# Patient Record
Sex: MALE | Race: WHITE | HISPANIC OR LATINO | Employment: UNEMPLOYED | URBAN - METROPOLITAN AREA
[De-identification: names, ages, dates, MRNs, and addresses within clinical notes are randomized per-mention and may not be internally consistent; named-entity substitution may affect disease eponyms.]

---

## 2018-11-16 ENCOUNTER — TELEPHONE (OUTPATIENT)
Dept: ORTHOPEDICS | Facility: CLINIC | Age: 43
End: 2018-11-16

## 2018-11-16 NOTE — TELEPHONE ENCOUNTER
----- Message from Angie Salazar sent at 11/16/2018 12:03 PM CST -----  Patient called to schedule an urgent appointment specifically with Dr [facility]  And wishes to speak with a nurse regarding this matter.       he can be reached at [phone]    Thanks  KB      No answer  No voice mail set up yet not taking calls at this time

## 2018-11-19 ENCOUNTER — TELEPHONE (OUTPATIENT)
Dept: ORTHOPEDICS | Facility: CLINIC | Age: 43
End: 2018-11-19

## 2018-11-19 NOTE — TELEPHONE ENCOUNTER
----- Message from Arlette Hong sent at 11/19/2018 11:43 AM CST -----  Contact: patient  Attn Philomena  Please call pt at [phone]    Patient is returning your  Patient is having left knee pain and only     Thank you     We spoke  Right off he said he has Medicaid  Told we do not take that here   I asked what was his injury  He said meniscal tear  Told we send sports related injuries to sports medicine  He said they said to call here  I gave him our [facility] Escalation phone number for help finding a provider  He thanked me.

## 2018-12-21 ENCOUNTER — CLINICAL SUPPORT (OUTPATIENT)
Dept: LAB | Facility: HOSPITAL | Age: 43
End: 2018-12-21
Attending: ORTHOPAEDIC SURGERY
Payer: MEDICAID

## 2018-12-21 ENCOUNTER — HOSPITAL ENCOUNTER (OUTPATIENT)
Dept: RADIOLOGY | Facility: HOSPITAL | Age: 43
Discharge: HOME | End: 2018-12-21
Attending: ORTHOPAEDIC SURGERY
Payer: MEDICAID

## 2018-12-21 DIAGNOSIS — Z01.818 PREOP EXAMINATION: ICD-10-CM

## 2018-12-21 DIAGNOSIS — Z01.818 PREOP EXAMINATION: Primary | ICD-10-CM

## 2018-12-21 PROCEDURE — 93005 ELECTROCARDIOGRAM TRACING: CPT

## 2018-12-21 PROCEDURE — 93010 ELECTROCARDIOGRAM REPORT: CPT | Mod: ,,, | Performed by: STUDENT IN AN ORGANIZED HEALTH CARE EDUCATION/TRAINING PROGRAM

## 2018-12-21 PROCEDURE — 93010 EKG 12-LEAD: ICD-10-PCS | Mod: ,,, | Performed by: STUDENT IN AN ORGANIZED HEALTH CARE EDUCATION/TRAINING PROGRAM

## 2018-12-21 PROCEDURE — 71046 X-RAY EXAM CHEST 2 VIEWS: CPT | Mod: TC,FY

## 2018-12-21 PROCEDURE — 71046 X-RAY EXAM CHEST 2 VIEWS: CPT | Mod: 26,,, | Performed by: RADIOLOGY

## 2018-12-31 ENCOUNTER — ANESTHESIA EVENT (OUTPATIENT)
Dept: SURGERY | Facility: HOSPITAL | Age: 43
End: 2018-12-31
Payer: MEDICAID

## 2018-12-31 ENCOUNTER — HOSPITAL ENCOUNTER (OUTPATIENT)
Dept: PREADMISSION TESTING | Facility: HOSPITAL | Age: 43
Discharge: HOME | End: 2018-12-31
Attending: ORTHOPAEDIC SURGERY
Payer: MEDICAID

## 2018-12-31 VITALS
DIASTOLIC BLOOD PRESSURE: 78 MMHG | HEIGHT: 75 IN | WEIGHT: 203.25 LBS | OXYGEN SATURATION: 97 % | BODY MASS INDEX: 25.27 KG/M2 | HEART RATE: 104 BPM | RESPIRATION RATE: 18 BRPM | TEMPERATURE: 98 F | SYSTOLIC BLOOD PRESSURE: 132 MMHG

## 2018-12-31 DIAGNOSIS — M23.332 OTHER MENISCUS DERANGEMENTS, OTHER MEDIAL MENISCUS, LEFT KNEE: Primary | ICD-10-CM

## 2018-12-31 PROCEDURE — G8980 MOBILITY D/C STATUS: HCPCS | Mod: CH

## 2018-12-31 PROCEDURE — G8979 MOBILITY GOAL STATUS: HCPCS | Mod: CH

## 2018-12-31 PROCEDURE — 97161 PT EVAL LOW COMPLEX 20 MIN: CPT

## 2018-12-31 PROCEDURE — G8978 MOBILITY CURRENT STATUS: HCPCS | Mod: CH

## 2018-12-31 RX ORDER — NAPROXEN SODIUM 220 MG
220 TABLET ORAL
Status: ON HOLD | COMMUNITY
End: 2019-04-09 | Stop reason: HOSPADM

## 2018-12-31 RX ORDER — LIDOCAINE HYDROCHLORIDE 10 MG/ML
1 INJECTION, SOLUTION EPIDURAL; INFILTRATION; INTRACAUDAL; PERINEURAL ONCE
Status: CANCELLED | OUTPATIENT
Start: 2018-12-31 | End: 2018-12-31

## 2018-12-31 RX ORDER — SODIUM CHLORIDE, SODIUM LACTATE, POTASSIUM CHLORIDE, CALCIUM CHLORIDE 600; 310; 30; 20 MG/100ML; MG/100ML; MG/100ML; MG/100ML
INJECTION, SOLUTION INTRAVENOUS CONTINUOUS
Status: CANCELLED | OUTPATIENT
Start: 2018-12-31

## 2018-12-31 RX ORDER — TRAMADOL HYDROCHLORIDE 50 MG/1
100 TABLET ORAL EVERY 6 HOURS
Status: ON HOLD | COMMUNITY
End: 2019-04-09 | Stop reason: HOSPADM

## 2018-12-31 NOTE — DISCHARGE INSTRUCTIONS
· Arrive on  1/7/2019  at  10:30.  · Report to the 2nd floor Procedural Check In Room .   · Nothing to eat or drink after midnight the night before your procedure.  ·                                                          · Please remove all body piercings and leave all your jewelery and valuables at home .  · Please remove your contact lenses.   · Wear loose comfortable clothing.  · You will not be able to drive that day, please make arrangement for transportation to and from your procedure.  · You cannot be alone for 24 hours after anesthesia. Make arrangements as needed.  · Shower the night before your procedure and the morning of your procedure with Hibiclens antibacterial solution.  · No lotions, creams or powders  · Do not shave 3 days prior to procedure  · Report any signs or symptoms of an infection to your surgeon. Examples: urinary (bladder) infection, upper respiratory infection, skin boils.   · Practice good hand washing before, during, and after procedure.   · Stop Aspirin, Ibuprofen, Advil, Motrin, Aleve, Nuprin, Naprosyn (all NSAID Medication) or any other blood thinners 5 days before your procedure unless directed by your physician.  Also hold all over the counter vitamins and herbal supplements for 5 days prior to your procedure.  · You may take Tylenol or Acetaminophen up the day of surgery for any pain.        I have read or had read and explained to me, and understand the above information.    Additional comments or instructions:  For additional questions call [phone]        Pre-Op Bathing Instructions    Before surgery, you can play an important role in your own health.    Because skin is not sterile, we need to be sure that your skin is as free of germs as possible. By following the instructions below, you can reduce the number of germs on your skin before surgery.    IMPORTANT: You will need to shower with a special soap called Hibiclens*, available at any pharmacy, over the counter usually  in the first aid isle.  If you are allergic to Chlorhexidine (the antiseptic in Hibiclens), use an antibacterial soap such as Dial Soap for your preoperative showers.  You will shower with Hibiclens the night before and the morning of surgery. Both the night before your surgery and the morning of your surgery see steps 2 and 3.   Do not use Hibiclens on the head, face or genitals to avoid injury to those areas.    STEP #1  1.  Shower with Hibiclens solution night before and the morning of surgery.      STEP #2: THE NIGHT BEFORE YOUR SURGERY     1. Do not shave the area of your body where your surgery will be performed.  2. Wash your hair as usual with your normal  Shampoo. Wash body shoulder to toes with your normal soap.  3. Squeeze Hibiclens into hand apply to surgical site.   4. Wash the site gently for five (5) minutes. Do not scrub your skin too hard.   5. Do not wash with your regular soap after Hibiclens is used.  6. Rinse your body thoroughly.  7. Pat yourself dry with a clean, soft towel.  8. Do not use lotion, cream, or powder.  9. Wear clean clothes.    STEP #3: THE MORNING OF YOUR SURGERY     1. Repeat Step #2.    * Not to be used by people allergic to Chlorhexidine.            Anesthesia: General Anesthesia     You are watched continuously during your procedure by your anesthesia provider.     Youre due to have surgery. During surgery, youll be given medicine called anesthesia or anesthetic. This will keep you comfortable and pain-free. Your anesthesia provider will use general anesthesia.  What is general anesthesia?  General anesthesia puts you into a state like deep sleep. It goes into the bloodstream (IV anesthetics), into the lungs (gas anesthetics), or both. You feel nothing during the procedure. You will not remember it. During the procedure, the anesthesia provider monitors you continuously. He or she checks your heart rate and rhythm, blood pressure, breathing, and blood oxygen.  · IV  anesthetics. IV anesthetics are given through an IV line in your arm. Theyre often given first. This is so you are asleep before a gas anesthetic is started. Some kinds of IV anesthetics relieve pain. Others relax you. Your doctor will decide which kind is best in your case.  · Gas anesthetics. Gas anesthetics are breathed into the lungs. They are often used to keep you asleep. They can be given through a facemask or a tube placed in your larynx or trachea (breathing tube).  ¨ If you have a facemask, your anesthesia provider will most likely place it over your nose and mouth while youre still awake. Youll breathe oxygen through the mask as your IV anesthetic is started. Gas anesthetic may be added through the mask.  ¨ If you have a tube in the larynx or trachea, it will be inserted into your throat after youre asleep.  Anesthesia tools and medicines  You will likely have:  · IV anesthetics. These are put into an IV line into your bloodstream.  · Gas anesthetics. You breathe these anesthetics into your lungs, where they pass into your bloodstream.  · Pulse oximeter. This is a small clip that is attached to the end of your finger. This measures your blood oxygen level.  · Electrocardiography leads (electrodes). These are small sticky pads that are placed on your chest. They record your heart rate and rhythm.  · Blood pressure cuff. This reads your blood pressure.  Risks and possible complications  General anesthesia has some risks. These include:  · Breathing problems  · Nausea and vomiting  · Sore throat or hoarseness (usually temporary)  · Allergic reaction to the anesthetic  · Irregular heartbeat (rare)  · Cardiac arrest (rare)   Anesthesia safety  · Follow all instructions you are given for how long not to eat or drink before your procedure.  · Be sure your doctor knows what medicines and drugs you take. This includes over-the-counter medicines, herbs, supplements, alcohol or other drugs. You will be asked  when those were last taken.  · Have an adult family member or friend drive you home after the procedure.  · For the first 24 hours after your surgery:  ¨ Do not drive or use heavy equipment.  ¨ Do not make important decisions or sign legal documents. If important decisions or signing legal documents is necessary during the first 24 hours after surgery, have a trusted family member or spouse act on your behalf.  ¨ Avoid alcohol.  ¨ Have a responsible adult stay with you. He or she can watch for problems and help keep you safe.  Date Last Reviewed: 12/1/2016  © 0445-9736 [employer]. [address] [place] [address], [place] [address]. All rights reserved. This information is not intended as a substitute for professional medical care. Always follow your healthcare professional's instructions.

## 2018-12-31 NOTE — PT/OT/SLP PROGRESS
Physical Therapy Crutch Evaluation/Training  Discharge  Nilesh Rolon Jr.   MRN: [id number]   Admitting Diagnosis: <principal problem not specified> The encounter diagnosis was Other meniscus derangements, other medial meniscus, left knee.  Pre op training/visit                          Billable Minutes:930 to 945  Evaluation 15     Order: gait training with axillary crutches  Order Date: 12/31/2018    Precautions Weight Bearing Status: weight bearing as tolerated: left leg    There is no problem list on file for this patient.    Past Medical History:   Diagnosis Date    Arthritis      History reviewed. No pertinent surgical history.    Subjective Information     Prior level of function: independent  Residence: lives with their family 1-story house/ trailer   Support available: family  Equipment owned: None  Mental Status: Oriented X 4  Skin: Intact  Sensation: Intact  Posture: Good  Hearing: Intact  Vision:  Intact    Pain at time of assessment: 3/10 located in left leg, aggravated by activity , relieved by rest.    Objective findings/Assessment  Bed mobility: independent  Transfers: independent  Gross assessment: WFL  Standing balance: Good  ROM: WFL  Strength: WFL  Patient requires crutch fitting and training.    Treatment  Gait: Instructed in WBAT gait with axillary crutches  Stairs: Teach back method  Transfers: na  Therapeutic Exercise: na  Education provided in form of: stair climbing    AM-PAC 6 CLICK MOBILITY  How much help from another person does this patient currently need?   1 = Unable, Total/Dependent Assistance  2 = A lot, Maximum/Moderate Assistance  3 = A little, Minimum/Contact Guard/Supervision  4 = None, Modified [place]/Independent          AM-PAC Raw Score CMS G-Code Modifier Level of Impairment Assistance   6 % Total / Unable   7 - 9 CM 80 - 100% Maximal Assist   10 - 14 CL 60 - 80% Moderate Assist   15 - 19 CK 40 - 60% Moderate Assist   20 - 22 CJ 20 - 40% Minimal Assist   23  CI 1-20% SBA / CGA   24 CH 0% Independent/ Mod I     Goals/Discharge Status  Patient safely and effectively ambulates with crutches with  modified independent,  weight bearing as tolerated: left leg on level surfaces.    Recommended Plan:  Patient to be discharged to home with family  support.    Merlin Elise, PT  12/31/2018

## 2019-01-07 ENCOUNTER — HOSPITAL ENCOUNTER (OUTPATIENT)
Facility: HOSPITAL | Age: 44
Discharge: HOME | End: 2019-01-07
Attending: ORTHOPAEDIC SURGERY | Admitting: ORTHOPAEDIC SURGERY
Payer: MEDICAID

## 2019-01-07 ENCOUNTER — ANESTHESIA (OUTPATIENT)
Dept: SURGERY | Facility: HOSPITAL | Age: 44
End: 2019-01-07
Payer: MEDICAID

## 2019-01-07 VITALS
OXYGEN SATURATION: 93 % | WEIGHT: 203 LBS | TEMPERATURE: 98 F | SYSTOLIC BLOOD PRESSURE: 123 MMHG | HEIGHT: 75 IN | HEART RATE: 102 BPM | DIASTOLIC BLOOD PRESSURE: 78 MMHG | BODY MASS INDEX: 25.24 KG/M2 | RESPIRATION RATE: 16 BRPM

## 2019-01-07 DIAGNOSIS — M23.332 OTHER MENISCUS DERANGEMENTS, OTHER MEDIAL MENISCUS, LEFT KNEE: Primary | ICD-10-CM

## 2019-01-07 PROCEDURE — 01382 ANES DX ARTHRS PX KNEE JT: CPT | Performed by: ORTHOPAEDIC SURGERY

## 2019-01-07 PROCEDURE — 25000003 PHARM REV CODE 250: Performed by: STUDENT IN AN ORGANIZED HEALTH CARE EDUCATION/TRAINING PROGRAM

## 2019-01-07 PROCEDURE — 71000015 HC POSTOP RECOV 1ST HR: Performed by: ORTHOPAEDIC SURGERY

## 2019-01-07 PROCEDURE — 25000003 PHARM REV CODE 250: Performed by: ORTHOPAEDIC SURGERY

## 2019-01-07 PROCEDURE — 63600175 PHARM REV CODE 636 W HCPCS: Performed by: STUDENT IN AN ORGANIZED HEALTH CARE EDUCATION/TRAINING PROGRAM

## 2019-01-07 PROCEDURE — 37000009 HC ANESTHESIA EA ADD 15 MINS: Performed by: ORTHOPAEDIC SURGERY

## 2019-01-07 PROCEDURE — 36000710: Performed by: ORTHOPAEDIC SURGERY

## 2019-01-07 PROCEDURE — 37000008 HC ANESTHESIA 1ST 15 MINUTES: Performed by: ORTHOPAEDIC SURGERY

## 2019-01-07 PROCEDURE — 71000039 HC RECOVERY, EACH ADD'L HOUR: Performed by: ORTHOPAEDIC SURGERY

## 2019-01-07 PROCEDURE — 71000033 HC RECOVERY, INTIAL HOUR: Performed by: ORTHOPAEDIC SURGERY

## 2019-01-07 PROCEDURE — S0020 INJECTION, BUPIVICAINE HYDRO: HCPCS | Performed by: ORTHOPAEDIC SURGERY

## 2019-01-07 PROCEDURE — 63600175 PHARM REV CODE 636 W HCPCS: Performed by: ORTHOPAEDIC SURGERY

## 2019-01-07 PROCEDURE — 36000711: Performed by: ORTHOPAEDIC SURGERY

## 2019-01-07 RX ORDER — EPINEPHRINE 1 MG/ML
INJECTION, SOLUTION INTRACARDIAC; INTRAMUSCULAR; INTRAVENOUS; SUBCUTANEOUS
Status: DISCONTINUED | OUTPATIENT
Start: 2019-01-07 | End: 2019-01-07 | Stop reason: HOSPADM

## 2019-01-07 RX ORDER — CEFAZOLIN SODIUM 2 G/50ML
2 SOLUTION INTRAVENOUS ONCE
Status: DISCONTINUED | OUTPATIENT
Start: 2019-01-07 | End: 2019-01-07 | Stop reason: HOSPADM

## 2019-01-07 RX ORDER — SODIUM CHLORIDE, SODIUM LACTATE, POTASSIUM CHLORIDE, CALCIUM CHLORIDE 600; 310; 30; 20 MG/100ML; MG/100ML; MG/100ML; MG/100ML
INJECTION, SOLUTION INTRAVENOUS CONTINUOUS PRN
Status: DISCONTINUED | OUTPATIENT
Start: 2019-01-07 | End: 2019-01-07

## 2019-01-07 RX ORDER — MIDAZOLAM HYDROCHLORIDE 1 MG/ML
INJECTION, SOLUTION INTRAMUSCULAR; INTRAVENOUS
Status: DISCONTINUED | OUTPATIENT
Start: 2019-01-07 | End: 2019-01-07

## 2019-01-07 RX ORDER — SODIUM CHLORIDE 0.9 % (FLUSH) 0.9 %
3 SYRINGE (ML) INJECTION EVERY 8 HOURS
Status: DISCONTINUED | OUTPATIENT
Start: 2019-01-07 | End: 2019-01-07 | Stop reason: HOSPADM

## 2019-01-07 RX ORDER — ONDANSETRON 2 MG/ML
INJECTION INTRAMUSCULAR; INTRAVENOUS
Status: DISCONTINUED | OUTPATIENT
Start: 2019-01-07 | End: 2019-01-07

## 2019-01-07 RX ORDER — PHENYLEPHRINE HYDROCHLORIDE 10 MG/ML
INJECTION INTRAVENOUS
Status: DISCONTINUED | OUTPATIENT
Start: 2019-01-07 | End: 2019-01-07

## 2019-01-07 RX ORDER — SODIUM CHLORIDE 0.9 % (FLUSH) 0.9 %
3 SYRINGE (ML) INJECTION
Status: DISCONTINUED | OUTPATIENT
Start: 2019-01-07 | End: 2019-01-07 | Stop reason: HOSPADM

## 2019-01-07 RX ORDER — BUPIVACAINE HYDROCHLORIDE 5 MG/ML
INJECTION, SOLUTION EPIDURAL; INTRACAUDAL
Status: DISCONTINUED | OUTPATIENT
Start: 2019-01-07 | End: 2019-01-07 | Stop reason: HOSPADM

## 2019-01-07 RX ORDER — FENTANYL CITRATE 50 UG/ML
INJECTION, SOLUTION INTRAMUSCULAR; INTRAVENOUS
Status: DISCONTINUED | OUTPATIENT
Start: 2019-01-07 | End: 2019-01-07

## 2019-01-07 RX ORDER — LIDOCAINE HYDROCHLORIDE 10 MG/ML
1 INJECTION, SOLUTION EPIDURAL; INFILTRATION; INTRACAUDAL; PERINEURAL ONCE
Status: DISCONTINUED | OUTPATIENT
Start: 2019-01-07 | End: 2019-01-07 | Stop reason: HOSPADM

## 2019-01-07 RX ORDER — LIDOCAINE HCL/PF 100 MG/5ML
SYRINGE (ML) INTRAVENOUS
Status: DISCONTINUED | OUTPATIENT
Start: 2019-01-07 | End: 2019-01-07

## 2019-01-07 RX ORDER — CEFAZOLIN SODIUM 2 G/50ML
2 SOLUTION INTRAVENOUS
Status: COMPLETED | OUTPATIENT
Start: 2019-01-07 | End: 2019-01-07

## 2019-01-07 RX ORDER — HYDROMORPHONE HYDROCHLORIDE 2 MG/ML
0.2 INJECTION, SOLUTION INTRAMUSCULAR; INTRAVENOUS; SUBCUTANEOUS EVERY 5 MIN PRN
Status: DISCONTINUED | OUTPATIENT
Start: 2019-01-07 | End: 2019-01-07 | Stop reason: HOSPADM

## 2019-01-07 RX ORDER — SODIUM CHLORIDE, SODIUM LACTATE, POTASSIUM CHLORIDE, CALCIUM CHLORIDE 600; 310; 30; 20 MG/100ML; MG/100ML; MG/100ML; MG/100ML
INJECTION, SOLUTION INTRAVENOUS CONTINUOUS
Status: DISCONTINUED | OUTPATIENT
Start: 2019-01-07 | End: 2019-01-07 | Stop reason: HOSPADM

## 2019-01-07 RX ORDER — OXYCODONE AND ACETAMINOPHEN 5; 325 MG/1; MG/1
1 TABLET ORAL
Status: DISCONTINUED | OUTPATIENT
Start: 2019-01-07 | End: 2019-01-07 | Stop reason: HOSPADM

## 2019-01-07 RX ORDER — SUCCINYLCHOLINE CHLORIDE 20 MG/ML
INJECTION INTRAMUSCULAR; INTRAVENOUS
Status: DISCONTINUED | OUTPATIENT
Start: 2019-01-07 | End: 2019-01-07

## 2019-01-07 RX ORDER — OXYCODONE AND ACETAMINOPHEN 5; 325 MG/1; MG/1
1 TABLET ORAL EVERY 4 HOURS PRN
Qty: 10 TABLET | Refills: 0 | Status: SHIPPED | OUTPATIENT
Start: 2019-01-07 | End: 2019-01-14

## 2019-01-07 RX ORDER — PROPOFOL 10 MG/ML
INJECTION, EMULSION INTRAVENOUS
Status: DISCONTINUED | OUTPATIENT
Start: 2019-01-07 | End: 2019-01-07

## 2019-01-07 RX ORDER — ONDANSETRON 2 MG/ML
4 INJECTION INTRAMUSCULAR; INTRAVENOUS ONCE
Status: DISCONTINUED | OUTPATIENT
Start: 2019-01-07 | End: 2019-01-07 | Stop reason: HOSPADM

## 2019-01-07 RX ADMIN — OXYCODONE HYDROCHLORIDE AND ACETAMINOPHEN 1 TABLET: 5; 325 TABLET ORAL at 08:01

## 2019-01-07 RX ADMIN — SUCCINYLCHOLINE CHLORIDE 100 MG: 20 INJECTION, SOLUTION INTRAMUSCULAR; INTRAVENOUS at 07:01

## 2019-01-07 RX ADMIN — CEFAZOLIN SODIUM 2 G: 2 SOLUTION INTRAVENOUS at 07:01

## 2019-01-07 RX ADMIN — PHENYLEPHRINE HYDROCHLORIDE 200 MCG: 10 INJECTION INTRAVENOUS at 07:01

## 2019-01-07 RX ADMIN — PHENYLEPHRINE HYDROCHLORIDE 100 MCG: 10 INJECTION INTRAVENOUS at 07:01

## 2019-01-07 RX ADMIN — ONDANSETRON 4 MG: 2 INJECTION, SOLUTION INTRAMUSCULAR; INTRAVENOUS at 07:01

## 2019-01-07 RX ADMIN — SODIUM CHLORIDE, SODIUM LACTATE, POTASSIUM CHLORIDE, AND CALCIUM CHLORIDE: .6; .31; .03; .02 INJECTION, SOLUTION INTRAVENOUS at 07:01

## 2019-01-07 RX ADMIN — PROPOFOL 150 MG: 10 INJECTION, EMULSION INTRAVENOUS at 07:01

## 2019-01-07 RX ADMIN — MIDAZOLAM 2 MG: 1 INJECTION INTRAMUSCULAR; INTRAVENOUS at 07:01

## 2019-01-07 RX ADMIN — FENTANYL CITRATE 100 MCG: 50 INJECTION, SOLUTION INTRAMUSCULAR; INTRAVENOUS at 07:01

## 2019-01-07 RX ADMIN — LIDOCAINE HYDROCHLORIDE 80 MG: 20 INJECTION, SOLUTION INTRAVENOUS at 07:01

## 2019-01-07 RX ADMIN — PROPOFOL 50 MG: 10 INJECTION, EMULSION INTRAVENOUS at 07:01

## 2019-01-07 NOTE — BRIEF OP NOTE
[facility]  Brief Operative Note     SUMMARY     Surgery Date: 1/7/2019     Surgeon(s) and Role:     * Derick Kirby MD - Primary    Assisting Surgeon: Jose Ulloa    Pre-op Diagnosis:  Other meniscus derangements, other medial meniscus, left knee [M23.332]    Post-op Diagnosis:  Post-Op Diagnosis Codes:  Medial femoral condyle chondral defect    Procedures:  ATS Knee scope    Anesthesia: General    Description of the findings of the procedure: Meniscus intact, Medial femoral condyle chondral defect    Findings/Key Components: Medial femoral condyle chondral defect    Estimated Blood Loss: 1cc         Specimens:   Specimen (12h ago, onward)    None          Discharge Note    SUMMARY     Admit Date: 1/7/2019    Discharge Date and Time:  01/07/2019 7:48 AM    Hospital Course (synopsis of major diagnoses, care, treatment, and services provided during the course of the hospital stay): Left knee pain pre-operatively admitted to same day surgery for Left Knee ATS.  Pt tolerated procedure well. Medial femoral condyle chondral defect noted. Meniscus intact. Pt discharged home same day    Final Diagnosis: Post-Op Diagnosis Codes:   Medial femoral condyle chondral defect    Disposition: Home or Self Care    Follow Up/Patient Instructions:     Medications:  Reconciled Home Medications:      Medication List      START taking these medications    oxyCODONE-acetaminophen 5-325 mg per tablet  Commonly known as:  PERCOCET  Take 1 tablet by mouth every 4 (four) hours as needed for Pain (q4-6hr PRN pn).        CONTINUE taking these medications    multivitamin capsule  Take 1 capsule by mouth once daily.     naproxen sodium 220 MG tablet  Commonly known as:  ANAPROX  Take 220 mg by mouth every 12 (twelve) hours.     traMADol 50 mg tablet  Commonly known as:  ULTRAM  Take 100 mg by mouth every 6 (six) hours.          Discharge Procedure Orders   Keep surgical extremity elevated     Ice to affected area     Activity  as tolerated     Follow-up Information     Derick Kirby MD In 2 weeks.    Specialty:  Orthopedic Surgery  Contact information:  [address] [address] [phone]

## 2019-01-07 NOTE — DISCHARGE INSTRUCTIONS
After Knee Arthroscopy  After surgery, your joint may be swollen, painful, and stiff. Your recovery time will depend on what was done. Your surgeon will tell you when to resume activity and weight bearing. If you had meniscal cartilage or loose bodies removed, you may be told to bear weight early on.    Patient will get a phone call from [facility] in regards to when and where to go to PHYSICAL THERAPY.        Follow your surgeons guidelines for healing:  · Elevate your knee as much as possible and ice your knee for 20 minutes. then allow at least 20 minutes before the next icing session.  · Limit weight-bearing, if instructed to do so.  · Keep your knee bandaged according to your surgeon's instructions.  · When you shower, wrap your knee with plastic to keep it dry.  · Take pain medicine as directed.  Your checklist  The checklist below helps remind you what to do after arthroscopy.  ? Schedule your first follow-up visit for 5 days after surgery or as directed by your surgeon.  ? Take care of your incision and bathe as directed.  ? Complete your physical therapy program.  ? Talk with your surgeon about activities you can do immediately and those that need to wait.  Contact your surgeon right away if you have any of the following:  · Fever  · Chills  · Persistent warmth or redness around the knee  · Persistent or increased pain  · Significant swelling in your knee  · Increasing pain in your calf muscle  Date Last Reviewed: 9/22/2015  © 3422-2573 [employer]. [address] [place] [address]. All rights reserved. This information is not intended as a substitute for professional medical care. Always follow your healthcare professional's instructions.          ANESTHESIA  -For the first 24 hours after surgery:  Do not drive, use heavy equipment, make important decisions, or drink alcohol  -It is normal to feel sleepy for several hours.  Rest until you are more awake.  -Have someone stay with you,  if needed.  They can watch for problems and help keep you safe.  -Some possible post anesthesia side effects include: nausea and vomiting, sore throat and hoarseness, sleepiness, and dizziness.    PAIN  -If you have pain after surgery, pain medicine will help you feel better.  Take it as directed, before pain becomes severe.  Most pain relievers taken by mouth need at least 20-30 minutes to start working.  -Do not drive or drink alcohol while taking pain medicine.  -Pain medication can upset your stomach.  Taking them with a little food may help.  -Other ways to help control pain: elevation, ice, and relaxation  -Call your surgeon if still having unmanageable pain an hour after taking pain medicine.  -Pain medicine can cause constipation.  Taking an over-the counter stool softener while on prescription pain medicine and drinking plenty of fluids can prevent this side effect.  -Call your surgeon if you have severe side effects like: breathing problems, trouble waking up, dizziness, confusion, or severe constipation.    NAUSEA  -Some people have nausea after surgery.  This is often because of anesthesia, pain, pain medicine, or the stress of surgery.  -Do not push yourself to eat.  Start off with clear liquids and soup.  Slowly move to solid foods.  Don't eat fatty, rich, spicy foods at first.  Eat smaller amounts.  -If you develop persistent nausea and vomiting please notify your surgeon immediately.    BLEEDING  -Different types of surgery require different types of care and dressing changes.  It is important to follow all instructions and advice from your surgeon.  Change dressing as directed.  Call your surgeon for any concerns regarding postop bleeding.    SIGNS OF INFECTION  -Signs of infection include: fever, swelling, drainage, and redness  -Notify your surgeon if you have a fever of 100.4 F (38.0 C) or higher.  -Notify your surgeon if you notice redness, swelling, increased pain, pus, or a foul smell at the  incision site.      Acetaminophen; Oxycodone tablets  What is this medicine?  ACETAMINOPHEN; OXYCODONE (a set a KURTIS samm fen; ox i KOE done) is a pain reliever. It is used to treat moderate to severe pain.  How should I use this medicine?  Take this medicine by mouth with a full glass of water. Follow the directions on the prescription label. You can take it with or without food. If it upsets your stomach, take it with food. Take your medicine at regular intervals. Do not take it more often than directed.  A special MedGuide will be given to you by the pharmacist with each prescription and refill. Be sure to read this information carefully each time.  Talk to your pediatrician regarding the use of this medicine in children. Special care may be needed.  What side effects may I notice from receiving this medicine?  Side effects that you should report to your doctor or health care professional as soon as possible:  · allergic reactions like skin rash, itching or hives, swelling of the face, lips, or tongue  · breathing problems  · confusion  · redness, blistering, peeling or loosening of the skin, including inside the mouth  · signs and symptoms of liver injury like dark yellow or brown urine; general ill feeling or flu-like symptoms; light-colored stools; loss of appetite; nausea; right upper belly pain; unusually weak or tired; yellowing of the eyes or skin  · signs and symptoms of low blood pressure like dizziness; feeling faint or lightheaded, falls; unusually weak or tired  · trouble passing urine or change in the amount of urine  Side effects that usually do not require medical attention (report to your doctor or health care professional if they continue or are bothersome):  · constipation  · dry mouth  · nausea, vomiting  · tiredness  What may interact with this medicine?  This medicine may interact with the following medications:  · alcohol  · antihistamines for allergy, cough and cold  · antiviral medicines for  HIV or AIDS  · atropine  · certain antibiotics like clarithromycin, erythromycin, linezolid, rifampin  · certain medicines for anxiety or sleep  · certain medicines for bladder problems like oxybutynin, tolterodine  · certain medicines for depression like amitriptyline, fluoxetine, sertraline  · certain medicines for fungal infections like ketoconazole, itraconazole, voriconazole  · certain medicines for migraine headache like almotriptan, eletriptan, frovatriptan, naratriptan, rizatriptan, sumatriptan, zolmitriptan  · certain medicines for nausea or vomiting like dolasetron, ondansetron, palonosetron  · certain medicines for Parkinson's disease like benztropine, trihexyphenidyl  · certain medicines for seizures like phenobarbital, phenytoin, primidone  · certain medicines for stomach problems like dicyclomine, hyoscyamine  · certain medicines for travel sickness like scopolamine  · diuretics  · general anesthetics like halothane, isoflurane, methoxyflurane, propofol  · ipratropium  · local anesthetics like lidocaine, pramoxine, tetracaine  · MAOIs like Carbex, Eldepryl, Marplan, Nardil, and Parnate  · medicines that relax muscles for surgery  · methylene blue  · nilotinib  · other medicines with acetaminophen  · other narcotic medicines for pain or cough  · phenothiazines like chlorpromazine, mesoridazine, prochlorperazine, thioridazine  What if I miss a dose?  If you miss a dose, take it as soon as you can. If it is almost time for your next dose, take only that dose. Do not take double or extra doses.  Where should I keep my medicine?  Keep out of the reach of children. This medicine can be abused. Keep your medicine in a safe place to protect it from theft. Do not share this medicine with anyone. Selling or giving away this medicine is dangerous and against the law.  This medicine may cause accidental overdose and death if it taken by other adults, children, or pets. Mix any unused medicine with a substance like  cat litter or coffee grounds. Then throw the medicine away in a sealed container like a sealed bag or a coffee can with a lid. Do not use the medicine after the expiration date.  Store at room temperature between 20 and 25 degrees C (68 and 77 degrees F).  What should I tell my health care provider before I take this medicine?  They need to know if you have any of these conditions:  · brain tumor  · Crohn's disease, inflammatory bowel disease, or ulcerative colitis  · drug abuse or addiction  · head injury  · heart or circulation problems  · if you often drink alcohol  · kidney disease or problems going to the bathroom  · liver disease  · lung disease, asthma, or breathing problems  · an unusual or allergic reaction to acetaminophen, oxycodone, other opioid analgesics, other medicines, foods, dyes, or preservatives  · pregnant or trying to get pregnant  · breast-feeding  What should I watch for while using this medicine?  Tell your doctor or health care professional if your pain does not go away, if it gets worse, or if you have new or a different type of pain. You may develop tolerance to the medicine. Tolerance means that you will need a higher dose of the medication for pain relief. Tolerance is normal and is expected if you take this medicine for a long time.  Do not suddenly stop taking your medicine because you may develop a severe reaction. Your body becomes used to the medicine. This does NOT mean you are addicted. Addiction is a behavior related to getting and using a drug for a non-medical reason. If you have pain, you have a medical reason to take pain medicine. Your doctor will tell you how much medicine to take. If your doctor wants you to stop the medicine, the dose will be slowly lowered over time to avoid any side effects.  There are different types of narcotic medicines (opiates). If you take more than one type at the same time or if you are taking another medicine that also causes drowsiness, you  may have more side effects. Give your health care provider a list of all medicines you use. Your doctor will tell you how much medicine to take. Do not take more medicine than directed. Call emergency for help if you have problems breathing or unusual sleepiness.  Do not take other medicines that contain acetaminophen with this medicine. Always read labels carefully. If you have questions, ask your doctor or pharmacist.  If you take too much acetaminophen get medical help right away. Too much acetaminophen can be very dangerous and cause liver damage. Even if you do not have symptoms, it is important to get help right away.  You may get drowsy or dizzy. Do not drive, use machinery, or do anything that needs mental alertness until you know how this medicine affects you. Do not stand or sit up quickly, especially if you are an older patient. This reduces the risk of dizzy or fainting spells. Alcohol may interfere with the effect of this medicine. Avoid alcoholic drinks.  The medicine will cause constipation. Try to have a bowel movement at least every 2 to 3 days. If you do not have a bowel movement for 3 days, call your doctor or health care professional.  Your mouth may get dry. Chewing sugarless gum or sucking hard candy, and drinking plenty or water may help. Contact your doctor if the problem does not go away or is severe.  NOTE:This sheet is a summary. It may not cover all possible information. If you have questions about this medicine, talk to your doctor, pharmacist, or health care provider. Copyright© 2017 [employer]

## 2019-01-07 NOTE — TRANSFER OF CARE
"Anesthesia Transfer of Care Note    Patient: Nilesh Rolon Jr.    Procedure(s) Performed: Procedure(s) (LRB):  ARTHROSCOPY, KNEE, WITH MENISCECTOMY (Left)  ARTHROSCOPY, KNEE (Left)    Patient location: PACU    Anesthesia Type: general    Transport from OR: Transported from OR on 6-10 L/min O2 by face mask with adequate spontaneous ventilation    Post pain: adequate analgesia    Post assessment: no apparent anesthetic complications    Post vital signs: stable    Level of consciousness: awake, alert and oriented    Nausea/Vomiting: no nausea/vomiting    Complications: none    Transfer of care protocol was followed      Last vitals:   Visit Vitals  /69   Pulse 108   Temp 37 °C (98.6 °F) (Temporal)   Resp 16   Ht 6' 3" (1.905 m)   Wt 92.1 kg (203 lb)   SpO2 100%   BMI 25.37 kg/m²     "

## 2019-01-07 NOTE — PLAN OF CARE
9483 patient OOB ambulated to bathroom with crutches. Patient voided with out difficulty. Pharmacy at  delivering pain medication.

## 2019-01-07 NOTE — OP NOTE
DATE OF PROCEDURE:  01/07/2019.    PREOPERATIVE DIAGNOSIS:  Possible left knee meniscal tear.    POSTOPERATIVE DIAGNOSIS:  OCD lesion, medial femoral condyle.    PROCEDURE:  Diagnostic arthroscopy.    ATTENDING SURGEON:  Derick Kirby M.D.    ASSISTANT:  Dr. Ulloa.    COMPLICATIONS:  None.    IMPLANTS:  None.    INDICATIONS:  This is a 43-year-old gentleman with mechanical knee pain.  MRI   revealed possible tear of the meniscus.  Operative and nonoperative management   discussed with the patient.  We did discuss the fact that arthroscopy hopefully   will help his symptoms related to any potential meniscal pathology; however, it   may not necessarily address any symptoms related to degenerative arthritis.  He   understood this and agreed to move forward with arthroscopy.    FINDINGS:  The patient did have a small 1 to 2 cm area of articular cartilage   fraying on the medial aspect of the medial femoral condyle in a nonweightbearing   area along the medial edge of the trochlea.    PROCEDURE IN DETAIL:  The patient was brought to the Operating Room and placed   supine on the operative table.  General anesthesia was induced without any   difficulties.  Left knee was placed in leg cordova.  Foot table flexed.    Posterior aspect of right knee was well padded.  Prior to incision, proper site   and procedure as well as antibiotic administration were verified.  Anterolateral   and anteromedial portal sites were injected Marcaine with epinephrine.  A #11   blade was used to establish anterolateral portals and dilated using a trocar and   cannula and camera placed in suprapatellar pouch.  Undersurface of the patella   was visualized, which showed no significant articular cartilage wear, grade I   changes on the patella as well as trochlea.  Knee was then flexed, medial   compartment visualized.  Spinal needle was used to establish anteromedial   portals, then created using #11 blade and dilated using a trocar.  Knee was then    placed in valgus position and medial compartment probed.  Medial meniscus was   intact.  No tears or disruptions.  Articular surface of the tibial plateau and   femoral condyle showed grade I softening.  Knee was then flexed, ACL visualized.    This was intact.  No tears or disruptions.  Knee was then placed in   figure-of-four position and lateral compartment visualized.  Lateral meniscus   was intact.  No tears or disruptions either, grade I softening on the femur and   tibia.  We then performed more of an exploratory arthroscopy, went into the   medial and lateral gutters and found no loose bodies or any pathology; however   examining the entire aspect of the medial femoral condyle on the peripheral edge   of the distal aspect of the medial femoral condyle, there is an area of   articular cartilage damage, grade II/III changes, not significantly   weightbearing area.  Otherwise, no other pathology.  No plica formation either.    This articular cartilage area may be the source of his pain.  However, there   was no surgical treatment to address this.  I then placed our cannula back in   suprapatellar pouch.  All excess fluid evacuated via the cannula.  Portal sites   were then closed with Dermabond and Steri-Strips, injected Marcaine with   epinephrine.  Incisions were then dressed with sterile ABD and ACE wrap.  The   patient was then extubated by Anesthesia without any difficulties.      VD/IN  dd: 01/07/2019 14:26:58 (CST)  td: 01/07/2019 15:56:15 (CST)  Doc ID   #[id number]  Job ID #[id number]    CC:

## 2019-01-07 NOTE — ANESTHESIA POSTPROCEDURE EVALUATION
"Anesthesia Post Evaluation    Patient: Nilesh Rolon     Procedure(s) Performed: Procedure(s) (LRB):  ARTHROSCOPY, KNEE, WITH MENISCECTOMY (Left)  ARTHROSCOPY, KNEE (Left)    Final Anesthesia Type: general  Patient location during evaluation: PACU  Patient participation: Yes- Able to Participate  Level of consciousness: awake and alert  Post-procedure vital signs: reviewed and stable  Pain management: adequate  Airway patency: patent  PONV status at discharge: No PONV  Anesthetic complications: no      Cardiovascular status: blood pressure returned to baseline and hemodynamically stable  Respiratory status: unassisted  Hydration status: euvolemic  Follow-up not needed.        Visit Vitals  /77   Pulse 100   Temp 36.9 °C (98.4 °F) (Temporal)   Resp 15   Ht 6' 3" (1.905 m)   Wt 92.1 kg (203 lb)   SpO2 (!) 93%   BMI 25.37 kg/m²       Pain/Maribel Score: Pain Rating Prior to Med Admin: 3 (1/7/2019  8:05 AM)  Pain Rating Post Med Admin: 0 (1/7/2019  8:47 AM)        "

## 2019-01-07 NOTE — H&P
History and Physical     History of Present Illness    mri shows men tear and oa  c/o cont pain     Allergies   · No Known Environmental Allergies   · No Known Food Allergies   · No Known Allergies    Current Meds    Medication Name Instruction   Naproxen 500 MG Oral Tablet TAKE 1 TABLET EVERY 12 HOURS WITH FOOD AS NEEDED.     Active Problems   · Acute pain of both knees (M25.561,M25.562)   · Internal derangement of left knee (M23.92)   · Localized osteoarthritis of left knee (M17.12)   · Localized osteoarthritis of right knee (M17.11)    Past Medical History   · No pertinent past medical history   · History of No pertinent past surgical history    Family History   · No pertinent family history    Social History   · Never a smoker   · No alcohol use    Review of Systems    see HPI  no dizziness     Vitals   Recorded: [id number] 09:30AM   Height 6 ft 3 in   Weight 202 lb    BMI Calculated 25.25   BSA Calculated 2.2   Systolic 129   Diastolic 88   Heart Rate 111   Respiration 18   Pain Scale 10   Location: Knee     Physical Exam    NAD  well appearing   knee left   swelling yes   gait antalgic: yes   ROM: limited  stability: stable  strength: normal     Assessment   1. Internal derangement of left knee (M23.92)    Plan    The patient presents today for Surgery. MRI revealed a tear of the meniscus as well as some degenerative arthritis of the left knee. We discussed these findings with  the patient. We did discuss arthroscopy and the fact that its role would hopefully  help the symptoms related to meniscal tear; however, would not necessarily address  any symptoms related to degenerative arthritis. The patient was also offered a course of PT first but would rather move forward with arthroscopy. The patient understands this and would like to move forward. I think that is reasonable. We spent approx 15 min face to face reviewing the risks and benefoits of surgery. We also reviewed the role of physical therapy vs  arthroscopy. Research indicates that approx 9 mos of PT will achieve similar results to the near immediate improvement with arthroscopy. The patient would like the more quick improvement compared to the delayed improvement with PT. We will go ahead and schedule this at a time that is convenient for the patient.     There are no changes to the H&P documented above.

## 2019-01-07 NOTE — PLAN OF CARE
1005 discharge instructions given Patient and family verbalized understanding. Discharged viaa wc per staff.

## 2019-04-06 ENCOUNTER — HOSPITAL ENCOUNTER (INPATIENT)
Facility: HOSPITAL | Age: 44
LOS: 3 days | Discharge: HOME | DRG: 442 | End: 2019-04-09
Attending: EMERGENCY MEDICINE | Admitting: INTERNAL MEDICINE
Payer: MEDICAID

## 2019-04-06 DIAGNOSIS — K92.2 GASTROINTESTINAL HEMORRHAGE, UNSPECIFIED GASTROINTESTINAL HEMORRHAGE TYPE: Primary | ICD-10-CM

## 2019-04-06 DIAGNOSIS — F10.939 ALCOHOL WITHDRAWAL SYNDROME WITH COMPLICATION: ICD-10-CM

## 2019-04-06 DIAGNOSIS — F10.939 ALCOHOL WITHDRAWAL: ICD-10-CM

## 2019-04-06 DIAGNOSIS — F10.930 ALCOHOL WITHDRAWAL SYNDROME WITHOUT COMPLICATION: ICD-10-CM

## 2019-04-06 DIAGNOSIS — K62.5 BRIGHT RED BLOOD PER RECTUM: ICD-10-CM

## 2019-04-06 DIAGNOSIS — D69.6 THROMBOCYTOPENIA: ICD-10-CM

## 2019-04-06 DIAGNOSIS — E87.1 HYPONATREMIA: ICD-10-CM

## 2019-04-06 DIAGNOSIS — F10.10 ALCOHOL ABUSE: ICD-10-CM

## 2019-04-06 DIAGNOSIS — D62 ACUTE BLOOD LOSS ANEMIA: ICD-10-CM

## 2019-04-06 DIAGNOSIS — K92.1 BLOODY STOOL: ICD-10-CM

## 2019-04-06 DIAGNOSIS — K70.11 ASCITES DUE TO ALCOHOLIC HEPATITIS: ICD-10-CM

## 2019-04-06 DIAGNOSIS — K70.11 ALCOHOLIC HEPATITIS WITH ASCITES: ICD-10-CM

## 2019-04-06 DIAGNOSIS — K92.1 MELENA: ICD-10-CM

## 2019-04-06 LAB
ABO + RH BLD: NORMAL
ALBUMIN SERPL BCP-MCNC: 3.3 G/DL (ref 3.5–5.2)
ALP SERPL-CCNC: 241 U/L (ref 55–135)
ALT SERPL W/O P-5'-P-CCNC: 26 U/L (ref 10–44)
AMMONIA PLAS-SCNC: 46 UMOL/L (ref 10–50)
ANION GAP SERPL CALC-SCNC: 14 MMOL/L (ref 8–16)
AST SERPL-CCNC: 144 U/L (ref 10–40)
BASOPHILS # BLD AUTO: 0.04 K/UL (ref 0–0.2)
BASOPHILS NFR BLD: 0.5 % (ref 0–1.9)
BILIRUB SERPL-MCNC: 1.7 MG/DL (ref 0.1–1)
BILIRUB UR QL STRIP: NEGATIVE
BLD GP AB SCN CELLS X3 SERPL QL: NORMAL
BUN SERPL-MCNC: 2 MG/DL (ref 6–20)
CALCIUM SERPL-MCNC: 8.7 MG/DL (ref 8.7–10.5)
CHLORIDE SERPL-SCNC: 95 MMOL/L (ref 95–110)
CLARITY UR: CLEAR
CO2 SERPL-SCNC: 23 MMOL/L (ref 23–29)
COLOR UR: YELLOW
CREAT SERPL-MCNC: 0.7 MG/DL (ref 0.5–1.4)
DIFFERENTIAL METHOD: ABNORMAL
EOSINOPHIL # BLD AUTO: 0 K/UL (ref 0–0.5)
EOSINOPHIL NFR BLD: 0.5 % (ref 0–8)
ERYTHROCYTE [DISTWIDTH] IN BLOOD BY AUTOMATED COUNT: 14.3 % (ref 11.5–14.5)
EST. GFR  (AFRICAN AMERICAN): >60 ML/MIN/1.73 M^2
EST. GFR  (NON AFRICAN AMERICAN): >60 ML/MIN/1.73 M^2
ETHANOL SERPL-MCNC: 125 MG/DL
GLUCOSE SERPL-MCNC: 109 MG/DL (ref 70–110)
GLUCOSE UR QL STRIP: NEGATIVE
HCT VFR BLD AUTO: 36.1 % (ref 40–54)
HGB BLD-MCNC: 12.7 G/DL (ref 14–18)
HGB UR QL STRIP: NEGATIVE
INR PPP: 1.2 (ref 0.8–1.2)
KETONES UR QL STRIP: NEGATIVE
LEUKOCYTE ESTERASE UR QL STRIP: NEGATIVE
LYMPHOCYTES # BLD AUTO: 1.1 K/UL (ref 1–4.8)
LYMPHOCYTES NFR BLD: 14.7 % (ref 18–48)
MCH RBC QN AUTO: 35.8 PG (ref 27–31)
MCHC RBC AUTO-ENTMCNC: 35.2 G/DL (ref 32–36)
MCV RBC AUTO: 102 FL (ref 82–98)
MONOCYTES # BLD AUTO: 1 K/UL (ref 0.3–1)
MONOCYTES NFR BLD: 12.9 % (ref 4–15)
NEUTROPHILS # BLD AUTO: 5.6 K/UL (ref 1.8–7.7)
NEUTROPHILS NFR BLD: 71.4 % (ref 38–73)
NITRITE UR QL STRIP: NEGATIVE
OB PNL STL: POSITIVE
PH UR STRIP: 8 [PH] (ref 5–8)
PLATELET # BLD AUTO: 145 K/UL (ref 150–350)
PMV BLD AUTO: 10.9 FL (ref 9.2–12.9)
POTASSIUM SERPL-SCNC: 3.5 MMOL/L (ref 3.5–5.1)
PROT SERPL-MCNC: 7.7 G/DL (ref 6–8.4)
PROT UR QL STRIP: NEGATIVE
PROTHROMBIN TIME: 12.9 SEC (ref 9–12.5)
RBC # BLD AUTO: 3.55 M/UL (ref 4.6–6.2)
SODIUM SERPL-SCNC: 132 MMOL/L (ref 136–145)
SP GR UR STRIP: <=1.005 (ref 1–1.03)
URN SPEC COLLECT METH UR: ABNORMAL
UROBILINOGEN UR STRIP-ACNC: NEGATIVE EU/DL
WBC # BLD AUTO: 7.77 K/UL (ref 3.9–12.7)

## 2019-04-06 PROCEDURE — 82272 OCCULT BLD FECES 1-3 TESTS: CPT

## 2019-04-06 PROCEDURE — 80053 COMPREHEN METABOLIC PANEL: CPT

## 2019-04-06 PROCEDURE — 85610 PROTHROMBIN TIME: CPT

## 2019-04-06 PROCEDURE — 25000003 PHARM REV CODE 250: Performed by: PHYSICIAN ASSISTANT

## 2019-04-06 PROCEDURE — 96375 TX/PRO/DX INJ NEW DRUG ADDON: CPT

## 2019-04-06 PROCEDURE — 80320 DRUG SCREEN QUANTALCOHOLS: CPT

## 2019-04-06 PROCEDURE — 81003 URINALYSIS AUTO W/O SCOPE: CPT

## 2019-04-06 PROCEDURE — 96361 HYDRATE IV INFUSION ADD-ON: CPT

## 2019-04-06 PROCEDURE — 99285 EMERGENCY DEPT VISIT HI MDM: CPT | Mod: 25

## 2019-04-06 PROCEDURE — 86850 RBC ANTIBODY SCREEN: CPT

## 2019-04-06 PROCEDURE — 85025 COMPLETE CBC W/AUTO DIFF WBC: CPT

## 2019-04-06 PROCEDURE — 82140 ASSAY OF AMMONIA: CPT

## 2019-04-06 PROCEDURE — 63600175 PHARM REV CODE 636 W HCPCS: Performed by: PHYSICIAN ASSISTANT

## 2019-04-06 PROCEDURE — 93005 ELECTROCARDIOGRAM TRACING: CPT

## 2019-04-06 PROCEDURE — 25500020 PHARM REV CODE 255: Performed by: EMERGENCY MEDICINE

## 2019-04-06 PROCEDURE — 12000002 HC ACUTE/MED SURGE SEMI-PRIVATE ROOM

## 2019-04-06 RX ORDER — THIAMINE HYDROCHLORIDE 100 MG/ML
100 INJECTION, SOLUTION INTRAMUSCULAR; INTRAVENOUS DAILY
Status: DISCONTINUED | OUTPATIENT
Start: 2019-04-07 | End: 2019-04-06

## 2019-04-06 RX ADMIN — SODIUM CHLORIDE 1000 ML: 0.9 INJECTION, SOLUTION INTRAVENOUS at 10:04

## 2019-04-06 RX ADMIN — LORAZEPAM 1 MG: 2 INJECTION INTRAMUSCULAR; INTRAVENOUS at 10:04

## 2019-04-06 RX ADMIN — IOHEXOL 100 ML: 350 INJECTION, SOLUTION INTRAVENOUS at 10:04

## 2019-04-06 RX ADMIN — SODIUM CHLORIDE 1000 ML: 0.9 INJECTION, SOLUTION INTRAVENOUS at 07:04

## 2019-04-07 PROBLEM — K92.2 GI BLEED: Status: ACTIVE | Noted: 2019-04-07

## 2019-04-07 PROBLEM — D62 ACUTE BLOOD LOSS ANEMIA: Status: ACTIVE | Noted: 2019-04-07

## 2019-04-07 PROBLEM — E87.1 HYPONATREMIA: Status: ACTIVE | Noted: 2019-04-07

## 2019-04-07 PROBLEM — R18.8 ASCITES: Status: ACTIVE | Noted: 2019-04-07

## 2019-04-07 PROBLEM — F10.10 ALCOHOL ABUSE: Status: ACTIVE | Noted: 2019-04-07

## 2019-04-07 PROBLEM — D69.6 THROMBOCYTOPENIA: Status: ACTIVE | Noted: 2019-04-07

## 2019-04-07 PROBLEM — K70.11 ALCOHOLIC HEPATITIS WITH ASCITES: Status: ACTIVE | Noted: 2019-04-07

## 2019-04-07 PROBLEM — F10.939 ALCOHOL WITHDRAWAL: Status: ACTIVE | Noted: 2019-04-07

## 2019-04-07 LAB
ALBUMIN SERPL BCP-MCNC: 2.9 G/DL (ref 3.5–5.2)
ALP SERPL-CCNC: 205 U/L (ref 55–135)
ALT SERPL W/O P-5'-P-CCNC: 22 U/L (ref 10–44)
ANION GAP SERPL CALC-SCNC: 11 MMOL/L (ref 8–16)
APAP SERPL-MCNC: <3 UG/ML (ref 10–20)
APTT BLDCRRT: 29.9 SEC (ref 21–32)
AST SERPL-CCNC: 114 U/L (ref 10–40)
BASOPHILS # BLD AUTO: 0.02 K/UL (ref 0–0.2)
BASOPHILS # BLD AUTO: 0.03 K/UL (ref 0–0.2)
BASOPHILS # BLD AUTO: 0.04 K/UL (ref 0–0.2)
BASOPHILS NFR BLD: 0.3 % (ref 0–1.9)
BASOPHILS NFR BLD: 0.5 % (ref 0–1.9)
BASOPHILS NFR BLD: 0.7 % (ref 0–1.9)
BILIRUB SERPL-MCNC: 1.9 MG/DL (ref 0.1–1)
BUN SERPL-MCNC: 2 MG/DL (ref 6–20)
CALCIUM SERPL-MCNC: 8.4 MG/DL (ref 8.7–10.5)
CHLORIDE SERPL-SCNC: 101 MMOL/L (ref 95–110)
CO2 SERPL-SCNC: 24 MMOL/L (ref 23–29)
CREAT SERPL-MCNC: 0.6 MG/DL (ref 0.5–1.4)
DIFFERENTIAL METHOD: ABNORMAL
EOSINOPHIL # BLD AUTO: 0.1 K/UL (ref 0–0.5)
EOSINOPHIL NFR BLD: 1.4 % (ref 0–8)
EOSINOPHIL NFR BLD: 1.5 % (ref 0–8)
EOSINOPHIL NFR BLD: 1.7 % (ref 0–8)
ERYTHROCYTE [DISTWIDTH] IN BLOOD BY AUTOMATED COUNT: 14.3 % (ref 11.5–14.5)
ERYTHROCYTE [DISTWIDTH] IN BLOOD BY AUTOMATED COUNT: 14.4 % (ref 11.5–14.5)
ERYTHROCYTE [DISTWIDTH] IN BLOOD BY AUTOMATED COUNT: 14.6 % (ref 11.5–14.5)
EST. GFR  (AFRICAN AMERICAN): >60 ML/MIN/1.73 M^2
EST. GFR  (NON AFRICAN AMERICAN): >60 ML/MIN/1.73 M^2
FERRITIN SERPL-MCNC: 1017 NG/ML (ref 20–300)
FOLATE SERPL-MCNC: 2.3 NG/ML (ref 4–24)
GLUCOSE SERPL-MCNC: 69 MG/DL (ref 70–110)
HCT VFR BLD AUTO: 33.9 % (ref 40–54)
HCT VFR BLD AUTO: 35.4 % (ref 40–54)
HCT VFR BLD AUTO: 35.6 % (ref 40–54)
HGB BLD-MCNC: 11.7 G/DL (ref 14–18)
HGB BLD-MCNC: 11.9 G/DL (ref 14–18)
HGB BLD-MCNC: 11.9 G/DL (ref 14–18)
INR PPP: 1.2 (ref 0.8–1.2)
IRON SERPL-MCNC: 86 UG/DL (ref 45–160)
LYMPHOCYTES # BLD AUTO: 0.7 K/UL (ref 1–4.8)
LYMPHOCYTES # BLD AUTO: 0.8 K/UL (ref 1–4.8)
LYMPHOCYTES # BLD AUTO: 0.8 K/UL (ref 1–4.8)
LYMPHOCYTES NFR BLD: 12.1 % (ref 18–48)
LYMPHOCYTES NFR BLD: 13.1 % (ref 18–48)
LYMPHOCYTES NFR BLD: 13.2 % (ref 18–48)
MCH RBC QN AUTO: 35 PG (ref 27–31)
MCH RBC QN AUTO: 35.1 PG (ref 27–31)
MCH RBC QN AUTO: 36 PG (ref 27–31)
MCHC RBC AUTO-ENTMCNC: 33.4 G/DL (ref 32–36)
MCHC RBC AUTO-ENTMCNC: 33.6 G/DL (ref 32–36)
MCHC RBC AUTO-ENTMCNC: 34.5 G/DL (ref 32–36)
MCV RBC AUTO: 104 FL (ref 82–98)
MCV RBC AUTO: 104 FL (ref 82–98)
MCV RBC AUTO: 105 FL (ref 82–98)
MONOCYTES # BLD AUTO: 0.6 K/UL (ref 0.3–1)
MONOCYTES # BLD AUTO: 0.7 K/UL (ref 0.3–1)
MONOCYTES # BLD AUTO: 0.8 K/UL (ref 0.3–1)
MONOCYTES NFR BLD: 10.3 % (ref 4–15)
MONOCYTES NFR BLD: 11.4 % (ref 4–15)
MONOCYTES NFR BLD: 12.9 % (ref 4–15)
NEUTROPHILS # BLD AUTO: 4.2 K/UL (ref 1.8–7.7)
NEUTROPHILS # BLD AUTO: 4.4 K/UL (ref 1.8–7.7)
NEUTROPHILS # BLD AUTO: 4.5 K/UL (ref 1.8–7.7)
NEUTROPHILS NFR BLD: 73 % (ref 38–73)
NEUTROPHILS NFR BLD: 73.5 % (ref 38–73)
NEUTROPHILS NFR BLD: 74.2 % (ref 38–73)
PLATELET # BLD AUTO: 129 K/UL (ref 150–350)
PLATELET # BLD AUTO: 131 K/UL (ref 150–350)
PLATELET # BLD AUTO: 136 K/UL (ref 150–350)
PMV BLD AUTO: 10.2 FL (ref 9.2–12.9)
PMV BLD AUTO: 10.2 FL (ref 9.2–12.9)
PMV BLD AUTO: 10.5 FL (ref 9.2–12.9)
POCT GLUCOSE: 178 MG/DL (ref 70–110)
POCT GLUCOSE: 78 MG/DL (ref 70–110)
POTASSIUM SERPL-SCNC: 3.3 MMOL/L (ref 3.5–5.1)
PROT SERPL-MCNC: 7.1 G/DL (ref 6–8.4)
PROTHROMBIN TIME: 12.8 SEC (ref 9–12.5)
RBC # BLD AUTO: 3.25 M/UL (ref 4.6–6.2)
RBC # BLD AUTO: 3.39 M/UL (ref 4.6–6.2)
RBC # BLD AUTO: 3.4 M/UL (ref 4.6–6.2)
SATURATED IRON: 52 % (ref 20–50)
SODIUM SERPL-SCNC: 136 MMOL/L (ref 136–145)
TOTAL IRON BINDING CAPACITY: 164 UG/DL (ref 250–450)
TRANSFERRIN SERPL-MCNC: 111 MG/DL (ref 200–375)
VIT B12 SERPL-MCNC: 1345 PG/ML (ref 210–950)
WBC # BLD AUTO: 5.69 K/UL (ref 3.9–12.7)
WBC # BLD AUTO: 5.97 K/UL (ref 3.9–12.7)
WBC # BLD AUTO: 6.01 K/UL (ref 3.9–12.7)

## 2019-04-07 PROCEDURE — 36415 COLL VENOUS BLD VENIPUNCTURE: CPT

## 2019-04-07 PROCEDURE — 63600175 PHARM REV CODE 636 W HCPCS: Performed by: STUDENT IN AN ORGANIZED HEALTH CARE EDUCATION/TRAINING PROGRAM

## 2019-04-07 PROCEDURE — C9113 INJ PANTOPRAZOLE SODIUM, VIA: HCPCS | Performed by: STUDENT IN AN ORGANIZED HEALTH CARE EDUCATION/TRAINING PROGRAM

## 2019-04-07 PROCEDURE — 85730 THROMBOPLASTIN TIME PARTIAL: CPT

## 2019-04-07 PROCEDURE — 85025 COMPLETE CBC W/AUTO DIFF WBC: CPT

## 2019-04-07 PROCEDURE — 94761 N-INVAS EAR/PLS OXIMETRY MLT: CPT

## 2019-04-07 PROCEDURE — 96374 THER/PROPH/DIAG INJ IV PUSH: CPT

## 2019-04-07 PROCEDURE — S5010 5% DEXTROSE AND 0.45% SALINE: HCPCS | Performed by: STUDENT IN AN ORGANIZED HEALTH CARE EDUCATION/TRAINING PROGRAM

## 2019-04-07 PROCEDURE — 25000003 PHARM REV CODE 250: Performed by: STUDENT IN AN ORGANIZED HEALTH CARE EDUCATION/TRAINING PROGRAM

## 2019-04-07 PROCEDURE — 80053 COMPREHEN METABOLIC PANEL: CPT

## 2019-04-07 PROCEDURE — 82746 ASSAY OF FOLIC ACID SERUM: CPT

## 2019-04-07 PROCEDURE — 27000221 HC OXYGEN, UP TO 24 HOURS

## 2019-04-07 PROCEDURE — 83540 ASSAY OF IRON: CPT

## 2019-04-07 PROCEDURE — 82607 VITAMIN B-12: CPT

## 2019-04-07 PROCEDURE — 81256 HFE GENE: CPT

## 2019-04-07 PROCEDURE — 86790 VIRUS ANTIBODY NOS: CPT

## 2019-04-07 PROCEDURE — 80074 ACUTE HEPATITIS PANEL: CPT

## 2019-04-07 PROCEDURE — 85610 PROTHROMBIN TIME: CPT

## 2019-04-07 PROCEDURE — 86706 HEP B SURFACE ANTIBODY: CPT

## 2019-04-07 PROCEDURE — 80329 ANALGESICS NON-OPIOID 1 OR 2: CPT

## 2019-04-07 PROCEDURE — 82728 ASSAY OF FERRITIN: CPT

## 2019-04-07 PROCEDURE — 20000000 HC ICU ROOM

## 2019-04-07 PROCEDURE — 86704 HEP B CORE ANTIBODY TOTAL: CPT

## 2019-04-07 RX ORDER — DEXTROSE 50 % IN WATER (D50W) INTRAVENOUS SYRINGE
25
Status: DISCONTINUED | OUTPATIENT
Start: 2019-04-07 | End: 2019-04-09 | Stop reason: HOSPADM

## 2019-04-07 RX ORDER — IBUPROFEN 200 MG
16 TABLET ORAL
Status: DISCONTINUED | OUTPATIENT
Start: 2019-04-07 | End: 2019-04-09 | Stop reason: HOSPADM

## 2019-04-07 RX ORDER — SODIUM CHLORIDE, SODIUM LACTATE, POTASSIUM CHLORIDE, CALCIUM CHLORIDE 600; 310; 30; 20 MG/100ML; MG/100ML; MG/100ML; MG/100ML
INJECTION, SOLUTION INTRAVENOUS CONTINUOUS
Status: DISCONTINUED | OUTPATIENT
Start: 2019-04-07 | End: 2019-04-07

## 2019-04-07 RX ORDER — LORAZEPAM 1 MG/1
2 TABLET ORAL EVERY 6 HOURS
Status: DISCONTINUED | OUTPATIENT
Start: 2019-04-07 | End: 2019-04-08

## 2019-04-07 RX ORDER — POTASSIUM CHLORIDE 20 MEQ/15ML
25 SOLUTION ORAL
Status: COMPLETED | OUTPATIENT
Start: 2019-04-07 | End: 2019-04-07

## 2019-04-07 RX ORDER — GLUCAGON 1 MG
1 KIT INJECTION
Status: DISCONTINUED | OUTPATIENT
Start: 2019-04-07 | End: 2019-04-09 | Stop reason: HOSPADM

## 2019-04-07 RX ORDER — POTASSIUM CHLORIDE 7.45 MG/ML
10 INJECTION INTRAVENOUS
Status: COMPLETED | OUTPATIENT
Start: 2019-04-07 | End: 2019-04-07

## 2019-04-07 RX ORDER — DEXTROSE 50 % IN WATER (D50W) INTRAVENOUS SYRINGE
12.5
Status: DISCONTINUED | OUTPATIENT
Start: 2019-04-07 | End: 2019-04-09 | Stop reason: HOSPADM

## 2019-04-07 RX ORDER — PANTOPRAZOLE SODIUM 40 MG/10ML
40 INJECTION, POWDER, LYOPHILIZED, FOR SOLUTION INTRAVENOUS 2 TIMES DAILY
Status: DISCONTINUED | OUTPATIENT
Start: 2019-04-07 | End: 2019-04-08

## 2019-04-07 RX ORDER — DIAZEPAM 10 MG/2ML
5 INJECTION INTRAMUSCULAR
Status: DISCONTINUED | OUTPATIENT
Start: 2019-04-07 | End: 2019-04-07

## 2019-04-07 RX ORDER — IBUPROFEN 200 MG
24 TABLET ORAL
Status: DISCONTINUED | OUTPATIENT
Start: 2019-04-07 | End: 2019-04-09 | Stop reason: HOSPADM

## 2019-04-07 RX ORDER — DEXTROSE MONOHYDRATE AND SODIUM CHLORIDE 5; .45 G/100ML; G/100ML
INJECTION, SOLUTION INTRAVENOUS CONTINUOUS
Status: DISCONTINUED | OUTPATIENT
Start: 2019-04-07 | End: 2019-04-09

## 2019-04-07 RX ORDER — LORAZEPAM 0.5 MG/1
0.5 TABLET ORAL EVERY 4 HOURS
Status: DISCONTINUED | OUTPATIENT
Start: 2019-04-07 | End: 2019-04-07

## 2019-04-07 RX ORDER — DIAZEPAM 5 MG/1
5 TABLET ORAL
Status: DISCONTINUED | OUTPATIENT
Start: 2019-04-07 | End: 2019-04-07

## 2019-04-07 RX ORDER — FOLIC ACID 1 MG/1
1 TABLET ORAL DAILY
Status: DISCONTINUED | OUTPATIENT
Start: 2019-04-07 | End: 2019-04-09 | Stop reason: HOSPADM

## 2019-04-07 RX ADMIN — DEXTROSE AND SODIUM CHLORIDE: 5; .45 INJECTION, SOLUTION INTRAVENOUS at 04:04

## 2019-04-07 RX ADMIN — LORAZEPAM 2 MG: 2 INJECTION INTRAMUSCULAR; INTRAVENOUS at 02:04

## 2019-04-07 RX ADMIN — LORAZEPAM 2 MG: 1 TABLET ORAL at 12:04

## 2019-04-07 RX ADMIN — LORAZEPAM 2 MG: 1 TABLET ORAL at 06:04

## 2019-04-07 RX ADMIN — LORAZEPAM 2 MG: 1 TABLET ORAL at 05:04

## 2019-04-07 RX ADMIN — POTASSIUM CHLORIDE 10 MEQ: 7.46 INJECTION, SOLUTION INTRAVENOUS at 08:04

## 2019-04-07 RX ADMIN — THIAMINE HYDROCHLORIDE 100 MG: 100 INJECTION, SOLUTION INTRAMUSCULAR; INTRAVENOUS at 11:04

## 2019-04-07 RX ADMIN — POTASSIUM CHLORIDE 10 MEQ: 7.46 INJECTION, SOLUTION INTRAVENOUS at 06:04

## 2019-04-07 RX ADMIN — OCTREOTIDE ACETATE 50 MCG/HR: 500 INJECTION, SOLUTION INTRAVENOUS; SUBCUTANEOUS at 02:04

## 2019-04-07 RX ADMIN — PANTOPRAZOLE SODIUM 40 MG: 40 INJECTION, POWDER, LYOPHILIZED, FOR SOLUTION INTRAVENOUS at 09:04

## 2019-04-07 RX ADMIN — FOLIC ACID 1 MG: 1 TABLET ORAL at 12:04

## 2019-04-07 RX ADMIN — THERA TABS 1 TABLET: TAB at 09:04

## 2019-04-07 RX ADMIN — OCTREOTIDE ACETATE 50 MCG/HR: 500 INJECTION, SOLUTION INTRAVENOUS; SUBCUTANEOUS at 11:04

## 2019-04-07 RX ADMIN — SODIUM CHLORIDE, SODIUM LACTATE, POTASSIUM CHLORIDE, AND CALCIUM CHLORIDE: .6; .31; .03; .02 INJECTION, SOLUTION INTRAVENOUS at 02:04

## 2019-04-07 RX ADMIN — THIAMINE HYDROCHLORIDE 100 MG: 100 INJECTION, SOLUTION INTRAMUSCULAR; INTRAVENOUS at 12:04

## 2019-04-07 RX ADMIN — DEXTROSE AND SODIUM CHLORIDE: 5; .45 INJECTION, SOLUTION INTRAVENOUS at 07:04

## 2019-04-07 RX ADMIN — POTASSIUM CHLORIDE 25.07 MEQ: 20 SOLUTION ORAL at 09:04

## 2019-04-07 RX ADMIN — POTASSIUM CHLORIDE 25.07 MEQ: 20 SOLUTION ORAL at 08:04

## 2019-04-07 RX ADMIN — OCTREOTIDE ACETATE 50 MCG/HR: 500 INJECTION, SOLUTION INTRAVENOUS; SUBCUTANEOUS at 07:04

## 2019-04-07 RX ADMIN — CEFTRIAXONE 1 G: 1 INJECTION, SOLUTION INTRAVENOUS at 02:04

## 2019-04-07 RX ADMIN — POTASSIUM CHLORIDE 10 MEQ: 7.46 INJECTION, SOLUTION INTRAVENOUS at 09:04

## 2019-04-07 RX ADMIN — LORAZEPAM 2 MG: 1 TABLET ORAL at 11:04

## 2019-04-07 NOTE — ED NOTES
Pt complaining of bright red and dark blood in the stool and in between bowel movements. Pt also complaining of abdominal pain from the pressure of the abdomen. Pt was diagnosed with fatty liver disease this past Friday and is worried to go to detox on Monday if he has other problems. Pt has been drinking every day for the past year and four months including today.

## 2019-04-07 NOTE — H&P
"[facility] Medicine H&P Note     Admitting Team: [facility] Hospitalist Team A  Attending Physician: Laverne Edge MD  Resident: Maksim Guajardo  Intern: Loan Bowers    Date of Admit: 4/6/2019    Chief Complaint     GI bleed x 3-4 days    Subjective:      History of Present Illness:    Nilesh Gorman Jr is a 42yo male with PMH of alcohol abuse and fatty liver. He presented to [facility] on 4/6/2019 with a primary complaint of blood in stool.    The patient was in his usual state of health until 1 week PTA when his abdomen became distended. Approx 3-4 days PTA, pt began passing 3-4 bloody stools daily, which he describes as soft with intermingled bright red liquid and black sticky components. States he to see his PCP Dr. Brannon on the day prior to presentation, when he was diagnosed with fatty liver, advised to quit drinking, and instructed to follow up with GI. Denies prior history of abdominal distension or bloody stools. He denies any associated abdominal pain, diarrhea or constipation, or vomiting. Endorses dizziness with standing since onset of bloody stools, though he denies LOC or falls. Takes two pills of Tylenol nightly "to help sleep" but denies NSAID use or tobacco use.     Patient drinks 1 L vodka daily for the last several years, with last drink occurring on the morning of presentation. Has attempted to quit before but could not because of onset of tremors. Insists he would like to quit now. Denies history seizures. At time of bedside eval by medicine housestaff, pt reports feeling anxious as if he is entering withdrawal.    Past Medical History:    EtOH abuse  Fatty liver  Possible diagnosis of hypercholesterolmia per mother    Past Surgical History:  Past Surgical History:   Procedure Laterality Date    ARTHROSCOPY, KNEE Left 1/7/2019    Performed by Derick Kirby MD at [facility] OR       Allergies:  Review of patient's allergies indicates:  No Known Allergies    Home Medications:  Prior to " "Admission medications    Medication Sig Start Date End Date Taking? Authorizing Provider   multivitamin capsule Take 1 capsule by mouth once daily.    Historical Provider, MD   naproxen sodium (ANAPROX) 220 MG tablet Take 220 mg by mouth every 12 (twelve) hours.    Historical Provider, MD   traMADol (ULTRAM) 50 mg tablet Take 100 mg by mouth every 6 (six) hours.    Historical Provider, MD       Family History:    Father: gastric ulcer  Maternal grandfather: DM        Social History:  Social History     Tobacco Use    Smoking status: Never Smoker    Smokeless tobacco: Never Used   Substance Use Topics    Alcohol use: Yes     Alcohol/week: 7.2 oz     Types: 12 Cans of beer per week    Drug use: No         Smoking status: never smoked  EtOH use: drinks 1 L vodka daily for last year  Drug use: denies        Review of Systems:  Pertinent items are noted in HPI. All other systems are reviewed and are negative.    Health Maintaince :   Primary Care Physician: Salud    Immunizations:   TDap status unknown by pt  Flu not UTD per pt  Pna not UTD per pt    Cancer Screening:  Colonoscopy: not UTD. Pt denies ever having colonoscopy    Other Screening  Screening EGD: pt denies history of EGD      Objective:   Last 24 Hour Vital Signs:  BP  Min: 105/60  Max: 133/91  Temp  Av.7 °F (37.1 °C)  Min: 98.2 °F (36.8 °C)  Max: 99.2 °F (37.3 °C)  Pulse  Av  Min: 98  Max: 113  Resp  Av  Min: 20  Max: 26  SpO2  Av.6 %  Min: 94 %  Max: 98 %  Height  Av' 3" (190.5 cm)  Min: 6' 3" (190.5 cm)  Max: 6' 3" (190.5 cm)  Weight  Av.7 kg (222 lb)  Min: 100.7 kg (222 lb)  Max: 100.7 kg (222 lb)  Body mass index is 27.75 kg/m².  No intake/output data recorded.    Physical Examination:    Gen: middle-aged male lying in bed, appears anxious and tremulous  Head: NC/AT  Eyes: PERRL. EOMI. No scleral icterus  ENT: MMM. OP non-erythematous.   Neck: supple, no adenopapthy  CV: tachycardic and regular. No m/r/g  Abdomen: " Moderately distended with shifting dullness. Nontender  Skin: Dry. no jaundice  Neuro: AAox3. CNII-XII grossly intact. Tremulous at rest. No asterixis    Laboratory:  Most Recent Data:  CBC:   Lab Results   Component Value Date    WBC 7.77 04/06/2019    HGB 12.7 (L) 04/06/2019    HCT 36.1 (L) 04/06/2019     (L) 04/06/2019     (H) 04/06/2019    RDW 14.3 04/06/2019     WBC Differential: 71.4 % N, no Bands, 14.7 % L, 12.9 % M, 0.5 % Eo, 0.5 % Baso, no additional cells seen  BMP:   Lab Results   Component Value Date     (L) 04/06/2019    K 3.5 04/06/2019    CL 95 04/06/2019    CO2 23 04/06/2019    BUN 2 (L) 04/06/2019    CREATININE 0.7 04/06/2019     04/06/2019    CALCIUM 8.7 04/06/2019     LFTs:   Lab Results   Component Value Date    PROT 7.7 04/06/2019    ALBUMIN 3.3 (L) 04/06/2019    BILITOT 1.7 (H) 04/06/2019     (H) 04/06/2019    ALKPHOS 241 (H) 04/06/2019    ALT 26 04/06/2019     Coags:   Lab Results   Component Value Date    INR 1.2 04/06/2019     FLP:   Lab Results   Component Value Date    CHOL 242 (H) 04/06/2016    HDL 40 04/06/2016    LDLCALC 146.4 04/06/2016    TRIG 278 (H) 04/06/2016    CHOLHDL 16.5 (L) 04/06/2016     DM:   Lab Results   Component Value Date    LDLCALC 146.4 04/06/2016    CREATININE 0.7 04/06/2019     Thyroid:   Lab Results   Component Value Date    TSH 1.691 04/06/2016    FREET4 0.84 04/06/2016     Anemia:   Lab Results   Component Value Date    IRON 122 04/06/2016    TIBC 293 04/06/2016    KPHRPNUE61 729 04/06/2016    FOLATE 11.6 04/06/2016     Cardiac: No results found for: TROPONINI, CKTOTAL, CKMB, BNP  Urinalysis:   Lab Results   Component Value Date    COLORU Yellow 04/06/2019    SPECGRAV <=1.005 (A) 04/06/2019    NITRITE Negative 04/06/2019    KETONESU Negative 04/06/2019    UROBILINOGEN Negative 04/06/2019       Trended Lab Data:  Recent Labs   Lab 04/06/19 1931   WBC 7.77   HGB 12.7*   HCT 36.1*   *   *   RDW 14.3   *   K 3.5    CL 95   CO2 23   BUN 2*   CREATININE 0.7      PROT 7.7   ALBUMIN 3.3*   BILITOT 1.7*   *   ALKPHOS 241*   ALT 26       Trended Cardiac Data:  No results for input(s): TROPONINI, CKTOTAL, CKMB, BNP in the last 168 hours.    Microbiology Data:  None    Other Results:  EKG (my interpretation): Normal sinus rhythm    Radiology:  Imaging Results          CT Abdomen Pelvis With Contrast (Final result)  Result time 04/06/19 23:15:55    Final result by Joe Bernard MD (04/06/19 23:15:55)                 Impression:      Hepatomegaly and morphologic changes of chronic liver disease.  Diffuse hepatic steatosis with areas of fatty sparing limits detection for focal hepatic mass.  Consider follow-up with contrast enhanced MRI for hepatocellular carcinoma screening as an outpatient.    Stigmata of portal hypertension including splenomegaly, collateral vessels, and moderate volume ascites.    Prominent right basilar subsegmental atelectasis likely secondary to elevation of the right hemidiaphragm.    Coronary artery calcifications.    Additional findings discussed in the body of the report.      Electronically signed by: Joe Bernard MD  Date:    04/06/2019  Time:    23:15             Narrative:    EXAMINATION:  CT ABDOMEN PELVIS WITH CONTRAST    CLINICAL HISTORY:  Abdominal distension;GI bleeding;    TECHNIQUE:  Low dose axial images, sagittal and coronal reformations were obtained from the lung bases to the pubic symphysis following the IV administration of 100 mL of Omnipaque 350 .  Oral contrast was not given.    COMPARISON:  Abdominal ultrasound, 04/06/2016.    FINDINGS:  Lower Chest:    Elevated right hemidiaphragm with prominent subsegmental atelectasis in the adjacent right lower lobe.  Mild left basilar subsegmental atelectasis is also present.  There is trace left pleural effusion.  Heart size is normal.  There are coronary artery calcifications.    Abdomen:    Liver is enlarged, measuring up to  25 cm in craniocaudal extent.  There are morphologic changes of chronic liver disease including lobar redistribution and minimally nodular contour.  Liver parenchyma is somewhat heterogeneous likely secondary to a patent steatosis with areas of fatty sparing.  Hepatic steatosis limits detection for underlying hepatic lesion on this single phase exam.  Follow-up with contrast enhanced MRI should be considered for hepatocellular carcinoma screening.  Gallbladder is unremarkable.  No calcified gallstones.  No intrahepatic biliary ductal dilatation. There are prominent periportal lymph nodes measuring up to 1.1 cm in short axis.    Spleen is mildly enlarged, measuring approximately 14 cm.  There is a prominent splenule adjacent to the spleen.  Small gastroesophageal and mesenteric collaterals are present.    Pancreas and adrenal glands are unremarkable.    The kidneys are symmetric.  No hydronephrosis.    No small bowel obstruction.  The appendix is normal.    No pneumoperitoneum or organized fluid collection.  There is moderate volume low-density free fluid throughout the abdomen and pelvis.    No bulky retroperitoneal lymphadenopathy.  There are mildly enlarged periportal lymph nodes, which can be seen in the setting of chronic liver disease.    Abdominal aorta is normal in caliber.    Portal, splenic, and superior mesenteric veins are patent.    Pelvis:    Urinary bladder and rectum are unremarkable.  Moderate volume pelvic free fluid.  No pelvic lymphadenopathy.    Bones and soft tissues:    No aggressive osseous lesions.  Mild degenerative changes in the lumbar spine.  Mild body wall edema.                                 Assessment:     Nilesh Rolon Jr. is a 43 y.o. male with:  Patient Active Problem List    Diagnosis Date Noted    GI bleed 04/07/2019    Alcohol withdrawal 04/07/2019    Other meniscus derangements, other medial meniscus, left knee 01/07/2019        Plan:     EtOH abuse with active  withdrawal  - pt reports drinking 1 L vodka daily, with last drink approx 12 hours PTA  - ammonia 46  - per ED report pt entered active withdrawal (EtOH level of 125). Received Ativan 1 mg, 2 L NS  - on medicine exam, pt is tachycardic, anxious-appearing and tremulous  - admit to ICU for close monitoring  - Ativan 0.5 mg PO q4h scheduled  - Valium 5 mg PO PRN for CIWA > 8  - thiamine 100 mg IV daily  - pt reports readiness to quit. Consider rehab plaement, EtOH cessation social support, possible pharmacologic support at discharge  - NS at 100 ml/hr    GI Bleed  - presents with 3-4 days bloody stools. Difficult to dilineate upper vs lower GIB based on history, as pt reports both dark sticky and bright red. Per ED ALVINO, gross red blood in rectum  - suspected etiologies includes PUD or gastritis related to heavy alcohol use.   - HDS, /91. H/H 12.7/36. Trend CBCs q12h  - IV Protonix 40 mg BID  - Consult GI. Keep NPO in anticipation of EGD    Alcoholic hepatitis vs chronic liver disease  - , ALT 26, Alk phos 241, Tbili 1.7. INR wnl, 1.1  - CT with hepatomegaly, hepatic steatosis, nodularity   - withdrawal, nutritional and hydration support as above  - EtOH cessation support as above    Anemia  - H/H 12.7/36.1,   - likely multifactorial, 2/2 acute blood loss, chronic EtOH use, possible B12 deficiency.  - pt has been type and screened  - q12h CBCs    Moderate ascites  - presents with 1 week abdominal distension.  - Shifting dullness on exam, nontender. Afebrile  - CT A/P with moderate volume free fluid throughout abdomen and pelvis  - monitor by exam.     Thrombocytopenia  - Plt 145, likely 2/2 EtOH use. CTM    IVFs: LR @ 100 ml/hr  VTE PPx: SCDs  Diet: NPO    Code: Full    Dispo: Admit to ICU for withdrawal management and GI workup      ALEX Armando MD  [facility] HO-1    [facility] Hospitalist Pager numbers:   [facility] Hospitalist Medicine Team A (Minesh/Reagan): [phone]  [facility] Hospitalist  Medicine Team B (Rebekah/Bernard):  [phone]

## 2019-04-07 NOTE — ED PROVIDER NOTES
Encounter Date: 4/6/2019       History     Chief Complaint   Patient presents with    GI Bleeding     + etoh use, last drink this am, pt complains of dark and bright red stool with abd pain x 3-4 days      43-year-old male with PMH of ETOH abuse and fatty liver disease presents to the ED with complaints of abdominal distension, dark stools, and bright red blood per rectum times 7-8 days.  Patient states the bright red blood started as just occasional specks and has gradually increased to ara red blood with bowel movements and occasionally between bowel movements.  He describes the dark stools as black and sticky.  He states the abdominal distension is accompanied by rigidity.  Patient states he saw his PCP, Dr Brannon, yesterday when he was diagnosed with fatty liver disease. His PCP instructed him to quit drinking and follow up with Gastroenterology.  He states symptoms are worsening.  He denies abdominal pain, nausea, vomiting, fevers.  Patient admits to daily ETOH consumption with Last ETOH consumption was this morning.  Patient states he is beginning rehab in 2 days.    The history is provided by the patient. No  was used.     Review of patient's allergies indicates:  No Known Allergies  Past Medical History:   Diagnosis Date    Arthritis      Past Surgical History:   Procedure Laterality Date    ARTHROSCOPY, KNEE Left 1/7/2019    Performed by Derick Kirby MD at [facility] OR     Family History   Problem Relation Age of Onset    Birth defects Neg Hx      Social History     Tobacco Use    Smoking status: Never Smoker    Smokeless tobacco: Never Used   Substance Use Topics    Alcohol use: Yes     Alcohol/week: 7.2 oz     Types: 12 Cans of beer per week    Drug use: No     Review of Systems   Constitutional: Negative for chills and fever.   Respiratory: Negative for shortness of breath.    Cardiovascular: Negative for chest pain.   Gastrointestinal: Positive for abdominal distention and blood  in stool. Negative for abdominal pain, constipation, diarrhea, nausea and vomiting.        Dark/black tarry stools   Neurological: Negative for dizziness, weakness, light-headedness and headaches.   All other systems reviewed and are negative.      Physical Exam     Initial Vitals [04/06/19 1836]   BP Pulse Resp Temp SpO2   (!) 133/91 (!) 113 (!) 21 99.2 °F (37.3 °C) 95 %      MAP       --         Physical Exam    Nursing note and vitals reviewed.  Constitutional: He appears well-developed and well-nourished. No distress.   HENT:   Head: Normocephalic and atraumatic.   Nose: Nose normal.   Eyes: Conjunctivae are normal.   Neck: Normal range of motion.   Cardiovascular: Normal rate, regular rhythm and normal heart sounds.   Pulmonary/Chest: Effort normal and breath sounds normal.   Abdominal: Soft. Bowel sounds are normal. He exhibits distension. He exhibits no fluid wave. There is no tenderness. There is rigidity. There is no rebound and no guarding.   Genitourinary: Rectal exam shows guaiac positive stool (Gross red blood on exam). Rectal exam shows no external hemorrhoid, no internal hemorrhoid and no fissure. Guaiac positive stool (Gross red blood on exam). : Acceptable.  Musculoskeletal: Normal range of motion.   Neurological: He is alert and oriented to person, place, and time.   Skin: Skin is warm and dry.   Psychiatric: He has a normal mood and affect. His speech is normal and behavior is normal. Judgment and thought content normal. Cognition and memory are normal.         ED Course   Procedures  Labs Reviewed   CBC W/ AUTO DIFFERENTIAL - Abnormal; Notable for the following components:       Result Value    RBC 3.55 (*)     Hemoglobin 12.7 (*)     Hematocrit 36.1 (*)      (*)     MCH 35.8 (*)     Platelets 145 (*)     Lymph% 14.7 (*)     All other components within normal limits   COMPREHENSIVE METABOLIC PANEL - Abnormal; Notable for the following components:    Sodium 132 (*)     BUN,  Bld 2 (*)     Albumin 3.3 (*)     Total Bilirubin 1.7 (*)     Alkaline Phosphatase 241 (*)      (*)     All other components within normal limits   PROTIME-INR - Abnormal; Notable for the following components:    Prothrombin Time 12.9 (*)     All other components within normal limits    Narrative:     PT performed on recollected sample   URINALYSIS - Abnormal; Notable for the following components:    Specific Gravity, UA <=1.005 (*)     All other components within normal limits   OCCULT BLOOD X 1, STOOL - Abnormal; Notable for the following components:    Occult Blood Positive (*)     All other components within normal limits   ALCOHOL,MEDICAL (ETHANOL) - Abnormal; Notable for the following components:    Alcohol, Medical, Serum 125 (*)     All other components within normal limits   AMMONIA   TYPE & SCREEN          Imaging Results          CT Abdomen Pelvis With Contrast (Final result)  Result time 04/06/19 23:15:55    Final result by Joe Bernard MD (04/06/19 23:15:55)                 Impression:      Hepatomegaly and morphologic changes of chronic liver disease.  Diffuse hepatic steatosis with areas of fatty sparing limits detection for focal hepatic mass.  Consider follow-up with contrast enhanced MRI for hepatocellular carcinoma screening as an outpatient.    Stigmata of portal hypertension including splenomegaly, collateral vessels, and moderate volume ascites.    Prominent right basilar subsegmental atelectasis likely secondary to elevation of the right hemidiaphragm.    Coronary artery calcifications.    Additional findings discussed in the body of the report.      Electronically signed by: Joe Bernard MD  Date:    04/06/2019  Time:    23:15             Narrative:    EXAMINATION:  CT ABDOMEN PELVIS WITH CONTRAST    CLINICAL HISTORY:  Abdominal distension;GI bleeding;    TECHNIQUE:  Low dose axial images, sagittal and coronal reformations were obtained from the lung bases to the pubic  symphysis following the IV administration of 100 mL of Omnipaque 350 .  Oral contrast was not given.    COMPARISON:  Abdominal ultrasound, 04/06/2016.    FINDINGS:  Lower Chest:    Elevated right hemidiaphragm with prominent subsegmental atelectasis in the adjacent right lower lobe.  Mild left basilar subsegmental atelectasis is also present.  There is trace left pleural effusion.  Heart size is normal.  There are coronary artery calcifications.    Abdomen:    Liver is enlarged, measuring up to 25 cm in craniocaudal extent.  There are morphologic changes of chronic liver disease including lobar redistribution and minimally nodular contour.  Liver parenchyma is somewhat heterogeneous likely secondary to a patent steatosis with areas of fatty sparing.  Hepatic steatosis limits detection for underlying hepatic lesion on this single phase exam.  Follow-up with contrast enhanced MRI should be considered for hepatocellular carcinoma screening.  Gallbladder is unremarkable.  No calcified gallstones.  No intrahepatic biliary ductal dilatation. There are prominent periportal lymph nodes measuring up to 1.1 cm in short axis.    Spleen is mildly enlarged, measuring approximately 14 cm.  There is a prominent splenule adjacent to the spleen.  Small gastroesophageal and mesenteric collaterals are present.    Pancreas and adrenal glands are unremarkable.    The kidneys are symmetric.  No hydronephrosis.    No small bowel obstruction.  The appendix is normal.    No pneumoperitoneum or organized fluid collection.  There is moderate volume low-density free fluid throughout the abdomen and pelvis.    No bulky retroperitoneal lymphadenopathy.  There are mildly enlarged periportal lymph nodes, which can be seen in the setting of chronic liver disease.    Abdominal aorta is normal in caliber.    Portal, splenic, and superior mesenteric veins are patent.    Pelvis:    Urinary bladder and rectum are unremarkable.  Moderate volume pelvic  free fluid.  No pelvic lymphadenopathy.    Bones and soft tissues:    No aggressive osseous lesions.  Mild degenerative changes in the lumbar spine.  Mild body wall edema.                                 Medical Decision Making:   Initial Assessment:   43-year-old male with abdominal distention, black tarry stools, and bright red blood per rectum times 7-8.  Last ETOH consumption was this morning.  Differential Diagnosis:   Differential Diagnosis includes, but is not limited to:  AAA, aortic dissection, mesenteric ischemia, perforated viscous, SBO/volvulus, incarcerated/strangulated hernia, intussusception, ileus, diverticulitis, esophagitis, hepatitis, pancreatitis, gastroenteritis, colitis, IBD/IBS, biliary colic, GERD, PUD, constipation, UTI/pyelonephritis,  disorder, ascites, hemorrhoids  Clinical Tests:   Lab Tests: Ordered and Reviewed  Radiological Study: Ordered and Reviewed  Medical Tests: Ordered and Reviewed  ED Management:  10:03 PM  Mild hyponatremia.  H&H 12/36 down from 13/39 months ago.  Positive occult stool.  PT time mildly elevated at 12.9.  Patient has received 1 L of IV fluids.  He has developed a slight tremor.  Additional L of fluids and IV Ativan ordered for withdrawal symptoms. CT is still pending due to emergent CTs were needed on codes strokes.   11:50 PM  CT scan shows hepatomegaly with chronic changes and Stigmata of portal hypertension including splenomegaly, collateral vessels, and moderate volume ascites.  Discussed patient with [facility] internal medicine who agree with admission and will see patient in ED.   I discussed results and treatment plan with the patient who states understanding and agreement with treatment plan.                       Clinical Impression:       ICD-10-CM ICD-9-CM   1. Gastrointestinal hemorrhage, unspecified gastrointestinal hemorrhage type K92.2 578.9   2. Bloody stool K92.1 578.1   3. Bright red blood per rectum K62.5 569.3   4. Alcohol withdrawal syndrome  without complication F10.230 291.81                                MARIAELENA ParedesC  04/06/19 [id number]

## 2019-04-07 NOTE — NURSING
MD Tr called regarding AM CMP; recommended Potassium replacement, accuchecks q6h, and a hypoglyemia protocol order; see new orders

## 2019-04-07 NOTE — PLAN OF CARE
Problem: Alcohol Withdrawal  Goal: Alcohol Withdrawal Symptom Control  Outcome: Ongoing (interventions implemented as appropriate)  Intervention: Manage Addictive Behavior (Brief Intervention)     04/07/19 0258   Promote Anxiety Reduction   Supportive Measures active listening utilized;decision-making supported;goal setting facilitated;positive reinforcement provided;problem solving facilitated;relaxation techniques promoted;self-care encouraged;self-reflection promoted;self-responsibility promoted;verbalization of feelings encouraged         Problem: Depression  Goal: Improved Mood    Intervention: Monitor and Manage Depressive Symptoms     04/07/19 0215 04/07/19 0258   Promote Anxiety Reduction   Family/Support System Care involvement promoted;presence promoted;self-care encouraged;support provided  --    Supportive Measures  --  active listening utilized;decision-making supported;goal setting facilitated;positive reinforcement provided;problem solving facilitated;relaxation techniques promoted;self-care encouraged;self-reflection promoted;self-responsibility promoted;verbalization of feelings encouraged

## 2019-04-07 NOTE — PLAN OF CARE
Problem: Bleeding (Gastrointestinal Bleeding)  Goal: Hemostasis  Outcome: Ongoing (interventions implemented as appropriate)  Intervention: Manage Gastrointestinal Bleeding     04/07/19 0259   Monitor/Manage Chemotherapy Gastrointestinal Effects   Environmental Support calm environment promoted;distractions minimized;personal routine supported;rest periods encouraged   Prevent and Manage Risk of Hemorrhage   Bleeding Management prepared for surgical intervention         Problem: Fall Injury Risk  Goal: Absence of Fall and Fall-Related Injury  Outcome: Ongoing (interventions implemented as appropriate)  Intervention: Promote Injury-Free Environment     04/07/19 0215   Optimize Balance and Safe Activity   Safety Promotion/Fall Prevention assistive device/personal item within reach;bed alarm set;Fall Risk reviewed with patient/family;Fall Risk signage in place;commode/urinal/bedpan at bedside;medications reviewed;muscle strengthening facilitated;lighting adjusted;nonskid shoes/socks when out of bed;side rails raised x 3;instructed to call staff for mobility   Optimize Ceiba and Functional Mobility   Environmental Safety Modification assistive device/personal items within reach;clutter free environment maintained;lighting adjusted;room organization consistent

## 2019-04-07 NOTE — CONSULTS
"[facility]    CC: melena    HPI 43 y.o. male with history of heavy alcohol abuse and fatty liver presents with acute, episodic, painless melena associated with drop in hemoglobin. States melena started four days ago. He states melena was intermixed with bright red blood. Last episode was two days ago. He denies abdominal pain. He used Naproxen once during this procedure. He drinks approximately one bottle of vodka per day. He denies nausea or vomiting. He denies family history of liver disease.     I have reviewed previous hospital records and summarized as above    Past Medical History  Arthritis     Past Surgical History  Left knee arthroploasty    Social History  Denies IVDU  Denies tobacco use  Drinks 1 bottle of vodka per day    Family History  Denies family history of colon cancer    Review of Systems  General ROS: positive for anxiety, negative for chills, fever or weight loss  Psychological ROS: negative for hallucination, depression or suicidal ideation  Ophthalmic ROS: negative for blurry vision, photophobia or eye pain  ENT ROS: negative for epistaxis, sore throat or rhinorrhea  Respiratory ROS: no cough, shortness of breath, or wheezing  Cardiovascular ROS: no chest pain or dyspnea on exertion  Gastrointestinal ROS: positive for melena, no abdominal pain, no nausea or vomiting  Genito-Urinary ROS: no dysuria, trouble voiding, or hematuria  Musculoskeletal ROS: negative for gait disturbance or muscular weakness  Neurological ROS: no syncope or seizures; no ataxia  Dermatological ROS: negative for pruritis, rash and jaundice    Physical Examination  BP (!) 92/54   Pulse 93   Temp 98.8 °F (37.1 °C) (Oral)   Resp (!) 38   Ht 6' 3" (1.905 m)   Wt 98.9 kg (218 lb 0.6 oz)   SpO2 95%   BMI 27.25 kg/m²   General appearance: alert, cooperative, no distress, obese  HENT: Normocephalic, atraumatic, neck symmetrical, no nasal discharge   Eyes: conjunctivae/corneas clear, PERRL, EOM's intact  Lungs: " no audible wheeze, symmetric chest rise  Heart: tachycardic rate, regular rhythm; palpable peripheral pulses  Abdomen: soft, non-tender; distended bowel sounds normoactive  Rectal: pink mucous in vault, no gross blood no melena  Extremities: extremities symmetric; no clubbing, cyanosis, or edema  Integument: Skin color, texture, turgor normal; no rashes; hair distrubution normal  Neurologic: Alert and oriented X 3, moves all extremities appopriately  Psychiatric: no pressured speech; normal affect; no evidence of impaired cognition     Labs:  H/H 11.9/35.6    Ferritin 1,017  INR 1.2    Imaging:  CT abd/pelvis: hepatic steatosis, no small bowel obstruction, ascites present    I have personally reviewed these images    Assessment:   43 year old man with heavy alcohol abuse and fatty liver presents with acute, episodic, painless melena associated with drop in hemoglobin. Labs are consistent with intrinsic liver dysfunction and likely cirrhosis as he had ascites on imaging. Ascites will need to be assessed and worked up. He has had no further episodes of melena during hospitalization.     Plan:  - Agree with high dose IV PPI and Octreotide gtt given likelihood of cirrhosis  - Agree with Rocephin 1gm daily for SBP ppx in setting of GI bleed in a cirrhotic  - Recommend diagnostic tap with cell count, culture, albumin and total protein  - Agree with supportive care. Monitor CBC, transfuse for Hgb < 7  - Likely EGD +/-colonoscopy (given bright red blood in stool) on Monday    Will discuss with Dr. Monsivais. Will continue to follow closely. Please call with any questions or concerns.    Antonieta Cortez  [facility]  Cell [phone]

## 2019-04-08 ENCOUNTER — ANESTHESIA (OUTPATIENT)
Dept: ENDOSCOPY | Facility: HOSPITAL | Age: 44
DRG: 442 | End: 2019-04-08
Payer: MEDICAID

## 2019-04-08 ENCOUNTER — ANESTHESIA EVENT (OUTPATIENT)
Dept: ENDOSCOPY | Facility: HOSPITAL | Age: 44
DRG: 442 | End: 2019-04-08
Payer: MEDICAID

## 2019-04-08 PROBLEM — K92.2 GASTROINTESTINAL HEMORRHAGE: Status: ACTIVE | Noted: 2019-04-08

## 2019-04-08 LAB
ALBUMIN FLD-MCNC: 1.5 G/DL
ALBUMIN SERPL BCP-MCNC: 2.6 G/DL (ref 3.5–5.2)
ALP SERPL-CCNC: 169 U/L (ref 55–135)
ALT SERPL W/O P-5'-P-CCNC: 21 U/L (ref 10–44)
ANION GAP SERPL CALC-SCNC: 8 MMOL/L (ref 8–16)
APPEARANCE FLD: NORMAL
AST SERPL-CCNC: 189 U/L (ref 10–40)
BASOPHILS # BLD AUTO: 0.01 K/UL (ref 0–0.2)
BASOPHILS # BLD AUTO: 0.03 K/UL (ref 0–0.2)
BASOPHILS NFR BLD: 0.2 % (ref 0–1.9)
BASOPHILS NFR BLD: 0.4 % (ref 0–1.9)
BILIRUB SERPL-MCNC: 1.8 MG/DL (ref 0.1–1)
BODY FLD TYPE: NORMAL
BODY FLUID SOURCE, LDH: NORMAL
BUN SERPL-MCNC: 2 MG/DL (ref 6–20)
CALCIUM SERPL-MCNC: 8.4 MG/DL (ref 8.7–10.5)
CHLORIDE SERPL-SCNC: 96 MMOL/L (ref 95–110)
CO2 SERPL-SCNC: 26 MMOL/L (ref 23–29)
COLOR FLD: YELLOW
CREAT SERPL-MCNC: 0.6 MG/DL (ref 0.5–1.4)
DIFFERENTIAL METHOD: ABNORMAL
DIFFERENTIAL METHOD: ABNORMAL
EOSINOPHIL # BLD AUTO: 0.1 K/UL (ref 0–0.5)
EOSINOPHIL # BLD AUTO: 0.2 K/UL (ref 0–0.5)
EOSINOPHIL NFR BLD: 1.8 % (ref 0–8)
EOSINOPHIL NFR BLD: 2.6 % (ref 0–8)
ERYTHROCYTE [DISTWIDTH] IN BLOOD BY AUTOMATED COUNT: 14 % (ref 11.5–14.5)
ERYTHROCYTE [DISTWIDTH] IN BLOOD BY AUTOMATED COUNT: 14.1 % (ref 11.5–14.5)
EST. GFR  (AFRICAN AMERICAN): >60 ML/MIN/1.73 M^2
EST. GFR  (NON AFRICAN AMERICAN): >60 ML/MIN/1.73 M^2
GLUCOSE SERPL-MCNC: 139 MG/DL (ref 70–110)
GRAM STN SPEC: NORMAL
GRAM STN SPEC: NORMAL
HAV IGM SERPL QL IA: NEGATIVE
HBV CORE AB SERPL QL IA: NEGATIVE
HBV CORE IGM SERPL QL IA: NEGATIVE
HBV SURFACE AB SER-ACNC: NEGATIVE M[IU]/ML
HBV SURFACE AG SERPL QL IA: NEGATIVE
HCT VFR BLD AUTO: 35.1 % (ref 40–54)
HCT VFR BLD AUTO: 36 % (ref 40–54)
HCV AB SERPL QL IA: NEGATIVE
HEPATITIS A ANTIBODY, IGG: POSITIVE
HGB BLD-MCNC: 12.1 G/DL (ref 14–18)
HGB BLD-MCNC: 12.6 G/DL (ref 14–18)
LDH FLD L TO P-CCNC: 111 U/L
LYMPHOCYTES # BLD AUTO: 0.7 K/UL (ref 1–4.8)
LYMPHOCYTES # BLD AUTO: 0.9 K/UL (ref 1–4.8)
LYMPHOCYTES NFR BLD: 11.9 % (ref 18–48)
LYMPHOCYTES NFR BLD: 12.1 % (ref 18–48)
LYMPHOCYTES NFR FLD MANUAL: 38 %
MCH RBC QN AUTO: 35.5 PG (ref 27–31)
MCH RBC QN AUTO: 35.8 PG (ref 27–31)
MCHC RBC AUTO-ENTMCNC: 34.5 G/DL (ref 32–36)
MCHC RBC AUTO-ENTMCNC: 35 G/DL (ref 32–36)
MCV RBC AUTO: 102 FL (ref 82–98)
MCV RBC AUTO: 103 FL (ref 82–98)
MESOTHL CELL NFR FLD MANUAL: 1 %
MONOCYTES # BLD AUTO: 0.7 K/UL (ref 0.3–1)
MONOCYTES # BLD AUTO: 0.9 K/UL (ref 0.3–1)
MONOCYTES NFR BLD: 11.4 % (ref 4–15)
MONOCYTES NFR BLD: 12.2 % (ref 4–15)
MONOS+MACROS NFR FLD MANUAL: 60 %
NEUTROPHILS # BLD AUTO: 4.3 K/UL (ref 1.8–7.7)
NEUTROPHILS # BLD AUTO: 5.4 K/UL (ref 1.8–7.7)
NEUTROPHILS NFR BLD: 73.5 % (ref 38–73)
NEUTROPHILS NFR BLD: 73.7 % (ref 38–73)
NEUTROPHILS NFR FLD MANUAL: 1 %
PLATELET # BLD AUTO: 140 K/UL (ref 150–350)
PLATELET # BLD AUTO: 148 K/UL (ref 150–350)
PMV BLD AUTO: 10.4 FL (ref 9.2–12.9)
PMV BLD AUTO: 9.8 FL (ref 9.2–12.9)
POCT GLUCOSE: 151 MG/DL (ref 70–110)
POCT GLUCOSE: 84 MG/DL (ref 70–110)
POTASSIUM SERPL-SCNC: 3.6 MMOL/L (ref 3.5–5.1)
PROT FLD-MCNC: 2.9 G/DL
PROT SERPL-MCNC: 6.6 G/DL (ref 6–8.4)
RBC # BLD AUTO: 3.41 M/UL (ref 4.6–6.2)
RBC # BLD AUTO: 3.52 M/UL (ref 4.6–6.2)
SODIUM SERPL-SCNC: 130 MMOL/L (ref 136–145)
SPECIMEN SOURCE: NORMAL
SPECIMEN SOURCE: NORMAL
WBC # BLD AUTO: 5.86 K/UL (ref 3.9–12.7)
WBC # BLD AUTO: 7.3 K/UL (ref 3.9–12.7)
WBC # FLD: 251 /CU MM

## 2019-04-08 PROCEDURE — 11000001 HC ACUTE MED/SURG PRIVATE ROOM

## 2019-04-08 PROCEDURE — 94761 N-INVAS EAR/PLS OXIMETRY MLT: CPT

## 2019-04-08 PROCEDURE — 25000003 PHARM REV CODE 250: Performed by: STUDENT IN AN ORGANIZED HEALTH CARE EDUCATION/TRAINING PROGRAM

## 2019-04-08 PROCEDURE — 36415 COLL VENOUS BLD VENIPUNCTURE: CPT

## 2019-04-08 PROCEDURE — 88342 IMHCHEM/IMCYTCHM 1ST ANTB: CPT | Performed by: PATHOLOGY

## 2019-04-08 PROCEDURE — 63600175 PHARM REV CODE 636 W HCPCS: Performed by: NURSE ANESTHETIST, CERTIFIED REGISTERED

## 2019-04-08 PROCEDURE — 43239 EGD BIOPSY SINGLE/MULTIPLE: CPT | Mod: ,,, | Performed by: INTERNAL MEDICINE

## 2019-04-08 PROCEDURE — 63600175 PHARM REV CODE 636 W HCPCS: Performed by: STUDENT IN AN ORGANIZED HEALTH CARE EDUCATION/TRAINING PROGRAM

## 2019-04-08 PROCEDURE — 25000003 PHARM REV CODE 250: Performed by: NURSE ANESTHETIST, CERTIFIED REGISTERED

## 2019-04-08 PROCEDURE — 83615 LACTATE (LD) (LDH) ENZYME: CPT

## 2019-04-08 PROCEDURE — C9113 INJ PANTOPRAZOLE SODIUM, VIA: HCPCS | Performed by: STUDENT IN AN ORGANIZED HEALTH CARE EDUCATION/TRAINING PROGRAM

## 2019-04-08 PROCEDURE — 43239 PR EGD, FLEX, W/BIOPSY, SGL/MULTI: ICD-10-PCS | Mod: ,,, | Performed by: INTERNAL MEDICINE

## 2019-04-08 PROCEDURE — 88305 TISSUE EXAM BY PATHOLOGIST: CPT | Mod: 26,,, | Performed by: PATHOLOGY

## 2019-04-08 PROCEDURE — 43239 EGD BIOPSY SINGLE/MULTIPLE: CPT | Performed by: INTERNAL MEDICINE

## 2019-04-08 PROCEDURE — 87205 SMEAR GRAM STAIN: CPT

## 2019-04-08 PROCEDURE — 89051 BODY FLUID CELL COUNT: CPT

## 2019-04-08 PROCEDURE — 37000008 HC ANESTHESIA 1ST 15 MINUTES: Performed by: INTERNAL MEDICINE

## 2019-04-08 PROCEDURE — 85025 COMPLETE CBC W/AUTO DIFF WBC: CPT

## 2019-04-08 PROCEDURE — 82042 OTHER SOURCE ALBUMIN QUAN EA: CPT

## 2019-04-08 PROCEDURE — 80053 COMPREHEN METABOLIC PANEL: CPT

## 2019-04-08 PROCEDURE — 88305 TISSUE EXAM BY PATHOLOGIST: CPT | Performed by: PATHOLOGY

## 2019-04-08 PROCEDURE — 27201012 HC FORCEPS, HOT/COLD, DISP: Performed by: INTERNAL MEDICINE

## 2019-04-08 PROCEDURE — S5010 5% DEXTROSE AND 0.45% SALINE: HCPCS | Performed by: STUDENT IN AN ORGANIZED HEALTH CARE EDUCATION/TRAINING PROGRAM

## 2019-04-08 PROCEDURE — 88342 IMHCHEM/IMCYTCHM 1ST ANTB: CPT | Mod: 26,,, | Performed by: PATHOLOGY

## 2019-04-08 PROCEDURE — 84157 ASSAY OF PROTEIN OTHER: CPT

## 2019-04-08 PROCEDURE — 88305 TISSUE SPECIMEN TO PATHOLOGY - SURGERY: ICD-10-PCS | Mod: 26,,, | Performed by: PATHOLOGY

## 2019-04-08 PROCEDURE — 88342 TISSUE SPECIMEN TO PATHOLOGY - SURGERY: ICD-10-PCS | Mod: 26,,, | Performed by: PATHOLOGY

## 2019-04-08 PROCEDURE — 87070 CULTURE OTHR SPECIMN AEROBIC: CPT

## 2019-04-08 PROCEDURE — 37000009 HC ANESTHESIA EA ADD 15 MINS: Performed by: INTERNAL MEDICINE

## 2019-04-08 PROCEDURE — 87075 CULTR BACTERIA EXCEPT BLOOD: CPT

## 2019-04-08 RX ORDER — PROPOFOL 10 MG/ML
VIAL (ML) INTRAVENOUS CONTINUOUS PRN
Status: DISCONTINUED | OUTPATIENT
Start: 2019-04-08 | End: 2019-04-08

## 2019-04-08 RX ORDER — SODIUM CHLORIDE 9 MG/ML
INJECTION, SOLUTION INTRAVENOUS CONTINUOUS PRN
Status: DISCONTINUED | OUTPATIENT
Start: 2019-04-08 | End: 2019-04-08

## 2019-04-08 RX ORDER — PROPOFOL 10 MG/ML
VIAL (ML) INTRAVENOUS
Status: DISCONTINUED | OUTPATIENT
Start: 2019-04-08 | End: 2019-04-08

## 2019-04-08 RX ORDER — DIPHENHYDRAMINE HCL 25 MG
25 CAPSULE ORAL ONCE AS NEEDED
Status: COMPLETED | OUTPATIENT
Start: 2019-04-08 | End: 2019-04-08

## 2019-04-08 RX ORDER — MIDAZOLAM HYDROCHLORIDE 1 MG/ML
INJECTION, SOLUTION INTRAMUSCULAR; INTRAVENOUS
Status: DISCONTINUED | OUTPATIENT
Start: 2019-04-08 | End: 2019-04-08

## 2019-04-08 RX ORDER — LORAZEPAM 1 MG/1
1 TABLET ORAL EVERY 6 HOURS
Status: COMPLETED | OUTPATIENT
Start: 2019-04-08 | End: 2019-04-09

## 2019-04-08 RX ORDER — PANTOPRAZOLE SODIUM 40 MG/1
40 TABLET, DELAYED RELEASE ORAL DAILY
Status: DISCONTINUED | OUTPATIENT
Start: 2019-04-08 | End: 2019-04-09 | Stop reason: HOSPADM

## 2019-04-08 RX ORDER — KETAMINE HYDROCHLORIDE 50 MG/ML
INJECTION, SOLUTION INTRAMUSCULAR; INTRAVENOUS
Status: DISCONTINUED | OUTPATIENT
Start: 2019-04-08 | End: 2019-04-08

## 2019-04-08 RX ORDER — LORAZEPAM 1 MG/1
1 TABLET ORAL EVERY 6 HOURS
Status: DISCONTINUED | OUTPATIENT
Start: 2019-04-08 | End: 2019-04-08

## 2019-04-08 RX ORDER — LIDOCAINE HCL/PF 100 MG/5ML
SYRINGE (ML) INTRAVENOUS
Status: DISCONTINUED | OUTPATIENT
Start: 2019-04-08 | End: 2019-04-08

## 2019-04-08 RX ADMIN — LORAZEPAM 1 MG: 1 TABLET ORAL at 06:04

## 2019-04-08 RX ADMIN — DEXTROSE AND SODIUM CHLORIDE: 5; .45 INJECTION, SOLUTION INTRAVENOUS at 01:04

## 2019-04-08 RX ADMIN — PANTOPRAZOLE SODIUM 40 MG: 40 INJECTION, POWDER, LYOPHILIZED, FOR SOLUTION INTRAVENOUS at 08:04

## 2019-04-08 RX ADMIN — KETAMINE HYDROCHLORIDE 25 MG: 50 INJECTION, SOLUTION, CONCENTRATE INTRAMUSCULAR; INTRAVENOUS at 09:04

## 2019-04-08 RX ADMIN — LORAZEPAM 2 MG: 1 TABLET ORAL at 05:04

## 2019-04-08 RX ADMIN — PROPOFOL 150 MCG/KG/MIN: 10 INJECTION, EMULSION INTRAVENOUS at 09:04

## 2019-04-08 RX ADMIN — SODIUM CHLORIDE: 0.9 INJECTION, SOLUTION INTRAVENOUS at 08:04

## 2019-04-08 RX ADMIN — LORAZEPAM 1 MG: 1 TABLET ORAL at 01:04

## 2019-04-08 RX ADMIN — LIDOCAINE HYDROCHLORIDE 100 MG: 20 INJECTION, SOLUTION INTRAVENOUS at 08:04

## 2019-04-08 RX ADMIN — OCTREOTIDE ACETATE 50 MCG/HR: 500 INJECTION, SOLUTION INTRAVENOUS; SUBCUTANEOUS at 03:04

## 2019-04-08 RX ADMIN — CEFTRIAXONE 1 G: 1 INJECTION, SOLUTION INTRAVENOUS at 02:04

## 2019-04-08 RX ADMIN — DIPHENHYDRAMINE HYDROCHLORIDE 25 MG: 25 CAPSULE ORAL at 10:04

## 2019-04-08 RX ADMIN — DEXTROSE AND SODIUM CHLORIDE: 5; .45 INJECTION, SOLUTION INTRAVENOUS at 02:04

## 2019-04-08 RX ADMIN — PROPOFOL 100 MG: 10 INJECTION, EMULSION INTRAVENOUS at 09:04

## 2019-04-08 RX ADMIN — MIDAZOLAM 2 MG: 1 INJECTION INTRAMUSCULAR; INTRAVENOUS at 08:04

## 2019-04-08 NOTE — ANESTHESIA PREPROCEDURE EVALUATION
04/08/2019  Nilesh Rolon Jr. is a 43 y.o., male w/ PMH of arthritis, chronic liver dz (w/ hepatic congestion), regular daily beer drinker, s/p left knee arthroscopy with meniscectomy on 1/7/19 w/ no anesthetic complication. He is scheduled for EGD on 4/8/19 to investigate recent GIB.    Past Medical History:   Diagnosis Date    Arthritis      Patient Active Problem List   Diagnosis    Other meniscus derangements, other medial meniscus, left knee    GI bleed    Alcohol withdrawal    Ascites    Acute blood loss anemia    Thrombocytopenia    Alcohol abuse    Alcoholic hepatitis with ascites    Hyponatremia    Bloody stool    Bright red blood per rectum     Past Surgical History:   Procedure Laterality Date    ARTHROSCOPY, KNEE Left 1/7/2019    Performed by Derick Kirby MD at [facility] OR     Social History     Socioeconomic History    Marital status: Single     Spouse name: Not on file    Number of children: Not on file    Years of education: Not on file    Highest education level: Not on file   Occupational History    Not on file   Social Needs    Financial resource strain: Not on file    Food insecurity:     Worry: Not on file     Inability: Not on file    Transportation needs:     Medical: Not on file     Non-medical: Not on file   Tobacco Use    Smoking status: Never Smoker    Smokeless tobacco: Never Used   Substance and Sexual Activity    Alcohol use: Yes     Alcohol/week: 7.2 oz     Types: 12 Cans of beer per week    Drug use: No    Sexual activity: Not on file   Lifestyle    Physical activity:     Days per week: Not on file     Minutes per session: Not on file    Stress: Not on file   Relationships    Social connections:     Talks on phone: Not on file     Gets together: Not on file     Attends Yarsanism service: Not on file     Active member of club or organization: Not on  file     Attends meetings of clubs or organizations: Not on file     Relationship status: Not on file   Other Topics Concern    Not on file   Social History Narrative    Not on file     Lab Results   Component Value Date    WBC 6.01 04/07/2019    HGB 11.7 (L) 04/07/2019    HCT 33.9 (L) 04/07/2019     (L) 04/07/2019    CHOL 242 (H) 04/06/2016    TRIG 278 (H) 04/06/2016    HDL 40 04/06/2016    ALT 21 04/08/2019     (H) 04/08/2019     (L) 04/08/2019    K 3.6 04/08/2019    CL 96 04/08/2019    CREATININE 0.6 04/08/2019    BUN 2 (L) 04/08/2019    CO2 26 04/08/2019    TSH 1.691 04/06/2016    PSA 0.54 04/06/2016    INR 1.2 04/07/2019       Current Facility-Administered Medications:     cefTRIAXone (ROCEPHIN) 1 g in dextrose 5 % 50 mL IVPB, 1 g, Intravenous, Q24H, Maksim Guajardo MD, Last Rate: 100 mL/hr at 04/08/19 0209, 1 g at 04/08/19 0209    dextrose 5 % and 0.45 % NaCl infusion, , Intravenous, Continuous, Maksim Guajardo MD, Last Rate: 100 mL/hr at 04/08/19 0212    dextrose 50 % in water (D50W) injection 12.5 g, 12.5 g, Intravenous, PRN, Julee Armando MD    dextrose 50 % in water (D50W) injection 25 g, 25 g, Intravenous, PRN, Julee Armando MD    folic acid tablet 1 mg, 1 mg, Oral, Daily, Maksim Guajardo MD, 1 mg at 04/07/19 1217    glucagon (human recombinant) injection 1 mg, 1 mg, Intramuscular, PRN, Julee Armando MD    glucose chewable tablet 16 g, 16 g, Oral, PRN, Julee Armando MD    glucose chewable tablet 24 g, 24 g, Oral, PRN, Julee Armando MD    lorazepam (ATIVAN) injection 2 mg, 2 mg, Intravenous, Q1H PRN, Maksim Guajardo MD, 2 mg at 04/07/19 0221    LORazepam tablet 2 mg, 2 mg, Oral, Q6H, Maksim Guajardo MD, 2 mg at 04/08/19 0539    multivitamin tablet 1 tablet, 1 tablet, Oral, Daily, Maksim Guajardo MD, 1 tablet at 04/07/19 0948    octreotide (SANDOSTATIN) 500 mcg in sodium chloride 0.9% 100 mL infusion, 50 mcg/hr, Intravenous, Continuous, Hope  JEANNE Dobbs MD, Last Rate: 10 mL/hr at 04/08/19 0319, 50 mcg/hr at 04/08/19 0319    pantoprazole injection 40 mg, 40 mg, Intravenous, BID, Maksim Guajardo MD, 40 mg at 04/07/19 2104    thiamine (B-1) 100 mg in dextrose 5 % 50 mL IVPB, 100 mg, Intravenous, Daily, Maksim Guajardo MD, Last Rate: 25 mL/hr at 04/07/19 2340, 100 mg at 04/07/19 2340      Pre-op Assessment    I have reviewed the Patient Summary Reports.     I have reviewed the Nursing Notes.   I have reviewed the Medications.     Review of Systems  Anesthesia Hx:  No previous Anesthesia  Denies Family Hx of Anesthesia complications.    Social:  Non-Smoker, Social Alcohol Use    Hematology/Oncology:         -- Anemia:   Cardiovascular:  Cardiovascular Normal  Denies Dysrhythmias.   Denies Angina.        Pulmonary:  Pulmonary Normal  Denies Shortness of breath.    Renal/:  Renal/ Normal     Hepatic/GI:   Denies GERD. Denies Liver Disease. Hepatitis    Neurological:  Neurology Normal Denies Seizures.    Endocrine:  Endocrine Normal        Physical Exam  General:  Well nourished    Airway/Jaw/Neck:  Airway Findings: Mouth Opening: Normal Tongue: Large  General Airway Assessment: Adult  Mallampati: IV      Dental:  Dental Findings: Periodontal disease, Severe    Chest/Lungs:  Chest/Lungs Findings: Clear to auscultation, Normal Respiratory Rate     Heart/Vascular:  Heart Findings: Rate: Normal  Rhythm: Regular Rhythm  Sounds: Normal  Heart murmur: negative    Abdomen:  Recent CT findings suggestive of chronic liver dz: (see below)    Impression      Hepatomegaly and morphologic changes of chronic liver disease.  Diffuse hepatic steatosis with areas of fatty sparing limits detection for focal hepatic mass.  Consider follow-up with contrast enhanced MRI for hepatocellular carcinoma screening as an outpatient.    Stigmata of portal hypertension including splenomegaly, collateral vessels, and moderate volume ascites.    Prominent right basilar  subsegmental atelectasis likely secondary to elevation of the right hemidiaphragm.    Coronary artery calcifications.    Additional findings discussed in the body of the report.         Mental Status:  Mental Status Findings:  Cooperative, Alert and Oriented       EKG 12/21/18:  Sinus tachycardia  Otherwise normal ECG  When compared with ECG of 02-APR-2017 04:10, No significant change was found  Confirmed by Marlon Trent MD      Anesthesia Plan  Type of Anesthesia, risks & benefits discussed:  Anesthesia Type:  MAC  Patient's Preference:   Intra-op Monitoring Plan: standard ASA monitors  Intra-op Monitoring Plan Comments:   Post Op Pain Control Plan: multimodal analgesia and per primary service following discharge from PACU  Post Op Pain Control Plan Comments:   Induction:   IV  Beta Blocker:  Patient is not currently on a Beta-Blocker (No further documentation required).       Informed Consent: Patient understands risks and agrees with Anesthesia plan.  Questions answered. Anesthesia consent signed with patient.  ASA Score: 3     Day of Surgery Review of History & Physical:  There are no significant changes.      Anesthesia Plan Notes:           Ready For Surgery From Anesthesia Perspective.

## 2019-04-08 NOTE — CONSULTS
Interventional Radiology Consult/Pre-Procedure Note      Chief Complaint/Reason for Consult: Ascites    History of Present Illness:  Nilesh Rolon Jr. is a 43 y.o. male with the PMH listed below who presents with ascites in the setting of EtOH abuse. Paracentesis requested.    Admission H&P reviewed.    Past Medical History:   Diagnosis Date    Arthritis      Past Surgical History:   Procedure Laterality Date    ARTHROSCOPY, KNEE Left 1/7/2019    Performed by Derick Kirby MD at [facility] OR       Allergies:   Review of patient's allergies indicates:  No Known Allergies    Home Meds:   Prior to Admission medications    Medication Sig Start Date End Date Taking? Authorizing Provider   multivitamin capsule Take 1 capsule by mouth once daily.    Historical Provider, MD   naproxen sodium (ANAPROX) 220 MG tablet Take 220 mg by mouth every 12 (twelve) hours.    Historical Provider, MD   traMADol (ULTRAM) 50 mg tablet Take 100 mg by mouth every 6 (six) hours.    Historical Provider, MD     Scheduled Meds:    cefTRIAXone 1 g in dextrose 5 % 50 mL  1 g Intravenous Q24H    folic acid  1 mg Oral Daily    LORazepam  1 mg Oral Q6H    multivitamin  1 tablet Oral Daily    pantoprazole  40 mg Oral Daily    thiamine (VITAMIN B1) IVPB  100 mg Intravenous Daily     Continuous Infusions:    dextrose 5 % and 0.45 % NaCl 100 mL/hr at 04/08/19 0212     PRN Meds:dextrose 50 % in water (D50W), dextrose 50 % in water (D50W), glucagon (human recombinant), glucose, glucose, lorazepam    Anticoagulation/Antiplatelet Meds: no anticoagulation    Review of Systems:   As documented in admission H&P.    Physical Exam:  Temp: 98.4 °F (36.9 °C) (04/08/19 0857)  Pulse: 92 (04/08/19 0945)  Resp: (!) 26 (04/08/19 0945)  BP: 131/89 (04/08/19 0945)  SpO2: (!) 93 % (04/08/19 0945)     General: NAD  HEENT: Normocephalic, sclera anicteric, oropharynx clear  Neck: Supple, no palpable lymphadenopathy  Heart: RRR  Lungs: Symmetric excursions, breathing  unlabored  Abd: Moderately distended  Extremities: LEIGH  Neuro: Gross nonfocal    Labs:  Recent Labs   Lab 04/07/19  0409   INR 1.2       Recent Labs   Lab 04/08/19  0755   WBC 5.86   HGB 12.1*   HCT 35.1*   *   *      Recent Labs   Lab 04/08/19  0254   *   *   K 3.6   CL 96   CO2 26   BUN 2*   CREATININE 0.6   CALCIUM 8.4*   ALT 21   *   ALBUMIN 2.6*   BILITOT 1.8*     Imaging:  CT AP 4/6/19 reviewed.    Assessment/Plan:   Moderate ascites on CT. Paracentesis today.    Sedation: None    Risks (including, but not limited to, pain, bleeding, infection, damage to nearby structures, failure, and the need for additional procedures), benefits, and alternatives were discussed with the patient. All questions were answered to the best of my abilities. The patient wishes to proceed with paracentesis. Written informed consent was obtained.      Geo Dumont MD  [facility]  Pager [phone]

## 2019-04-08 NOTE — PLAN OF CARE
Pt reports he lives with a friend and had been independent; pt informed TN his friend or mother provides transportation for him. Pt also informed TN he had arranged to get ETOH detox at [facility] and has notified Adriana at [facility] that he is in hospital and still wants to go for treatment when d/c.   Tn reinforced importance of ETOH cessation and encouraged inpatient treatment.  Tn gave d/c brochure and card ane encouraged to call for further needs/concerns.    TN noted:  Inpatient consult to Social Work Once    Complete   Comments: Per patient's family, accepted to inpatient detox at [facility] scheduled for today. Requesting assistance with direct transfer to [facility] once medically cleared. Anticipate discharge tomorrow.     Pt's mother is available to take pt however pt wants to stop home to get clothes. Team prefers pt go directly to [facility]. Tn informed Dr. Dobbs that Case Management does not provide transportation to detox facilities however Tn spoke to Olimpia Rawls, Director of Case Management who stated CM would pay for transport to get pt directly to [facility] as long as there is a bed available on day of d/c.    1400- TN spoke to Cassandra in admit at [facility] and informed of case and plan for admit tomorrow if bed available.  Cassandra stated Crystal has been working with pt and she would give Crystal Parikh # to call and requested Tn send clinicals to fax # [phone]; Tn faxed clinicals via [facility]       04/08/19 2129   Discharge Assessment   Assessment Type Discharge Planning Assessment   Confirmed/corrected address and phone number on facesheet? Yes   Assessment information obtained from? Patient   Expected Length of Stay (days) 1   Communicated expected length of stay with patient/caregiver yes   Prior to hospitilization cognitive status: Alert/Oriented   Prior to hospitalization functional status: Independent   Current cognitive status: Alert/Oriented   Current Functional Status: Needs  Assistance   Lives With friend(s)   Able to Return to Prior Arrangements yes   Is patient able to care for self after discharge? Unable to determine at this time (comments)   Who are your caregiver(s) and their phone number(s)? Laurel (mother) [phone]   Patient's perception of discharge disposition other (comments)  (Tavon Servin)   Readmission Within the Last 30 Days no previous admission in last 30 days   Patient currently being followed by outpatient case management? No   Patient currently receives any other outside agency services? No   Equipment Currently Used at Home none   Do you have any problems affording any of your prescribed medications? No   Is the patient taking medications as prescribed? yes   Does the patient have transportation home? Yes   Transportation Anticipated family or friend will provide   Discharge Plan A Other  ([facility])   Discharge Plan B Home with family   DME Needed Upon Discharge  none   Patient/Family in Agreement with Plan yes

## 2019-04-08 NOTE — PROVATION PATIENT INSTRUCTIONS
Discharge Summary/Instructions after an Endoscopic Procedure  Patient Name: Nilesh Rolon  Patient MRN: [id number]  Patient YOB: 1975 Monday, April 08, 2019  Bonita Kendall MD  RESTRICTIONS:  During your procedure today, you received medications for sedation.  These   medications may affect your judgment, balance and coordination.  Therefore,   for 24 hours, you have the following restrictions:   - DO NOT drive a car, operate machinery, make legal/financial decisions,   sign important papers or drink alcohol.    ACTIVITY:  Today: no heavy lifting, straining or running due to procedural   sedation/anesthesia.  The following day: return to full activity including work.  DIET:  Eat and drink normally unless instructed otherwise.     TREATMENT FOR COMMON SIDE EFFECTS:  - Mild abdominal pain, nausea, belching, bloating or excessive gas:  rest,   eat lightly and use a heating pad.  - Sore Throat: treat with throat lozenges and/or gargle with warm salt   water.  - Because air was used during the procedure, expelling large amounts of air   from your rectum or belching is normal.  - If a bowel prep was taken, you may not have a bowel movement for 1-3 days.    This is normal.  SYMPTOMS TO WATCH FOR AND REPORT TO YOUR PHYSICIAN:  1. Abdominal pain or bloating, other than gas cramps.  2. Chest pain.  3. Back pain.  4. Signs of infection such as: chills or fever occurring within 24 hours   after the procedure.  5. Rectal bleeding, which would show as bright red, maroon, or black stools.   (A tablespoon of blood from the rectum is not serious, especially if   hemorrhoids are present.)  6. Vomiting.  7. Weakness or dizziness.  GO DIRECTLY TO THE NEAREST EMERGENCY ROOM IF YOU HAVE ANY OF THE FOLLOWING:      Difficulty breathing              Chills and/or fever over 101 F   Persistent vomiting and/or vomiting blood   Severe abdominal pain   Severe chest pain   Black, tarry stools   Bleeding- more than one  tablespoon   Any other symptom or condition that you feel may need urgent attention  Your doctor recommends these additional instructions:  If any biopsies were taken, your doctors clinic will contact you in 1 to 2   weeks with any results.  - Return patient to hospital salter for ongoing care.   - Advance diet as tolerated.   - Continue present medications.   - Will follow up gastric and esophgeal biopsy results and call patient.  For questions, problems or results please call your physician - Bonita Kendall MD at Work:  [phone].  EMERGENCY PHONE NUMBER: [phone],  LAB RESULTS: [phone]  IF A COMPLICATION OR EMERGENCY SITUATION ARISES AND YOU ARE UNABLE TO REACH   YOUR PHYSICIAN - GO DIRECTLY TO THE EMERGENCY ROOM.  Bonita Kendall MD  4/8/2019 9:41:04 AM  This report has been verified and signed electronically.  [employer]ATION

## 2019-04-08 NOTE — PLAN OF CARE
Problem: Adult Inpatient Plan of Care  Goal: Plan of Care Review  Mr. Rolon, will complete procedures today EGD and paracentetics and works toward going to detox/Rehab for his alcohol addiction. He will work with case management to set up his transition to the appropriate facility. He will call for assistance when wanting to get up to the bedside commode, bed in low position, wheels locked, bed alarm on, skid resistance socks on, call light within easy reach, he will be offered assistance every hour to prevent injury from a fall.

## 2019-04-08 NOTE — PROGRESS NOTES
[facility] Res[facility] Progress Note    Subjective:      Patient being transferred to GI suite this morning. Denies any problems or complaints overnight. Denies tremulousness, diaphoresis or agitation. Had 2 bowel movements overnight, non-bloody but scant blood present on wiping. Afebrile and hemodynamically stable.     Objective:   Last 24 Hour Vital Signs:  BP  Min: 93/63  Max: 151/109  Temp  Av.5 °F (36.9 °C)  Min: 98.3 °F (36.8 °C)  Max: 98.6 °F (37 °C)  Pulse  Av.9  Min: 79  Max: 113  Resp  Av.3  Min: 14  Max: 55  SpO2  Av %  Min: 93 %  Max: 100 %  Weight  Av.6 kg (219 lb 9.3 oz)  Min: 99.6 kg (219 lb 9.3 oz)  Max: 99.6 kg (219 lb 9.3 oz)  I/O last 3 completed shifts:  In: 4749.8 [P.O.:720; I.V.:2879.8; IV Piggyback:1150]  Out: 4025 [Urine:4025]    Physical Examination:  Gen: middle-aged male lying in bed, appears comfortable, tremulousness with arm raise, not appreciated at rest   HEENT: NC/AT, PERRL. EOMI. No scleral icterus, MMM. OP non-erythematous.   Neck: supple, no adenopapthy  CV: tachycardic and regular. No m/r/g  Abdomen: Moderately distended, non-tender, +hepatomegaly noted   Skin: Dry. no jaundice  Neuro: AAox3. CNII-XII grossly intact. No asterixis     Laboratory:  Laboratory Data Reviewed: yes  Pertinent Findings:  Recent Labs   Lab 19  1931 19  0409  19  1206 19  1941 19  0254 19  0755   WBC 7.77  --    < > 5.69 6.01  --  5.86   HGB 12.7*  --    < > 11.9* 11.7*  --  12.1*   HCT 36.1*  --    < > 35.4* 33.9*  --  35.1*   *  --    < > 136* 129*  --  140*   *  --    < > 104* 104*  --  103*   RDW 14.3  --    < > 14.4 14.6*  --  14.0   * 136  --   --   --  130*  --    K 3.5 3.3*  --   --   --  3.6  --    CL 95 101  --   --   --  96  --    CO2 23 24  --   --   --  26  --    BUN 2* 2*  --   --   --  2*  --    CREATININE 0.7 0.6  --   --   --  0.6  --     69*  --   --   --  139*  --    PROT 7.7 7.1  --    --   --  6.6  --    ALBUMIN 3.3* 2.9*  --   --   --  2.6*  --    BILITOT 1.7* 1.9*  --   --   --  1.8*  --    * 114*  --   --   --  189*  --    ALKPHOS 241* 205*  --   --   --  169*  --    ALT 26 22  --   --   --  21  --     < > = values in this interval not displayed.       Microbiology Data Reviewed: yes  Pertinent Findings:  None this admit     Other Results:  EKG (my interpretation): no new     Radiology Data Reviewed: yes  Pertinent Findings:  No new     Current Medications:     Infusions:   dextrose 5 % and 0.45 % NaCl 100 mL/hr at 04/08/19 0212    octreotide (SANDOSTATIN) infusion 50 mcg/hr (04/08/19 0319)        Scheduled:   cefTRIAXone 1 g in dextrose 5 % 50 mL  1 g Intravenous Q24H    folic acid  1 mg Oral Daily    LORazepam  2 mg Oral Q6H    multivitamin  1 tablet Oral Daily    pantoprazole  40 mg Intravenous BID    thiamine (VITAMIN B1) IVPB  100 mg Intravenous Daily        PRN:  dextrose 50 % in water (D50W), dextrose 50 % in water (D50W), glucagon (human recombinant), glucose, glucose, lorazepam    Antibiotics and Day Number of Therapy:  Rocephin 1g qd, started 4/7 - SBP ppx    Lines and Day Number of Therapy:  PIV     Assessment:     Nilesh Pateldiviejose Mota is a 43 y.o.male with  Patient Active Problem List    Diagnosis Date Noted    GI bleed 04/07/2019    Alcohol withdrawal 04/07/2019    Ascites 04/07/2019    Acute blood loss anemia 04/07/2019    Thrombocytopenia 04/07/2019    Alcohol abuse 04/07/2019    Alcoholic hepatitis with ascites 04/07/2019    Hyponatremia 04/07/2019    Bloody stool     Bright red blood per rectum     Other meniscus derangements, other medial meniscus, left knee 01/07/2019        Plan:     EtOH abuse with active withdrawal  - pt reports drinking 1 L vodka daily, with last drink approx 12 hours PTA  - per ED report pt entered active withdrawal (EtOH level of 125). Received Ativan 1 mg, 2 L NS  - on medicine exam, pt tachycardic, anxious-appearing and  tremulous  - admit to ICU for close monitoring  - Ativan 0.5 mg PO q4h scheduled and Valium 5 mg PO PRN for CIWA > 8  - Start folate, MV and Thiamine 100 mg IV daily  - per patient's wife at bedside, pt to go to inpatient detox at [place] today 4/8. Will touch base with CM about facilitating direct transfer once medically cleared.      GI Bleed  - presents with 3-4 days bloody stools. Difficult to dilineate upper vs lower GIB based on history, as pt reports both dark sticky and bright red. Per ED ALVINO, gross red blood in rectum  - suspected etiologies includes PUD or gastritis related to heavy alcohol use  - on admit, H/H 12.7/36. Trend CBCs q12h  - Started Protonix, octreotide and rocephin 4/7   - consulted GI, plan for EGD today      Alcoholic hepatitis with ascites   - ED labs: , ALT 26, Alk phos 241, Tbili 1.7. INR wnl, 1.1  - CT with hepatomegaly, hepatic steatosis, nodularity and moderate volume free fluid throughout abdomen and pelvis    - withdrawal, nutritional and hydration support as above  - EtOH cessation support as above  - consulted IR for possible paracentesis  - on octreotide for SBP ppx   - will start lasix/aldactone on discharge      Anemia  - ED H/H 12.7/36.1,   - likely multifactorial, 2/2 acute blood loss, chronic EtOH use, possible B12 deficiency.  - 4/8 H/H stable, will continue to monitor      Thrombocytopenia  - Plt 145, likely 2/2 EtOH use.  - will continue to monitor      IVFs: D51/2NS @ 100cc/hr  VTE PPx: SCDs  Diet: NPO  Code: Full     Dispo: admit to ICU for etoh withdrawal, plan for EGD today with GI and possible para with IR.       Loan Dobbs  [facility] Internal Medicine HO-I  [facility] Hospital Medicine Service Team A    [facility] Medicine Hospitalist Pager numbers:   [facility] Hospitalist Medicine Team A (Minesh/Reagan): [phone]  [facility] Hospitalist Medicine Team B (Rebekah/Bernard):  [phone]

## 2019-04-08 NOTE — PROCEDURES
Interventional Radiology Post-Procedure Note    Pre Op Diagnosis: Ascites  Post Op Diagnosis: Same    Procedure: Paracentesis    Procedure performed by: Tim    Written Informed Consent Obtained: Yes  Specimen Sent: Yes  Estimated Blood Loss: Minimal    Findings:   Moderate ascites. 2.9 L bright yellow, almost clear fluid removed from RLQ.    No immediate complications. Patient tolerated procedure well. Please see full dictated procedure report for additional details and recommendations.      Geo Dumont MD  [facility]  Pager [phone]

## 2019-04-08 NOTE — PLAN OF CARE
Problem: Alcohol Withdrawal  Goal: Alcohol Withdrawal Symptom Control  Outcome: Ongoing (interventions implemented as appropriate)  Intervention: Minimize/Manage Alcohol Withdrawal Symptoms     04/07/19 2305   Optimize Eating and Swallowing   Aspiration Precautions upright posture maintained   Prevent or Manage Neurotoxicity Effects   Seizure Precautions activity supervised;clutter-free environment maintained;emergency equipment at bedside   Prevent or Manage Pain   Sensory Stimulation Regulation visual stimulation minimized;tactile stimulation minimized;quiet environment promoted;lighting decreased;care clustered;auditory stimulation minimized   Optimize Bethany and Functional Mobility   Environmental Safety Modification assistive device/personal items within reach;clutter free environment maintained;room organization consistent;lighting adjusted         Problem: Adjustment to Illness (Gastrointestinal Bleeding)  Goal: Optimal Coping with Acute Illness  Outcome: Ongoing (interventions implemented as appropriate)  Intervention: Optimize Psychosocial Response to Unexpected Illness     04/07/19 1505   Promote Anxiety Reduction   Supportive Measures active listening utilized;decision-making supported;goal setting facilitated;other (see comments);positive reinforcement provided         Problem: Fall Injury Risk  Goal: Absence of Fall and Fall-Related Injury  Outcome: Ongoing (interventions implemented as appropriate)  Intervention: Promote Injury-Free Environment     04/07/19 2305 04/08/19 0105   Optimize Balance and Safe Activity   Safety Promotion/Fall Prevention  --  assistive device/personal item within reach;bed alarm set;Fall Risk reviewed with patient/family;Fall Risk signage in place;commode/urinal/bedpan at bedside;medications reviewed;muscle strengthening facilitated;lighting adjusted;nonskid shoes/socks when out of bed;side rails raised x 3;instructed to call staff for mobility   Optimize Bethany  and Functional Mobility   Environmental Safety Modification assistive device/personal items within reach;clutter free environment maintained;room organization consistent;lighting adjusted  --

## 2019-04-08 NOTE — TRANSFER OF CARE
"Anesthesia Transfer of Care Note    Patient: Nilesh Rolon Jr.    Procedure(s) Performed: Procedure(s) (LRB):  EGD (ESOPHAGOGASTRODUODENOSCOPY) (N/A)    Patient location: ICU    Anesthesia Type: MAC    Transport from OR: Transported from OR on 2-3 L/min O2 by NC with adequate spontaneous ventilation    Post pain: adequate analgesia    Post assessment: no apparent anesthetic complications and tolerated procedure well    Post vital signs: stable    Level of consciousness: alert, awake and oriented    Nausea/Vomiting: no nausea/vomiting    Complications: none    Transfer of care protocol was followed      Last vitals:   Visit Vitals  /85   Pulse 93   Temp 36.9 °C (98.4 °F) (Temporal)   Resp 20   Ht 6' 3" (1.905 m)   Wt 99.6 kg (219 lb 9.3 oz)   SpO2 (!) 92%   BMI 27.45 kg/m²     "

## 2019-04-08 NOTE — PLAN OF CARE
Patient transferred to endoscopy for EGD exam with Dr. Kendall. AAOx3, no family present. Consents x2 verified, VS recorded. Comfort measures provided.

## 2019-04-08 NOTE — ANESTHESIA POSTPROCEDURE EVALUATION
Anesthesia Post Evaluation    Patient: Nilesh Rolon JrSimba    Procedure(s) Performed: Procedure(s) (LRB):  EGD (ESOPHAGOGASTRODUODENOSCOPY) (N/A)    Final Anesthesia Type: MAC  Patient location during evaluation: GI PACU  Level of consciousness: awake  Post-procedure vital signs: reviewed and stable  Pain management: adequate  Airway patency: patent    Anesthetic complications: no      Cardiovascular status: stable  Respiratory status: spontaneous ventilation  Hydration status: euvolemic  Follow-up not needed.          Vitals Value Taken Time   /90 4/8/2019 10:01 AM   Temp 36.9 °C (98.4 °F) 4/8/2019  8:57 AM   Pulse 85 4/8/2019 10:14 AM   Resp 20 4/8/2019 10:14 AM   SpO2 96 % 4/8/2019 10:14 AM   Vitals shown include unvalidated device data.      No case tracking events are documented in the log.      Pain/Maribel Score: No data recorded

## 2019-04-08 NOTE — NURSING
MD inject lidocaine into procedure site, right mid abdomen, catheter inserted, draining clear yellow fluid, pt tolerated well, will send specimen to lab

## 2019-04-09 VITALS
SYSTOLIC BLOOD PRESSURE: 124 MMHG | TEMPERATURE: 99 F | BODY MASS INDEX: 26.29 KG/M2 | HEART RATE: 87 BPM | HEIGHT: 75 IN | WEIGHT: 211.44 LBS | OXYGEN SATURATION: 94 % | RESPIRATION RATE: 17 BRPM | DIASTOLIC BLOOD PRESSURE: 77 MMHG

## 2019-04-09 LAB
ALBUMIN SERPL BCP-MCNC: 2.7 G/DL (ref 3.5–5.2)
ALP SERPL-CCNC: 173 U/L (ref 55–135)
ALT SERPL W/O P-5'-P-CCNC: 24 U/L (ref 10–44)
ANION GAP SERPL CALC-SCNC: 7 MMOL/L (ref 8–16)
AST SERPL-CCNC: 195 U/L (ref 10–40)
BASOPHILS # BLD AUTO: 0.02 K/UL (ref 0–0.2)
BASOPHILS NFR BLD: 0.3 % (ref 0–1.9)
BILIRUB SERPL-MCNC: 2.1 MG/DL (ref 0.1–1)
BUN SERPL-MCNC: 3 MG/DL (ref 6–20)
CALCIUM SERPL-MCNC: 8.7 MG/DL (ref 8.7–10.5)
CHLORIDE SERPL-SCNC: 96 MMOL/L (ref 95–110)
CO2 SERPL-SCNC: 29 MMOL/L (ref 23–29)
CREAT SERPL-MCNC: 0.7 MG/DL (ref 0.5–1.4)
DIFFERENTIAL METHOD: ABNORMAL
EOSINOPHIL # BLD AUTO: 0.1 K/UL (ref 0–0.5)
EOSINOPHIL NFR BLD: 2.1 % (ref 0–8)
ERYTHROCYTE [DISTWIDTH] IN BLOOD BY AUTOMATED COUNT: 14.1 % (ref 11.5–14.5)
EST. GFR  (AFRICAN AMERICAN): >60 ML/MIN/1.73 M^2
EST. GFR  (NON AFRICAN AMERICAN): >60 ML/MIN/1.73 M^2
GLUCOSE SERPL-MCNC: 96 MG/DL (ref 70–110)
HCT VFR BLD AUTO: 36.6 % (ref 40–54)
HGB BLD-MCNC: 12.6 G/DL (ref 14–18)
LYMPHOCYTES # BLD AUTO: 0.9 K/UL (ref 1–4.8)
LYMPHOCYTES NFR BLD: 13 % (ref 18–48)
MCH RBC QN AUTO: 35.5 PG (ref 27–31)
MCHC RBC AUTO-ENTMCNC: 34.4 G/DL (ref 32–36)
MCV RBC AUTO: 103 FL (ref 82–98)
MONOCYTES # BLD AUTO: 0.9 K/UL (ref 0.3–1)
MONOCYTES NFR BLD: 13.8 % (ref 4–15)
NEUTROPHILS # BLD AUTO: 4.7 K/UL (ref 1.8–7.7)
NEUTROPHILS NFR BLD: 70.7 % (ref 38–73)
PLATELET # BLD AUTO: 162 K/UL (ref 150–350)
PMV BLD AUTO: 9.9 FL (ref 9.2–12.9)
POTASSIUM SERPL-SCNC: 3.7 MMOL/L (ref 3.5–5.1)
PROT SERPL-MCNC: 6.8 G/DL (ref 6–8.4)
RBC # BLD AUTO: 3.55 M/UL (ref 4.6–6.2)
SODIUM SERPL-SCNC: 132 MMOL/L (ref 136–145)
WBC # BLD AUTO: 6.67 K/UL (ref 3.9–12.7)

## 2019-04-09 PROCEDURE — 25000003 PHARM REV CODE 250: Performed by: STUDENT IN AN ORGANIZED HEALTH CARE EDUCATION/TRAINING PROGRAM

## 2019-04-09 PROCEDURE — 97161 PT EVAL LOW COMPLEX 20 MIN: CPT

## 2019-04-09 PROCEDURE — 36415 COLL VENOUS BLD VENIPUNCTURE: CPT

## 2019-04-09 PROCEDURE — 80053 COMPREHEN METABOLIC PANEL: CPT

## 2019-04-09 PROCEDURE — 63600175 PHARM REV CODE 636 W HCPCS: Performed by: STUDENT IN AN ORGANIZED HEALTH CARE EDUCATION/TRAINING PROGRAM

## 2019-04-09 PROCEDURE — 94761 N-INVAS EAR/PLS OXIMETRY MLT: CPT

## 2019-04-09 PROCEDURE — 97165 OT EVAL LOW COMPLEX 30 MIN: CPT

## 2019-04-09 PROCEDURE — 85025 COMPLETE CBC W/AUTO DIFF WBC: CPT

## 2019-04-09 RX ORDER — SPIRONOLACTONE 50 MG/1
50 TABLET, FILM COATED ORAL DAILY
Qty: 30 TABLET | Refills: 3 | Status: ON HOLD | OUTPATIENT
Start: 2019-04-09 | End: 2019-10-24 | Stop reason: CLARIF

## 2019-04-09 RX ORDER — PANTOPRAZOLE SODIUM 40 MG/1
40 TABLET, DELAYED RELEASE ORAL DAILY
Qty: 30 TABLET | Refills: 3 | Status: ON HOLD | OUTPATIENT
Start: 2019-04-10 | End: 2019-10-24 | Stop reason: CLARIF

## 2019-04-09 RX ORDER — FOLIC ACID 1 MG/1
1 TABLET ORAL DAILY
Qty: 30 TABLET | Refills: 3 | Status: ON HOLD | OUTPATIENT
Start: 2019-04-10 | End: 2019-10-24 | Stop reason: CLARIF

## 2019-04-09 RX ORDER — LANOLIN ALCOHOL/MO/W.PET/CERES
100 CREAM (GRAM) TOPICAL DAILY
Status: ON HOLD | COMMUNITY
Start: 2019-04-09 | End: 2019-10-24 | Stop reason: CLARIF

## 2019-04-09 RX ORDER — LORAZEPAM 0.5 MG/1
0.5 TABLET ORAL EVERY 6 HOURS
Status: DISCONTINUED | OUTPATIENT
Start: 2019-04-09 | End: 2019-04-09 | Stop reason: HOSPADM

## 2019-04-09 RX ORDER — FUROSEMIDE 20 MG/1
20 TABLET ORAL DAILY
Qty: 60 TABLET | Refills: 2 | Status: ON HOLD | OUTPATIENT
Start: 2019-04-09 | End: 2019-10-24 | Stop reason: CLARIF

## 2019-04-09 RX ADMIN — PANTOPRAZOLE SODIUM 40 MG: 40 TABLET, DELAYED RELEASE ORAL at 09:04

## 2019-04-09 RX ADMIN — LORAZEPAM 1 MG: 1 TABLET ORAL at 06:04

## 2019-04-09 RX ADMIN — THIAMINE HYDROCHLORIDE 100 MG: 100 INJECTION, SOLUTION INTRAMUSCULAR; INTRAVENOUS at 12:04

## 2019-04-09 RX ADMIN — FOLIC ACID 1 MG: 1 TABLET ORAL at 09:04

## 2019-04-09 RX ADMIN — CEFTRIAXONE 1 G: 1 INJECTION, SOLUTION INTRAVENOUS at 02:04

## 2019-04-09 RX ADMIN — LORAZEPAM 1 MG: 1 TABLET ORAL at 12:04

## 2019-04-09 RX ADMIN — THERA TABS 1 TABLET: TAB at 09:04

## 2019-04-09 NOTE — PLAN OF CARE
Problem: Physical Therapy Goal  Goal: Physical Therapy Goal  Outcome: Outcome(s) achieved Date Met: 04/09/19  Initial PT evaluation performed.  Pt ok for D/C home from PT standpoint.  No skilled Pt or DME needs.  Pt ok to ambulate in hallway with nursing staff while still admitted.  PT to sign off.

## 2019-04-09 NOTE — PLAN OF CARE
Problem: Occupational Therapy Goal  Goal: Occupational Therapy Goal  Outcome: Outcome(s) achieved Date Met: 04/09/19  OT eval performed, report to follow    No OT needs identified, discontinue OT

## 2019-04-09 NOTE — PROGRESS NOTES
"[facility]    CC: melena    Interval History  Patient doing well, feels much better after paracentesis.  Reports having no bowel movements since EGD yesterday.  Tolerating a regular diet.  Denies fevers, chills, nausea, vomiting, abdominal pain, hematemesis, hematochezia, melena.     Past Medical History:   Diagnosis Date    Arthritis        Review of Systems  General ROS: negative for chills, fever or weight loss  Cardiovascular ROS: no chest pain or dyspnea on exertion  Gastrointestinal ROS: positive for melena  no abdominal pain, change in bowel habits, or black/ bloody stools  Neurological ROS: no tremors, anxiousness, agitation    Physical Examination  /63   Pulse 89   Temp 98.3 °F (36.8 °C)   Resp 17   Ht 6' 3" (1.905 m)   Wt 95.9 kg (211 lb 6.7 oz)   SpO2 (!) 93%   BMI 26.43 kg/m²   General appearance: alert, cooperative, no distress  HENT: Normocephalic, atraumatic, neck symmetrical, no nasal discharge   Lungs: clear to auscultation bilaterally, no dullness to percussion bilaterally  Heart: regular rate and rhythm without rub; no displacement of the PMI   Abdomen: soft, non-tender; slightly distended, bowel sounds normoactive; no organomegaly  Extremities: extremities symmetric; no clubbing, cyanosis, or edema  Neurologic: Alert and oriented X 3, normal strength, normal coordination and gait    Labs:  WBC 6.6  Hgb 12.6  Hct 36.6      Na 132  K 3.7  BUN 3  Cr 0.7  Alb 2.7  Tbili 2.1  Alkphos 173    ALT 24    SAAG 1.1  TP 2.9  , 1% PMNs    Imaging:  CT abd/pelvis: hepatic steatosis, no small bowel obstruction, ascites present     I have personally reviewed these images    Assessment:   43 year old man with heavy alcohol abuse and fatty liver presents with acute, episodic, painless melena associated with drop in hemoglobin. Labs are consistent with intrinsic liver dysfunction and likely cirrhosis as he had ascites on imaging. MELD score 11.  4.9L of yellow asitic " fluid removed by IR, SAAG 1.1, TP >2.5 pointing towards a process separate from portal hypertension. EGD on 4/8 with hypertensive gastropathy and esogageal nodule, biopises taken.   H/H stable, no further signs of bleeding.     Plan:  - continue supportive care. Monitor CBC, transfuse for Hgb < 7  - continue oral PPI daily  - would recommend TTE and RUQ US with doppler to asess for other causes of ascites  given abnormal fluid studies  - can consider aldactone and lasix upon discharge  - follow up biopsy pathology  - strict alcohol cessation, will need to follow up in GI clinic      Abdoul Rodriguez MD HO-1  [facility]

## 2019-04-09 NOTE — PT/OT/SLP EVAL
"Occupational Therapy   Evaluation and Discharge Note    Name: Nilesh Rolon Jr.  MRN: [id number]  Admitting Diagnosis:  Alcohol withdrawal 1 Day Post-Op    Recommendations:     Discharge Recommendations: substance abuse facility  Discharge Equipment Recommendations:  none  Barriers to discharge:  None    Assessment:     Nilesh Rolon Jr. is a 43 y.o. male with a medical diagnosis of Alcohol withdrawal. At this time, patient is functioning at their prior level of function and does not require further acute OT services.     Plan:     During this hospitalization, patient does not require further acute OT services.  Please re-consult if situation changes.    · Plan of Care Reviewed with: patient    Subjective     Chief Complaint: "I want to go to detox  Patient/Family Comments/goals: to go to detox    Occupational Profile:  Living Environment: Lives w/GF in [facility], no [facility]  Previous level of function: indep  Roles and Routines: not currently working 2/2 meniscus surgery a few months ago  Equipment Used at home:  none  Assistance upon Discharge: family    Pain/Comfort:  · Pain Rating 1: 0/10  · Pain Rating Post-Intervention 1: 0/10    Patients cultural, spiritual, Evangelical conflicts given the current situation: no    Objective:     Communicated with: nurse prior to session.  Patient found HOB elevated with bed alarm, telemetry, peripheral IV upon OT entry to room.    General Precautions: Standard, fall   Orthopedic Precautions:    Braces:       Occupational Performance:    Bed Mobility:    · Patient completed Rolling/Turning to Left with  independence  · Patient completed Rolling/Turning to Right with independence  · Patient completed Scooting/Bridging with independence  · Patient completed Supine to Sit with independence  · Patient completed Sit to Supine with independence    Functional Mobility/Transfers:  · Patient completed Sit <> Stand Transfer with independence  with  no assistive device   · Patient completed " Bed <> Chair Transfer using Step Transfer technique with independence with no assistive device  · Functional Mobility: indep in room no AD    Activities of Daily Living:  · Grooming: independence standing at sink  · Lower Body Dressing: independence      Cognitive/Visual Perceptual:  AO4    Physical Exam:  BUE AROM/strength WNL  Normal sit balance, good stand balance    AMPAC 6 Click ADL:  AMPA Total Score: 24    Treatment & Education:  Pt educated on role of OT/POC  Education:    Patient left HOB elevated with all lines intact, call button in reach, bed alarm on and nurse notified    GOALS:   Multidisciplinary Problems     Occupational Therapy Goals     Not on file          Multidisciplinary Problems (Resolved)        Problem: Occupational Therapy Goal    Goal Priority Disciplines Outcome Interventions   Occupational Therapy Goal   (Resolved)     OT, PT/OT Outcome(s) achieved                    History:     Past Medical History:   Diagnosis Date    Arthritis        Past Surgical History:   Procedure Laterality Date    ARTHROSCOPY, KNEE Left 1/7/2019    Performed by Derick Kirby MD at [facility] OR    EGD (ESOPHAGOGASTRODUODENOSCOPY) N/A 4/8/2019    Performed by Bonita Kendall MD at [facility] ENDO       Time Tracking:     OT Date of Treatment: 04/09/19  OT Start Time: 1012  OT Stop Time: 1028  OT Total Time (min): 16 min    Billable Minutes:Evaluation 16    Francisco Young OT  4/9/2019

## 2019-04-09 NOTE — PROGRESS NOTES
[facility] Medicine Resident JOSE JUANI Progress Note    Subjective:      Patient had EGD yesterday with findings of portal hypertensive gastropathy, biopsies taken and will be reviewed outpatient. Also had paracentesis performed by IR, tolerated both procedures well. Was stepped down overnight.     Denies any problems or complaints overnight. No tremulousness, diaphoresis or agitation. Afebrile and hemodynamically stable.     Objective:   Last 24 Hour Vital Signs:  BP  Min: 107/81  Max: 154/93  Temp  Av.6 °F (37 °C)  Min: 97.7 °F (36.5 °C)  Max: 99.4 °F (37.4 °C)  Pulse  Av.9  Min: 76  Max: 125  Resp  Av.8  Min: 14  Max: 51  SpO2  Av.6 %  Min: 91 %  Max: 97 %  Weight  Av.3 kg (212 lb 4.9 oz)  Min: 95.9 kg (211 lb 6.7 oz)  Max: 96.7 kg (213 lb 3 oz)  I/O last 3 completed shifts:  In: 4626.7 [P.O.:820; I.V.:3606.7; IV Piggyback:200]  Out: 7150 [Urine:4250; Other:2900]    Physical Examination:  Gen: middle-aged male lying in bed, appears comfortable, tremulousness with arm raise, not appreciated at rest   HEENT: NC/AT, PERRL. EOMI. No scleral icterus, MMM. OP non-erythematous.   Neck: supple, no adenopapthy  CV: tachycardic and regular. No m/r/g  Abdomen: Distended, non-tender, +hepatomegaly noted   Skin: Dry. no jaundice  Neuro: AAox3. CNII-XII grossly intact. No asterixis     Laboratory:  Laboratory Data Reviewed: yes  Pertinent Findings:  Recent Labs   Lab 19  0409  19  1941 19  0254 19  0755 194 19  0709   WBC  --    < > 6.01  --  5.86 7.30  --    HGB  --    < > 11.7*  --  12.1* 12.6*  --    HCT  --    < > 33.9*  --  35.1* 36.0*  --    PLT  --    < > 129*  --  140* 148*  --    MCV  --    < > 104*  --  103* 102*  --    RDW  --    < > 14.6*  --  14.0 14.1  --      --   --  130*  --   --  132*   K 3.3*  --   --  3.6  --   --  3.7     --   --  96  --   --  96   CO2 24  --   --  26  --   --  29   BUN 2*  --   --  2*  --   --  3*   CREATININE 0.6   --   --  0.6  --   --  0.7   GLU 69*  --   --  139*  --   --  96   PROT 7.1  --   --  6.6  --   --  6.8   ALBUMIN 2.9*  --   --  2.6*  --   --  2.7*   BILITOT 1.9*  --   --  1.8*  --   --  2.1*   *  --   --  189*  --   --  195*   ALKPHOS 205*  --   --  169*  --   --  173*   ALT 22  --   --  21  --   --  24    < > = values in this interval not displayed.       Microbiology Data Reviewed: yes  Pertinent Findings:  Ascitic fluid gram stain - no WBCs or organisms  Ascitic culture - NGTD    Other Results:  EKG (my interpretation): no new     Radiology Data Reviewed: yes  Pertinent Findings:  No new     Current Medications:     Infusions:   dextrose 5 % and 0.45 % NaCl 100 mL/hr at 04/09/19 0355        Scheduled:   cefTRIAXone 1 g in dextrose 5 % 50 mL  1 g Intravenous Q24H    folic acid  1 mg Oral Daily    LORazepam  0.5 mg Oral Q6H    multivitamin  1 tablet Oral Daily    pantoprazole  40 mg Oral Daily    thiamine (VITAMIN B1) IVPB  100 mg Intravenous Daily        PRN:  dextrose 50 % in water (D50W), dextrose 50 % in water (D50W), glucagon (human recombinant), glucose, glucose, lorazepam    Antibiotics and Day Number of Therapy:  Rocephin 1g qd, started 4/7 - stopped 4/9    Lines and Day Number of Therapy:  PIV     Assessment:     ChristopherANTONELLA Rolon Jr. is a 43 y.o.male with  Patient Active Problem List    Diagnosis Date Noted    Gastrointestinal hemorrhage 04/08/2019    GI bleed 04/07/2019    Alcohol withdrawal 04/07/2019    Ascites 04/07/2019    Acute blood loss anemia 04/07/2019    Thrombocytopenia 04/07/2019    Alcohol abuse 04/07/2019    Alcoholic hepatitis with ascites 04/07/2019    Hyponatremia 04/07/2019    Bloody stool     Bright red blood per rectum     Other meniscus derangements, other medial meniscus, left knee 01/07/2019        Plan:     EtOH abuse with active withdrawal  - pt reports drinking 1 L vodka daily, with last drink approx 12 hours PTA  - per ED report pt entered active  withdrawal (EtOH level of 125). Received Ativan 1 mg, 2 L NS  - on medicine exam, pt tachycardic, anxious-appearing and tremulous  - admit to ICU for close monitoring, stepped down to floor 4/8  - Ativan 0.5 mg PO q4h scheduled and Valium 5 mg PO PRN for CIWA > 8  - Start folate, MV and Thiamine 100 mg IV daily  - per patient's wife at bedside, pt to go to inpatient detox at [facility] today 4/8. Will touch base with CM about facilitating direct transfer once medically cleared.      GI Bleed 2/2 portal hypertensive gastropathy, stable  - presents with 3-4 days bloody stools. Difficult to dilineate upper vs lower GIB based on history, as pt reports both dark sticky and bright red. Per ED ALVINO, gross red blood in rectum  - suspected etiologies includes PUD or gastritis related to heavy alcohol use  - on admit, H/H 12.7/36.  - Started Protonix, octreotide and rocephin 4/7   - 4/8 EGD with findings consistent with portal HTN gastropathy, no sites of active bleeding. Biopsies taken, to be reviewed outpatient.     Alcoholic hepatitis with ascites   - ED labs: , ALT 26, Alk phos 241, Tbili 1.7. INR wnl, 1.1  - CT with hepatomegaly, hepatic steatosis, nodularity and moderate volume free fluid throughout abdomen and pelvis    - withdrawal, nutritional and hydration support as above  - EtOH cessation support as above  - 4/8 IR paracentesis performed, no SBP, aerobic cx: NGTD  - on octreotide for SBP ppx   - will start lasix/aldactone on discharge      Anemia, stable   - ED H/H 12.7/36.1,   - likely multifactorial, 2/2 acute blood loss, chronic EtOH use, possible B12 deficiency.  - 4/9 H/H stable, will continue to monitor      Thrombocytopenia, improving  - ED Plt 145, likely 2/2 EtOH use.  - 4/9 improved to 162, will continue to monitor      IVFs: none   VTE PPx: SCDs  Diet: low sodium   Code: Full     Dispo: medically stable for discharge to home vs. Inpatient detox at [facility]      Hope E Elissa Rinku  L[employer]  Internal Medicine [facility] Medicine Service Team A    [facility] Medicine Hospitalist Pager numbers:   [facility] Hospitalist Medicine Team A (Minesh/Reagan): [phone]  [facility] Hospitalist Medicine Team B (Rebekah/Bernard):  [phone]

## 2019-04-09 NOTE — DISCHARGE SUMMARY
"hospitals Hospital Medicine Discharge Summary    Primary Team: hospitals Hospitalist Team A  Attending Physician: Laverne Edge MD  Resident: Bennett  Intern: Elissa    Date of Admit: 4/6/2019  Date of Discharge: 4/9/2019    Discharge to: Home   Condition: Stable     Discharge Diagnoses     Patient Active Problem List   Diagnosis    GI bleed    Alcohol withdrawal    Ascites    Acute blood loss anemia    Thrombocytopenia    Alcohol abuse    Alcoholic hepatitis with ascites    Hyponatremia    Bloody stool    Bright red blood per rectum    Gastrointestinal hemorrhage       Consultants and Procedures     Consultants:  GI  IR    Procedures:   EGD  Paracentesis     Imaging:  CT A/P  Hepatomegaly and morphologic changes of chronic liver disease.  Diffuse hepatic steatosis with areas of fatty sparing limits detection for focal hepatic mass.  Consider follow-up with contrast enhanced MRI for hepatocellular carcinoma screening as an outpatient.    Stigmata of portal hypertension including splenomegaly, collateral vessels, and moderate volume ascites.    Prominent right basilar subsegmental atelectasis likely secondary to elevation of the right hemidiaphragm.    Coronary artery calcifications.    Brief History of Present Illness      Nilesh Gorman Jr is a 44yo male with alcohol abuse who presented to [facility] on 4/6/2019 with a primary complaint of blood in stool.     The patient was in his usual state of health until 1 week PTA when his abdomen became distended. Approx 3-4 days PTA, pt began passing 3-4 bloody stools daily, which he describes as soft with intermingled bright red liquid and black sticky components. He denies prior history of abdominal distension or bloody stools. He denies any associated abdominal pain, diarrhea or constipation, or vomiting. Endorses dizziness with standing since onset of bloody stools, though he denies LOC or falls. Takes two pills of Tylenol nightly "to help sleep" but " denies NSAID use or tobacco use.      Patient drinks 1 L vodka daily for the last several years, with last drink occurring on the morning of presentation. Has attempted to quit before but could not because of onset of tremors. Insists he would like to quit now. Denies history seizures. At time of bedside eval by medicine housestaff, pt reports feeling anxious as if he is entering withdrawal.    For the full HPI please refer to the History & Physical from this admission.    Hospital Course By Problem with Pertinent Findings     EtOH abuse with active withdrawal  - pt reports drinking 1 L vodka daily, with last drink approx 12 hours PTA  - per ED report pt entered active withdrawal (EtOH level of 125). Received Ativan 1 mg, 2 L NS  - on our initial exam, pt tachycardic, anxious-appearing and tremulous  - admited to ICU for close monitoring, stepped down to floor 4/8  - given ativan for symptoms throughout stay, CIWA scores on 24 hours prior to discharge 3-6   - Started folate, MV and Thiamine 100 mg daily, continued on discharge   - per patient's wife at bedside, pt to go to inpatient detox at [place] [facility]. Has already been approved via insurance but now awaiting bed. Discharged on 4/9 still pending bed. Encouraged continued cessation of EtOH until this has been arranged.      GI Bleed 2/2 portal hypertensive gastropathy, resolved   - presented with 3-4 days bloody stools. Difficult to dilineate upper vs lower GIB based on history, as pt reports both dark sticky and bright red. Per ED ALVINO, gross red blood in rectum  - on admit, H/H 12.7/36, stable through admission and never required transfusion   - Started Protonix, octreotide and rocephin 4/7, discontinued 4/8 after EGD and negative paracentesis cell count    - 4/8 EGD with findings consistent with portal HTN gastropathy, no sites of active bleeding. Biopsies taken, to be reviewed outpatient.     Alcoholic hepatitis with ascites   - ED labs: , ALT 26, Alk phos  241, Tbili 1.7. INR wnl, 1.1  - CT with hepatomegaly, hepatic steatosis, nodularity and moderate volume free fluid throughout abdomen and pelvis    - withdrawal, nutritional and hydration support provided as above  - EtOH cessation support as above  - 4/8 IR paracentesis performed, no SBP, aerobic cx: NGTD  - Started on lasix 20/aldactone 50 on discharge for ascites management   - Referred to GI on discharge       Anemia, stable   - ED H/H 12.7/36.1,   - likely multifactorial, 2/2 acute blood loss, chronic EtOH use, folate deficiency. Did not require blood on admission    - H/H stable ~12.5 on day of discharge      Thrombocytopenia, improving  - ED Plt 145, likely 2/2 EtOH use.  - improved to 162 on day of discharge     Discharge Medications        Medication List      START taking these medications    folic acid 1 MG tablet  Commonly known as:  FOLVITE  Take 1 tablet (1 mg total) by mouth once daily.  Start taking on:  4/10/2019     furosemide 20 MG tablet  Commonly known as:  LASIX  Take 1 tablet (20 mg total) by mouth once daily.     pantoprazole 40 MG tablet  Commonly known as:  PROTONIX  Take 1 tablet (40 mg total) by mouth once daily.  Start taking on:  4/10/2019     spironolactone 50 MG tablet  Commonly known as:  ALDACTONE  Take 1 tablet (50 mg total) by mouth once daily.     thiamine 100 MG tablet  Commonly known as:  VITAMIN B-1  Take 1 tablet (100 mg total) by mouth once daily.        CONTINUE taking these medications    multivitamin capsule        STOP taking these medications    naproxen sodium 220 MG tablet  Commonly known as:  ANAPROX     traMADol 50 mg tablet  Commonly known as:  ULTRAM           Where to Get Your Medications      These medications were sent to [facility] [address], [place] [address]    Hours:  Mon-Fri, 8a-5:30p Phone:  [phone]   · folic acid 1 MG tablet  · furosemide 20 MG tablet  · pantoprazole 40 MG tablet  · spironolactone 50 MG tablet      You can get these medications from any pharmacy    You don't need a prescription for these medications  · thiamine 100 MG tablet         Discharge Information:   Diet:  Low Na diet     Physical Activity:  As tolerated              Instructions:  1. Take all medications as prescribed  2. Keep all follow-up appointments  3. Return to the hospital or call your primary care physicians if any worsening symptoms such as fever, chest pain, shortness of breath, return of symptoms, or any other concerns.    Follow-Up Appointments:  Mobile for EtOH detox  Dr. Brannon 1 week  GI 1 month     Maksim Guajardo MD  [facility] Internal Medicine/Pediatrics, -III

## 2019-04-09 NOTE — NURSING
Pt IV and Tele removed. Pt discharge papers printed and gone over with pt and family. Transport called for discharge.

## 2019-04-09 NOTE — PT/OT/SLP EVAL
Physical Therapy Evaluation and Discharge Note    Patient Name:  Nilesh Rolon Jr.   MRN:  [id number]    Recommendations:     Discharge Recommendations:  home   Discharge Equipment Recommendations: none   Barriers to discharge: None    Assessment:     Nilesh Rolon Jr. is a 43 y.o. male admitted with a medical diagnosis of Alcohol withdrawal. .  At this time, patient is functioning at their prior level of function and does not require further acute PT services.     Recent Surgery: Procedure(s) (LRB):  EGD (ESOPHAGOGASTRODUODENOSCOPY) (N/A) 1 Day Post-Op    Plan:     During this hospitalization, patient does not require further acute PT services.  Please re-consult if situation changes.      Subjective     Chief Complaint: wants to take a shower  Patient/Family Comments/goals: to go home  Pain/Comfort:  · Pain Rating 1: 0/10    Patients cultural, spiritual, Muslim conflicts given the current situation: no    Living Environment:  Pt lives with family in a [facility] with 0E.  Prior to admission, patients level of function was Independent.  Equipment used at home: none.  DME owned (not currently used): axillary crutches.  Upon discharge, patient will have assistance from family.    Objective:     Communicated with nsg prior to session.  Patient found HOB elevated with bed alarm, telemetry, peripheral IV upon PT entry to room.    General Precautions: Standard, fall   Orthopedic Precautions:N/A   Braces: N/A     Exams:  · Cognitive Exam:  Patient is oriented to Person, Place, Time and Situation  · Gross Motor Coordination:  WFL  · Postural Exam:  Patient presented with the following abnormalities:    · -       No postural abnormalities identified  · Sensation:    · -       Intact  light/touch B LE's  · Skin Integrity/Edema:      · -       Skin integrity: Visible skin intact  · RLE ROM: WFL  · RLE Strength: WFL  · LLE ROM: WFL  · LLE Strength: WFL    Functional Mobility:  · Bed Mobility:     · Scooting:  independence  · Supine to Sit: independence  · Sit to Supine: independence  · Transfers:     · Sit to Stand:  independence with no AD  · Gait: 100' Independently  · Balance: Good    AM-PAC 6 CLICK MOBILITY  Total Score:24       Therapeutic Activities and Exercises:   eval only    AM-PAC 6 CLICK MOBILITY  Total Score:24     Patient left HOB elevated with all lines intact, call button in reach, bed alarm on and nsg notified.    GOALS:   Multidisciplinary Problems     Physical Therapy Goals     Not on file          Multidisciplinary Problems (Resolved)        Problem: Physical Therapy Goal    Goal Priority Disciplines Outcome Goal Variances Interventions   Physical Therapy Goal   (Resolved)     PT, PT/OT Outcome(s) achieved                     History:     Past Medical History:   Diagnosis Date    Arthritis        Past Surgical History:   Procedure Laterality Date    ARTHROSCOPY, KNEE Left 1/7/2019    Performed by Derick Kirby MD at [facility] OR    EGD (ESOPHAGOGASTRODUODENOSCOPY) N/A 4/8/2019    Performed by Bonita Kendall MD at [facility] ENDO       Time Tracking:     PT Received On: 04/09/19  PT Start Time: 1115     PT Stop Time: 1123  PT Total Time (min): 8 min     Billable Minutes: Evaluation 8      Sharda Hernandez, PT  04/09/2019

## 2019-04-09 NOTE — PLAN OF CARE
Problem: Adult Inpatient Plan of Care  Goal: Plan of Care Review  Outcome: Ongoing (interventions implemented as appropriate)  1930H Patient is awake and orientedx4. Care plan explained to patient and mother; they verbalized understanding. On room air, O2 saturation at 93-97%. Hooked to heart monitor running normal sinus rhythm at 85-97bpm. Patient has right hand 20G that is infusing, left forearm 20G was removed. Maintained on 2 gram low sodium diet. Patient has a rash on face, and abdomen. Ambulated to the bathroom with assistance; patient had a bowel movement. No pain reported; reported itching gave diphenhydramine. The abdomen is distended and rounded.No shortness of breath/cough. Hand tremors noted, Lorazepam given. All Due medications given. Encouraged to turn every 2 hours as tolerated. Maintained on fall risk/seizure precautions. Bed in lowest position, bed alarm on, call light/personal items within reach and instructed to call for help when needed. Will continue to monitor.

## 2019-04-09 NOTE — PLAN OF CARE
I called [facility] and they do not have a bed available at this time.  I offered to call [facility] for pt and he states he wants to continue to pursue [place] from home.

## 2019-04-10 ENCOUNTER — PATIENT OUTREACH (OUTPATIENT)
Dept: ADMINISTRATIVE | Facility: CLINIC | Age: 44
End: 2019-04-10

## 2019-04-10 NOTE — PATIENT INSTRUCTIONS
When You Have Gastrointestinal (GI) Bleeding    Blood in your vomit or stool can be a sign of gastrointestinal (GI) bleeding. GI bleeding can be scary. But the cause may not be serious. You should always see a doctor if GI bleeding occurs.  The GI tract  The GI tract is the path through which food travels in the body. Food passes from the mouth down the esophagus (the tube from the mouth to the stomach). Food begins to break down in the stomach. It then moves through the duodenum, the first part of the small intestine. Nutrients are absorbed as food travels through the small intestine. What is left passes into the colon (large intestine) as waste. The colon removes water from the waste. Waste continues from the colon to the rectum (where stool is stored). Waste then leaves the body through the anus.  Causes of GI bleeding  GI bleeding can be caused by many different problems. Some of the more common causes include:  · Swollen veins in the anus (hemorrhoids)  · Swollen veins in the esophagus (varices)  · Sore on the lining of the GI tract (ulcer)  · Cuts or scrapes in the mouth or throat  · Infection caused by germs such as bacteria or parasites  · Food allergies, such as milk allergy in young children  · Medicines  · Inflammation of the GI tract (gastritis or esophagitis)  · Colitis (Crohn's disease or ulcerative colitis)  · Cancer (tumors or polyps)  · Abnormal pouches in the colon (diverticula)  · Tears in the esophagus or anus  · Nosebleed  · Abnormal blood vessels in the GI tract (angiodysplasia)  Diagnosing the cause of blood in stool  If blood is coming out in your stool, you may have a lower GI tract problem or a very fast upper GI tract bleed. Bleeding from the GI tract can be bright red. Or it may look dark and tarry. Tests may also find blood in your stool that cant be seen with the eye (occult blood). To find out the cause, tests that may be ordered include:  · Blood tests. A blood sample is taken and  sent to a lab for exam.  · Hemoccult test. Checks a stool sample for blood.  · Stool culture. Checks a stool sample for bacteria or parasites.  · X-ray, ultrasound, or CT scan. Imaging tests that take pictures of the digestive tract.  · Colonoscopy or sigmoidoscopy. This test uses a flexible tube with a tiny camera. The tube is inserted through your anus into your rectum to see the inside of your colon. Your provider can also take a tiny tissue sample (biopsy) and treat a bleeding source  Diagnosing the cause of blood in vomit  If you are vomiting blood or something that looks like coffee grounds, you may have an upper GI tract problem. To find the cause, tests that may be done include:  · Upper Endoscopy. A flexible tube with a tiny camera is inserted through your mouth and throat to see inside your upper GI tract. This lets your provider take a tiny tissue sample (biopsy) and treat a bleeding source.  · Nasogastric lavage. This can tell if you have upper GI or lower GI bleeding.  · X-ray, ultrasound, or CT scan. Imaging tests that take pictures of your digestive tract.  · Upper GI series. X-rays of the upper part of your GI tract taken from inside your body.  · Enteroscopy. This sends a flexible tube or a small, swallowed capsule camera into your small intestine.  When to call your healthcare provider  Call your healthcare provider right away if you have any of the following:  · Bleeding from your mouth or anus that can't be stopped  · Fever of 100.4°F (38.0°) or higher  · Bleeding along with feeling lightheaded or dizzy  · Signs of fluid loss (dehydration). These include a dry, sticky mouth, decreased urine output; and very dark urine.  · Belly (abdominal) pain   Date Last Reviewed: 7/1/2016  © 5252-4002 [employer]. [address] [place] [address], [place] [address]. All rights reserved. This information is not intended as a substitute for professional medical care. Always follow your healthcare  professional's instructions.

## 2019-04-11 LAB — BACTERIA SPEC AEROBE CULT: NO GROWTH

## 2019-04-12 LAB
GENETICIST REVIEW: NORMAL
HFE GENE MUT ANL BLD/T: NORMAL
HFE RELEASED BY: NORMAL
HFE RESULT SUMMARY: NORMAL
REF LAB TEST METHOD: NORMAL
SPECIMEN SOURCE: NORMAL
SPECIMEN,  HEMOCHROMATOSIS: NORMAL

## 2019-04-15 LAB — BACTERIA SPEC ANAEROBE CULT: NORMAL

## 2019-04-17 ENCOUNTER — TELEPHONE (OUTPATIENT)
Dept: GASTROENTEROLOGY | Facility: CLINIC | Age: 44
End: 2019-04-17

## 2019-04-17 NOTE — TELEPHONE ENCOUNTER
----- Message from Bonita Kendall MD sent at 4/17/2019  8:12 AM CDT -----  Gastric bx with inflammation, h.pylori still pending. I think it is likely alcohol related. Distal esophageal bx without evidence of malignancy. He needs hepatology f/u, would consider repeating the EGD at some point for further esophageal bx but defer decision to them

## 2019-05-13 ENCOUNTER — OFFICE VISIT (OUTPATIENT)
Dept: NEUROLOGY | Facility: HOSPITAL | Age: 44
End: 2019-05-13
Attending: INTERNAL MEDICINE
Payer: MEDICAID

## 2019-05-13 VITALS
TEMPERATURE: 98 F | WEIGHT: 214.19 LBS | SYSTOLIC BLOOD PRESSURE: 129 MMHG | HEART RATE: 113 BPM | HEIGHT: 75 IN | BODY MASS INDEX: 26.63 KG/M2 | DIASTOLIC BLOOD PRESSURE: 79 MMHG

## 2019-05-13 DIAGNOSIS — K70.9 ALCOHOLIC LIVER DISEASE: Primary | ICD-10-CM

## 2019-05-13 PROCEDURE — 99215 OFFICE O/P EST HI 40 MIN: CPT | Performed by: INTERNAL MEDICINE

## 2019-05-13 NOTE — PATIENT INSTRUCTIONS
Hepatology staff from [facility] will contact to schedule appt.    Labs today, 1st floor [facility].

## 2019-05-13 NOTE — PROGRESS NOTES
"[facility]    CC: liver disease, esophageal nodule    HPI 43 y.o. male with pmh arthritis presents for chronic liver disease associated with alcohol abuse disorder for three years. Patient was admitted for ascites and melena one month ago. EGD at the time revealed PHG and a 15mm esophageal nodule. Biopsies of the nodule were notable for reactive changes, though squamous cells not seen and H Pylori negative. Patient reports alcohol cessation for over one month, and he feels much better. He acknowledges resolution of itching and abdominal swelling. He denies any history of lower extremity swelling. He saw his PCP, who noted his labs had all normalized. Patient reports no difficulty swallowing, but he get occasional abdominal pains throughout. He denies family history of liver disease.      Past Medical History:   Diagnosis Date    Arthritis          Review of Systems  General ROS: negative for chills, fever or weight loss  Cardiovascular ROS: no chest pain or dyspnea on exertion  Gastrointestinal ROS: transient, sharp abdominal pain in AFQ, No change in bowel habits, or black/ bloody stools    Physical Examination  /79 (BP Location: Left arm, Patient Position: Sitting, BP Method: Medium (Automatic))   Pulse (!) 113   Temp 98.1 °F (36.7 °C) (Oral)   Ht 6' 3" (1.905 m)   Wt 97.1 kg (214 lb 2.8 oz)   BMI 26.77 kg/m²   General appearance: alert, cooperative, no distress  HENT: Normocephalic, atraumatic, neck symmetrical, no nasal discharge   Lungs: clear to auscultation bilaterally, no dullness to percussion bilaterally  Heart: regular rate and rhythm without rub; no displacement of the PMI   Abdomen: soft, non-tender; bowel sounds normoactive; no organomegaly  Extremities: extremities symmetric; no clubbing, cyanosis, or edema  Neurologic: Alert and oriented X 3, normal strength, normal coordination and gait    Labs:  Lab Results   Component Value Date    WBC 6.67 04/09/2019    HGB 12.6 (L) " 04/09/2019    HCT 36.6 (L) 04/09/2019     (H) 04/09/2019     04/09/2019     Lab Results   Component Value Date    ALT 24 04/09/2019     (H) 04/09/2019    ALKPHOS 173 (H) 04/09/2019    BILITOT 2.1 (H) 04/09/2019     Lab Results   Component Value Date    CREATININE 0.7 04/09/2019    BUN 3 (L) 04/09/2019     (L) 04/09/2019    K 3.7 04/09/2019    CL 96 04/09/2019    CO2 29 04/09/2019       Imaging:  CT abdomen -- enlarged heterogenous liver with steatosis, ascites, splenomegaly noted.    I personally reviewed imaging    Medical records reviewed    Assessment:   43 yoM presents for alcoholic liver disease associated with ascites for 3 years. Patient's ascites has resolved while taking spironolactone 50mg and lasix 20mg daily. Will check CMP to determine liver and and renal response to diuretics and alcohol abstinence. Patient with heterozygous genotype for hemochromatosis along with elevated ferritin, though uncertain of presence in his overall liver disease. Ferritin likely elevated in association with alcohol consumption, so will recheck today.    Plan:  -continue alcohol cessation  -referral to hepatology at [facility] for chronic liver disease with ascites  -continue lasix and spironolactone  -repeat CMP, PT/INR and CBC  -repeat ferritin    Merlin Anderson MD   [address] [place] [address], [address]   [place] [address] [phone]

## 2019-05-14 ENCOUNTER — DOCUMENTATION ONLY (OUTPATIENT)
Dept: TRANSPLANT | Facility: CLINIC | Age: 44
End: 2019-05-14

## 2019-05-14 NOTE — NURSING
Pt records reviewed.   Pt will be referred to Hepatology.  Alcoholic liver disease  Initial referral received  from the [facility].   Referring Provider/diagnosis  Merlin Anderson MD      Referral letter sent to patient.

## 2019-05-14 NOTE — LETTER
May 14, 2019    Nilesh Rolon  [address] [address]      Dear Nilesh Rolon:    Your doctor has referred you to the [facility]. We are sending this letter to remind you to make an appointment with us to complete the referral process.     Please call us at [phone] to schedule an appointment. We look forward to seeing you soon.     If you received a call and have been scheduled, please disregard this letter.       Sincerely,        [facility]   [address] [place] [id number]  [phone]

## 2019-10-24 ENCOUNTER — HOSPITAL ENCOUNTER (INPATIENT)
Facility: HOSPITAL | Age: 44
LOS: 4 days | Discharge: HOME | DRG: 897 | End: 2019-10-28
Attending: EMERGENCY MEDICINE | Admitting: INTERNAL MEDICINE
Payer: MEDICAID

## 2019-10-24 DIAGNOSIS — K92.1 BLOODY STOOL: ICD-10-CM

## 2019-10-24 DIAGNOSIS — D69.6 THROMBOCYTOPENIA: ICD-10-CM

## 2019-10-24 DIAGNOSIS — K70.9 ALCOHOLIC LIVER DISEASE: ICD-10-CM

## 2019-10-24 DIAGNOSIS — E87.1 HYPONATREMIA: ICD-10-CM

## 2019-10-24 DIAGNOSIS — F10.939 ALCOHOL WITHDRAWAL: Primary | ICD-10-CM

## 2019-10-24 DIAGNOSIS — F10.939 ALCOHOL WITHDRAWAL SYNDROME WITH COMPLICATION: ICD-10-CM

## 2019-10-24 DIAGNOSIS — K70.11 ALCOHOLIC HEPATITIS WITH ASCITES: Chronic | ICD-10-CM

## 2019-10-24 DIAGNOSIS — F10.931 DELIRIUM TREMENS: ICD-10-CM

## 2019-10-24 DIAGNOSIS — K92.2 GASTROINTESTINAL HEMORRHAGE, UNSPECIFIED GASTROINTESTINAL HEMORRHAGE TYPE: ICD-10-CM

## 2019-10-24 DIAGNOSIS — R07.9 CHEST PAIN: ICD-10-CM

## 2019-10-24 DIAGNOSIS — F10.10 ALCOHOL ABUSE: Chronic | ICD-10-CM

## 2019-10-24 LAB
ALBUMIN SERPL BCP-MCNC: 4 G/DL (ref 3.5–5.2)
ALP SERPL-CCNC: 139 U/L (ref 55–135)
ALT SERPL W/O P-5'-P-CCNC: 30 U/L (ref 10–44)
AMPHET+METHAMPHET UR QL: NEGATIVE
ANION GAP SERPL CALC-SCNC: 14 MMOL/L (ref 8–16)
AST SERPL-CCNC: 94 U/L (ref 10–40)
BACTERIA #/AREA URNS HPF: ABNORMAL /HPF
BARBITURATES UR QL SCN>200 NG/ML: NEGATIVE
BENZODIAZ UR QL SCN>200 NG/ML: NEGATIVE
BILIRUB SERPL-MCNC: 3.8 MG/DL (ref 0.1–1)
BILIRUB UR QL STRIP: ABNORMAL
BNP SERPL-MCNC: 30 PG/ML (ref 0–99)
BUN SERPL-MCNC: 11 MG/DL (ref 6–20)
BZE UR QL SCN: NEGATIVE
CALCIUM SERPL-MCNC: 10.5 MG/DL (ref 8.7–10.5)
CANNABINOIDS UR QL SCN: NEGATIVE
CHLORIDE SERPL-SCNC: 93 MMOL/L (ref 95–110)
CK SERPL-CCNC: 410 U/L (ref 20–200)
CLARITY UR: CLEAR
CO2 SERPL-SCNC: 21 MMOL/L (ref 23–29)
COLOR UR: ABNORMAL
CREAT SERPL-MCNC: 1.1 MG/DL (ref 0.5–1.4)
CREAT UR-MCNC: 141.1 MG/DL (ref 23–375)
ERYTHROCYTE [DISTWIDTH] IN BLOOD BY AUTOMATED COUNT: 14.6 % (ref 11.5–14.5)
EST. GFR  (AFRICAN AMERICAN): >60 ML/MIN/1.73 M^2
EST. GFR  (NON AFRICAN AMERICAN): >60 ML/MIN/1.73 M^2
ESTIMATED AVG GLUCOSE: 94 MG/DL (ref 68–131)
ETHANOL SERPL-MCNC: <10 MG/DL
GLUCOSE SERPL-MCNC: 115 MG/DL (ref 70–110)
GLUCOSE UR QL STRIP: NEGATIVE
HBA1C MFR BLD HPLC: 4.9 % (ref 4–5.6)
HCT VFR BLD AUTO: 43.1 % (ref 40–54)
HGB BLD-MCNC: 14.8 G/DL (ref 14–18)
HGB UR QL STRIP: ABNORMAL
HYALINE CASTS #/AREA URNS LPF: 25 /LPF
INR PPP: 1.2 (ref 0.8–1.2)
KETONES UR QL STRIP: NEGATIVE
LEUKOCYTE ESTERASE UR QL STRIP: NEGATIVE
MCH RBC QN AUTO: 34.7 PG (ref 27–31)
MCHC RBC AUTO-ENTMCNC: 34.3 G/DL (ref 32–36)
MCV RBC AUTO: 101 FL (ref 82–98)
METHADONE UR QL SCN>300 NG/ML: NEGATIVE
MICROSCOPIC COMMENT: ABNORMAL
NITRITE UR QL STRIP: NEGATIVE
NON-SQ EPI CELLS #/AREA URNS HPF: 2 /HPF
OPIATES UR QL SCN: NEGATIVE
PCP UR QL SCN>25 NG/ML: NEGATIVE
PH UR STRIP: 7 [PH] (ref 5–8)
PLATELET # BLD AUTO: 69 K/UL (ref 150–350)
PMV BLD AUTO: 11.2 FL (ref 9.2–12.9)
POTASSIUM SERPL-SCNC: 3.6 MMOL/L (ref 3.5–5.1)
PROT SERPL-MCNC: 8.9 G/DL (ref 6–8.4)
PROT UR QL STRIP: ABNORMAL
PROTHROMBIN TIME: 12.8 SEC (ref 9–12.5)
RBC # BLD AUTO: 4.27 M/UL (ref 4.6–6.2)
RBC #/AREA URNS HPF: 2 /HPF (ref 0–4)
SODIUM SERPL-SCNC: 128 MMOL/L (ref 136–145)
SP GR UR STRIP: <=1.005 (ref 1–1.03)
SQUAMOUS #/AREA URNS HPF: 0 /HPF
T4 FREE SERPL-MCNC: 1.14 NG/DL (ref 0.71–1.51)
TOXICOLOGY INFORMATION: NORMAL
TROPONIN I SERPL DL<=0.01 NG/ML-MCNC: 0.01 NG/ML (ref 0–0.03)
TSH SERPL DL<=0.005 MIU/L-ACNC: 4 UIU/ML (ref 0.4–4)
URN SPEC COLLECT METH UR: ABNORMAL
UROBILINOGEN UR STRIP-ACNC: NEGATIVE EU/DL
WBC # BLD AUTO: 7.19 K/UL (ref 3.9–12.7)
WBC #/AREA URNS HPF: 8 /HPF (ref 0–5)

## 2019-10-24 PROCEDURE — 83036 HEMOGLOBIN GLYCOSYLATED A1C: CPT

## 2019-10-24 PROCEDURE — 96375 TX/PRO/DX INJ NEW DRUG ADDON: CPT

## 2019-10-24 PROCEDURE — 93010 EKG 12-LEAD: ICD-10-PCS | Mod: ,,, | Performed by: INTERNAL MEDICINE

## 2019-10-24 PROCEDURE — 63600175 PHARM REV CODE 636 W HCPCS: Performed by: EMERGENCY MEDICINE

## 2019-10-24 PROCEDURE — 84439 ASSAY OF FREE THYROXINE: CPT

## 2019-10-24 PROCEDURE — 85027 COMPLETE CBC AUTOMATED: CPT

## 2019-10-24 PROCEDURE — 25000003 PHARM REV CODE 250: Performed by: EMERGENCY MEDICINE

## 2019-10-24 PROCEDURE — 83880 ASSAY OF NATRIURETIC PEPTIDE: CPT

## 2019-10-24 PROCEDURE — 63600175 PHARM REV CODE 636 W HCPCS: Performed by: STUDENT IN AN ORGANIZED HEALTH CARE EDUCATION/TRAINING PROGRAM

## 2019-10-24 PROCEDURE — 20000000 HC ICU ROOM

## 2019-10-24 PROCEDURE — 96374 THER/PROPH/DIAG INJ IV PUSH: CPT

## 2019-10-24 PROCEDURE — 85610 PROTHROMBIN TIME: CPT

## 2019-10-24 PROCEDURE — 99285 EMERGENCY DEPT VISIT HI MDM: CPT | Mod: 25

## 2019-10-24 PROCEDURE — 84484 ASSAY OF TROPONIN QUANT: CPT

## 2019-10-24 PROCEDURE — 81000 URINALYSIS NONAUTO W/SCOPE: CPT | Mod: 59

## 2019-10-24 PROCEDURE — 80307 DRUG TEST PRSMV CHEM ANLYZR: CPT

## 2019-10-24 PROCEDURE — 25000003 PHARM REV CODE 250: Performed by: STUDENT IN AN ORGANIZED HEALTH CARE EDUCATION/TRAINING PROGRAM

## 2019-10-24 PROCEDURE — 80053 COMPREHEN METABOLIC PANEL: CPT

## 2019-10-24 PROCEDURE — 93005 ELECTROCARDIOGRAM TRACING: CPT

## 2019-10-24 PROCEDURE — 96361 HYDRATE IV INFUSION ADD-ON: CPT

## 2019-10-24 PROCEDURE — 84443 ASSAY THYROID STIM HORMONE: CPT

## 2019-10-24 PROCEDURE — 82550 ASSAY OF CK (CPK): CPT

## 2019-10-24 PROCEDURE — 80074 ACUTE HEPATITIS PANEL: CPT

## 2019-10-24 PROCEDURE — 93010 ELECTROCARDIOGRAM REPORT: CPT | Mod: ,,, | Performed by: INTERNAL MEDICINE

## 2019-10-24 PROCEDURE — 80320 DRUG SCREEN QUANTALCOHOLS: CPT

## 2019-10-24 PROCEDURE — 96376 TX/PRO/DX INJ SAME DRUG ADON: CPT

## 2019-10-24 RX ORDER — THIAMINE HCL 100 MG
100 TABLET ORAL DAILY
Status: DISCONTINUED | OUTPATIENT
Start: 2019-10-24 | End: 2019-10-28 | Stop reason: HOSPADM

## 2019-10-24 RX ORDER — PANTOPRAZOLE SODIUM 40 MG/1
40 TABLET, DELAYED RELEASE ORAL DAILY
Status: DISCONTINUED | OUTPATIENT
Start: 2019-10-24 | End: 2019-10-28 | Stop reason: HOSPADM

## 2019-10-24 RX ORDER — RAMELTEON 8 MG/1
8 TABLET ORAL NIGHTLY PRN
Status: DISCONTINUED | OUTPATIENT
Start: 2019-10-24 | End: 2019-10-25

## 2019-10-24 RX ORDER — FOLIC ACID 1 MG/1
1 TABLET ORAL DAILY
Status: DISCONTINUED | OUTPATIENT
Start: 2019-10-24 | End: 2019-10-28 | Stop reason: HOSPADM

## 2019-10-24 RX ORDER — SODIUM CHLORIDE, SODIUM LACTATE, POTASSIUM CHLORIDE, CALCIUM CHLORIDE 600; 310; 30; 20 MG/100ML; MG/100ML; MG/100ML; MG/100ML
1000 INJECTION, SOLUTION INTRAVENOUS CONTINUOUS
Status: ACTIVE | OUTPATIENT
Start: 2019-10-24 | End: 2019-10-25

## 2019-10-24 RX ORDER — DIAZEPAM 5 MG/1
10 TABLET ORAL EVERY 6 HOURS
Status: DISCONTINUED | OUTPATIENT
Start: 2019-10-24 | End: 2019-10-25

## 2019-10-24 RX ORDER — SODIUM CHLORIDE 0.9 % (FLUSH) 0.9 %
10 SYRINGE (ML) INJECTION
Status: DISCONTINUED | OUTPATIENT
Start: 2019-10-24 | End: 2019-10-28 | Stop reason: HOSPADM

## 2019-10-24 RX ORDER — HALOPERIDOL 5 MG/ML
2 INJECTION INTRAMUSCULAR ONCE
Status: COMPLETED | OUTPATIENT
Start: 2019-10-24 | End: 2019-10-24

## 2019-10-24 RX ORDER — ONDANSETRON 2 MG/ML
4 INJECTION INTRAMUSCULAR; INTRAVENOUS EVERY 8 HOURS PRN
Status: DISCONTINUED | OUTPATIENT
Start: 2019-10-24 | End: 2019-10-28 | Stop reason: HOSPADM

## 2019-10-24 RX ORDER — DIAZEPAM 10 MG/2ML
10 INJECTION INTRAMUSCULAR ONCE
Status: COMPLETED | OUTPATIENT
Start: 2019-10-24 | End: 2019-10-24

## 2019-10-24 RX ORDER — FUROSEMIDE 20 MG/1
20 TABLET ORAL DAILY
Status: DISCONTINUED | OUTPATIENT
Start: 2019-10-25 | End: 2019-10-24

## 2019-10-24 RX ORDER — SPIRONOLACTONE 25 MG/1
50 TABLET ORAL DAILY
Status: DISCONTINUED | OUTPATIENT
Start: 2019-10-25 | End: 2019-10-24

## 2019-10-24 RX ADMIN — HALOPERIDOL LACTATE 2 MG: 5 INJECTION, SOLUTION INTRAMUSCULAR at 09:10

## 2019-10-24 RX ADMIN — LORAZEPAM 1 MG: 2 INJECTION INTRAMUSCULAR; INTRAVENOUS at 03:10

## 2019-10-24 RX ADMIN — SODIUM CHLORIDE, SODIUM LACTATE, POTASSIUM CHLORIDE, AND CALCIUM CHLORIDE 1000 ML: .6; .31; .03; .02 INJECTION, SOLUTION INTRAVENOUS at 07:10

## 2019-10-24 RX ADMIN — LORAZEPAM 1 MG: 2 INJECTION INTRAMUSCULAR; INTRAVENOUS at 09:10

## 2019-10-24 RX ADMIN — SODIUM CHLORIDE, SODIUM LACTATE, POTASSIUM CHLORIDE, AND CALCIUM CHLORIDE 1000 ML: .6; .31; .03; .02 INJECTION, SOLUTION INTRAVENOUS at 12:10

## 2019-10-24 RX ADMIN — DIAZEPAM 10 MG: 5 TABLET ORAL at 11:10

## 2019-10-24 RX ADMIN — THERA TABS 1 TABLET: TAB at 03:10

## 2019-10-24 RX ADMIN — LORAZEPAM 1 MG: 2 INJECTION INTRAMUSCULAR; INTRAVENOUS at 06:10

## 2019-10-24 RX ADMIN — DIAZEPAM 10 MG: 5 TABLET ORAL at 06:10

## 2019-10-24 RX ADMIN — LORAZEPAM 2 MG: 2 INJECTION INTRAMUSCULAR; INTRAVENOUS at 12:10

## 2019-10-24 RX ADMIN — DIAZEPAM 10 MG: 5 INJECTION, SOLUTION INTRAMUSCULAR; INTRAVENOUS at 02:10

## 2019-10-24 RX ADMIN — LORAZEPAM 1 MG: 2 INJECTION INTRAMUSCULAR; INTRAVENOUS at 07:10

## 2019-10-24 RX ADMIN — FOLIC ACID 1 MG: 1 TABLET ORAL at 03:10

## 2019-10-24 RX ADMIN — DEXMEDETOMIDINE HYDROCHLORIDE 1.4 MCG/KG/HR: 4 INJECTION, SOLUTION INTRAVENOUS at 11:10

## 2019-10-24 RX ADMIN — SODIUM CHLORIDE, SODIUM LACTATE, POTASSIUM CHLORIDE, AND CALCIUM CHLORIDE 1000 ML: .6; .31; .03; .02 INJECTION, SOLUTION INTRAVENOUS at 03:10

## 2019-10-24 RX ADMIN — PANTOPRAZOLE SODIUM 40 MG: 40 TABLET, DELAYED RELEASE ORAL at 03:10

## 2019-10-24 RX ADMIN — Medication 100 MG: at 02:10

## 2019-10-24 NOTE — H&P
[facility] Medicine H&P Note     Admitting Team: [facility] Hospitalist Team B  Attending Physician: To Wagner MD  Resident: Gavino  Intern: Talia    Date of Admit: 10/24/2019    Chief Complaint     EtOH Withdrawal x6 days    Subjective:      History of Present Illness:  Nilesh Rolon Jr. is a 44 y.o. male who  has a past medical history of Anxiety and Arthritis. The patient presented to [facility] on 10/24/2019 with a primary complaint of alcohol withdrawal x6 days.    As per the patient prior to x6 days ago he was drinking 1/5th of vodka daily. He made the decision to stop drinking and thought that he could stop on his own. He endorses starting to have episodes of tremulousness that started x4-5 days ago. He also endorses visual hallucinations stating that he has been seeing people and things floating in the air that are not actually there; he however denies any auditory hallucinations. He states that he has withdrawn from alcohol in the past but has never experienced a seizure.     He denies any nausea, vomiting, sick contacts, chest pain, shortness of breath or rashes. He also denies taking any daily medication. He endorses a surgery in the past for a torn meniscus of his right knee. The patient states that he does not drink or use drugs.    He does endorse some symptomatic improvement over the past 6 days but felt like he should come in because he still just does not feel good. He denies any decreased PO intake stating that he has been eating and drinking without issue. He endorses a good support system at home stating that he lives with his girlfriend and has family which he sees very often and live near him.    Past Medical History:  Past Medical History:   Diagnosis Date    Anxiety     Arthritis        Past Surgical History:  Past Surgical History:   Procedure Laterality Date    ARTHROSCOPY OF KNEE Left 1/7/2019    Procedure: ARTHROSCOPY, KNEE;  Surgeon: Derick Kirby MD;   "Location: [facility] OR;  Service: Orthopedics;  Laterality: Left;    ESOPHAGOGASTRODUODENOSCOPY N/A 2019    Procedure: EGD (ESOPHAGOGASTRODUODENOSCOPY);  Surgeon: Bonita Kendall MD;  Location: [facility] ENDO;  Service: Endoscopy;  Laterality: N/A;     Allergies:  Review of patient's allergies indicates:  No Known Allergies    Home Medications:  Prior to Admission medications    Medication Sig Start Date End Date Taking? Authorizing Provider   UNKNOWN TO PATIENT Pt states he takes 1 pill "for anxiety"   Yes Historical Provider, MD   folic acid (FOLVITE) 1 MG tablet Take 1 tablet (1 mg total) by mouth once daily. 4/10/19 4/9/20  Maksim Guajardo MD   furosemide (LASIX) 20 MG tablet Take 1 tablet (20 mg total) by mouth once daily. 19  Maksim Guajardo MD   multivitamin capsule Take 1 capsule by mouth once daily.    Historical Provider, MD   pantoprazole (PROTONIX) 40 MG tablet Take 1 tablet (40 mg total) by mouth once daily. 4/10/19 4/9/20  Maksim Guajardo MD   spironolactone (ALDACTONE) 50 MG tablet Take 1 tablet (50 mg total) by mouth once daily. 19  Maksim Guajardo MD   thiamine (VITAMIN B-1) 100 MG tablet Take 1 tablet (100 mg total) by mouth once daily. 19   Maksim Guajardo MD     Family History:  Family History   Problem Relation Age of Onset    Birth defects Neg Hx      Social History:  Social History     Tobacco Use    Smoking status: Never Smoker    Smokeless tobacco: Never Used   Substance Use Topics    Alcohol use: Yes     Comment: 1L vodka daily    Drug use: No     Review of Systems:  Pertinent items are noted in HPI. All other systems are reviewed and are negative.     Objective:   Last 24 Hour Vital Signs:  BP  Min: 119/82  Max: 136/91  Pulse  Av.2  Min: 106  Max: 134  Resp  Av.8  Min: 29  Max: 37  SpO2  Av.2 %  Min: 96 %  Max: 98 %  Height  Av' 3" (190.5 cm)  Min: 6' 3" (190.5 cm)  Max: 6' 3" (190.5 cm)  Weight  Av.5 kg (215 " lb)  Min: 97.5 kg (215 lb)  Max: 97.5 kg (215 lb)  Body mass index is 26.87 kg/m².  No intake/output data recorded.    Physical Examination:  General: Alert and oriented, Laying in bed, Tremulous, Diaphoretic  HEENT: PERRL, EOMI, Moist mucus membranes w/ no erythema noted, Trachea midline, No facial asymmetry noted, No palpable supraclavicular lymph nodes, No palpable submandibular lymph nodes  Cards: Tachycardic, No murmurs, rubs or gallops  Pulm: CTAB, No wheezes, rales or rhonchi  Abd: Soft, NTD, NBS, Nondistended  Skin: No rashes or erythema noted, No ecchymosis  Ext: No edema, 2+ Distal pulses    Laboratory:  Most Recent Data:  CBC:   Lab Results   Component Value Date    WBC 7.19 10/24/2019    HGB 14.8 10/24/2019    HCT 43.1 10/24/2019    PLT 69 (L) 10/24/2019     (H) 10/24/2019    RDW 14.6 (H) 10/24/2019     BMP:   Lab Results   Component Value Date     (L) 10/24/2019    K 3.6 10/24/2019    CL 93 (L) 10/24/2019    CO2 21 (L) 10/24/2019    BUN 11 10/24/2019    CREATININE 1.1 10/24/2019     (H) 10/24/2019    CALCIUM 10.5 10/24/2019     LFTs:   Lab Results   Component Value Date    PROT 8.9 (H) 10/24/2019    ALBUMIN 4.0 10/24/2019    BILITOT 3.8 (H) 10/24/2019    AST 94 (H) 10/24/2019    ALKPHOS 139 (H) 10/24/2019    ALT 30 10/24/2019     Coags:   Lab Results   Component Value Date    INR 1.2 04/07/2019     FLP:   Lab Results   Component Value Date    CHOL 242 (H) 04/06/2016    HDL 40 04/06/2016    LDLCALC 146.4 04/06/2016    TRIG 278 (H) 04/06/2016    CHOLHDL 16.5 (L) 04/06/2016     DM:   Lab Results   Component Value Date    LDLCALC 146.4 04/06/2016    CREATININE 1.1 10/24/2019     Thyroid:   Lab Results   Component Value Date    TSH 4.000 10/24/2019    FREET4 1.14 10/24/2019     Anemia:   Lab Results   Component Value Date    IRON 86 04/07/2019    TIBC 164 (L) 04/07/2019    FERRITIN 1,017 (H) 04/07/2019    XIHJPUPW48 1345 (H) 04/07/2019    FOLATE 2.3 (L) 04/07/2019     Cardiac:   Lab  Results   Component Value Date    TROPONINI 0.013 10/24/2019    BNP 30 10/24/2019     Urinalysis:   Lab Results   Component Value Date    COLORU Brown (A) 10/24/2019    SPECGRAV <=1.005 (A) 10/24/2019    NITRITE Negative 10/24/2019    KETONESU Negative 10/24/2019    UROBILINOGEN Negative 10/24/2019    WBCUA 8 (H) 10/24/2019     Trended Lab Data:  Recent Labs   Lab 10/24/19  1211   WBC 7.19   HGB 14.8   HCT 43.1   PLT 69*   *   RDW 14.6*   *   K 3.6   CL 93*   CO2 21*   BUN 11   CREATININE 1.1   *   PROT 8.9*   ALBUMIN 4.0   BILITOT 3.8*   AST 94*   ALKPHOS 139*   ALT 30     Trended Cardiac Data:  Recent Labs   Lab 10/24/19  1211   TROPONINI 0.013   BNP 30     Microbiology Data:  None    Other Results:  EKG (my interpretation): Sinus tachycardia    Radiology:  Imaging Results    None        Assessment:     Nilesh BERMAN Gonzales Mota is a 44 y.o. male with:  Patient Active Problem List    Diagnosis Date Noted    Delirium tremens 10/24/2019    Gastrointestinal hemorrhage 04/08/2019    GI bleed 04/07/2019    Alcohol withdrawal 04/07/2019    Ascites 04/07/2019    Acute blood loss anemia 04/07/2019    Thrombocytopenia 04/07/2019    Alcohol abuse 04/07/2019    Alcoholic hepatitis with ascites 04/07/2019    Hyponatremia 04/07/2019    Bloody stool     Bright red blood per rectum     Other meniscus derangements, other medial meniscus, left knee 01/07/2019     Plan:     1. EtOH abuse w/ Active Withdrawal  - Patient previously drinking 1/5th of vodka in approximately 3 days  - Last drink ~6 days ago; Endorses history of previous withdrawals with no seizures  - Tachycardic, Tremulous and Diaphoretic on exam  - Notes active visual hallucinations  - Thiamine, Multivitamin and Folic acid  - Start on Diazepam 10mg Q6H and Lorazepam PRN  - Continue to monitor    2. Alcoholic Hepatitis w/ Asicites  - Patient with history of ascites  - Previously prescribed Lasix 20 and Aldactone 50  - AST 94, ALT 30,  TBili 3.8, Alk Phos 139  - Will acquire INR  - Will hold medications at this time and restart once CK results    3. Thrombocytopenia  - Plt 69 in the ED; Likely 2/2 to EtOH use  - No indication of active bleed  - Continue to monitor    4. Portal HTN Gastropathy  - Patient with noted history of GI Bleeds 2/2 to portal hypertension gastropathy  - H/H 14.8/43.1 on presentation to the ED  - No concern for active bleed at this time  - Will start pantoprazole 40  - Continue to monitor    PPx: SCD's 2/2 to Thrombocytopenia  Diet: Regular  Dispo: Pending resolution of withdrawal symptoms.    Code Status:     Code: Full    Jacob Melgar IV, MD  [facility] HO-I  [facility] Hospitalist Team B    [facility] Medicine Hospitalist Pager numbers:   [facility] Hospitalist Medicine Team A (Minesh/Reagan): [phone]  [facility] Hospitalist Medicine Team B (Rebekah/Bernard):  [phone]

## 2019-10-24 NOTE — ED NOTES
Pt aware of plan of care to be admitted to the hospital. Pt is visibly shaking when trying to use the urinal. MD notified. Bed rails are up and call light is within patient reach.

## 2019-10-24 NOTE — ED NOTES
Pt ambulated to restroom with shaky gait and standby assistance.  Walked back to Ed bed, reconnected, provided pillow and blanket.  Pt asking for food.  Checking orders for diet.

## 2019-10-24 NOTE — ED NOTES
"Pt lying in bed resting, still shaking with movement.  Pt appears to have some confusion.  Pt asked, "are we still in the same room we have been in all day?" (he has not left the room at all today nor has he fallen asleep).  Pt is exhibiting some loose associations with wording.  Pt aware of plan of care to be moved to a new unit.  Pt mother notified as requested.   "

## 2019-10-24 NOTE — ED TRIAGE NOTES
Pt came to the ED via EMS with complaints of Tremors and a racing heart.  Pt states that he has been coming off alcohol since Saturday.  Pt notes he drinks on average a liter of hard alcohol per day, sometimes more, sometimes less.  Pt states that wants to quit drinking and was trying to do it on his own.

## 2019-10-24 NOTE — ED PROVIDER NOTES
"Encounter Date: 10/24/2019    SCRIBE #1 NOTE: I, Natanael Toro, am scribing for, and in the presence of,  . I have scribed the entire note.       History     Chief Complaint   Patient presents with    Withdrawal     pt states he is withdrawing from alcohol. normally drinks 1L of vodka daily, but last drink was 5 days ago. C/o generalized tremors for the past few days and of "seeing shadows" today.      Nilesh Rolon Jr. is a 44 y.o. male who  has a past medical history of Arthritis.    The patient presents to the ED due to possible alcohol withdrawal.   He states he drinks 1L of Vodka a day, and has been drinking for several years after the death of several close friends and family members. He stopped drinking "cold turkey" a few days ago, and now feels extremely anxious, with generalized tremors.   He endorses associated N/V, lower abdominal pain, but denies any seizure activity, hallucinations, CP, SOB, urinary symptoms, or any other complaints at this time.  He denies any prior similar episodes in the past. He denies any other drug use.    The history is provided by the patient.     Review of patient's allergies indicates:  No Known Allergies  Past Medical History:   Diagnosis Date    Anxiety     Arthritis      Past Surgical History:   Procedure Laterality Date    ARTHROSCOPY OF KNEE Left 1/7/2019    Procedure: ARTHROSCOPY, KNEE;  Surgeon: Derick Kirby MD;  Location: [facility] OR;  Service: Orthopedics;  Laterality: Left;    ESOPHAGOGASTRODUODENOSCOPY N/A 4/8/2019    Procedure: EGD (ESOPHAGOGASTRODUODENOSCOPY);  Surgeon: Bonita Kendall MD;  Location: [facility] ENDO;  Service: Endoscopy;  Laterality: N/A;     Family History   Problem Relation Age of Onset    Birth defects Neg Hx      Social History     Tobacco Use    Smoking status: Never Smoker    Smokeless tobacco: Never Used   Substance Use Topics    Alcohol use: Yes     Comment: 1L vodka daily    Drug use: No     Review of Systems "   Constitutional: Negative for chills and fever.   HENT: Negative for sore throat.    Respiratory: Negative for shortness of breath.    Cardiovascular: Negative for chest pain.   Gastrointestinal: Positive for abdominal pain and vomiting. Negative for constipation and diarrhea.   Genitourinary: Negative for dysuria, frequency and urgency.   Musculoskeletal: Negative for back pain.   Skin: Negative for rash and wound.   Neurological: Positive for tremors. Negative for weakness.   Hematological: Does not bruise/bleed easily.   Psychiatric/Behavioral: Negative for agitation, behavioral problems and confusion. The patient is nervous/anxious.        Physical Exam     Initial Vitals   BP Pulse Resp Temp SpO2   10/24/19 1204 10/24/19 1204 10/24/19 1204 10/24/19 1543 10/24/19 1204   (!) 136/91 (!) 134 (!) 37 98.6 °F (37 °C) 98 %      MAP       --                Physical Exam    Nursing note and vitals reviewed.  Constitutional: He appears well-developed and well-nourished. He is not diaphoretic. No distress.   Tremulous, cooperative, no distress.   HENT:   Head: Normocephalic and atraumatic.   Mouth/Throat: Oropharynx is clear and moist.   Eyes: EOM are normal. Pupils are equal, round, and reactive to light.   Neck: No tracheal deviation present.   Cardiovascular: Normal rate, regular rhythm, normal heart sounds and intact distal pulses.   HR 140s   Pulmonary/Chest: Breath sounds normal. No stridor. No respiratory distress.   Abdominal: Soft. He exhibits no distension and no mass. There is no tenderness.   Musculoskeletal: Normal range of motion. He exhibits no edema.   Neurological: He is alert and oriented to person, place, and time. He has normal strength. He displays tremor (diffuse). No cranial nerve deficit or sensory deficit. GCS eye subscore is 4. GCS verbal subscore is 5. GCS motor subscore is 6.   Skin: Skin is warm and dry. Capillary refill takes less than 2 seconds. No rash noted.   Psychiatric: He has a normal  mood and affect. His behavior is normal. Thought content normal.   Calm, cooperative, no AVH         ED Course   Procedures  Labs Reviewed   CBC WITHOUT DIFFERENTIAL - Abnormal; Notable for the following components:       Result Value    RBC 4.27 (*)     Mean Corpuscular Volume 101 (*)     Mean Corpuscular Hemoglobin 34.7 (*)     RDW 14.6 (*)     Platelets 69 (*)     All other components within normal limits   COMPREHENSIVE METABOLIC PANEL - Abnormal; Notable for the following components:    Sodium 128 (*)     Chloride 93 (*)     CO2 21 (*)     Glucose 115 (*)     Total Protein 8.9 (*)     Total Bilirubin 3.8 (*)     Alkaline Phosphatase 139 (*)     AST 94 (*)     All other components within normal limits   URINALYSIS, REFLEX TO URINE CULTURE - Abnormal; Notable for the following components:    Color, UA Brown (*)     Specific Gravity, UA <=1.005 (*)     Protein, UA 1+ (*)     Bilirubin (UA) 1+ (*)     Occult Blood UA Trace (*)     All other components within normal limits    Narrative:     Preferred Collection Type->Urine, Clean Catch   URINALYSIS MICROSCOPIC - Abnormal; Notable for the following components:    WBC, UA 8 (*)     Bacteria Few (*)     Non-Squam Epith 2 (*)     Hyaline Casts, UA 25 (*)     All other components within normal limits    Narrative:     Preferred Collection Type->Urine, Clean Catch   DRUG SCREEN PANEL, URINE EMERGENCY    Narrative:     Preferred Collection Type->Urine, Clean Catch   ALCOHOL,MEDICAL (ETHANOL)   TROPONIN I   B-TYPE NATRIURETIC PEPTIDE   TSH   HEPATITIS PANEL, ACUTE   T4, FREE        ECG Results          EKG 12-lead (Final result)  Result time 10/24/19 16:08:50    Final result by Interface, Lab In [facility] (10/24/19 16:08:50)                 Narrative:    Test Reason : F10.239,    Vent. Rate : 132 BPM     Atrial Rate : 132 BPM     P-R Int : 154 ms          QRS Dur : 082 ms      QT Int : 278 ms       P-R-T Axes : 034 071 016 degrees     QTc Int : 411 ms    Sinus  tachycardia  Otherwise normal ECG  When compared with ECG of 06-APR-2019 19:16,  No significant change was found  Confirmed by Hasmukh Hargrove MD ([id number]) on 10/24/2019 4:08:33 PM    Referred By: INNA   SELF           Confirmed By:Hasmukh Hargrove MD                            Imaging Results    None          Medical Decision Making:   History:   Old Medical Records: I decided to obtain old medical records.  Initial Assessment:   45 yo M with chronic alcohol abuse, alcoholic liver disease presents to ED due to diffuse tremors, anxiety, N/V after stopping chronic alcohol abuse.  On arrival, tremulous, tachycardic, otherwise calm and cooperative.  Will obtain labs, treat with Ativan, IVF due to likely alcohol withdrawal, and reassess.  Differential Diagnosis:   Differential Diagnosis includes, but is not limited to:  Decompensated psychiatric disease (schizophrenia, bipolar disorder, major depression), excited delirium, medication noncompliance, substance abuse/withdrawal, intentional drug overdose, medication toxicity, APAP/ASA overdose, acute stress reaction, personality disorder, malingering, metabolic derangement    Clinical Tests:   Lab Tests: Ordered and Reviewed  Radiological Study: Ordered and Reviewed  ED Management:  Patient given IV ativan and Valium with improvement in vitals and symptoms.  Labs with stable alcoholic liver disease, hyponatremia.  [facility] contacted for admission for further management of NOHELIA.    On re-evaluation, the patient's status has remained stable.  At this time, I believe the patient should be admitted to the hospital for further evaluation and management of EtOH withdrawal.  [facility] service was contacted and the case was discussed.   The consulting physician/team agrees with plan and will admit under their service.   The patient and family were updated with test results, overall impression, and further plan of care. All questions were answered. The patient expressed understanding  and agrees with the current plan.                Attending Attestation:         Attending Critical Care:   Critical Care Times:   Direct Patient Care (initial evaluation, reassessments, and time considering the case)................................................................15 minutes.   Additional History from reviewing old medical records or taking additional history from the family, EMS, PCP, etc.......................10 minutes.   Ordering, Reviewing, and Interpreting Diagnostic Studies...............................................................................................................6 minutes.   Documentation..................................................................................................................................................................................12 minutes.   Consultation with other Physicians. .................................................................................................................................................6 minutes.   Consultation with the patient's family directly relating to the patient's condition, care, and DNR status (when patient unable)......5 minutes.   ==============================================================  · Total Critical Care Time - exclusive of procedural time: 54 minutes.  ==============================================================  Critical care was necessary to treat or prevent imminent or life-threatening deterioration of the following conditions: dehydration and overdose (alcohol withdrawal).               ED Course as of Oct 26 0544   Thu Oct 24, 2019   1409 Admitted by [facility] medicine     [KM]      ED Course User Index  [KM] Natanael Toro     Clinical Impression:       ICD-10-CM ICD-9-CM   1. Alcohol withdrawal F10.239 291.81   2. Hyponatremia E87.1 276.1   3. Thrombocytopenia D69.6 287.5   4. Alcoholic liver disease K70.9 571.3   5. Delirium tremens F10.231 291.0   6. Chest pain R07.9  786.50   7. Alcohol abuse F10.10 305.00   8. Alcoholic hepatitis with ascites K70.11 571.1   9. Gastrointestinal hemorrhage, unspecified gastrointestinal hemorrhage type K92.2 578.9           Disposition:   Disposition: Admitted  Condition: Stable       I, Dr. Moshe Correa, personally performed the services described in this documentation. All medical record entries made by the scribe were at my direction and in my presence.  I have reviewed the chart and agree that the record reflects my personal performance and is accurate and complete.     Moshe Correa MD.  5:44 AM 10/26/2019             Moshe Correa MD  10/26/19 0548

## 2019-10-25 LAB
ALBUMIN SERPL BCP-MCNC: 3.6 G/DL (ref 3.5–5.2)
ALP SERPL-CCNC: 114 U/L (ref 55–135)
ALT SERPL W/O P-5'-P-CCNC: 29 U/L (ref 10–44)
ANION GAP SERPL CALC-SCNC: 10 MMOL/L (ref 8–16)
AST SERPL-CCNC: 103 U/L (ref 10–40)
BASOPHILS # BLD AUTO: 0.03 K/UL (ref 0–0.2)
BASOPHILS # BLD AUTO: 0.03 K/UL (ref 0–0.2)
BASOPHILS NFR BLD: 0.8 % (ref 0–1.9)
BASOPHILS NFR BLD: 0.8 % (ref 0–1.9)
BILIRUB SERPL-MCNC: 3 MG/DL (ref 0.1–1)
BUN SERPL-MCNC: 11 MG/DL (ref 6–20)
CALCIUM SERPL-MCNC: 9.9 MG/DL (ref 8.7–10.5)
CHLORIDE SERPL-SCNC: 101 MMOL/L (ref 95–110)
CO2 SERPL-SCNC: 24 MMOL/L (ref 23–29)
CREAT SERPL-MCNC: 0.8 MG/DL (ref 0.5–1.4)
DIFFERENTIAL METHOD: ABNORMAL
DIFFERENTIAL METHOD: ABNORMAL
EOSINOPHIL # BLD AUTO: 0 K/UL (ref 0–0.5)
EOSINOPHIL # BLD AUTO: 0.1 K/UL (ref 0–0.5)
EOSINOPHIL NFR BLD: 0.8 % (ref 0–8)
EOSINOPHIL NFR BLD: 1.5 % (ref 0–8)
ERYTHROCYTE [DISTWIDTH] IN BLOOD BY AUTOMATED COUNT: 14.8 % (ref 11.5–14.5)
ERYTHROCYTE [DISTWIDTH] IN BLOOD BY AUTOMATED COUNT: 14.8 % (ref 11.5–14.5)
EST. GFR  (AFRICAN AMERICAN): >60 ML/MIN/1.73 M^2
EST. GFR  (NON AFRICAN AMERICAN): >60 ML/MIN/1.73 M^2
GLUCOSE SERPL-MCNC: 100 MG/DL (ref 70–110)
HAV IGM SERPL QL IA: NEGATIVE
HBV CORE IGM SERPL QL IA: NEGATIVE
HBV SURFACE AG SERPL QL IA: NEGATIVE
HCT VFR BLD AUTO: 36.7 % (ref 40–54)
HCT VFR BLD AUTO: 38.1 % (ref 40–54)
HCV AB SERPL QL IA: NEGATIVE
HGB BLD-MCNC: 12.3 G/DL (ref 14–18)
HGB BLD-MCNC: 13 G/DL (ref 14–18)
INR PPP: 1.2 (ref 0.8–1.2)
LYMPHOCYTES # BLD AUTO: 0.6 K/UL (ref 1–4.8)
LYMPHOCYTES # BLD AUTO: 0.8 K/UL (ref 1–4.8)
LYMPHOCYTES NFR BLD: 17.1 % (ref 18–48)
LYMPHOCYTES NFR BLD: 19.5 % (ref 18–48)
MAGNESIUM SERPL-MCNC: 1.7 MG/DL (ref 1.6–2.6)
MCH RBC QN AUTO: 34.5 PG (ref 27–31)
MCH RBC QN AUTO: 34.6 PG (ref 27–31)
MCHC RBC AUTO-ENTMCNC: 33.5 G/DL (ref 32–36)
MCHC RBC AUTO-ENTMCNC: 34.1 G/DL (ref 32–36)
MCV RBC AUTO: 101 FL (ref 82–98)
MCV RBC AUTO: 103 FL (ref 82–98)
MONOCYTES # BLD AUTO: 0.2 K/UL (ref 0.3–1)
MONOCYTES # BLD AUTO: 0.5 K/UL (ref 0.3–1)
MONOCYTES NFR BLD: 13.1 % (ref 4–15)
MONOCYTES NFR BLD: 6.1 % (ref 4–15)
NEUTROPHILS # BLD AUTO: 2.6 K/UL (ref 1.8–7.7)
NEUTROPHILS # BLD AUTO: 2.8 K/UL (ref 1.8–7.7)
NEUTROPHILS NFR BLD: 68.2 % (ref 38–73)
NEUTROPHILS NFR BLD: 72.1 % (ref 38–73)
PHOSPHATE SERPL-MCNC: 4.2 MG/DL (ref 2.7–4.5)
PLATELET # BLD AUTO: 57 K/UL (ref 150–350)
PLATELET # BLD AUTO: 58 K/UL (ref 150–350)
PMV BLD AUTO: 10.8 FL (ref 9.2–12.9)
PMV BLD AUTO: 10.8 FL (ref 9.2–12.9)
POTASSIUM SERPL-SCNC: 3.7 MMOL/L (ref 3.5–5.1)
PROT SERPL-MCNC: 7.9 G/DL (ref 6–8.4)
PROTHROMBIN TIME: 12.9 SEC (ref 9–12.5)
RBC # BLD AUTO: 3.55 M/UL (ref 4.6–6.2)
RBC # BLD AUTO: 3.77 M/UL (ref 4.6–6.2)
SODIUM SERPL-SCNC: 135 MMOL/L (ref 136–145)
WBC # BLD AUTO: 3.75 K/UL (ref 3.9–12.7)
WBC # BLD AUTO: 3.94 K/UL (ref 3.9–12.7)

## 2019-10-25 PROCEDURE — 63600175 PHARM REV CODE 636 W HCPCS: Performed by: STUDENT IN AN ORGANIZED HEALTH CARE EDUCATION/TRAINING PROGRAM

## 2019-10-25 PROCEDURE — 25000003 PHARM REV CODE 250: Performed by: STUDENT IN AN ORGANIZED HEALTH CARE EDUCATION/TRAINING PROGRAM

## 2019-10-25 PROCEDURE — 80053 COMPREHEN METABOLIC PANEL: CPT

## 2019-10-25 PROCEDURE — 85610 PROTHROMBIN TIME: CPT

## 2019-10-25 PROCEDURE — 83735 ASSAY OF MAGNESIUM: CPT

## 2019-10-25 PROCEDURE — 20000000 HC ICU ROOM

## 2019-10-25 PROCEDURE — 85025 COMPLETE CBC W/AUTO DIFF WBC: CPT

## 2019-10-25 PROCEDURE — 36415 COLL VENOUS BLD VENIPUNCTURE: CPT

## 2019-10-25 PROCEDURE — 84100 ASSAY OF PHOSPHORUS: CPT

## 2019-10-25 RX ORDER — GABAPENTIN 300 MG/1
300 CAPSULE ORAL EVERY 6 HOURS
Status: DISPENSED | OUTPATIENT
Start: 2019-10-25 | End: 2019-10-25

## 2019-10-25 RX ORDER — GABAPENTIN 300 MG/1
300 CAPSULE ORAL 2 TIMES DAILY
Status: COMPLETED | OUTPATIENT
Start: 2019-10-27 | End: 2019-10-28

## 2019-10-25 RX ORDER — DEXMEDETOMIDINE HYDROCHLORIDE 4 UG/ML
0.2 INJECTION, SOLUTION INTRAVENOUS CONTINUOUS
Status: DISCONTINUED | OUTPATIENT
Start: 2019-10-25 | End: 2019-10-27

## 2019-10-25 RX ORDER — GABAPENTIN 300 MG/1
300 CAPSULE ORAL
Status: DISPENSED | OUTPATIENT
Start: 2019-10-26 | End: 2019-10-27

## 2019-10-25 RX ORDER — DIAZEPAM 5 MG/1
20 TABLET ORAL EVERY 6 HOURS
Status: DISCONTINUED | OUTPATIENT
Start: 2019-10-25 | End: 2019-10-25

## 2019-10-25 RX ORDER — DIAZEPAM 5 MG/1
10 TABLET ORAL EVERY 6 HOURS
Status: DISCONTINUED | OUTPATIENT
Start: 2019-10-25 | End: 2019-10-26

## 2019-10-25 RX ORDER — GABAPENTIN 300 MG/1
300 CAPSULE ORAL ONCE
Status: DISCONTINUED | OUTPATIENT
Start: 2019-10-28 | End: 2019-10-28 | Stop reason: HOSPADM

## 2019-10-25 RX ADMIN — FOLIC ACID 1 MG: 1 TABLET ORAL at 08:10

## 2019-10-25 RX ADMIN — LORAZEPAM 2 MG: 2 INJECTION INTRAMUSCULAR; INTRAVENOUS at 08:10

## 2019-10-25 RX ADMIN — DIAZEPAM 20 MG: 5 TABLET ORAL at 05:10

## 2019-10-25 RX ADMIN — Medication 100 MG: at 08:10

## 2019-10-25 RX ADMIN — GABAPENTIN 300 MG: 300 CAPSULE ORAL at 06:10

## 2019-10-25 RX ADMIN — GABAPENTIN 300 MG: 300 CAPSULE ORAL at 05:10

## 2019-10-25 RX ADMIN — PANTOPRAZOLE SODIUM 40 MG: 40 TABLET, DELAYED RELEASE ORAL at 08:10

## 2019-10-25 RX ADMIN — SODIUM CHLORIDE, SODIUM LACTATE, POTASSIUM CHLORIDE, AND CALCIUM CHLORIDE 1000 ML: .6; .31; .03; .02 INJECTION, SOLUTION INTRAVENOUS at 04:10

## 2019-10-25 RX ADMIN — LORAZEPAM 2 MG: 2 INJECTION INTRAMUSCULAR; INTRAVENOUS at 03:10

## 2019-10-25 RX ADMIN — DIAZEPAM 10 MG: 5 TABLET ORAL at 02:10

## 2019-10-25 RX ADMIN — THERA TABS 1 TABLET: TAB at 08:10

## 2019-10-25 RX ADMIN — DEXMEDETOMIDINE HYDROCHLORIDE 1.4 MCG/KG/HR: 4 INJECTION, SOLUTION INTRAVENOUS at 04:10

## 2019-10-25 NOTE — PLAN OF CARE
Chief Complaint      EtOH Withdrawal x6 days     Pt independent with ADLs, no HH/HME. Pt lives with a friend. Pt's Mother, Jamzine or friend can provide transportation on discharge.    Pt last admitted [facility], IP, Alcohol Withdrawal 4/6/19--4/9/19, pt wanted to d/c to [facility] at the time but no beds were available       10/25/19 1245   Discharge Assessment   Assessment Type Discharge Planning Assessment   Confirmed/corrected address and phone number on facesheet? Yes   Assessment information obtained from? Patient;Medical Record   Expected Length of Stay (days) 3   Communicated expected length of stay with patient/caregiver yes   Prior to hospitilization cognitive status: Alert/Oriented   Prior to hospitalization functional status: Independent   Current cognitive status: Alert/Oriented   Current Functional Status: Needs Assistance   Facility Arrived From: (home)   Lives With friend(s)   Able to Return to Prior Arrangements yes   Is patient able to care for self after discharge? Yes   Who are your caregiver(s) and their phone number(s)? Mother: Laurel Rolon [phone]   Patient's perception of discharge disposition home or selfcare   Readmission Within the Last 30 Days no previous admission in last 30 days   Patient currently being followed by outpatient case management? No   Patient currently receives any other outside agency services? No   Equipment Currently Used at Home none   Do you have any problems affording any of your prescribed medications? No   Is the patient taking medications as prescribed? yes   Does the patient have transportation home? Yes   Transportation Anticipated family or friend will provide   Dialysis Name and Scheduled days N/A   Does the patient receive services at the [facility]madin Clinic? No   Discharge Plan A Home   Discharge Plan B Rehab   DME Needed Upon Discharge  none   Patient/Family in Agreement with Plan yes     Yajaira Perez RN Transitional Navigator  [phone]

## 2019-10-25 NOTE — PROGRESS NOTES
Pt Admit to Rm 266 from 4th floor d/t requiring Precedex gtt. Bedside handoff done in room with Pt. Pt very agitated, tremoring, hallucinating, and trying to pull off medical equipment.  MD at bedside on admit. Orders placed for condom cath, ok to use max precedex until achieve Rass -2, and restraints. Pt and Family aware of orders with acceptance. Family at bedside on admit. Mom number  on board. Pt oriented to Name only at present. Pt reoriented to room and staff multiple times, thinks he is on a spaceship in the year of 1920. Unable to orient to time and place at present. Placed on ICU monitor for vitals. Will cont to monitor.

## 2019-10-25 NOTE — PROGRESS NOTES
"[facility] Medicine Progress Note    Primary Team: [facility] Hospitalist Team B  Attending Physician: Bernard  Resident: Gavino  Intern: Talia    Subjective:      Mr. Rolon was sitting up in bed eating his breakfast this morning when I went to see him. He denies any chest pain or SOB overnight. He also denies any nausea or vomiting. As per the patient the hallucinations he had previously been experiencing have stopped.    As per nursing the patient has been having intermittent episodes of agitation and confusion. They have been able to titrate his precedex down to .7.     Objective:     Last 24 Hour Vital Signs:  BP  Min: 89/54  Max: 160/96  Temp  Av °F (36.7 °C)  Min: 97 °F (36.1 °C)  Max: 98.6 °F (37 °C)  Pulse  Av.1  Min: 65  Max: 134  Resp  Av.8  Min: 13  Max: 48  SpO2  Av.2 %  Min: 94 %  Max: 100 %  Height  Av' 3" (190.5 cm)  Min: 6' 3" (190.5 cm)  Max: 6' 3" (190.5 cm)  Weight  Av.5 kg (214 lb 15.6 oz)  Min: 97.5 kg (214 lb 15.2 oz)  Max: 97.5 kg (215 lb)  I/O last 3 completed shifts:  In: 1000 [IV Piggyback:1000]  Out: -     Physical Examination:  General: Alert and oriented, Laying in bed, Tremulous, Diaphoretic  HEENT: PERRL, EOMI, Moist mucus membranes w/ no erythema noted, Trachea midline, No facial asymmetry noted, No palpable supraclavicular lymph nodes, No palpable submandibular lymph nodes  Cards: RRR, No murmurs, rubs or gallops  Pulm: CTAB, No wheezes, rales or rhonchi  Abd: Soft, NTD, NBS, Nondistended  Skin: No rashes or erythema noted, No ecchymosis  Ext: No edema, 2+ Distal pulses    Laboratory:  Laboratory Data Reviewed: yes  Pertinent Findings:  Recent Labs   Lab 10/24/19  1211 10/25/19  0519   WBC 7.19 3.75*   HGB 14.8 13.0*   HCT 43.1 38.1*   PLT 69* 57*   *  --    K 3.6  --    CL 93*  --    CREATININE 1.1  --    BUN 11  --    CO2 21*  --    ALT 30  --    AST 94*  --      Microbiology Data Reviewed: yes  Pertinent Findings:  None    Other " Results:  EKG (my interpretation): Sinus tachycardia    Radiology Data Reviewed: yes  Pertinent Findings:  CT Head Pending    Current Medications:     Infusions:   dexmedetomidine (PRECEDEX) infusion 1.1 mcg/kg/hr (10/25/19 0615)    lactated ringers 1,000 mL (10/25/19 0412)        Scheduled:   diazePAM  20 mg Oral Q6H    folic acid  1 mg Oral Daily    gabapentin  300 mg Oral Q6H    Followed by    [START ON 10/26/2019] gabapentin  300 mg Oral TID WM    Followed by    [START ON 10/27/2019] gabapentin  300 mg Oral BID    Followed by    [START ON 10/28/2019] gabapentin  300 mg Oral Once    multivitamin  1 tablet Oral Daily    pantoprazole  40 mg Oral Daily    thiamine  100 mg Oral Daily        PRN:  lorazepam, lorazepam, ondansetron, promethazine (PHENERGAN) IVPB, sodium chloride 0.9%    Antibiotics and Day Number of Therapy:  None    Assessment:     Nilesh Patelana FrySimba is a 44 y.o.male with  Patient Active Problem List    Diagnosis Date Noted    Delirium tremens 10/24/2019    Gastrointestinal hemorrhage 04/08/2019    GI bleed 04/07/2019    Alcohol withdrawal 04/07/2019    Ascites 04/07/2019    Acute blood loss anemia 04/07/2019    Thrombocytopenia 04/07/2019    Alcohol abuse 04/07/2019    Alcoholic hepatitis with ascites 04/07/2019    Hyponatremia 04/07/2019    Bloody stool     Bright red blood per rectum     Other meniscus derangements, other medial meniscus, left knee 01/07/2019      Plan:     1. EtOH abuse w/ Active Withdrawal  - Patient previously drinking 1/5th of vodka in approximately 3 days; As per patient last drink was x6 days prior to admission  - Last drink ~2 days prior to admission as per significant other; Endorses history of previous withdrawals with no seizures  - Tachycardic, Tremulous and Diaphoretic on exam  - Notes active visual hallucinations  - Thiamine, Multivitamin and Folic acid  - Started on Diazepam 10mg Q6H and Lorazepam PRN  - Increasing agitation noted  overnight on day of admission requiring step up to the ICU  - Patient with continued agitation in ICU despite Precedex and increase in Diazepam to 20mg Q6H and Lorazepam PRN dosage; Addition of Gabapentin taper  - Required soft restraints 2/2 to continued breakthrough agitation overnight  - Now down to .7 precedex this morning with only intermittent episodes of agitation; Continue to monitor in the ICU    2. Alcoholic Hepatitis w/ Asicites  - Patient with history of ascites  - Previously prescribed Lasix 20 and Aldactone 50  - AST 94, ALT 30, TBili 3.8, Alk Phos 139  - Protime 12.9,   - Anticipate restarting medications prior to discharge     3. Thrombocytopenia  - Plt 69 in the ED; Likely 2/2 to EtOH use  - No indication of active bleed  - Continue to monitor     4. Portal HTN Gastropathy  - Patient with noted history of GI Bleeds 2/2 to portal hypertension gastropathy  - H/H 14.8/43.1 on presentation to the ED  - No concern for active bleed at this time  - Will start pantoprazole 40  - Continue to monitor    PPx: SCD's 2/2 to Thrombocytopenia  Diet: Regular  Code: Full  Dispo: Pending resolution of withdrawal symptoms.    Jacob Melgar IV, MD  [facility] HO-I  U Hospitalist Team B    [facility] Medicine Hospitalist Pager numbers:   [facility] Hospitalist Medicine Team A (Minesh/Reagan): [phone]  [facility] Hospitalist Medicine Team B (Rebekah/Bernard):  [phone]

## 2019-10-25 NOTE — PROGRESS NOTES
Pt AAOX4 at present. Denies pain. Noted Rt arm swollen. IV d/c'd to Rt Forearm. First attempt to IV was pulled out by Pt.  20g to Lt hand and Lt Forearm placed with assistx2. Another nurse to hold arm steady and calm Pt. Urinating with condom cath without difficulty. The process to wean off Precedex initiated at 0515. Pt tolerating well. Safety maintained. SR up x3. Call light in reach. Family not present at bedside.

## 2019-10-25 NOTE — NURSING
Pt awake eating breakfast, tremors noted. Pt continues to have periods of visual hallucinations.

## 2019-10-25 NOTE — NURSING
Pt returned form CT to ICU via wheelchair, mother on bedside. Pt placed in hospital bed; ICU monitor and SCD's applied; bed placed in lowest position; bed alarm set. Will continue to assess.

## 2019-10-25 NOTE — PROGRESS NOTES
Pt sat up in bed at 0.8 Precedex, and agitated wanting to pull off equipment. Pt unable to console or orient to situation. MD notified with ok to keep at max sedation for tonight.

## 2019-10-25 NOTE — PLAN OF CARE
1920H Patient is  awake and oriented except to year. Girlfriend at bedside. Telesitter [employer] at bedside. Care plan explained and need reinforcement. VIP care model explained. On room air, O2 saturation 98%. On heart monitor running Sinus Tachycardia at 118bpm. IV site to the left antecubital 20G; Lactated Ringer running at 125ml/hr. Abrasions noted on occipital area, right upper back and both knees. Maintained on renal diet. Maintained on fall precaution. Bed in lowest position, bed alarms on, call light within reach and instructed to call for help when needed. Will continue  to monitor.  CIWA score 14, ativan given as needed.    2100H Patient pulled out IV access and verbalized he needs to leave the hospital, code white called. MDs at the bedside with orders made and carried out.     2250H Transferred to ICU.

## 2019-10-25 NOTE — NURSING
Pt being escorted to CT via wheelchair with pt transport and ICU nurse. ICU monitor in use. Generalized tremors noted.

## 2019-10-25 NOTE — PROGRESS NOTES
e-ICU admit note      Reason for ICU admission:    HPI:    43 yo male with hx ETOH abuse presents with tremors, auditory and visual hallucinations    Originally admitted to th floor but transfrred to ICU because of severe agitation    PHx:    ETOH abuse  Anxiety  Arthritis  Ascites  portal hypertension gastropathy    Home Meds:    Multivitamins  Folic acid  Lasix 20 mg qd  Pantoprazole 40 mg  Thiamine Aldactone 50mg qd    Significant labs, images:    Na 128  K 3.6  AST 96  T. Bili 3.8  plts [id number]        I viewed the pt via camera at  12:06 am    Resting , NAD  105/67, 80 NSR, 99%, RR 22                Assessment/Plan     EtOH Withdrawal     Thiamine, Multivitamin, Folic acid   Start on Diazepam 10mg Q6H and Lorazepam PRN  Precedex infusion   Continue to monitor      Alcoholic Hepatitis w/ Ascites  Patient with history of ascites  previously on Lasix 20 and Aldactone 50        Thrombocytopenia   2/2 to EtOH   Continue to monitor      Portal HTN Gastropathy  - Patient with noted history of GI Bleeds 2/2 to portal hypertension gastropathy  pantoprazole 40mg    DVT prophylaxis- SCD    GUP- PPI

## 2019-10-25 NOTE — NURSING
Tremors noted; pt patting on side asking for keys to attend a soccer game; Pt reoriented to place at this time.

## 2019-10-25 NOTE — PLAN OF CARE
Pt AAO to name only, needs frequent reorientation to time, place and situation. Denies pain at present. Soft restraints present d/t agitation and pulling off medical equipment. Pt and mom educated on placement and parameters to d/c. No noted injuries present. Pt is on max sedation with MD ok to remain on max sedation until am due to Pt occasionally wakes through the night screaming and pulling. Noted to happen once in deep sleep with snore. MD notified of actions. Pt able to be aroused with verbal stimulation. Plan to start weaning precedex at 0600 per MD and CT in am due to fall at home. CIWA score presently 5. Safety maintained. SR up x3. Call light in reach. Family not present at bedside.

## 2019-10-25 NOTE — NURSING
Assisted pt to bedside commode large bowel movement with ara red blood noted. Pt states, he has not been experiencing blood in his stool. Dr. DORIE Melgar notified.

## 2019-10-25 NOTE — PLAN OF CARE
[facility] Face-to-Face Evaluation    Patient re-evaluated for continued use of restraints. He continues to require max precedex dosing in order to maintain appropriate sedation. Patient intermittently awakes and attempts to get out of bed, pull at IVs, and curse at nursing staff. Nursing staff situated at bedside to re-orient and redirect patient as needed, but is still requiring soft wrist restraints in order to maintain IVs.    Will increase valium dosing and ativan PRN dosing. Will add gabapentin as adjunct.    Restraints order to be maintained.    Shi Mancia MD  [facility] Medicine/Emergency Medicine HO-III  10/25/2019 4:14 AM

## 2019-10-26 LAB
ALBUMIN SERPL BCP-MCNC: 3.2 G/DL (ref 3.5–5.2)
ALP SERPL-CCNC: 116 U/L (ref 55–135)
ALT SERPL W/O P-5'-P-CCNC: 41 U/L (ref 10–44)
ANION GAP SERPL CALC-SCNC: 11 MMOL/L (ref 8–16)
AST SERPL-CCNC: 148 U/L (ref 10–40)
BASOPHILS # BLD AUTO: 0.02 K/UL (ref 0–0.2)
BASOPHILS NFR BLD: 0.5 % (ref 0–1.9)
BILIRUB SERPL-MCNC: 2 MG/DL (ref 0.1–1)
BUN SERPL-MCNC: 9 MG/DL (ref 6–20)
CALCIUM SERPL-MCNC: 9.1 MG/DL (ref 8.7–10.5)
CHLORIDE SERPL-SCNC: 103 MMOL/L (ref 95–110)
CO2 SERPL-SCNC: 25 MMOL/L (ref 23–29)
CREAT SERPL-MCNC: 0.8 MG/DL (ref 0.5–1.4)
DIFFERENTIAL METHOD: ABNORMAL
EOSINOPHIL # BLD AUTO: 0.1 K/UL (ref 0–0.5)
EOSINOPHIL NFR BLD: 1.3 % (ref 0–8)
ERYTHROCYTE [DISTWIDTH] IN BLOOD BY AUTOMATED COUNT: 14.9 % (ref 11.5–14.5)
EST. GFR  (AFRICAN AMERICAN): >60 ML/MIN/1.73 M^2
EST. GFR  (NON AFRICAN AMERICAN): >60 ML/MIN/1.73 M^2
GLUCOSE SERPL-MCNC: 89 MG/DL (ref 70–110)
HCT VFR BLD AUTO: 37.4 % (ref 40–54)
HGB BLD-MCNC: 12.5 G/DL (ref 14–18)
INR PPP: 1.2 (ref 0.8–1.2)
LYMPHOCYTES # BLD AUTO: 0.7 K/UL (ref 1–4.8)
LYMPHOCYTES NFR BLD: 18.5 % (ref 18–48)
MAGNESIUM SERPL-MCNC: 1.6 MG/DL (ref 1.6–2.6)
MCH RBC QN AUTO: 34.2 PG (ref 27–31)
MCHC RBC AUTO-ENTMCNC: 33.4 G/DL (ref 32–36)
MCV RBC AUTO: 103 FL (ref 82–98)
MONOCYTES # BLD AUTO: 0.6 K/UL (ref 0.3–1)
MONOCYTES NFR BLD: 14.8 % (ref 4–15)
NEUTROPHILS # BLD AUTO: 2.6 K/UL (ref 1.8–7.7)
NEUTROPHILS NFR BLD: 64.9 % (ref 38–73)
PHOSPHATE SERPL-MCNC: 4 MG/DL (ref 2.7–4.5)
PLATELET # BLD AUTO: 68 K/UL (ref 150–350)
PMV BLD AUTO: 10.6 FL (ref 9.2–12.9)
POTASSIUM SERPL-SCNC: 4.1 MMOL/L (ref 3.5–5.1)
PROT SERPL-MCNC: 7.2 G/DL (ref 6–8.4)
PROTHROMBIN TIME: 12.4 SEC (ref 9–12.5)
RBC # BLD AUTO: 3.65 M/UL (ref 4.6–6.2)
SODIUM SERPL-SCNC: 139 MMOL/L (ref 136–145)
WBC # BLD AUTO: 3.99 K/UL (ref 3.9–12.7)

## 2019-10-26 PROCEDURE — 84100 ASSAY OF PHOSPHORUS: CPT

## 2019-10-26 PROCEDURE — 99233 PR SUBSEQUENT HOSPITAL CARE,LEVL III: ICD-10-PCS | Mod: ,,, | Performed by: INTERNAL MEDICINE

## 2019-10-26 PROCEDURE — 99233 SBSQ HOSP IP/OBS HIGH 50: CPT | Mod: ,,, | Performed by: INTERNAL MEDICINE

## 2019-10-26 PROCEDURE — 25000003 PHARM REV CODE 250: Performed by: STUDENT IN AN ORGANIZED HEALTH CARE EDUCATION/TRAINING PROGRAM

## 2019-10-26 PROCEDURE — 85610 PROTHROMBIN TIME: CPT

## 2019-10-26 PROCEDURE — 36415 COLL VENOUS BLD VENIPUNCTURE: CPT

## 2019-10-26 PROCEDURE — 83735 ASSAY OF MAGNESIUM: CPT

## 2019-10-26 PROCEDURE — 94761 N-INVAS EAR/PLS OXIMETRY MLT: CPT

## 2019-10-26 PROCEDURE — 11000001 HC ACUTE MED/SURG PRIVATE ROOM

## 2019-10-26 PROCEDURE — 85025 COMPLETE CBC W/AUTO DIFF WBC: CPT

## 2019-10-26 PROCEDURE — 80053 COMPREHEN METABOLIC PANEL: CPT

## 2019-10-26 RX ORDER — DIAZEPAM 5 MG/1
10 TABLET ORAL EVERY 8 HOURS
Status: DISCONTINUED | OUTPATIENT
Start: 2019-10-26 | End: 2019-10-27

## 2019-10-26 RX ADMIN — Medication 100 MG: at 10:10

## 2019-10-26 RX ADMIN — DIAZEPAM 10 MG: 5 TABLET ORAL at 04:10

## 2019-10-26 RX ADMIN — GABAPENTIN 300 MG: 300 CAPSULE ORAL at 12:10

## 2019-10-26 RX ADMIN — FOLIC ACID 1 MG: 1 TABLET ORAL at 10:10

## 2019-10-26 RX ADMIN — DIAZEPAM 10 MG: 5 TABLET ORAL at 06:10

## 2019-10-26 RX ADMIN — PANTOPRAZOLE SODIUM 40 MG: 40 TABLET, DELAYED RELEASE ORAL at 10:10

## 2019-10-26 RX ADMIN — DIAZEPAM 10 MG: 5 TABLET ORAL at 12:10

## 2019-10-26 RX ADMIN — GABAPENTIN 300 MG: 300 CAPSULE ORAL at 06:10

## 2019-10-26 RX ADMIN — THERA TABS 1 TABLET: TAB at 10:10

## 2019-10-26 RX ADMIN — DIAZEPAM 10 MG: 5 TABLET ORAL at 11:10

## 2019-10-26 NOTE — SUBJECTIVE & OBJECTIVE
Past Medical History:   Diagnosis Date    Anxiety     Arthritis        Past Surgical History:   Procedure Laterality Date    ARTHROSCOPY OF KNEE Left 1/7/2019    Procedure: ARTHROSCOPY, KNEE;  Surgeon: Derick Kirby MD;  Location: [facility] OR;  Service: Orthopedics;  Laterality: Left;    ESOPHAGOGASTRODUODENOSCOPY N/A 4/8/2019    Procedure: EGD (ESOPHAGOGASTRODUODENOSCOPY);  Surgeon: Bonita Kendall MD;  Location: [facility] ENDO;  Service: Endoscopy;  Laterality: N/A;       Review of patient's allergies indicates:  No Known Allergies  Family History     None        Tobacco Use    Smoking status: Never Smoker    Smokeless tobacco: Never Used   Substance and Sexual Activity    Alcohol use: Yes     Comment: 1L vodka daily    Drug use: No    Sexual activity: Yes     Partners: Female     Review of Systems   Constitutional: Positive for activity change, appetite change and diaphoresis. Negative for chills and fever.   HENT: Negative for facial swelling, mouth sores and trouble swallowing.    Eyes: Negative for pain and redness.   Respiratory: Negative for cough and shortness of breath.    Cardiovascular: Positive for palpitations. Negative for chest pain and leg swelling.   Gastrointestinal: Positive for blood in stool and nausea. Negative for abdominal pain.   Genitourinary: Negative for dysuria and hematuria.   Musculoskeletal: Negative for gait problem and neck stiffness.   Skin: Positive for pallor. Negative for rash and wound.   Neurological: Positive for tremors. Negative for seizures and headaches.   Psychiatric/Behavioral: Positive for hallucinations. Negative for confusion. The patient is nervous/anxious.      Objective:     Vital Signs (Most Recent):  Temp: 99 °F (37.2 °C) (10/26/19 0715)  Pulse: 110 (10/26/19 1200)  Resp: (!) 29 (10/26/19 1200)  BP: (!) 130/90 (10/26/19 1200)  SpO2: 100 % (10/26/19 1200) Vital Signs (24h Range):  Temp:  [98.4 °F (36.9 °C)-99 °F (37.2 °C)] 99 °F (37.2 °C)  Pulse:  []  110  Resp:  [7-30] 29  SpO2:  [97 %-100 %] 100 %  BP: ()/(50-98) 130/90     Weight: 97.5 kg (214 lb 15.2 oz) (10/24/19 1711)  Body mass index is 26.87 kg/m².      Intake/Output Summary (Last 24 hours) at 10/26/2019 1223  Last data filed at 10/26/2019 0900  Gross per 24 hour   Intake 2035 ml   Output 4100 ml   Net -2065 ml       Lines/Drains/Airways     Drain            Male External Urinary Catheter 10/24/19 2330 Medium 1 day          Peripheral Intravenous Line                 Peripheral IV - Single Lumen 10/24/19 2110 20 G Posterior;Right Forearm 1 day         Peripheral IV - Single Lumen 10/25/19 0600 20 G Anterior;Left Hand 1 day         Peripheral IV - Single Lumen 10/25/19 0610 20 G Anterior;Left;Lateral Forearm 1 day                Physical Exam   Constitutional: He is oriented to person, place, and time. No distress.   tremulous   HENT:   Head: Normocephalic and atraumatic.   Eyes: Conjunctivae are normal. No scleral icterus.   Neck: Neck supple. No thyromegaly present.   Cardiovascular: Normal rate, regular rhythm and intact distal pulses.   Pulmonary/Chest: Effort normal and breath sounds normal. No respiratory distress.   Abdominal: Soft. He exhibits no mass. There is no tenderness.   Genitourinary:   Genitourinary Comments: Rectal exam with nurse in room:  There is perianal small skin opening, possibly a draining sinus tract without fluctuance ; there is brown stool at anal opening, no ara blood appreciated. No large external hemorrhoids.   Musculoskeletal: Normal range of motion. He exhibits no tenderness.   Neurological: He is alert and oriented to person, place, and time.   Skin: Skin is warm and dry. No rash noted.   Psychiatric: He has a normal mood and affect. His behavior is normal.   Vitals reviewed.      Significant Labs:  Blood Culture: No results for input(s): LABBLOO in the last 48 hours.  CBC:   Recent Labs   Lab 10/25/19  0519 10/25/19  1430 10/26/19  0428   WBC 3.75* 3.94 3.99    HGB 13.0* 12.3* 12.5*   HCT 38.1* 36.7* 37.4*   PLT 57* 58* 68*     CMP:   Recent Labs   Lab 10/26/19  0428   GLU 89   CALCIUM 9.1   ALBUMIN 3.2*   PROT 7.2      K 4.1   CO2 25      BUN 9   CREATININE 0.8   ALKPHOS 116   ALT 41   *   BILITOT 2.0*     Coagulation:   Recent Labs   Lab 10/26/19  0428   INR 1.2     Lipase: No results for input(s): LIPASE in the last 48 hours.  Liver Function Test:   Recent Labs   Lab 10/25/19  0519 10/26/19  0428   ALT 29 41   * 148*   ALKPHOS 114 116   BILITOT 3.0* 2.0*   PROT 7.9 7.2   ALBUMIN 3.6 3.2*       Significant Imaging:  Imaging results within the past 24 hours have been reviewed.

## 2019-10-26 NOTE — PLAN OF CARE
Problem: Adult Inpatient Plan of Care  Goal: Plan of Care Review  Description  Outcome: Ongoing, Progressing  Flowsheets (Taken 10/26/2019 0549)  Plan of Care Reviewed With: patient  Patient is restless in bed. Awake, alert, and oriented x4 with periods of confusion. No hallucinations since last night between 7-8pm. Moves all extremities and follows commands. See flowsheets for details. All lines and drains are intact and fluids are infusing without difficulty. See MAR for gtts. 1 brown/bloody BM this AM per bedside commode. Bed in lowest position. Siderails x3. Will continue to monitor

## 2019-10-26 NOTE — CONSULTS
[facility]  Gastroenterology  Consult Note    Patient Name: Nilesh Rolon Jr.  MRN: [id number]  Admission Date: 10/24/2019  Hospital Length of Stay: 2 days  Code Status: Full Code   Attending Provider: To Wagner MD   Consulting Provider: Mauro Salguero MD  Primary Care Physician: Elio Brannon MD  Principal Problem:Alcohol withdrawal    Inpatient consult to [facility]  Consult performed by: Mauro Salguero MD  Consult ordered by: Ramírez Junior MD        Subjective:     HPI:  This is a 43 y/o male hx of significant etoh use admitted to ICU with etoh withdrawal and DTs.   GI consulted for rectal bleeding.  Family at bedside to assist with history as well as nursing.    Prior to admission family states that the patient noted some darker brown to maybe black stools.  He was then admitted in yesterday morning he developed ara red liquidy blood in stool that kept pouring out into the bed sheets.  There was no nausea no vomiting no hematemesis at that time.  This started to slow down throughout the day and by this morning he is only seeing slight streaks of blood in the stool.  He has not had a bowel movement since the morning. No exacerbtaing or alleviating factors. He is very tremulous from his delirium tremens.  He was having hallucinations but currently is is not. Last drink was 2 days prior to admit. Was drinking approx 1/5th of vodka almost daily.   States he has had colonoscopy in the past that was normal.    Had EGD 4/2019 f[place]on  Impression:           - Nodule found in the esophagus. Biopsied (Jar 2).                        - Portal hypertensive gastropathy. Biopsied (Jar 1).                        - Normal duodenal bulb, first portion of the                         duodenum and second portion of the duodenum.                        43 year old man with alcoholic cirrhosis presents                         with melena with mild anemia; EGD significant for                          nodule in the distal esophagus and mild portal                         hypertensive gastropathy. Symptoms may have been                         secondary to oozing from portal hypertensive                         gastropathy    Past Medical History:   Diagnosis Date    Anxiety     Arthritis        Past Surgical History:   Procedure Laterality Date    ARTHROSCOPY OF KNEE Left 1/7/2019    Procedure: ARTHROSCOPY, KNEE;  Surgeon: Derick Kirby MD;  Location: [facility] OR;  Service: Orthopedics;  Laterality: Left;    ESOPHAGOGASTRODUODENOSCOPY N/A 4/8/2019    Procedure: EGD (ESOPHAGOGASTRODUODENOSCOPY);  Surgeon: Bonita Kendall MD;  Location: [facility] ENDO;  Service: Endoscopy;  Laterality: N/A;       Review of patient's allergies indicates:  No Known Allergies  Family History     None        Tobacco Use    Smoking status: Never Smoker    Smokeless tobacco: Never Used   Substance and Sexual Activity    Alcohol use: Yes     Comment: 1L vodka daily    Drug use: No    Sexual activity: Yes     Partners: Female     Review of Systems   Constitutional: Positive for activity change, appetite change and diaphoresis. Negative for chills and fever.   HENT: Negative for facial swelling, mouth sores and trouble swallowing.    Eyes: Negative for pain and redness.   Respiratory: Negative for cough and shortness of breath.    Cardiovascular: Positive for palpitations. Negative for chest pain and leg swelling.   Gastrointestinal: Positive for blood in stool and nausea. Negative for abdominal pain.   Genitourinary: Negative for dysuria and hematuria.   Musculoskeletal: Negative for gait problem and neck stiffness.   Skin: Positive for pallor. Negative for rash and wound.   Neurological: Positive for tremors. Negative for seizures and headaches.   Psychiatric/Behavioral: Positive for hallucinations. Negative for confusion. The patient is nervous/anxious.      Objective:     Vital Signs (Most Recent):  Temp: 99 °F (37.2 °C)  (10/26/19 0715)  Pulse: 110 (10/26/19 1200)  Resp: (!) 29 (10/26/19 1200)  BP: (!) 130/90 (10/26/19 1200)  SpO2: 100 % (10/26/19 1200) Vital Signs (24h Range):  Temp:  [98.4 °F (36.9 °C)-99 °F (37.2 °C)] 99 °F (37.2 °C)  Pulse:  [] 110  Resp:  [7-30] 29  SpO2:  [97 %-100 %] 100 %  BP: ()/(50-98) 130/90     Weight: 97.5 kg (214 lb 15.2 oz) (10/24/19 1711)  Body mass index is 26.87 kg/m².      Intake/Output Summary (Last 24 hours) at 10/26/2019 1223  Last data filed at 10/26/2019 0900  Gross per 24 hour   Intake 2035 ml   Output 4100 ml   Net -2065 ml       Lines/Drains/Airways     Drain            Male External Urinary Catheter 10/24/19 2330 Medium 1 day          Peripheral Intravenous Line                 Peripheral IV - Single Lumen 10/24/19 2110 20 G Posterior;Right Forearm 1 day         Peripheral IV - Single Lumen 10/25/19 0600 20 G Anterior;Left Hand 1 day         Peripheral IV - Single Lumen 10/25/19 0610 20 G Anterior;Left;Lateral Forearm 1 day                Physical Exam   Constitutional: He is oriented to person, place, and time. No distress.   tremulous   HENT:   Head: Normocephalic and atraumatic.   Eyes: Conjunctivae are normal. No scleral icterus.   Neck: Neck supple. No thyromegaly present.   Cardiovascular: Normal rate, regular rhythm and intact distal pulses.   Pulmonary/Chest: Effort normal and breath sounds normal. No respiratory distress.   Abdominal: Soft. He exhibits no mass. There is no tenderness.   Genitourinary:   Genitourinary Comments: Rectal exam with nurse in room:  There is perianal small skin opening, possibly a draining sinus tract without fluctuance ; there is brown stool at anal opening, no ara blood appreciated. No large external hemorrhoids.   Musculoskeletal: Normal range of motion. He exhibits no tenderness.   Neurological: He is alert and oriented to person, place, and time.   Skin: Skin is warm and dry. No rash noted.   Psychiatric: He has a normal mood and  affect. His behavior is normal.   Vitals reviewed.      Significant Labs:  Blood Culture: No results for input(s): LABBLOO in the last 48 hours.  CBC:   Recent Labs   Lab 10/25/19  0519 10/25/19  1430 10/26/19  0428   WBC 3.75* 3.94 3.99   HGB 13.0* 12.3* 12.5*   HCT 38.1* 36.7* 37.4*   PLT 57* 58* 68*     CMP:   Recent Labs   Lab 10/26/19  0428   GLU 89   CALCIUM 9.1   ALBUMIN 3.2*   PROT 7.2      K 4.1   CO2 25      BUN 9   CREATININE 0.8   ALKPHOS 116   ALT 41   *   BILITOT 2.0*     Coagulation:   Recent Labs   Lab 10/26/19  0428   INR 1.2     Lipase: No results for input(s): LIPASE in the last 48 hours.  Liver Function Test:   Recent Labs   Lab 10/25/19  0519 10/26/19  0428   ALT 29 41   * 148*   ALKPHOS 114 116   BILITOT 3.0* 2.0*   PROT 7.9 7.2   ALBUMIN 3.6 3.2*       Significant Imaging:  Imaging results within the past 24 hours have been reviewed.    Assessment/Plan:     Bloody stool  Reported ara red blood per rectum yesterday that has been slowly resolving.  There appears to be an opening full such as a draining sinus tract in the perianal area.  This is currently not bleeding but may have been the source.  Actively going through DTs.    Will favor conservative management at this time given that he is high risk for any sedation for procedure.  I suspect the bleeding will ultimately slow and stop, and suspect platelet dysfunction from prior etoh abuse.    Recs:  Monitor stool  Trend Hgb BID  IV PPI daily    If bleeding persists, can consider EGD / flex sig in the future.  Otherwise, no plan at present for endoscopic eval.    He was counseled extensively on the dangers of continued alcohol use and was advised complete cessation.  Would benefit from addiction counseling in the future.        Thank you for your consult. I will follow-up with patient. Please contact us if you have any additional questions.    Mauro Salguero MD  Gastroenterology  [facility]

## 2019-10-26 NOTE — PROGRESS NOTES
[facility] Medicine Progress Note    Primary Team: [facility] Hospitalist Team B  Attending Physician: Bernard  Resident: Gavino  Intern: Talia    Subjective:      Patient sitting up in bed and resting comfortably in bed this am. Patient now noticing rash to bridge of nose and radiating to cheeks. Denies SOB, chest pain, abdominal pain, nausea/vomiting. Continues to have red streaks of blood with BM, however improving     Objective:     Last 24 Hour Vital Signs:  BP  Min: 82/50  Max: 147/95  Temp  Av.6 °F (37 °C)  Min: 98.4 °F (36.9 °C)  Max: 99 °F (37.2 °C)  Pulse  Av.2  Min: 65  Max: 109  Resp  Av.5  Min: 7  Max: 27  SpO2  Av.3 %  Min: 97 %  Max: 100 %  I/O last 3 completed shifts:  In: 5418.9 [P.O.:1750; I.V.:3668.9]  Out: 4980 [Urine:4980]    Physical Examination:  General: Alert and oriented, Laying in bed  HEENT: PERRL, EOMI, Moist mucus membranes w/ no erythema noted, Trachea midline, No facial asymmetry noted, No palpable supraclavicular lymph nodes, No palpable submandibular lymph nodes  Cards: RRR, No murmurs, rubs or gallops  Pulm: CTAB, No wheezes, rales or rhonchi  Abd: Soft, NTD, NBS, Nondistended  Skin: erythematous rash noted to nose and cheeks. No ecchymosis  Ext: No edema, 2+ Distal pulses    Laboratory:  Laboratory Data Reviewed: yes  Pertinent Findings:  Recent Labs   Lab 10/24/19  1211 10/25/19  0519 10/25/19  1430 10/26/19  0428   WBC 7.19 3.75* 3.94 3.99   HGB 14.8 13.0* 12.3* 12.5*   HCT 43.1 38.1* 36.7* 37.4*   PLT 69* 57* 58* 68*   * 135*  --  139   K 3.6 3.7  --  4.1   CL 93* 101  --  103   CREATININE 1.1 0.8  --  0.8   BUN 11 11  --  9   CO2 21* 24  --  25   ALT 30 29  --  41   AST 94* 103*  --  148*     Microbiology Data Reviewed: yes  Pertinent Findings:  None    Other Results:  EKG (my interpretation): Sinus tachycardia    Radiology Data Reviewed: yes  Pertinent Findings:  CT Head WNL    Current Medications:     Infusions:   dexmedetomidine (PRECEDEX)  infusion Stopped (10/25/19 1015)        Scheduled:   diazePAM  10 mg Oral Q8H    folic acid  1 mg Oral Daily    gabapentin  300 mg Oral TID WM    Followed by    [START ON 10/27/2019] gabapentin  300 mg Oral BID    Followed by    [START ON 10/28/2019] gabapentin  300 mg Oral Once    multivitamin  1 tablet Oral Daily    pantoprazole  40 mg Oral Daily    thiamine  100 mg Oral Daily        PRN:  lorazepam, lorazepam, ondansetron, promethazine (PHENERGAN) IVPB, sodium chloride 0.9%    Antibiotics and Day Number of Therapy:  None    Assessment:     Nilesh Rolon Jr. is a 44 y.o.male with  Patient Active Problem List    Diagnosis Date Noted    Delirium tremens 10/24/2019    Gastrointestinal hemorrhage 04/08/2019    GI bleed 04/07/2019    Alcohol withdrawal 04/07/2019    Ascites 04/07/2019    Acute blood loss anemia 04/07/2019    Thrombocytopenia 04/07/2019    Alcohol abuse 04/07/2019    Alcoholic hepatitis with ascites 04/07/2019    Hyponatremia 04/07/2019    Bloody stool     Bright red blood per rectum     Other meniscus derangements, other medial meniscus, left knee 01/07/2019      Plan:     1. EtOH abuse w/ Withdrawal  - Patient previously drinking 1/5th of vodka in approximately 3 days; As per patient last drink was x6 days prior to admission  - Last drink ~2 days prior to admission as per significant other; Endorses history of previous withdrawals with no seizures  - Tachycardic, Tremulous and Diaphoretic on initial exam  - Notes active visual hallucinations  - Thiamine, Multivitamin and Folic acid  - Started on Diazepam 10mg Q6H and Lorazepam PRN  - 2/2 Increasing agitation the night of admit, stepped up to the ICU and started on precedex. Required soft restraints 2/2 to continued breakthrough agitation overnight  - Precedex gtt able to be weaned over 2 days. Now off precedex  - continue valium taper w/ ativan prn    2. Alcoholic Hepatitis w/ Asicites  - Patient with history of ascites  -  Previously prescribed Lasix 20 and Aldactone 50  - AST 94, ALT 30, TBili 3.8, Alk Phos 139  - Protime 12.9,   - Anticipate restarting medications prior to discharge    3. Bright red blood in stool  - GI consulted  - concerned for varicies in setting of alcohol abuse  - H/H stable     4. Thrombocytopenia  - Plt 69 in the ED; Likely 2/2 to EtOH use  - No indication of active bleed  - Continue to monitor     5. Portal HTN Gastropathy  - Patient with noted history of GI Bleeds 2/2 to portal hypertension gastropathy  - H/H 14.8/43.1 on presentation to the ED  - No concern for active bleed at this time  - Will start pantoprazole 40  - Continue to monitor    PPx: SCD's 2/2 to Thrombocytopenia  Diet: Regular  Code: Full  Dispo: Stepped down from ICU today. Pending GI evaluation and valium taper    Nathan Ruvalcaba, DO   Internal Medicine/Pediatrics HO-III    [facility] Medicine Hospitalist Pager numbers:   U Hospitalist Medicine Team A (Minesh/Reagan): [phone]  [facility] Hospitalist Medicine Team B (Rebekah/Bernard):  [phone]

## 2019-10-26 NOTE — NURSING TRANSFER
Nursing Transfer Note      10/26/2019     Transfer From: 266  To: 471    Transfer via wheelchair    Transfer with cardiac monitoring    Transported by ICU Nurse     Medicines sent: Yes     Chart sent with patient: Yes    Notified: Significant Other     Patient reassessed ont: 10/26/19 by Fifth Floor Primary Nurse     Upon arrival to floor: patient oriented to room, escorted to restroom, hooked up to IV fluids, call bell placed in reach, and bed placed in lowest position.

## 2019-10-26 NOTE — ASSESSMENT & PLAN NOTE
Reported ara red blood per rectum yesterday that has been slowly resolving.  There appears to be an opening full such as a draining sinus tract in the perianal area.  This is currently not bleeding but may have been the source.  Actively going through DTs.    Will favor conservative management at this time given that he is high risk for any sedation for procedure.  I suspect the bleeding will ultimately slow and stop, and suspect platelet dysfunction from prior etoh abuse.    Recs:  Monitor stool  Trend Hgb BID  IV PPI daily    If bleeding persists, can consider EGD / flex sig in the future.  Otherwise, no plan at present for endoscopic eval.    He was counseled extensively on the dangers of continued alcohol use and was advised complete cessation.  Would benefit from addiction counseling in the future.

## 2019-10-26 NOTE — MEDICAL/APP STUDENT
[facility] Medical Student Progress Note    Subjective:      Mr. Rolon reports feeling much better this morning with no major complaints. He says he is no longer sweating, feeling agitated, having tremors, or having hallucinations. He has experienced no nausea or vomiting, but bloody stools were noted. He reported that he has instability when walking and standing.      Objective:   Last 24 Hour Vital Signs:  BP  Min: 82/50  Max: 147/95  Temp  Av.3 °F (36.8 °C)  Min: 97.6 °F (36.4 °C)  Max: 98.6 °F (37 °C)  Pulse  Av.2  Min: 62  Max: 99  Resp  Av.4  Min: 7  Max: 27  SpO2  Av.4 %  Min: 97 %  Max: 100 %  I/O last 3 completed shifts:  In: 5418.9 [P.O.:1750; I.V.:3668.9]  Out: 4980 [Urine:4980]    Physical Examination:  Physical Exam   Constitutional: He is oriented to person, place, and time. No distress.   HENT:   Head: Atraumatic.   Mouth/Throat: No oropharyngeal exudate.   Reddened skin around nose and cheeks   Eyes: Pupils are equal, round, and reactive to light.   Neck: Normal range of motion. No thyromegaly present.   Cardiovascular: Normal rate and regular rhythm. Exam reveals no gallop and no friction rub.   No murmur heard.  Pulmonary/Chest: Effort normal. No respiratory distress. He has no wheezes.   Abdominal: There is no tenderness. There is no guarding.   Mild distension   Musculoskeletal: He exhibits no edema or tenderness.   Neurological: He is alert and oriented to person, place, and time.   On arm extension, mild tremors (improved)   Skin: Skin is warm.   Psychiatric: Mood, memory and affect normal.       Laboratory:  Laboratory Data Reviewed: yes  Pertinent Findings:    Platelets at 68K        Current Medications:     Infusions:   dexmedetomidine (PRECEDEX) infusion Stopped (10/25/19 1015)        Scheduled:   diazePAM  10 mg Oral Q6H    folic acid  1 mg Oral Daily    gabapentin  300 mg Oral TID WM    Followed by    [START ON 10/27/2019] gabapentin  300 mg Oral BID     Followed by    [START ON 10/28/2019] gabapentin  300 mg Oral Once    multivitamin  1 tablet Oral Daily    pantoprazole  40 mg Oral Daily    thiamine  100 mg Oral Daily        PRN:  lorazepam, lorazepam, ondansetron, promethazine (PHENERGAN) IVPB, sodium chloride 0.9%    Assessment:     Mr. Rolon is a 44 year old male who is currently being treated for alcohol withdrawal with symptoms who is improving with appropriate pharmaceutical therapy and medical monitoring in the ICU.    Patient Active Problem List    Diagnosis Date Noted    Delirium tremens 10/24/2019    Gastrointestinal hemorrhage 04/08/2019    GI bleed 04/07/2019    Alcohol withdrawal 04/07/2019    Ascites 04/07/2019    Acute blood loss anemia 04/07/2019    Thrombocytopenia 04/07/2019    Alcohol abuse 04/07/2019    Alcoholic hepatitis with ascites 04/07/2019    Hyponatremia 04/07/2019    Bloody stool     Bright red blood per rectum     Other meniscus derangements, other medial meniscus, left knee 01/07/2019        Plan:     EtOH abuse with Withdrawal  Monitor vital signs and withdrawal symptoms  Tailor and titrate medications to patient status  Continue diazepam and gabapentin as needed  Start Precedex for increased agitation    Hepatitis 2/2 Alcohol  Monitor liver enzymes  AST at 148, ALT at 41    Hematochezia   GI consulted - no colonoscopy recommended    Thrombocytopenia  68K platelets  Likely 2/2 chronic alcohol abuse  Should resolve over time with alcohol cessation      Robert Lee  [facility] Medical Student L3  U Internal Medicine Service Team B    [facility] Medicine Hospitalist Pager numbers:   [facility] Hospitalist Medicine Team A (Minesh/Reagan): [phone]  [facility] Hospitalist Medicine Team B (Rebekah/Bernard):  [phone]

## 2019-10-26 NOTE — HPI
This is a 43 y/o male hx of significant etoh use admitted to ICU with etoh withdrawal and DTs.   GI consulted for rectal bleeding.  Family at bedside to assist with history as well as nursing.    Prior to admission family states that the patient noted some darker brown to maybe black stools.  He was then admitted in yesterday morning he developed ara red liquidy blood in stool that kept pouring out into the bed sheets.  There was no nausea no vomiting no hematemesis at that time.  This started to slow down throughout the day and by this morning he is only seeing slight streaks of blood in the stool.  He has not had a bowel movement since the morning. No exacerbtaing or alleviating factors. He is very tremulous from his delirium tremens.  He was having hallucinations but currently is is not. Last drink was 2 days prior to admit. Was drinking approx 1/5th of vodka almost daily.   States he has had colonoscopy in the past that was normal.    Had EGD 4/2019 [place]  Impression:           - Nodule found in the esophagus. Biopsied (Jar 2).                        - Portal hypertensive gastropathy. Biopsied (Jar 1).                        - Normal duodenal bulb, first portion of the                         duodenum and second portion of the duodenum.                        43 year old man with alcoholic cirrhosis presents                         with melena with mild anemia; EGD significant for                         nodule in the distal esophagus and mild portal                         hypertensive gastropathy. Symptoms may have been                         secondary to oozing from portal hypertensive                         gastropathy

## 2019-10-27 PROBLEM — K92.2 GASTROINTESTINAL HEMORRHAGE: Status: RESOLVED | Noted: 2019-04-08 | Resolved: 2019-10-27

## 2019-10-27 PROBLEM — M23.332 OTHER MENISCUS DERANGEMENTS, OTHER MEDIAL MENISCUS, LEFT KNEE: Status: RESOLVED | Noted: 2019-01-07 | Resolved: 2019-10-27

## 2019-10-27 PROBLEM — D62 ACUTE BLOOD LOSS ANEMIA: Status: RESOLVED | Noted: 2019-04-07 | Resolved: 2019-10-27

## 2019-10-27 LAB
ALBUMIN SERPL BCP-MCNC: 3.6 G/DL (ref 3.5–5.2)
ALP SERPL-CCNC: 111 U/L (ref 55–135)
ALT SERPL W/O P-5'-P-CCNC: 40 U/L (ref 10–44)
ANION GAP SERPL CALC-SCNC: 10 MMOL/L (ref 8–16)
AST SERPL-CCNC: 113 U/L (ref 10–40)
BASOPHILS # BLD AUTO: 0.03 K/UL (ref 0–0.2)
BASOPHILS NFR BLD: 0.8 % (ref 0–1.9)
BILIRUB SERPL-MCNC: 2.3 MG/DL (ref 0.1–1)
BUN SERPL-MCNC: 7 MG/DL (ref 6–20)
CALCIUM SERPL-MCNC: 9.6 MG/DL (ref 8.7–10.5)
CHLORIDE SERPL-SCNC: 101 MMOL/L (ref 95–110)
CO2 SERPL-SCNC: 29 MMOL/L (ref 23–29)
CREAT SERPL-MCNC: 0.8 MG/DL (ref 0.5–1.4)
DIFFERENTIAL METHOD: ABNORMAL
EOSINOPHIL # BLD AUTO: 0.1 K/UL (ref 0–0.5)
EOSINOPHIL NFR BLD: 1.3 % (ref 0–8)
ERYTHROCYTE [DISTWIDTH] IN BLOOD BY AUTOMATED COUNT: 15 % (ref 11.5–14.5)
EST. GFR  (AFRICAN AMERICAN): >60 ML/MIN/1.73 M^2
EST. GFR  (NON AFRICAN AMERICAN): >60 ML/MIN/1.73 M^2
GLUCOSE SERPL-MCNC: 98 MG/DL (ref 70–110)
HCT VFR BLD AUTO: 39.8 % (ref 40–54)
HGB BLD-MCNC: 13 G/DL (ref 14–18)
INR PPP: 1.2 (ref 0.8–1.2)
LYMPHOCYTES # BLD AUTO: 0.7 K/UL (ref 1–4.8)
LYMPHOCYTES NFR BLD: 18.8 % (ref 18–48)
MAGNESIUM SERPL-MCNC: 1.6 MG/DL (ref 1.6–2.6)
MCH RBC QN AUTO: 33.5 PG (ref 27–31)
MCHC RBC AUTO-ENTMCNC: 32.7 G/DL (ref 32–36)
MCV RBC AUTO: 103 FL (ref 82–98)
MONOCYTES # BLD AUTO: 0.6 K/UL (ref 0.3–1)
MONOCYTES NFR BLD: 17.2 % (ref 4–15)
NEUTROPHILS # BLD AUTO: 2.3 K/UL (ref 1.8–7.7)
NEUTROPHILS NFR BLD: 61.9 % (ref 38–73)
PHOSPHATE SERPL-MCNC: 4.6 MG/DL (ref 2.7–4.5)
PLATELET # BLD AUTO: 83 K/UL (ref 150–350)
PMV BLD AUTO: 10.5 FL (ref 9.2–12.9)
POTASSIUM SERPL-SCNC: 4.1 MMOL/L (ref 3.5–5.1)
PROT SERPL-MCNC: 7.9 G/DL (ref 6–8.4)
PROTHROMBIN TIME: 12.6 SEC (ref 9–12.5)
RBC # BLD AUTO: 3.88 M/UL (ref 4.6–6.2)
SODIUM SERPL-SCNC: 140 MMOL/L (ref 136–145)
WBC # BLD AUTO: 3.72 K/UL (ref 3.9–12.7)

## 2019-10-27 PROCEDURE — 99232 SBSQ HOSP IP/OBS MODERATE 35: CPT | Mod: ,,, | Performed by: INTERNAL MEDICINE

## 2019-10-27 PROCEDURE — 25000003 PHARM REV CODE 250: Performed by: STUDENT IN AN ORGANIZED HEALTH CARE EDUCATION/TRAINING PROGRAM

## 2019-10-27 PROCEDURE — 83735 ASSAY OF MAGNESIUM: CPT

## 2019-10-27 PROCEDURE — 80053 COMPREHEN METABOLIC PANEL: CPT

## 2019-10-27 PROCEDURE — 85025 COMPLETE CBC W/AUTO DIFF WBC: CPT

## 2019-10-27 PROCEDURE — 85610 PROTHROMBIN TIME: CPT

## 2019-10-27 PROCEDURE — 11000001 HC ACUTE MED/SURG PRIVATE ROOM

## 2019-10-27 PROCEDURE — 99232 PR SUBSEQUENT HOSPITAL CARE,LEVL II: ICD-10-PCS | Mod: ,,, | Performed by: INTERNAL MEDICINE

## 2019-10-27 PROCEDURE — 36415 COLL VENOUS BLD VENIPUNCTURE: CPT

## 2019-10-27 PROCEDURE — 84100 ASSAY OF PHOSPHORUS: CPT

## 2019-10-27 RX ORDER — PANTOPRAZOLE SODIUM 40 MG/1
40 TABLET, DELAYED RELEASE ORAL DAILY
Qty: 30 TABLET | Refills: 3 | Status: SHIPPED | OUTPATIENT
Start: 2019-10-27 | End: 2020-08-31 | Stop reason: SDUPTHER

## 2019-10-27 RX ORDER — LIDOCAINE 50 MG/G
1 PATCH TOPICAL
Status: DISCONTINUED | OUTPATIENT
Start: 2019-10-27 | End: 2019-10-28 | Stop reason: HOSPADM

## 2019-10-27 RX ORDER — FOLIC ACID 1 MG/1
1 TABLET ORAL DAILY
Qty: 30 TABLET | Refills: 3 | Status: SHIPPED | OUTPATIENT
Start: 2019-10-27 | End: 2020-10-26 | Stop reason: SDUPTHER

## 2019-10-27 RX ORDER — DIAZEPAM 5 MG/1
10 TABLET ORAL EVERY 12 HOURS
Status: DISCONTINUED | OUTPATIENT
Start: 2019-10-27 | End: 2019-10-28 | Stop reason: HOSPADM

## 2019-10-27 RX ADMIN — GABAPENTIN 300 MG: 300 CAPSULE ORAL at 08:10

## 2019-10-27 RX ADMIN — DIAZEPAM 10 MG: 5 TABLET ORAL at 06:10

## 2019-10-27 RX ADMIN — PANTOPRAZOLE SODIUM 40 MG: 40 TABLET, DELAYED RELEASE ORAL at 08:10

## 2019-10-27 RX ADMIN — FOLIC ACID 1 MG: 1 TABLET ORAL at 08:10

## 2019-10-27 RX ADMIN — DIAZEPAM 10 MG: 5 TABLET ORAL at 07:10

## 2019-10-27 RX ADMIN — GABAPENTIN 300 MG: 300 CAPSULE ORAL at 07:10

## 2019-10-27 RX ADMIN — THERA TABS 1 TABLET: TAB at 08:10

## 2019-10-27 RX ADMIN — LIDOCAINE 1 PATCH: 50 PATCH TOPICAL at 07:10

## 2019-10-27 RX ADMIN — Medication 100 MG: at 08:10

## 2019-10-27 NOTE — PROGRESS NOTES
[facility] Medicine Progress Note    Primary Team: [facility] Hospitalist Team B  Attending Physician: Bernard  Resident: Peg Mancia  Intern: Vernon Melgar    Subjective:      Patient sitting up in bed and resting comfortably in bed this am. Patient with erythematous rash to bridge of nose and radiating to cheeks. Denies SOB, chest pain, abdominal pain, nausea/vomiting. Patient has not had any blood bowel movements or red streaks of blood with BM overnight. Patient denies hallucinations overnight.     Objective:     Last 24 Hour Vital Signs:  BP  Min: 121/86  Max: 159/93  Temp  Av.9 °F (37.2 °C)  Min: 97.7 °F (36.5 °C)  Max: 100.2 °F (37.9 °C)  Pulse  Av.8  Min: 88  Max: 119  Resp  Av.5  Min: 16  Max: 30  SpO2  Av.2 %  Min: 95 %  Max: 100 %  Weight  Av.9 kg (226 lb 13.7 oz)  Min: 102.9 kg (226 lb 13.7 oz)  Max: 102.9 kg (226 lb 13.7 oz)  I/O last 3 completed shifts:  In: 1175 [P.O.:1175]  Out: 4600 [Urine:4600]    Physical Examination:  General: A&Ox3, resting in bed  HEENT: PERRL, EOMI, Moist mucus membranes w/ no erythema noted, Trachea midline, No facial asymmetry noted  Cards: Tachycardic with regular rhythm, No murmurs, rubs or gallops  Pulm: CTAB, No wheezes/crackles/rhonchi  Abd: Soft, ND/NT, NBS  Skin: erythematous rash noted to nose and cheeks. No ecchymosis  Ext: No edema, 2+ Distal pulses  Neuro: A&Ox3, no focal deficits, no tremor  Psych: Thought content normal. No hallucinations or delusions.    Laboratory:  Recent Labs   Lab 10/24/19  1211 10/25/19  0519 10/25/19  1430 10/26/19  0428 10/27/19  0627   WBC 7.19 3.75* 3.94 3.99 3.72*   HGB 14.8 13.0* 12.3* 12.5* 13.0*   HCT 43.1 38.1* 36.7* 37.4* 39.8*   PLT 69* 57* 58* 68* 83*   * 135*  --  139  --    K 3.6 3.7  --  4.1  --    CL 93* 101  --  103  --    CREATININE 1.1 0.8  --  0.8  --    BUN 11 11  --  9  --    CO2 21* 24  --  25  --    ALT 30 29  --  41  --    AST 94* 103*  --  148*  --       Microbiology:  None    Other Results:  EKG (my interpretation): Sinus tachycardia    Radiology:  CT Head WNL    Current Medications:     Scheduled:   diazePAM  10 mg Oral Q8H    folic acid  1 mg Oral Daily    gabapentin  300 mg Oral TID WM    Followed by    gabapentin  300 mg Oral BID    Followed by    [START ON 10/28/2019] gabapentin  300 mg Oral Once    lidocaine  1 patch Transdermal Q24H    multivitamin  1 tablet Oral Daily    pantoprazole  40 mg Oral Daily    thiamine  100 mg Oral Daily        PRN:  lorazepam, lorazepam, ondansetron, promethazine (PHENERGAN) IVPB, sodium chloride 0.9%    Assessment:     Nilehs Rolon Jr. is a 44 y.o.male with  Patient Active Problem List    Diagnosis Date Noted    Delirium tremens 10/24/2019    GI bleed 04/07/2019    Alcohol withdrawal 04/07/2019    Ascites 04/07/2019    Thrombocytopenia 04/07/2019    Alcohol abuse 04/07/2019    Alcoholic hepatitis with ascites 04/07/2019    Hyponatremia 04/07/2019    Bloody stool       Plan:     EtOH abuse w/ Withdrawal  - Patient previously drinking 1/5th of vodka in approximately 3 days; As per patient last drink was x6 days prior to admission  - Last drink ~2 days prior to admission as per significant other; Endorses history of previous withdrawals with no seizures  - Tachycardic, Tremulous and Diaphoretic on initial exam  - Notes active visual hallucinations and again on 10/25  - Thiamine, Multivitamin and Folic acid  - Started on Diazepam 10mg Q6H and Lorazepam PRN  - 2/2 Increasing agitation the night of admit, stepped up to the ICU and started on precedex. Required soft restraints 2/2 to continued breakthrough agitation overnight  - Precedex gtt weaned off 10/25  - continue valium taper 10mg Q8 currently w/ ativan PRN (none required 10/26)    Alcoholic Hepatitis w/ Ascites  - Patient with history of ascites  - Previously prescribed Lasix 20 and Aldactone 50  - AST 94, ALT 30, TBili 3.8, Alk Phos 139  - Protime  12.9,   - Anticipate restarting medications prior to discharge    Bright red blood in stool  - GI consulted, concerned for varicies in setting of alcohol abuse  - H/H stable  - likely draining sinus tract in perianal area per GI, no need for EGD/colonoscopy at this time. If bleeding persists consider EGD/flex sig in the future. Platelet dysfunction in setting of alcohol abuse.  - continue PPI daily  - follow up with GI outpatient     Thrombocytopenia  - Plt 69>58>68; Likely 2/2 to EtOH use  - No indication of active bleed  - Continue to monitor     History of Portal HTN Gastropathy  - Patient with noted history of GI Bleeds 2/2 to portal hypertension gastropathy  - H/H 14.8/43.1 on presentation to the ED  - No concern for active bleed at this time  - Started protonix 40  - Continue to monitor; Hgb 10/26 12.5    PPx: SCD's 2/2 to Thrombocytopenia  Diet: Regular  Code: Full  Dispo: Stepped down from ICU. GI evaluated and no endoscopy; valium taper; likely discharge home today and refer for outpatient rehab and psych    Ramírez Junior MD  [facility] Internal [facility] HO-I  [facility] Hospitalist Team B    [facility] Medicine Hospitalist Pager numbers:   [facility] Hospitalist Medicine Team A (Minesh/Reagan): [phone]  [facility] Hospitalist Medicine Team B (Rebekah/Bernard):  [phone]

## 2019-10-27 NOTE — NURSING
Pt able to ambulate in gaitan 100 feet with stand by assistance. Pt has no c/o shortness of breath or chest pain. Pt HR  110s-120s. Pt in no acute distress.

## 2019-10-27 NOTE — PLAN OF CARE
VN cued into pt's room for introduction. VN informed pt that VN would be working along side bedside nurse and PCT throughout shift. Level of present pain assessed. Tremors visibly better. Patient AAO X4. At present no distress noted. Discussed with patient High fall risk protocol and interventions that have been initiated and cont be in place for safety. Patient verbalized clear understanding and cooperation using teach back method. Bed alarm presently activated and in use. Will cont to be available to patient and intervene prn.

## 2019-10-27 NOTE — MEDICAL/APP STUDENT
[facility] Progress Note    Subjective:      Mr. Rolon reports feeling good overnight, and he stated that he is ready to leave today. He reported no nausea, vomiting, fever, chills, shortness of breath, or chest pain. He stated that he did not have any recently hallucinations or tremors.      Objective:   Last 24 Hour Vital Signs:  BP  Min: 121/86  Max: 159/93  Temp  Av.9 °F (37.2 °C)  Min: 97.7 °F (36.5 °C)  Max: 100.2 °F (37.9 °C)  Pulse  Av.6  Min: 88  Max: 119  Resp  Av.6  Min: 16  Max: 30  SpO2  Av %  Min: 95 %  Max: 100 %  Weight  Av.9 kg (226 lb 13.7 oz)  Min: 102.9 kg (226 lb 13.7 oz)  Max: 102.9 kg (226 lb 13.7 oz)  I/O last 3 completed shifts:  In: 1300 [P.O.:1300]  Out: 4600 [Urine:4600]    Physical Examination:  Physical Exam   Constitutional: He is oriented to person, place, and time and well-developed, well-nourished, and in no distress. No distress.   HENT:   Redness on face, improved from yesterday   Eyes: Pupils are equal, round, and reactive to light. Conjunctivae are normal.   Neck: No JVD present. No tracheal deviation present. No thyromegaly present.   Cardiovascular: Normal rate and regular rhythm. Exam reveals no friction rub.   No murmur heard.  Pulmonary/Chest: No respiratory distress. He has no wheezes.   Abdominal: Bowel sounds are normal. There is no tenderness.   Musculoskeletal: He exhibits no edema.   Neurological: He is alert and oriented to person, place, and time.   Tremors on static arm and hand extension   Skin:   Redness on face by nose, cheeks, nasolabial area         Laboratory:  Laboratory Data Reviewed: yes  Pertinent Findings:  Platelets low, but improvinK from 68K  RBC improving: 3.65 -> 3.88  Hb improvin.5 -> 13.0  Hct improvin.4 ->39.8  MCV remains elevated: 103      Microbiology Data Reviewed: yes  Pertinent Findings:  None    Other Results:  EKG (my interpretation): No new    Radiology Data Reviewed: yes  Pertinent  Findings:  No new    Current Medications:     Infusions:       Scheduled:   diazePAM  10 mg Oral Q12H    folic acid  1 mg Oral Daily    gabapentin  300 mg Oral BID    Followed by    [START ON 10/28/2019] gabapentin  300 mg Oral Once    lidocaine  1 patch Transdermal Q24H    multivitamin  1 tablet Oral Daily    pantoprazole  40 mg Oral Daily    thiamine  100 mg Oral Daily        PRN:  lorazepam, lorazepam, ondansetron, promethazine (PHENERGAN) IVPB, sodium chloride 0.9%        Assessment:     Nilesh Rolon Jr. is a 44 y.o.male with acute alcohol withdrawal who is currently being treated for withdrawal symptoms. He is stable and improving with appropriate pharmaceutical therapy.   Patient Active Problem List    Diagnosis Date Noted    Alcoholic liver disease     Delirium tremens 10/24/2019    GI bleed 2019    Alcohol withdrawal 2019    Ascites 2019    Thrombocytopenia 2019    Alcohol abuse 2019    Alcoholic hepatitis with ascites 2019    Hyponatremia 2019    Bloody stool         Plan:   EtOH abuse with Acute Withdrawal  Monitor vital signs and withdrawal symptoms  Maintain on diazepam to control tremors and prevent seizures  Keep patient another day due to physical exam tremors and elevated vital signs (HR, BP)    Hepatitis 2/2 Alcohol  Monitor for ascites  Monitor liver enzymes  AST improved: 148 -> 113    Hematochezia  GI consulted - did not recommend colonoscopy  Reported resolved today    Thrombocytopenia  ImprovinK platelets -> 83K  Likely 2/2 chronic alcohol abuse    Macrocytic Anemia  Improving RBC, Hemoglobin, and Hct    Face Rash  Redness around nose, cheeks, nasolabial area  Treat with moisturizer and monitor improvement     Robert Lee  [facility] Medical Student L3  [facility] Medicine Service Team B    [facility] Medicine Hospitalist Pager numbers:   [facility] Hospitalist Medicine Team A (Minesh/Reagan): [phone]  [facility] Hospitalist Medicine Team B  (Rebekah/Bernard):  [phone]

## 2019-10-27 NOTE — SUBJECTIVE & OBJECTIVE
Subjective:     Interval History:   No further bleeding. Normal colored stools.  Hgb uptrending.  Asking about rehab and etoh cessation services to aide in quitting.  Tremors improving.  No vomiting.    Review of Systems   Constitutional: Positive for activity change, appetite change and fatigue. Negative for fever.   Respiratory: Negative for cough and shortness of breath.    Gastrointestinal: Positive for nausea. Negative for abdominal pain, anal bleeding and vomiting.   Neurological: Positive for tremors.     Objective:     Vital Signs (Most Recent):  Temp: 99.1 °F (37.3 °C) (10/27/19 0549)  Pulse: 110 (10/27/19 0938)  Resp: 18 (10/27/19 0938)  BP: 119/78 (10/27/19 0938)  SpO2: 96 % (10/27/19 0938) Vital Signs (24h Range):  Temp:  [97.7 °F (36.5 °C)-100.2 °F (37.9 °C)] 99.1 °F (37.3 °C)  Pulse:  [] 110  Resp:  [16-30] 18  SpO2:  [95 %-100 %] 96 %  BP: (119-159)/(78-93) 119/78     Weight: 102.9 kg (226 lb 13.7 oz) (10/27/19 0549)  Body mass index is 28.35 kg/m².      Intake/Output Summary (Last 24 hours) at 10/27/2019 0959  Last data filed at 10/27/2019 0756  Gross per 24 hour   Intake 500 ml   Output 1175 ml   Net -675 ml       Lines/Drains/Airways     Peripheral Intravenous Line                 Peripheral IV - Single Lumen 10/25/19 0600 20 G Anterior;Left Hand 2 days         Peripheral IV - Single Lumen 10/25/19 0610 20 G Anterior;Left;Lateral Forearm 2 days                Physical Exam   Constitutional: He is oriented to person, place, and time. No distress.   Improved tremoulousness   Pulmonary/Chest: Effort normal. No respiratory distress.   Abdominal: Soft. He exhibits no distension.   Neurological: He is alert and oriented to person, place, and time.   Psychiatric: He has a normal mood and affect. His behavior is normal.   Vitals reviewed.      Significant Labs:  Amylase: No results for input(s): AMYLASE in the last 48 hours.  Blood Culture: No results for input(s): LABBLOO in the last 48  hours.  CBC:   Recent Labs   Lab 10/25/19  1430 10/26/19  0428 10/27/19  0627   WBC 3.94 3.99 3.72*   HGB 12.3* 12.5* 13.0*   HCT 36.7* 37.4* 39.8*   PLT 58* 68* 83*     CMP:   Recent Labs   Lab 10/27/19  0627   GLU 98   CALCIUM 9.6   ALBUMIN 3.6   PROT 7.9      K 4.1   CO2 29      BUN 7   CREATININE 0.8   ALKPHOS 111   ALT 40   *   BILITOT 2.3*     Coagulation:   Recent Labs   Lab 10/27/19  0627   INR 1.2         Significant Imaging:  Imaging results within the past 24 hours have been reviewed.

## 2019-10-27 NOTE — PLAN OF CARE
Plan of care reviewed with patient. Patient verbalized understanding. Fall precautions maintained. Bed in lowest position, call light within reach, 2x bed rails, pt wearing slip resistant socks, diazepam scheduled q12hr, CIWA scale q4hr. Patient notified to ask staff for assistance and pt verbalized complete understanding. Pt on telemetry with no ectopy noted. Will continue to monitor.

## 2019-10-27 NOTE — ASSESSMENT & PLAN NOTE
Bleeding has resolved and Hgb stable.    Recs:  PPI PO daily  Trend Hgb    No indication for endoscopy at present.    Recommend he be set up with addiction psych services on discharge  He will also need appt with hepatology at main campus on discharge (he was previously referred there but never made appt)    He was counseled extensively on the dangers of continued alcohol use and was advised complete cessation.

## 2019-10-27 NOTE — PLAN OF CARE
1940H Patient is  awake and orientedx4. Care plan explained and verbalized understanding. VIP care model explained. CIWA score 5. On room air, no complaints of shortness of breath. On heart monitor running Sinus Rhythm at 97bpm. IV site to the left forearm 20G and left hand 20G; flushed and saline locked. Abrasions noted on occipital area, right upper back and both knees. Maintained on low salt,low fat diet. Maintained on fall precaution. Bed in lowest position, bed alarms on, call light within reach and instructed to call for help when needed. Will continue  to monitor.    Due medications given. CIWA score assessment every 4 hours.

## 2019-10-27 NOTE — PROGRESS NOTES
[facility]  Gastroenterology  Progress Note    Patient Name: Nilesh Rolon Jr.  MRN: [id number]  Admission Date: 10/24/2019  Hospital Length of Stay: 3 days  Code Status: Full Code   Attending Provider: To Wagner MD  Consulting Provider: Mauro Salguero MD  Primary Care Physician: Elio Brannon MD  Principal Problem: Alcohol withdrawal      Subjective:     Interval History:   No further bleeding. Normal colored stools.  Hgb uptrending.  Asking about rehab and etoh cessation services to aide in quitting.  Tremors improving.  No vomiting.    Review of Systems   Constitutional: Positive for activity change, appetite change and fatigue. Negative for fever.   Respiratory: Negative for cough and shortness of breath.    Gastrointestinal: Positive for nausea. Negative for abdominal pain, anal bleeding and vomiting.   Neurological: Positive for tremors.     Objective:     Vital Signs (Most Recent):  Temp: 99.1 °F (37.3 °C) (10/27/19 0549)  Pulse: 110 (10/27/19 0938)  Resp: 18 (10/27/19 0938)  BP: 119/78 (10/27/19 0938)  SpO2: 96 % (10/27/19 0938) Vital Signs (24h Range):  Temp:  [97.7 °F (36.5 °C)-100.2 °F (37.9 °C)] 99.1 °F (37.3 °C)  Pulse:  [] 110  Resp:  [16-30] 18  SpO2:  [95 %-100 %] 96 %  BP: (119-159)/(78-93) 119/78     Weight: 102.9 kg (226 lb 13.7 oz) (10/27/19 0549)  Body mass index is 28.35 kg/m².      Intake/Output Summary (Last 24 hours) at 10/27/2019 0959  Last data filed at 10/27/2019 0756  Gross per 24 hour   Intake 500 ml   Output 1175 ml   Net -675 ml       Lines/Drains/Airways     Peripheral Intravenous Line                 Peripheral IV - Single Lumen 10/25/19 0600 20 G Anterior;Left Hand 2 days         Peripheral IV - Single Lumen 10/25/19 0610 20 G Anterior;Left;Lateral Forearm 2 days                Physical Exam   Constitutional: He is oriented to person, place, and time. No distress.   Improved tremoulousness   Pulmonary/Chest: Effort normal. No respiratory  distress.   Abdominal: Soft. He exhibits no distension.   Neurological: He is alert and oriented to person, place, and time.   Psychiatric: He has a normal mood and affect. His behavior is normal.   Vitals reviewed.      Significant Labs:  Amylase: No results for input(s): AMYLASE in the last 48 hours.  Blood Culture: No results for input(s): LABBLOO in the last 48 hours.  CBC:   Recent Labs   Lab 10/25/19  1430 10/26/19  0428 10/27/19  0627   WBC 3.94 3.99 3.72*   HGB 12.3* 12.5* 13.0*   HCT 36.7* 37.4* 39.8*   PLT 58* 68* 83*     CMP:   Recent Labs   Lab 10/27/19  0627   GLU 98   CALCIUM 9.6   ALBUMIN 3.6   PROT 7.9      K 4.1   CO2 29      BUN 7   CREATININE 0.8   ALKPHOS 111   ALT 40   *   BILITOT 2.3*     Coagulation:   Recent Labs   Lab 10/27/19  0627   INR 1.2         Significant Imaging:  Imaging results within the past 24 hours have been reviewed.    Assessment/Plan:     Bloody stool  Bleeding has resolved and Hgb stable.    Recs:  PPI PO daily  Trend Hgb    No indication for endoscopy at present.    Recommend he be set up with addiction psych services on discharge  He will also need appt with hepatology at [facility] on discharge (he was previously referred there but never made appt)    He was counseled extensively on the dangers of continued alcohol use and was advised complete cessation.    Will sign off.  Please call us back if we can be of further assistance.    Thank you for your consult. I will sign off. Please contact us if you have any additional questions.    Mauro Salguero MD  Gastroenterology  [facility]

## 2019-10-28 VITALS
WEIGHT: 227.06 LBS | HEIGHT: 75 IN | SYSTOLIC BLOOD PRESSURE: 138 MMHG | TEMPERATURE: 99 F | OXYGEN SATURATION: 96 % | RESPIRATION RATE: 18 BRPM | HEART RATE: 86 BPM | BODY MASS INDEX: 28.23 KG/M2 | DIASTOLIC BLOOD PRESSURE: 90 MMHG

## 2019-10-28 LAB
ALBUMIN SERPL BCP-MCNC: 3.5 G/DL (ref 3.5–5.2)
ALP SERPL-CCNC: 114 U/L (ref 55–135)
ALT SERPL W/O P-5'-P-CCNC: 46 U/L (ref 10–44)
ANION GAP SERPL CALC-SCNC: 10 MMOL/L (ref 8–16)
AST SERPL-CCNC: 139 U/L (ref 10–40)
BASOPHILS # BLD AUTO: 0.03 K/UL (ref 0–0.2)
BASOPHILS NFR BLD: 0.6 % (ref 0–1.9)
BILIRUB SERPL-MCNC: 2.3 MG/DL (ref 0.1–1)
BUN SERPL-MCNC: 8 MG/DL (ref 6–20)
CALCIUM SERPL-MCNC: 9.6 MG/DL (ref 8.7–10.5)
CHLORIDE SERPL-SCNC: 101 MMOL/L (ref 95–110)
CO2 SERPL-SCNC: 27 MMOL/L (ref 23–29)
CREAT SERPL-MCNC: 0.8 MG/DL (ref 0.5–1.4)
DIFFERENTIAL METHOD: ABNORMAL
EOSINOPHIL # BLD AUTO: 0.1 K/UL (ref 0–0.5)
EOSINOPHIL NFR BLD: 1.5 % (ref 0–8)
ERYTHROCYTE [DISTWIDTH] IN BLOOD BY AUTOMATED COUNT: 15.2 % (ref 11.5–14.5)
EST. GFR  (AFRICAN AMERICAN): >60 ML/MIN/1.73 M^2
EST. GFR  (NON AFRICAN AMERICAN): >60 ML/MIN/1.73 M^2
GLUCOSE SERPL-MCNC: 100 MG/DL (ref 70–110)
HCT VFR BLD AUTO: 39.5 % (ref 40–54)
HGB BLD-MCNC: 13.2 G/DL (ref 14–18)
INR PPP: 1.2 (ref 0.8–1.2)
LYMPHOCYTES # BLD AUTO: 1.1 K/UL (ref 1–4.8)
LYMPHOCYTES NFR BLD: 20.2 % (ref 18–48)
MAGNESIUM SERPL-MCNC: 1.7 MG/DL (ref 1.6–2.6)
MCH RBC QN AUTO: 34.3 PG (ref 27–31)
MCHC RBC AUTO-ENTMCNC: 33.4 G/DL (ref 32–36)
MCV RBC AUTO: 103 FL (ref 82–98)
MONOCYTES # BLD AUTO: 0.9 K/UL (ref 0.3–1)
MONOCYTES NFR BLD: 17.7 % (ref 4–15)
NEUTROPHILS # BLD AUTO: 3.2 K/UL (ref 1.8–7.7)
NEUTROPHILS NFR BLD: 60 % (ref 38–73)
PHOSPHATE SERPL-MCNC: 4.5 MG/DL (ref 2.7–4.5)
PLATELET # BLD AUTO: 107 K/UL (ref 150–350)
PMV BLD AUTO: 10.5 FL (ref 9.2–12.9)
POTASSIUM SERPL-SCNC: 3.9 MMOL/L (ref 3.5–5.1)
PROT SERPL-MCNC: 8 G/DL (ref 6–8.4)
PROTHROMBIN TIME: 12.7 SEC (ref 9–12.5)
RBC # BLD AUTO: 3.85 M/UL (ref 4.6–6.2)
SODIUM SERPL-SCNC: 138 MMOL/L (ref 136–145)
WBC # BLD AUTO: 5.25 K/UL (ref 3.9–12.7)

## 2019-10-28 PROCEDURE — 80053 COMPREHEN METABOLIC PANEL: CPT

## 2019-10-28 PROCEDURE — 63600175 PHARM REV CODE 636 W HCPCS: Performed by: INTERNAL MEDICINE

## 2019-10-28 PROCEDURE — 90686 IIV4 VACC NO PRSV 0.5 ML IM: CPT | Performed by: INTERNAL MEDICINE

## 2019-10-28 PROCEDURE — 84100 ASSAY OF PHOSPHORUS: CPT

## 2019-10-28 PROCEDURE — 25000003 PHARM REV CODE 250: Performed by: STUDENT IN AN ORGANIZED HEALTH CARE EDUCATION/TRAINING PROGRAM

## 2019-10-28 PROCEDURE — 85025 COMPLETE CBC W/AUTO DIFF WBC: CPT

## 2019-10-28 PROCEDURE — 90471 IMMUNIZATION ADMIN: CPT | Performed by: INTERNAL MEDICINE

## 2019-10-28 PROCEDURE — 83735 ASSAY OF MAGNESIUM: CPT

## 2019-10-28 PROCEDURE — 36415 COLL VENOUS BLD VENIPUNCTURE: CPT

## 2019-10-28 PROCEDURE — 85610 PROTHROMBIN TIME: CPT

## 2019-10-28 RX ADMIN — PANTOPRAZOLE SODIUM 40 MG: 40 TABLET, DELAYED RELEASE ORAL at 09:10

## 2019-10-28 RX ADMIN — GABAPENTIN 300 MG: 300 CAPSULE ORAL at 09:10

## 2019-10-28 RX ADMIN — DIAZEPAM 10 MG: 5 TABLET ORAL at 09:10

## 2019-10-28 RX ADMIN — FOLIC ACID 1 MG: 1 TABLET ORAL at 09:10

## 2019-10-28 RX ADMIN — INFLUENZA VIRUS VACCINE 0.5 ML: 15; 15; 15; 15 SUSPENSION INTRAMUSCULAR at 04:10

## 2019-10-28 RX ADMIN — LIDOCAINE 1 PATCH: 50 PATCH TOPICAL at 09:10

## 2019-10-28 RX ADMIN — Medication 100 MG: at 09:10

## 2019-10-28 RX ADMIN — THERA TABS 1 TABLET: TAB at 09:10

## 2019-10-28 NOTE — DISCHARGE SUMMARY
[facility] Medicine Discharge Summary    Primary Team: [facility] Hospitalist Team B  Attending Physician: To Wagner MD  Resident: Gavino  Intern: Talia    Date of Admit: 10/24/2019  Date of Discharge: 10/28/2019    Discharge to: Home  Condition: Stable    Discharge Diagnoses     Patient Active Problem List   Diagnosis    GI bleed    Alcohol withdrawal    Ascites    Thrombocytopenia    Alcohol abuse    Alcoholic hepatitis with ascites    Hyponatremia    Bloody stool    Delirium tremens    Alcoholic liver disease       Consultants and Procedures     Consultants:  Gastroenterology    Procedures:   None    Imaging:  CT Head    Brief History of Present Illness      Nilesh Rolon Jr. is a 44 y.o. male who  has a past medical history of Anxiety and Arthritis. The patient presented to [facility] on 10/24/2019 with a primary complaint of alcohol withdrawal x6 days.     As per the patient 6 days prior to admission he was drinking a 1/5th of vodka daily. He made the decision to stop drinking and thought that he could stop on his own. He endorsed starting to have episodes of tremulousness that started x4-5 days prior to admission. He also endorsed visual hallucinations stating that he had been seeing people and things floating in the air that are not actually there; he however denied any auditory hallucinations. He stated that he had withdrawn from alcohol in the past but had never experienced a seizure.      He denied any nausea, vomiting, sick contacts, chest pain, shortness of breath or rashes. He also denied taking any daily medication. He endorsed a surgery in the past for a torn meniscus of his right knee. The patient denied smoking or using drugs.     He did endorse some symptomatic improvement over the past 6 days but felt like he should come in because he still just did not feel good. He denied any decreased PO intake stating that he had been eating and drinking without  issue. He endorsed a good support system at home stating that he lives with his girlfriend and has family which he sees very often and live near him.    Hospital Course By Problem with Pertinent Findings     EtOH abuse w/ Withdrawal  - Patient previously drinking 1/5th of vodka in approximately 3 days; As per patient last drink was x6 days prior to admission  - Last drink ~2 days prior to admission as per significant other; Endorsed history of previous withdrawals with no seizures  - Tachycardic, Tremulous and Diaphoretic on initial exam  - Noted active visual hallucinations on admission and again on 10/25  - Thiamine, Multivitamin and Folic acid  - Started on Diazepam 10mg Q6H and Lorazepam PRN  - 2/2 Increasing agitation the night of admit, stepped up to the ICU and started on precedex. Required soft restraints 2/2 to continued breakthrough agitation overnight  - Precedex gtt weaned off 10/25  - Continued valium taper 10mg Q8 currently w/ ativan PRN(None required on 10/26 and 10/27)  - Patient stable on discharge; Will have close follow up with Psych [facility]     Alcoholic Hepatitis w/ Ascites  - Patient with history of ascites  - Previously prescribed Lasix 20 and Aldactone 50; Patient denies taking them prior to admission  - AST 94, ALT 30, TBili 3.8, Alk Phos 139  - Protime 12.9,   - Will have close follow up with Hepatology and GI     Bright red blood in stool  - GI consulted, concerned for varicies in setting of alcohol abuse  - H/H stable  - Likely draining sinus tract in perianal area per GI, no need for EGD/colonoscopy at this time. If bleeding persists consider EGD/flex sig in the future. Platelet dysfunction in setting of alcohol abuse.  - Continue PPI daily  - Patient will have close follow up with GI     Thrombocytopenia  - Plt 69>58>68; Likely 2/2 to EtOH use  - No indication of active bleed  - Patient will have close follow up with PCP     History of Portal HTN Gastropathy  -  Patient with noted history of GI Bleeds 2/2 to portal hypertension gastropathy  - H/H 14.8/43.1 on presentation to the ED  - No concern for active bleed at this time  - Started protonix 40  - Will have close follow up with Hepatology and GI    Discharge Medications      Nilesh Rolon Jr.   Home Medication Instructions MAYANK:[id number]    Printed on:10/28/19 0268   Medication Information                      folic acid (FOLVITE) 1 MG tablet  Take 1 tablet (1 mg total) by mouth once daily.             multivitamin capsule  Take 1 capsule by mouth once daily.             pantoprazole (PROTONIX) 40 MG tablet  Take 1 tablet (40 mg total) by mouth once daily.               Discharge Information:   Diet:  Regular    Physical Activity:  As tolerated             Instructions:  1. Take all medications as prescribed  2. Keep all follow-up appointments  3. Return to the hospital or call your primary care physicians if any worsening symptoms such as fever, chest pain, shortness of breath, return of symptoms, or any other concerns.    Follow-Up Appointments:  Follow up with Addiction [facility].  Follow up with Dr. Dawkins of Psychiatry  Follow up with Dr. Salguero of Gastroenterology.  Follow up with Hepatology.  Follow up with Dr. Bliss to establish care for a PCP.    Jacob Melgar IV, MD  [facility] Internal Medicine, \[facility]\"" Hospitalist Team B

## 2019-10-28 NOTE — PLAN OF CARE
Received patient upon rounds at 1930H, patient seen sitting at the edge of the bed, family around.  Conscious , coherent to time, date, place, person and situation.CIWA -2, mild tremors on the hands, cooperative. Face flushed. No complaints of pain upon rounds. GCS 15.   With saline lock gauge 20 left forearm  flushed patent,  with clean and dry dressing. Advised patient to call for any assistance. CAll bell within reach. Bed alarm ON. Safety fall precaution maintained. Telemetry Normal sinus rhythm  60-80's. Will continue to monitor patient.  0642H- Patient stable VS over the night, afebrile. No untoward signs and symptoms noted over the night slept well. Telemetry no ectopy noted over the night.CIWA-2.  Free from fall. IV line patent.Will endorse patient to day shift Nurse.

## 2019-10-28 NOTE — PLAN OF CARE
Visit with pt for d/c planning.  Contacted [facility], informed pt to come to report to clinic tomorrow AM at 8, pt agreed to appt. States his wife will pick him up at d/c.  Pt has no DME or HH needs.    D/C rounds complete. All questions answered.  Nurse to discuss d/c medications.  Discussed need to keep f/u appts, adherence to medication regimen for health maintenance, verbalized understanding.       10/28/19 1431   Discharge Reassessment   Assessment Type Discharge Planning Reassessment   Provided patient/caregiver education on the expected discharge date and the discharge plan Yes   Do you have any problems affording any of your prescribed medications? No   Discharge Plan A Home   Discharge Plan B Home with family   DME Needed Upon Discharge  none   Patient choice form signed by patient/caregiver N/A   Anticipated Discharge Disposition Home   Can the patient answer the patient profile reliably? Yes, cognitively intact   How does the patient rate their overall health at the present time? Good   Describe the patient's ability to walk at the present time. No restrictions   How often would a person be available to care for the patient? Whenever needed   Number of comorbid conditions (as recorded on the chart) One   During the past month, has the patient often been bothered by feeling down, depressed or hopeless? Yes   During the past month, has the patient often been bothered by little interest or pleasure in doing things? Yes       Lee Ann Renteria RN    [phone]

## 2019-10-28 NOTE — PLAN OF CARE
10/28/19 1446   Final Note   Assessment Type Final Discharge Note   Anticipated Discharge Disposition Home   Hospital Follow Up  Appt(s) scheduled? Yes   Discharge plans and expectations educations in teach back method with documentation complete? Yes   Right Care Referral Info   Post Acute Recommendation No Care       Lee Ann Renteria, RN    [phone]

## 2019-10-28 NOTE — PLAN OF CARE
20 gauge PIV to left hand removed without difficulty, catheter intact- no redness or swelling noted to site at time of removal. 20 gauge PIV to left forearm removed without difficulty, catheter intact- no redness or swelling noted to site at time of removal. Tele monitor removed, cleaned, and returned to telemetry bunker. D/C instructions and medications reviewed with patient per SALUD Lewis- verbalizes understanding. Patient to be transported home via personal vehicle per significant other to resume self care. NADN at time of D/C.

## 2019-10-28 NOTE — PROGRESS NOTES
[facility] Medicine Progress Note    Primary Team: [facility] Hospitalist Team B  Attending Physician: Bernard  Resident: Peg Mancia  Intern: Vernon Melgar    Subjective:      Mr. Rolon was resting comfortably in bed when I went to see him this morning. He denies any chest pain or SOB overnight. He also denies any hallucinations or tremors. As per the patient he has had no further episodes of bloody stool.     Objective:     Last 24 Hour Vital Signs:  BP  Min: 119/78  Max: 137/92  Temp  Av.4 °F (36.9 °C)  Min: 97.7 °F (36.5 °C)  Max: 99.5 °F (37.5 °C)  Pulse  Av.5  Min: 85  Max: 112  Resp  Av  Min: 18  Max: 18  SpO2  Av %  Min: 94 %  Max: 97 %  Weight  Av.8 kg (226 lb 10.1 oz)  Min: 102.6 kg (226 lb 3.1 oz)  Max: 103 kg (227 lb 1.2 oz)  I/O last 3 completed shifts:  In: 1375 [P.O.:1375]  Out: 2600 [Urine:2600]    Physical Examination:  General: A&Ox3, resting in bed  HEENT: PERRL, EOMI, Moist mucus membranes w/ no erythema noted, Trachea midline, No facial asymmetry noted  Cards: RRR, No murmurs, rubs or gallops  Pulm: CTAB, No wheezes/crackles/rhonchi  Abd: Soft, ND/NT, NBS  Skin: erythematous rash noted to nose and cheeks. No ecchymosis  Ext: No edema, 2+ Distal pulses   Neuro: A&Ox3, no focal deficits, no tremor  Psych: Thought content normal. No hallucinations or delusions.    Laboratory:  Recent Labs   Lab 10/25/19  0519 10/25/19  1430 10/26/19  0428 10/27/19  0627   WBC 3.75* 3.94 3.99 3.72*   HGB 13.0* 12.3* 12.5* 13.0*   HCT 38.1* 36.7* 37.4* 39.8*   PLT 57* 58* 68* 83*   *  --  139 140   K 3.7  --  4.1 4.1     --  103 101   CREATININE 0.8  --  0.8 0.8   BUN 11  --  9 7   CO2 24  --  25 29   ALT 29  --  41 40   *  --  148* 113*     Microbiology:  None    Other Results:  EKG (my interpretation): Sinus tachycardia    Radiology:  CT Head WNL    Current Medications:     Scheduled:   diazePAM  10 mg Oral Q12H    folic acid  1 mg Oral Daily    gabapentin   300 mg Oral BID    Followed by    gabapentin  300 mg Oral Once    lidocaine  1 patch Transdermal Q24H    multivitamin  1 tablet Oral Daily    pantoprazole  40 mg Oral Daily    thiamine  100 mg Oral Daily        PRN:  lorazepam, lorazepam, ondansetron, promethazine (PHENERGAN) IVPB, sodium chloride 0.9%    Assessment:     Nilesh Rolon Jr. is a 44 y.o.male with  Patient Active Problem List    Diagnosis Date Noted    Alcoholic liver disease     Delirium tremens 10/24/2019    GI bleed 04/07/2019    Alcohol withdrawal 04/07/2019    Ascites 04/07/2019    Thrombocytopenia 04/07/2019    Alcohol abuse 04/07/2019    Alcoholic hepatitis with ascites 04/07/2019    Hyponatremia 04/07/2019    Bloody stool       Plan:     EtOH abuse w/ Withdrawal  - Patient previously drinking 1/5th of vodka in approximately 3 days; As per patient last drink was x6 days prior to admission  - Last drink ~2 days prior to admission as per significant other; Endorses history of previous withdrawals with no seizures  - Tachycardic, Tremulous and Diaphoretic on initial exam  - Notes active visual hallucinations and again on 10/25  - Thiamine, Multivitamin and Folic acid  - Started on Diazepam 10mg Q6H and Lorazepam PRN  - 2/2 Increasing agitation the night of admit, stepped up to the ICU and started on precedex. Required soft restraints 2/2 to continued breakthrough agitation overnight  - Precedex gtt weaned off 10/25  - Continue valium taper 10mg Q8 currently w/ ativan PRN(None required on 10/26 and 10/27)    Alcoholic Hepatitis w/ Ascites  - Patient with history of ascites  - Previously prescribed Lasix 20 and Aldactone 50  - AST 94, ALT 30, TBili 3.8, Alk Phos 139  - Protime 12.9,   - Anticipate restarting medications prior to discharge  - Will have close follow up with Hepatology on discharge    Bright red blood in stool  - GI consulted, concerned for varicies in setting of alcohol abuse  - H/H stable  - Likely draining  sinus tract in perianal area per GI, no need for EGD/colonoscopy at this time. If bleeding persists consider EGD/flex sig in the future. Platelet dysfunction in setting of alcohol abuse.  - Continue PPI daily  - Follow up with GI outpatient     Thrombocytopenia  - Plt 69>58>68; Likely 2/2 to EtOH use  - No indication of active bleed  - Continue to monitor     History of Portal HTN Gastropathy  - Patient with noted history of GI Bleeds 2/2 to portal hypertension gastropathy  - H/H 14.8/43.1 on presentation to the ED  - No concern for active bleed at this time  - Started protonix 40  - Continue to monitor; Hgb 10/26 12.5    PPx: SCD's 2/2 to Thrombocytopenia  Diet: Regular  Code: Full  Dispo: Valium taper; likely discharge home today and refer for outpatient rehab and psych w/ close follow up OP by Hepatology     Jacob Melgar IV, MD  [facility] Internal [facility]I  U Hospitalist Team B    \[facility]\"" Medicine Hospitalist Pager numbers:   \[facility]\"" Hospitalist Medicine Team A (Minesh/Reagan): [phone]  \[facility]\"" Hospitalist Medicine Team B (Rebekah/Bernard):  [phone]

## 2019-10-29 NOTE — PHYSICIAN QUERY
PT Name: Nilesh Rolon Jr.  MR #: [id number]     Physician Query Form - Documentation Clarification      CDS/: Ludmila Santos RN               Contact information:[email or web address]    This form is a permanent document in the medical record.     Query Date: October 29, 2019    By submitting this query, we are merely seeking further clarification of documentation. Please utilize your independent clinical judgment when addressing the question(s) below.    The Medical record reflects the following:    Supporting Clinical Findings Location in Medical Record     EtOH abuse w/ Withdrawal  - Patient previously drinking 1/5th of vodka in approximately 3 days; As per patient last drink was x6 days prior to admission  - Last drink ~2 days prior to admission as per significant other; Endorsed history of previous withdrawals with no seizures  - Tachycardic, Tremulous and Diaphoretic on initial exam  - Noted active visual hallucinations on admission and again on 10/25  - Thiamine, Multivitamin and Folic acid  - Started on Diazepam 10mg Q6H and Lorazepam PRN  - 2/2 Increasing agitation the night of admit, stepped up to the ICU and started on precedex. Required soft restraints 2/2 to continued breakthrough agitation overnight  - Precedex gtt weaned off 10/25  - Continued valium taper 10mg Q8 currently w/ ativan PRN(None required on 10/26 and 10/27)  - Patient stable on discharge; Will have close follow up with Psych and Addiction Specialists    Alcoholic hepatitis w/Ascites   Discharge summary   Patient with history of ascites  - Previously prescribed Lasix 20 and Aldactone 50; Patient denies taking them prior to admission  - AST 94, ALT 30, TBili 3.8, Alk Phos 139  - Protime 12.9,   - Will have close follow up with Hepatology and GI                                                                                   Doctor, Please specify diagnosis or diagnoses associated with above clinical  findings.    Provider Use Only          [  ]  Alcohol abuse with withdrawal delirium    [X]  Alcohol dependence with withdrawal delirium    [  ]  Other (please specify)______________                                                                                                                 [  ] Clinically Undetermined

## 2019-10-30 ENCOUNTER — PATIENT OUTREACH (OUTPATIENT)
Dept: ADMINISTRATIVE | Facility: CLINIC | Age: 44
End: 2019-10-30

## 2019-10-30 NOTE — PATIENT INSTRUCTIONS
"  Alcohol Abuse  Alcoholic drinks are harmful when you have too many of them. There is no set number of drinks that defines too much. Drinking that disrupts your life or your health is called alcohol abuse. Alcohol abuse can hurt your relationships with others. You may lose friends, a spouse, or even your job. You may be abusing alcohol if any of the following are true for you:  · Duties at home or with  suffer because of drinking.  · Duties at work or in school suffer because of drinking.  · You have missed work or school because of drinking.  · You use alcohol while driving or operating machinery.  · You have legal problems such as arrests due to drinking.  · You keep drinking even though it causes serious problems in your life.  Health effects  Alcohol abuse causes health problems. Sometimes this can happen after only drinking a little." There is no set number of drinks or amount of alcohol that defines too much. The more you drink at one time, and the more often you drink determine both the short-term and long-term health effects. It affects all parts of your body and your health, including your:  · Brain. Alcohol is a central nervous system depressant. It can damage parts of the brain that affect your balance, memory, thinking, and emotions. It can cause memory loss, blackouts, depression, agitation, sleep cycle changes, and seizures. These changes may or may not be reversible.  · Heart and vascular system. Alcohol affects multiple areas. It can damage heart muscle causing cardiomyopathy, which is a weakening and stretching of the heart muscle. This can lead to trouble breathing, an irregular heartbeat, atrial fibrillation, leg swelling, and heart failure. Alcohol use makes the blood vessels stiffen causing high blood pressure. All of these problems increase your risk of having heart attacks or strokes.  · Liver. Alcohol causes fat to build up in the liver, affecting its normal function. This " increases the risk for hepatitis, leading to abdominal pain, appetite loss, jaundice, bleeding problems, liver fibrosis, and cirrhosis. This, in turn, can affect your ability to fight off infections, and can cause diabetes. The liver changes prevent it from removing toxins in your blood that can cause encephalopathy, which may show with confusion, altered level of consciousness, personality changes, memory loss, seizures, coma, and death.  · Pancreas. Alcohol can cause inflammation of the pancreas, or pancreatitis. This can cause abdominal pain, fever, and diabetes.  · Immune system. Alcohol weakens your immune system in a number of ways. It suppresses your immune system making it harder to fight infections and colds. It also increases the chance of getting pneumonia and tuberculosis.  · Cancer. Alcohol is a risk factor for developing cancer of the mouth, esophagus, pharynx, larynx, liver, and breast.  · Sexual function. Alcohol can lead to sexual problems.  Home care  The following guidelines will help you deal with alcohol abuse:  · Admit you have a problem with alcohol.  · Ask for help from your healthcare provider and trusted family members or close friends.  · Get help from people trained in dealing with alcohol abuse. This may be individual counseling or group therapy, or it may be a supervised alcohol treatment program.  · Join a self-help group for alcohol abuse such as Alcoholics Anonymous (AA).  · Avoid people who abuse alcohol or tempt you to drink.  Follow-up care  Follow up as advised by the healthcare provider, or as advised. Contact these groups to get help:  · Alcoholics Anonymous (AA): Go to www.aa.org or check the phone book for meetings near you.  · National Alcohol and Substance Abuse Information Center (NASAIC): 683.957.4879 www.addictioncareoptions.com  · National [place] on Alcoholism and Drug Dependence (NCADD): 723-ODY-YKXF (157-5002) www.ncadd.org  Call 911  Call 911 if any of these  occur:  · Trouble breathing or slow irregular breathing  · Chest pain  · Sudden weakness on one side of your body or sudden trouble speaking  · Heavy bleeding or vomiting blood  · Very drowsy or trouble awakening  · Fainting or loss of consciousness  · Rapid heart rate  · Seizure  When to seek medical care  Call your healthcare provider right away if any of these occur:   · Confusion  · Hallucinations (seeing, hearing, or feeling things that arent there)  · Pain in your upper abdomen that gets worse  · Repeated vomiting or black or tarry stools  · Severe shakiness  Date Last Reviewed: 6/1/2016  © 8077-5377 [employer]. [address] [place] [address]. All rights reserved. This information is not intended as a substitute for professional medical care. Always follow your healthcare professional's instructions.

## 2019-11-03 ENCOUNTER — DOCUMENTATION ONLY (OUTPATIENT)
Dept: TRANSPLANT | Facility: CLINIC | Age: 44
End: 2019-11-03

## 2019-11-04 NOTE — NURSING
Pt records reviewed.   Patient will be referred to Hepatology.  Alcohol withdrawal  Initial referral received  from the workque.   Referring Provider/diagnosis    Ramírez Junior MD    Referral letter sent to patient.

## 2019-12-06 ENCOUNTER — OFFICE VISIT (OUTPATIENT)
Dept: HEPATOLOGY | Facility: CLINIC | Age: 44
End: 2019-12-06
Payer: MEDICAID

## 2019-12-06 ENCOUNTER — LAB VISIT (OUTPATIENT)
Dept: LAB | Facility: HOSPITAL | Age: 44
End: 2019-12-06
Payer: MEDICAID

## 2019-12-06 VITALS
OXYGEN SATURATION: 96 % | SYSTOLIC BLOOD PRESSURE: 144 MMHG | BODY MASS INDEX: 29.33 KG/M2 | WEIGHT: 235.88 LBS | RESPIRATION RATE: 18 BRPM | DIASTOLIC BLOOD PRESSURE: 82 MMHG | HEART RATE: 67 BPM | HEIGHT: 75 IN

## 2019-12-06 DIAGNOSIS — K70.10 ALCOHOLIC HEPATITIS WITHOUT ASCITES: ICD-10-CM

## 2019-12-06 DIAGNOSIS — K70.10 ALCOHOLIC HEPATITIS WITHOUT ASCITES: Primary | ICD-10-CM

## 2019-12-06 DIAGNOSIS — F10.10 ALCOHOL ABUSE: Chronic | ICD-10-CM

## 2019-12-06 LAB
AFP SERPL-MCNC: 6.6 NG/ML (ref 0–8.4)
ALBUMIN SERPL BCP-MCNC: 3.8 G/DL (ref 3.5–5.2)
ALP SERPL-CCNC: 88 U/L (ref 55–135)
ALT SERPL W/O P-5'-P-CCNC: 29 U/L (ref 10–44)
ANION GAP SERPL CALC-SCNC: 14 MMOL/L (ref 8–16)
AST SERPL-CCNC: 94 U/L (ref 10–40)
BASOPHILS # BLD AUTO: 0.06 K/UL (ref 0–0.2)
BASOPHILS NFR BLD: 1.4 % (ref 0–1.9)
BILIRUB SERPL-MCNC: 1.2 MG/DL (ref 0.1–1)
BUN SERPL-MCNC: 7 MG/DL (ref 6–20)
CALCIUM SERPL-MCNC: 9.6 MG/DL (ref 8.7–10.5)
CHLORIDE SERPL-SCNC: 102 MMOL/L (ref 95–110)
CO2 SERPL-SCNC: 26 MMOL/L (ref 23–29)
CREAT SERPL-MCNC: 0.8 MG/DL (ref 0.5–1.4)
DIFFERENTIAL METHOD: ABNORMAL
EOSINOPHIL # BLD AUTO: 0.1 K/UL (ref 0–0.5)
EOSINOPHIL NFR BLD: 1.2 % (ref 0–8)
ERYTHROCYTE [DISTWIDTH] IN BLOOD BY AUTOMATED COUNT: 14.2 % (ref 11.5–14.5)
EST. GFR  (AFRICAN AMERICAN): >60 ML/MIN/1.73 M^2
EST. GFR  (NON AFRICAN AMERICAN): >60 ML/MIN/1.73 M^2
GLUCOSE SERPL-MCNC: 113 MG/DL (ref 70–110)
HCT VFR BLD AUTO: 41.5 % (ref 40–54)
HGB BLD-MCNC: 13.9 G/DL (ref 14–18)
IMM GRANULOCYTES # BLD AUTO: 0 K/UL (ref 0–0.04)
IMM GRANULOCYTES NFR BLD AUTO: 0 % (ref 0–0.5)
INR PPP: 1.2 (ref 0.8–1.2)
LYMPHOCYTES # BLD AUTO: 1.3 K/UL (ref 1–4.8)
LYMPHOCYTES NFR BLD: 29.6 % (ref 18–48)
MCH RBC QN AUTO: 34.2 PG (ref 27–31)
MCHC RBC AUTO-ENTMCNC: 33.5 G/DL (ref 32–36)
MCV RBC AUTO: 102 FL (ref 82–98)
MONOCYTES # BLD AUTO: 0.6 K/UL (ref 0.3–1)
MONOCYTES NFR BLD: 12.7 % (ref 4–15)
NEUTROPHILS # BLD AUTO: 2.4 K/UL (ref 1.8–7.7)
NEUTROPHILS NFR BLD: 55.1 % (ref 38–73)
NRBC BLD-RTO: 0 /100 WBC
PLATELET # BLD AUTO: 49 K/UL (ref 150–350)
PMV BLD AUTO: 11.2 FL (ref 9.2–12.9)
POTASSIUM SERPL-SCNC: 4.2 MMOL/L (ref 3.5–5.1)
PROT SERPL-MCNC: 8.4 G/DL (ref 6–8.4)
PROTHROMBIN TIME: 12.3 SEC (ref 9–12.5)
RBC # BLD AUTO: 4.06 M/UL (ref 4.6–6.2)
SODIUM SERPL-SCNC: 142 MMOL/L (ref 136–145)
WBC # BLD AUTO: 4.33 K/UL (ref 3.9–12.7)

## 2019-12-06 PROCEDURE — 99999 PR PBB SHADOW E&M-EST. PATIENT-LVL IV: CPT | Mod: PBBFAC,,, | Performed by: NURSE PRACTITIONER

## 2019-12-06 PROCEDURE — 99204 OFFICE O/P NEW MOD 45 MIN: CPT | Mod: S$PBB,,, | Performed by: NURSE PRACTITIONER

## 2019-12-06 PROCEDURE — 99204 PR OFFICE/OUTPT VISIT, NEW, LEVL IV, 45-59 MIN: ICD-10-PCS | Mod: S$PBB,,, | Performed by: NURSE PRACTITIONER

## 2019-12-06 PROCEDURE — 85025 COMPLETE CBC W/AUTO DIFF WBC: CPT

## 2019-12-06 PROCEDURE — 99214 OFFICE O/P EST MOD 30 MIN: CPT | Mod: PBBFAC | Performed by: NURSE PRACTITIONER

## 2019-12-06 PROCEDURE — 80053 COMPREHEN METABOLIC PANEL: CPT

## 2019-12-06 PROCEDURE — 85610 PROTHROMBIN TIME: CPT

## 2019-12-06 PROCEDURE — 82105 ALPHA-FETOPROTEIN SERUM: CPT

## 2019-12-06 PROCEDURE — 99999 PR PBB SHADOW E&M-EST. PATIENT-LVL IV: ICD-10-PCS | Mod: PBBFAC,,, | Performed by: NURSE PRACTITIONER

## 2019-12-06 PROCEDURE — 36415 COLL VENOUS BLD VENIPUNCTURE: CPT

## 2019-12-06 RX ORDER — PROPRANOLOL HYDROCHLORIDE 10 MG/1
10 TABLET ORAL DAILY
Refills: 3 | COMMUNITY
Start: 2019-11-30 | End: 2021-03-31

## 2019-12-06 RX ORDER — NALTREXONE HYDROCHLORIDE 50 MG/1
50 TABLET, FILM COATED ORAL DAILY
Refills: 3 | Status: ON HOLD | COMMUNITY
Start: 2019-11-30 | End: 2021-12-15 | Stop reason: SINTOL

## 2019-12-06 NOTE — LETTER
December 8, 2019      Ramírez Junior MD  [address] [facility] [address]           [facility] - Hepatology  [address]  [place] [address]  Phone: [phone]  Fax: [phone]          Patient: Nilesh Rolon Jr.   MR Number: [id number]   YOB: 1975   Date of Visit: 12/6/2019       Dear Dr. Ramírez Junior:    Thank you for referring Nilesh Rolon to me for evaluation. Attached you will find relevant portions of my assessment and plan of care.    If you have questions, please do not hesitate to call me. I look forward to following Nilesh Rolon along with you.    Sincerely,    Dianne Jimenez, NP    Enclosure  CC:  No Recipients    If you would like to receive this communication electronically, please contact [email or web address] or [phone] to request more information on [employer] access.    For providers and/or their staff who would like to refer a patient to [facility], please contact us through our one-stop-shop provider referral line, [facility], at [phone].    If you feel you have received this communication in error or would no longer like to receive these types of communications, please e-mail [email or web address]

## 2019-12-06 NOTE — PROGRESS NOTES
[facility] - New Patient     REFERRING PROVIDER: Dr. Ramírez Junior    CHIEF COMPLAINT: Follow-up for alcoholic hepatitis     HPI: This is a 44 y.o. White male referred for alcoholic hepatitis.    He was admitted to [facility] 10/24-10/28 with symptoms of alcohol withdrawal x 6 days. Previously drinking 1/5 vodka daily. He had visual hallucinations. No h/o seizure.             Labs during this admission:  AST 94  ALT 30  TBili up to 3.8  Na 128  Platelets 50-60s  INR 1.2  MELD 22    Liver tests improving at discharge, MELD down to 13.    Of note, hospitalized with alcoholic hepatitis in 4/2019. Had GI bleed and ascites requiring LVP during this admission.     Interval history:  He continues to drink alcohol, up to a bottle of vodka per day. He reports being told that a small amount of alcohol was okay for his liver. Is trying to cut back but worried about having withdrawal symptoms again. On naltrexone, prescribed by his psychiatrist, though does not think that this is helping with cravings.     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     Feels fatigued though denies symptoms of hepatic decompensation including jaundice, ascites, cognitive problems to suggest hepatic encephalopathy, or GI bleeding.          Review of patient's allergies indicates:  No Known Allergies     Current Outpatient Medications on File Prior to Visit   Medication Sig Dispense Refill    folic acid (FOLVITE) 1 MG tablet Take 1 tablet (1 mg total) by mouth once daily. 30 tablet 3    multivitamin capsule Take 1 capsule by mouth once daily.      naltrexone (DEPADE) 50 mg tablet Take 50 mg by mouth once daily.  3    pantoprazole (PROTONIX) 40 MG tablet Take 1 tablet (40 mg total) by mouth once daily. 30 tablet 3    propranolol (INDERAL) 10 MG tablet Take 10 mg by mouth once daily.  3     No current facility-administered medications on file prior to visit.          PMHX:  has a past medical  history of Anxiety, Arthritis, and Other meniscus derangements, other medial meniscus, left knee (1/7/2019).    PSHX:  has a past surgical history that includes Arthroscopy of knee (Left, 1/7/2019) and Esophagogastroduodenoscopy (N/A, 4/8/2019).    FAMILY HISTORY:   Family History   Problem Relation Age of Onset    Birth defects Neg Hx        SOCIAL HISTORY:   Social History     Tobacco Use   Smoking Status Never Smoker   Smokeless Tobacco Never Used       Social History     Substance and Sexual Activity   Alcohol Use Yes    Comment: 1L vodka daily       Social History     Substance and Sexual Activity   Drug Use No         Review of Systems   Constitutional: Negative for appetite change, chills, fever and unexpected weight change. (+) fatigue  Eyes: Negative for visual disturbance.   Respiratory: Negative for cough and shortness of breath.    Cardiovascular: Negative for chest pain, palpitations and leg swelling.   Gastrointestinal: Negative for abdominal distention, abdominal pain, blood in stool, constipation, diarrhea, nausea and vomiting. No change in stool color.  Genitourinary: Negative for dysuria and hematuria. Denies dark urine.   Musculoskeletal: Negative for arthralgias, gait problem, joint swelling and myalgias.   Skin: Negative for color change, rash and wound. Denies itching.   Neurological: Negative for dizziness, tremors, speech difficulty, and headaches.   Hematological: Does not bruise/bleed easily.   Psychiatric/Behavioral: Negative for confusion, decreased concentration and sleep disturbance. Denies memory loss. Denies depression. The patient is not nervous/anxious.        Physical Exam   Constitutional: Well-nourished. No distress. Alert and oriented to person, place, and time.  Eyes: No scleral icterus.   Cardiovascular: Normal rate, regular rhythm and normal heart sounds. No murmur heard.  Pulmonary/Chest: Respiratory effort and breath sounds normal. No respiratory distress.   Abdominal:  "Soft, non-tender. No distension; no ascites appreciated. There is no palpable hepatosplenomegaly. No hernia or mass.   Musculoskeletal: No edema.   Neurological: No tremor. Coordination and gait normal. No asterixis.    Skin: Skin is warm and dry. No rash or erythema. No jaundice. No telangiectasias or palmar erythema noted.  Psychiatric: Normal mood and affect. Speech, behavior, and thought content normal. No depression or anxiety noted.       BP (!) 144/82 (BP Location: Right arm, Patient Position: Sitting, BP Method: Medium (Automatic))   Pulse 67   Resp 18   Ht 6' 3" (1.905 m)   Wt 107 kg (235 lb 14.3 oz)   SpO2 96%   BMI 29.48 kg/m²         LABS:  Lab Results   Component Value Date    WBC 4.33 12/06/2019    HGB 13.9 (L) 12/06/2019    HCT 41.5 12/06/2019    PLT 49 (L) 12/06/2019     12/06/2019    K 4.2 12/06/2019    CREATININE 0.8 12/06/2019    ALT 29 12/06/2019    AST 94 (H) 12/06/2019    ALKPHOS 88 12/06/2019    BILITOT 1.2 (H) 12/06/2019    ALBUMIN 3.8 12/06/2019    INR 1.2 12/06/2019    AFP 6.6 12/06/2019    FERRITIN 1,017 (H) 04/07/2019    FESATURATED 52 (H) 04/07/2019    CHOL 242 (H) 04/06/2016    TRIG 278 (H) 04/06/2016    LDLCALC 146.4 04/06/2016    HGBA1C 4.9 10/24/2019       Hepatitis A Antibody IgG   Date Value Ref Range Status   04/07/2019 Positive (A)  Final       Hepatitis B Surface Ag   Date Value Ref Range Status   10/24/2019 Negative Negative Final     Hep B Core Total Ab   Date Value Ref Range Status   04/07/2019 Negative  Final     Hep B S Ab   Date Value Ref Range Status   04/07/2019 Negative  Final     Hepatitis C Ab   Date Value Ref Range Status   10/24/2019 Negative Negative Final     Immunization History   Administered Date(s) Administered    Influenza - Quadrivalent - PF (6 months and older) 10/28/2019          DIAGNOSTIC STUDIES:  CT abd pelvis w/ contrast - 4/6/19  FINDINGS:  Lower Chest:    Elevated right hemidiaphragm with prominent subsegmental atelectasis in the " adjacent right lower lobe.  Mild left basilar subsegmental atelectasis is also present.  There is trace left pleural effusion.  Heart size is normal.  There are coronary artery calcifications.    Abdomen:    Liver is enlarged, measuring up to 25 cm in craniocaudal extent.  There are morphologic changes of chronic liver disease including lobar redistribution and minimally nodular contour.  Liver parenchyma is somewhat heterogeneous likely secondary to a patent steatosis with areas of fatty sparing.  Hepatic steatosis limits detection for underlying hepatic lesion on this single phase exam.  Follow-up with contrast enhanced MRI should be considered for hepatocellular carcinoma screening.  Gallbladder is unremarkable.  No calcified gallstones.  No intrahepatic biliary ductal dilatation. There are prominent periportal lymph nodes measuring up to 1.1 cm in short axis.    Spleen is mildly enlarged, measuring approximately 14 cm.  There is a prominent splenule adjacent to the spleen.  Small gastroesophageal and mesenteric collaterals are present.    Pancreas and adrenal glands are unremarkable.    The kidneys are symmetric.  No hydronephrosis.    No small bowel obstruction.  The appendix is normal.    No pneumoperitoneum or organized fluid collection.  There is moderate volume low-density free fluid throughout the abdomen and pelvis.    No bulky retroperitoneal lymphadenopathy.  There are mildly enlarged periportal lymph nodes, which can be seen in the setting of chronic liver disease.    Abdominal aorta is normal in caliber.    Portal, splenic, and superior mesenteric veins are patent.    Pelvis:    Urinary bladder and rectum are unremarkable.  Moderate volume pelvic free fluid.  No pelvic lymphadenopathy.    Bones and soft tissues:    No aggressive osseous lesions.  Mild degenerative changes in the lumbar spine.  Mild body wall edema.      Impression       Hepatomegaly and morphologic changes of chronic liver disease.   Diffuse hepatic steatosis with areas of fatty sparing limits detection for focal hepatic mass.  Consider follow-up with contrast enhanced MRI for hepatocellular carcinoma screening as an outpatient.    Stigmata of portal hypertension including splenomegaly, collateral vessels, and moderate volume ascites.    Prominent right basilar subsegmental atelectasis likely secondary to elevation of the right hemidiaphragm.    Coronary artery calcifications.    Additional findings discussed in the body of the report.       EGD - 4/8/19  Impression:   - Nodule found in the esophagus. Biopsied (Jar 2).                        - Portal hypertensive gastropathy. Biopsied (Jar 1).                        - Normal duodenal bulb, first portion of the                         duodenum and second portion of the duodenum.                        43 year old man with alcoholic cirrhosis presents                         with melena with mild anemia; EGD significant for                         nodule in the distal esophagus and mild portal                         hypertensive gastropathy. Symptoms may have been                         secondary to oozing from portal hypertensive                         gastropathy        ASSESSMENT / PLAN:  44 y.o. White male with:    1. Alcoholic hepatitis - No current s/s hepatic decompensation   -- May have underlying cirrhosis though unclear in the setting of alcoholic hepatitis   -- Labs today to reassess liver function / MELD  -- Abdominal US now. Discussed that if cirrhosis confirmed, will need to continue liver cancer screening every 6 months     2. Alcohol use disorder   -- Started drinking alcohol again shortly after discharge. Lengthy discussion about the risks associated with ongoing alcohol use including liver failure and death. No amount of alcohol is safe for his liver and reinforced the need to stop completely.    -- Recommend he stop alcohol under careful supervision and monitoring given h/o  withdrawal. He plans to follow-up with his psychiatrist on Mon to discuss options  -- Provided patient and significant other with a list of local resources for rehab facilities         Orders Placed This Encounter   Procedures    US Abdomen Complete    CBC auto differential    Comprehensive metabolic panel    Protime-INR    AFP tumor marker       Return to clinic in 4 weeks.       EDUCATION / DISCUSSION:   Discussed need to abstain completely from alcohol as this is causing liver dysfunction. Discussed that alcohol abuse can cause cirrhosis. Patient understands that continued alcohol abuse can be detrimental to the liver. Discussed potential outcomes of cirrhosis, including liver cancer, liver failure, liver transplant, death.           Thank you for allowing me to participate in the care of HARLEY Go Jr.-C  Hepatology and Liver Transplant         Patient was seen in clinic with Dr. Fonseca ; Case discussed, plan reviewed.

## 2019-12-06 NOTE — PROGRESS NOTES
I have personally performed a face to face diagnostic evaluation on this patient. I have reviewed and agree with today's findings and the care plan outlined by Dianne Jimenez NP      My findings are as follows:  Patient presents with a recent admission due to alc hep  Still works in construction  Admits to heavy alcohol intake.    - I explained that he needs to stop drinking completely and never go back to it.  - aware of risks of withdrawals- will seek help from his psychiatrist and will also be given phone numbers for available resources to call    - labs and liver US    he will return to Dianne Jimenez NP  for follow-up.

## 2019-12-08 PROBLEM — K92.2 GI BLEED: Status: RESOLVED | Noted: 2019-04-07 | Resolved: 2019-12-08

## 2019-12-08 PROBLEM — E87.1 HYPONATREMIA: Chronic | Status: RESOLVED | Noted: 2019-04-07 | Resolved: 2019-12-08

## 2019-12-08 PROBLEM — R18.8 ASCITES: Status: RESOLVED | Noted: 2019-04-07 | Resolved: 2019-12-08

## 2019-12-09 ENCOUNTER — HOSPITAL ENCOUNTER (OUTPATIENT)
Dept: RADIOLOGY | Facility: HOSPITAL | Age: 44
Discharge: HOME | End: 2019-12-09
Attending: NURSE PRACTITIONER
Payer: MEDICAID

## 2019-12-09 DIAGNOSIS — K70.10 ALCOHOLIC HEPATITIS WITHOUT ASCITES: ICD-10-CM

## 2019-12-09 PROCEDURE — 76700 US EXAM ABDOM COMPLETE: CPT | Mod: 26,,, | Performed by: RADIOLOGY

## 2019-12-09 PROCEDURE — 76700 US ABDOMEN COMPLETE: ICD-10-PCS | Mod: 26,,, | Performed by: RADIOLOGY

## 2019-12-09 PROCEDURE — 76700 US EXAM ABDOM COMPLETE: CPT | Mod: TC

## 2019-12-17 ENCOUNTER — TELEPHONE (OUTPATIENT)
Dept: HEPATOLOGY | Facility: CLINIC | Age: 44
End: 2019-12-17

## 2019-12-17 NOTE — TELEPHONE ENCOUNTER
----- Message from Dianne Jimenez NP sent at 12/17/2019 10:24 AM CST -----  Please inform patient- Labs are stable. Liver function is improving. Continue to remain abstinent from alcohol as we discussed.  Follow-up in January as scheduled.

## 2019-12-17 NOTE — TELEPHONE ENCOUNTER
----- Message from Dianne Jimenez NP sent at 12/17/2019 10:26 AM CST -----  Please inform patient- Ultrasound is stable, no concerning masses in the liver. No fluid in the abdomen.

## 2020-01-03 ENCOUNTER — OFFICE VISIT (OUTPATIENT)
Dept: HEPATOLOGY | Facility: CLINIC | Age: 45
End: 2020-01-03
Payer: MEDICAID

## 2020-01-03 VITALS
BODY MASS INDEX: 29.38 KG/M2 | SYSTOLIC BLOOD PRESSURE: 123 MMHG | WEIGHT: 236.31 LBS | DIASTOLIC BLOOD PRESSURE: 78 MMHG | OXYGEN SATURATION: 95 % | HEIGHT: 75 IN | HEART RATE: 68 BPM

## 2020-01-03 DIAGNOSIS — K70.10 ALCOHOLIC HEPATITIS WITHOUT ASCITES: Primary | ICD-10-CM

## 2020-01-03 DIAGNOSIS — F10.10 ALCOHOL ABUSE: Chronic | ICD-10-CM

## 2020-01-03 PROCEDURE — 99999 PR PBB SHADOW E&M-EST. PATIENT-LVL III: CPT | Mod: PBBFAC,,, | Performed by: NURSE PRACTITIONER

## 2020-01-03 PROCEDURE — 99999 PR PBB SHADOW E&M-EST. PATIENT-LVL III: ICD-10-PCS | Mod: PBBFAC,,, | Performed by: NURSE PRACTITIONER

## 2020-01-03 PROCEDURE — 99214 PR OFFICE/OUTPT VISIT, EST, LEVL IV, 30-39 MIN: ICD-10-PCS | Mod: S$PBB,,, | Performed by: NURSE PRACTITIONER

## 2020-01-03 PROCEDURE — 99214 OFFICE O/P EST MOD 30 MIN: CPT | Mod: S$PBB,,, | Performed by: NURSE PRACTITIONER

## 2020-01-03 PROCEDURE — 99213 OFFICE O/P EST LOW 20 MIN: CPT | Mod: PBBFAC | Performed by: NURSE PRACTITIONER

## 2020-01-03 NOTE — PATIENT INSTRUCTIONS
1. Liver tests are improving, will repeat labs this month to monitor.    2. Ultrasound looked stable, no masses in the liver.    3. Most important thing is that you do not drink any alcohol.    4. Return to clinic in 3 months. We will get a scan in the office at that time to check for scarring/damage in the liver.     5. Follow-up with your psychiatrist.

## 2020-01-03 NOTE — PROGRESS NOTES
[facility] - Established Patient    Last Clinic Visit: 12/6/19    CHIEF COMPLAINT: Follow-up for alcoholic hepatitis     HPI: This is a 44 y.o. White male here for follow-up for alcoholic hepatitis.     He was admitted to [facility] [place] [facility] 10/24-10/28 with symptoms of alcohol withdrawal x 6 days. Previously drinking 1/5 vodka daily. He had visual hallucinations. No h/o seizure.  MELD up to 22 during this admission.    Of note, also had admission for alcoholic hepatitis 4/2019. Had GIB and ascites requiring paracentesis at this time.     At last visit, was still drinking alcohol though he was under the impression that a small amount of alcohol was safe for his liver. Despite ongoing alcohol use, MELD had improved to 9.    On naltrexone though not followed by psychiatry at this time.   Active in [employer] and attending AA 1-2 x week.  Last drink AMAURI, no withdrawal symptoms at this time. He wants to remain abstinent.      Abd US 12/9/19: no focal hepatic lesions, splenomegaly, no ascites      Reports he feels better since last visit. Denies symptoms of hepatic decompensation including jaundice, ascites, cognitive problems to suggest hepatic encephalopathy, or GI bleeding.          Review of patient's allergies indicates:  No Known Allergies     Current Outpatient Medications on File Prior to Visit   Medication Sig Dispense Refill    folic acid (FOLVITE) 1 MG tablet Take 1 tablet (1 mg total) by mouth once daily. 30 tablet 3    multivitamin capsule Take 1 capsule by mouth once daily.      naltrexone (DEPADE) 50 mg tablet Take 50 mg by mouth once daily.  3    pantoprazole (PROTONIX) 40 MG tablet Take 1 tablet (40 mg total) by mouth once daily. 30 tablet 3    propranolol (INDERAL) 10 MG tablet Take 10 mg by mouth once daily.  3     No current facility-administered medications on file prior to visit.          PMHX:  has a past medical history of Anxiety, Arthritis, and Other meniscus derangements, other medial  meniscus, left knee (1/7/2019).    PSHX:  has a past surgical history that includes Arthroscopy of knee (Left, 1/7/2019) and Esophagogastroduodenoscopy (N/A, 4/8/2019).    FAMILY HISTORY:   Family History   Problem Relation Age of Onset    Birth defects Neg Hx        SOCIAL HISTORY:   Social History     Tobacco Use   Smoking Status Never Smoker   Smokeless Tobacco Never Used       Social History     Substance and Sexual Activity   Alcohol Use Not Currently    Comment: last drink 1/1/20       Social History     Substance and Sexual Activity   Drug Use No         Review of Systems   Constitutional: Negative for appetite change, chills, fatigue, fever. (+) weight gain  Eyes: Negative for visual disturbance.   Respiratory: Negative for cough and shortness of breath.    Cardiovascular: Negative for chest pain, palpitations and leg swelling.   Gastrointestinal: Negative for abdominal distention, abdominal pain, blood in stool, constipation, diarrhea, nausea and vomiting. No change in stool color.  Genitourinary: Negative for dysuria and hematuria. Denies dark urine.   Musculoskeletal: Negative for arthralgias, gait problem, joint swelling and myalgias.   Skin: Negative for color change, rash and wound. Denies itching.   Neurological: Negative for dizziness, tremors, speech difficulty, and headaches.   Hematological: Does not bruise/bleed easily.   Psychiatric/Behavioral: Negative for confusion, decreased concentration and sleep disturbance. Denies memory loss. Denies depression. The patient is not nervous/anxious.        Physical Exam   Constitutional: Well-nourished. No distress. Alert and oriented to person, place, and time.  Eyes: No scleral icterus.   Cardiovascular: Normal rate, regular rhythm and normal heart sounds. No murmur heard.  Pulmonary/Chest: Respiratory effort and breath sounds normal. No respiratory distress.   Abdominal: Soft, non-tender. No distension; no ascites appreciated. There is no palpable  "hepatosplenomegaly. No hernia or mass.   Musculoskeletal: No edema.   Neurological: No tremor. Coordination and gait normal. No asterixis.    Skin: Skin is warm and dry. No rash or erythema. No jaundice. No telangiectasias or palmar erythema noted.  Psychiatric: Normal mood and affect. Speech, behavior, and thought content normal. No depression or anxiety noted.       /78 (BP Location: Right arm, Patient Position: Sitting, BP Method: Medium (Automatic))   Pulse 68   Ht 6' 3" (1.905 m)   Wt 107.2 kg (236 lb 5.3 oz)   SpO2 95%   BMI 29.54 kg/m²       LABS:  Lab Results   Component Value Date    WBC 4.33 12/06/2019    HGB 13.9 (L) 12/06/2019    HCT 41.5 12/06/2019    PLT 49 (L) 12/06/2019     12/06/2019    K 4.2 12/06/2019    CREATININE 0.8 12/06/2019    ALT 29 12/06/2019    AST 94 (H) 12/06/2019    ALKPHOS 88 12/06/2019    BILITOT 1.2 (H) 12/06/2019    ALBUMIN 3.8 12/06/2019    INR 1.2 12/06/2019    AFP 6.6 12/06/2019    FERRITIN 1,017 (H) 04/07/2019    FESATURATED 52 (H) 04/07/2019    CHOL 242 (H) 04/06/2016    TRIG 278 (H) 04/06/2016    LDLCALC 146.4 04/06/2016    HGBA1C 4.9 10/24/2019       Hepatitis A Antibody IgG   Date Value Ref Range Status   04/07/2019 Positive (A)  Final       Hepatitis B Surface Ag   Date Value Ref Range Status   10/24/2019 Negative Negative Final     Hep B Core Total Ab   Date Value Ref Range Status   04/07/2019 Negative  Final     Hep B S Ab   Date Value Ref Range Status   04/07/2019 Negative  Final     Hepatitis C Ab   Date Value Ref Range Status   10/24/2019 Negative Negative Final     Immunization History   Administered Date(s) Administered    Influenza - Quadrivalent - PF (6 months and older) 10/28/2019          DIAGNOSTIC STUDIES:  ABD. U/S  Results for orders placed during the hospital encounter of 12/09/19   US Abdomen Complete    Narrative EXAMINATION:  US ABDOMEN COMPLETE    CLINICAL HISTORY:  Alcoholic hepatitis without ascites    TECHNIQUE:  Complete abdominal " ultrasound (including pancreas, aorta, liver, gallbladder, common bile duct, IVC, kidneys, and spleen) was performed.    COMPARISON:  Ultrasound complete April 6, 2016, CT abdomen and pelvis April 6, 2019    FINDINGS:  Of proximal pancreas unremarkable, much of body and tail not seen due to bowel gas.  Visualized segments of aorta and cava unremarkable, portions of both structures not well seen due to midline bowel gas.  No ascites.  Liver is enlarged at 22.6 cm.  Heterogeneous hyperechoic echo pattern is in keeping with fatty infiltration.  No focal hepatic lesion.  Unremarkable gallbladder and ductal system, common duct measurements at 3.9 mm.  No right or left renal masses, stones, or hydronephrosis.  Right kidney measures 9.8 cm.  Left kidney measures 10.1 cm.  Splenomegaly at 13.5 cm.  No focal hepatic lesion.  Small adjacent 2.5 cm splenule noted.      Impression Compared to most recent abdomen pelvis CT and ultrasound, dates given above, findings include:    1. Hepatomegaly and hepatic steatosis changes without focal hepatic lesion  2. Splenomegaly and no focal lesion  3. No ascites noted on present exam  4. Portions of pancreas, aorta, and cava not seen due to bowel gas, visualized segments unremarkable      Electronically signed by: Jose Quiñones  Date:    12/09/2019  Time:    10:54        EGD - 4/8/19  Impression:   - Nodule found in the esophagus. Biopsied (Jar 2).                        - Portal hypertensive gastropathy. Biopsied (Jar 1).                        - Normal duodenal bulb, first portion of the                         duodenum and second portion of the duodenum.                        43 year old man with alcoholic cirrhosis presents                         with melena with mild anemia; EGD significant for                         nodule in the distal esophagus and mild portal                         hypertensive gastropathy. Symptoms may have been                         secondary to oozing  from portal hypertensive                         gastropathy        ASSESSMENT / PLAN:  44 y.o. White male with:    1. Alcoholic hepatitis - LFTs improving   MELD-Na score: 9 at 12/6/2019  3:19 PM  MELD score: 9 at 12/6/2019  3:19 PM  Calculated from:  Serum Creatinine: 0.8 mg/dL (Rounded to 1 mg/dL) at 12/6/2019  3:19 PM  Serum Sodium: 142 mmol/L (Rounded to 137 mmol/L) at 12/6/2019  3:19 PM  Total Bilirubin: 1.2 mg/dL at 12/6/2019  3:19 PM  INR(ratio): 1.2 at 12/6/2019  3:19 PM  Age: 44 years  -- May have underlying cirrhosis though unclear in the setting of alcoholic hepatitis. Will allow more time off of alcohol before getting Fibroscan  -- If cirrhosis confirmed, will need to continue liver cancer screening every 6 months    2. Alcohol use disorder   -- Last drinks 2 days ago  -- Reports commitment to abstinence. Active in [employer] and AA.  -- Advised f/u with psychiatry   -- Reinforced that no amount of alcohol is safe for his liver       Labs this month to monitor liver tests.  F/u in 3 months, will likely get Fibroscan at this time.      Orders Placed This Encounter   Procedures    CBC auto differential    Comprehensive metabolic panel    Protime-INR    Phosphatidylethanol (PETH)         Thank you for allowing me to participate in the care of Nilesh Jimenez, FNP-C  Hepatology and Liver Transplant

## 2020-03-30 ENCOUNTER — TELEPHONE (OUTPATIENT)
Dept: HEPATOLOGY | Facility: CLINIC | Age: 45
End: 2020-03-30

## 2020-03-30 NOTE — TELEPHONE ENCOUNTER
MA called patient to convert clinic appointment to a VIDEO VISIT, for patient safety and less exposure to hospital settings we are now offering VIDEO VISIT. Patient willing to sign up sent him the code via text. Inform him I will convert the appt after he sign up.

## 2020-05-21 DIAGNOSIS — K70.10 ALCOHOLIC HEPATITIS WITHOUT ASCITES: Primary | ICD-10-CM

## 2020-07-23 ENCOUNTER — HOSPITAL ENCOUNTER (INPATIENT)
Facility: HOSPITAL | Age: 45
LOS: 4 days | Discharge: HOME | DRG: 896 | End: 2020-07-27
Attending: EMERGENCY MEDICINE | Admitting: HOSPITALIST
Payer: MEDICAID

## 2020-07-23 ENCOUNTER — ANESTHESIA (OUTPATIENT)
Dept: INTENSIVE CARE | Facility: HOSPITAL | Age: 45
DRG: 896 | End: 2020-07-23
Payer: MEDICAID

## 2020-07-23 ENCOUNTER — ANESTHESIA EVENT (OUTPATIENT)
Dept: INTENSIVE CARE | Facility: HOSPITAL | Age: 45
DRG: 896 | End: 2020-07-23
Payer: MEDICAID

## 2020-07-23 DIAGNOSIS — F10.932 ALCOHOL WITHDRAWAL SEIZURE WITH PERCEPTUAL DISTURBANCE: ICD-10-CM

## 2020-07-23 DIAGNOSIS — R56.9 ALCOHOL WITHDRAWAL SEIZURE WITH PERCEPTUAL DISTURBANCE: ICD-10-CM

## 2020-07-23 DIAGNOSIS — K70.9 ALCOHOLIC LIVER DISEASE: ICD-10-CM

## 2020-07-23 DIAGNOSIS — R56.9 ALCOHOL WITHDRAWAL SEIZURE WITHOUT COMPLICATION: Primary | ICD-10-CM

## 2020-07-23 DIAGNOSIS — F10.930 ALCOHOL WITHDRAWAL SEIZURE WITHOUT COMPLICATION: Primary | ICD-10-CM

## 2020-07-23 DIAGNOSIS — K92.2 GASTROINTESTINAL HEMORRHAGE, UNSPECIFIED GASTROINTESTINAL HEMORRHAGE TYPE: ICD-10-CM

## 2020-07-23 DIAGNOSIS — F10.10 ALCOHOL ABUSE: Chronic | ICD-10-CM

## 2020-07-23 LAB
ABO + RH BLD: NORMAL
ALBUMIN SERPL BCP-MCNC: 3 G/DL (ref 3.5–5.2)
ALP SERPL-CCNC: 82 U/L (ref 55–135)
ALT SERPL W/O P-5'-P-CCNC: 24 U/L (ref 10–44)
AMPHET+METHAMPHET UR QL: NEGATIVE
ANION GAP SERPL CALC-SCNC: 12 MMOL/L (ref 8–16)
ANION GAP SERPL CALC-SCNC: 8 MMOL/L (ref 8–16)
ANISOCYTOSIS BLD QL SMEAR: SLIGHT
AST SERPL-CCNC: 92 U/L (ref 10–40)
BACTERIA #/AREA URNS HPF: ABNORMAL /HPF
BARBITURATES UR QL SCN>200 NG/ML: NEGATIVE
BASOPHILS # BLD AUTO: 0.01 K/UL (ref 0–0.2)
BASOPHILS # BLD AUTO: 0.02 K/UL (ref 0–0.2)
BASOPHILS NFR BLD: 0.2 % (ref 0–1.9)
BASOPHILS NFR BLD: 0.3 % (ref 0–1.9)
BENZODIAZ UR QL SCN>200 NG/ML: NEGATIVE
BILIRUB SERPL-MCNC: 3.8 MG/DL (ref 0.1–1)
BILIRUB UR QL STRIP: ABNORMAL
BLD GP AB SCN CELLS X3 SERPL QL: NORMAL
BUN SERPL-MCNC: 11 MG/DL (ref 6–20)
BUN SERPL-MCNC: 11 MG/DL (ref 6–20)
BZE UR QL SCN: NEGATIVE
CALCIUM SERPL-MCNC: 8.2 MG/DL (ref 8.7–10.5)
CALCIUM SERPL-MCNC: 8.3 MG/DL (ref 8.7–10.5)
CANNABINOIDS UR QL SCN: NEGATIVE
CHLORIDE SERPL-SCNC: 91 MMOL/L (ref 95–110)
CHLORIDE SERPL-SCNC: 94 MMOL/L (ref 95–110)
CK SERPL-CCNC: 199 U/L (ref 20–200)
CLARITY UR: ABNORMAL
CO2 SERPL-SCNC: 19 MMOL/L (ref 23–29)
CO2 SERPL-SCNC: 22 MMOL/L (ref 23–29)
COLOR UR: ABNORMAL
CREAT SERPL-MCNC: 0.8 MG/DL (ref 0.5–1.4)
CREAT SERPL-MCNC: 0.8 MG/DL (ref 0.5–1.4)
CREAT UR-MCNC: 299.2 MG/DL (ref 23–375)
DIFFERENTIAL METHOD: ABNORMAL
DIFFERENTIAL METHOD: ABNORMAL
EOSINOPHIL # BLD AUTO: 0 K/UL (ref 0–0.5)
EOSINOPHIL # BLD AUTO: 0 K/UL (ref 0–0.5)
EOSINOPHIL NFR BLD: 0.2 % (ref 0–8)
EOSINOPHIL NFR BLD: 0.3 % (ref 0–8)
ERYTHROCYTE [DISTWIDTH] IN BLOOD BY AUTOMATED COUNT: 17.1 % (ref 11.5–14.5)
ERYTHROCYTE [DISTWIDTH] IN BLOOD BY AUTOMATED COUNT: 17.6 % (ref 11.5–14.5)
EST. GFR  (AFRICAN AMERICAN): >60 ML/MIN/1.73 M^2
EST. GFR  (AFRICAN AMERICAN): >60 ML/MIN/1.73 M^2
EST. GFR  (NON AFRICAN AMERICAN): >60 ML/MIN/1.73 M^2
EST. GFR  (NON AFRICAN AMERICAN): >60 ML/MIN/1.73 M^2
ETHANOL SERPL-MCNC: <10 MG/DL
GLUCOSE SERPL-MCNC: 109 MG/DL (ref 70–110)
GLUCOSE SERPL-MCNC: 95 MG/DL (ref 70–110)
GLUCOSE UR QL STRIP: NEGATIVE
HCT VFR BLD AUTO: 29.5 % (ref 40–54)
HCT VFR BLD AUTO: 31.2 % (ref 40–54)
HGB BLD-MCNC: 10.6 G/DL (ref 14–18)
HGB BLD-MCNC: 11.2 G/DL (ref 14–18)
HGB UR QL STRIP: ABNORMAL
HYALINE CASTS #/AREA URNS LPF: 0 /LPF
HYPOCHROMIA BLD QL SMEAR: ABNORMAL
IMM GRANULOCYTES # BLD AUTO: 0.02 K/UL (ref 0–0.04)
IMM GRANULOCYTES # BLD AUTO: 0.03 K/UL (ref 0–0.04)
IMM GRANULOCYTES NFR BLD AUTO: 0.4 % (ref 0–0.5)
IMM GRANULOCYTES NFR BLD AUTO: 0.4 % (ref 0–0.5)
KETONES UR QL STRIP: NEGATIVE
LEUKOCYTE ESTERASE UR QL STRIP: NEGATIVE
LYMPHOCYTES # BLD AUTO: 0.6 K/UL (ref 1–4.8)
LYMPHOCYTES # BLD AUTO: 0.6 K/UL (ref 1–4.8)
LYMPHOCYTES NFR BLD: 10.9 % (ref 18–48)
LYMPHOCYTES NFR BLD: 8.3 % (ref 18–48)
MCH RBC QN AUTO: 33.2 PG (ref 27–31)
MCH RBC QN AUTO: 34.1 PG (ref 27–31)
MCHC RBC AUTO-ENTMCNC: 35.9 G/DL (ref 32–36)
MCHC RBC AUTO-ENTMCNC: 35.9 G/DL (ref 32–36)
MCV RBC AUTO: 93 FL (ref 82–98)
MCV RBC AUTO: 95 FL (ref 82–98)
METHADONE UR QL SCN>300 NG/ML: NEGATIVE
MICROSCOPIC COMMENT: ABNORMAL
MONOCYTES # BLD AUTO: 0.9 K/UL (ref 0.3–1)
MONOCYTES # BLD AUTO: 1.1 K/UL (ref 0.3–1)
MONOCYTES NFR BLD: 14.3 % (ref 4–15)
MONOCYTES NFR BLD: 16.2 % (ref 4–15)
NEUTROPHILS # BLD AUTO: 4.1 K/UL (ref 1.8–7.7)
NEUTROPHILS # BLD AUTO: 5.7 K/UL (ref 1.8–7.7)
NEUTROPHILS NFR BLD: 72.1 % (ref 38–73)
NEUTROPHILS NFR BLD: 76.4 % (ref 38–73)
NITRITE UR QL STRIP: POSITIVE
NRBC BLD-RTO: 0 /100 WBC
NRBC BLD-RTO: 0 /100 WBC
OPIATES UR QL SCN: NEGATIVE
PCP UR QL SCN>25 NG/ML: NEGATIVE
PH UR STRIP: 6 [PH] (ref 5–8)
PLATELET # BLD AUTO: 42 K/UL (ref 150–350)
PLATELET # BLD AUTO: 47 K/UL (ref 150–350)
PLATELET BLD QL SMEAR: ABNORMAL
PMV BLD AUTO: 11.3 FL (ref 9.2–12.9)
PMV BLD AUTO: 11.3 FL (ref 9.2–12.9)
POTASSIUM SERPL-SCNC: 3.6 MMOL/L (ref 3.5–5.1)
POTASSIUM SERPL-SCNC: 4.2 MMOL/L (ref 3.5–5.1)
PROT SERPL-MCNC: 7.4 G/DL (ref 6–8.4)
PROT UR QL STRIP: ABNORMAL
RBC # BLD AUTO: 3.11 M/UL (ref 4.6–6.2)
RBC # BLD AUTO: 3.37 M/UL (ref 4.6–6.2)
RBC #/AREA URNS HPF: 10 /HPF (ref 0–4)
SARS-COV-2 RDRP RESP QL NAA+PROBE: NEGATIVE
SODIUM SERPL-SCNC: 122 MMOL/L (ref 136–145)
SODIUM SERPL-SCNC: 124 MMOL/L (ref 136–145)
SODIUM UR-SCNC: <20 MMOL/L (ref 20–250)
SP GR UR STRIP: 1.02 (ref 1–1.03)
TOXICOLOGY INFORMATION: NORMAL
TROPONIN I SERPL DL<=0.01 NG/ML-MCNC: 0.01 NG/ML (ref 0–0.03)
TSH SERPL DL<=0.005 MIU/L-ACNC: 2.68 UIU/ML (ref 0.4–4)
URN SPEC COLLECT METH UR: ABNORMAL
UROBILINOGEN UR STRIP-ACNC: 1 EU/DL
WBC # BLD AUTO: 5.68 K/UL (ref 3.9–12.7)
WBC # BLD AUTO: 7.43 K/UL (ref 3.9–12.7)
WBC #/AREA URNS HPF: 3 /HPF (ref 0–5)

## 2020-07-23 PROCEDURE — 84484 ASSAY OF TROPONIN QUANT: CPT

## 2020-07-23 PROCEDURE — 80320 DRUG SCREEN QUANTALCOHOLS: CPT

## 2020-07-23 PROCEDURE — C9113 INJ PANTOPRAZOLE SODIUM, VIA: HCPCS | Performed by: HOSPITALIST

## 2020-07-23 PROCEDURE — 85025 COMPLETE CBC W/AUTO DIFF WBC: CPT

## 2020-07-23 PROCEDURE — 63600175 PHARM REV CODE 636 W HCPCS: Performed by: EMERGENCY MEDICINE

## 2020-07-23 PROCEDURE — 96361 HYDRATE IV INFUSION ADD-ON: CPT

## 2020-07-23 PROCEDURE — 80307 DRUG TEST PRSMV CHEM ANLYZR: CPT

## 2020-07-23 PROCEDURE — 25000003 PHARM REV CODE 250: Performed by: EMERGENCY MEDICINE

## 2020-07-23 PROCEDURE — 76937 US GUIDE VASCULAR ACCESS: CPT | Performed by: ANESTHESIOLOGY

## 2020-07-23 PROCEDURE — 63600175 PHARM REV CODE 636 W HCPCS: Performed by: HOSPITALIST

## 2020-07-23 PROCEDURE — 83935 ASSAY OF URINE OSMOLALITY: CPT

## 2020-07-23 PROCEDURE — 96366 THER/PROPH/DIAG IV INF ADDON: CPT

## 2020-07-23 PROCEDURE — 84443 ASSAY THYROID STIM HORMONE: CPT

## 2020-07-23 PROCEDURE — 20000000 HC ICU ROOM

## 2020-07-23 PROCEDURE — 25000003 PHARM REV CODE 250: Performed by: HOSPITALIST

## 2020-07-23 PROCEDURE — 84300 ASSAY OF URINE SODIUM: CPT

## 2020-07-23 PROCEDURE — 99285 EMERGENCY DEPT VISIT HI MDM: CPT | Mod: 25

## 2020-07-23 PROCEDURE — U0002 COVID-19 LAB TEST NON-CDC: HCPCS

## 2020-07-23 PROCEDURE — 96365 THER/PROPH/DIAG IV INF INIT: CPT

## 2020-07-23 PROCEDURE — C1751 CATH, INF, PER/CENT/MIDLINE: HCPCS | Performed by: STUDENT IN AN ORGANIZED HEALTH CARE EDUCATION/TRAINING PROGRAM

## 2020-07-23 PROCEDURE — 80053 COMPREHEN METABOLIC PANEL: CPT

## 2020-07-23 PROCEDURE — 80048 BASIC METABOLIC PNL TOTAL CA: CPT

## 2020-07-23 PROCEDURE — 81000 URINALYSIS NONAUTO W/SCOPE: CPT | Mod: 59

## 2020-07-23 PROCEDURE — 82550 ASSAY OF CK (CPK): CPT

## 2020-07-23 PROCEDURE — 96375 TX/PRO/DX INJ NEW DRUG ADDON: CPT

## 2020-07-23 PROCEDURE — 86850 RBC ANTIBODY SCREEN: CPT

## 2020-07-23 PROCEDURE — 36415 COLL VENOUS BLD VENIPUNCTURE: CPT

## 2020-07-23 RX ORDER — OCTREOTIDE ACETATE 50 UG/ML
50 INJECTION, SOLUTION INTRAVENOUS; SUBCUTANEOUS ONCE
Status: COMPLETED | OUTPATIENT
Start: 2020-07-23 | End: 2020-07-23

## 2020-07-23 RX ORDER — LEVETIRACETAM 10 MG/ML
1000 INJECTION INTRAVASCULAR
Status: COMPLETED | OUTPATIENT
Start: 2020-07-23 | End: 2020-07-23

## 2020-07-23 RX ORDER — FOLIC ACID 1 MG/1
1 TABLET ORAL DAILY
Status: DISCONTINUED | OUTPATIENT
Start: 2020-07-24 | End: 2020-07-27 | Stop reason: HOSPADM

## 2020-07-23 RX ORDER — 3% SODIUM CHLORIDE 3 G/100ML
100 INJECTION, SOLUTION INTRAVENOUS CONTINUOUS
Status: DISPENSED | OUTPATIENT
Start: 2020-07-23 | End: 2020-07-23

## 2020-07-23 RX ORDER — DIAZEPAM 5 MG/1
10 TABLET ORAL EVERY 6 HOURS
Status: DISCONTINUED | OUTPATIENT
Start: 2020-07-23 | End: 2020-07-23

## 2020-07-23 RX ORDER — PANTOPRAZOLE SODIUM 40 MG/10ML
40 INJECTION, POWDER, LYOPHILIZED, FOR SOLUTION INTRAVENOUS 2 TIMES DAILY
Status: DISCONTINUED | OUTPATIENT
Start: 2020-07-23 | End: 2020-07-27

## 2020-07-23 RX ORDER — DIAZEPAM 10 MG/2ML
5 INJECTION INTRAMUSCULAR
Status: COMPLETED | OUTPATIENT
Start: 2020-07-23 | End: 2020-07-23

## 2020-07-23 RX ORDER — DIAZEPAM 10 MG/2ML
10 INJECTION INTRAMUSCULAR EVERY 6 HOURS
Status: DISCONTINUED | OUTPATIENT
Start: 2020-07-23 | End: 2020-07-24

## 2020-07-23 RX ORDER — SODIUM CHLORIDE 9 MG/ML
INJECTION, SOLUTION INTRAVENOUS CONTINUOUS
Status: DISCONTINUED | OUTPATIENT
Start: 2020-07-23 | End: 2020-07-24

## 2020-07-23 RX ORDER — LEVETIRACETAM 15 MG/ML
1500 INJECTION INTRAVASCULAR 2 TIMES DAILY
Status: DISCONTINUED | OUTPATIENT
Start: 2020-07-24 | End: 2020-07-25

## 2020-07-23 RX ADMIN — SODIUM CHLORIDE 1000 ML: 0.9 INJECTION, SOLUTION INTRAVENOUS at 03:07

## 2020-07-23 RX ADMIN — LORAZEPAM 1 MG: 2 INJECTION INTRAMUSCULAR; INTRAVENOUS at 09:07

## 2020-07-23 RX ADMIN — DIAZEPAM 5 MG: 5 INJECTION, SOLUTION INTRAMUSCULAR; INTRAVENOUS at 06:07

## 2020-07-23 RX ADMIN — OCTREOTIDE ACETATE 50 MCG: 50 INJECTION, SOLUTION INTRAVENOUS; SUBCUTANEOUS at 08:07

## 2020-07-23 RX ADMIN — LORAZEPAM 1 MG: 2 INJECTION INTRAMUSCULAR; INTRAVENOUS at 03:07

## 2020-07-23 RX ADMIN — FOLIC ACID: 5 INJECTION, SOLUTION INTRAMUSCULAR; INTRAVENOUS; SUBCUTANEOUS at 05:07

## 2020-07-23 RX ADMIN — SODIUM CHLORIDE 100 ML/HR: 3 INJECTION, SOLUTION INTRAVENOUS at 10:07

## 2020-07-23 RX ADMIN — SODIUM CHLORIDE: 9 INJECTION, SOLUTION INTRAVENOUS at 08:07

## 2020-07-23 RX ADMIN — LEVETIRACETAM 1000 MG: 10 INJECTION INTRAVENOUS at 03:07

## 2020-07-23 RX ADMIN — OCTREOTIDE ACETATE 50 MCG/HR: 500 INJECTION, SOLUTION INTRAVENOUS; SUBCUTANEOUS at 08:07

## 2020-07-23 RX ADMIN — PANTOPRAZOLE SODIUM 40 MG: 40 INJECTION, POWDER, FOR SOLUTION INTRAVENOUS at 06:07

## 2020-07-23 RX ADMIN — DEXTROSE MONOHYDRATE 2500 MG: 50 INJECTION, SOLUTION INTRAVENOUS at 05:07

## 2020-07-23 RX ADMIN — CEFTRIAXONE 1 G: 1 INJECTION, SOLUTION INTRAVENOUS at 08:07

## 2020-07-23 RX ADMIN — DIAZEPAM 10 MG: 10 INJECTION, SOLUTION INTRAMUSCULAR; INTRAVENOUS at 11:07

## 2020-07-23 RX ADMIN — DIAZEPAM 10 MG: 10 INJECTION, SOLUTION INTRAMUSCULAR; INTRAVENOUS at 07:07

## 2020-07-23 NOTE — ED NOTES
Spoke to pt. Wife-Jazmine on phone with pt. Permission and updated on status and plan of care. Contact number is [phone].

## 2020-07-23 NOTE — ED PROVIDER NOTES
Encounter Date: 7/23/2020    SCRIBE #1 NOTE: I, Arielle Rebollar, am scribing for, and in the presence of,  Dr. Anderson. I have scribed the entire note.       History     Chief Complaint   Patient presents with    Seizures     Nilesh Rolon Jr. is a 45 y.o. male with a past medical history of anxiety who presents to the ED due to tremors and confusion onset earlier today. Patient also believes he may have had a seizure as well. He endorses history of alcohol withdrawal seizures, and reports he stopped drinking a couple days ago. Patient denies fever, shortness of breath, nausea, vomiting, or any other associated symptoms. No prior treatments reported.     The history is provided by the patient.     Review of patient's allergies indicates:  No Known Allergies  Past Medical History:   Diagnosis Date    Anxiety     Arthritis     Other meniscus derangements, other medial meniscus, left knee 1/7/2019     Past Surgical History:   Procedure Laterality Date    ARTHROSCOPY OF KNEE Left 1/7/2019    Procedure: ARTHROSCOPY, KNEE;  Surgeon: Derick Kirby MD;  Location: [facility];  Service: Orthopedics;  Laterality: Left;    ESOPHAGOGASTRODUODENOSCOPY N/A 4/8/2019    Procedure: EGD (ESOPHAGOGASTRODUODENOSCOPY);  Surgeon: Bonita Kendall MD;  Location: [facility];  Service: Endoscopy;  Laterality: N/A;     Family History   Problem Relation Age of Onset    Birth defects Neg Hx      Social History     Tobacco Use    Smoking status: Never Smoker    Smokeless tobacco: Never Used   Substance Use Topics    Alcohol use: Not Currently     Comment: last drink 1/1/20    Drug use: No     Review of Systems   Constitutional: Negative for fever.   HENT: Negative for sore throat.    Respiratory: Negative for shortness of breath.    Cardiovascular: Negative for chest pain.   Gastrointestinal: Negative for nausea.   Genitourinary: Negative for dysuria.   Musculoskeletal: Negative for back pain.   Skin: Negative for rash.   Neurological:  Positive for tremors and seizures (Suspected). Negative for weakness.   Hematological: Does not bruise/bleed easily.   Psychiatric/Behavioral: Positive for confusion.       Physical Exam     Initial Vitals [07/23/20 1454]   BP Pulse Resp Temp SpO2   127/74 103 15 98.9 °F (37.2 °C) 96 %      MAP       --         Physical Exam    Nursing note and vitals reviewed.  Constitutional: He appears well-developed and well-nourished. He is not diaphoretic. No distress.   HENT:   Head: Normocephalic and atraumatic.   Contusions on tongue bilaterally.    Eyes: Conjunctivae and EOM are normal. Pupils are equal, round, and reactive to light.   Horizontal nystagmus.   Neck: Normal range of motion. Neck supple.   Cardiovascular: Normal rate, regular rhythm, normal heart sounds and intact distal pulses.   Pulmonary/Chest: Breath sounds normal. No respiratory distress.   Abdominal: Soft. Bowel sounds are normal. He exhibits no distension. There is no abdominal tenderness.   Musculoskeletal: Normal range of motion. No edema.   Neurological: He is alert and oriented to person, place, and time. He has normal strength.   Intentional tremors. Mildly confused with no hallucinations.    Skin: Skin is warm and dry. No erythema.   Psychiatric: He has a normal mood and affect. His behavior is normal. Thought content normal.         ED Course   Procedures  Labs Reviewed   CBC W/ AUTO DIFFERENTIAL - Abnormal; Notable for the following components:       Result Value    RBC 3.37 (*)     Hemoglobin 11.2 (*)     Hematocrit 31.2 (*)     Mean Corpuscular Hemoglobin 33.2 (*)     RDW 17.1 (*)     Platelets 47 (*)     Lymph # 0.6 (*)     Mono # 1.1 (*)     Gran% 76.4 (*)     Lymph% 8.3 (*)     All other components within normal limits   COMPREHENSIVE METABOLIC PANEL - Abnormal; Notable for the following components:    Sodium 122 (*)     Chloride 91 (*)     CO2 19 (*)     Calcium 8.3 (*)     Albumin 3.0 (*)     Total Bilirubin 3.8 (*)     AST 92 (*)      All other components within normal limits   URINALYSIS - Abnormal; Notable for the following components:    Color, UA Orange (*)     Appearance, UA Hazy (*)     Protein, UA 1+ (*)     Bilirubin (UA) 2+ (*)     Occult Blood UA Trace (*)     Nitrite, UA Positive (*)     All other components within normal limits    Narrative:     Specimen Source->Urine   URINALYSIS MICROSCOPIC - Abnormal; Notable for the following components:    RBC, UA 10 (*)     All other components within normal limits    Narrative:     Specimen Source->Urine   CK   ALCOHOL,MEDICAL (ETHANOL)   TROPONIN I   SARS-COV-2 RNA AMPLIFICATION, QUAL   DRUG SCREEN PANEL, URINE EMERGENCY          Imaging Results          X-Ray Chest AP Portable (In process)                             Scribe Attestation:   Scribe #1: I performed the above scribed service and the documentation accurately describes the services I performed. I attest to the accuracy of the note.            ED Course as of Jul 23 1733   Thu Jul 23, 2020   1625 Case discussed with [facility].  They will evaluate the patient and make recommendations.  The patient was loaded with Ativan 2 mg and Keppra 3500 mg.    [ST]   1645 Case discussed with Dr. Taylor, [facility] hospitalist.  The patient will be admitted to his service.    [ST]      ED Course User Index  [ST] Emma Anderson MD                Clinical Impression:       ICD-10-CM ICD-9-CM   1. Alcohol withdrawal seizure without complication  F10.230 291.81             ED Disposition Condition    Admit              I, Emma Anderson, personally performed the services described in this documentation. All medical record entries made by the scribe were at my direction and in my presence.  I have reviewed the chart and agree that the record reflects my personal performance and is accurate and complete. Emma Anderson M.D. 5:32 PM07/23/2020             Emma Anderson MD  07/23/20 1734       Emma Anderson MD  07/23/20 1735

## 2020-07-23 NOTE — CONSULTS
Neurology Consult and Evaluation Note:    Reason for consult: seizures    HPI:  45y M with medical hx of anxiety, alcohol abuse, alcoholic hepatitis and GI bleed who was brought in by EMS after a witnessed seizure by girlfriend. Patient was walking down the stairs today when he had a generalized convulsive vs tremulous activity and fell to the ground hitting his head.   On arrival to ED, patient was confused. He was administered ativan 2 mg IV as exhibiting signs of DTs, and also administered keppra load.  He has experienced alcohol withdrawal seizures in the past. Last drink 2 days ago, drinks daily liqor (1/2 pint)    Other symptoms: GI bleed - BRB per rectum. Auditory hallucinations+. COVID 19 +    ROS: 12 point ROS negative except as above    Past Medical History:   Diagnosis Date    Anxiety     Arthritis     Other meniscus derangements, other medial meniscus, left knee 1/7/2019     Past Surgical History:   Procedure Laterality Date    ARTHROSCOPY OF KNEE Left 1/7/2019    Procedure: ARTHROSCOPY, KNEE;  Surgeon: Derick Kirby MD;  Location: [facility] OR;  Service: Orthopedics;  Laterality: Left;    ESOPHAGOGASTRODUODENOSCOPY N/A 4/8/2019    Procedure: EGD (ESOPHAGOGASTRODUODENOSCOPY);  Surgeon: Bonita Kendall MD;  Location: [facility] ENDO;  Service: Endoscopy;  Laterality: N/A;     Family History   Problem Relation Age of Onset    Birth defects Neg Hx      Social History     Socioeconomic History    Marital status: Single     Spouse name: Not on file    Number of children: Not on file    Years of education: Not on file    Highest education level: Not on file   Occupational History    Not on file   Social Needs    Financial resource strain: Not on file    Food insecurity     Worry: Not on file     Inability: Not on file    Transportation needs     Medical: Not on file     Non-medical: Not on file   Tobacco Use    Smoking status: Never Smoker    Smokeless tobacco: Never Used   Substance and Sexual Activity     Alcohol use: Not Currently     Comment: last drink 1/1/20    Drug use: No    Sexual activity: Yes     Partners: Female   Lifestyle    Physical activity     Days per week: Not on file     Minutes per session: Not on file    Stress: Not on file   Relationships    Social connections     Talks on phone: Not on file     Gets together: Not on file     Attends [employer] service: Not on file     Active member of club or organization: Not on file     Attends meetings of clubs or organizations: Not on file     Relationship status: Not on file   Other Topics Concern    Not on file   Social History Narrative    Not on file     No current facility-administered medications on file prior to encounter.      Current Outpatient Medications on File Prior to Encounter   Medication Sig Dispense Refill    folic acid (FOLVITE) 1 MG tablet Take 1 tablet (1 mg total) by mouth once daily. 30 tablet 3    multivitamin capsule Take 1 capsule by mouth once daily.      naltrexone (DEPADE) 50 mg tablet Take 50 mg by mouth once daily.  3    pantoprazole (PROTONIX) 40 MG tablet Take 1 tablet (40 mg total) by mouth once daily. 30 tablet 3    propranolol (INDERAL) 10 MG tablet Take 10 mg by mouth once daily.  3       Current Facility-Administered Medications:     0.9%  NaCl infusion, , Intravenous, Continuous, Otoniel Espinoza MD    cefTRIAXone (ROCEPHIN) 1 g/50 mL D5W IVPB, 1 g, Intravenous, Q24H, Otoniel Espinoza MD    diazePAM injection 10 mg, 10 mg, Intravenous, Q6H, Otoniel Espinoza MD, 10 mg at 07/23/20 1927    [START ON 7/24/2020] folic acid tablet 1 mg, 1 mg, Oral, Daily, Otoniel Espinoza MD    lorazepam (ATIVAN) injection 1 mg, 1 mg, Intravenous, Q1H PRN, Otoniel Espinoza MD    [START ON 7/24/2020] multivitamin tablet, 1 tablet, Oral, Daily, Otoniel Espinoza MD    octreotide injection 50 mcg, 50 mcg, Intravenous, Once **AND** octreotide (SANDOSTATIN) 500 mcg in sodium chloride  0.9% 100 mL infusion, 50 mcg/hr, Intravenous, Continuous, Otoniel Espinoza MD    pantoprazole injection 40 mg, 40 mg, Intravenous, BID, Otoniel Espinoza MD, 40 mg at 07/23/20 1830    [START ON 7/24/2020] thiamine (B-1) 100 mg in dextrose 5 % 50 mL IVPB, 100 mg, Intravenous, Daily, Otoniel Espinoza MD    Current Outpatient Medications:     folic acid (FOLVITE) 1 MG tablet, Take 1 tablet (1 mg total) by mouth once daily., Disp: 30 tablet, Rfl: 3    multivitamin capsule, Take 1 capsule by mouth once daily., Disp: , Rfl:     naltrexone (DEPADE) 50 mg tablet, Take 50 mg by mouth once daily., Disp: , Rfl: 3    pantoprazole (PROTONIX) 40 MG tablet, Take 1 tablet (40 mg total) by mouth once daily., Disp: 30 tablet, Rfl: 3    propranolol (INDERAL) 10 MG tablet, Take 10 mg by mouth once daily., Disp: , Rfl: 3    Exam:  Vitals:    07/23/20 1904   BP:    Pulse:    Resp:    Temp: (P) 98.6 °F (37 °C)     GENERAL/CONSTITUTIONAL:   Scleral edema,  Increased BMI  Tremulous  Tongue bite +    CV:  -NRRR; limbs warm and well-perfused     HIGHER INTEGRATIVE FUNCTIONS:   -Alert  -Oriented to time, place and person  -Recent and remote memory impaired but difficult to test as patient appears anxious and distracted   -Speech without dysarthria, no aphasia  -Normal fund of knowledge     CN:   -visual fields intact  -PERRL  -EOMI, nystagmus ++ bilateral in horizontal direction  -Facial sensation intact bilaterally  -Facial strength symmetrical  -Hearing normal BL  -Palate elevates symmetrically  -Normal shoulder shrug and head turn  -Tongue protrudes midline      MOTOR:   -Tone: normal in upper and lower extremities  -RUE/RLE 5/5  -LUE/LLE 5/5     SENSORY:   -Sensation normal bilaterally to light touch  Intact to vibration BL at toes     REFLEXES:   -2+ bilateral symmetric   -Flexor plantar reflex bilaterally  -No clonus     GAIT & STATION:   Patient with DTs, and confused. Fall risk +    Imaging and Laboratory  results:  CT head without contrast - pending  CT C spine without contrast - pending     Covid 19 positive  UA with nitrite positive  Drug screen - negative  Ethanol - < 10    CBC with normal white count, normocytic anemia, thrombocytopenia   Hyponatremia on CMP, urine sodium low      Assessment and Recommendations:  45 y M with multiple medical comorbidities related to alcohol abuse, including GIB, ascites, jaundice who presents with Delerium tremens and alcohol withdrawal seizure. Last drink 2 days ago, daily drinker. Neuro exam intact except for some abnormalities in affect, attention and nystagmus.  Other medical work up evident for hyponatremia, covid 19 positivity.    Recommendations:  1) Pending CT head. Will review - may require additional imaging as nystagmus and other abnormal neuro findings.  2) will be admit to ICU for DTs and hyponatremia. Q1 neurochecks, telemetry  3) Received load of keppra. Continue keppra 1500 mg IV BID  4) obtain spot EEG in the a.m. as confusion and auditory hallucinations (likely related to alcohol withdrawal), but patient has abnormal neuro exam and non clinical seizures need to be evaluated for. Based on EEG will decide on AED dosing going forward as seizures otherwise with known provoking factor.  5) Correction of hyponatremia per standard guidelines per primary team.  6) DTs management per CIWA protocol and ICU management  7) Recommended other labs: HIV, RPR, TSH, Hba1c, B vitamins to include B1 and B12, ammonia  8)  B1 supplementation prior to po intake (discussed with ED)    Will follow.  Please call for questions.

## 2020-07-23 NOTE — MEDICAL/APP STUDENT
History     Chief Complaint   Patient presents with    Seizures     Patient with PMH of HTN and Ethanol abuse was brought via EMS after wife reported a seizure. Patient was found down and confused by EMS. He appeared disoriented to person, time and place during transport. EMS states his initial confusion has improved significantly upon arrival to ED. He does not recall the seizure and states he tried to quit drinking. His last drink was either yesterday or 2 days prior to presentation. He has a history of withdrawal seizures. He has evidence of tongue biting.           Past Medical History:   Diagnosis Date    Anxiety     Arthritis     Other meniscus derangements, other medial meniscus, left knee 1/7/2019       Past Surgical History:   Procedure Laterality Date    ARTHROSCOPY OF KNEE Left 1/7/2019    Procedure: ARTHROSCOPY, KNEE;  Surgeon: Derick Kirby MD;  Location: [facility] OR;  Service: Orthopedics;  Laterality: Left;    ESOPHAGOGASTRODUODENOSCOPY N/A 4/8/2019    Procedure: EGD (ESOPHAGOGASTRODUODENOSCOPY);  Surgeon: Bonita Kendall MD;  Location: [facility] ENDO;  Service: Endoscopy;  Laterality: N/A;       Family History   Problem Relation Age of Onset    Birth defects Neg Hx        Social History     Tobacco Use    Smoking status: Never Smoker    Smokeless tobacco: Never Used   Substance Use Topics    Alcohol use: Not Currently     Comment: last drink 1/1/20    Drug use: No       Review of Systems   Constitutional: Positive for diaphoresis. Negative for chills, fatigue and fever.   HENT: Negative for hearing loss.    Eyes: Negative for visual disturbance.   Respiratory: Negative for cough, chest tightness and shortness of breath.    Cardiovascular: Negative for chest pain.   Gastrointestinal: Negative for blood in stool, diarrhea, nausea and vomiting.   Genitourinary: Negative for difficulty urinating, dysuria and frequency.   Musculoskeletal: Negative for arthralgias.   Neurological: Positive for tremors.  Negative for headaches.       Physical Exam   BP (!) 173/103   Pulse 98   Temp 98.4 °F (36.9 °C) (Oral)   Resp (!) 27   Ht 6' (1.829 m)   Wt 107 kg (236 lb)   SpO2 99%   BMI 32.01 kg/m²     Physical Exam    Constitutional: He appears well-developed and well-nourished. He is diaphoretic.   HENT:   Head: Head is without raccoon's eyes, without Londono's sign, without abrasion, without contusion and without laceration. Hair is normal.   Tongue biting present   Eyes: Scleral icterus is present. Right eye exhibits nystagmus. Left eye exhibits nystagmus. Right pupil is not reactive. Left pupil is not reactive.   Pulmonary/Chest: Effort normal.   Neurological: He is alert and oriented to person, place, and time. He has normal strength. He displays tremor. He displays no atrophy. No cranial nerve deficit or sensory deficit. He exhibits normal muscle tone. GCS eye subscore is 4. GCS verbal subscore is 5. GCS motor subscore is 6.   Skin: Skin is warm.         ED Course

## 2020-07-23 NOTE — ED NOTES
Pt. C/o abdominal discomfort. Requesting medication for his stomach. C/o generalized pain and nausea. Dr. Anderson updated.

## 2020-07-23 NOTE — ED NOTES
Pt. Arrives via ems after witnessed seizure by girlfriend. The pt. Was assisted to the ground without fall or injury. He has a hx. Of alcohol withdrawal seizures. Family reports he has been trying to stop drinking and has had little etoh intake  x4 days. On arrival the pt. Is alert, visibly tremulous, mild sweating noted. He is oriented to person and place. He reportedly was in the kitchen making a sandwich when seizure occurred. He had taken 2 benadryl earlier to help him sleep. He states his last drink was 4 days ago. He dnies pain or headache. He also reports that he has has bloody stools for the last 4-5 days.

## 2020-07-23 NOTE — ED NOTES
Admit team and neurology have been consulted to see pt.. pt. Updated on plan of care. Pt. Remains very tremulous.

## 2020-07-24 LAB
ALBUMIN SERPL BCP-MCNC: 3 G/DL (ref 3.5–5.2)
ALP SERPL-CCNC: 79 U/L (ref 55–135)
ALT SERPL W/O P-5'-P-CCNC: 24 U/L (ref 10–44)
ANION GAP SERPL CALC-SCNC: 6 MMOL/L (ref 8–16)
ANION GAP SERPL CALC-SCNC: 6 MMOL/L (ref 8–16)
ANION GAP SERPL CALC-SCNC: 7 MMOL/L (ref 8–16)
ANION GAP SERPL CALC-SCNC: 9 MMOL/L (ref 8–16)
ANION GAP SERPL CALC-SCNC: 9 MMOL/L (ref 8–16)
AST SERPL-CCNC: 104 U/L (ref 10–40)
BASOPHILS # BLD AUTO: 0.02 K/UL (ref 0–0.2)
BASOPHILS NFR BLD: 0.4 % (ref 0–1.9)
BILIRUB DIRECT SERPL-MCNC: 2.4 MG/DL (ref 0.1–0.3)
BILIRUB SERPL-MCNC: 3.5 MG/DL (ref 0.1–1)
BUN SERPL-MCNC: 11 MG/DL (ref 6–20)
BUN SERPL-MCNC: 12 MG/DL (ref 6–20)
BUN SERPL-MCNC: 12 MG/DL (ref 6–20)
CALCIUM SERPL-MCNC: 8.2 MG/DL (ref 8.7–10.5)
CALCIUM SERPL-MCNC: 8.2 MG/DL (ref 8.7–10.5)
CALCIUM SERPL-MCNC: 8.3 MG/DL (ref 8.7–10.5)
CALCIUM SERPL-MCNC: 8.4 MG/DL (ref 8.7–10.5)
CALCIUM SERPL-MCNC: 8.4 MG/DL (ref 8.7–10.5)
CHLORIDE SERPL-SCNC: 100 MMOL/L (ref 95–110)
CHLORIDE SERPL-SCNC: 101 MMOL/L (ref 95–110)
CHLORIDE SERPL-SCNC: 102 MMOL/L (ref 95–110)
CHLORIDE SERPL-SCNC: 103 MMOL/L (ref 95–110)
CHLORIDE SERPL-SCNC: 99 MMOL/L (ref 95–110)
CO2 SERPL-SCNC: 22 MMOL/L (ref 23–29)
CO2 SERPL-SCNC: 22 MMOL/L (ref 23–29)
CO2 SERPL-SCNC: 23 MMOL/L (ref 23–29)
CO2 SERPL-SCNC: 25 MMOL/L (ref 23–29)
CO2 SERPL-SCNC: 26 MMOL/L (ref 23–29)
CREAT SERPL-MCNC: 0.8 MG/DL (ref 0.5–1.4)
DIFFERENTIAL METHOD: ABNORMAL
EOSINOPHIL # BLD AUTO: 0 K/UL (ref 0–0.5)
EOSINOPHIL NFR BLD: 0.4 % (ref 0–8)
ERYTHROCYTE [DISTWIDTH] IN BLOOD BY AUTOMATED COUNT: 17.4 % (ref 11.5–14.5)
EST. GFR  (AFRICAN AMERICAN): >60 ML/MIN/1.73 M^2
EST. GFR  (NON AFRICAN AMERICAN): >60 ML/MIN/1.73 M^2
GLUCOSE SERPL-MCNC: 102 MG/DL (ref 70–110)
GLUCOSE SERPL-MCNC: 131 MG/DL (ref 70–110)
GLUCOSE SERPL-MCNC: 143 MG/DL (ref 70–110)
GLUCOSE SERPL-MCNC: 90 MG/DL (ref 70–110)
GLUCOSE SERPL-MCNC: 99 MG/DL (ref 70–110)
HCT VFR BLD AUTO: 29.8 % (ref 40–54)
HGB BLD-MCNC: 10.5 G/DL (ref 14–18)
HIV 1+2 AB+HIV1 P24 AG SERPL QL IA: NEGATIVE
IMM GRANULOCYTES # BLD AUTO: 0.02 K/UL (ref 0–0.04)
IMM GRANULOCYTES NFR BLD AUTO: 0.4 % (ref 0–0.5)
LYMPHOCYTES # BLD AUTO: 0.6 K/UL (ref 1–4.8)
LYMPHOCYTES NFR BLD: 10.3 % (ref 18–48)
MCH RBC QN AUTO: 33.2 PG (ref 27–31)
MCHC RBC AUTO-ENTMCNC: 35.2 G/DL (ref 32–36)
MCV RBC AUTO: 94 FL (ref 82–98)
MONOCYTES # BLD AUTO: 1 K/UL (ref 0.3–1)
MONOCYTES NFR BLD: 18.1 % (ref 4–15)
NEUTROPHILS # BLD AUTO: 3.8 K/UL (ref 1.8–7.7)
NEUTROPHILS NFR BLD: 70.4 % (ref 38–73)
NRBC BLD-RTO: 0 /100 WBC
OSMOLALITY UR: 598 MOSM/KG (ref 50–1200)
PLATELET # BLD AUTO: 48 K/UL (ref 150–350)
PMV BLD AUTO: 10.4 FL (ref 9.2–12.9)
POTASSIUM SERPL-SCNC: 3.6 MMOL/L (ref 3.5–5.1)
POTASSIUM SERPL-SCNC: 3.6 MMOL/L (ref 3.5–5.1)
POTASSIUM SERPL-SCNC: 3.7 MMOL/L (ref 3.5–5.1)
POTASSIUM SERPL-SCNC: 3.9 MMOL/L (ref 3.5–5.1)
POTASSIUM SERPL-SCNC: 4 MMOL/L (ref 3.5–5.1)
PROT SERPL-MCNC: 7.4 G/DL (ref 6–8.4)
RBC # BLD AUTO: 3.16 M/UL (ref 4.6–6.2)
RPR SER QL: NORMAL
SODIUM SERPL-SCNC: 130 MMOL/L (ref 136–145)
SODIUM SERPL-SCNC: 131 MMOL/L (ref 136–145)
SODIUM SERPL-SCNC: 133 MMOL/L (ref 136–145)
VIT B12 SERPL-MCNC: 1453 PG/ML (ref 210–950)
WBC # BLD AUTO: 5.36 K/UL (ref 3.9–12.7)

## 2020-07-24 PROCEDURE — 99233 PR SUBSEQUENT HOSPITAL CARE,LEVL III: ICD-10-PCS | Mod: ,,, | Performed by: INTERNAL MEDICINE

## 2020-07-24 PROCEDURE — 86592 SYPHILIS TEST NON-TREP QUAL: CPT

## 2020-07-24 PROCEDURE — 95816 EEG AWAKE AND DROWSY: CPT

## 2020-07-24 PROCEDURE — 20000000 HC ICU ROOM

## 2020-07-24 PROCEDURE — 95816 EEG AWAKE AND DROWSY: CPT | Mod: 26,,, | Performed by: PSYCHIATRY & NEUROLOGY

## 2020-07-24 PROCEDURE — 63600175 PHARM REV CODE 636 W HCPCS: Performed by: HOSPITALIST

## 2020-07-24 PROCEDURE — 80048 BASIC METABOLIC PNL TOTAL CA: CPT

## 2020-07-24 PROCEDURE — 80076 HEPATIC FUNCTION PANEL: CPT

## 2020-07-24 PROCEDURE — 99233 SBSQ HOSP IP/OBS HIGH 50: CPT | Mod: ,,, | Performed by: INTERNAL MEDICINE

## 2020-07-24 PROCEDURE — 86703 HIV-1/HIV-2 1 RESULT ANTBDY: CPT

## 2020-07-24 PROCEDURE — 95816 PR EEG,W/AWAKE & DROWSY RECORD: ICD-10-PCS | Mod: 26,,, | Performed by: PSYCHIATRY & NEUROLOGY

## 2020-07-24 PROCEDURE — 25000003 PHARM REV CODE 250: Performed by: HOSPITALIST

## 2020-07-24 PROCEDURE — 25000003 PHARM REV CODE 250

## 2020-07-24 PROCEDURE — 94761 N-INVAS EAR/PLS OXIMETRY MLT: CPT

## 2020-07-24 PROCEDURE — 85025 COMPLETE CBC W/AUTO DIFF WBC: CPT

## 2020-07-24 PROCEDURE — C9113 INJ PANTOPRAZOLE SODIUM, VIA: HCPCS | Performed by: HOSPITALIST

## 2020-07-24 PROCEDURE — 82607 VITAMIN B-12: CPT

## 2020-07-24 PROCEDURE — 25000003 PHARM REV CODE 250: Performed by: INTERNAL MEDICINE

## 2020-07-24 RX ORDER — DEXMEDETOMIDINE HYDROCHLORIDE 4 UG/ML
0.2 INJECTION, SOLUTION INTRAVENOUS CONTINUOUS
Status: DISCONTINUED | OUTPATIENT
Start: 2020-07-24 | End: 2020-07-25

## 2020-07-24 RX ORDER — ENOXAPARIN SODIUM 100 MG/ML
40 INJECTION SUBCUTANEOUS EVERY 24 HOURS
Status: DISCONTINUED | OUTPATIENT
Start: 2020-07-24 | End: 2020-07-26

## 2020-07-24 RX ORDER — DEXTROSE MONOHYDRATE 50 MG/ML
INJECTION, SOLUTION INTRAVENOUS CONTINUOUS
Status: ACTIVE | OUTPATIENT
Start: 2020-07-24 | End: 2020-07-25

## 2020-07-24 RX ORDER — ESCITALOPRAM OXALATE 5 MG/1
5 TABLET ORAL DAILY
Status: DISCONTINUED | OUTPATIENT
Start: 2020-07-25 | End: 2020-07-27 | Stop reason: HOSPADM

## 2020-07-24 RX ORDER — DIAZEPAM 10 MG/2ML
10 INJECTION INTRAMUSCULAR EVERY 8 HOURS
Status: DISCONTINUED | OUTPATIENT
Start: 2020-07-25 | End: 2020-07-25

## 2020-07-24 RX ORDER — DEXMEDETOMIDINE HYDROCHLORIDE 4 UG/ML
INJECTION, SOLUTION INTRAVENOUS
Status: COMPLETED
Start: 2020-07-24 | End: 2020-07-24

## 2020-07-24 RX ORDER — DEXTROSE MONOHYDRATE 50 MG/ML
INJECTION, SOLUTION INTRAVENOUS CONTINUOUS
Status: DISCONTINUED | OUTPATIENT
Start: 2020-07-24 | End: 2020-07-24

## 2020-07-24 RX ADMIN — OCTREOTIDE ACETATE 50 MCG/HR: 500 INJECTION, SOLUTION INTRAVENOUS; SUBCUTANEOUS at 05:07

## 2020-07-24 RX ADMIN — PANTOPRAZOLE SODIUM 40 MG: 40 INJECTION, POWDER, FOR SOLUTION INTRAVENOUS at 08:07

## 2020-07-24 RX ADMIN — THERA TABS 1 TABLET: TAB at 08:07

## 2020-07-24 RX ADMIN — DEXTROSE: 5 SOLUTION INTRAVENOUS at 02:07

## 2020-07-24 RX ADMIN — CEFTRIAXONE 1 G: 1 INJECTION, SOLUTION INTRAVENOUS at 06:07

## 2020-07-24 RX ADMIN — LEVETIRACETAM INJECTION 1500 MG: 15 INJECTION INTRAVENOUS at 08:07

## 2020-07-24 RX ADMIN — DIAZEPAM 10 MG: 10 INJECTION, SOLUTION INTRAMUSCULAR; INTRAVENOUS at 05:07

## 2020-07-24 RX ADMIN — DEXMEDETOMIDINE HYDROCHLORIDE 0.2 MCG/KG/HR: 4 INJECTION, SOLUTION INTRAVENOUS at 02:07

## 2020-07-24 RX ADMIN — FOLIC ACID 1 MG: 1 TABLET ORAL at 08:07

## 2020-07-24 RX ADMIN — ENOXAPARIN SODIUM 40 MG: 40 INJECTION SUBCUTANEOUS at 08:07

## 2020-07-24 RX ADMIN — DEXMEDETOMIDINE HYDROCHLORIDE 0.2 MCG/KG/HR: 4 INJECTION, SOLUTION INTRAVENOUS at 05:07

## 2020-07-24 RX ADMIN — LORAZEPAM 1 MG: 2 INJECTION INTRAMUSCULAR; INTRAVENOUS at 01:07

## 2020-07-24 RX ADMIN — THIAMINE HYDROCHLORIDE 100 MG: 100 INJECTION, SOLUTION INTRAMUSCULAR; INTRAVENOUS at 08:07

## 2020-07-24 RX ADMIN — DIAZEPAM 10 MG: 10 INJECTION, SOLUTION INTRAMUSCULAR; INTRAVENOUS at 06:07

## 2020-07-24 NOTE — NURSING
Pt uncooperative and restless. Pt pulled out PIV, pulled off monitor cables, multiple attempts to get out of bed. Shea Canales NP notified. See new orders.

## 2020-07-24 NOTE — EICU
eICU Note :  555- Seizure related to ETOH withdrawal.  44yo.  male.  Called by the [facility]:    Problem:  44 y/o presented with seizures   85-year-old male known case of alcohol abuse, alcoholic hepatitis, ascites, GI bleed, portal hepatic gastropathy, presented with witnessed seizure by his girlfriend.  He was helped to the ground without hitting the head.  He was confused and tremulous at that time.  He said his last drink was 2 days ago.  He drinks half pint of liquor per day.    Pertinent History and labs reviewed :  Vitals:    07/23/20 1550 07/23/20 1900 07/23/20 2115   BP: 108/67 119/77 123/77   Pulse: 87 85 83   Resp: (!) 21 (!) 27 19   SpO2: 98% 100% 100%   Labs:   WBC 5.68, hemoglobin 10.6, hematocrit 29.5, platelets 42  Sodium 124, potassium 3.6, chloride 94, CO2 22, anion gap 8, BUN 11, creatinine 0.8, EGFR greater than 60, glucose 95,calcium 8.2, TSH 2.6  CT Neck: negative.  CT Head Mastoid: Left Mastoid effusion    Treatment /Intervention given:  1.ETOH withdrawal /DT/ETOH withdrawal seizures : agent on Ativan when necessary, Valium 10 Mill grams every 6 schedule.  On CIWA scale, Keppra for seizures.  Neurology consulted, psychiatry consulted.  On folate and thiamine.  2.Hyponatremia; Beer potomania versus other causes.  Urine /serum sodium ,status post 1 L normal saline in the emergency room.  On maintenance infusion normal saline at 75 cc an hour.  BMP every 4  3.GI Bleed: BRBPR :need to rule out variceal bleed.on Protonix 40 mg twice a day, octreotide bolus followed by GTT, on ceftriaxone.  Follow-up CBC, GI consult  4.PUD DVT prophylaxis; SCD and PPI  5:19 AM  Precedex GTT        Naila Myers M.D  eICU Physician

## 2020-07-24 NOTE — NURSING
Pt making multiple attempts to get out of bed. Pt is restrained.  Multiple attempts made to reorient patient.  Pt oriented to self and time. PIV  removed my pt. EICU notified.

## 2020-07-24 NOTE — ASSESSMENT & PLAN NOTE
- likely nutritional, ? Beer potomania?  - check urine Na, osm  - s/p 1L NS in ED, will continue at 75/hr  - BMP q4  - consult Nephrology

## 2020-07-24 NOTE — CONSULTS
"[facility] ICU 5th Floor  Psychiatric Evaluation  Consult Note    Diagnostic Interview - CPT 75215    Patient Name: Nilesh Rolon Jr.  MRN: [id number]   Patient Class: IP- Inpatient  Admission Date: 7/23/2020  Hospital Length of Stay: 1 days  Attending Physician: Otoniel Espinoza, *  Primary Care Provider: Bradford Vega DO, DO (Inactive)    Consults    Identification Data: This is a 45 y.o. White male who was admitted to [facility]. This is a consult requested by Olga Rothman for psych evaluation.  Patient is not on a PEC status      Chief Complaint:  'I had tremors"    History of Present Illness:   According to H and P patient had seizures and was drinking alcohol, he had bleeding per rectum also.  According to wife, patient has been drinking for a while but it escalated and  got out of control after death of his dad 2 years ago.  He is drinking about 4-6 shots of vodka after his work.  He gets shakes and tried to quit the by his own 2 or 3 times but not able to quit.  He has a history of for withdrawal shakes and  hallucination but never had seizures , this is the 1st time she got scared because of the seizure activity.  The other was some concern about hallucination but the patient does not remember hallucinating and wife did not see hallucinations either.  He was started on Precedex and Valium. He was seen by [facility] authority and was prescribed naltrexone and trazodone.  He can not sleep without his trazodone and wakes up at 5:00 a.m..  He is worried about his health and always complained about pain in his knee.  He was suspected for lupus but he did not go for follow-up due to fear.  Patient showed denial about his alcohol dependence and always denies the recommendation of inpatient rehab by liver  doctors.  He admitted some anxiety but no panic attacks.  He gets some sadness now and then but does not feel like hurting himself or crying spells.  He " looks sad drowsy and disoriented at this time.  He did know where he is at and was not able to tell day, day, month and year.  He was able to tell that he is in ICU and question was asked after 5 min, he did know the name of this hospital.  He denies use of drugs his sodium level was low upon arrival   Past Psychiatric History:  Patient has no previous inpatient psychiatric treatment.  However he was referred by his liver doctor to [facility] authority for alcohol meetings.  He has no history of suicide or homicide.  He is on trazodone and naltrexone.  He has no history of full rehab but had 1 DWI long ago.    Past Medical History:  Past Medical History:   Diagnosis Date    Anxiety     Arthritis     Other meniscus derangements, other medial meniscus, left knee 1/7/2019         Social History:  Patient was born and raised in [place].  He has good childhood.  He has high school diploma.  He is single and has 1 daughter who lives with mom in [place].  He is .  He has knows family history of mental illness suicide or addiction problem   reports that he has never smoked. He has never used smokeless tobacco. He reports previous alcohol use. He reports that he does not use drugs.             Psychotherapeutics (From admission, onward)    Start     Stop Route Frequency Ordered    07/25/20 0900  escitalopram oxalate tablet 5 mg      -- Oral Daily 07/24/20 1643    07/24/20 0615  dexmedetomidine (PRECEDEX) 400mcg/100mL 0.9% NaCL infusion     Question Answer Comment   Titrate by (in mcg/kg/hr): 0.2    Titrate interval: (in minutes) 5    To maintain a RASS goal of: RASS (0) Alert and calm    Maximum dose of (in mcg/kg/hr): 1        -- IV Continuous 07/24/20 0501    07/23/20 1900  diazePAM injection 10 mg      -- IV Every 6 hours 07/23/20 1759    07/23/20 1853  lorazepam (ATIVAN) injection 1 mg      -- IV Every hour PRN 07/23/20 1753        His sodium is now while new 33  bilirubin is 2.4 ears she 104 ALT 20 for urine shows 2+ bilirubin and night try CBC shows hemoglobin 10.5 and platelets 48 he also has a history of hypertension.  He has UTI this time.  He is on Valium thiamine    Current Evaluation:     Mental Status Exam:  This is a 45-year-old  male who looks older than his stated age.  He thought that he is 25 instead of saying 45.  He was not able to tell day, day ,month and year.  He is drowsy at this time.  He has poor attention and concentration.  He distracted easily.  His speech is garbled, due to sedation.  He denies hearing voices or seeing things.  He is not agitated nor combative or paranoid.  He is not threatening to harm to self or others.  Insight and judgment are impaired.  He is of average intelligent person.  He has adequate fund of knowledge    Psychiatric Diagnosis:  AXIS I:  Alcohol use disorder, severe with perceptual disturbance, alcohol withdrawal                  Anxiety disorder NOS  AXIS II:  No diagnosis    AXIS III:  Alcoholic hepatitis, UTI, arthritis    AXIS IV:  Unable to quit drinking, missing his dad medical problems    AXIS V:  30    Assessment, Recommendation and Plans:   Agreed with the use of Valium, thiamine and folic acid  Initiate Lexapro 5 mg p.o. q.a.m. and increase to 10 mg in couple of days once out of for withdrawal phase and and delirium.  Will consider [facility] this [facility] to [facility] for detox and rehab.    Prognosis:  Fair    Able to Give Consent:   No  Strengths and Liabilities:   Patient is skilled and has supportive significant other at home    Discharge Plans:      Justin Dawkins MD  Psychiatry  [facility] ICU 5th Floor

## 2020-07-24 NOTE — PROGRESS NOTES
"Neurology Progress Note:    Reason for consult: seizures    HPI:  45y M with medical hx of anxiety, alcohol abuse, alcoholic hepatitis and GI bleed who was brought in by EMS after a witnessed seizure by girlfriend. Patient was walking down the stairs today when he had a generalized convulsive vs tremulous activity and fell to the ground hitting his head.   On arrival to ED, patient was confused. He was administered ativan 2 mg IV as exhibiting signs of DTs, and also administered keppra load.  He has experienced alcohol withdrawal seizures in the past. Last drink 2 days ago, drinks daily liqor (1/2 pint)    Other symptoms: GI bleed - BRB per rectum. Auditory hallucinations +.     Interval Changes:  Patient intermittently confused and restless overnight. Patient has central line in and was restrained overnight. Patient currently restrained, somnolent, but answers questions and follows commands. Patient has EEG pending for later today, 7/24/2020. On exam, patient A&O to name/place, but stated the year was "The 50's" and that he was "here for a checkup."    ROS: 12 point ROS negative except as above    Past Medical History:   Diagnosis Date    Anxiety     Arthritis     Other meniscus derangements, other medial meniscus, left knee 1/7/2019     Past Surgical History:   Procedure Laterality Date    ARTHROSCOPY OF KNEE Left 1/7/2019    Procedure: ARTHROSCOPY, KNEE;  Surgeon: Derick Kirby MD;  Location: [facility] OR;  Service: Orthopedics;  Laterality: Left;    ESOPHAGOGASTRODUODENOSCOPY N/A 4/8/2019    Procedure: EGD (ESOPHAGOGASTRODUODENOSCOPY);  Surgeon: Bonita Kendall MD;  Location: [facility] ENDO;  Service: Endoscopy;  Laterality: N/A;     Family History   Problem Relation Age of Onset    Birth defects Neg Hx      Social History     Socioeconomic History    Marital status: Single     Spouse name: Not on file    Number of children: Not on file    Years of education: Not on file    Highest education level: Not on file "   Occupational History    Not on file   Social Needs    Financial resource strain: Not on file    Food insecurity     Worry: Not on file     Inability: Not on file    Transportation needs     Medical: Not on file     Non-medical: Not on file   Tobacco Use    Smoking status: Never Smoker    Smokeless tobacco: Never Used   Substance and Sexual Activity    Alcohol use: Not Currently     Comment: last drink 1/1/20    Drug use: No    Sexual activity: Yes     Partners: Female   Lifestyle    Physical activity     Days per week: Not on file     Minutes per session: Not on file    Stress: Not on file   Relationships    Social connections     Talks on phone: Not on file     Gets together: Not on file     Attends Jewish service: Not on file     Active member of club or organization: Not on file     Attends meetings of clubs or organizations: Not on file     Relationship status: Not on file   Other Topics Concern    Not on file   Social History Narrative    Not on file     No current facility-administered medications on file prior to encounter.      Current Outpatient Medications on File Prior to Encounter   Medication Sig Dispense Refill    folic acid (FOLVITE) 1 MG tablet Take 1 tablet (1 mg total) by mouth once daily. 30 tablet 3    multivitamin capsule Take 1 capsule by mouth once daily.      naltrexone (DEPADE) 50 mg tablet Take 50 mg by mouth once daily.  3    pantoprazole (PROTONIX) 40 MG tablet Take 1 tablet (40 mg total) by mouth once daily. 30 tablet 3    propranolol (INDERAL) 10 MG tablet Take 10 mg by mouth once daily.  3       Current Facility-Administered Medications:     cefTRIAXone (ROCEPHIN) 1 g/50 mL D5W IVPB, 1 g, Intravenous, Q24H, Otoniel Espinoza MD, 1 g at 07/23/20 2054    dexmedetomidine (PRECEDEX) 400mcg/100mL 0.9% NaCL infusion, 0.2 mcg/kg/hr, Intravenous, Continuous, Naila Murguia MD, Last Rate: 8.3 mL/hr at 07/24/20 0710, 0.3 mcg/kg/hr at 07/24/20 0710    diazePAM  injection 10 mg, 10 mg, Intravenous, Q6H, Otoniel Espinoza MD, 10 mg at 07/24/20 0510    folic acid tablet 1 mg, 1 mg, Oral, Daily, Otoniel Espinoza MD, 1 mg at 07/24/20 0803    levETIRAcetam in NaCl (iso-os) IVPB 1,500 mg, 1,500 mg, Intravenous, BID, Otoniel Espinoza MD, 1,500 mg at 07/24/20 0802    lorazepam (ATIVAN) injection 1 mg, 1 mg, Intravenous, Q1H PRN, Otoniel Espinoza MD, 1 mg at 07/24/20 0126    multivitamin tablet, 1 tablet, Oral, Daily, Otoniel Espinoza MD, 1 tablet at 07/24/20 0803    [COMPLETED] octreotide injection 50 mcg, 50 mcg, Intravenous, Once, 50 mcg at 07/23/20 2038 **AND** octreotide (SANDOSTATIN) 500 mcg in sodium chloride 0.9% 100 mL infusion, 50 mcg/hr, Intravenous, Continuous, Otoniel Espinoza MD, Last Rate: 10 mL/hr at 07/24/20 0504, 50 mcg/hr at 07/24/20 0504    pantoprazole injection 40 mg, 40 mg, Intravenous, BID, Otoniel Espinoza MD, 40 mg at 07/24/20 0803    thiamine (B-1) 100 mg in dextrose 5 % 50 mL IVPB, 100 mg, Intravenous, Daily, Otoniel Espinoza MD, Last Rate: 12.5 mL/hr at 07/24/20 0802, 100 mg at 07/24/20 0802    Exam:  Vitals:    07/24/20 0702   BP: 107/63   Pulse: 71   Resp: 16   Temp:      GENERAL/CONSTITUTIONAL:   Scleral edema,  Increased BMI  Tremulous  Tongue bite +    CV:  -NRRR; limbs warm and well-perfused     HIGHER INTEGRATIVE FUNCTIONS:   -Alert  -Oriented to place and person  -Recent and remote memory impaired but difficult to test as patient appears anxious and distracted   -Speech with mild dysarthria, no aphasia  -Normal fund of knowledge     CN:   -visual fields intact  -PERRL  -EOMI, nystagmus +bilateral in horizontal direction  -Facial sensation intact bilaterally  -Facial strength symmetrical  -Hearing normal BL  -Palate elevates symmetrically  -Normal shoulder shrug and head turn  -Tongue protrudes midline      MOTOR:   -Tone: normal in upper and lower extremities  -RUE/RLE 5/5  -LUE/LLE  5/5     SENSORY:   -Sensation normal bilaterally to light touch  Intact to vibration BL at toes     REFLEXES:   -2+ bilateral symmetric   -Flexor plantar reflex bilaterally  -No clonus     GAIT & STATION:   Patient with DTs, and confused. Fall risk +    Imaging and Laboratory results:  CT head without contrast - No acute intracranial abnormalities. Inferior left mastoid effusion, no findings to suggest mastoid air cell coalescence.  CT C-spine without contrast - No acute displaced fracture or dislocation of the cervical spine     UA with nitrite positive  Drug screen - negative  Ethanol - < 10    CBC with normal white count, normocytic anemia, thrombocytopenia   Hyponatremia on CMP, urine sodium low      Assessment and Recommendations:  45 y M with multiple medical comorbidities related to alcohol abuse, including GIB, ascites, jaundice who presents with Delerium tremens and alcohol withdrawal seizure. Last drink 2 days ago, daily drinker. Neuro exam intact except for some abnormalities in affect, attention and nystagmus.  Other medical work up evident for hyponatremia, covid 19 positivity.    Recommendations:  1) CT head negative for acute intracranial abnormalities.   2) Remain in ICU for DTs and hyponatremia. q1 neurochecks, telemetry  3) Received load of keppra. Continue keppra 1500 mg IV BID  4) Spot EEG PENDING as confusion and auditory hallucinations (likely related to alcohol withdrawal).  5) Correction of hyponatremia per primary team.  6) DTs management per CIWA protocol and ICU management  7) RPR negative, TSH WNL, B12 elevated; Recommended other labs: HIV, Hba1c, Vit B1, and ammonia pending  8)  B1 supplementation       Will follow up on tests.  Please call for questions.     Gen Sanz DO  [facility], PGY-1  [facility]

## 2020-07-24 NOTE — H&P
[facility] Hospital Medicine  History & Physical    Patient Name: Nilesh Rolon Jr.  MRN: [id number]  Admission Date: 7/23/2020  Attending Physician: Otoniel Espinoza MD  Primary Care Provider: Bradford Vega DO, DO (Inactive)         Patient information was obtained from patient, EMS personnel, past medical records and ER records.     Subjective:     Principal Problem:<principal problem not specified>    Chief Complaint:   Chief Complaint   Patient presents with    Seizures        HPI: Mr. Rolon is a 46yo man with anxiety, history of alcohol abuse with DTs in the past, alcoholic hepatitis c/b ascites and PHG with GIB who presents following a seizure. Per report, he had witnessed seizure by girlfriend and was helped to the ground without hitting his head. Was confused and tremorous at that time. He states that last drink was 2 days ago; he drinks 1/2 pint of liquor per day. He reports that he also had bloody bowel movements today; per nursing in ED, he had BRB on rectal exam. He states that he is experiencing auditory hallucinations.    Received 2mg IV ativan and was loaded with IV Keppra in the ED.    Past Medical History:   Diagnosis Date    Anxiety     Arthritis     Other meniscus derangements, other medial meniscus, left knee 1/7/2019       Past Surgical History:   Procedure Laterality Date    ARTHROSCOPY OF KNEE Left 1/7/2019    Procedure: ARTHROSCOPY, KNEE;  Surgeon: Derick Kirby MD;  Location: [facility] OR;  Service: Orthopedics;  Laterality: Left;    ESOPHAGOGASTRODUODENOSCOPY N/A 4/8/2019    Procedure: EGD (ESOPHAGOGASTRODUODENOSCOPY);  Surgeon: Bonita Kendall MD;  Location: [facility] ENDO;  Service: Endoscopy;  Laterality: N/A;       Review of patient's allergies indicates:  No Known Allergies    No current facility-administered medications on file prior to encounter.      Current Outpatient Medications on File Prior to Encounter   Medication Sig    folic acid (FOLVITE) 1  MG tablet Take 1 tablet (1 mg total) by mouth once daily.    multivitamin capsule Take 1 capsule by mouth once daily.    naltrexone (DEPADE) 50 mg tablet Take 50 mg by mouth once daily.    pantoprazole (PROTONIX) 40 MG tablet Take 1 tablet (40 mg total) by mouth once daily.    propranolol (INDERAL) 10 MG tablet Take 10 mg by mouth once daily.     Family History     None        Tobacco Use    Smoking status: Never Smoker    Smokeless tobacco: Never Used   Substance and Sexual Activity    Alcohol use: Not Currently     Comment: last drink 1/1/20    Drug use: No    Sexual activity: Yes     Partners: Female     Review of Systems   Constitutional: Positive for diaphoresis. Negative for chills and fever.   HENT: Negative for congestion and sore throat.    Eyes: Negative for discharge and visual disturbance.   Respiratory: Negative for cough and shortness of breath.    Cardiovascular: Negative for chest pain and leg swelling.   Gastrointestinal: Negative for abdominal pain, nausea and vomiting.   Genitourinary: Negative for dysuria and flank pain.   Musculoskeletal: Negative for arthralgias and joint swelling.   Skin: Negative for rash and wound.   Allergic/Immunologic: Negative for immunocompromised state.   Neurological: Positive for tremors and seizures. Negative for light-headedness and headaches.   Psychiatric/Behavioral: Positive for confusion. Negative for agitation. The patient is nervous/anxious.      Objective:     Vital Signs (Most Recent):  Temp: (P) 98.6 °F (37 °C) (07/23/20 1904)  Pulse: 87 (07/23/20 1550)  Resp: (!) 21 (07/23/20 1550)  BP: 108/67 (07/23/20 1550)  SpO2: 98 % (07/23/20 1550) Vital Signs (24h Range):  Temp:  [98.4 °F (36.9 °C)-98.9 °F (37.2 °C)] (P) 98.6 °F (37 °C)  Pulse:  [] 87  Resp:  [15-36] 21  SpO2:  [96 %-99 %] 98 %  BP: (108-173)/() 108/67     Weight: 107 kg (236 lb)  Body mass index is 32.01 kg/m².    Physical Exam  Vitals signs reviewed.   Constitutional:        General: He is in acute distress (uncomfortable appearing).      Appearance: He is well-developed. He is diaphoretic.   HENT:      Head: Normocephalic and atraumatic.      Nose: Nose normal.      Mouth/Throat:      Comments: Contusion on tongue  Eyes:      General: No scleral icterus.     Comments: Horizontal nystagmus   Neck:      Musculoskeletal: Normal range of motion.      Vascular: No JVD.      Trachea: No tracheal deviation.   Cardiovascular:      Rate and Rhythm: Regular rhythm. Tachycardia present.      Heart sounds: Normal heart sounds. No murmur. No friction rub. No gallop.    Pulmonary:      Effort: Pulmonary effort is normal. No respiratory distress.      Breath sounds: Normal breath sounds.   Abdominal:      General: There is no distension.      Palpations: Abdomen is soft. There is no mass.      Tenderness: There is no abdominal tenderness.      Hernia: No hernia is present.   Musculoskeletal:         General: No deformity.   Skin:     General: Skin is warm.      Findings: No rash.   Neurological:      Mental Status: He is alert and oriented to person, place, and time.      Comments: Tremorous.   Psychiatric:         Attention and Perception: He perceives auditory hallucinations.         Mood and Affect: Mood is anxious.         Behavior: Behavior normal.             Significant Labs: All pertinent labs within the past 24 hours have been reviewed.    Significant Imaging: I have reviewed all pertinent imaging results/findings within the past 24 hours.    Assessment/Plan:     Alcohol withdrawal seizure with perceptual disturbance  - given 2mg IV ativan in ED; still with significant symptoms  - initiate on valium 10mg q6 scheduled with breakthrough ativan 1mg prn seizures or CIWA >8  - serial CIWAs  - loaded with Keppra in ED  - Neurology consulted  - Psych consulted  - will monitor in ICU for now; may need precedex if develops agitation as has occurred in the past  - folate, thiamine, MVI  - seizure  precautions    Alcoholic liver disease  - history of alcoholic hepatitis with ascites in the past  - AUS      Hyponatremia  - likely nutritional, ? Beer potomania?  - check urine Na, osm  - s/p 1L NS in ED, will continue at 75/hr  - BMP q4  - consult Nephrology    Alcohol abuse  - history of substantial EtOH use  - thiamine, folate, multivitamin  - consult Psych      Thrombocytopenia  - likely 2/2 liver disease/EtOH  - monitor with CBC      GI bleed  - BRBPR  - history of PHG in the past  - given history of liver disease and heavy EtOH use, will need to r/o variceal bleed although less likely  - GI bleed pathway initiated  - 2LBIV  - initiate on IV protonix 40mg bid, IV octreotide bolus->gtt, and IV ceftriaxone for now  - trend CBC tid  - orthostatic vitals  - GI consulted      VTE Risk Mitigation (From admission, onward)         Ordered     IP VTE HIGH RISK PATIENT  Once      07/23/20 1757     Place sequential compression device  Until discontinued      07/23/20 1757               50 minutes of critical care time was spent personally by me on the following activities: development of treatment plan with patient or surrogate and bedside caregivers, discussions with consultants, evaluation of patient's response to treatment, examination of patient, ordering and performing treatments and interventions, ordering and review of laboratory studies, ordering and review of radiographic studies, pulse oximetry, re-evaluation of patient's condition. This critical care time did not overlap with that of any other provider or involve time for any procedures.      Otoniel Espinoza MD  Department of Hospital Medicine   [facility]

## 2020-07-24 NOTE — ASSESSMENT & PLAN NOTE
- given 2mg IV ativan in ED; still with significant symptoms  - initiate on valium 10mg q6 scheduled with breakthrough ativan 1mg prn seizures or CIWA >8  - serial CIWAs  - loaded with Keppra in ED  - Neurology consulted  - Psych consulted  - will monitor in ICU for now; may need precedex if develops agitation as has occurred in the past  - folate, thiamine, MVI  - seizure precautions

## 2020-07-24 NOTE — NURSING
Pt has intermittent confusion, opens eyes spontaneously, and unable to follow commands. RR 18-28 breaths per minute. Pulse ox measuring > 95% on room air. Pt restrained due to interference in care. Central line infusing w/o difficulty. Pt repositioned Q2H with use of pillows. Heels floated off mattress at all times.Tubing and other devices remain free from under the patient. Condom catheter care provided. Frequent rounds made to assess for safety and discomfort, fall precautions maintained. Call bell within reach. Will continue to monitor

## 2020-07-24 NOTE — CONSULTS
[facility] ICU 5th Floor  Psychiatric Evaluation  Consult Note    Diagnostic Interview - CPT 41649    Patient Name: Nilesh Rolon Jr.  MRN: [id number]   Patient Class: IP- Inpatient  Admission Date: 7/23/2020  Hospital Length of Stay: 1 days  Attending Physician: Otoniel Espinoza, *  Primary Care Provider: Bradford Vega DO, DO (Inactive)    Consults    Identification Data: This is a 45 y.o. White male who was admitted to [facility]. This is a consult requested by Olga Rothman for psych evaluation.       Chief Complaint:      History of Present Illness:      Past Psychiatric History:      Past Medical History:  Past Medical History:   Diagnosis Date    Anxiety     Arthritis     Other meniscus derangements, other medial meniscus, left knee 1/7/2019         Social History:   reports that he has never smoked. He has never used smokeless tobacco. He reports previous alcohol use. He reports that he does not use drugs.             Psychotherapeutics (From admission, onward)    Start     Stop Route Frequency Ordered    07/25/20 0900  escitalopram oxalate tablet 5 mg      -- Oral Daily 07/24/20 1643    07/24/20 0615  dexmedetomidine (PRECEDEX) 400mcg/100mL 0.9% NaCL infusion     Question Answer Comment   Titrate by (in mcg/kg/hr): 0.2    Titrate interval: (in minutes) 5    To maintain a RASS goal of: RASS (0) Alert and calm    Maximum dose of (in mcg/kg/hr): 1        -- IV Continuous 07/24/20 0501    07/23/20 1900  diazePAM injection 10 mg      -- IV Every 6 hours 07/23/20 1759    07/23/20 1853  lorazepam (ATIVAN) injection 1 mg      -- IV Every hour PRN 07/23/20 1753            Current Evaluation:     Mental Status Exam:      Psychiatric Diagnosis:  AXIS I:     AXIS II:     AXIS III:     AXIS IV:     AXIS V:     Assessment, Recommendation and Plans:         Prognosis:     Able to Give Consent:       Strengths and Liabilities:       Discharge Plans:      Justin Dawkins  MD  Psychiatry  [facility] ICU 5th Floor

## 2020-07-24 NOTE — NURSING
Received pt from ED at 2100. Pt's belongings placed in drawer. Connected patient to monitor. Anesthesia consulted for central line placement.     2230  Central Line placed. Verified by xray.  Medications infusing.

## 2020-07-24 NOTE — PLAN OF CARE
 Seizures         HPI: Mr. Rolon is a 44yo man with anxiety, history of alcohol abuse with DTs in the past, alcoholic hepatitis c/b ascites and PHG with GIB who presents following a seizure. Per report, he had witnessed seizure by girlfriend and was helped to the ground without hitting his head. Was confused and tremorous at that time. He states that last drink was 2 days ago; he drinks 1/2 pint of liquor per day. He reports that he also had bloody bowel movements today; per nursing in ED, he had BRB on rectal exam. He states that he is experiencing auditory hallucinations.     The pt's currently in ICU. The Sw met with the pt to complete the assessment. The pt was able to answer all the questions the Sw asked but the Sw also spoke to the pt's s/o Jazmine Roby([phone])via phone to gain further clarity. The pt and Jazmine live in [place]. The pt's independent with his adl's and he doesn't use any dme. The pt's self-employed as a . Jazmine asked the Sw not to tell the pt's mother Laurel Rolon [phone] the reason why he's in the hospital b/c she will take his car from him again. The Sw educated the pt on the dangers of drinking and he states he kind of has a little drinking problem not a big one. He informed the Sw he likes Vodka and lemonade after a hard day of work. The Sw gave her the contact info for the [facility] for [facility]([phone])    Jazmine states the pt needs to go to an IP rehab facility. She has a list given to her by the pt's liver doctor at [facility]. She has been reviewing the list and states she will start calling around. The Sw offered assistance to her.The Sw informed her the pt has to agree to go into a facility b/c the pt told the Sw he has never been in a facility and isn't interested in going to one. The pt is established with [facility]) [phone] Dr. Vega. He prescribed the pt Naltrexone to help with the alcohol  craving but Jazmine states it didn't work. [facility] offers out pt and residential substance abuse services,crisis intervention and Case Management. Jazmine feels the pt's in denial with his alcohol problems. She will call [facility] also b/c she didn't know they offer the services listed above. She feels since the death of the pt's father in 2018 he has been drinking heavily. The Sw left her name and contact info with Jazmine and encouraged her to call if she has any further questions or concerns. The Sw left a d/c brochure at bedside for the pt with her name and contact info on it. The Sw will continue to follow the pt throughout his transitions of care and will assist with any d/c needs.        07/24/20 1036   Discharge Assessment   Assessment Type Discharge Planning Assessment   Confirmed/corrected address and phone number on facesheet? Yes   Assessment information obtained from? Patient;Caregiver   Prior to hospitilization cognitive status: Alert/Oriented   Prior to hospitalization functional status: Independent   Current cognitive status: Not Oriented to Place;Not Oriented to Time   Current Functional Status: Needs Assistance   Lives With significant other   Able to Return to Prior Arrangements yes   Is patient able to care for self after discharge? Yes   Who are your caregiver(s) and their phone number(s)? Jazmine Ca(s/o)[phone]  Laurel Rolon(mother)[phone]   Patient's perception of discharge disposition admitted as an inpatient   Readmission Within the Last 30 Days no previous admission in last 30 days   Patient currently being followed by outpatient case management? No   Patient currently receives any other outside agency services? No   Equipment Currently Used at Home none   Do you have any problems affording any of your prescribed medications? No  (the pt receives his meds affordably at [facility] in [place])   Is the patient taking medications as prescribed? yes   Does the patient have transportation  home? Yes   Transportation Anticipated family or friend will provide   Does the patient receive services at the [facility]? No   Discharge Plan A Home with family   Discharge Plan B Psychiatric hospital   DME Needed Upon Discharge  other (see comments)  (TBD)   Patient/Family in Agreement with Plan yes

## 2020-07-24 NOTE — ASSESSMENT & PLAN NOTE
- BRBPR  - history of PHG in the past  - given history of liver disease and heavy EtOH use, will need to r/o variceal bleed although less likely  - GI bleed pathway initiated  - 2LBIV  - initiate on IV protonix 40mg bid, IV octreotide bolus->gtt, and IV ceftriaxone for now  - trend CBC tid  - orthostatic vitals  - GI consulted

## 2020-07-24 NOTE — SUBJECTIVE & OBJECTIVE
Past Medical History:   Diagnosis Date    Anxiety     Arthritis     Other meniscus derangements, other medial meniscus, left knee 1/7/2019       Past Surgical History:   Procedure Laterality Date    ARTHROSCOPY OF KNEE Left 1/7/2019    Procedure: ARTHROSCOPY, KNEE;  Surgeon: Derick Kirby MD;  Location: [facility] OR;  Service: Orthopedics;  Laterality: Left;    ESOPHAGOGASTRODUODENOSCOPY N/A 4/8/2019    Procedure: EGD (ESOPHAGOGASTRODUODENOSCOPY);  Surgeon: Bonita Kendall MD;  Location: [facility] ENDO;  Service: Endoscopy;  Laterality: N/A;       Review of patient's allergies indicates:  No Known Allergies    No current facility-administered medications on file prior to encounter.      Current Outpatient Medications on File Prior to Encounter   Medication Sig    folic acid (FOLVITE) 1 MG tablet Take 1 tablet (1 mg total) by mouth once daily.    multivitamin capsule Take 1 capsule by mouth once daily.    naltrexone (DEPADE) 50 mg tablet Take 50 mg by mouth once daily.    pantoprazole (PROTONIX) 40 MG tablet Take 1 tablet (40 mg total) by mouth once daily.    propranolol (INDERAL) 10 MG tablet Take 10 mg by mouth once daily.     Family History     None        Tobacco Use    Smoking status: Never Smoker    Smokeless tobacco: Never Used   Substance and Sexual Activity    Alcohol use: Not Currently     Comment: last drink 1/1/20    Drug use: No    Sexual activity: Yes     Partners: Female     Review of Systems   Constitutional: Positive for diaphoresis. Negative for chills and fever.   HENT: Negative for congestion and sore throat.    Eyes: Negative for discharge and visual disturbance.   Respiratory: Negative for cough and shortness of breath.    Cardiovascular: Negative for chest pain and leg swelling.   Gastrointestinal: Negative for abdominal pain, nausea and vomiting.   Genitourinary: Negative for dysuria and flank pain.   Musculoskeletal: Negative for arthralgias and joint swelling.   Skin: Negative for rash  and wound.   Allergic/Immunologic: Negative for immunocompromised state.   Neurological: Positive for tremors and seizures. Negative for light-headedness and headaches.   Psychiatric/Behavioral: Positive for confusion. Negative for agitation. The patient is nervous/anxious.      Objective:     Vital Signs (Most Recent):  Temp: (P) 98.6 °F (37 °C) (07/23/20 1904)  Pulse: 87 (07/23/20 1550)  Resp: (!) 21 (07/23/20 1550)  BP: 108/67 (07/23/20 1550)  SpO2: 98 % (07/23/20 1550) Vital Signs (24h Range):  Temp:  [98.4 °F (36.9 °C)-98.9 °F (37.2 °C)] (P) 98.6 °F (37 °C)  Pulse:  [] 87  Resp:  [15-36] 21  SpO2:  [96 %-99 %] 98 %  BP: (108-173)/() 108/67     Weight: 107 kg (236 lb)  Body mass index is 32.01 kg/m².    Physical Exam  Vitals signs reviewed.   Constitutional:       General: He is in acute distress (uncomfortable appearing).      Appearance: He is well-developed. He is diaphoretic.   HENT:      Head: Normocephalic and atraumatic.      Nose: Nose normal.      Mouth/Throat:      Comments: Contusion on tongue  Eyes:      General: No scleral icterus.     Comments: Horizontal nystagmus   Neck:      Musculoskeletal: Normal range of motion.      Vascular: No JVD.      Trachea: No tracheal deviation.   Cardiovascular:      Rate and Rhythm: Regular rhythm. Tachycardia present.      Heart sounds: Normal heart sounds. No murmur. No friction rub. No gallop.    Pulmonary:      Effort: Pulmonary effort is normal. No respiratory distress.      Breath sounds: Normal breath sounds.   Abdominal:      General: There is no distension.      Palpations: Abdomen is soft. There is no mass.      Tenderness: There is no abdominal tenderness.      Hernia: No hernia is present.   Musculoskeletal:         General: No deformity.   Skin:     General: Skin is warm.      Findings: No rash.   Neurological:      Mental Status: He is alert and oriented to person, place, and time.      Comments: Tremorous.   Psychiatric:         Attention  and Perception: He perceives auditory hallucinations.         Mood and Affect: Mood is anxious.         Behavior: Behavior normal.             Significant Labs: All pertinent labs within the past 24 hours have been reviewed.    Significant Imaging: I have reviewed all pertinent imaging results/findings within the past 24 hours.

## 2020-07-24 NOTE — ANESTHESIA PROCEDURE NOTES
Central Line    Diagnosis: hyponatremia  Patient location during procedure: ICU  Procedure start time: 7/23/2020 9:55 PM  Timeout: 7/23/2020 9:54 PM  Procedure end time: 7/23/2020 10:24 PM    Staffing  Authorizing Provider: Elton Steen MD  Performing Provider: Julee Armando MD    Staffing  Anesthesiologist: Elton Steen MD  Resident/CRNA: Julee Armando MD  Performed: resident/CRNA   Anesthesiologist was present at the time of the procedure.  Preanesthetic Checklist  Completed: patient identified, timeout performed, IV checked, risks and benefits discussed, monitors and equipment checked and anesthesia consent given  Indication   Indication: med administration     Anesthesia   local infiltration    Central Line   Skin Prep: skin prepped with ChloraPrep, skin prep agent completely dried prior to procedure  maximum sterile barriers used during central venous catheter insertion  hand hygiene performed prior to central venous catheter insertion  Location: right, internal jugular.   Catheter type: triple lumen  Catheter Size: 7 Fr  Inserted Catheter Length: 13 cm  Ultrasound: vascular probe with ultrasound  Vessel Caliber: medium, patent  Vascular Doppler:  not done, compressibility normal  Needle advanced into vessel with real time Ultrasound guidance.  Guidewire confirmed in vessel.  Image recorded and saved.  Manometry: none  Insertion Attempts: 1   Securement:line sutured, chlorhexidine patch, sterile dressing applied and blood return through all ports    Post-Procedure   X-Ray: no pneumothorax on x-ray, placement verified by x-ray and successful placement  Adverse Events:none    Guidewire Guidewire removed intact. Guidewire removed intact, verified with nurse.  Additional Notes  VSS. Tolerated procedure well.

## 2020-07-24 NOTE — HPI
Mr. Rolon is a 44yo man with anxiety, history of alcohol abuse with DTs in the past, alcoholic hepatitis c/b ascites and PHG with GIB who presents following a seizure. Per report, he had witnessed seizure by girlfriend and was helped to the ground without hitting his head. Was confused and tremorous at that time. He states that last drink was 2 days ago; he drinks 1/2 pint of liquor per day. He reports that he also had bloody bowel movements today; per nursing in ED, he had BRB on rectal exam. He states that he is experiencing auditory hallucinations.    Received 2mg IV ativan and was loaded with IV Keppra in the ED.

## 2020-07-24 NOTE — PLAN OF CARE
Pt is aaox1. VSS. Pt has been sleeping most of the day. Pt was easily able to be redirected when showing signs of confusion. Last CIWA score was 14. EEG completed. Kevyn and D5W still infusing as ordered.Precedex has been stopped since 1506 and valium to be given as scheduled. Pt has not had any seizures or seizure like activity. No bloody stools. Urine output >2L today.   Gi, psych and nephrology consults placed. Pt's partner-Jazmine, has been updated with poc. Safety maintained. Call light within reach and bed alarm activated.

## 2020-07-25 LAB
ALBUMIN SERPL BCP-MCNC: 2.8 G/DL (ref 3.5–5.2)
ALP SERPL-CCNC: 76 U/L (ref 55–135)
ALT SERPL W/O P-5'-P-CCNC: 35 U/L (ref 10–44)
ANION GAP SERPL CALC-SCNC: 8 MMOL/L (ref 8–16)
ANION GAP SERPL CALC-SCNC: 9 MMOL/L (ref 8–16)
AST SERPL-CCNC: 215 U/L (ref 10–40)
BASOPHILS # BLD AUTO: 0.04 K/UL (ref 0–0.2)
BASOPHILS NFR BLD: 1.3 % (ref 0–1.9)
BILIRUB SERPL-MCNC: 3.3 MG/DL (ref 0.1–1)
BUN SERPL-MCNC: 10 MG/DL (ref 6–20)
BUN SERPL-MCNC: 6 MG/DL (ref 6–20)
BUN SERPL-MCNC: 8 MG/DL (ref 6–20)
BUN SERPL-MCNC: 9 MG/DL (ref 6–20)
BUN SERPL-MCNC: 9 MG/DL (ref 6–20)
CALCIUM SERPL-MCNC: 8.4 MG/DL (ref 8.7–10.5)
CHLORIDE SERPL-SCNC: 100 MMOL/L (ref 95–110)
CHLORIDE SERPL-SCNC: 100 MMOL/L (ref 95–110)
CHLORIDE SERPL-SCNC: 101 MMOL/L (ref 95–110)
CHLORIDE SERPL-SCNC: 101 MMOL/L (ref 95–110)
CHLORIDE SERPL-SCNC: 97 MMOL/L (ref 95–110)
CO2 SERPL-SCNC: 24 MMOL/L (ref 23–29)
CO2 SERPL-SCNC: 24 MMOL/L (ref 23–29)
CO2 SERPL-SCNC: 25 MMOL/L (ref 23–29)
CREAT SERPL-MCNC: 0.8 MG/DL (ref 0.5–1.4)
DIFFERENTIAL METHOD: ABNORMAL
EOSINOPHIL # BLD AUTO: 0 K/UL (ref 0–0.5)
EOSINOPHIL NFR BLD: 1.3 % (ref 0–8)
ERYTHROCYTE [DISTWIDTH] IN BLOOD BY AUTOMATED COUNT: 18 % (ref 11.5–14.5)
EST. GFR  (AFRICAN AMERICAN): >60 ML/MIN/1.73 M^2
EST. GFR  (NON AFRICAN AMERICAN): >60 ML/MIN/1.73 M^2
GLUCOSE SERPL-MCNC: 113 MG/DL (ref 70–110)
GLUCOSE SERPL-MCNC: 117 MG/DL (ref 70–110)
GLUCOSE SERPL-MCNC: 90 MG/DL (ref 70–110)
GLUCOSE SERPL-MCNC: 99 MG/DL (ref 70–110)
GLUCOSE SERPL-MCNC: 99 MG/DL (ref 70–110)
HCT VFR BLD AUTO: 29.5 % (ref 40–54)
HGB BLD-MCNC: 10.3 G/DL (ref 14–18)
IMM GRANULOCYTES # BLD AUTO: 0.01 K/UL (ref 0–0.04)
IMM GRANULOCYTES NFR BLD AUTO: 0.3 % (ref 0–0.5)
LYMPHOCYTES # BLD AUTO: 0.6 K/UL (ref 1–4.8)
LYMPHOCYTES NFR BLD: 19 % (ref 18–48)
MCH RBC QN AUTO: 33.4 PG (ref 27–31)
MCHC RBC AUTO-ENTMCNC: 34.9 G/DL (ref 32–36)
MCV RBC AUTO: 96 FL (ref 82–98)
MONOCYTES # BLD AUTO: 0.6 K/UL (ref 0.3–1)
MONOCYTES NFR BLD: 18.4 % (ref 4–15)
NEUTROPHILS # BLD AUTO: 1.9 K/UL (ref 1.8–7.7)
NEUTROPHILS NFR BLD: 59.7 % (ref 38–73)
NRBC BLD-RTO: 0 /100 WBC
PLATELET # BLD AUTO: 61 K/UL (ref 150–350)
PMV BLD AUTO: 10.3 FL (ref 9.2–12.9)
POTASSIUM SERPL-SCNC: 3.3 MMOL/L (ref 3.5–5.1)
POTASSIUM SERPL-SCNC: 3.4 MMOL/L (ref 3.5–5.1)
POTASSIUM SERPL-SCNC: 3.4 MMOL/L (ref 3.5–5.1)
POTASSIUM SERPL-SCNC: 3.5 MMOL/L (ref 3.5–5.1)
POTASSIUM SERPL-SCNC: 4 MMOL/L (ref 3.5–5.1)
PROT SERPL-MCNC: 7.3 G/DL (ref 6–8.4)
RBC # BLD AUTO: 3.08 M/UL (ref 4.6–6.2)
SODIUM SERPL-SCNC: 130 MMOL/L (ref 136–145)
SODIUM SERPL-SCNC: 134 MMOL/L (ref 136–145)
WBC # BLD AUTO: 3.1 K/UL (ref 3.9–12.7)

## 2020-07-25 PROCEDURE — 11000001 HC ACUTE MED/SURG PRIVATE ROOM

## 2020-07-25 PROCEDURE — 63600175 PHARM REV CODE 636 W HCPCS: Performed by: HOSPITALIST

## 2020-07-25 PROCEDURE — 80053 COMPREHEN METABOLIC PANEL: CPT

## 2020-07-25 PROCEDURE — 85025 COMPLETE CBC W/AUTO DIFF WBC: CPT

## 2020-07-25 PROCEDURE — 94761 N-INVAS EAR/PLS OXIMETRY MLT: CPT

## 2020-07-25 PROCEDURE — C9113 INJ PANTOPRAZOLE SODIUM, VIA: HCPCS | Performed by: HOSPITALIST

## 2020-07-25 PROCEDURE — 80048 BASIC METABOLIC PNL TOTAL CA: CPT | Mod: 91

## 2020-07-25 PROCEDURE — 25000003 PHARM REV CODE 250: Performed by: PSYCHIATRY & NEUROLOGY

## 2020-07-25 PROCEDURE — 63600175 PHARM REV CODE 636 W HCPCS: Mod: JG | Performed by: HOSPITALIST

## 2020-07-25 PROCEDURE — 80048 BASIC METABOLIC PNL TOTAL CA: CPT

## 2020-07-25 PROCEDURE — 25000003 PHARM REV CODE 250: Performed by: HOSPITALIST

## 2020-07-25 PROCEDURE — 94760 N-INVAS EAR/PLS OXIMETRY 1: CPT

## 2020-07-25 RX ORDER — DESMOPRESSIN ACETATE 4 UG/ML
1 INJECTION, SOLUTION INTRAVENOUS; SUBCUTANEOUS ONCE
Status: COMPLETED | OUTPATIENT
Start: 2020-07-25 | End: 2020-07-25

## 2020-07-25 RX ORDER — POTASSIUM CHLORIDE 20 MEQ/1
40 TABLET, EXTENDED RELEASE ORAL ONCE
Status: COMPLETED | OUTPATIENT
Start: 2020-07-25 | End: 2020-07-25

## 2020-07-25 RX ORDER — DEXTROSE MONOHYDRATE 50 MG/ML
INJECTION, SOLUTION INTRAVENOUS CONTINUOUS
Status: ACTIVE | OUTPATIENT
Start: 2020-07-25 | End: 2020-07-25

## 2020-07-25 RX ORDER — HYDROXYZINE PAMOATE 25 MG/1
25 CAPSULE ORAL EVERY 8 HOURS PRN
Status: DISCONTINUED | OUTPATIENT
Start: 2020-07-25 | End: 2020-07-27 | Stop reason: HOSPADM

## 2020-07-25 RX ORDER — DIAZEPAM 10 MG/2ML
10 INJECTION INTRAMUSCULAR EVERY 12 HOURS
Status: DISCONTINUED | OUTPATIENT
Start: 2020-07-25 | End: 2020-07-26

## 2020-07-25 RX ORDER — LEVETIRACETAM 500 MG/1
1000 TABLET ORAL 2 TIMES DAILY
Status: DISCONTINUED | OUTPATIENT
Start: 2020-07-25 | End: 2020-07-27 | Stop reason: HOSPADM

## 2020-07-25 RX ADMIN — DEXTROSE: 5 SOLUTION INTRAVENOUS at 07:07

## 2020-07-25 RX ADMIN — THIAMINE HYDROCHLORIDE 100 MG: 100 INJECTION, SOLUTION INTRAMUSCULAR; INTRAVENOUS at 08:07

## 2020-07-25 RX ADMIN — DIAZEPAM 10 MG: 5 INJECTION, SOLUTION INTRAMUSCULAR; INTRAVENOUS at 08:07

## 2020-07-25 RX ADMIN — ESCITALOPRAM OXALATE 5 MG: 5 TABLET, FILM COATED ORAL at 08:07

## 2020-07-25 RX ADMIN — DESMOPRESSIN ACETATE 1 MCG: 4 SOLUTION INTRAVENOUS at 11:07

## 2020-07-25 RX ADMIN — LEVETIRACETAM INJECTION 1500 MG: 15 INJECTION INTRAVENOUS at 08:07

## 2020-07-25 RX ADMIN — PANTOPRAZOLE SODIUM 40 MG: 40 INJECTION, POWDER, FOR SOLUTION INTRAVENOUS at 08:07

## 2020-07-25 RX ADMIN — LEVETIRACETAM 1000 MG: 500 TABLET ORAL at 08:07

## 2020-07-25 RX ADMIN — THERA TABS 1 TABLET: TAB at 08:07

## 2020-07-25 RX ADMIN — POTASSIUM CHLORIDE 40 MEQ: 1500 TABLET, EXTENDED RELEASE ORAL at 07:07

## 2020-07-25 RX ADMIN — HYDROXYZINE PAMOATE 25 MG: 25 CAPSULE ORAL at 10:07

## 2020-07-25 RX ADMIN — DEXTROSE: 5 SOLUTION INTRAVENOUS at 12:07

## 2020-07-25 RX ADMIN — FOLIC ACID 1 MG: 1 TABLET ORAL at 08:07

## 2020-07-25 RX ADMIN — DIAZEPAM 10 MG: 10 INJECTION, SOLUTION INTRAMUSCULAR; INTRAVENOUS at 05:07

## 2020-07-25 RX ADMIN — POTASSIUM CHLORIDE 40 MEQ: 1500 TABLET, EXTENDED RELEASE ORAL at 10:07

## 2020-07-25 NOTE — ASSESSMENT & PLAN NOTE
- history of substantial EtOH use  - thiamine, folate, multivitamin  - consult Psych, appreciate recs

## 2020-07-25 NOTE — PLAN OF CARE
Neurology Note:    45 y M with multiple medical comorbidities related to alcohol abuse, including GIB, ascites, jaundice who presents with Delerium tremens and alcohol withdrawal seizure.      Recommendations:  -Exam stable, back to baseline.  -CT head negative for acute intracranial abnormalities. No additional brain imaging needed inpatient.  -Since neuro exam is back to baseline, with known provoking factor of alcohol withdrawal, EEG can be done outpatient prior to neurology clinic follow up.  -continue keppra 1000 mg po BID on discharge. On neurology clinic follow up will reassess dosing and indication for keppra going forward.  -Alcohol abuse cessation program per primary  -B1 supplementation  -Seizure precautions and state law regarding driving vehicle discussed with patient and are as follows:    Patient should be instructed on the following seizure precautions  Take all medications as prescribed by your doctor. Specifically take your anti-epileptic drugs at timed intervals that are on the prescription label.  - Do not drive or operate heavy machinery for a state-law specific period of time from seizure activity  - If you ride a bicycle or any other manually propelled device, please use a helmet  - Never swim alone or without close supervision  - Use showers only; do not take a bath or put yourself in a situation where loss of responsiveness could lead to drowning  - Only cook under supervision. Use the back burners only.  - Do not hold a child or carry an infant.  - Do not engage in any activity that in the event of a seizure or loss of responsiveness could lead to any type of bodily injury to yourself or others    Neurology will sign off.   Please call if any additional questions.  F/u in neurology clinic 3-4 weeks from discharge.

## 2020-07-25 NOTE — ASSESSMENT & PLAN NOTE
- likely nutritional, ? Beer potomania?  - check urine Na, osm  - s/p 1L NS in ED, given 100ml 3% with improvement given seizures  - rapid correction, will give D5W and f/u on repeat BMP  - BMP q4  - consult Nephrology

## 2020-07-25 NOTE — PLAN OF CARE
"VN note: VN cued into patient's room for introduction. Significant other at bedside. VN informed patient and significant other that VN would be working closely along side bedside nurse, PCT, and the rest of the care team and making rounds throughout the shift. They verbalized understanding. Allowed time for questions.    When VN entered room they reported patient c/o "itching" and a "rash." They informed VN already told PCT. Patient recently transferred from ICU. Bedside nurse Susan aware, she will go and assess patient.  VN will continue to be available to patient and intervene prn.    "

## 2020-07-25 NOTE — PROCEDURES
ELECTROENCEPHALOGRAM REPORT    DATE OF SERVICE:  07/24/2020  EEG NUMBER:  OK   REQUESTED BY:  Dr. Espinoza  LOCATION OF SERVICE:      METHODOLOGY   Electroencephalographic (EEG) recording is with electrodes placed according to the International 10-20 placement system.  Thirty two (32) channels of digital signal (sampling rate of 512/sec) including T1 and T2 was simultaneously recorded from the scalp and may include  EKG, EMG, and/or eye monitors.  Recording band pass was 0.1 to 512 hz.  Digital video recording of the patient is simultaneously recorded with the EEG.  The patient is instructed report clinical symptoms which may occur during the recording session.  EEG and video recording is stored and archived in digital format. Activation procedures which include photic stimulation, hyperventilation and instructing patients to perform simple task are done in selected patients.    The EEG is displayed on a monitor screen and can be reviewed using different montages.  Computer assisted analysis is employed to detect spike and electrographic seizure activity.   The entire record is submitted for computer analysis.  The entire recording is visually reviewed and the times identified by computer analysis as being spikes or seizures are reviewed again.  Compresses spectral analysis (CSA) is also performed on the activity recorded from each individual channel.  This is displayed as a power display of frequencies from 0 to 30 Hz over time.   The CSA is reviewed looking for asymmetries in power between homologous areas of the scalp and then compared with the original EEG recording.     [employer] software was also utilized in the review of this study.  This software suite analyzes the EEG recording in multiple domains.  Coherence and rhythmicity is computed to identify EEG sections which may contain organized seizures.  Each channel undergoes analysis to detect presence of spike and sharp waves which have special  and morphological characteristic of epileptic activity.  The routine EEG recording is converted from spacial into frequency domain.  This is then displayed comparing homologous areas to identify areas of significant asymmetry.  Algorithm to identify non-cortically generated artifact is used to separate eye movement, EMG and other artifact from the EEG    EEG FINGINGS  Waking state -   Recording was obtained patient's bedside in the ICU.  Patient is awake and interactive during the recording.  Waking background consists of a mixture of relatively rhythmic low and mid-range beta which was symmetrically distributed over both hemispheres.  Occasionally an irregular 8-9 hertz posterior dominant rhythm was seen in the occipital leads.  No lateralized or focal features were present and no spike or sharp wave activity was recorded.  Sleep state -   Not recorded  Activation procedures:   Photic Stimulation - was performed using flash rates of 1-30 per second which produced a good driving response but without provoking pathological discharges  Hyperventilation - not performed    Cognitive testing:   The patient was ask questions the and correctly responded with their location, month, year and name of the president.  Patient was also able to count sequentially correctly.    Cardiac Monitor:   Single lead EKG was obtained and showed a regular cardiac rhythm, with a  rate of approximately 60    IMPRESSION:  Normal EEG

## 2020-07-25 NOTE — SUBJECTIVE & OBJECTIVE
Interval History:   Improved substantially and weaned off precedex gtt yesterday. Will continue taper and step out of ICU. Notes mild tremors, no further hallucinations. Denies anxiety    Review of Systems   Unable to perform ROS: Mental status change   Constitutional: Negative for fever.   Neurological: Positive for tremors. Negative for dizziness and light-headedness.   Psychiatric/Behavioral: Negative for agitation and hallucinations. The patient is not nervous/anxious.      Objective:     Vital Signs (Most Recent):  Temp: 97.6 °F (36.4 °C) (07/25/20 0930)  Pulse: 89 (07/25/20 0930)  Resp: 20 (07/25/20 0930)  BP: (!) 176/89 (07/25/20 0930)  SpO2: 99 % (07/25/20 0930) Vital Signs (24h Range):  Temp:  [97.6 °F (36.4 °C)-99 °F (37.2 °C)] 97.6 °F (36.4 °C)  Pulse:  [60-89] 89  Resp:  [10-23] 20  SpO2:  [99 %-100 %] 99 %  BP: ()/(56-90) 176/89     Weight: 110 kg (242 lb 8.1 oz)  Body mass index is 30.31 kg/m².    Intake/Output Summary (Last 24 hours) at 7/25/2020 1028  Last data filed at 7/25/2020 0715  Gross per 24 hour   Intake 1742.26 ml   Output 4800 ml   Net -3057.74 ml      Physical Exam  Vitals signs reviewed.   Constitutional:       General: He is not in acute distress.     Appearance: He is well-developed. He is not diaphoretic.   HENT:      Head: Normocephalic and atraumatic.      Nose: Nose normal.      Mouth/Throat:      Comments: Contusion on tongue  Eyes:      General: No scleral icterus.  Neck:      Musculoskeletal: Normal range of motion.      Vascular: No JVD.      Trachea: No tracheal deviation.   Cardiovascular:      Rate and Rhythm: Normal rate and regular rhythm.      Heart sounds: Normal heart sounds. No murmur. No friction rub. No gallop.    Pulmonary:      Effort: Pulmonary effort is normal. No respiratory distress.      Breath sounds: Normal breath sounds.   Abdominal:      General: There is no distension.      Palpations: Abdomen is soft. There is no mass.      Tenderness: There is no  abdominal tenderness.      Hernia: No hernia is present.   Musculoskeletal:         General: No deformity.   Skin:     General: Skin is warm.      Findings: No rash.   Neurological:      Mental Status: He is alert and oriented to person, place, and time.      Comments: Tremorous.   Psychiatric:         Behavior: Behavior normal.         Significant Labs: All pertinent labs within the past 24 hours have been reviewed.    Significant Imaging: I have reviewed all pertinent imaging results/findings within the past 24 hours.

## 2020-07-25 NOTE — PROGRESS NOTES
[facility] ICU 5th Floor  Hospital Medicine  Progress Note    Patient Name: Nilesh Rolon Jr.  MRN: [id number]  Patient Class: IP- Inpatient   Admission Date: 7/23/2020  Length of Stay: 1 days  Attending Physician: Otoniel Espinoza, *  Primary Care Provider: Bradford Vega DO, DO (Inactive)        Subjective:     Principal Problem:<principal problem not specified>        HPI:  Mr. Rolon is a 46yo man with anxiety, history of alcohol abuse with DTs in the past, alcoholic hepatitis c/b ascites and PHG with GIB who presents following a seizure. Per report, he had witnessed seizure by girlfriend and was helped to the ground without hitting his head. Was confused and tremorous at that time. He states that last drink was 2 days ago; he drinks 1/2 pint of liquor per day. He reports that he also had bloody bowel movements today; per nursing in ED, he had BRB on rectal exam. He states that he is experiencing auditory hallucinations.    Received 2mg IV ativan and was loaded with IV Keppra in the ED.    Overview/Hospital Course:  No notes on file    Interval History:   Increasing agitation overnight requiring initiation of precedex gtt. Now weaning off. States he feels a little better. Hallucination have improved and vitals have been relatively stable.    Review of Systems   Unable to perform ROS: Mental status change     Objective:     Vital Signs (Most Recent):  Temp: 98.8 °F (37.1 °C) (07/24/20 1905)  Pulse: 71 (07/24/20 1905)  Resp: 18 (07/24/20 1905)  BP: 108/70 (07/24/20 1905)  SpO2: 100 % (07/24/20 1905) Vital Signs (24h Range):  Temp:  [97.6 °F (36.4 °C)-98.8 °F (37.1 °C)] 98.8 °F (37.1 °C)  Pulse:  [60-94] 71  Resp:  [10-36] 18  SpO2:  [99 %-100 %] 100 %  BP: ()/(56-89) 108/70     Weight: 110 kg (242 lb 8.1 oz)  Body mass index is 30.31 kg/m².    Intake/Output Summary (Last 24 hours) at 7/24/2020 1942  Last data filed at 7/24/2020 1818  Gross per 24 hour   Intake 1895.89 ml    Output 3550 ml   Net -1654.11 ml      Physical Exam  Vitals signs reviewed.   Constitutional:       General: He is in acute distress (uncomfortable appearing).      Appearance: He is well-developed. He is diaphoretic.   HENT:      Head: Normocephalic and atraumatic.      Nose: Nose normal.      Mouth/Throat:      Comments: Contusion on tongue  Eyes:      General: No scleral icterus.     Comments: Horizontal nystagmus   Neck:      Musculoskeletal: Normal range of motion.      Vascular: No JVD.      Trachea: No tracheal deviation.   Cardiovascular:      Rate and Rhythm: Regular rhythm. Tachycardia present.      Heart sounds: Normal heart sounds. No murmur. No friction rub. No gallop.    Pulmonary:      Effort: Pulmonary effort is normal. No respiratory distress.      Breath sounds: Normal breath sounds.   Abdominal:      General: There is no distension.      Palpations: Abdomen is soft. There is no mass.      Tenderness: There is no abdominal tenderness.      Hernia: No hernia is present.   Musculoskeletal:         General: No deformity.   Skin:     General: Skin is warm.      Findings: No rash.   Neurological:      Mental Status: He is alert and oriented to person, place, and time.      Comments: Tremorous.   Psychiatric:         Attention and Perception: He perceives auditory hallucinations.         Mood and Affect: Mood is anxious.         Behavior: Behavior normal.         Significant Labs: All pertinent labs within the past 24 hours have been reviewed.    Significant Imaging: I have reviewed all pertinent imaging results/findings within the past 24 hours.      Assessment/Plan:      Alcohol withdrawal seizure with perceptual disturbance  - given 2mg IV ativan in ED; still with significant symptoms  - initiated on valium 10mg q6 scheduled with breakthrough ativan 1mg prn seizures or CIWA >8  - precedex overnight, now discontinued this afternoon  - continue valium taper to q8  - serial CIWAs  - loaded with Keppra  in ED; continue 1500mg bid IV  - Neurology consulted: EEG pending, may need further imaging pending improvement  - Psych consulted, appreciate recs  - will monitor in ICU for now  - folate, thiamine, MVI  - seizure precautions    Alcoholic liver disease  - history of alcoholic hepatitis with ascites in the past  - AUS with HSM and hepatic steatosis, no ascites    Hyponatremia  - likely nutritional, ? Beer potomania?  - check urine Na, osm  - s/p 1L NS in ED, given 100ml 3% with improvement given seizures  - rapid correction, will give D5W and f/u on repeat BMP  - BMP q4  - consult Nephrology    Alcohol abuse  - history of substantial EtOH use  - thiamine, folate, multivitamin  - consult Psych      Thrombocytopenia  - likely 2/2 liver disease/EtOH  - monitor with CBC      GI bleed  - BRBPR  - history of PHG in the past  - given history of liver disease and heavy EtOH use, will need to r/o variceal bleed although less likely  - GI bleed pathway initiated  - 2LBIV  - initiate on IV protonix 40mg bid, IV octreotide bolus->gtt, and IV ceftriaxone for now  - trend CBC tid  - orthostatic vitals stable  - no further bleeding over the course of the day, can likely de-escalate tomorrow  - GI consulted      VTE Risk Mitigation (From admission, onward)         Ordered     IP VTE HIGH RISK PATIENT  Once      07/23/20 1757     Place sequential compression device  Until discontinued      07/23/20 1757              70 minutes of critical care time was spent personally by me on the following activities: development of treatment plan with patient or surrogate and bedside caregivers, discussions with consultants, evaluation of patient's response to treatment, examination of patient, ordering and performing treatments and interventions, ordering and review of laboratory studies, ordering and review of radiographic studies, pulse oximetry, re-evaluation of patient's condition. This critical care time did not overlap with that of any  other provider or involve time for any procedures.          Otoniel Espinoza MD  Department of Hospital Medicine   [facility] ICU 5th Floor

## 2020-07-25 NOTE — NURSING
Pt now oriented x4, opens eyes spontaneously, and able to follow commands. Precedex gtt kept off entire shift.  RR 12-24 breaths per minute. Pulse ox measuring > 95% on room air.  Pt repositioned Q2H with use of pillows. Heels floated off mattress at all times.Tubing and other devices remain free from under the patient. External catheter care provided. Frequent rounds made to assess for safety and discomfort, fall precautions maintained. Call bell within reach. Will continue to monitor

## 2020-07-25 NOTE — CONSULTS
[facility] ICU 5th Floor  Gastroenterology  Consult Note    Patient Name: Nilesh Rolon Jr.  MRN: [id number]  Admission Date: 7/23/2020  Hospital Length of Stay: 1 days  Code Status: Full Code   Attending Provider: Otoniel Espinoza, *   Consulting Provider: Bonita Kendall MD  Primary Care Physician: Bradford Vega DO, DO (Inactive)  Principal Problem:Alcohol withdrawal seizure with perceptual disturbance    Inpatient consult [facility]  Consult performed by: Bonita Kendall MD  Consult ordered by: Otoniel Espinoza MD  Reason for consult: GI bleed        Subjective:     HPI:  This is a with h/o etoh abuse with possible cirrhosis here noting confusion, etoh withdrawal and GI bleeding. Some hematochezia was noted prior to arrival, pt oriented to 1 currently and unable to give history. Several episodes of small volume dark red stool, no melena. This was similar to a prior admission. EGD last year with suspected portal htn gastopathy, no esophageal or gastric varices. No further bleeding noted since admission. Vitals stable.     The following portions of the patient's history were reviewed and updated as appropriate: allergies, current medications, past family history, past medical history, past social history, past surgical history and problem list.      Past Medical History:   Diagnosis Date    Anxiety     Arthritis     Other meniscus derangements, other medial meniscus, left knee 1/7/2019       Past Surgical History:   Procedure Laterality Date    ARTHROSCOPY OF KNEE Left 1/7/2019    Procedure: ARTHROSCOPY, KNEE;  Surgeon: Derick Kirby MD;  Location: [facility] OR;  Service: Orthopedics;  Laterality: Left;    ESOPHAGOGASTRODUODENOSCOPY N/A 4/8/2019    Procedure: EGD (ESOPHAGOGASTRODUODENOSCOPY);  Surgeon: Bonita Kendall MD;  Location: [facility] ENDO;  Service: Endoscopy;  Laterality: N/A;       Review of patient's allergies indicates:  No Known Allergies  Family  History     None        Tobacco Use    Smoking status: Never Smoker    Smokeless tobacco: Never Used   Substance and Sexual Activity    Alcohol use: Not Currently     Comment: last drink 1/1/20    Drug use: No    Sexual activity: Yes     Partners: Female     Review of Systems   Unable to perform ROS: Mental status change     Objective:     Vital Signs (Most Recent):  Temp: 98.8 °F (37.1 °C) (07/24/20 1905)  Pulse: 71 (07/24/20 1905)  Resp: 18 (07/24/20 1905)  BP: 108/70 (07/24/20 1905)  SpO2: 100 % (07/24/20 1905) Vital Signs (24h Range):  Temp:  [97.6 °F (36.4 °C)-98.8 °F (37.1 °C)] 98.8 °F (37.1 °C)  Pulse:  [60-94] 71  Resp:  [10-36] 18  SpO2:  [99 %-100 %] 100 %  BP: ()/(56-89) 108/70     Weight: 110 kg (242 lb 8.1 oz) (07/23/20 2301)  Body mass index is 30.31 kg/m².      Intake/Output Summary (Last 24 hours) at 7/24/2020 1955  Last data filed at 7/24/2020 1818  Gross per 24 hour   Intake 1895.89 ml   Output 3550 ml   Net -1654.11 ml       Lines/Drains/Airways     Central Venous Catheter Line            Percutaneous Central Line Insertion/Assessment - Triple Lumen  07/23/20 2155 less than 1 day          Drain            Male External Urinary Catheter 07/24/20 0300 Medium less than 1 day          Peripheral Intravenous Line                 Peripheral IV - Single Lumen 07/23/20 2000 20 G Right Hand less than 1 day                Physical Exam  Vitals signs and nursing note reviewed.   Constitutional:       General: He is not in acute distress.     Appearance: He is well-developed. He is not diaphoretic.   HENT:      Head: Normocephalic and atraumatic.   Eyes:      General: No scleral icterus.     Conjunctiva/sclera: Conjunctivae normal.   Neck:      Thyroid: No thyromegaly.      Trachea: No tracheal deviation.   Cardiovascular:      Rate and Rhythm: Normal rate and regular rhythm.      Heart sounds: Normal heart sounds. No friction rub. No gallop.    Pulmonary:      Effort: Pulmonary effort is normal.       Breath sounds: Normal breath sounds. No wheezing or rales.   Abdominal:      General: Bowel sounds are normal. There is no distension.      Palpations: Abdomen is soft.      Tenderness: There is no abdominal tenderness. There is no guarding or rebound.   Musculoskeletal: Normal range of motion.         General: No tenderness.   Neurological:      Mental Status: He is alert.      Comments: Somnolent, but arousable   Psychiatric:      Comments: Unable to fully obtain given mental status         Significant Labs:  CBC:   Recent Labs   Lab 07/23/20  1517 07/23/20  1947 07/24/20  0354   WBC 7.43 5.68 5.36   HGB 11.2* 10.6* 10.5*   HCT 31.2* 29.5* 29.8*   PLT 47* 42* 48*     CMP:   Recent Labs   Lab 07/24/20  0354  07/24/20  1709   GLU 99   < > 143*   CALCIUM 8.2*   < > 8.4*   ALBUMIN 3.0*  --   --    PROT 7.4  --   --    *   < > 133*   K 3.9   < > 3.7   CO2 22*   < > 25      < > 102   BUN 11   < > 12   CREATININE 0.8   < > 0.8   ALKPHOS 79  --   --    ALT 24  --   --    *  --   --    BILITOT 3.5*  --   --     < > = values in this interval not displayed.       Significant Imaging:  Imaging results within the past 24 hours have been reviewed.    Assessment/Plan:     GI bleed  - very low suspicion for variceal bleed with no further bleeding noted, stable h/h and no varices noted on prior examination  - continue to treat withdrawal  - needs etoh cessation  - vitamin supplementation  - ammonia ordered, monitor for HE  - ok to d/c octreotide  - consider egd on Monday, colonoscopy if needed  - monitor clinically  - suspect lft elevation due to etoh hepatitis with underlying cirrhosis        Thank you for your consult. I will follow-up with patient. Please contact us if you have any additional questions.    Bonita Kendall MD  Gastroenterology  [facility] ICU 5th Floor

## 2020-07-25 NOTE — NURSING TRANSFER
Nursing Transfer Note      7/25/2020     Transfer To: [address] to [facility]    Transfer via bed    Transfer with cardiac monitoring    Transported by irvin darling/angelina smith and vera     Medicines sent: none    Chart send with patient: Yes    Notified: spouse    Patient reassessed at: 0928 07/25/20 (date, time)    Upon arrival to floor: cardiac monitor applied, patient oriented to room, call bell in reach and bed in lowest position

## 2020-07-25 NOTE — PLAN OF CARE
Pt transferred from ICU. Care plan explained & understood by pt. Alert & oriented. V/S taken. Put pt onTele monitor. On RA. Meds given as per MAR. Pt complained itchiness & rashes on face, MD informed. Anti-histamine given. Pt using commode with assistance. Safety maintained at all times. Call bell within reach. Bed on low position. Bed alarm on. Will continue to monitor.

## 2020-07-25 NOTE — ASSESSMENT & PLAN NOTE
- BRBPR  - history of PHG in the past  - given history of liver disease and heavy EtOH use, will need to r/o variceal bleed although less likely  - GI bleed pathway initiated  - 2LBIV  - initiate on IV protonix 40mg bid, IV octreotide bolus->gtt, and IV ceftriaxone for now  - trend CBC tid  - orthostatic vitals stable  - no further bleeding over the course of the day, can likely de-escalate tomorrow  - GI consulted

## 2020-07-25 NOTE — PROGRESS NOTES
[facility] Hospital Medicine  Progress Note    Patient Name: Nilesh Rolon Jr.  MRN: [id number]  Patient Class: IP- Inpatient   Admission Date: 7/23/2020  Length of Stay: 2 days  Attending Physician: Otoniel Espinoza, *  Primary Care Provider: Bradford Vega DO, DO (Inactive)        Subjective:     Principal Problem:Alcohol withdrawal seizure with perceptual disturbance        HPI:  Mr. Rolon is a 46yo man with anxiety, history of alcohol abuse with DTs in the past, alcoholic hepatitis c/b ascites and PHG with GIB who presents following a seizure. Per report, he had witnessed seizure by girlfriend and was helped to the ground without hitting his head. Was confused and tremorous at that time. He states that last drink was 2 days ago; he drinks 1/2 pint of liquor per day. He reports that he also had bloody bowel movements today; per nursing in ED, he had BRB on rectal exam. He states that he is experiencing auditory hallucinations.    Received 2mg IV ativan and was loaded with IV Keppra in the ED.    Overview/Hospital Course:  No notes on file    Interval History:   Improved substantially and weaned off precedex gtt yesterday. Will continue taper and step out of ICU. Notes mild tremors, no further hallucinations. Denies anxiety    Review of Systems   Unable to perform ROS: Mental status change   Constitutional: Negative for fever.   Neurological: Positive for tremors. Negative for dizziness and light-headedness.   Psychiatric/Behavioral: Negative for agitation and hallucinations. The patient is not nervous/anxious.      Objective:     Vital Signs (Most Recent):  Temp: 97.6 °F (36.4 °C) (07/25/20 0930)  Pulse: 89 (07/25/20 0930)  Resp: 20 (07/25/20 0930)  BP: (!) 176/89 (07/25/20 0930)  SpO2: 99 % (07/25/20 0930) Vital Signs (24h Range):  Temp:  [97.6 °F (36.4 °C)-99 °F (37.2 °C)] 97.6 °F (36.4 °C)  Pulse:  [60-89] 89  Resp:  [10-23] 20  SpO2:  [99 %-100 %] 99 %  BP:  ()/(56-90) 176/89     Weight: 110 kg (242 lb 8.1 oz)  Body mass index is 30.31 kg/m².    Intake/Output Summary (Last 24 hours) at 7/25/2020 1028  Last data filed at 7/25/2020 0715  Gross per 24 hour   Intake 1742.26 ml   Output 4800 ml   Net -3057.74 ml      Physical Exam  Vitals signs reviewed.   Constitutional:       General: He is not in acute distress.     Appearance: He is well-developed. He is not diaphoretic.   HENT:      Head: Normocephalic and atraumatic.      Nose: Nose normal.      Mouth/Throat:      Comments: Contusion on tongue  Eyes:      General: No scleral icterus.  Neck:      Musculoskeletal: Normal range of motion.      Vascular: No JVD.      Trachea: No tracheal deviation.   Cardiovascular:      Rate and Rhythm: Normal rate and regular rhythm.      Heart sounds: Normal heart sounds. No murmur. No friction rub. No gallop.    Pulmonary:      Effort: Pulmonary effort is normal. No respiratory distress.      Breath sounds: Normal breath sounds.   Abdominal:      General: There is no distension.      Palpations: Abdomen is soft. There is no mass.      Tenderness: There is no abdominal tenderness.      Hernia: No hernia is present.   Musculoskeletal:         General: No deformity.   Skin:     General: Skin is warm.      Findings: No rash.   Neurological:      Mental Status: He is alert and oriented to person, place, and time.      Comments: Tremorous.   Psychiatric:         Behavior: Behavior normal.         Significant Labs: All pertinent labs within the past 24 hours have been reviewed.    Significant Imaging: I have reviewed all pertinent imaging results/findings within the past 24 hours.      Assessment/Plan:      * Alcohol withdrawal seizure with perceptual disturbance  - given 2mg IV ativan in ED; still with significant symptoms  - initiated on valium 10mg q6 scheduled with breakthrough ativan 1mg prn seizures or CIWA >8  - precedex overnight, now discontinued on 7/24  - continue valium  taper to q12  - serial CIWAs  - loaded with Keppra in ED; then 1500mg IV bid, de-escalate to 1000mg PO bid  - Neurology consulted: EEG not necessary given improvement and known reversible cause, can f/u outpatient and will need seizure precautions  - Psych consulted, appreciate recs: initiate lexapro 5  - will step down from ICU  - folate, thiamine, MVI  - seizure precautions    Alcoholic liver disease  - history of alcoholic hepatitis with ascites in the past  - AUS with HSM and hepatic steatosis, no ascites    Hyponatremia  - likely due to beer potomania  - checked urine Na, osm  - s/p 1L NS in ED, given 100ml 3% with improvement given seizures  - rapid correction, will give D5W+DDAVP, monitor  - BMP q6  - improving    Alcohol abuse  - history of substantial EtOH use  - thiamine, folate, multivitamin  - consult Psych, appreciate recs      Thrombocytopenia  - likely 2/2 liver disease/EtOH  - monitor with CBC      GI bleed  - BRBPR  - history of PHG in the past  - given history of liver disease and heavy EtOH use, will need to r/o variceal bleed although less likely  - GI bleed pathway initiated  - 2LBIV  - initiated on IV protonix 40mg bid, IV octreotide bolus->gtt, and IV ceftriaxone; will stop ceftriaxone/octreotide  - trend CBC daily  - orthostatic vitals stable  - GI consulted, appreciate recs      VTE Risk Mitigation (From admission, onward)         Ordered     enoxaparin injection 40 mg  Every 24 hours      07/24/20 1949     IP VTE HIGH RISK PATIENT  Once      07/23/20 1757     Place sequential compression device  Until discontinued      07/23/20 1757                      Otoniel Espinoza MD  Department of Hospital Medicine   [facility]

## 2020-07-25 NOTE — ASSESSMENT & PLAN NOTE
- very low suspicion for variceal bleed with no further bleeding noted, stable h/h and no varices noted on prior examination  - continue to treat withdrawal  - needs etoh cessation  - vitamin supplementation  - ammonia ordered, monitor for HE  - ok to d/c octreotide  - consider egd on Monday, colonoscopy if needed  - monitor clinically  - suspect lft elevation due to etoh hepatitis with underlying cirrhosis

## 2020-07-25 NOTE — ASSESSMENT & PLAN NOTE
- BRBPR  - history of PHG in the past  - given history of liver disease and heavy EtOH use, will need to r/o variceal bleed although less likely  - GI bleed pathway initiated  - 2LBIV  - initiated on IV protonix 40mg bid, IV octreotide bolus->gtt, and IV ceftriaxone; will stop ceftriaxone/octreotide  - trend CBC daily  - orthostatic vitals stable  - GI consulted, appreciate recs

## 2020-07-25 NOTE — ASSESSMENT & PLAN NOTE
- history of alcoholic hepatitis with ascites in the past  - AUS with HSM and hepatic steatosis, no ascites

## 2020-07-25 NOTE — SUBJECTIVE & OBJECTIVE
Interval History:   Increasing agitation overnight requiring initiation of precedex gtt. Now weaning off. States he feels a little better. Hallucination have improved and vitals have been relatively stable.    Review of Systems   Unable to perform ROS: Mental status change     Objective:     Vital Signs (Most Recent):  Temp: 98.8 °F (37.1 °C) (07/24/20 1905)  Pulse: 71 (07/24/20 1905)  Resp: 18 (07/24/20 1905)  BP: 108/70 (07/24/20 1905)  SpO2: 100 % (07/24/20 1905) Vital Signs (24h Range):  Temp:  [97.6 °F (36.4 °C)-98.8 °F (37.1 °C)] 98.8 °F (37.1 °C)  Pulse:  [60-94] 71  Resp:  [10-36] 18  SpO2:  [99 %-100 %] 100 %  BP: ()/(56-89) 108/70     Weight: 110 kg (242 lb 8.1 oz)  Body mass index is 30.31 kg/m².    Intake/Output Summary (Last 24 hours) at 7/24/2020 1942  Last data filed at 7/24/2020 1818  Gross per 24 hour   Intake 1895.89 ml   Output 3550 ml   Net -1654.11 ml      Physical Exam  Vitals signs reviewed.   Constitutional:       General: He is in acute distress (uncomfortable appearing).      Appearance: He is well-developed. He is diaphoretic.   HENT:      Head: Normocephalic and atraumatic.      Nose: Nose normal.      Mouth/Throat:      Comments: Contusion on tongue  Eyes:      General: No scleral icterus.     Comments: Horizontal nystagmus   Neck:      Musculoskeletal: Normal range of motion.      Vascular: No JVD.      Trachea: No tracheal deviation.   Cardiovascular:      Rate and Rhythm: Regular rhythm. Tachycardia present.      Heart sounds: Normal heart sounds. No murmur. No friction rub. No gallop.    Pulmonary:      Effort: Pulmonary effort is normal. No respiratory distress.      Breath sounds: Normal breath sounds.   Abdominal:      General: There is no distension.      Palpations: Abdomen is soft. There is no mass.      Tenderness: There is no abdominal tenderness.      Hernia: No hernia is present.   Musculoskeletal:         General: No deformity.   Skin:     General: Skin is warm.       Findings: No rash.   Neurological:      Mental Status: He is alert and oriented to person, place, and time.      Comments: Tremorous.   Psychiatric:         Attention and Perception: He perceives auditory hallucinations.         Mood and Affect: Mood is anxious.         Behavior: Behavior normal.         Significant Labs: All pertinent labs within the past 24 hours have been reviewed.    Significant Imaging: I have reviewed all pertinent imaging results/findings within the past 24 hours.

## 2020-07-25 NOTE — ASSESSMENT & PLAN NOTE
- given 2mg IV ativan in ED; still with significant symptoms  - initiated on valium 10mg q6 scheduled with breakthrough ativan 1mg prn seizures or CIWA >8  - precedex overnight, now discontinued this afternoon  - continue valium taper to q8  - serial CIWAs  - loaded with Keppra in ED; continue 1500mg bid IV  - Neurology consulted: EEG pending, may need further imaging pending improvement  - Psych consulted, appreciate recs  - will monitor in ICU for now  - folate, thiamine, MVI  - seizure precautions

## 2020-07-25 NOTE — HPI
This is a with h/o etoh abuse with possible cirrhosis here noting confusion, etoh withdrawal and GI bleeding. Some hematochezia was noted prior to arrival, pt oriented to 1 currently and unable to give history. Several episodes of small volume dark red stool, no melena. This was similar to a prior admission. EGD last year with suspected portal htn gastopathy, no esophageal or gastric varices. No further bleeding noted since admission. Vitals stable.     The following portions of the patient's history were reviewed and updated as appropriate: allergies, current medications, past family history, past medical history, past social history, past surgical history and problem list.

## 2020-07-25 NOTE — SUBJECTIVE & OBJECTIVE
Past Medical History:   Diagnosis Date    Anxiety     Arthritis     Other meniscus derangements, other medial meniscus, left knee 1/7/2019       Past Surgical History:   Procedure Laterality Date    ARTHROSCOPY OF KNEE Left 1/7/2019    Procedure: ARTHROSCOPY, KNEE;  Surgeon: Derick Kirby MD;  Location: [facility] OR;  Service: Orthopedics;  Laterality: Left;    ESOPHAGOGASTRODUODENOSCOPY N/A 4/8/2019    Procedure: EGD (ESOPHAGOGASTRODUODENOSCOPY);  Surgeon: Bonita Kendall MD;  Location: [facility] ENDO;  Service: Endoscopy;  Laterality: N/A;       Review of patient's allergies indicates:  No Known Allergies  Family History     None        Tobacco Use    Smoking status: Never Smoker    Smokeless tobacco: Never Used   Substance and Sexual Activity    Alcohol use: Not Currently     Comment: last drink 1/1/20    Drug use: No    Sexual activity: Yes     Partners: Female     Review of Systems   Unable to perform ROS: Mental status change     Objective:     Vital Signs (Most Recent):  Temp: 98.8 °F (37.1 °C) (07/24/20 1905)  Pulse: 71 (07/24/20 1905)  Resp: 18 (07/24/20 1905)  BP: 108/70 (07/24/20 1905)  SpO2: 100 % (07/24/20 1905) Vital Signs (24h Range):  Temp:  [97.6 °F (36.4 °C)-98.8 °F (37.1 °C)] 98.8 °F (37.1 °C)  Pulse:  [60-94] 71  Resp:  [10-36] 18  SpO2:  [99 %-100 %] 100 %  BP: ()/(56-89) 108/70     Weight: 110 kg (242 lb 8.1 oz) (07/23/20 2301)  Body mass index is 30.31 kg/m².      Intake/Output Summary (Last 24 hours) at 7/24/2020 1955  Last data filed at 7/24/2020 1818  Gross per 24 hour   Intake 1895.89 ml   Output 3550 ml   Net -1654.11 ml       Lines/Drains/Airways     Central Venous Catheter Line            Percutaneous Central Line Insertion/Assessment - Triple Lumen  07/23/20 2155 less than 1 day          Drain            Male External Urinary Catheter 07/24/20 0300 Medium less than 1 day          Peripheral Intravenous Line                 Peripheral IV - Single Lumen 07/23/20 2000 20 G Right Hand  less than 1 day                Physical Exam  Vitals signs and nursing note reviewed.   Constitutional:       General: He is not in acute distress.     Appearance: He is well-developed. He is not diaphoretic.   HENT:      Head: Normocephalic and atraumatic.   Eyes:      General: No scleral icterus.     Conjunctiva/sclera: Conjunctivae normal.   Neck:      Thyroid: No thyromegaly.      Trachea: No tracheal deviation.   Cardiovascular:      Rate and Rhythm: Normal rate and regular rhythm.      Heart sounds: Normal heart sounds. No friction rub. No gallop.    Pulmonary:      Effort: Pulmonary effort is normal.      Breath sounds: Normal breath sounds. No wheezing or rales.   Abdominal:      General: Bowel sounds are normal. There is no distension.      Palpations: Abdomen is soft.      Tenderness: There is no abdominal tenderness. There is no guarding or rebound.   Musculoskeletal: Normal range of motion.         General: No tenderness.   Neurological:      Mental Status: He is alert.      Comments: Somnolent, but arousable   Psychiatric:      Comments: Unable to fully obtain given mental status         Significant Labs:  CBC:   Recent Labs   Lab 07/23/20  1517 07/23/20  1947 07/24/20  0354   WBC 7.43 5.68 5.36   HGB 11.2* 10.6* 10.5*   HCT 31.2* 29.5* 29.8*   PLT 47* 42* 48*     CMP:   Recent Labs   Lab 07/24/20  0354  07/24/20  1709   GLU 99   < > 143*   CALCIUM 8.2*   < > 8.4*   ALBUMIN 3.0*  --   --    PROT 7.4  --   --    *   < > 133*   K 3.9   < > 3.7   CO2 22*   < > 25      < > 102   BUN 11   < > 12   CREATININE 0.8   < > 0.8   ALKPHOS 79  --   --    ALT 24  --   --    *  --   --    BILITOT 3.5*  --   --     < > = values in this interval not displayed.       Significant Imaging:  Imaging results within the past 24 hours have been reviewed.

## 2020-07-25 NOTE — ASSESSMENT & PLAN NOTE
- likely due to beer potomania  - checked urine Na, osm  - s/p 1L NS in ED, given 100ml 3% with improvement given seizures  - rapid correction, will give D5W+DDAVP, monitor  - BMP q6  - improving

## 2020-07-25 NOTE — ASSESSMENT & PLAN NOTE
- given 2mg IV ativan in ED; still with significant symptoms  - initiated on valium 10mg q6 scheduled with breakthrough ativan 1mg prn seizures or CIWA >8  - precedex overnight, now discontinued on 7/24  - continue valium taper to q12  - serial CIWAs  - loaded with Keppra in ED; then 1500mg IV bid, de-escalate to 1000mg PO bid  - Neurology consulted: EEG not necessary given improvement and known reversible cause, can f/u outpatient and will need seizure precautions  - Psych consulted, appreciate recs: initiate lexapro 5  - will step down from ICU  - folate, thiamine, MVI  - seizure precautions

## 2020-07-25 NOTE — PROGRESS NOTES
"[facility] Progress Note    CC: ETOH liver disease, BRBPR    Interval History: Feeling better today. Fully alert and oriented. No further bowel movements, and no further blood loss. Denies n/v, abd pain.     Past Medical History    has a past medical history of Anxiety, Arthritis, and Other meniscus derangements, other medial meniscus, left knee (1/7/2019).    Review of Systems  General ROS: negative for - chills, fever or weight loss  Cardiovascular ROS: no chest pain or dyspnea on exertion  Gastrointestinal ROS: no abdominal pain, change in bowel habits, or black/ bloody stools    Physical Examination  BP (!) 176/89 (Patient Position: Lying)   Pulse 89   Temp 97.6 °F (36.4 °C) (Axillary)   Resp 20   Ht 6' 3" (1.905 m)   Wt 110 kg (242 lb 8.1 oz)   SpO2 99%   BMI 30.31 kg/m²      General appearance: alert, cooperative, no distress  HENT: Normocephalic, atraumatic, neck symmetrical, no nasal discharge, facial rash   Lungs: clear to auscultation in all fields, symmetric chest wall expansion bilaterally, no wheeze, rale, or rhonchi  Heart: normal rate, regular rhythm without rub; palpable peripheral pulses  Abdomen: soft, non-tender; bowel sounds normoactive; no organomegaly  Extremities: extremities symmetric; no clubbing, cyanosis, or edema  Neurologic: Alert and oriented X 3, normal strength, normal coordination, no asterixis     Labs:  HGB 10  Plts 61    Na 134  Cr 0.8  TB 3.3  Alb 2.8      Assessment:   46 yo M with ETOH abuse, with ETOH liver disease, h/o ETOH hepatitis, ascites who presented with ETOH intoxication and reports of few days of BRBPR.   Previous EGD 2019 with PHG, no varices, esophageal nodule.   Rectal bleeding has ceased at this point. HGB stable.   ETOH intox/withdrawal sxs improved.   He has findings of underlying liver disease.     Plan:  Continue ETOH withdrawal protocol per primary   Vitamin supplementation  Trend H/H; tranfuse if needed to HGB of 7; do not over " transfuse   Will monitor CBC trend and follow for possible endoscopy     Brian Hernandez MD  [facility]   [phone]

## 2020-07-26 LAB
ALBUMIN SERPL BCP-MCNC: 2.9 G/DL (ref 3.5–5.2)
ALP SERPL-CCNC: 83 U/L (ref 55–135)
ALT SERPL W/O P-5'-P-CCNC: 33 U/L (ref 10–44)
ANION GAP SERPL CALC-SCNC: 8 MMOL/L (ref 8–16)
ANION GAP SERPL CALC-SCNC: 9 MMOL/L (ref 8–16)
AST SERPL-CCNC: 144 U/L (ref 10–40)
BASOPHILS # BLD AUTO: 0.04 K/UL (ref 0–0.2)
BASOPHILS NFR BLD: 1.1 % (ref 0–1.9)
BILIRUB SERPL-MCNC: 4.1 MG/DL (ref 0.1–1)
BUN SERPL-MCNC: 5 MG/DL (ref 6–20)
BUN SERPL-MCNC: 6 MG/DL (ref 6–20)
CALCIUM SERPL-MCNC: 8.3 MG/DL (ref 8.7–10.5)
CALCIUM SERPL-MCNC: 8.4 MG/DL (ref 8.7–10.5)
CHLORIDE SERPL-SCNC: 94 MMOL/L (ref 95–110)
CHLORIDE SERPL-SCNC: 94 MMOL/L (ref 95–110)
CHLORIDE SERPL-SCNC: 95 MMOL/L (ref 95–110)
CHLORIDE SERPL-SCNC: 95 MMOL/L (ref 95–110)
CO2 SERPL-SCNC: 22 MMOL/L (ref 23–29)
CO2 SERPL-SCNC: 24 MMOL/L (ref 23–29)
CREAT SERPL-MCNC: 0.8 MG/DL (ref 0.5–1.4)
DIFFERENTIAL METHOD: ABNORMAL
EOSINOPHIL # BLD AUTO: 0.1 K/UL (ref 0–0.5)
EOSINOPHIL NFR BLD: 1.4 % (ref 0–8)
ERYTHROCYTE [DISTWIDTH] IN BLOOD BY AUTOMATED COUNT: 17.2 % (ref 11.5–14.5)
EST. GFR  (AFRICAN AMERICAN): >60 ML/MIN/1.73 M^2
EST. GFR  (NON AFRICAN AMERICAN): >60 ML/MIN/1.73 M^2
GLUCOSE SERPL-MCNC: 124 MG/DL (ref 70–110)
GLUCOSE SERPL-MCNC: 91 MG/DL (ref 70–110)
GLUCOSE SERPL-MCNC: 97 MG/DL (ref 70–110)
GLUCOSE SERPL-MCNC: 97 MG/DL (ref 70–110)
HCT VFR BLD AUTO: 30.3 % (ref 40–54)
HGB BLD-MCNC: 10.7 G/DL (ref 14–18)
IMM GRANULOCYTES # BLD AUTO: 0.02 K/UL (ref 0–0.04)
IMM GRANULOCYTES NFR BLD AUTO: 0.6 % (ref 0–0.5)
LYMPHOCYTES # BLD AUTO: 0.9 K/UL (ref 1–4.8)
LYMPHOCYTES NFR BLD: 24.9 % (ref 18–48)
MCH RBC QN AUTO: 33.2 PG (ref 27–31)
MCHC RBC AUTO-ENTMCNC: 35.3 G/DL (ref 32–36)
MCV RBC AUTO: 94 FL (ref 82–98)
MONOCYTES # BLD AUTO: 0.8 K/UL (ref 0.3–1)
MONOCYTES NFR BLD: 23 % (ref 4–15)
NEUTROPHILS # BLD AUTO: 1.8 K/UL (ref 1.8–7.7)
NEUTROPHILS NFR BLD: 49 % (ref 38–73)
NRBC BLD-RTO: 0 /100 WBC
PLATELET # BLD AUTO: 66 K/UL (ref 150–350)
PMV BLD AUTO: 9.5 FL (ref 9.2–12.9)
POTASSIUM SERPL-SCNC: 3.6 MMOL/L (ref 3.5–5.1)
POTASSIUM SERPL-SCNC: 3.6 MMOL/L (ref 3.5–5.1)
POTASSIUM SERPL-SCNC: 3.8 MMOL/L (ref 3.5–5.1)
POTASSIUM SERPL-SCNC: 3.8 MMOL/L (ref 3.5–5.1)
PROT SERPL-MCNC: 7.5 G/DL (ref 6–8.4)
RBC # BLD AUTO: 3.22 M/UL (ref 4.6–6.2)
SODIUM SERPL-SCNC: 126 MMOL/L (ref 136–145)
SODIUM SERPL-SCNC: 127 MMOL/L (ref 136–145)
WBC # BLD AUTO: 3.57 K/UL (ref 3.9–12.7)

## 2020-07-26 PROCEDURE — C9113 INJ PANTOPRAZOLE SODIUM, VIA: HCPCS | Performed by: HOSPITALIST

## 2020-07-26 PROCEDURE — 25000003 PHARM REV CODE 250

## 2020-07-26 PROCEDURE — 94761 N-INVAS EAR/PLS OXIMETRY MLT: CPT

## 2020-07-26 PROCEDURE — 85025 COMPLETE CBC W/AUTO DIFF WBC: CPT

## 2020-07-26 PROCEDURE — 80053 COMPREHEN METABOLIC PANEL: CPT

## 2020-07-26 PROCEDURE — 25000003 PHARM REV CODE 250: Performed by: HOSPITALIST

## 2020-07-26 PROCEDURE — 25000003 PHARM REV CODE 250: Performed by: PSYCHIATRY & NEUROLOGY

## 2020-07-26 PROCEDURE — 80048 BASIC METABOLIC PNL TOTAL CA: CPT | Mod: 91

## 2020-07-26 PROCEDURE — 63600175 PHARM REV CODE 636 W HCPCS: Performed by: HOSPITALIST

## 2020-07-26 PROCEDURE — 11000001 HC ACUTE MED/SURG PRIVATE ROOM

## 2020-07-26 RX ORDER — DIAZEPAM 5 MG/1
10 TABLET ORAL DAILY
Status: DISCONTINUED | OUTPATIENT
Start: 2020-07-26 | End: 2020-07-26

## 2020-07-26 RX ORDER — DIAZEPAM 5 MG/1
5 TABLET ORAL DAILY
Status: DISCONTINUED | OUTPATIENT
Start: 2020-07-27 | End: 2020-07-27 | Stop reason: HOSPADM

## 2020-07-26 RX ORDER — POTASSIUM CHLORIDE 20 MEQ/1
40 TABLET, EXTENDED RELEASE ORAL ONCE
Status: COMPLETED | OUTPATIENT
Start: 2020-07-26 | End: 2020-07-26

## 2020-07-26 RX ORDER — SODIUM CHLORIDE 9 MG/ML
INJECTION, SOLUTION INTRAVENOUS CONTINUOUS
Status: ACTIVE | OUTPATIENT
Start: 2020-07-26 | End: 2020-07-26

## 2020-07-26 RX ORDER — POLYETHYLENE GLYCOL 3350, SODIUM SULFATE ANHYDROUS, SODIUM BICARBONATE, SODIUM CHLORIDE, POTASSIUM CHLORIDE 236; 22.74; 6.74; 5.86; 2.97 G/4L; G/4L; G/4L; G/4L; G/4L
4000 POWDER, FOR SOLUTION ORAL ONCE
Status: COMPLETED | OUTPATIENT
Start: 2020-07-26 | End: 2020-07-26

## 2020-07-26 RX ADMIN — SODIUM CHLORIDE: 0.9 INJECTION, SOLUTION INTRAVENOUS at 08:07

## 2020-07-26 RX ADMIN — POLYETHYLENE GLYCOL 3350, SODIUM SULFATE ANHYDROUS, SODIUM BICARBONATE, SODIUM CHLORIDE, POTASSIUM CHLORIDE 4000 ML: 236; 22.74; 6.74; 5.86; 2.97 POWDER, FOR SOLUTION ORAL at 06:07

## 2020-07-26 RX ADMIN — LEVETIRACETAM 1000 MG: 500 TABLET ORAL at 08:07

## 2020-07-26 RX ADMIN — THIAMINE HYDROCHLORIDE 100 MG: 100 INJECTION, SOLUTION INTRAMUSCULAR; INTRAVENOUS at 08:07

## 2020-07-26 RX ADMIN — FOLIC ACID 1 MG: 1 TABLET ORAL at 08:07

## 2020-07-26 RX ADMIN — PANTOPRAZOLE SODIUM 40 MG: 40 INJECTION, POWDER, FOR SOLUTION INTRAVENOUS at 08:07

## 2020-07-26 RX ADMIN — POTASSIUM CHLORIDE 40 MEQ: 1500 TABLET, EXTENDED RELEASE ORAL at 08:07

## 2020-07-26 RX ADMIN — ESCITALOPRAM OXALATE 5 MG: 5 TABLET, FILM COATED ORAL at 08:07

## 2020-07-26 RX ADMIN — PANTOPRAZOLE SODIUM 40 MG: 40 INJECTION, POWDER, FOR SOLUTION INTRAVENOUS at 09:07

## 2020-07-26 RX ADMIN — DIAZEPAM 10 MG: 5 TABLET ORAL at 08:07

## 2020-07-26 RX ADMIN — THERA TABS 1 TABLET: TAB at 08:07

## 2020-07-26 NOTE — ASSESSMENT & PLAN NOTE
- BRBPR  - history of PHG in the past  - given history of liver disease and heavy EtOH use, will need to r/o variceal bleed although less likely  - GI bleed pathway initiated  - 2LBIV  - initiated on IV protonix 40mg bid, IV octreotide bolus->gtt, and IV ceftriaxone; stopped ceftriaxone/octreotide on 7/25  - trend CBC daily  - orthostatic vitals stable  - GI consulted, appreciate recs: plan for EGD/cscope tomorrow  - CLD today, NPO@MN

## 2020-07-26 NOTE — ASSESSMENT & PLAN NOTE
- given 2mg IV ativan in ED; still with significant symptoms  - initiated on valium 10mg q6 scheduled with breakthrough ativan 1mg prn seizures or CIWA >8  - precedex overnight, now discontinued on 7/24  - continue valium taper to 10mg daily  - serial CIWAs  - loaded with Keppra in ED; then 1500mg IV bid, de-escalate to 1000mg PO bid  - Neurology consulted: EEG not necessary given improvement and known reversible cause, can f/u outpatient and will need seizure precautions  - Psych consulted, appreciate recs: initiate lexapro 5  - stepped down from ICU  - folate, thiamine, MVI  - seizure precautions

## 2020-07-26 NOTE — PLAN OF CARE
Brief GI Note     Will plan for egd and colonoscopy tomorrow.   Continue CLD.   NPO at midnight.   Colon prep starting this evening.     Brian Hernandez MD  [facility]   [phone]

## 2020-07-26 NOTE — ASSESSMENT & PLAN NOTE
- likely due to beer potomania  - checked urine Na, osm  - s/p 1L NS in ED, given 100ml 3% with improvement given seizures  - rapid correction, given D5W+DDAVP yesterday, now raising slowly on NS  - BMP q8  - improving

## 2020-07-26 NOTE — PLAN OF CARE
Care plan explained & understood by pt. NSR on Tele. Pt on clear liquids. IVFluids at 125mls/hr. Meds given as per MAR. Safety maintained at all times. Call bell within reach. Bed on low position. Bed alarm on. Will continue to monitor.

## 2020-07-26 NOTE — PLAN OF CARE
Pt stable. NO distress noted. POC reviewed with pt. Pt verbalized understanding. Pt remains ST in the 110s on the monitor. NO true red alarms noted. Pt denied pain. IV Valium administered for DT. Some tremors noted. Pt calm and cooperative. RIJ TLC in place. IV fluids going.  Fall precautions maintained. Bed in lowest position, call light in reach and bed alarm on.

## 2020-07-26 NOTE — PROGRESS NOTES
[facility]  Progress Note    Patient Name: Nilesh Rolon Jr.  MRN: [id number]  Patient Class: IP- Inpatient   Admission Date: 7/23/2020  Length of Stay: 3 days  Attending Physician: Otoniel Espinoza, *  Primary Care Provider: Bradford Vega DO, DO (Inactive)        Subjective:     Principal Problem:Alcohol withdrawal seizure with perceptual disturbance        HPI:  Mr. Rolon is a 46yo man with anxiety, history of alcohol abuse with DTs in the past, alcoholic hepatitis c/b ascites and PHG with GIB who presents following a seizure. Per report, he had witnessed seizure by girlfriend and was helped to the ground without hitting his head. Was confused and tremorous at that time. He states that last drink was 2 days ago; he drinks 1/2 pint of liquor per day. He reports that he also had bloody bowel movements today; per nursing in ED, he had BRB on rectal exam. He states that he is experiencing auditory hallucinations.    Received 2mg IV ativan and was loaded with IV Keppra in the ED.    Overview/Hospital Course:  No notes on file    Interval History:   Doing well, substantially improved symptoms, not requiring additional ativan. Sodium brought down due to overcorrection, improving overall. Still with some small bloody BM, plan for EGD/cscope tomorrow.    Review of Systems   Unable to perform ROS: Mental status change   Constitutional: Negative for fever.   Neurological: Positive for tremors. Negative for dizziness and light-headedness.   Psychiatric/Behavioral: Negative for agitation and hallucinations. The patient is not nervous/anxious.      Objective:     Vital Signs (Most Recent):  Temp: 97.9 °F (36.6 °C) (07/26/20 1253)  Pulse: 83 (07/26/20 1253)  Resp: 16 (07/26/20 1253)  BP: 133/81 (07/26/20 1253)  SpO2: 97 % (07/26/20 1253) Vital Signs (24h Range):  Temp:  [97.9 °F (36.6 °C)-98.8 °F (37.1 °C)] 97.9 °F (36.6 °C)  Pulse:  [] 83  Resp:  [16-20] 16  SpO2:   [96 %-100 %] 97 %  BP: (133-147)/(81-95) 133/81     Weight: 110 kg (242 lb 8.1 oz)  Body mass index is 30.31 kg/m².    Intake/Output Summary (Last 24 hours) at 7/26/2020 1317  Last data filed at 7/26/2020 1150  Gross per 24 hour   Intake 245 ml   Output 1350 ml   Net -1105 ml      Physical Exam  Vitals signs reviewed.   Constitutional:       General: He is not in acute distress.     Appearance: He is well-developed. He is not diaphoretic.   HENT:      Head: Normocephalic and atraumatic.      Nose: Nose normal.      Mouth/Throat:      Comments: Contusion on tongue  Eyes:      General: No scleral icterus.  Neck:      Musculoskeletal: Normal range of motion.      Vascular: No JVD.      Trachea: No tracheal deviation.   Cardiovascular:      Rate and Rhythm: Normal rate and regular rhythm.      Heart sounds: Normal heart sounds. No murmur. No friction rub. No gallop.    Pulmonary:      Effort: Pulmonary effort is normal. No respiratory distress.      Breath sounds: Normal breath sounds.   Abdominal:      General: There is no distension.      Palpations: Abdomen is soft. There is no mass.      Tenderness: There is no abdominal tenderness.      Hernia: No hernia is present.   Musculoskeletal:         General: No deformity.   Skin:     General: Skin is warm.      Findings: No rash.   Neurological:      Mental Status: He is alert and oriented to person, place, and time.      Comments: Tremorous.   Psychiatric:         Behavior: Behavior normal.         Significant Labs: All pertinent labs within the past 24 hours have been reviewed.    Significant Imaging: I have reviewed all pertinent imaging results/findings within the past 24 hours.      Assessment/Plan:      * Alcohol withdrawal seizure with perceptual disturbance  - given 2mg IV ativan in ED; still with significant symptoms  - initiated on valium 10mg q6 scheduled with breakthrough ativan 1mg prn seizures or CIWA >8  - precedex overnight, now discontinued on 7/24  -  continue valium taper to 10mg daily  - serial CIWAs  - loaded with Keppra in ED; then 1500mg IV bid, de-escalate to 1000mg PO bid  - Neurology consulted: EEG not necessary given improvement and known reversible cause, can f/u outpatient and will need seizure precautions  - Psych consulted, appreciate recs: initiate lexapro 5  - stepped down from ICU  - folate, thiamine, MVI  - seizure precautions    Alcoholic liver disease  - history of alcoholic hepatitis with ascites in the past  - AUS with HSM and hepatic steatosis, no ascites    Hyponatremia  - likely due to beer potomania  - checked urine Na, osm  - s/p 1L NS in ED, given 100ml 3% with improvement given seizures  - rapid correction, given D5W+DDAVP yesterday, now raising slowly on NS  - BMP q8  - improving    Alcohol abuse  - history of substantial EtOH use  - thiamine, folate, multivitamin  - consult Psych, appreciate recs      Thrombocytopenia  - likely 2/2 liver disease/EtOH  - monitor with CBC      GI bleed  - BRBPR  - history of PHG in the past  - given history of liver disease and heavy EtOH use, will need to r/o variceal bleed although less likely  - GI bleed pathway initiated  - 2LBIV  - initiated on IV protonix 40mg bid, IV octreotide bolus->gtt, and IV ceftriaxone; stopped ceftriaxone/octreotide on 7/25  - trend CBC daily  - orthostatic vitals stable  - GI consulted, appreciate recs: plan for EGD/cscope tomorrow  - CLD today, NPO@MN      VTE Risk Mitigation (From admission, onward)         Ordered     IP VTE HIGH RISK PATIENT  Once      07/23/20 1757     Place sequential compression device  Until discontinued      07/23/20 1757                      Otoniel Espinoza MD  Department of Hospital Medicine   [facility]

## 2020-07-26 NOTE — PLAN OF CARE
07/26/20 0844   PRE-TX-O2   O2 Device (Oxygen Therapy) room air   SpO2 98 %   Pulse Oximetry Type Intermittent

## 2020-07-26 NOTE — SUBJECTIVE & OBJECTIVE
Interval History:   Doing well, substantially improved symptoms, not requiring additional ativan. Sodium brought down due to overcorrection, improving overall. Still with some small bloody BM, plan for EGD/cscope tomorrow.    Review of Systems   Unable to perform ROS: Mental status change   Constitutional: Negative for fever.   Neurological: Positive for tremors. Negative for dizziness and light-headedness.   Psychiatric/Behavioral: Negative for agitation and hallucinations. The patient is not nervous/anxious.      Objective:     Vital Signs (Most Recent):  Temp: 97.9 °F (36.6 °C) (07/26/20 1253)  Pulse: 83 (07/26/20 1253)  Resp: 16 (07/26/20 1253)  BP: 133/81 (07/26/20 1253)  SpO2: 97 % (07/26/20 1253) Vital Signs (24h Range):  Temp:  [97.9 °F (36.6 °C)-98.8 °F (37.1 °C)] 97.9 °F (36.6 °C)  Pulse:  [] 83  Resp:  [16-20] 16  SpO2:  [96 %-100 %] 97 %  BP: (133-147)/(81-95) 133/81     Weight: 110 kg (242 lb 8.1 oz)  Body mass index is 30.31 kg/m².    Intake/Output Summary (Last 24 hours) at 7/26/2020 1317  Last data filed at 7/26/2020 1150  Gross per 24 hour   Intake 245 ml   Output 1350 ml   Net -1105 ml      Physical Exam  Vitals signs reviewed.   Constitutional:       General: He is not in acute distress.     Appearance: He is well-developed. He is not diaphoretic.   HENT:      Head: Normocephalic and atraumatic.      Nose: Nose normal.      Mouth/Throat:      Comments: Contusion on tongue  Eyes:      General: No scleral icterus.  Neck:      Musculoskeletal: Normal range of motion.      Vascular: No JVD.      Trachea: No tracheal deviation.   Cardiovascular:      Rate and Rhythm: Normal rate and regular rhythm.      Heart sounds: Normal heart sounds. No murmur. No friction rub. No gallop.    Pulmonary:      Effort: Pulmonary effort is normal. No respiratory distress.      Breath sounds: Normal breath sounds.   Abdominal:      General: There is no distension.      Palpations: Abdomen is soft. There is no mass.       Tenderness: There is no abdominal tenderness.      Hernia: No hernia is present.   Musculoskeletal:         General: No deformity.   Skin:     General: Skin is warm.      Findings: No rash.   Neurological:      Mental Status: He is alert and oriented to person, place, and time.      Comments: Tremorous.   Psychiatric:         Behavior: Behavior normal.         Significant Labs: All pertinent labs within the past 24 hours have been reviewed.    Significant Imaging: I have reviewed all pertinent imaging results/findings within the past 24 hours.

## 2020-07-27 ENCOUNTER — ANESTHESIA (OUTPATIENT)
Dept: ENDOSCOPY | Facility: HOSPITAL | Age: 45
DRG: 896 | End: 2020-07-27
Payer: MEDICAID

## 2020-07-27 ENCOUNTER — ANESTHESIA EVENT (OUTPATIENT)
Dept: ENDOSCOPY | Facility: HOSPITAL | Age: 45
DRG: 896 | End: 2020-07-27
Payer: MEDICAID

## 2020-07-27 VITALS
RESPIRATION RATE: 18 BRPM | HEART RATE: 89 BPM | SYSTOLIC BLOOD PRESSURE: 158 MMHG | TEMPERATURE: 97 F | BODY MASS INDEX: 30.15 KG/M2 | DIASTOLIC BLOOD PRESSURE: 89 MMHG | HEIGHT: 75 IN | WEIGHT: 242.5 LBS | OXYGEN SATURATION: 99 %

## 2020-07-27 LAB
ALBUMIN SERPL BCP-MCNC: 3.2 G/DL (ref 3.5–5.2)
ALP SERPL-CCNC: 102 U/L (ref 55–135)
ALT SERPL W/O P-5'-P-CCNC: 39 U/L (ref 10–44)
ANION GAP SERPL CALC-SCNC: 10 MMOL/L (ref 8–16)
ANION GAP SERPL CALC-SCNC: 11 MMOL/L (ref 8–16)
ANION GAP SERPL CALC-SCNC: 13 MMOL/L (ref 8–16)
AST SERPL-CCNC: 138 U/L (ref 10–40)
BASOPHILS # BLD AUTO: 0.05 K/UL (ref 0–0.2)
BASOPHILS NFR BLD: 0.9 % (ref 0–1.9)
BILIRUB DIRECT SERPL-MCNC: 3.7 MG/DL (ref 0.1–0.3)
BILIRUB SERPL-MCNC: 5.7 MG/DL (ref 0.1–1)
BUN SERPL-MCNC: 5 MG/DL (ref 6–20)
CALCIUM SERPL-MCNC: 8.6 MG/DL (ref 8.7–10.5)
CALCIUM SERPL-MCNC: 8.8 MG/DL (ref 8.7–10.5)
CALCIUM SERPL-MCNC: 9 MG/DL (ref 8.7–10.5)
CHLORIDE SERPL-SCNC: 96 MMOL/L (ref 95–110)
CHLORIDE SERPL-SCNC: 97 MMOL/L (ref 95–110)
CHLORIDE SERPL-SCNC: 98 MMOL/L (ref 95–110)
CO2 SERPL-SCNC: 20 MMOL/L (ref 23–29)
CO2 SERPL-SCNC: 22 MMOL/L (ref 23–29)
CO2 SERPL-SCNC: 23 MMOL/L (ref 23–29)
CREAT SERPL-MCNC: 0.7 MG/DL (ref 0.5–1.4)
CREAT SERPL-MCNC: 0.7 MG/DL (ref 0.5–1.4)
CREAT SERPL-MCNC: 0.8 MG/DL (ref 0.5–1.4)
DIFFERENTIAL METHOD: ABNORMAL
EOSINOPHIL # BLD AUTO: 0.1 K/UL (ref 0–0.5)
EOSINOPHIL NFR BLD: 1 % (ref 0–8)
ERYTHROCYTE [DISTWIDTH] IN BLOOD BY AUTOMATED COUNT: 17 % (ref 11.5–14.5)
EST. GFR  (AFRICAN AMERICAN): >60 ML/MIN/1.73 M^2
EST. GFR  (NON AFRICAN AMERICAN): >60 ML/MIN/1.73 M^2
GLUCOSE SERPL-MCNC: 70 MG/DL (ref 70–110)
GLUCOSE SERPL-MCNC: 85 MG/DL (ref 70–110)
GLUCOSE SERPL-MCNC: 95 MG/DL (ref 70–110)
HCT VFR BLD AUTO: 33.1 % (ref 40–54)
HGB BLD-MCNC: 11.7 G/DL (ref 14–18)
IMM GRANULOCYTES # BLD AUTO: 0.03 K/UL (ref 0–0.04)
IMM GRANULOCYTES NFR BLD AUTO: 0.5 % (ref 0–0.5)
LYMPHOCYTES # BLD AUTO: 1.2 K/UL (ref 1–4.8)
LYMPHOCYTES NFR BLD: 21.1 % (ref 18–48)
MCH RBC QN AUTO: 33 PG (ref 27–31)
MCHC RBC AUTO-ENTMCNC: 35.3 G/DL (ref 32–36)
MCV RBC AUTO: 93 FL (ref 82–98)
MONOCYTES # BLD AUTO: 1.1 K/UL (ref 0.3–1)
MONOCYTES NFR BLD: 18.7 % (ref 4–15)
NEUTROPHILS # BLD AUTO: 3.4 K/UL (ref 1.8–7.7)
NEUTROPHILS NFR BLD: 57.8 % (ref 38–73)
NRBC BLD-RTO: 0 /100 WBC
PLATELET # BLD AUTO: 64 K/UL (ref 150–350)
PLATELET BLD QL SMEAR: ABNORMAL
PMV BLD AUTO: 9.6 FL (ref 9.2–12.9)
POTASSIUM SERPL-SCNC: 4 MMOL/L (ref 3.5–5.1)
POTASSIUM SERPL-SCNC: 4 MMOL/L (ref 3.5–5.1)
POTASSIUM SERPL-SCNC: 4.4 MMOL/L (ref 3.5–5.1)
PROT SERPL-MCNC: 8.2 G/DL (ref 6–8.4)
RBC # BLD AUTO: 3.55 M/UL (ref 4.6–6.2)
SARS-COV-2 RDRP RESP QL NAA+PROBE: NEGATIVE
SODIUM SERPL-SCNC: 129 MMOL/L (ref 136–145)
SODIUM SERPL-SCNC: 130 MMOL/L (ref 136–145)
SODIUM SERPL-SCNC: 131 MMOL/L (ref 136–145)
WBC # BLD AUTO: 5.84 K/UL (ref 3.9–12.7)

## 2020-07-27 PROCEDURE — C9113 INJ PANTOPRAZOLE SODIUM, VIA: HCPCS | Performed by: HOSPITALIST

## 2020-07-27 PROCEDURE — 94761 N-INVAS EAR/PLS OXIMETRY MLT: CPT

## 2020-07-27 PROCEDURE — 80048 BASIC METABOLIC PNL TOTAL CA: CPT | Mod: 91

## 2020-07-27 PROCEDURE — 37000009 HC ANESTHESIA EA ADD 15 MINS: Performed by: INTERNAL MEDICINE

## 2020-07-27 PROCEDURE — 80048 BASIC METABOLIC PNL TOTAL CA: CPT

## 2020-07-27 PROCEDURE — 27201012 HC FORCEPS, HOT/COLD, DISP: Performed by: INTERNAL MEDICINE

## 2020-07-27 PROCEDURE — 80053 COMPREHEN METABOLIC PANEL: CPT

## 2020-07-27 PROCEDURE — 88305 TISSUE EXAM BY PATHOLOGIST: CPT | Performed by: PATHOLOGY

## 2020-07-27 PROCEDURE — 85025 COMPLETE CBC W/AUTO DIFF WBC: CPT

## 2020-07-27 PROCEDURE — 63600175 PHARM REV CODE 636 W HCPCS: Performed by: NURSE ANESTHETIST, CERTIFIED REGISTERED

## 2020-07-27 PROCEDURE — 45380 COLONOSCOPY AND BIOPSY: CPT | Mod: ,,, | Performed by: INTERNAL MEDICINE

## 2020-07-27 PROCEDURE — 25000003 PHARM REV CODE 250: Performed by: PSYCHIATRY & NEUROLOGY

## 2020-07-27 PROCEDURE — 37000008 HC ANESTHESIA 1ST 15 MINUTES: Performed by: INTERNAL MEDICINE

## 2020-07-27 PROCEDURE — 45380 PR COLONOSCOPY,BIOPSY: ICD-10-PCS | Mod: ,,, | Performed by: INTERNAL MEDICINE

## 2020-07-27 PROCEDURE — 25000003 PHARM REV CODE 250: Performed by: NURSE ANESTHETIST, CERTIFIED REGISTERED

## 2020-07-27 PROCEDURE — 82248 BILIRUBIN DIRECT: CPT

## 2020-07-27 PROCEDURE — 45380 COLONOSCOPY AND BIOPSY: CPT | Performed by: INTERNAL MEDICINE

## 2020-07-27 PROCEDURE — 25000003 PHARM REV CODE 250: Performed by: HOSPITALIST

## 2020-07-27 PROCEDURE — 88305 TISSUE EXAM BY PATHOLOGIST: CPT | Mod: 26,,, | Performed by: PATHOLOGY

## 2020-07-27 PROCEDURE — U0002 COVID-19 LAB TEST NON-CDC: HCPCS

## 2020-07-27 PROCEDURE — 43235 EGD DIAGNOSTIC BRUSH WASH: CPT | Mod: 51,,, | Performed by: INTERNAL MEDICINE

## 2020-07-27 PROCEDURE — 63600175 PHARM REV CODE 636 W HCPCS: Performed by: HOSPITALIST

## 2020-07-27 PROCEDURE — 43235 PR EGD, FLEX, DIAGNOSTIC: ICD-10-PCS | Mod: 51,,, | Performed by: INTERNAL MEDICINE

## 2020-07-27 PROCEDURE — 88305 TISSUE EXAM BY PATHOLOGIST: ICD-10-PCS | Mod: 26,,, | Performed by: PATHOLOGY

## 2020-07-27 PROCEDURE — 43235 EGD DIAGNOSTIC BRUSH WASH: CPT | Performed by: INTERNAL MEDICINE

## 2020-07-27 RX ORDER — PANTOPRAZOLE SODIUM 40 MG/1
40 TABLET, DELAYED RELEASE ORAL 2 TIMES DAILY
Status: DISCONTINUED | OUTPATIENT
Start: 2020-07-27 | End: 2020-07-27 | Stop reason: HOSPADM

## 2020-07-27 RX ORDER — LIDOCAINE HYDROCHLORIDE 20 MG/ML
INJECTION INTRAVENOUS
Status: DISCONTINUED | OUTPATIENT
Start: 2020-07-27 | End: 2020-07-27

## 2020-07-27 RX ORDER — SODIUM CHLORIDE 9 MG/ML
INJECTION, SOLUTION INTRAVENOUS CONTINUOUS PRN
Status: DISCONTINUED | OUTPATIENT
Start: 2020-07-27 | End: 2020-07-27

## 2020-07-27 RX ORDER — LEVETIRACETAM 1000 MG/1
1000 TABLET ORAL 2 TIMES DAILY
Qty: 60 TABLET | Refills: 11 | Status: SHIPPED | OUTPATIENT
Start: 2020-07-27 | End: 2021-08-05 | Stop reason: SDUPTHER

## 2020-07-27 RX ORDER — PROPOFOL 10 MG/ML
VIAL (ML) INTRAVENOUS
Status: DISCONTINUED | OUTPATIENT
Start: 2020-07-27 | End: 2020-07-27

## 2020-07-27 RX ORDER — ESCITALOPRAM OXALATE 5 MG/1
5 TABLET ORAL DAILY
Qty: 30 TABLET | Refills: 11 | Status: SHIPPED | OUTPATIENT
Start: 2020-07-28 | End: 2020-08-10

## 2020-07-27 RX ORDER — PROPOFOL 10 MG/ML
VIAL (ML) INTRAVENOUS CONTINUOUS PRN
Status: DISCONTINUED | OUTPATIENT
Start: 2020-07-27 | End: 2020-07-27

## 2020-07-27 RX ORDER — FENTANYL CITRATE 50 UG/ML
INJECTION, SOLUTION INTRAMUSCULAR; INTRAVENOUS
Status: DISCONTINUED | OUTPATIENT
Start: 2020-07-27 | End: 2020-07-27

## 2020-07-27 RX ORDER — LANOLIN ALCOHOL/MO/W.PET/CERES
100 CREAM (GRAM) TOPICAL DAILY
Qty: 30 TABLET | Refills: 11 | Status: SHIPPED | OUTPATIENT
Start: 2020-07-27 | End: 2022-01-08 | Stop reason: SDUPTHER

## 2020-07-27 RX ADMIN — PANTOPRAZOLE SODIUM 40 MG: 40 INJECTION, POWDER, FOR SOLUTION INTRAVENOUS at 08:07

## 2020-07-27 RX ADMIN — FENTANYL CITRATE 50 MCG: 50 INJECTION, SOLUTION INTRAMUSCULAR; INTRAVENOUS at 10:07

## 2020-07-27 RX ADMIN — PROPOFOL 150 MCG/KG/MIN: 10 INJECTION, EMULSION INTRAVENOUS at 10:07

## 2020-07-27 RX ADMIN — ESCITALOPRAM OXALATE 5 MG: 5 TABLET, FILM COATED ORAL at 08:07

## 2020-07-27 RX ADMIN — LEVETIRACETAM 1000 MG: 500 TABLET ORAL at 08:07

## 2020-07-27 RX ADMIN — FOLIC ACID 1 MG: 1 TABLET ORAL at 08:07

## 2020-07-27 RX ADMIN — DIAZEPAM 5 MG: 5 TABLET ORAL at 08:07

## 2020-07-27 RX ADMIN — THIAMINE HYDROCHLORIDE 100 MG: 100 INJECTION, SOLUTION INTRAMUSCULAR; INTRAVENOUS at 08:07

## 2020-07-27 RX ADMIN — LIDOCAINE HYDROCHLORIDE 100 MG: 20 INJECTION, SOLUTION INTRAVENOUS at 10:07

## 2020-07-27 RX ADMIN — PROPOFOL 30 MG: 10 INJECTION, EMULSION INTRAVENOUS at 10:07

## 2020-07-27 RX ADMIN — THERA TABS 1 TABLET: TAB at 08:07

## 2020-07-27 RX ADMIN — PROPOFOL 100 MG: 10 INJECTION, EMULSION INTRAVENOUS at 10:07

## 2020-07-27 RX ADMIN — SODIUM CHLORIDE: 0.9 INJECTION, SOLUTION INTRAVENOUS at 10:07

## 2020-07-27 NOTE — PLAN OF CARE
CM met with pt for d/c planning.  Per primary team, pt ready for d/c.  Discussed voluntary admission to inpt alcohol rehab, pt states he is not ready at this time but will f/u with resources given in 2 weeks after their planned vacation.  CM educated pt on need to consider abstinence from alcohol if interested  In recovery.  Pt states he will receive the information and review wife.      Discussed need for hospital f/u appt, states he would prefer to see his PCP when offered priority clinic appt.  Pt states he plans on admitting to inpt program greater than one week and will call his PCP for f/u.      After further discussion, pt has agreed to f/u with [facility].  Referrals to [facility] sent via fax to [phone].  Department will contact pt with f/u appt.    Lee Ann Renteria RN    [phone]

## 2020-07-27 NOTE — ASSESSMENT & PLAN NOTE
- likely due to beer potomania  - checked urine Na, osm  - s/p 1L NS in ED, given 100ml 3% with improvement given seizures  - initially overcorrected, given DDAVP+D5 with improvement, then corrected slowly  - improved with IVF

## 2020-07-27 NOTE — NURSING
Pt arrived to unit post colonoscopy/endoscopy. Vs stabled. No c/o pain or discomfort voiced. Appears relaxed without signs of distress. Report received from BIB Jim. Per report pt had upper GI endoscopy and colonoscopy. Tolerated well.

## 2020-07-27 NOTE — [employer] PATIENT INSTRUCTIONS
Discharge Summary/Instructions after an Endoscopic Procedure  Patient Name: Nilesh Rolon  Patient MRN: [id number]  Patient YOB: 1975 Monday, July 27, 2020  Sotero Zapata MD  Your health is very important to us during the Covid Crisis. Following your   procedure today, you will receive a daily text for 2 weeks asking about   signs or symptoms of Covid 19.  Please respond to this text when you   receive it so we can follow up and keep you as safe as possible.   RESTRICTIONS:  During your procedure today, you received medications for sedation.  These   medications may affect your judgment, balance and coordination.  Therefore,   for 24 hours, you have the following restrictions:   - DO NOT drive a car, operate machinery, make legal/financial decisions,   sign important papers or drink alcohol.    ACTIVITY:  Today: no heavy lifting, straining or running due to procedural   sedation/anesthesia.  The following day: return to full activity including work.  DIET:  Eat and drink normally unless instructed otherwise.     TREATMENT FOR COMMON SIDE EFFECTS:  - Mild abdominal pain, nausea, belching, bloating or excessive gas:  rest,   eat lightly and use a heating pad.  - Sore Throat: treat with throat lozenges and/or gargle with warm salt   water.  - Because air was used during the procedure, expelling large amounts of air   from your rectum or belching is normal.  - If a bowel prep was taken, you may not have a bowel movement for 1-3 days.    This is normal.  SYMPTOMS TO WATCH FOR AND REPORT TO YOUR PHYSICIAN:  1. Abdominal pain or bloating, other than gas cramps.  2. Chest pain.  3. Back pain.  4. Signs of infection such as: chills or fever occurring within 24 hours   after the procedure.  5. Rectal bleeding, which would show as bright red, maroon, or black stools.   (A tablespoon of blood from the rectum is not serious, especially if   hemorrhoids are present.)  6. Vomiting.  7. Weakness or dizziness.  GO  DIRECTLY TO THE NEAREST EMERGENCY ROOM IF YOU HAVE ANY OF THE FOLLOWING:      Difficulty breathing              Chills and/or fever over 101 F   Persistent vomiting and/or vomiting blood   Severe abdominal pain   Severe chest pain   Black, tarry stools   Bleeding- more than one tablespoon   Any other symptom or condition that you feel may need urgent attention  Your doctor recommends these additional instructions:  If any biopsies were taken, your doctors clinic will contact you in 1 to 2   weeks with any results.  - Return patient to hospital salter for ongoing care.   - Resume previous diet.   - Continue present medications.   - Await pathology results.   - Repeat colonoscopy date to be determined after pending pathology results   are reviewed for surveillance.  For questions, problems or results please call your physician - Sotero Zapata MD.  EMERGENCY PHONE NUMBER: [phone],  LAB RESULTS: [phone]  IF A COMPLICATION OR EMERGENCY SITUATION ARISES AND YOU ARE UNABLE TO REACH   YOUR PHYSICIAN - GO DIRECTLY TO THE EMERGENCY ROOM.  Sotero Zapata MD  7/27/2020 11:15:26 AM  This report has been verified and signed electronically.  [employer]

## 2020-07-27 NOTE — PLAN OF CARE
Safety maintained, bed alarm on and call bell in reach. Telemetry monitoring in progress NSR noted.Medication given and tolerated well. Plan of care reviewed and questions answered.tolerated 2 Liters of Golytely. Instructed on NPO except for prep. No distress noted. Will continue to monitor.

## 2020-07-27 NOTE — ASSESSMENT & PLAN NOTE
- history of substantial EtOH use  - thiamine, folate, multivitamin  - consulted Psych: add on lexapro 5mg daily  - provided substance abuse resources

## 2020-07-27 NOTE — TRANSFER OF CARE
"Anesthesia Transfer of Care Note    Patient: Nilesh Rolon     Procedure(s) Performed: Procedure(s) (LRB):  EGD (ESOPHAGOGASTRODUODENOSCOPY) (N/A)  COLONOSCOPY (N/A)    Patient location: GI    Anesthesia Type: MAC    Transport from OR: Transported from OR on room air with adequate spontaneous ventilation    Post pain: adequate analgesia    Post assessment: no apparent anesthetic complications    Post vital signs: stable    Level of consciousness: awake    Nausea/Vomiting: no nausea/vomiting    Complications: none    Transfer of care protocol was followed      Last vitals:   Visit Vitals  /80   Pulse 92   Temp 37.3 °C (99.1 °F)   Resp 18   Ht 6' 3" (1.905 m)   Wt 110 kg (242 lb 8.1 oz)   SpO2 99%   BMI 30.31 kg/m²     "

## 2020-07-27 NOTE — ASSESSMENT & PLAN NOTE
- given 2mg IV ativan in ED; still with significant symptoms  - initiated on valium 10mg q6 scheduled with breakthrough ativan 1mg prn seizures or CIWA >8  - precedex overnight on day of admission, now discontinued on 7/24  - tapered off valium  - loaded with Keppra in ED; then 1500mg IV bid, de-escalate to 1000mg PO bid  - Neurology consulted: EEG as outpatient with followup; given seizure precautions  - Psych consulted, appreciate recs: initiate lexapro 5  - folate, thiamine, MVI  - seizure precautions

## 2020-07-27 NOTE — ANESTHESIA POSTPROCEDURE EVALUATION
Anesthesia Post Evaluation    Patient: Nilesh Carrjose Mota    Procedure(s) Performed: Procedure(s) (LRB):  EGD (ESOPHAGOGASTRODUODENOSCOPY) (N/A)  COLONOSCOPY (N/A)    Final Anesthesia Type: MAC    Patient location during evaluation: GI PACU  Patient participation: Yes- Able to Participate  Level of consciousness: awake and alert  Post-procedure vital signs: reviewed and stable  Pain management: adequate  Airway patency: patent  MIKE mitigation strategies: Multimodal analgesia  PONV status at discharge: No PONV  Anesthetic complications: no      Cardiovascular status: hemodynamically stable and blood pressure returned to baseline  Respiratory status: room air, unassisted and spontaneous ventilation  Hydration status: euvolemic  Follow-up not needed.          Vitals Value Taken Time   /98 07/27/20 1130   Temp 36.9 °C (98.4 °F) 07/27/20 1100   Pulse 89 07/27/20 1130   Resp 15 07/27/20 1130   SpO2 100 % 07/27/20 1130         No case tracking events are documented in the log.      Pain/Maribel Score: Maribel Score: 10 (7/27/2020 11:30 AM)

## 2020-07-27 NOTE — HOSPITAL COURSE
"Mr. Rolon presented with withdrawal seizures and DTs with hyponatremia. Also with rectal bleeding. He was admitted to ICU and placed on scheduled valium 10mg q6 IV with breakthrough ativan. Eventually became agitated and required precedex gtt. Also loaded with keppra given seizure then tapered to 1000mg bid dosing. His sodium was corrected with IVF, likely 2/2 beer potomania. GI consulted and performed EGD and cscope which revealed grade 1 esophageal ulcers, PHG, friable tissue in rectum, and several polyps which were resected. He will need outpatient f/u. He was evaluated by Psychiatry who recommended initiation of lexapro 5mg daily with uptitration when out of acute withdrawal phase. Agitation and DTs improved; he was offered rehab but declined at this time. Provided resources and states he will seek this out in two weeks. Will need f/u with Neurology and GI. Problem based discussion below.    BP (!) 158/89 (BP Location: Left arm, Patient Position: Lying)   Pulse 89   Temp 97.1 °F (36.2 °C) (Oral)   Resp 18   Ht 6' 3" (1.905 m)   Wt 110 kg (242 lb 8.1 oz)   SpO2 99%   BMI 30.31 kg/m²   Physical Exam  Vitals signs reviewed.   Constitutional:       General: He is not in acute distress.     Appearance: He is well-developed. He is not diaphoretic.   HENT:      Head: Normocephalic and atraumatic.      Nose: Nose normal.      Mouth/Throat:      Comments: Contusion on tongue  Eyes:      General: No scleral icterus.  Neck:      Musculoskeletal: Normal range of motion.      Vascular: No JVD.      Trachea: No tracheal deviation.   Cardiovascular:      Rate and Rhythm: Normal rate and regular rhythm.      Heart sounds: Normal heart sounds. No murmur. No friction rub. No gallop.    Pulmonary:      Effort: Pulmonary effort is normal. No respiratory distress.      Breath sounds: Normal breath sounds.   Abdominal:      General: There is no distension.      Palpations: Abdomen is soft. There is no mass.      " Tenderness: There is no abdominal tenderness.      Hernia: No hernia is present.   Musculoskeletal:         General: No deformity.   Skin:     General: Skin is warm.      Findings: No rash.   Neurological:      Mental Status: He is alert and oriented to person, place, and time.      Comments: Tremorousness has improved.   Psychiatric:         Behavior: Behavior normal.

## 2020-07-27 NOTE — PLAN OF CARE
met with patient and provided ETOH resources for patient. SW provided patient with [facility] Inpatient flyer.        07/27/20 4905   Post-Acute Status   Post-Acute Authorization Other   Other Status Replaced by [facility]

## 2020-07-27 NOTE — [employer]ATION PATIENT INSTRUCTIONS
Discharge Summary/Instructions after an Endoscopic Procedure  Patient Name: Nilesh Rolon  Patient MRN: [id number]  Patient YOB: 1975 Monday, July 27, 2020  Sotero Zapata MD  Your health is very important to us during the Covid Crisis. Following your   procedure today, you will receive a daily text for 2 weeks asking about   signs or symptoms of Covid 19.  Please respond to this text when you   receive it so we can follow up and keep you as safe as possible.   RESTRICTIONS:  During your procedure today, you received medications for sedation.  These   medications may affect your judgment, balance and coordination.  Therefore,   for 24 hours, you have the following restrictions:   - DO NOT drive a car, operate machinery, make legal/financial decisions,   sign important papers or drink alcohol.    ACTIVITY:  Today: no heavy lifting, straining or running due to procedural   sedation/anesthesia.  The following day: return to full activity including work.  DIET:  Eat and drink normally unless instructed otherwise.     TREATMENT FOR COMMON SIDE EFFECTS:  - Mild abdominal pain, nausea, belching, bloating or excessive gas:  rest,   eat lightly and use a heating pad.  - Sore Throat: treat with throat lozenges and/or gargle with warm salt   water.  - Because air was used during the procedure, expelling large amounts of air   from your rectum or belching is normal.  - If a bowel prep was taken, you may not have a bowel movement for 1-3 days.    This is normal.  SYMPTOMS TO WATCH FOR AND REPORT TO YOUR PHYSICIAN:  1. Abdominal pain or bloating, other than gas cramps.  2. Chest pain.  3. Back pain.  4. Signs of infection such as: chills or fever occurring within 24 hours   after the procedure.  5. Rectal bleeding, which would show as bright red, maroon, or black stools.   (A tablespoon of blood from the rectum is not serious, especially if   hemorrhoids are present.)  6. Vomiting.  7. Weakness or dizziness.  GO  DIRECTLY TO THE NEAREST EMERGENCY ROOM IF YOU HAVE ANY OF THE FOLLOWING:      Difficulty breathing              Chills and/or fever over 101 F   Persistent vomiting and/or vomiting blood   Severe abdominal pain   Severe chest pain   Black, tarry stools   Bleeding- more than one tablespoon   Any other symptom or condition that you feel may need urgent attention  Your doctor recommends these additional instructions:  If any biopsies were taken, your doctors clinic will contact you in 1 to 2   weeks with any results.  - Return patient to hospital salter for ongoing care.   - Resume previous diet.   - Continue present medications.   - Return to primary care physician at appointment to be scheduled.  For questions, problems or results please call your physician - Sotero Zapata MD.  EMERGENCY PHONE NUMBER: [phone],  LAB RESULTS: [phone]  IF A COMPLICATION OR EMERGENCY SITUATION ARISES AND YOU ARE UNABLE TO REACH   YOUR PHYSICIAN - GO DIRECTLY TO THE EMERGENCY ROOM.  Sotero Zapata MD  7/27/2020 11:11:32 AM  This report has been verified and signed electronically.  [employer]

## 2020-07-27 NOTE — DISCHARGE SUMMARY
[facility] Hospital Medicine  Discharge Summary      Patient Name: Nilesh Rolon Jr.  MRN: [id number]  Admission Date: 7/23/2020  Hospital Length of Stay: 4 days  Discharge Date and Time: 7/27/2020  3:46 PM  Attending Physician: No att. providers found   Discharging Provider: Otoniel Espinoza MD  Primary Care Provider: Bradford Vega DO, DO (Inactive)      HPI:   Mr. Rolon is a 44yo man with anxiety, history of alcohol abuse with DTs in the past, alcoholic hepatitis c/b ascites and PHG with GIB who presents following a seizure. Per report, he had witnessed seizure by girlfriend and was helped to the ground without hitting his head. Was confused and tremorous at that time. He states that last drink was 2 days ago; he drinks 1/2 pint of liquor per day. He reports that he also had bloody bowel movements today; per nursing in ED, he had BRB on rectal exam. He states that he is experiencing auditory hallucinations.    Received 2mg IV ativan and was loaded with IV Keppra in the ED.    Procedure(s) (LRB):  EGD (ESOPHAGOGASTRODUODENOSCOPY) (N/A)  COLONOSCOPY (N/A)      Hospital Course:   Mr. Rolon presented with withdrawal seizures and DTs with hyponatremia. Also with rectal bleeding. He was admitted to ICU and placed on scheduled valium 10mg q6 IV with breakthrough ativan. Eventually became agitated and required precedex gtt. Also loaded with keppra given seizure then tapered to 1000mg bid dosing. His sodium was corrected with IVF, likely 2/2 beer potomania. GI consulted and performed EGD and cscope which revealed grade 1 esophageal ulcers, PHG, friable tissue in rectum, and several polyps which were resected. He will need outpatient f/u. He was evaluated by Psychiatry who recommended initiation of lexapro 5mg daily with uptitration when out of acute withdrawal phase. Agitation and DTs improved; he was offered rehab but declined at this time. Provided resources and states he will  "seek this out in two weeks. Will need f/u with Neurology and GI. Problem based discussion below.    BP (!) 158/89 (BP Location: Left arm, Patient Position: Lying)   Pulse 89   Temp 97.1 °F (36.2 °C) (Oral)   Resp 18   Ht 6' 3" (1.905 m)   Wt 110 kg (242 lb 8.1 oz)   SpO2 99%   BMI 30.31 kg/m²   Physical Exam  Vitals signs reviewed.   Constitutional:       General: He is not in acute distress.     Appearance: He is well-developed. He is not diaphoretic.   HENT:      Head: Normocephalic and atraumatic.      Nose: Nose normal.      Mouth/Throat:      Comments: Contusion on tongue  Eyes:      General: No scleral icterus.  Neck:      Musculoskeletal: Normal range of motion.      Vascular: No JVD.      Trachea: No tracheal deviation.   Cardiovascular:      Rate and Rhythm: Normal rate and regular rhythm.      Heart sounds: Normal heart sounds. No murmur. No friction rub. No gallop.    Pulmonary:      Effort: Pulmonary effort is normal. No respiratory distress.      Breath sounds: Normal breath sounds.   Abdominal:      General: There is no distension.      Palpations: Abdomen is soft. There is no mass.      Tenderness: There is no abdominal tenderness.      Hernia: No hernia is present.   Musculoskeletal:         General: No deformity.   Skin:     General: Skin is warm.      Findings: No rash.   Neurological:      Mental Status: He is alert and oriented to person, place, and time.      Comments: Tremorousness has improved.   Psychiatric:         Behavior: Behavior normal.      Consults:   Consults (From admission, onward)        Status Ordering Provider     Inpatient consult to [facility]  Once     Provider:  Bonita Kendall MD    Completed CHERYL JOHN     Inpatient consult to Psychiatry  Once     Provider:  Justin Dawkins MD    Acknowledged CHERYL JOHN          * Alcohol withdrawal seizure with perceptual disturbance  - given 2mg IV ativan in ED; still with significant " symptoms  - initiated on valium 10mg q6 scheduled with breakthrough ativan 1mg prn seizures or CIWA >8  - precedex overnight on day of admission, now discontinued on 7/24  - tapered off valium  - loaded with Keppra in ED; then 1500mg IV bid, de-escalate to 1000mg PO bid  - Neurology consulted: EEG as outpatient with followup; given seizure precautions  - Psych consulted, appreciate recs: initiate lexapro 5  - folate, thiamine, MVI  - seizure precautions    Alcoholic liver disease  - history of alcoholic hepatitis with ascites in the past  - AUS with HSM and hepatic steatosis, no ascites    Hyponatremia  - likely due to beer potomania  - checked urine Na, osm  - s/p 1L NS in ED, given 100ml 3% with improvement given seizures  - initially overcorrected, given DDAVP+D5 with improvement, then corrected slowly  - improved with IVF    Alcohol abuse  - history of substantial EtOH use  - thiamine, folate, multivitamin  - consulted Psych: add on lexapro 5mg daily  - provided substance abuse resources      Thrombocytopenia  - likely 2/2 liver disease/EtOH  - monitor with CBC      GI bleed  - BRBPR  - history of PHG in the past  - GI bleed pathway initiated  - endoscopic results as above: continue PPI and will need f/u for resected polyp results      Final Active Diagnoses:    Diagnosis Date Noted POA    PRINCIPAL PROBLEM:  Alcohol withdrawal seizure with perceptual disturbance [F10.232] 07/23/2020 Yes    Alcoholic liver disease [K70.9]  Yes    GI bleed [K92.2] 04/07/2019 Yes    Alcohol abuse [F10.10] 04/07/2019 Yes     Chronic    Thrombocytopenia [D69.6] 04/07/2019 Yes    Hyponatremia [E87.1] 04/07/2019 Yes     Chronic      Problems Resolved During this Admission:       Discharged Condition: fair    Disposition: Home or Self Care    Follow Up:    Patient Instructions:      Ambulatory referral/consult to Neurology   Standing Status: Future   Referral Priority: Routine Referral Type: Consultation   Referral Reason:  Specialty Services Required   Requested Specialty: Neurology   Number of Visits Requested: 1     Ambulatory referral/consult to Gastroenterology   Standing Status: Future   Referral Priority: Routine Referral Type: Consultation   Referral Reason: Specialty Services Required   Requested Specialty: Gastroenterology   Number of Visits Requested: 1     Ambulatory referral/consult to Outpatient Case Management   Referral Priority: Routine Referral Type: Consultation   Referral Reason: Specialty Services Required   Number of Visits Requested: 1     Diet Adult Regular     Notify your health care provider if you experience any of the following:  increased confusion or weakness     EEG   Standing Status: Future Standing Exp. Date: 07/27/21     Activity as tolerated       Significant Diagnostic Studies: Labs:   CMP   Recent Labs   Lab 07/26/20  0538  07/27/20  0045 07/27/20  0529 07/27/20  0802   *  127*   < > 129* 130* 131*   K 3.6  3.6   < > 4.0 4.4 4.0   CL 94*  94*   < > 96 97 98   CO2 24  24   < > 22* 23 20*   GLU 97  97   < > 95 85 70   BUN 6  6   < > 5* 5* 5*   CREATININE 0.8  0.8   < > 0.7 0.8 0.7   CALCIUM 8.3*  8.3*   < > 8.8 9.0 8.6*   PROT 7.5  --   --  8.2  --    ALBUMIN 2.9*  --   --  3.2*  --    BILITOT 4.1*  --   --  5.7*  --    ALKPHOS 83  --   --  102  --    *  --   --  138*  --    ALT 33  --   --  39  --    ANIONGAP 9  9   < > 11 10 13   ESTGFRAFRICA >60  >60   < > >60 >60 >60.0   EGFRNONAA >60  >60   < > >60 >60 >60.0    < > = values in this interval not displayed.   , CBC   Recent Labs   Lab 07/26/20  0538 07/27/20  0529   WBC 3.57* 5.84   HGB 10.7* 11.7*   HCT 30.3* 33.1*   PLT 66* 64*   , INR   Lab Results   Component Value Date    INR 1.2 12/06/2019    INR 1.2 10/28/2019    INR 1.2 10/27/2019   , Lipid Panel   Lab Results   Component Value Date    CHOL 242 (H) 04/06/2016    HDL 40 04/06/2016    LDLCALC 146.4 04/06/2016    TRIG 278 (H) 04/06/2016    CHOLHDL 16.5 (L) 04/06/2016    , Troponin   Recent Labs   Lab 07/23/20  1517   TROPONINI 0.007   , A1C: No results for input(s): HGBA1C in the last 4320 hours. and All labs within the past 24 hours have been reviewed  Microbiology: Blood Culture No results found for: LABBLOO and Urine Culture  No results found for: LABURIN  Radiology: X-Ray: CXR: X-Ray Chest 1 View (CXR): No results found for this visit on 07/23/20. and X-Ray Chest PA and Lateral (CXR): No results found for this visit on 07/23/20.  Endoscopy: Colonoscopy: as above and Gastroscopy: as above    Pending Diagnostic Studies:     Procedure Component Value Units Date/Time    Basic metabolic panel [[id number]]     Order Status: Sent Lab Status: No result     Specimen: Blood     Specimen to Pathology, Surgery Gastrointestinal tract [[id number]] Collected: 07/27/20 105    Order Status: Sent Lab Status: In process Updated: 07/27/20 1134         Medications:  Reconciled Home Medications:      Medication List      START taking these medications    escitalopram oxalate 5 MG Tab  Commonly known as: LEXAPRO  Take 1 tablet (5 mg total) by mouth once daily.  Start taking on: July 28, 2020     levETIRAcetam 1000 MG tablet  Commonly known as: KEPPRA  Take 1 tablet (1,000 mg total) by mouth 2 (two) times daily.     thiamine 100 MG tablet  Take 1 tablet (100 mg total) by mouth once daily.        CONTINUE taking these medications    folic acid 1 MG tablet  Commonly known as: FOLVITE  Take 1 tablet (1 mg total) by mouth once daily.     multivitamin capsule  Take 1 capsule by mouth once daily.     naltrexone 50 mg tablet  Commonly known as: DEPADE  Take 50 mg by mouth once daily.     pantoprazole 40 MG tablet  Commonly known as: PROTONIX  Take 1 tablet (40 mg total) by mouth once daily.     propranoloL 10 MG tablet  Commonly known as: INDERAL  Take 10 mg by mouth once daily.            Indwelling Lines/Drains at time of discharge:   Lines/Drains/Airways     None                 Time spent on the  discharge of patient: 50 minutes  Patient was seen and examined on the date of discharge and determined to be suitable for discharge.         Otoniel Espinoza MD  Department of Hospital Medicine  [facility]

## 2020-07-27 NOTE — H&P
Short Stay Endoscopy History and Physical    PCP - Bradford Vega, , DO (Inactive)  Referring Physician - Aaareferral Self  No address on file    Procedure - egd/colon  ASA - per anesthesia  Mallampati - per anesthesia  History of Anesthesia problems - no  Family history Anesthesia problems -  no   Plan of anesthesia - General    HPI:  This is a 45 y.o. male here for evaluation of: rectal bleeding    Reflux - no  Dysphagia - no  Abdominal pain - no  Diarrhea - no    ROS:  Constitutional: No fevers, chills, No weight loss  CV: No chest pain  Pulm: No cough, No shortness of breath  Ophtho: No vision changes  GI: see HPI  Derm: No rash    Medical History:  has a past medical history of Anxiety, Arthritis, and Other meniscus derangements, other medial meniscus, left knee (1/7/2019).    Surgical History:  has a past surgical history that includes Arthroscopy of knee (Left, 1/7/2019) and Esophagogastroduodenoscopy (N/A, 4/8/2019).    Family History: family history is not on file..    Social History:  reports that he has never smoked. He has never used smokeless tobacco. He reports previous alcohol use. He reports that he does not use drugs.    Review of patient's allergies indicates:  No Known Allergies    Medications:   Medications Prior to Admission   Medication Sig Dispense Refill Last Dose    folic acid (FOLVITE) 1 MG tablet Take 1 tablet (1 mg total) by mouth once daily. 30 tablet 3     multivitamin capsule Take 1 capsule by mouth once daily.       naltrexone (DEPADE) 50 mg tablet Take 50 mg by mouth once daily.  3     pantoprazole (PROTONIX) 40 MG tablet Take 1 tablet (40 mg total) by mouth once daily. 30 tablet 3     propranolol (INDERAL) 10 MG tablet Take 10 mg by mouth once daily.  3        Physical Exam:    Vital Signs:   Vitals:    07/27/20 0701   BP:    Pulse: 73   Resp:    Temp:        General Appearance: Well appearing in no acute distress    Labs:  Lab Results   Component Value Date    WBC  5.84 07/27/2020    HGB 11.7 (L) 07/27/2020    HCT 33.1 (L) 07/27/2020    PLT 64 (L) 07/27/2020    CHOL 242 (H) 04/06/2016    TRIG 278 (H) 04/06/2016    HDL 40 04/06/2016    ALT 39 07/27/2020     (H) 07/27/2020     (L) 07/27/2020    K 4.4 07/27/2020    CL 97 07/27/2020    CREATININE 0.8 07/27/2020    BUN 5 (L) 07/27/2020    CO2 23 07/27/2020    TSH 2.679 07/23/2020    PSA 0.54 04/06/2016    INR 1.2 12/06/2019    HGBA1C 4.9 10/24/2019       I have explained the risks and benefits of this endoscopic procedure to the patient including but not limited to bleeding, inflammation, infection, perforation, and death.      Sotero Zapata MD

## 2020-07-27 NOTE — DISCHARGE INSTRUCTIONS
Take all medications as prescribed by your doctor. Specifically take your anti-epileptic drugs at timed intervals that are on the prescription label.  - Do not drive or operate heavy machinery for a state-law specific period of time from seizure activity  - If you ride a bicycle or any other manually propelled device, please use a helmet  - Never swim alone or without close supervision  - Use showers only; do not take a bath or put yourself in a situation where loss of responsiveness could lead to drowning  - Only cook under supervision. Use the back burners only.  - Do not hold a child or carry an infant.  - Do not engage in any activity that in the event of a seizure or loss of responsiveness could lead to any type of bodily injury to yourself or others    Epilepsy, Discharge Instructions for (English) View Edit Remove   Levetiracetam tablets (English) View Edit Remove  Escitalopram tablets (English) View Edit Remove  Thiamine, Vitamin B1 tablets (English) View Edit Remove  Alcohol Withdrawal Seizure (English) View Edit Remove  Alcoholism, The Impact of (English) View Edit Remove  Addiction: Your Treatment Options (English) View Edit Remove  Addiction, Recovering (English) View Edit Remove

## 2020-07-27 NOTE — ANESTHESIA PREPROCEDURE EVALUATION
07/27/2020  Nilesh Rolon Jr. is a 45 y.o., male admitted with alcohol withdrawal/seizure. For EGD/colonoscopy for anemia/GIB. Pt w/ PMH of arthritis, chronic liver dz (w/ hepatic congestion), regular daily beer drinker, s/p left knee arthroscopy with meniscectomy on 1/7/19 w/ no anesthetic complications.     Past Medical History:   Diagnosis Date    Anxiety     Arthritis     Other meniscus derangements, other medial meniscus, left knee 1/7/2019     Patient Active Problem List   Diagnosis    GI bleed    Alcohol withdrawal    Thrombocytopenia    Alcohol abuse    Alcoholic hepatitis with ascites    Hyponatremia    Delirium tremens    Alcoholic liver disease    Alcohol withdrawal seizure with perceptual disturbance     Past Surgical History:   Procedure Laterality Date    ARTHROSCOPY OF KNEE Left 1/7/2019    Procedure: ARTHROSCOPY, KNEE;  Surgeon: Derick Kirby MD;  Location: [facility] OR;  Service: Orthopedics;  Laterality: Left;    ESOPHAGOGASTRODUODENOSCOPY N/A 4/8/2019    Procedure: EGD (ESOPHAGOGASTRODUODENOSCOPY);  Surgeon: Bonita Kendall MD;  Location: [facility] ENDO;  Service: Endoscopy;  Laterality: N/A;               Pre-op Assessment    I have reviewed the Patient Summary Reports.     I have reviewed the Nursing Notes. I have reviewed the NPO Status.   I have reviewed the Medications.     Review of Systems  Anesthesia Hx:  No previous Anesthesia  Denies Family Hx of Anesthesia complications.    Social:  Non-Smoker, Social Alcohol Use    Hematology/Oncology:         -- Anemia: Hematology Comments: thrombocytopenia   Cardiovascular:  Cardiovascular Normal     Renal/:  Renal/ Normal     Hepatic/GI:   Liver Disease,    Neurological:   Seizures (ETOH?)    Endocrine:  Endocrine Normal    Psych:   Psychiatric History          Physical Exam  General:  Well nourished, Obesity     Airway/Jaw/Neck:  Airway Findings: Mouth Opening: Normal Tongue: Large  General Airway Assessment: Adult  Mallampati: IV      Dental:  Dental Findings: Periodontal disease, Severe    Chest/Lungs:  Chest/Lungs Findings: Clear to auscultation, Normal Respiratory Rate     Heart/Vascular:  Heart Findings: Rate: Normal  Rhythm: Regular Rhythm  Sounds: Normal     Abdomen:  Recent CT findings suggestive of chronic liver dz: (see below)    Impression      Hepatomegaly and morphologic changes of chronic liver disease.  Diffuse hepatic steatosis with areas of fatty sparing limits detection for focal hepatic mass.  Consider follow-up with contrast enhanced MRI for hepatocellular carcinoma screening as an outpatient.    Stigmata of portal hypertension including splenomegaly, collateral vessels, and moderate volume ascites.    Prominent right basilar subsegmental atelectasis likely secondary to elevation of the right hemidiaphragm.    Coronary artery calcifications.    Additional findings discussed in the body of the report.         Mental Status:  Mental Status Findings:  Cooperative, Alert and Oriented       Lab Results   Component Value Date    WBC 5.84 07/27/2020    HGB 11.7 (L) 07/27/2020    HCT 33.1 (L) 07/27/2020    PLT 64 (L) 07/27/2020    CHOL 242 (H) 04/06/2016    TRIG 278 (H) 04/06/2016    HDL 40 04/06/2016    ALT 39 07/27/2020     (H) 07/27/2020     (L) 07/27/2020    K 4.4 07/27/2020    CL 97 07/27/2020    CREATININE 0.8 07/27/2020    BUN 5 (L) 07/27/2020    CO2 23 07/27/2020    TSH 2.679 07/23/2020    PSA 0.54 04/06/2016    INR 1.2 12/06/2019    HGBA1C 4.9 10/24/2019           Anesthesia Plan  Type of Anesthesia, risks & benefits discussed:  Anesthesia Type:  MAC  Patient's Preference:   Intra-op Monitoring Plan: standard ASA monitors  Intra-op Monitoring Plan Comments:   Post Op Pain Control Plan: multimodal analgesia and per primary service following discharge from PACU  Post Op Pain Control Plan  Comments:   Induction:   IV  Beta Blocker:  Patient is not currently on a Beta-Blocker (No further documentation required).       Informed Consent: Patient understands risks and agrees with Anesthesia plan.  Questions answered. Anesthesia consent signed with patient.  ASA Score: 3     Day of Surgery Review of History & Physical:  There are no significant changes.      Anesthesia Plan Notes:           Ready For Surgery From Anesthesia Perspective.

## 2020-07-27 NOTE — PLAN OF CARE
VN note: VN completed AVS and attachments and notified bedside nurseNicholas. Waiting for family to arrive to admin discharge education. Will cont to be available and intervene prn.

## 2020-07-27 NOTE — PLAN OF CARE
CM met with pt again with [facility] appt card and instruction sheet.  Pt made aware both Neurology and GI will contact him for f/u appt with [facility].  Pt verbalized understanding of need for close f/u as he is d/c'ing on Keppra along with need for monitoring  med/labs.  He has agreed to f/u with [facility], soonest 8/10 which pt states will be after his scheduled vacation.      Significant other Jazmine will be by for pick-up.  Jazmine also supports decision to forgo inpt alcohol tx for now until after vacation.  Resources given to pt by YONIS.  Referral to complex case management placed by KIRBY.       07/27/20 1510   Final Note   Assessment Type Final Discharge Note   Anticipated Discharge Disposition Home   Hospital Follow Up  Appt(s) scheduled? Yes   Discharge plans and expectations educations in teach back method with documentation complete? Yes   Right Care Referral Info   Post Acute Recommendation No Care       Lee Ann Renteria, RN    [phone]

## 2020-07-28 ENCOUNTER — PATIENT OUTREACH (OUTPATIENT)
Dept: ADMINISTRATIVE | Facility: CLINIC | Age: 45
End: 2020-07-28

## 2020-07-28 LAB
FINAL PATHOLOGIC DIAGNOSIS: NORMAL
GROSS: NORMAL

## 2020-07-28 NOTE — PATIENT INSTRUCTIONS
"  Alcohol Abuse  Alcoholic drinks are harmful when you have too many of them. There is no set number of drinks that defines too much. Drinking that disrupts your life or your health is called alcohol abuse. Alcohol abuse can hurt your relationships with others. You may lose friends, a spouse, or even your job. You may be abusing alcohol if any of the following are true for you:  · Duties at home or with  suffer because of drinking.  · Duties at work or in school suffer because of drinking.  · You have missed work or school because of drinking.  · You use alcohol while driving or operating machinery.  · You have legal problems such as arrests due to drinking.  · You keep drinking even though it causes serious problems in your life.  Health effects  Alcohol abuse causes health problems. Sometimes this can happen after only drinking a little." There is no set number of drinks or amount of alcohol that defines too much. The more you drink at one time, and the more often you drink determine both the short-term and long-term health effects. It affects all parts of your body and your health, including your:  · Brain. Alcohol is a central nervous system depressant. It can damage parts of the brain that affect your balance, memory, thinking, and emotions. It can cause memory loss, blackouts, depression, agitation, sleep cycle changes, and seizures. These changes may or may not be reversible.  · Heart and vascular system. Alcohol affects multiple areas. It can damage heart muscle causing cardiomyopathy, which is a weakening and stretching of the heart muscle. This can lead to trouble breathing, an irregular heartbeat, atrial fibrillation, leg swelling, and heart failure. Alcohol use makes the blood vessels stiffen causing high blood pressure. All of these problems increase your risk of having heart attacks or strokes.  · Liver. Alcohol causes fat to build up in the liver, affecting its normal function. This " increases the risk for hepatitis, leading to abdominal pain, appetite loss, jaundice, bleeding problems, liver fibrosis, and cirrhosis. This, in turn, can affect your ability to fight off infections, and can cause diabetes. The liver changes prevent it from removing toxins in your blood that can cause encephalopathy, which may show with confusion, altered level of consciousness, personality changes, memory loss, seizures, coma, and death.  · Pancreas. Alcohol can cause inflammation of the pancreas, or pancreatitis. This can cause abdominal pain, fever, and diabetes.  · Immune system. Alcohol weakens your immune system in a number of ways. It suppresses your immune system making it harder to fight infections and colds. It also increases the chance of getting pneumonia and tuberculosis.  · Cancer. Alcohol is a risk factor for developing cancer of the mouth, esophagus, pharynx, larynx, liver, and breast.  · Sexual function. Alcohol can lead to sexual problems.  Home care  The following guidelines will help you deal with alcohol abuse:  · Admit you have a problem with alcohol.  · Ask for help from your healthcare provider and trusted family members or close friends.  · Get help from people trained in dealing with alcohol abuse. This may be individual counseling or group therapy, or it may be a supervised alcohol treatment program.  · Join a self-help group for alcohol abuse such as Alcoholics Anonymous (AA).  · Avoid people who abuse alcohol or tempt you to drink.  Follow-up care  Follow up as advised by the healthcare provider, or as advised. Contact these groups to get help:  · Alcoholics Anonymous (AA): Go to www.aa.org or check the phone book for meetings near you.  · National Alcohol and Substance Abuse Information Center (NASAIC): 740.172.8572 www.addictioncareoptions.com  · National [place] on Alcoholism and Drug Dependence (NCADD): 423-RPX-AZPV (122-7695) www.ncadd.org  Call 911  Call 911 if any of these  occur:  · Trouble breathing or slow irregular breathing  · Chest pain  · Sudden weakness on one side of your body or sudden trouble speaking  · Heavy bleeding or vomiting blood  · Very drowsy or trouble awakening  · Fainting or loss of consciousness  · Rapid heart rate  · Seizure  When to seek medical care  Call your healthcare provider right away if any of these occur:   · Confusion  · Hallucinations (seeing, hearing, or feeling things that arent there)  · Pain in your upper abdomen that gets worse  · Repeated vomiting or black or tarry stools  · Severe shakiness  Date Last Reviewed: 6/1/2016  © 0423-0267 [employer]. [address] [place] [address]. All rights reserved. This information is not intended as a substitute for professional medical care. Always follow your healthcare professional's instructions.

## 2020-07-30 ENCOUNTER — SSC ENCOUNTER (OUTPATIENT)
Dept: ADMINISTRATIVE | Facility: OTHER | Age: 45
End: 2020-07-30

## 2020-07-30 NOTE — PROGRESS NOTES
Please note the following patient's information was forwarded to the Medicaid ([place] Medicaid,  [employer], [employer], [facility], [facility] Community Plan, [facility]) Case Management office.    Please contact Outpatient Complex Care Management at ext [phone] with any questions.    Thank you,    Vianney Del Toro, [facility]  Outpatient Case Mgmnt  [phone]

## 2020-08-04 ENCOUNTER — TELEPHONE (OUTPATIENT)
Dept: GASTROENTEROLOGY | Facility: CLINIC | Age: 45
End: 2020-08-04

## 2020-08-04 NOTE — TELEPHONE ENCOUNTER
----- Message from Sotero Zapata MD sent at 8/4/2020 10:53 AM CDT -----  Can you let him know he needs a colonoscopy in 5 years

## 2020-08-04 NOTE — TELEPHONE ENCOUNTER
Called patient and spoke to his mother. Informed her repeat colonoscopy in 5 years. Left message to call back.  Mother verbalized understanding.

## 2020-08-10 ENCOUNTER — OFFICE VISIT (OUTPATIENT)
Dept: PRIMARY CARE CLINIC | Facility: CLINIC | Age: 45
End: 2020-08-10
Payer: MEDICAID

## 2020-08-10 VITALS
TEMPERATURE: 98 F | SYSTOLIC BLOOD PRESSURE: 95 MMHG | HEART RATE: 66 BPM | BODY MASS INDEX: 29.48 KG/M2 | WEIGHT: 235.88 LBS | DIASTOLIC BLOOD PRESSURE: 64 MMHG

## 2020-08-10 DIAGNOSIS — K92.2 GASTROINTESTINAL HEMORRHAGE, UNSPECIFIED GASTROINTESTINAL HEMORRHAGE TYPE: ICD-10-CM

## 2020-08-10 DIAGNOSIS — F10.10 ALCOHOL ABUSE: Chronic | ICD-10-CM

## 2020-08-10 DIAGNOSIS — K70.11 ALCOHOLIC HEPATITIS WITH ASCITES: Chronic | ICD-10-CM

## 2020-08-10 DIAGNOSIS — K70.9 ALCOHOLIC LIVER DISEASE: ICD-10-CM

## 2020-08-10 DIAGNOSIS — R56.9 ALCOHOL WITHDRAWAL SEIZURE WITHOUT COMPLICATION: Primary | ICD-10-CM

## 2020-08-10 DIAGNOSIS — F10.930 ALCOHOL WITHDRAWAL SEIZURE WITHOUT COMPLICATION: Primary | ICD-10-CM

## 2020-08-10 PROBLEM — F10.939 ALCOHOL WITHDRAWAL: Status: RESOLVED | Noted: 2019-04-07 | Resolved: 2020-08-10

## 2020-08-10 PROCEDURE — 99495 TRANSJ CARE MGMT MOD F2F 14D: CPT | Mod: S$PBB,,, | Performed by: HOSPITALIST

## 2020-08-10 PROCEDURE — 99213 OFFICE O/P EST LOW 20 MIN: CPT | Mod: PBBFAC,PO | Performed by: HOSPITALIST

## 2020-08-10 PROCEDURE — 99999 PR PBB SHADOW E&M-EST. PATIENT-LVL III: CPT | Mod: PBBFAC,,, | Performed by: HOSPITALIST

## 2020-08-10 PROCEDURE — 99495 TCM SERVICES (MODERATE COMPLEXITY): ICD-10-PCS | Mod: S$PBB,,, | Performed by: HOSPITALIST

## 2020-08-10 PROCEDURE — 99999 PR PBB SHADOW E&M-EST. PATIENT-LVL III: ICD-10-PCS | Mod: PBBFAC,,, | Performed by: HOSPITALIST

## 2020-08-10 RX ORDER — ESCITALOPRAM OXALATE 10 MG/1
10 TABLET ORAL DAILY
Qty: 30 TABLET | Refills: 11 | Status: SHIPPED | OUTPATIENT
Start: 2020-08-10 | End: 2021-09-24 | Stop reason: SDUPTHER

## 2020-08-10 NOTE — PROGRESS NOTES
[facility]   New Visit Progress Note   Recent Hospital Discharge     PRESENTING HISTORY     Chief Complaint/Reason for Admission:  Follow up Hospital Discharge   PCP: Bradford Vega DO, DO (Inactive)   Admission Date: 7/23/2020  Hospital Length of Stay: 4 days  Discharge Date and Time: 7/27/2020  3:46 PM    Hospital Course:  Mr. Rolon is a 44yo man with anxiety, history of alcohol abuse with DTs in the past, alcoholic hepatitis c/b ascites and PHG with GIB who presents following a seizure.     Mr. Rolon presented with withdrawal seizures and DTs with hyponatremia. Also with rectal bleeding. He was admitted to ICU and placed on scheduled valium 10mg q6 IV with breakthrough ativan. Eventually became agitated and required precedex gtt. Also loaded with keppra given seizure then tapered to 1000mg bid dosing. His sodium was corrected with IVF, likely 2/2 beer potomania. GI consulted and performed EGD and cscope which revealed grade 1 esophageal ulcers, PHG, friable tissue in rectum, and several polyps which were resected. He will need outpatient f/u. He was evaluated by Psychiatry who recommended initiation of lexapro 5mg daily with uptitration when out of acute withdrawal phase. Agitation and DTs improved; he was offered rehab but declined at this time. Provided resources and states he will seek this out in two weeks. Will need f/u with Neurology and GI.   ___________________________________________________________________    Today: Pt reports that he is doing much better today, He has been sober since he left the hospital. He has very supportive roommates. Will need GI follow up.     Review of Systems  General ROS: negative for chills, fever or weight loss  Psychological ROS: negative for hallucination, depression or suicidal ideation  Ophthalmic ROS: negative for blurry vision, photophobia or eye pain  ENT ROS: negative for epistaxis, sore throat or rhinorrhea  Respiratory ROS: no cough, shortness  of breath, or wheezing  Cardiovascular ROS: no chest pain or dyspnea on exertion  Gastrointestinal ROS: no abdominal pain, change in bowel habits, or black/ bloody stools  Genito-Urinary ROS: no dysuria, trouble voiding, or hematuria  Musculoskeletal ROS: negative for gait disturbance or muscular weakness  Neurological ROS: no syncope or seizures; no ataxia  Dermatological ROS: negative for pruritis, rash and jaundice      PAST HISTORY:     Past Medical History:   Diagnosis Date    Anxiety     Arthritis     Other meniscus derangements, other medial meniscus, left knee 1/7/2019       Past Surgical History:   Procedure Laterality Date    ARTHROSCOPY OF KNEE Left 1/7/2019    Procedure: ARTHROSCOPY, KNEE;  Surgeon: Derick Kirby MD;  Location: [facility] OR;  Service: Orthopedics;  Laterality: Left;    COLONOSCOPY N/A 7/27/2020    Procedure: COLONOSCOPY;  Surgeon: Sotero Zapata MD;  Location: [facility];  Service: Endoscopy;  Laterality: N/A;    ESOPHAGOGASTRODUODENOSCOPY N/A 4/8/2019    Procedure: EGD (ESOPHAGOGASTRODUODENOSCOPY);  Surgeon: Bonita Kendall MD;  Location: [facility] ENDO;  Service: Endoscopy;  Laterality: N/A;    ESOPHAGOGASTRODUODENOSCOPY N/A 7/27/2020    Procedure: EGD (ESOPHAGOGASTRODUODENOSCOPY);  Surgeon: Sotero Zapata MD;  Location: [facility];  Service: Endoscopy;  Laterality: N/A;       Family History   Problem Relation Age of Onset    Birth defects Neg Hx          MEDICATIONS & ALLERGIES:     Current Outpatient Medications on File Prior to Visit   Medication Sig Dispense Refill    folic acid (FOLVITE) 1 MG tablet Take 1 tablet (1 mg total) by mouth once daily. 30 tablet 3    levETIRAcetam (KEPPRA) 1000 MG tablet Take 1 tablet (1,000 mg total) by mouth 2 (two) times daily. 60 tablet 11    multivitamin capsule Take 1 capsule by mouth once daily.      naltrexone (DEPADE) 50 mg tablet Take 50 mg by mouth once daily.  3    pantoprazole (PROTONIX) 40 MG tablet Take 1 tablet (40 mg total) by mouth  once daily. 30 tablet 3    propranolol (INDERAL) 10 MG tablet Take 10 mg by mouth once daily.  3    thiamine 100 MG tablet Take 1 tablet (100 mg total) by mouth once daily. 30 tablet 11    [DISCONTINUED] escitalopram oxalate (LEXAPRO) 5 MG Tab Take 1 tablet (5 mg total) by mouth once daily. 30 tablet 11     No current facility-administered medications on file prior to visit.         Review of patient's allergies indicates:  No Known Allergies    OBJECTIVE:     Vital Signs:  BP 95/64 (BP Location: Right arm, Patient Position: Sitting)   Pulse 66   Temp 97.9 °F (36.6 °C) (Oral)   Wt 107 kg (235 lb 14.3 oz)   BMI 29.48 kg/m²   Wt Readings from Last 1 Encounters:   08/10/20 1315 107 kg (235 lb 14.3 oz)     Body mass index is 29.48 kg/m².        Physical Exam:  BP 95/64 (BP Location: Right arm, Patient Position: Sitting)   Pulse 66   Temp 97.9 °F (36.6 °C) (Oral)   Wt 107 kg (235 lb 14.3 oz)   BMI 29.48 kg/m²   General appearance: alert, cooperative, no distress  Constitutional:Oriented to person, place, and time  + appears well-developed and well-nourished.   HEENT: Normocephalic, atraumatic, neck symmetrical, no nasal discharge   Eyes: conjunctivae/corneas clear, PERRL, EOM's intact  Lungs: clear to auscultation bilaterally, no dullness to percussion bilaterally  Heart: regular rate and rhythm without rub; no displacement of the PMI   Abdomen: soft, non-tender; bowel sounds normoactive; no organomegaly - mild ascites  Extremities: extremities symmetric; no clubbing, cyanosis, or edema  Integument: Skin color, texture, turgor normal; no rashes; hair distrubution normal  Neurologic: Alert and oriented X 3, normal strength, normal coordination and gait  Psychiatric: no pressured speech; normal affect; no evidence of impaired cognition     Laboratory  Lab Results   Component Value Date    WBC 5.84 07/27/2020    HGB 11.7 (L) 07/27/2020    HCT 33.1 (L) 07/27/2020    MCV 93 07/27/2020    PLT 64 (L) 07/27/2020      BMP  Lab Results   Component Value Date     (L) 07/27/2020    K 4.0 07/27/2020    CL 98 07/27/2020    CO2 20 (L) 07/27/2020    BUN 5 (L) 07/27/2020    CREATININE 0.7 07/27/2020    CALCIUM 8.6 (L) 07/27/2020    ANIONGAP 13 07/27/2020    ESTGFRAFRICA >60.0 07/27/2020    EGFRNONAA >60.0 07/27/2020     Lab Results   Component Value Date    ALT 39 07/27/2020     (H) 07/27/2020    ALKPHOS 102 07/27/2020    BILITOT 5.7 (H) 07/27/2020     Lab Results   Component Value Date    INR 1.2 12/06/2019    INR 1.2 10/28/2019    INR 1.2 10/27/2019     Lab Results   Component Value Date    HGBA1C 4.9 10/24/2019       TRANSITION OF CARE:     [facility] On [facility] Contact Note: 7/28/2020    Family and/or Caretaker present at visit?  No.  Diagnostic tests reviewed/disposition: No diagnosic tests pending after this hospitalization.  Disease/illness education: alcoholism  Home health/community services discussion/referrals: Patient does not have home health established from hospital visit.  They do not need home health.  If needed, we will set up home health for the patient.   Establishment or re-establishment of referral orders for community resources: No other necessary community resources.   Discussion with other health care providers: No discussion with other health care providers necessary.     ASSESSMENT & PLAN:       Alcohol withdrawal seizure without complication  Alcoholic hepatitis with ascites  Gastrointestinal hemorrhage, unspecified gastrointestinal hemorrhage type  Alcoholic liver disease  Alcohol abuse    -     escitalopram oxalate (LEXAPRO) 10 MG tablet; Take 1 tablet (10 mg total) by mouth once daily.  Dispense: 30 tablet; Refill: 11  - f/u with GI        Scheduled Follow-up :  Future Appointments   Date Time Provider Department Center   8/31/2020  2:00 PM [facility]       Post Visit Medication List:     Medication List          Accurate as of August 10, 2020  1:59 PM. If you have  any questions, ask your nurse or doctor.            CHANGE how you take these medications    escitalopram oxalate 10 MG tablet  Commonly known as: LEXAPRO  Take 1 tablet (10 mg total) by mouth once daily.  What changed:   · medication strength  · how much to take  Changed by: Darlyn Carlos MD        CONTINUE taking these medications    folic acid 1 MG tablet  Commonly known as: FOLVITE  Take 1 tablet (1 mg total) by mouth once daily.     levETIRAcetam 1000 MG tablet  Commonly known as: KEPPRA  Take 1 tablet (1,000 mg total) by mouth 2 (two) times daily.     multivitamin capsule     naltrexone 50 mg tablet  Commonly known as: DEPADE     pantoprazole 40 MG tablet  Commonly known as: PROTONIX  Take 1 tablet (40 mg total) by mouth once daily.     propranoloL 10 MG tablet  Commonly known as: INDERAL     thiamine 100 MG tablet  Take 1 tablet (100 mg total) by mouth once daily.           Where to Get Your Medications      These medications were sent to [facility]  [address], [place] [address]    Hours: Mon-Fri, 8a-5:30p Phone: [phone]   · escitalopram oxalate 10 MG tablet         Signing Physician:  Darlyn Carlos MD

## 2020-08-31 ENCOUNTER — OFFICE VISIT (OUTPATIENT)
Dept: GASTROENTEROLOGY | Facility: CLINIC | Age: 45
End: 2020-08-31
Payer: MEDICAID

## 2020-08-31 VITALS
OXYGEN SATURATION: 99 % | BODY MASS INDEX: 29.53 KG/M2 | HEIGHT: 75 IN | DIASTOLIC BLOOD PRESSURE: 68 MMHG | WEIGHT: 237.5 LBS | SYSTOLIC BLOOD PRESSURE: 132 MMHG | RESPIRATION RATE: 18 BRPM | HEART RATE: 63 BPM

## 2020-08-31 DIAGNOSIS — K70.9 ALCOHOLIC LIVER DISEASE: Primary | ICD-10-CM

## 2020-08-31 DIAGNOSIS — R79.89 ELEVATED LFTS: ICD-10-CM

## 2020-08-31 DIAGNOSIS — Z86.010 HX OF COLONIC POLYPS: ICD-10-CM

## 2020-08-31 PROBLEM — I85.10 SECONDARY ESOPHAGEAL VARICES WITHOUT BLEEDING: Status: ACTIVE | Noted: 2020-08-31

## 2020-08-31 PROBLEM — I85.10 SECONDARY ESOPHAGEAL VARICES WITHOUT BLEEDING: Status: RESOLVED | Noted: 2020-08-31 | Resolved: 2020-08-31

## 2020-08-31 PROBLEM — Z86.0100 HX OF COLONIC POLYPS: Status: ACTIVE | Noted: 2020-08-31

## 2020-08-31 PROCEDURE — 99999 PR PBB SHADOW E&M-EST. PATIENT-LVL V: ICD-10-PCS | Mod: PBBFAC,,, | Performed by: NURSE PRACTITIONER

## 2020-08-31 PROCEDURE — 99214 OFFICE O/P EST MOD 30 MIN: CPT | Mod: S$PBB,,, | Performed by: NURSE PRACTITIONER

## 2020-08-31 PROCEDURE — 99214 PR OFFICE/OUTPT VISIT, EST, LEVL IV, 30-39 MIN: ICD-10-PCS | Mod: S$PBB,,, | Performed by: NURSE PRACTITIONER

## 2020-08-31 PROCEDURE — 99999 PR PBB SHADOW E&M-EST. PATIENT-LVL V: CPT | Mod: PBBFAC,,, | Performed by: NURSE PRACTITIONER

## 2020-08-31 PROCEDURE — 99215 OFFICE O/P EST HI 40 MIN: CPT | Mod: PBBFAC,PO | Performed by: NURSE PRACTITIONER

## 2020-08-31 RX ORDER — TRAZODONE HYDROCHLORIDE 50 MG/1
TABLET ORAL
COMMUNITY
Start: 2020-08-20 | End: 2021-03-31

## 2020-08-31 RX ORDER — PANTOPRAZOLE SODIUM 40 MG/1
40 TABLET, DELAYED RELEASE ORAL DAILY
Qty: 30 TABLET | Refills: 3 | Status: ON HOLD | OUTPATIENT
Start: 2020-08-31 | End: 2021-12-02 | Stop reason: SDUPTHER

## 2020-08-31 RX ORDER — PROPRANOLOL HYDROCHLORIDE 20 MG/1
TABLET ORAL
Status: ON HOLD | COMMUNITY
Start: 2020-08-20 | End: 2021-12-31 | Stop reason: SDUPTHER

## 2020-08-31 NOTE — LETTER
August 31, 2020      Otoniel Espinoza MD  [address]  [facility] [address]           [facility]  [address] [place]   [facility] [address]  Phone: [phone]  Fax: [phone]          Patient: Nilesh Rolon Jr.   MR Number: [id number]   YOB: 1975   Date of Visit: 8/31/2020       Dear Dr. Otoniel Espinoza:    Thank you for referring Nilesh Rolon to me for evaluation. Attached you will find relevant portions of my assessment and plan of care.    If you have questions, please do not hesitate to call me. I look forward to following Nilesh Rolon along with you.    Sincerely,    Leann Fisher, [facility]    Enclosure  CC:  No Recipients    If you would like to receive this communication electronically, please contact [email or web address] or [phone] to request more information on [facility] [facility] Link access.    For providers and/or their staff who would like to refer a patient to [facility], please contact us through our one-stop-shop provider referral line, [facility], at [phone].    If you feel you have received this communication in error or would no longer like to receive these types of communications, please e-mail [email or web address]

## 2020-08-31 NOTE — PROGRESS NOTES
Subjective:       Patient ID: Nilesh Rolon Jr. is a 45 y.o. male.    Chief Complaint: Hospital Follow Up    46 y/o male with anxiety, hx alcohol abuse, and alcoholic hepatitis followed by [facility] referred for hospital follow up. Patient was admitted to [facility] on 7/23/2020 for 4 days with alcohol withdrawal seizure and GIB. EGD and colonoscopy by Dr. Kendall revealed grade I esophageal varices, portal hypertensive gastropathy, and multiple colon polys which were resected. States he has been sober since hospital discharge. Denies abdominal pain, n/v, hematemesis, constipation, diarrhea, blood in stool, or melena.      Past Medical History:   Diagnosis Date    Anxiety     Arthritis     Other meniscus derangements, other medial meniscus, left knee 1/7/2019       Past Surgical History:   Procedure Laterality Date    ARTHROSCOPY OF KNEE Left 1/7/2019    Procedure: ARTHROSCOPY, KNEE;  Surgeon: Derick Kirby MD;  Location: [facility] OR;  Service: Orthopedics;  Laterality: Left;    COLONOSCOPY N/A 7/27/2020    Procedure: COLONOSCOPY;  Surgeon: Sotero Zapata MD;  Location: [facility];  Service: Endoscopy;  Laterality: N/A;    ESOPHAGOGASTRODUODENOSCOPY N/A 4/8/2019    Procedure: EGD (ESOPHAGOGASTRODUODENOSCOPY);  Surgeon: Bonita Kendall MD;  Location: [facility];  Service: Endoscopy;  Laterality: N/A;    ESOPHAGOGASTRODUODENOSCOPY N/A 7/27/2020    Procedure: EGD (ESOPHAGOGASTRODUODENOSCOPY);  Surgeon: Sotero Zapata MD;  Location: [facility];  Service: Endoscopy;  Laterality: N/A;       Family History   Problem Relation Age of Onset    Birth defects Neg Hx        Social History     Socioeconomic History    Marital status: Single     Spouse name: Not on file    Number of children: Not on file    Years of education: Not on file    Highest education level: Not on file   Occupational History    Not on file   Social Needs    Financial resource strain: Not on file    Food insecurity     Worry: Not on  "file     Inability: Not on file    Transportation needs     Medical: Not on file     Non-medical: Not on file   Tobacco Use    Smoking status: Never Smoker    Smokeless tobacco: Never Used   Substance and Sexual Activity    Alcohol use: Not Currently     Comment: last drink 1/1/20    Drug use: No    Sexual activity: Yes     Partners: Female   Lifestyle    Physical activity     Days per week: Not on file     Minutes per session: Not on file    Stress: Not on file   Relationships    Social connections     Talks on phone: Not on file     Gets together: Not on file     Attends Jehovah's witness service: Not on file     Active member of club or organization: Not on file     Attends meetings of clubs or organizations: Not on file     Relationship status: Not on file   Other Topics Concern    Not on file   Social History Narrative    Not on file       Review of Systems   Constitutional: Negative for fatigue, fever and unexpected weight change.   HENT: Negative for sore throat and trouble swallowing.    Respiratory: Negative for cough and shortness of breath.    Cardiovascular: Negative for chest pain.   Gastrointestinal: Negative for abdominal pain and blood in stool.   Genitourinary: Negative for dysuria.   Musculoskeletal: Negative for arthralgias and myalgias.   Neurological: Negative for dizziness and weakness.   Hematological: Negative for adenopathy. Does not bruise/bleed easily.   Psychiatric/Behavioral: Negative for dysphoric mood.         Objective:     Vitals:    08/31/20 1413   BP: 132/68   BP Location: Right arm   Patient Position: Sitting   BP Method: X-Large (Manual)   Pulse: 63   Resp: 18   SpO2: 99%   Weight: 107.7 kg (237 lb 8 oz)   Height: 6' 3" (1.905 m)          Physical Exam  Constitutional:       General: He is not in acute distress.     Appearance: Normal appearance. He is not ill-appearing.   HENT:      Head: Normocephalic.   Eyes:      Conjunctiva/sclera: Conjunctivae normal.      Pupils: Pupils " are equal, round, and reactive to light.   Neck:      Musculoskeletal: Normal range of motion.   Cardiovascular:      Rate and Rhythm: Normal rate and regular rhythm.   Pulmonary:      Effort: Pulmonary effort is normal.      Breath sounds: Normal breath sounds.   Abdominal:      General: Bowel sounds are normal.      Palpations: Abdomen is soft.      Tenderness: There is no abdominal tenderness.   Musculoskeletal: Normal range of motion.   Skin:     General: Skin is warm and dry.      Coloration: Skin is not jaundiced.   Neurological:      Mental Status: He is alert and oriented to person, place, and time.   Psychiatric:         Mood and Affect: Mood normal.         Behavior: Behavior normal.       Lab Results   Component Value Date    WBC 5.84 07/27/2020    HGB 11.7 (L) 07/27/2020    HCT 33.1 (L) 07/27/2020    MCV 93 07/27/2020    PLT 64 (L) 07/27/2020     BMP  Lab Results   Component Value Date     (L) 07/27/2020    K 4.0 07/27/2020    CL 98 07/27/2020    CO2 20 (L) 07/27/2020    BUN 5 (L) 07/27/2020    CREATININE 0.7 07/27/2020    CALCIUM 8.6 (L) 07/27/2020    ANIONGAP 13 07/27/2020    ESTGFRAFRICA >60.0 07/27/2020    EGFRNONAA >60.0 07/27/2020           Assessment:         ICD-10-CM ICD-9-CM   1. Alcoholic liver disease  K70.9 571.3   2. Elevated LFTs  R94.5 790.6   3. Hx of colonic polyps  Z86.010 V12.72       Plan:       Alcoholic liver disease; Elevated LFTs  -     Previously followed by hepatology; patient states he did not follow up due to COVID  -     Ambulatory referral/consult to Hepatology; Future; Expected date: 09/07/2020        -     Advised to avoid all alcohol use; Local AA information provided    Hx of colonic polyps         -     Repeat colonoscopy due in 2025    Other orders  -     pantoprazole (PROTONIX) 40 MG tablet; Take 1 tablet (40 mg total) by mouth once daily.  Dispense: 30 tablet; Refill: 3      No follow-ups on file.     Patient's Medications   New Prescriptions    No medications  on file   Previous Medications    ESCITALOPRAM OXALATE (LEXAPRO) 10 MG TABLET    Take 1 tablet (10 mg total) by mouth once daily.    FOLIC ACID (FOLVITE) 1 MG TABLET    Take 1 tablet (1 mg total) by mouth once daily.    LEVETIRACETAM (KEPPRA) 1000 MG TABLET    Take 1 tablet (1,000 mg total) by mouth 2 (two) times daily.    MULTIVITAMIN CAPSULE    Take 1 capsule by mouth once daily.    NALTREXONE (DEPADE) 50 MG TABLET    Take 50 mg by mouth once daily.    PROPRANOLOL (INDERAL) 10 MG TABLET    Take 10 mg by mouth once daily.    PROPRANOLOL (INDERAL) 20 MG TABLET    TK 1 T PO  TID    THIAMINE 100 MG TABLET    Take 1 tablet (100 mg total) by mouth once daily.    TRAZODONE (DESYREL) 50 MG TABLET    TK 1 T PO  QHS   Modified Medications    Modified Medication Previous Medication    PANTOPRAZOLE (PROTONIX) 40 MG TABLET pantoprazole (PROTONIX) 40 MG tablet       Take 1 tablet (40 mg total) by mouth once daily.    Take 1 tablet (40 mg total) by mouth once daily.   Discontinued Medications    No medications on file

## 2020-09-01 ENCOUNTER — TELEPHONE (OUTPATIENT)
Dept: HEPATOLOGY | Facility: CLINIC | Age: 45
End: 2020-09-01

## 2020-09-01 NOTE — TELEPHONE ENCOUNTER
----- Message from Noemi Bazan sent at 9/1/2020  8:19 AM CDT -----  Please call patient to schedule an appt to see shayy Dean was referred by Phillip for alcholic liver disease and elevated LFT. Thanks

## 2020-09-11 ENCOUNTER — TELEPHONE (OUTPATIENT)
Dept: HEPATOLOGY | Facility: CLINIC | Age: 45
End: 2020-09-11

## 2020-09-11 NOTE — TELEPHONE ENCOUNTER
----- Message from Dianne Jimenez NP sent at 9/10/2020  4:09 PM CDT -----  Contact: pt  Labs please - CBC, CMP, INR, PETH. Thanks!   ----- Message -----  From: Shea Shrestha MA  Sent: 9/10/2020   3:31 PM CDT  To: Dianne Jimenez NP    Do you want any testing with this patients follow up  ----- Message -----  From: Cely Brito  Sent: 9/10/2020   3:05 PM CDT  To: Tony Dean Staff    Patient calling to  appt      [phone]   Call Back :

## 2020-09-14 ENCOUNTER — LAB VISIT (OUTPATIENT)
Dept: LAB | Facility: HOSPITAL | Age: 45
End: 2020-09-14
Payer: MEDICAID

## 2020-09-14 ENCOUNTER — OFFICE VISIT (OUTPATIENT)
Dept: HEPATOLOGY | Facility: CLINIC | Age: 45
End: 2020-09-14
Payer: MEDICAID

## 2020-09-14 VITALS
DIASTOLIC BLOOD PRESSURE: 60 MMHG | HEIGHT: 75 IN | WEIGHT: 240.94 LBS | BODY MASS INDEX: 29.96 KG/M2 | HEART RATE: 73 BPM | OXYGEN SATURATION: 97 % | SYSTOLIC BLOOD PRESSURE: 90 MMHG

## 2020-09-14 DIAGNOSIS — K70.10 ALCOHOLIC HEPATITIS WITHOUT ASCITES: Primary | ICD-10-CM

## 2020-09-14 DIAGNOSIS — R56.9 ALCOHOL WITHDRAWAL SEIZURE WITHOUT COMPLICATION: ICD-10-CM

## 2020-09-14 DIAGNOSIS — R79.89 ELEVATED LFTS: ICD-10-CM

## 2020-09-14 DIAGNOSIS — I95.9 HYPOTENSION, UNSPECIFIED HYPOTENSION TYPE: ICD-10-CM

## 2020-09-14 DIAGNOSIS — D69.6 THROMBOCYTOPENIA: ICD-10-CM

## 2020-09-14 DIAGNOSIS — K70.10 ALCOHOLIC HEPATITIS WITHOUT ASCITES: ICD-10-CM

## 2020-09-14 DIAGNOSIS — F10.10 ALCOHOL ABUSE: Chronic | ICD-10-CM

## 2020-09-14 DIAGNOSIS — E87.1 HYPONATREMIA: Chronic | ICD-10-CM

## 2020-09-14 DIAGNOSIS — Z87.19 HISTORY OF GI BLEED: ICD-10-CM

## 2020-09-14 DIAGNOSIS — K70.9 ALCOHOLIC LIVER DISEASE: ICD-10-CM

## 2020-09-14 DIAGNOSIS — F10.930 ALCOHOL WITHDRAWAL SEIZURE WITHOUT COMPLICATION: ICD-10-CM

## 2020-09-14 PROBLEM — K70.11 ALCOHOLIC HEPATITIS WITH ASCITES: Chronic | Status: RESOLVED | Noted: 2019-04-07 | Resolved: 2020-09-14

## 2020-09-14 LAB
ALBUMIN SERPL BCP-MCNC: 2.7 G/DL (ref 3.5–5.2)
ALP SERPL-CCNC: 78 U/L (ref 55–135)
ALT SERPL W/O P-5'-P-CCNC: 14 U/L (ref 10–44)
ANION GAP SERPL CALC-SCNC: 8 MMOL/L (ref 8–16)
AST SERPL-CCNC: 33 U/L (ref 10–40)
BASOPHILS # BLD AUTO: 0.04 K/UL (ref 0–0.2)
BASOPHILS NFR BLD: 0.7 % (ref 0–1.9)
BILIRUB SERPL-MCNC: 1.3 MG/DL (ref 0.1–1)
BUN SERPL-MCNC: 5 MG/DL (ref 6–20)
CALCIUM SERPL-MCNC: 8.9 MG/DL (ref 8.7–10.5)
CHLORIDE SERPL-SCNC: 105 MMOL/L (ref 95–110)
CO2 SERPL-SCNC: 25 MMOL/L (ref 23–29)
CREAT SERPL-MCNC: 1.1 MG/DL (ref 0.5–1.4)
DIFFERENTIAL METHOD: ABNORMAL
EOSINOPHIL # BLD AUTO: 0.2 K/UL (ref 0–0.5)
EOSINOPHIL NFR BLD: 2.8 % (ref 0–8)
ERYTHROCYTE [DISTWIDTH] IN BLOOD BY AUTOMATED COUNT: 14.1 % (ref 11.5–14.5)
EST. GFR  (AFRICAN AMERICAN): >60 ML/MIN/1.73 M^2
EST. GFR  (NON AFRICAN AMERICAN): >60 ML/MIN/1.73 M^2
GLUCOSE SERPL-MCNC: 74 MG/DL (ref 70–110)
HCT VFR BLD AUTO: 38.7 % (ref 40–54)
HGB BLD-MCNC: 12.9 G/DL (ref 14–18)
IMM GRANULOCYTES # BLD AUTO: 0.02 K/UL (ref 0–0.04)
IMM GRANULOCYTES NFR BLD AUTO: 0.3 % (ref 0–0.5)
INR PPP: 1.2 (ref 0.8–1.2)
LYMPHOCYTES # BLD AUTO: 1.9 K/UL (ref 1–4.8)
LYMPHOCYTES NFR BLD: 32.4 % (ref 18–48)
MCH RBC QN AUTO: 32.7 PG (ref 27–31)
MCHC RBC AUTO-ENTMCNC: 33.3 G/DL (ref 32–36)
MCV RBC AUTO: 98 FL (ref 82–98)
MONOCYTES # BLD AUTO: 0.7 K/UL (ref 0.3–1)
MONOCYTES NFR BLD: 11.7 % (ref 4–15)
NEUTROPHILS # BLD AUTO: 3 K/UL (ref 1.8–7.7)
NEUTROPHILS NFR BLD: 52.1 % (ref 38–73)
NRBC BLD-RTO: 0 /100 WBC
PLATELET # BLD AUTO: 122 K/UL (ref 150–350)
PMV BLD AUTO: 11.1 FL (ref 9.2–12.9)
POTASSIUM SERPL-SCNC: 4.2 MMOL/L (ref 3.5–5.1)
PROT SERPL-MCNC: 7.3 G/DL (ref 6–8.4)
PROTHROMBIN TIME: 12.8 SEC (ref 9–12.5)
RBC # BLD AUTO: 3.95 M/UL (ref 4.6–6.2)
SODIUM SERPL-SCNC: 138 MMOL/L (ref 136–145)
WBC # BLD AUTO: 5.81 K/UL (ref 3.9–12.7)

## 2020-09-14 PROCEDURE — 80053 COMPREHEN METABOLIC PANEL: CPT

## 2020-09-14 PROCEDURE — 99999 PR PBB SHADOW E&M-EST. PATIENT-LVL IV: ICD-10-PCS | Mod: PBBFAC,,, | Performed by: NURSE PRACTITIONER

## 2020-09-14 PROCEDURE — 99214 PR OFFICE/OUTPT VISIT, EST, LEVL IV, 30-39 MIN: ICD-10-PCS | Mod: S$PBB,,, | Performed by: NURSE PRACTITIONER

## 2020-09-14 PROCEDURE — 36415 COLL VENOUS BLD VENIPUNCTURE: CPT

## 2020-09-14 PROCEDURE — 80321 ALCOHOLS BIOMARKERS 1OR 2: CPT

## 2020-09-14 PROCEDURE — 99999 PR PBB SHADOW E&M-EST. PATIENT-LVL IV: CPT | Mod: PBBFAC,,, | Performed by: NURSE PRACTITIONER

## 2020-09-14 PROCEDURE — 85610 PROTHROMBIN TIME: CPT

## 2020-09-14 PROCEDURE — 99214 OFFICE O/P EST MOD 30 MIN: CPT | Mod: S$PBB,,, | Performed by: NURSE PRACTITIONER

## 2020-09-14 PROCEDURE — 99214 OFFICE O/P EST MOD 30 MIN: CPT | Mod: PBBFAC | Performed by: NURSE PRACTITIONER

## 2020-09-14 PROCEDURE — 85025 COMPLETE CBC W/AUTO DIFF WBC: CPT

## 2020-09-14 NOTE — PATIENT INSTRUCTIONS
1. Stay away from all alcohol (wine, beer, liquor)    2. Will let you know when labs from today are back    3. Make sure to stay hydrated, focus on good nutrition right now    4. Let me know if you develop any swelling in the legs/abdomen, confusion, or bleeding.    5. Follow-up with your psychiatrist     6. Continue folic acid and thiamine     7. Follow up in 6-8 weeks

## 2020-09-14 NOTE — PROGRESS NOTES
[facility] - Established Patient    Last Clinic Visit: 1/3/20    CHIEF COMPLAINT: Follow-up for alcoholic hepatitis     HPI: This is a 45 y.o. White male here for follow-up for alcoholic hepatitis.     He was admitted to [facility] 10/24-10/28 with symptoms of alcohol withdrawal x 6 days. Previously drinking 1/5 vodka daily. He had visual hallucinations.   MELD up to 22 during this admission. He then had another admission with alcoholic hepatitis 4/2019. Had GIB and ascites requiring paracentesis at this time.     He continued to drink alcohol despite recommendations for abstinence and naltrexone prescribed by his psychiatrist.     He was re-admitted 7/23-7/27 following seizure at home. His GF reported still drinking 1/2 pint liquor/day up until 2 days prior to admission. Also had bloody BM and auditory hallucinations. Admitted to ICU.    EGD / colonoscopy (7/27/20): grade I esophageal varices, PHG, 2 polyps removed from colon, friability with contact bleeding in rectum    Last labs (7/27/20): Na 130, TBili 5.7, platelets 60s     Abd US 7/23/20 -- hepatosplenomegaly, no liver lesions, no ascites     Interval history:  Feeling better. Denies any alcohol since discharge.  No current plans for AA/rehab or follow-up with his psychiatrist.  Still taking naltrexone.    Denies symptoms of hepatic decompensation including jaundice, ascites, cognitive problems to suggest hepatic encephalopathy, or GI bleeding.     No seizures, tremors have almost resolved.  Sleeping a lot more during the day and awake at night.    Has some dizziness when standing quickly.  BP today - 90/60     Eating well, staying hydrated.     Currently living with a roommate. No alcohol in the house.          Review of patient's allergies indicates:  No Known Allergies     Current Outpatient Medications on File Prior to Visit   Medication Sig Dispense Refill    escitalopram oxalate (LEXAPRO) 10 MG tablet Take 1 tablet (10 mg total) by mouth once  daily. 30 tablet 11    folic acid (FOLVITE) 1 MG tablet Take 1 tablet (1 mg total) by mouth once daily. 30 tablet 3    levETIRAcetam (KEPPRA) 1000 MG tablet Take 1 tablet (1,000 mg total) by mouth 2 (two) times daily. 60 tablet 11    multivitamin capsule Take 1 capsule by mouth once daily.      naltrexone (DEPADE) 50 mg tablet Take 50 mg by mouth once daily.  3    pantoprazole (PROTONIX) 40 MG tablet Take 1 tablet (40 mg total) by mouth once daily. 30 tablet 3    propranolol (INDERAL) 10 MG tablet Take 10 mg by mouth once daily.  3    propranoloL (INDERAL) 20 MG tablet TK 1 T PO  TID      thiamine 100 MG tablet Take 1 tablet (100 mg total) by mouth once daily. 30 tablet 11    traZODone (DESYREL) 50 MG tablet TK 1 T PO  QHS       No current facility-administered medications on file prior to visit.          PMHX:  has a past medical history of Anxiety, Arthritis, and Other meniscus derangements, other medial meniscus, left knee (1/7/2019).    PSHX:  has a past surgical history that includes Arthroscopy of knee (Left, 1/7/2019); Esophagogastroduodenoscopy (N/A, 4/8/2019); Esophagogastroduodenoscopy (N/A, 7/27/2020); and Colonoscopy (N/A, 7/27/2020).    FAMILY HISTORY:   Family History   Problem Relation Age of Onset    Birth defects Neg Hx        SOCIAL HISTORY:   Social History     Tobacco Use   Smoking Status Never Smoker   Smokeless Tobacco Never Used       Social History     Substance and Sexual Activity   Alcohol Use Not Currently    Comment: last drink 7/21       Social History     Substance and Sexual Activity   Drug Use No         Review of Systems   Constitutional: Negative for appetite change, chills, fatigue, fever and unexpected weight change.   Eyes: Negative for visual disturbance.   Respiratory: Negative for cough and shortness of breath.    Cardiovascular: Negative for chest pain, palpitations and leg swelling.   Gastrointestinal: Negative for abdominal distention, abdominal pain, blood in  "stool, constipation, diarrhea, nausea and vomiting. No change in stool color.  Genitourinary: Negative for dysuria and hematuria. Denies dark urine.   Musculoskeletal: Negative for arthralgias, gait problem, joint swelling and myalgias.   Skin: Negative for color change, rash and wound. Denies itching.   Neurological: (+) intermittent dizziness, tremors. Denies speech difficulty and headaches.   Hematological: Does not bruise/bleed easily.   Psychiatric/Behavioral: Negative for confusion, decreased concentration. (+) sleep disturbance. Denies memory loss. Denies depression. The patient is not nervous/anxious.         Physical Exam   Constitutional: Well-nourished. No distress. Alert and oriented to person, place, and time.  Eyes: No scleral icterus.   Cardiovascular: Normal rate, regular rhythm and normal heart sounds. No murmur heard.  Pulmonary/Chest: Respiratory effort and breath sounds normal. No respiratory distress.   Abdominal: Soft, non-tender. No distension; no ascites appreciated. There is no palpable hepatosplenomegaly. No hernia or mass.   Musculoskeletal: No edema.   Neurological: No tremor. Coordination and gait normal. No asterixis.    Skin: Skin is warm and dry. No rash or erythema. No jaundice. No telangiectasias or palmar erythema noted.  Psychiatric: Normal mood and affect. Speech, behavior, and thought content normal. No depression or anxiety noted.       BP 90/60 (BP Location: Right arm, Patient Position: Sitting, BP Method: Medium (Automatic))   Pulse 73   Ht 6' 3" (1.905 m)   Wt 109.3 kg (240 lb 15.4 oz)   SpO2 97%   BMI 30.12 kg/m²       LABS:  Lab Results   Component Value Date    WBC 5.81 09/14/2020    HGB 12.9 (L) 09/14/2020    HCT 38.7 (L) 09/14/2020     (L) 09/14/2020     09/14/2020    K 4.2 09/14/2020    CREATININE 1.1 09/14/2020    ALT 14 09/14/2020    AST 33 09/14/2020    ALKPHOS 78 09/14/2020    BILITOT 1.3 (H) 09/14/2020    BILIDIR 3.7 (H) 07/27/2020    ALBUMIN 2.7 " (L) 09/14/2020    INR 1.2 09/14/2020    AFP 6.6 12/06/2019    FERRITIN 1,017 (H) 04/07/2019    FESATURATED 52 (H) 04/07/2019    CHOL 242 (H) 04/06/2016    TRIG 278 (H) 04/06/2016    LDLCALC 146.4 04/06/2016    HGBA1C 4.9 10/24/2019       Hepatitis A Antibody IgG   Date Value Ref Range Status   04/07/2019 Positive (A)  Final       Hepatitis B Surface Ag   Date Value Ref Range Status   10/24/2019 Negative Negative Final     Hep B Core Total Ab   Date Value Ref Range Status   04/07/2019 Negative  Final     Hep B S Ab   Date Value Ref Range Status   04/07/2019 Negative  Final     Hepatitis C Ab   Date Value Ref Range Status   10/24/2019 Negative Negative Final     Immunization History   Administered Date(s) Administered    Influenza - Quadrivalent - PF *Preferred* (6 months and older) 10/28/2019          DIAGNOSTIC STUDIES:  ABD. US - 7/23/20  FINDINGS:  The liver is increased in size and  echogenicity without evidence of hepatomegaly or focal lesions. The liver measures 23 point cm in craniocaudal dimensions.     The gallbladder contains no gallstones, gallbladder wall thickening, or pericholecystic fluid.  There is no sonographic Gold sign..     The pancreatic duct is minimally dilated at 2.2 mm.     The common bile duct measures 3 mm.     Both kidneys are normal in size without hydronephrosis. The right kidney measures 12.3 cm in length. The left kidney measures 11.1 cm in length.     The spleen is increased in size, measuring 15.1 cm.  A splenule is noted.     The IVC is unremarkable.  The distal abdominal aorta is obscured by bowel gas.     Impression:     Hepatic steatosis.  Hepatosplenomegaly.     Minimal dilatation of the pancreatic duct.  Consider ERCP or MRCP.     Limited evaluation of the aorta.       EGD - 7/27/20  Findings:        Grade I varices were found in the lower third of the esophagus.        Severe portal hypertensive gastropathy was found in the entire        examined stomach.        The  examined duodenum was normal.   Impression:   - Grade I esophageal varices.                         - Portal hypertensive gastropathy.                         - Normal examined duodenum.                         - No specimens collected.           ASSESSMENT / PLAN:  45 y.o. White male with:    1. Alcoholic hepatitis  -- Awaiting MELD labs from today. He is clinically improving and reports abstinence since discharge. He has now had 3 admissions with alcoholic hepatitis. He acknowledges that any future alcohol use can cause hepatic decompensation and/or liver failure.   -- Again discussed that he may have underlying cirrhosis though unclear in the setting of alcoholic hepatitis. Will allow more time off of alcohol before getting Fibroscan for fibrosis staging.  -- If cirrhosis confirmed, will need to continue liver cancer screening every 6 months and repeat EGDs for variceal screening    2. Alcohol use disorder   -- Reports last drink 7/21  -- No current plans for AA or rehab. Provided with list of substance abuse resources.  -- Still taking naltrexone, advised f/u with psychiatry   -- Continue folic acid and thiamine  -- Reinforced that no amount of alcohol is safe for his liver     3. Impaired sleep  -- Possible HE. Denies any confusion or disorientation.  -- Check ammonia with next labs, may benefit from trial of lactulose    4. Hypotension  -- Continue to monitor, may need to stop propranolol if this does not improve     5. Recent GIB  -- EGD 7/2020 with grade I esophageal varices   -- On propranolol and PPI    6. History of withdrawal seizures  -- Now on Keppra     7. Hyponatremia  -- Awaiting labs from today           Orders Placed This Encounter   Procedures    CBC Without Differential    Comprehensive metabolic panel    Protime-INR    Ammonia       Follow-up visit in 6-8 weeks.         Thank you for allowing me to participate in the care of Nilesh Jimenez, FNP-C  Hepatology  and Liver Transplant

## 2020-09-17 ENCOUNTER — TELEPHONE (OUTPATIENT)
Dept: HEPATOLOGY | Facility: CLINIC | Age: 45
End: 2020-09-17

## 2020-09-18 NOTE — TELEPHONE ENCOUNTER
Previously attempted to call patient, no answer.    Please call patient-  Let him know that liver tests are almost back to normal.  It is very important that he stays away from all alcohol so that liver can continue to recover. Will repeat labs prior to next clinic visit as scheduled.

## 2020-09-21 LAB — PHOSPHATIDYLETHANOL (PETH): NEGATIVE NG/ML

## 2020-09-25 RX ORDER — FOLIC ACID 1 MG/1
1 TABLET ORAL DAILY
Qty: 30 TABLET | Refills: 3 | OUTPATIENT
Start: 2020-09-25 | End: 2021-09-25

## 2020-10-26 ENCOUNTER — LAB VISIT (OUTPATIENT)
Dept: LAB | Facility: HOSPITAL | Age: 45
End: 2020-10-26
Payer: MEDICAID

## 2020-10-26 ENCOUNTER — OFFICE VISIT (OUTPATIENT)
Dept: HEPATOLOGY | Facility: CLINIC | Age: 45
End: 2020-10-26
Payer: MEDICAID

## 2020-10-26 VITALS
DIASTOLIC BLOOD PRESSURE: 62 MMHG | WEIGHT: 255.31 LBS | SYSTOLIC BLOOD PRESSURE: 103 MMHG | BODY MASS INDEX: 32.77 KG/M2 | HEIGHT: 74 IN | OXYGEN SATURATION: 96 % | RESPIRATION RATE: 20 BRPM | HEART RATE: 73 BPM

## 2020-10-26 DIAGNOSIS — F10.11 HISTORY OF ALCOHOL ABUSE: ICD-10-CM

## 2020-10-26 DIAGNOSIS — I95.9 HYPOTENSION, UNSPECIFIED HYPOTENSION TYPE: ICD-10-CM

## 2020-10-26 DIAGNOSIS — K70.10 ALCOHOLIC HEPATITIS WITHOUT ASCITES: Primary | ICD-10-CM

## 2020-10-26 DIAGNOSIS — Z87.19 HISTORY OF GI BLEED: ICD-10-CM

## 2020-10-26 DIAGNOSIS — K70.10 ALCOHOLIC HEPATITIS WITHOUT ASCITES: ICD-10-CM

## 2020-10-26 LAB
ALBUMIN SERPL BCP-MCNC: 3 G/DL (ref 3.5–5.2)
ALP SERPL-CCNC: 79 U/L (ref 55–135)
ALT SERPL W/O P-5'-P-CCNC: 15 U/L (ref 10–44)
AMMONIA PLAS-SCNC: 60 UMOL/L (ref 10–50)
ANION GAP SERPL CALC-SCNC: 10 MMOL/L (ref 8–16)
AST SERPL-CCNC: 31 U/L (ref 10–40)
BILIRUB SERPL-MCNC: 0.9 MG/DL (ref 0.1–1)
BUN SERPL-MCNC: 9 MG/DL (ref 6–20)
CALCIUM SERPL-MCNC: 9.3 MG/DL (ref 8.7–10.5)
CHLORIDE SERPL-SCNC: 107 MMOL/L (ref 95–110)
CO2 SERPL-SCNC: 23 MMOL/L (ref 23–29)
CREAT SERPL-MCNC: 1 MG/DL (ref 0.5–1.4)
ERYTHROCYTE [DISTWIDTH] IN BLOOD BY AUTOMATED COUNT: 14.6 % (ref 11.5–14.5)
EST. GFR  (AFRICAN AMERICAN): >60 ML/MIN/1.73 M^2
EST. GFR  (NON AFRICAN AMERICAN): >60 ML/MIN/1.73 M^2
GLUCOSE SERPL-MCNC: 106 MG/DL (ref 70–110)
HCT VFR BLD AUTO: 37.4 % (ref 40–54)
HGB BLD-MCNC: 12.1 G/DL (ref 14–18)
INR PPP: 1.1 (ref 0.8–1.2)
MCH RBC QN AUTO: 31.6 PG (ref 27–31)
MCHC RBC AUTO-ENTMCNC: 32.4 G/DL (ref 32–36)
MCV RBC AUTO: 98 FL (ref 82–98)
PLATELET # BLD AUTO: 102 K/UL (ref 150–350)
PMV BLD AUTO: 10.6 FL (ref 9.2–12.9)
POTASSIUM SERPL-SCNC: 4.2 MMOL/L (ref 3.5–5.1)
PROT SERPL-MCNC: 7.3 G/DL (ref 6–8.4)
PROTHROMBIN TIME: 12.4 SEC (ref 9–12.5)
RBC # BLD AUTO: 3.83 M/UL (ref 4.6–6.2)
SODIUM SERPL-SCNC: 140 MMOL/L (ref 136–145)
WBC # BLD AUTO: 4.62 K/UL (ref 3.9–12.7)

## 2020-10-26 PROCEDURE — 99999 PR PBB SHADOW E&M-EST. PATIENT-LVL IV: ICD-10-PCS | Mod: PBBFAC,,, | Performed by: NURSE PRACTITIONER

## 2020-10-26 PROCEDURE — 80053 COMPREHEN METABOLIC PANEL: CPT

## 2020-10-26 PROCEDURE — 85610 PROTHROMBIN TIME: CPT

## 2020-10-26 PROCEDURE — 99214 OFFICE O/P EST MOD 30 MIN: CPT | Mod: PBBFAC | Performed by: NURSE PRACTITIONER

## 2020-10-26 PROCEDURE — 82140 ASSAY OF AMMONIA: CPT

## 2020-10-26 PROCEDURE — 36415 COLL VENOUS BLD VENIPUNCTURE: CPT

## 2020-10-26 PROCEDURE — 99214 OFFICE O/P EST MOD 30 MIN: CPT | Mod: S$PBB,,, | Performed by: NURSE PRACTITIONER

## 2020-10-26 PROCEDURE — 85027 COMPLETE CBC AUTOMATED: CPT

## 2020-10-26 PROCEDURE — 99999 PR PBB SHADOW E&M-EST. PATIENT-LVL IV: CPT | Mod: PBBFAC,,, | Performed by: NURSE PRACTITIONER

## 2020-10-26 PROCEDURE — 99214 PR OFFICE/OUTPT VISIT, EST, LEVL IV, 30-39 MIN: ICD-10-PCS | Mod: S$PBB,,, | Performed by: NURSE PRACTITIONER

## 2020-10-26 RX ORDER — FOLIC ACID 1 MG/1
1 TABLET ORAL DAILY
Qty: 30 TABLET | Refills: 11 | Status: SHIPPED | OUTPATIENT
Start: 2020-10-26 | End: 2021-11-06

## 2020-10-26 NOTE — PROGRESS NOTES
[facility] - Established Patient    Last Clinic Visit: 9/14/20    CHIEF COMPLAINT: Follow-up for alcoholic hepatitis     HPI: This is a 45 y.o. White male here for follow-up for alcoholic hepatitis.     He was admitted to [facility] 10/24-10/28 with symptoms of alcohol withdrawal x 6 days. Previously drinking 1/5 vodka daily. He had visual hallucinations.   MELD up to 22 during this admission. He then had another admission with alcoholic hepatitis 4/2019. Had GIB and ascites requiring paracentesis at this time.      He continued to drink alcohol despite recommendations for abstinence and naltrexone prescribed by his psychiatrist.     He was re-admitted 7/23-7/27 following seizure at home. His GF reported still drinking 1/2 pint liquor/day up until 2 days prior to admission. Also had bloody BM and auditory hallucinations. Admitted to ICU.    EGD / colonoscopy (7/27/20): grade I esophageal varices, PHG, 2 polyps removed from colon, friability with contact bleeding in rectum    Abd US 7/23/20 -- hepatosplenomegaly, no liver lesions, no ascites      Interval history:  Continues to remain abstinent (last drink prior to admission in July).  Denies any issues with this though he is craving more sweets and gaining weight. Still sleepy during the day.     No current plans for AA/rehab or follow-up with his psychiatrist.  Still taking naltrexone.  No seizures, remains on Keppra.     Denies symptoms of hepatic decompensation including jaundice, ascites, cognitive problems to suggest hepatic encephalopathy, or GI bleeding.     No longer having dizziness when standing.  BP today - 103/62    Still living with a roommate. No alcohol in the house.          Review of patient's allergies indicates:  No Known Allergies     Current Outpatient Medications on File Prior to Visit   Medication Sig Dispense Refill    escitalopram oxalate (LEXAPRO) 10 MG tablet Take 1 tablet (10 mg total) by mouth once daily. 30 tablet 11     levETIRAcetam (KEPPRA) 1000 MG tablet Take 1 tablet (1,000 mg total) by mouth 2 (two) times daily. 60 tablet 11    multivitamin capsule Take 1 capsule by mouth once daily.      naltrexone (DEPADE) 50 mg tablet Take 50 mg by mouth once daily.  3    pantoprazole (PROTONIX) 40 MG tablet Take 1 tablet (40 mg total) by mouth once daily. 30 tablet 3    propranolol (INDERAL) 10 MG tablet Take 10 mg by mouth once daily.  3    propranoloL (INDERAL) 20 MG tablet TK 1 T PO  TID      thiamine 100 MG tablet Take 1 tablet (100 mg total) by mouth once daily. 30 tablet 11    traZODone (DESYREL) 50 MG tablet TK 1 T PO  QHS      [DISCONTINUED] folic acid (FOLVITE) 1 MG tablet Take 1 tablet (1 mg total) by mouth once daily. 30 tablet 3     No current facility-administered medications on file prior to visit.          PMHX:  has a past medical history of Anxiety, Arthritis, and Other meniscus derangements, other medial meniscus, left knee (1/7/2019).    PSHX:  has a past surgical history that includes Arthroscopy of knee (Left, 1/7/2019); Esophagogastroduodenoscopy (N/A, 4/8/2019); Esophagogastroduodenoscopy (N/A, 7/27/2020); and Colonoscopy (N/A, 7/27/2020).    FAMILY HISTORY:   Family History   Problem Relation Age of Onset    Birth defects Neg Hx        SOCIAL HISTORY:   Social History     Tobacco Use   Smoking Status Never Smoker   Smokeless Tobacco Never Used       Social History     Substance and Sexual Activity   Alcohol Use Not Currently    Comment: last drink 7/21       Social History     Substance and Sexual Activity   Drug Use No         Review of Systems   Constitutional: Negative for appetite change, chills, fatigue, fever. + weight gain  Eyes: Negative for visual disturbance.   Respiratory: Negative for cough and shortness of breath.    Cardiovascular: Negative for chest pain, palpitations and leg swelling.   Gastrointestinal: Negative for abdominal distention, abdominal pain, blood in stool, constipation,  "diarrhea, nausea and vomiting. No change in stool color.  Genitourinary: Negative for dysuria and hematuria. Denies dark urine.   Musculoskeletal: Negative for arthralgias, gait problem, joint swelling and myalgias.   Skin: Negative for color change, rash and wound. Denies itching.   Neurological: Negative for dizziness, tremors, speech difficulty, and headaches.   Hematological: Does not bruise/bleed easily.   Psychiatric/Behavioral: Negative for confusion, decreased concentration. Denies memory loss. Denies depression. The patient is not nervous/anxious.     + sleeping during the day      Physical Exam   Constitutional: Well-nourished. No distress. Alert and oriented to person, place, and time.  Eyes: No scleral icterus.   Cardiovascular: Normal rate, regular rhythm and normal heart sounds. No murmur heard.  Pulmonary/Chest: Respiratory effort and breath sounds normal. No respiratory distress.   Abdominal: Soft, non-tender. No distension; no ascites appreciated. There is no palpable hepatosplenomegaly. No hernia or mass.   Musculoskeletal: No edema.   Neurological: No tremor. Coordination and gait normal. No asterixis.    Skin: Skin is warm and dry. No rash or erythema. No jaundice. No telangiectasias or palmar erythema noted.  Psychiatric: Normal mood and affect. Speech, behavior, and thought content normal. No depression or anxiety noted.       /62 (BP Location: Right arm, Patient Position: Sitting, BP Method: Large (Automatic))   Pulse 73   Resp 20   Ht 6' 2" (1.88 m)   Wt 115.8 kg (255 lb 4.7 oz)   SpO2 96%   BMI 32.78 kg/m²       LABS:  Lab Results   Component Value Date    WBC 5.81 09/14/2020    HGB 12.9 (L) 09/14/2020    HCT 38.7 (L) 09/14/2020     (L) 09/14/2020     09/14/2020    K 4.2 09/14/2020    CREATININE 1.1 09/14/2020    ALT 14 09/14/2020    AST 33 09/14/2020    ALKPHOS 78 09/14/2020    BILITOT 1.3 (H) 09/14/2020    BILIDIR 3.7 (H) 07/27/2020    ALBUMIN 2.7 (L) 09/14/2020    " INR 1.2 09/14/2020    AFP 6.6 12/06/2019    FERRITIN 1,017 (H) 04/07/2019    FESATURATED 52 (H) 04/07/2019    PETH Negative 09/14/2020    CHOL 242 (H) 04/06/2016    TRIG 278 (H) 04/06/2016    LDLCALC 146.4 04/06/2016    HGBA1C 4.9 10/24/2019       Hepatitis A Antibody IgG   Date Value Ref Range Status   04/07/2019 Positive (A)  Final       Hepatitis B Surface Ag   Date Value Ref Range Status   10/24/2019 Negative Negative Final     Hep B Core Total Ab   Date Value Ref Range Status   04/07/2019 Negative  Final     Hep B S Ab   Date Value Ref Range Status   04/07/2019 Negative  Final     Hepatitis C Ab   Date Value Ref Range Status   10/24/2019 Negative Negative Final     Immunization History   Administered Date(s) Administered    Influenza - Quadrivalent - PF *Preferred* (6 months and older) 10/28/2019          DIAGNOSTIC STUDIES:  ABD. US - 7/23/20  FINDINGS:  The liver is increased in size and  echogenicity without evidence of hepatomegaly or focal lesions. The liver measures 23 point cm in craniocaudal dimensions.     The gallbladder contains no gallstones, gallbladder wall thickening, or pericholecystic fluid.  There is no sonographic Gold sign..     The pancreatic duct is minimally dilated at 2.2 mm.     The common bile duct measures 3 mm.     Both kidneys are normal in size without hydronephrosis. The right kidney measures 12.3 cm in length. The left kidney measures 11.1 cm in length.     The spleen is increased in size, measuring 15.1 cm.  A splenule is noted.     The IVC is unremarkable.  The distal abdominal aorta is obscured by bowel gas.     Impression:     Hepatic steatosis.  Hepatosplenomegaly.     Minimal dilatation of the pancreatic duct.  Consider ERCP or MRCP.     Limited evaluation of the aorta.       EGD - 7/27/20  Findings:        Grade I varices were found in the lower third of the esophagus.        Severe portal hypertensive gastropathy was found in the entire        examined stomach.         The examined duodenum was normal.   Impression:   - Grade I esophageal varices.                         - Portal hypertensive gastropathy.                         - Normal examined duodenum.                         - No specimens collected.           ASSESSMENT / PLAN:  45 y.o. White male with:    1. Alcoholic hepatitis  -- MELD-Na score: 10 at 9/14/2020  8:28 AM  MELD score: 10 at 9/14/2020  8:28 AM  Calculated from:  Serum Creatinine: 1.1 mg/dL at 9/14/2020  8:25 AM  Serum Sodium: 138 mmol/L (Rounded to 137 mmol/L) at 9/14/2020  8:25 AM  Total Bilirubin: 1.3 mg/dL at 9/14/2020  8:25 AM  INR(ratio): 1.2 at 9/14/2020  8:28 AM  Age: 45 years 2 months  -- Repeat labs today to reassess LFTs   -- Will plan for fibrosis staging with Fibroscan in 3 months. That will give >6 months of abstinence   -- If cirrhosis confirmed, will need to continue liver cancer screening every 6 months and repeat EGDs for variceal screening    2. Alcohol use disorder   -- Reports last drink 7/21  -- No current plans for AA or rehab. Previously provided with list of substance abuse resources though he does not feel like he needs this.  -- Still taking naltrexone, advised f/u with psychiatry   -- Continue folic acid and thiamine  -- He has now had 3 admissions with alcoholic hepatitis. He acknowledges that any future alcohol use can cause hepatic decompensation and/or liver failure      3. Daytime sleepiness  -- Possible HE. Denies any confusion or disorientation.  -- Check ammonia, may benefit from trial of lactulose    4. Hypotension  -- Continue to monitor, may need to stop propranolol if this does not improve     5. H/o esophageal varices and GIB during last admission  -- EGD 7/2020 with grade I esophageal varices   -- On propranolol and PPI    6. History of withdrawal seizures  -- On Keppra          Follow-up visit in 3 months with Fibroscan prior.        Orders Placed This Encounter   Procedures    FibroScan (Vibration Controlled  Transient Elastography)             Thank you for allowing me to participate in the care of Nilesh Jimenez, ARTHURP-C  Hepatology

## 2020-10-26 NOTE — PATIENT INSTRUCTIONS
1. Labs today to recheck liver tests    2. Follow-up in 3 months with Fibroscan before    3. Continue to stay away from ALL alcohol (wine, beer, liquor)

## 2020-12-11 ENCOUNTER — TELEPHONE (OUTPATIENT)
Dept: HEPATOLOGY | Facility: CLINIC | Age: 45
End: 2020-12-11

## 2020-12-11 NOTE — TELEPHONE ENCOUNTER
Returned patients call. Patient stated that he received a letter stating he needed to call us about his labs. I am not sure what letter he is speaking of. He stated that he did get the lab results from October. So I told him to disregard the letter.

## 2020-12-11 NOTE — TELEPHONE ENCOUNTER
----- Message from Dianne Jimenez NP sent at 12/11/2020  1:29 PM CST -----  Regarding: RE: Speak with office  Contact: Nilesh  Can you find out a little more about what he wants to discuss. He hasn't had any testing since October.  Thanks!  Dianne    ----- Message -----  From: Shea Shrestha MA  Sent: 12/11/2020   1:20 PM CST  To: Dianne Jimenez NP  Subject: FW: Speak with office                              ----- Message -----  From: Nicolas Jasso  Sent: 12/11/2020   1:05 PM CST  To: Tony Dean Staff  Subject: Speak with office                                Pt is calling to speak with nurse about results.    [phone]

## 2020-12-30 ENCOUNTER — TELEPHONE (OUTPATIENT)
Dept: RHEUMATOLOGY | Facility: CLINIC | Age: 45
End: 2020-12-30

## 2020-12-30 NOTE — TELEPHONE ENCOUNTER
Joceline Junior Staff   Caller: Unspecified (Today, 10:39 AM)             Type:  Patient Returning Call     Who Called: Patient   Who Left Message for Patient: unknown   Does the patient know what this is regarding?: appointment   Would the patient rather a call back or a response via [employer]? Call back   Best Call Back Number:    Additional Information: n/a      A referral was fax to the office for the patient for Rheumatology. I informed the patient due to him having Medicaid we will not be able to see him. I informed the patient that I faxed the referral back to his provider and asked them to reach out to [facility] group. Pt voiced understanding.

## 2021-01-26 ENCOUNTER — OFFICE VISIT (OUTPATIENT)
Dept: HEPATOLOGY | Facility: CLINIC | Age: 46
End: 2021-01-26
Payer: MEDICAID

## 2021-01-26 ENCOUNTER — PROCEDURE VISIT (OUTPATIENT)
Dept: HEPATOLOGY | Facility: CLINIC | Age: 46
End: 2021-01-26
Payer: MEDICAID

## 2021-01-26 VITALS
DIASTOLIC BLOOD PRESSURE: 67 MMHG | WEIGHT: 275.56 LBS | HEART RATE: 62 BPM | SYSTOLIC BLOOD PRESSURE: 113 MMHG | HEIGHT: 74 IN | RESPIRATION RATE: 16 BRPM | BODY MASS INDEX: 35.36 KG/M2 | OXYGEN SATURATION: 96 %

## 2021-01-26 DIAGNOSIS — I85.10 SECONDARY ESOPHAGEAL VARICES WITHOUT BLEEDING: ICD-10-CM

## 2021-01-26 DIAGNOSIS — K70.30 ALCOHOLIC CIRRHOSIS OF LIVER WITHOUT ASCITES: Primary | ICD-10-CM

## 2021-01-26 DIAGNOSIS — K76.82 HEPATIC ENCEPHALOPATHY: ICD-10-CM

## 2021-01-26 DIAGNOSIS — F10.11 HISTORY OF ALCOHOL ABUSE: ICD-10-CM

## 2021-01-26 DIAGNOSIS — F10.930 ALCOHOL WITHDRAWAL SEIZURE WITHOUT COMPLICATION: ICD-10-CM

## 2021-01-26 DIAGNOSIS — K70.10 ALCOHOLIC HEPATITIS WITHOUT ASCITES: ICD-10-CM

## 2021-01-26 DIAGNOSIS — R56.9 ALCOHOL WITHDRAWAL SEIZURE WITHOUT COMPLICATION: ICD-10-CM

## 2021-01-26 PROBLEM — I95.9 HYPOTENSION: Status: RESOLVED | Noted: 2020-09-14 | Resolved: 2021-01-26

## 2021-01-26 PROCEDURE — 99999 PR PBB SHADOW E&M-EST. PATIENT-LVL IV: ICD-10-PCS | Mod: PBBFAC,,, | Performed by: NURSE PRACTITIONER

## 2021-01-26 PROCEDURE — 99214 PR OFFICE/OUTPT VISIT, EST, LEVL IV, 30-39 MIN: ICD-10-PCS | Mod: S$PBB,,, | Performed by: NURSE PRACTITIONER

## 2021-01-26 PROCEDURE — 99999 PR PBB SHADOW E&M-EST. PATIENT-LVL IV: CPT | Mod: PBBFAC,,, | Performed by: NURSE PRACTITIONER

## 2021-01-26 PROCEDURE — 91200 FIBROSCAN (VIBRATION CONTROLLED TRANSIENT ELASTOGRAPHY): ICD-10-PCS | Mod: 26,S$PBB,, | Performed by: NURSE PRACTITIONER

## 2021-01-26 PROCEDURE — 91200 LIVER ELASTOGRAPHY: CPT | Mod: 26,S$PBB,, | Performed by: NURSE PRACTITIONER

## 2021-01-26 PROCEDURE — 99214 OFFICE O/P EST MOD 30 MIN: CPT | Mod: S$PBB,,, | Performed by: NURSE PRACTITIONER

## 2021-01-26 PROCEDURE — 91200 LIVER ELASTOGRAPHY: CPT | Mod: PBBFAC | Performed by: NURSE PRACTITIONER

## 2021-01-26 PROCEDURE — 99214 OFFICE O/P EST MOD 30 MIN: CPT | Mod: PBBFAC | Performed by: NURSE PRACTITIONER

## 2021-01-26 RX ORDER — LACTULOSE 10 G/15ML
20 SOLUTION ORAL 2 TIMES DAILY
Qty: 1800 ML | Refills: 5 | Status: SHIPPED | OUTPATIENT
Start: 2021-01-26 | End: 2021-03-31

## 2021-01-30 ENCOUNTER — HOSPITAL ENCOUNTER (OUTPATIENT)
Dept: RADIOLOGY | Facility: HOSPITAL | Age: 46
Discharge: HOME | End: 2021-01-30
Attending: NURSE PRACTITIONER
Payer: MEDICAID

## 2021-01-30 DIAGNOSIS — K70.30 ALCOHOLIC CIRRHOSIS OF LIVER WITHOUT ASCITES: ICD-10-CM

## 2021-01-30 PROCEDURE — 76705 ECHO EXAM OF ABDOMEN: CPT | Mod: 26,,, | Performed by: RADIOLOGY

## 2021-01-30 PROCEDURE — 76705 US ABDOMEN LIMITED: ICD-10-PCS | Mod: 26,,, | Performed by: RADIOLOGY

## 2021-01-30 PROCEDURE — 76705 ECHO EXAM OF ABDOMEN: CPT | Mod: TC

## 2021-02-22 DIAGNOSIS — M25.562 LEFT KNEE PAIN, UNSPECIFIED CHRONICITY: Primary | ICD-10-CM

## 2021-02-23 ENCOUNTER — OFFICE VISIT (OUTPATIENT)
Dept: ORTHOPEDICS | Facility: CLINIC | Age: 46
End: 2021-02-23
Payer: MEDICAID

## 2021-02-23 ENCOUNTER — HOSPITAL ENCOUNTER (OUTPATIENT)
Dept: RADIOLOGY | Facility: HOSPITAL | Age: 46
Discharge: HOME | End: 2021-02-23
Attending: ORTHOPAEDIC SURGERY
Payer: MEDICAID

## 2021-02-23 VITALS
HEIGHT: 74 IN | SYSTOLIC BLOOD PRESSURE: 129 MMHG | BODY MASS INDEX: 36.55 KG/M2 | HEART RATE: 71 BPM | WEIGHT: 284.81 LBS | DIASTOLIC BLOOD PRESSURE: 77 MMHG

## 2021-02-23 DIAGNOSIS — M17.12 PRIMARY OSTEOARTHRITIS OF LEFT KNEE: Primary | ICD-10-CM

## 2021-02-23 DIAGNOSIS — M25.562 LEFT KNEE PAIN, UNSPECIFIED CHRONICITY: ICD-10-CM

## 2021-02-23 DIAGNOSIS — M25.511 RIGHT SHOULDER PAIN, UNSPECIFIED CHRONICITY: Primary | ICD-10-CM

## 2021-02-23 PROCEDURE — 99214 OFFICE O/P EST MOD 30 MIN: CPT | Mod: S$PBB,,, | Performed by: ORTHOPAEDIC SURGERY

## 2021-02-23 PROCEDURE — 99999 PR PBB SHADOW E&M-EST. PATIENT-LVL III: ICD-10-PCS | Mod: PBBFAC,,, | Performed by: ORTHOPAEDIC SURGERY

## 2021-02-23 PROCEDURE — 99213 OFFICE O/P EST LOW 20 MIN: CPT | Mod: PBBFAC,25,PN | Performed by: ORTHOPAEDIC SURGERY

## 2021-02-23 PROCEDURE — 73562 XR KNEE 3 VIEW LEFT: ICD-10-PCS | Mod: 26,LT,, | Performed by: RADIOLOGY

## 2021-02-23 PROCEDURE — 73562 X-RAY EXAM OF KNEE 3: CPT | Mod: TC,FY,LT

## 2021-02-23 PROCEDURE — 73562 X-RAY EXAM OF KNEE 3: CPT | Mod: 26,LT,, | Performed by: RADIOLOGY

## 2021-02-23 PROCEDURE — 99214 PR OFFICE/OUTPT VISIT, EST, LEVL IV, 30-39 MIN: ICD-10-PCS | Mod: S$PBB,,, | Performed by: ORTHOPAEDIC SURGERY

## 2021-02-23 PROCEDURE — 99999 PR PBB SHADOW E&M-EST. PATIENT-LVL III: CPT | Mod: PBBFAC,,, | Performed by: ORTHOPAEDIC SURGERY

## 2021-02-23 RX ORDER — AMOXICILLIN 500 MG/1
CAPSULE ORAL
Status: ON HOLD | COMMUNITY
Start: 2021-02-08 | End: 2021-12-12 | Stop reason: HOSPADM

## 2021-02-23 RX ORDER — TRAZODONE HYDROCHLORIDE 100 MG/1
100 TABLET ORAL NIGHTLY
COMMUNITY
Start: 2020-12-29 | End: 2021-03-31

## 2021-02-23 RX ORDER — MELOXICAM 7.5 MG/1
TABLET ORAL
Status: ON HOLD | COMMUNITY
Start: 2021-02-11 | End: 2021-12-12

## 2021-02-23 RX ORDER — LACTULOSE 10 G/15ML
SOLUTION ORAL; RECTAL
COMMUNITY
Start: 2021-01-26 | End: 2021-03-31

## 2021-02-23 RX ORDER — KETOCONAZOLE 20 MG/G
CREAM TOPICAL 2 TIMES DAILY
COMMUNITY
Start: 2020-12-29 | End: 2022-01-28

## 2021-02-23 RX ORDER — HYDROCODONE BITARTRATE AND ACETAMINOPHEN 7.5; 325 MG/1; MG/1
TABLET ORAL
COMMUNITY
Start: 2021-02-08 | End: 2021-03-31

## 2021-02-23 RX ORDER — ASPIRIN 325 MG
50000 TABLET, DELAYED RELEASE (ENTERIC COATED) ORAL
Status: ON HOLD | COMMUNITY
Start: 2021-01-22 | End: 2021-12-15 | Stop reason: HOSPADM

## 2021-02-24 ENCOUNTER — OFFICE VISIT (OUTPATIENT)
Dept: ORTHOPEDICS | Facility: CLINIC | Age: 46
End: 2021-02-24
Payer: MEDICAID

## 2021-02-24 ENCOUNTER — HOSPITAL ENCOUNTER (OUTPATIENT)
Dept: RADIOLOGY | Facility: HOSPITAL | Age: 46
Discharge: HOME | End: 2021-02-24
Attending: ORTHOPAEDIC SURGERY
Payer: MEDICAID

## 2021-02-24 VITALS
WEIGHT: 287.81 LBS | SYSTOLIC BLOOD PRESSURE: 117 MMHG | BODY MASS INDEX: 36.94 KG/M2 | DIASTOLIC BLOOD PRESSURE: 80 MMHG | HEART RATE: 76 BPM | HEIGHT: 74 IN

## 2021-02-24 DIAGNOSIS — M25.511 RIGHT SHOULDER PAIN, UNSPECIFIED CHRONICITY: ICD-10-CM

## 2021-02-24 DIAGNOSIS — M19.211 OTHER SECONDARY OSTEOARTHRITIS OF RIGHT SHOULDER: Primary | ICD-10-CM

## 2021-02-24 PROBLEM — M19.011 OSTEOARTHRITIS OF RIGHT SHOULDER: Status: ACTIVE | Noted: 2021-02-24

## 2021-02-24 PROCEDURE — 20610 LARGE JOINT ASPIRATION/INJECTION: R GLENOHUMERAL: ICD-10-PCS | Mod: S$PBB,RT,, | Performed by: ORTHOPAEDIC SURGERY

## 2021-02-24 PROCEDURE — 73030 XR SHOULDER COMPLETE 2 OR MORE VIEWS RIGHT: ICD-10-PCS | Mod: 26,RT,, | Performed by: RADIOLOGY

## 2021-02-24 PROCEDURE — 73030 X-RAY EXAM OF SHOULDER: CPT | Mod: TC,FY,RT

## 2021-02-24 PROCEDURE — 99214 OFFICE O/P EST MOD 30 MIN: CPT | Mod: PBBFAC,25,PN | Performed by: ORTHOPAEDIC SURGERY

## 2021-02-24 PROCEDURE — 99999 PR PBB SHADOW E&M-EST. PATIENT-LVL IV: ICD-10-PCS | Mod: PBBFAC,,, | Performed by: ORTHOPAEDIC SURGERY

## 2021-02-24 PROCEDURE — 99999 PR PBB SHADOW E&M-EST. PATIENT-LVL IV: CPT | Mod: PBBFAC,,, | Performed by: ORTHOPAEDIC SURGERY

## 2021-02-24 PROCEDURE — 20610 DRAIN/INJ JOINT/BURSA W/O US: CPT | Mod: PBBFAC,PN,RT | Performed by: ORTHOPAEDIC SURGERY

## 2021-02-24 PROCEDURE — 73030 X-RAY EXAM OF SHOULDER: CPT | Mod: 26,RT,, | Performed by: RADIOLOGY

## 2021-02-24 PROCEDURE — 99214 PR OFFICE/OUTPT VISIT, EST, LEVL IV, 30-39 MIN: ICD-10-PCS | Mod: 25,S$PBB,, | Performed by: ORTHOPAEDIC SURGERY

## 2021-02-24 PROCEDURE — 99214 OFFICE O/P EST MOD 30 MIN: CPT | Mod: 25,S$PBB,, | Performed by: ORTHOPAEDIC SURGERY

## 2021-02-24 RX ORDER — TRIAMCINOLONE ACETONIDE 40 MG/ML
40 INJECTION, SUSPENSION INTRA-ARTICULAR; INTRAMUSCULAR
Status: DISCONTINUED | OUTPATIENT
Start: 2021-02-24 | End: 2021-02-24 | Stop reason: HOSPADM

## 2021-02-24 RX ADMIN — TRIAMCINOLONE ACETONIDE 40 MG: 40 INJECTION, SUSPENSION INTRA-ARTICULAR; INTRAMUSCULAR at 01:02

## 2021-03-23 ENCOUNTER — OFFICE VISIT (OUTPATIENT)
Dept: ORTHOPEDICS | Facility: CLINIC | Age: 46
End: 2021-03-23
Payer: MEDICAID

## 2021-03-23 VITALS
HEART RATE: 72 BPM | DIASTOLIC BLOOD PRESSURE: 77 MMHG | HEIGHT: 74 IN | BODY MASS INDEX: 36.95 KG/M2 | SYSTOLIC BLOOD PRESSURE: 131 MMHG | WEIGHT: 287.94 LBS

## 2021-03-23 DIAGNOSIS — M17.12 PRIMARY OSTEOARTHRITIS OF LEFT KNEE: Primary | ICD-10-CM

## 2021-03-23 PROCEDURE — 20610 DRAIN/INJ JOINT/BURSA W/O US: CPT | Mod: PBBFAC,PN | Performed by: ORTHOPAEDIC SURGERY

## 2021-03-23 PROCEDURE — 99213 OFFICE O/P EST LOW 20 MIN: CPT | Mod: PBBFAC,PN,25 | Performed by: ORTHOPAEDIC SURGERY

## 2021-03-23 PROCEDURE — 99499 UNLISTED E&M SERVICE: CPT | Mod: S$PBB,,, | Performed by: ORTHOPAEDIC SURGERY

## 2021-03-23 PROCEDURE — 99999 PR PBB SHADOW E&M-EST. PATIENT-LVL III: ICD-10-PCS | Mod: PBBFAC,,, | Performed by: ORTHOPAEDIC SURGERY

## 2021-03-23 PROCEDURE — 20610 LARGE JOINT ASPIRATION/INJECTION: L KNEE: ICD-10-PCS | Mod: S$PBB,LT,, | Performed by: ORTHOPAEDIC SURGERY

## 2021-03-23 PROCEDURE — 99999 PR PBB SHADOW E&M-EST. PATIENT-LVL III: CPT | Mod: PBBFAC,,, | Performed by: ORTHOPAEDIC SURGERY

## 2021-03-23 PROCEDURE — 99499 NO LOS: ICD-10-PCS | Mod: S$PBB,,, | Performed by: ORTHOPAEDIC SURGERY

## 2021-03-23 RX ADMIN — Medication 60 MG: at 10:03

## 2021-03-31 ENCOUNTER — OFFICE VISIT (OUTPATIENT)
Dept: ORTHOPEDICS | Facility: CLINIC | Age: 46
End: 2021-03-31
Payer: MEDICAID

## 2021-03-31 VITALS
SYSTOLIC BLOOD PRESSURE: 109 MMHG | HEART RATE: 65 BPM | HEIGHT: 74 IN | BODY MASS INDEX: 37.08 KG/M2 | DIASTOLIC BLOOD PRESSURE: 67 MMHG | WEIGHT: 288.94 LBS

## 2021-03-31 DIAGNOSIS — M19.011 PRIMARY OSTEOARTHRITIS OF RIGHT SHOULDER: Primary | ICD-10-CM

## 2021-03-31 PROCEDURE — 99213 OFFICE O/P EST LOW 20 MIN: CPT | Mod: S$PBB,,, | Performed by: ORTHOPAEDIC SURGERY

## 2021-03-31 PROCEDURE — 99999 PR PBB SHADOW E&M-EST. PATIENT-LVL IV: ICD-10-PCS | Mod: PBBFAC,,, | Performed by: ORTHOPAEDIC SURGERY

## 2021-03-31 PROCEDURE — 99214 OFFICE O/P EST MOD 30 MIN: CPT | Mod: PBBFAC,PN | Performed by: ORTHOPAEDIC SURGERY

## 2021-03-31 PROCEDURE — 99213 PR OFFICE/OUTPT VISIT, EST, LEVL III, 20-29 MIN: ICD-10-PCS | Mod: S$PBB,,, | Performed by: ORTHOPAEDIC SURGERY

## 2021-03-31 PROCEDURE — 99999 PR PBB SHADOW E&M-EST. PATIENT-LVL IV: CPT | Mod: PBBFAC,,, | Performed by: ORTHOPAEDIC SURGERY

## 2021-03-31 RX ORDER — BENZOYL PEROXIDE 2.5 G/100G
GEL TOPICAL 2 TIMES DAILY
COMMUNITY
Start: 2021-02-25 | End: 2022-01-03

## 2021-03-31 RX ORDER — DOXYCYCLINE 100 MG/1
1 TABLET ORAL 2 TIMES DAILY
Status: ON HOLD | COMMUNITY
Start: 2021-03-28 | End: 2021-12-12 | Stop reason: HOSPADM

## 2021-03-31 RX ORDER — MUPIROCIN 20 MG/G
OINTMENT TOPICAL
Status: ON HOLD | COMMUNITY
Start: 2021-03-19 | End: 2021-12-15 | Stop reason: HOSPADM

## 2021-03-31 RX ORDER — METRONIDAZOLE 7.5 MG/G
GEL TOPICAL 2 TIMES DAILY
COMMUNITY
Start: 2021-03-12 | End: 2022-01-03

## 2021-03-31 RX ORDER — TRIAMCINOLONE ACETONIDE 0.25 MG/G
CREAM TOPICAL
COMMUNITY
Start: 2021-03-19 | End: 2022-01-28

## 2021-03-31 RX ORDER — PREDNISONE 10 MG/1
TABLET ORAL
Status: ON HOLD | COMMUNITY
Start: 2021-03-19 | End: 2021-12-15 | Stop reason: HOSPADM

## 2021-04-14 ENCOUNTER — CLINICAL SUPPORT (OUTPATIENT)
Dept: REHABILITATION | Facility: HOSPITAL | Age: 46
End: 2021-04-14
Attending: ORTHOPAEDIC SURGERY
Payer: MEDICAID

## 2021-04-14 DIAGNOSIS — R53.1 WEAKNESS: ICD-10-CM

## 2021-04-14 DIAGNOSIS — M25.60 STIFFNESS IN JOINT: ICD-10-CM

## 2021-04-14 DIAGNOSIS — M19.011 PRIMARY OSTEOARTHRITIS OF RIGHT SHOULDER: ICD-10-CM

## 2021-04-14 DIAGNOSIS — M79.602 PAIN OF LEFT UPPER EXTREMITY: ICD-10-CM

## 2021-04-14 PROCEDURE — 97165 OT EVAL LOW COMPLEX 30 MIN: CPT | Mod: PN

## 2021-04-21 ENCOUNTER — CLINICAL SUPPORT (OUTPATIENT)
Dept: REHABILITATION | Facility: HOSPITAL | Age: 46
End: 2021-04-21
Attending: ORTHOPAEDIC SURGERY
Payer: MEDICAID

## 2021-04-21 DIAGNOSIS — M25.60 STIFFNESS IN JOINT: ICD-10-CM

## 2021-04-21 DIAGNOSIS — M79.602 PAIN OF LEFT UPPER EXTREMITY: ICD-10-CM

## 2021-04-21 DIAGNOSIS — R53.1 WEAKNESS: ICD-10-CM

## 2021-04-21 PROCEDURE — 97530 THERAPEUTIC ACTIVITIES: CPT | Mod: PN

## 2021-04-28 ENCOUNTER — CLINICAL SUPPORT (OUTPATIENT)
Dept: REHABILITATION | Facility: HOSPITAL | Age: 46
End: 2021-04-28
Attending: ORTHOPAEDIC SURGERY
Payer: MEDICAID

## 2021-04-28 DIAGNOSIS — M25.60 STIFFNESS IN JOINT: ICD-10-CM

## 2021-04-28 DIAGNOSIS — M79.602 PAIN OF LEFT UPPER EXTREMITY: ICD-10-CM

## 2021-04-28 DIAGNOSIS — R53.1 WEAKNESS: ICD-10-CM

## 2021-04-28 PROCEDURE — 97530 THERAPEUTIC ACTIVITIES: CPT | Mod: PN

## 2021-05-12 ENCOUNTER — TELEPHONE (OUTPATIENT)
Dept: REHABILITATION | Facility: HOSPITAL | Age: 46
End: 2021-05-12

## 2021-05-19 ENCOUNTER — TELEPHONE (OUTPATIENT)
Dept: REHABILITATION | Facility: HOSPITAL | Age: 46
End: 2021-05-19

## 2021-08-05 DIAGNOSIS — M17.12 PRIMARY OSTEOARTHRITIS OF LEFT KNEE: Primary | ICD-10-CM

## 2021-08-06 RX ORDER — LEVETIRACETAM 1000 MG/1
1000 TABLET ORAL 2 TIMES DAILY
Qty: 60 TABLET | Refills: 11 | Status: ON HOLD | OUTPATIENT
Start: 2021-08-06 | End: 2021-12-31 | Stop reason: HOSPADM

## 2021-09-24 RX ORDER — ESCITALOPRAM OXALATE 10 MG/1
10 TABLET ORAL DAILY
Qty: 30 TABLET | Refills: 11 | Status: ON HOLD | OUTPATIENT
Start: 2021-09-24 | End: 2021-12-15 | Stop reason: HOSPADM

## 2021-09-28 RX ORDER — LANOLIN ALCOHOL/MO/W.PET/CERES
100 CREAM (GRAM) TOPICAL DAILY
Qty: 30 TABLET | Refills: 11 | Status: CANCELLED | OUTPATIENT
Start: 2021-09-24 | End: 2022-09-24

## 2021-11-06 ENCOUNTER — TELEPHONE (OUTPATIENT)
Dept: HEPATOLOGY | Facility: CLINIC | Age: 46
End: 2021-11-06
Payer: MEDICAID

## 2021-11-06 DIAGNOSIS — K70.30 ALCOHOLIC CIRRHOSIS OF LIVER WITHOUT ASCITES: Primary | ICD-10-CM

## 2021-11-06 RX ORDER — FOLIC ACID 1 MG/1
TABLET ORAL
Qty: 30 TABLET | Refills: 2 | Status: SHIPPED | OUTPATIENT
Start: 2021-11-06 | End: 2022-05-13

## 2021-11-30 ENCOUNTER — TELEPHONE (OUTPATIENT)
Dept: HEPATOLOGY | Facility: CLINIC | Age: 46
End: 2021-11-30
Payer: MEDICAID

## 2021-11-30 ENCOUNTER — HOSPITAL ENCOUNTER (INPATIENT)
Facility: HOSPITAL | Age: 46
LOS: 15 days | Discharge: HOME | DRG: 432 | End: 2021-12-15
Attending: EMERGENCY MEDICINE | Admitting: INTERNAL MEDICINE
Payer: MEDICAID

## 2021-11-30 ENCOUNTER — TELEPHONE (OUTPATIENT)
Dept: GASTROENTEROLOGY | Facility: CLINIC | Age: 46
End: 2021-11-30
Payer: MEDICAID

## 2021-11-30 ENCOUNTER — HOSPITAL ENCOUNTER (OUTPATIENT)
Dept: RADIOLOGY | Facility: HOSPITAL | Age: 46
Discharge: HOME | DRG: 432 | End: 2021-11-30
Attending: NURSE PRACTITIONER
Payer: MEDICAID

## 2021-11-30 DIAGNOSIS — K76.82 HEPATIC ENCEPHALOPATHY: ICD-10-CM

## 2021-11-30 DIAGNOSIS — K70.30 ALCOHOLIC CIRRHOSIS, UNSPECIFIED WHETHER ASCITES PRESENT: ICD-10-CM

## 2021-11-30 DIAGNOSIS — K70.30 ALCOHOLIC CIRRHOSIS OF LIVER WITHOUT ASCITES: ICD-10-CM

## 2021-11-30 DIAGNOSIS — K92.2 GASTROINTESTINAL HEMORRHAGE, UNSPECIFIED GASTROINTESTINAL HEMORRHAGE TYPE: ICD-10-CM

## 2021-11-30 DIAGNOSIS — F10.20 ALCOHOL USE DISORDER, SEVERE, DEPENDENCE: ICD-10-CM

## 2021-11-30 DIAGNOSIS — E87.1 HYPONATREMIA: ICD-10-CM

## 2021-11-30 DIAGNOSIS — K72.00 ACUTE LIVER FAILURE WITHOUT HEPATIC COMA: Primary | ICD-10-CM

## 2021-11-30 DIAGNOSIS — K62.5 RECTAL BLEEDING: ICD-10-CM

## 2021-11-30 LAB
ALBUMIN SERPL BCP-MCNC: 2.2 G/DL (ref 3.5–5.2)
ALP SERPL-CCNC: 137 U/L (ref 55–135)
ALT SERPL W/O P-5'-P-CCNC: 35 U/L (ref 10–44)
AMMONIA PLAS-SCNC: 50 UMOL/L (ref 10–50)
ANION GAP SERPL CALC-SCNC: 12 MMOL/L (ref 8–16)
ANION GAP SERPL CALC-SCNC: 8 MMOL/L (ref 8–16)
ANION GAP SERPL CALC-SCNC: 9 MMOL/L (ref 8–16)
AST SERPL-CCNC: 145 U/L (ref 10–40)
BACTERIA #/AREA URNS AUTO: ABNORMAL /HPF
BASOPHILS # BLD AUTO: 0.01 K/UL (ref 0–0.2)
BASOPHILS # BLD AUTO: 0.01 K/UL (ref 0–0.2)
BASOPHILS NFR BLD: 0.1 % (ref 0–1.9)
BASOPHILS NFR BLD: 0.1 % (ref 0–1.9)
BILIRUB SERPL-MCNC: 19.1 MG/DL (ref 0.1–1)
BILIRUB UR QL STRIP: ABNORMAL
BUN SERPL-MCNC: 15 MG/DL (ref 6–20)
BUN SERPL-MCNC: 16 MG/DL (ref 6–30)
BUN SERPL-MCNC: 17 MG/DL (ref 6–20)
BUN SERPL-MCNC: 18 MG/DL (ref 6–20)
CALCIUM SERPL-MCNC: 7.6 MG/DL (ref 8.7–10.5)
CALCIUM SERPL-MCNC: 7.7 MG/DL (ref 8.7–10.5)
CALCIUM SERPL-MCNC: 7.7 MG/DL (ref 8.7–10.5)
CHLORIDE SERPL-SCNC: 79 MMOL/L (ref 95–110)
CHLORIDE SERPL-SCNC: 80 MMOL/L (ref 95–110)
CHLORIDE SERPL-SCNC: 80 MMOL/L (ref 95–110)
CHLORIDE SERPL-SCNC: 83 MMOL/L (ref 95–110)
CLARITY UR REFRACT.AUTO: ABNORMAL
CO2 SERPL-SCNC: 23 MMOL/L (ref 23–29)
CO2 SERPL-SCNC: 24 MMOL/L (ref 23–29)
CO2 SERPL-SCNC: 24 MMOL/L (ref 23–29)
COLOR UR AUTO: ABNORMAL
CREAT SERPL-MCNC: 1.3 MG/DL (ref 0.5–1.4)
CREAT SERPL-MCNC: 1.6 MG/DL (ref 0.5–1.4)
CREAT UR-MCNC: >450 MG/DL (ref 23–375)
CTP QC/QA: YES
DIFFERENTIAL METHOD: ABNORMAL
DIFFERENTIAL METHOD: ABNORMAL
EOSINOPHIL # BLD AUTO: 0 K/UL (ref 0–0.5)
EOSINOPHIL # BLD AUTO: 0 K/UL (ref 0–0.5)
EOSINOPHIL NFR BLD: 0.3 % (ref 0–8)
EOSINOPHIL NFR BLD: 0.3 % (ref 0–8)
ERYTHROCYTE [DISTWIDTH] IN BLOOD BY AUTOMATED COUNT: 18.6 % (ref 11.5–14.5)
ERYTHROCYTE [DISTWIDTH] IN BLOOD BY AUTOMATED COUNT: 18.8 % (ref 11.5–14.5)
EST. GFR  (AFRICAN AMERICAN): >60 ML/MIN/1.73 M^2
EST. GFR  (NON AFRICAN AMERICAN): >60 ML/MIN/1.73 M^2
ETHANOL SERPL-MCNC: <10 MG/DL
GLUCOSE SERPL-MCNC: 82 MG/DL (ref 70–110)
GLUCOSE SERPL-MCNC: 83 MG/DL (ref 70–110)
GLUCOSE SERPL-MCNC: 84 MG/DL (ref 70–110)
GLUCOSE SERPL-MCNC: 99 MG/DL (ref 70–110)
GLUCOSE UR QL STRIP: ABNORMAL
HCT VFR BLD AUTO: 24.5 % (ref 40–54)
HCT VFR BLD AUTO: 25.5 % (ref 40–54)
HCT VFR BLD CALC: 31 %PCV (ref 36–54)
HCV AB SERPL QL IA: NEGATIVE
HGB BLD-MCNC: 9 G/DL (ref 14–18)
HGB BLD-MCNC: 9.3 G/DL (ref 14–18)
HGB UR QL STRIP: NEGATIVE
HIV 1+2 AB+HIV1 P24 AG SERPL QL IA: NEGATIVE
HYALINE CASTS UR QL AUTO: 12 /LPF
IMM GRANULOCYTES # BLD AUTO: 0.03 K/UL (ref 0–0.04)
IMM GRANULOCYTES # BLD AUTO: 0.06 K/UL (ref 0–0.04)
IMM GRANULOCYTES NFR BLD AUTO: 0.3 % (ref 0–0.5)
IMM GRANULOCYTES NFR BLD AUTO: 0.6 % (ref 0–0.5)
INR PPP: 1.7 (ref 0.8–1.2)
KETONES UR QL STRIP: NEGATIVE
LACTATE SERPL-SCNC: 1.9 MMOL/L (ref 0.5–2.2)
LEUKOCYTE ESTERASE UR QL STRIP: NEGATIVE
LIPASE SERPL-CCNC: 65 U/L (ref 4–60)
LYMPHOCYTES # BLD AUTO: 1.1 K/UL (ref 1–4.8)
LYMPHOCYTES # BLD AUTO: 1.2 K/UL (ref 1–4.8)
LYMPHOCYTES NFR BLD: 10.9 % (ref 18–48)
LYMPHOCYTES NFR BLD: 12 % (ref 18–48)
MCH RBC QN AUTO: 35.8 PG (ref 27–31)
MCH RBC QN AUTO: 36 PG (ref 27–31)
MCHC RBC AUTO-ENTMCNC: 36.5 G/DL (ref 32–36)
MCHC RBC AUTO-ENTMCNC: 36.7 G/DL (ref 32–36)
MCV RBC AUTO: 98 FL (ref 82–98)
MCV RBC AUTO: 98 FL (ref 82–98)
MICROSCOPIC COMMENT: ABNORMAL
MONOCYTES # BLD AUTO: 1.1 K/UL (ref 0.3–1)
MONOCYTES # BLD AUTO: 1.4 K/UL (ref 0.3–1)
MONOCYTES NFR BLD: 11.7 % (ref 4–15)
MONOCYTES NFR BLD: 13.9 % (ref 4–15)
NEUTROPHILS # BLD AUTO: 7.3 K/UL (ref 1.8–7.7)
NEUTROPHILS # BLD AUTO: 7.4 K/UL (ref 1.8–7.7)
NEUTROPHILS NFR BLD: 74.2 % (ref 38–73)
NEUTROPHILS NFR BLD: 75.6 % (ref 38–73)
NITRITE UR QL STRIP: NEGATIVE
NRBC BLD-RTO: 0 /100 WBC
NRBC BLD-RTO: 0 /100 WBC
OSMOLALITY UR: 697 MOSM/KG (ref 50–1200)
PH UR STRIP: 5 [PH] (ref 5–8)
PLATELET # BLD AUTO: 125 K/UL (ref 150–450)
PLATELET # BLD AUTO: 130 K/UL (ref 150–450)
PMV BLD AUTO: 10.6 FL (ref 9.2–12.9)
PMV BLD AUTO: 10.8 FL (ref 9.2–12.9)
POC IONIZED CALCIUM: 0.96 MMOL/L (ref 1.06–1.42)
POC TCO2 (MEASURED): 23 MMOL/L (ref 23–29)
POTASSIUM BLD-SCNC: 3.8 MMOL/L (ref 3.5–5.1)
POTASSIUM SERPL-SCNC: 3.7 MMOL/L (ref 3.5–5.1)
POTASSIUM SERPL-SCNC: 4.4 MMOL/L (ref 3.5–5.1)
POTASSIUM SERPL-SCNC: 4.7 MMOL/L (ref 3.5–5.1)
PROT SERPL-MCNC: 5.5 G/DL (ref 6–8.4)
PROT UR QL STRIP: ABNORMAL
PROTHROMBIN TIME: 17.9 SEC (ref 9–12.5)
RBC # BLD AUTO: 2.5 M/UL (ref 4.6–6.2)
RBC # BLD AUTO: 2.6 M/UL (ref 4.6–6.2)
RBC #/AREA URNS AUTO: 1 /HPF (ref 0–4)
SAMPLE: ABNORMAL
SARS-COV-2 RDRP RESP QL NAA+PROBE: NEGATIVE
SODIUM BLD-SCNC: 118 MMOL/L (ref 136–145)
SODIUM SERPL-SCNC: 112 MMOL/L (ref 136–145)
SODIUM SERPL-SCNC: 112 MMOL/L (ref 136–145)
SODIUM SERPL-SCNC: 118 MMOL/L (ref 136–145)
SODIUM UR-SCNC: <10 MMOL/L (ref 20–250)
SP GR UR STRIP: 1.03 (ref 1–1.03)
SQUAMOUS #/AREA URNS AUTO: 1 /HPF
URN SPEC COLLECT METH UR: ABNORMAL
WBC # BLD AUTO: 9.74 K/UL (ref 3.9–12.7)
WBC # BLD AUTO: 9.85 K/UL (ref 3.9–12.7)
WBC #/AREA URNS AUTO: 2 /HPF (ref 0–5)

## 2021-11-30 PROCEDURE — 83690 ASSAY OF LIPASE: CPT | Performed by: EMERGENCY MEDICINE

## 2021-11-30 PROCEDURE — 99291 CRITICAL CARE FIRST HOUR: CPT | Mod: CS,,, | Performed by: EMERGENCY MEDICINE

## 2021-11-30 PROCEDURE — 20000000 HC ICU ROOM

## 2021-11-30 PROCEDURE — U0002 COVID-19 LAB TEST NON-CDC: HCPCS | Performed by: EMERGENCY MEDICINE

## 2021-11-30 PROCEDURE — 82077 ASSAY SPEC XCP UR&BREATH IA: CPT | Performed by: EMERGENCY MEDICINE

## 2021-11-30 PROCEDURE — 83605 ASSAY OF LACTIC ACID: CPT | Performed by: EMERGENCY MEDICINE

## 2021-11-30 PROCEDURE — 99291 PR CRITICAL CARE, E/M 30-74 MINUTES: ICD-10-PCS | Mod: ,,, | Performed by: INTERNAL MEDICINE

## 2021-11-30 PROCEDURE — 85610 PROTHROMBIN TIME: CPT | Performed by: EMERGENCY MEDICINE

## 2021-11-30 PROCEDURE — C9113 INJ PANTOPRAZOLE SODIUM, VIA: HCPCS | Performed by: STUDENT IN AN ORGANIZED HEALTH CARE EDUCATION/TRAINING PROGRAM

## 2021-11-30 PROCEDURE — 99291 PR CRITICAL CARE, E/M 30-74 MINUTES: ICD-10-PCS | Mod: CS,,, | Performed by: EMERGENCY MEDICINE

## 2021-11-30 PROCEDURE — 87389 HIV-1 AG W/HIV-1&-2 AB AG IA: CPT | Performed by: EMERGENCY MEDICINE

## 2021-11-30 PROCEDURE — 99291 CRITICAL CARE FIRST HOUR: CPT

## 2021-11-30 PROCEDURE — 80048 BASIC METABOLIC PNL TOTAL CA: CPT | Mod: 91 | Performed by: STUDENT IN AN ORGANIZED HEALTH CARE EDUCATION/TRAINING PROGRAM

## 2021-11-30 PROCEDURE — 85025 COMPLETE CBC W/AUTO DIFF WBC: CPT | Performed by: EMERGENCY MEDICINE

## 2021-11-30 PROCEDURE — 82140 ASSAY OF AMMONIA: CPT | Performed by: STUDENT IN AN ORGANIZED HEALTH CARE EDUCATION/TRAINING PROGRAM

## 2021-11-30 PROCEDURE — A4217 STERILE WATER/SALINE, 500 ML: HCPCS | Performed by: STUDENT IN AN ORGANIZED HEALTH CARE EDUCATION/TRAINING PROGRAM

## 2021-11-30 PROCEDURE — 76705 ECHO EXAM OF ABDOMEN: CPT | Mod: 26,,, | Performed by: RADIOLOGY

## 2021-11-30 PROCEDURE — 63600175 PHARM REV CODE 636 W HCPCS: Performed by: STUDENT IN AN ORGANIZED HEALTH CARE EDUCATION/TRAINING PROGRAM

## 2021-11-30 PROCEDURE — 25000003 PHARM REV CODE 250: Performed by: STUDENT IN AN ORGANIZED HEALTH CARE EDUCATION/TRAINING PROGRAM

## 2021-11-30 PROCEDURE — 83935 ASSAY OF URINE OSMOLALITY: CPT

## 2021-11-30 PROCEDURE — 99291 CRITICAL CARE FIRST HOUR: CPT | Mod: ,,, | Performed by: INTERNAL MEDICINE

## 2021-11-30 PROCEDURE — 80053 COMPREHEN METABOLIC PANEL: CPT | Performed by: EMERGENCY MEDICINE

## 2021-11-30 PROCEDURE — 82570 ASSAY OF URINE CREATININE: CPT

## 2021-11-30 PROCEDURE — 84300 ASSAY OF URINE SODIUM: CPT

## 2021-11-30 PROCEDURE — 76705 ECHO EXAM OF ABDOMEN: CPT | Mod: TC

## 2021-11-30 PROCEDURE — 76705 US ABDOMEN LIMITED: ICD-10-PCS | Mod: 26,,, | Performed by: RADIOLOGY

## 2021-11-30 PROCEDURE — 86803 HEPATITIS C AB TEST: CPT | Performed by: EMERGENCY MEDICINE

## 2021-11-30 PROCEDURE — 85025 COMPLETE CBC W/AUTO DIFF WBC: CPT | Mod: 91 | Performed by: STUDENT IN AN ORGANIZED HEALTH CARE EDUCATION/TRAINING PROGRAM

## 2021-11-30 PROCEDURE — 81001 URINALYSIS AUTO W/SCOPE: CPT | Performed by: EMERGENCY MEDICINE

## 2021-11-30 RX ORDER — LORAZEPAM 2 MG/ML
INJECTION INTRAMUSCULAR
Status: DISPENSED
Start: 2021-11-30 | End: 2021-12-01

## 2021-11-30 RX ORDER — SODIUM CHLORIDE 0.9 % (FLUSH) 0.9 %
10 SYRINGE (ML) INJECTION
Status: DISCONTINUED | OUTPATIENT
Start: 2021-11-30 | End: 2021-12-15 | Stop reason: HOSPADM

## 2021-11-30 RX ORDER — DIAZEPAM 5 MG/1
10 TABLET ORAL EVERY 6 HOURS
Status: COMPLETED | OUTPATIENT
Start: 2021-11-30 | End: 2021-12-01

## 2021-11-30 RX ORDER — LIDOCAINE HYDROCHLORIDE 10 MG/ML
10 INJECTION, SOLUTION EPIDURAL; INFILTRATION; INTRACAUDAL; PERINEURAL
Status: ACTIVE | OUTPATIENT
Start: 2021-11-30 | End: 2021-12-01

## 2021-11-30 RX ORDER — PANTOPRAZOLE SODIUM 40 MG/10ML
40 INJECTION, POWDER, LYOPHILIZED, FOR SOLUTION INTRAVENOUS 2 TIMES DAILY
Status: DISCONTINUED | OUTPATIENT
Start: 2021-11-30 | End: 2021-12-03

## 2021-11-30 RX ORDER — PANTOPRAZOLE SODIUM 40 MG/10ML
INJECTION, POWDER, LYOPHILIZED, FOR SOLUTION INTRAVENOUS
Status: DISPENSED
Start: 2021-11-30 | End: 2021-12-01

## 2021-11-30 RX ORDER — THIAMINE HCL 100 MG
100 TABLET ORAL DAILY
Status: DISCONTINUED | OUTPATIENT
Start: 2021-11-30 | End: 2021-12-15 | Stop reason: HOSPADM

## 2021-11-30 RX ORDER — LACTULOSE 10 G/15ML
SOLUTION ORAL
Status: COMPLETED
Start: 2021-11-30 | End: 2021-12-04

## 2021-11-30 RX ORDER — PANTOPRAZOLE SODIUM 40 MG/1
TABLET, DELAYED RELEASE ORAL
Status: DISCONTINUED
Start: 2021-11-30 | End: 2021-12-01 | Stop reason: WASHOUT

## 2021-11-30 RX ORDER — LACTULOSE 10 G/15ML
10 SOLUTION ORAL 3 TIMES DAILY
Status: DISCONTINUED | OUTPATIENT
Start: 2021-11-30 | End: 2021-12-15 | Stop reason: HOSPADM

## 2021-11-30 RX ORDER — 3% SODIUM CHLORIDE 3 G/100ML
25 INJECTION, SOLUTION INTRAVENOUS CONTINUOUS
Status: DISCONTINUED | OUTPATIENT
Start: 2021-11-30 | End: 2021-11-30 | Stop reason: SDUPTHER

## 2021-11-30 RX ORDER — CEFTRIAXONE 1 G/1
1 INJECTION, POWDER, FOR SOLUTION INTRAMUSCULAR; INTRAVENOUS
Status: DISCONTINUED | OUTPATIENT
Start: 2021-11-30 | End: 2021-12-03

## 2021-11-30 RX ORDER — LORAZEPAM 2 MG/ML
2 INJECTION INTRAMUSCULAR
Status: DISCONTINUED | OUTPATIENT
Start: 2021-11-30 | End: 2021-12-15 | Stop reason: HOSPADM

## 2021-11-30 RX ORDER — FOLIC ACID 1 MG/1
1 TABLET ORAL DAILY
Status: DISCONTINUED | OUTPATIENT
Start: 2021-11-30 | End: 2021-12-15 | Stop reason: HOSPADM

## 2021-11-30 RX ADMIN — LACTULOSE 10 G: 10 SOLUTION ORAL at 10:11

## 2021-11-30 RX ADMIN — VASOPRESSIN: 20 INJECTION, SOLUTION INTRAVENOUS at 03:11

## 2021-11-30 RX ADMIN — Medication 100 MG: at 02:11

## 2021-11-30 RX ADMIN — LACTULOSE 10 G: 10 SOLUTION ORAL at 03:11

## 2021-11-30 RX ADMIN — PANTOPRAZOLE SODIUM 40 MG: 40 INJECTION, POWDER, FOR SOLUTION INTRAVENOUS at 01:11

## 2021-11-30 RX ADMIN — PANTOPRAZOLE SODIUM 40 MG: 40 INJECTION, POWDER, FOR SOLUTION INTRAVENOUS at 11:11

## 2021-11-30 RX ADMIN — LORAZEPAM 2 MG: 2 INJECTION INTRAMUSCULAR; INTRAVENOUS at 10:11

## 2021-11-30 RX ADMIN — CEFTRIAXONE 1 G: 1 INJECTION, POWDER, FOR SOLUTION INTRAMUSCULAR; INTRAVENOUS at 02:11

## 2021-11-30 RX ADMIN — FOLIC ACID 1 MG: 1 TABLET ORAL at 02:11

## 2021-11-30 RX ADMIN — POTASSIUM BICARBONATE 50 MEQ: 978 TABLET, EFFERVESCENT ORAL at 02:11

## 2021-11-30 RX ADMIN — DIAZEPAM 10 MG: 5 TABLET ORAL at 01:11

## 2021-11-30 RX ADMIN — DIAZEPAM 10 MG: 5 TABLET ORAL at 06:11

## 2021-11-30 RX ADMIN — MULTIPLE VITAMINS W/ MINERALS TAB 1 TABLET: TAB at 02:11

## 2021-12-01 ENCOUNTER — ANESTHESIA EVENT (OUTPATIENT)
Dept: ENDOSCOPY | Facility: HOSPITAL | Age: 46
DRG: 432 | End: 2021-12-01
Payer: MEDICAID

## 2021-12-01 PROBLEM — N17.9 AKI (ACUTE KIDNEY INJURY): Status: ACTIVE | Noted: 2021-12-01

## 2021-12-01 LAB
ALBUMIN SERPL BCP-MCNC: 2.2 G/DL (ref 3.5–5.2)
ALP SERPL-CCNC: 135 U/L (ref 55–135)
ALT SERPL W/O P-5'-P-CCNC: 32 U/L (ref 10–44)
ANION GAP SERPL CALC-SCNC: 11 MMOL/L (ref 8–16)
ANION GAP SERPL CALC-SCNC: 12 MMOL/L (ref 8–16)
ANION GAP SERPL CALC-SCNC: 13 MMOL/L (ref 8–16)
ANION GAP SERPL CALC-SCNC: 13 MMOL/L (ref 8–16)
ANION GAP SERPL CALC-SCNC: 14 MMOL/L (ref 8–16)
ANION GAP SERPL CALC-SCNC: 9 MMOL/L (ref 8–16)
AST SERPL-CCNC: 143 U/L (ref 10–40)
BASOPHILS # BLD AUTO: 0.01 K/UL (ref 0–0.2)
BASOPHILS # BLD AUTO: 0.02 K/UL (ref 0–0.2)
BASOPHILS NFR BLD: 0.2 % (ref 0–1.9)
BASOPHILS NFR BLD: 0.3 % (ref 0–1.9)
BILIRUB SERPL-MCNC: 21.6 MG/DL (ref 0.1–1)
BUN SERPL-MCNC: 21 MG/DL (ref 6–20)
BUN SERPL-MCNC: 23 MG/DL (ref 6–20)
BUN SERPL-MCNC: 25 MG/DL (ref 6–20)
BUN SERPL-MCNC: 26 MG/DL (ref 6–20)
BUN SERPL-MCNC: 27 MG/DL (ref 6–20)
BUN SERPL-MCNC: 28 MG/DL (ref 6–20)
CALCIUM SERPL-MCNC: 7.2 MG/DL (ref 8.7–10.5)
CALCIUM SERPL-MCNC: 7.3 MG/DL (ref 8.7–10.5)
CALCIUM SERPL-MCNC: 7.3 MG/DL (ref 8.7–10.5)
CALCIUM SERPL-MCNC: 7.4 MG/DL (ref 8.7–10.5)
CALCIUM SERPL-MCNC: 7.5 MG/DL (ref 8.7–10.5)
CALCIUM SERPL-MCNC: 7.7 MG/DL (ref 8.7–10.5)
CHLORIDE SERPL-SCNC: 81 MMOL/L (ref 95–110)
CHLORIDE SERPL-SCNC: 81 MMOL/L (ref 95–110)
CHLORIDE SERPL-SCNC: 83 MMOL/L (ref 95–110)
CHLORIDE SERPL-SCNC: 84 MMOL/L (ref 95–110)
CHLORIDE SERPL-SCNC: 85 MMOL/L (ref 95–110)
CHLORIDE SERPL-SCNC: 85 MMOL/L (ref 95–110)
CO2 SERPL-SCNC: 21 MMOL/L (ref 23–29)
CO2 SERPL-SCNC: 22 MMOL/L (ref 23–29)
CO2 SERPL-SCNC: 23 MMOL/L (ref 23–29)
CO2 SERPL-SCNC: 23 MMOL/L (ref 23–29)
CO2 SERPL-SCNC: 24 MMOL/L (ref 23–29)
CO2 SERPL-SCNC: 24 MMOL/L (ref 23–29)
CREAT SERPL-MCNC: 1.6 MG/DL (ref 0.5–1.4)
CREAT SERPL-MCNC: 1.7 MG/DL (ref 0.5–1.4)
CREAT SERPL-MCNC: 1.8 MG/DL (ref 0.5–1.4)
CREAT SERPL-MCNC: 2 MG/DL (ref 0.5–1.4)
CREAT SERPL-MCNC: 2 MG/DL (ref 0.5–1.4)
CREAT SERPL-MCNC: 2.2 MG/DL (ref 0.5–1.4)
DIFFERENTIAL METHOD: ABNORMAL
DIFFERENTIAL METHOD: ABNORMAL
EOSINOPHIL # BLD AUTO: 0 K/UL (ref 0–0.5)
EOSINOPHIL # BLD AUTO: 0.1 K/UL (ref 0–0.5)
EOSINOPHIL NFR BLD: 0.5 % (ref 0–8)
EOSINOPHIL NFR BLD: 0.7 % (ref 0–8)
ERYTHROCYTE [DISTWIDTH] IN BLOOD BY AUTOMATED COUNT: 19.5 % (ref 11.5–14.5)
ERYTHROCYTE [DISTWIDTH] IN BLOOD BY AUTOMATED COUNT: 19.6 % (ref 11.5–14.5)
EST. GFR  (AFRICAN AMERICAN): 39.8 ML/MIN/1.73 M^2
EST. GFR  (AFRICAN AMERICAN): 44.6 ML/MIN/1.73 M^2
EST. GFR  (AFRICAN AMERICAN): 44.6 ML/MIN/1.73 M^2
EST. GFR  (AFRICAN AMERICAN): 50.7 ML/MIN/1.73 M^2
EST. GFR  (AFRICAN AMERICAN): 54.3 ML/MIN/1.73 M^2
EST. GFR  (AFRICAN AMERICAN): 58.4 ML/MIN/1.73 M^2
EST. GFR  (NON AFRICAN AMERICAN): 34.4 ML/MIN/1.73 M^2
EST. GFR  (NON AFRICAN AMERICAN): 38.6 ML/MIN/1.73 M^2
EST. GFR  (NON AFRICAN AMERICAN): 38.6 ML/MIN/1.73 M^2
EST. GFR  (NON AFRICAN AMERICAN): 43.8 ML/MIN/1.73 M^2
EST. GFR  (NON AFRICAN AMERICAN): 47 ML/MIN/1.73 M^2
EST. GFR  (NON AFRICAN AMERICAN): 50.6 ML/MIN/1.73 M^2
GLUCOSE SERPL-MCNC: 67 MG/DL (ref 70–110)
GLUCOSE SERPL-MCNC: 72 MG/DL (ref 70–110)
GLUCOSE SERPL-MCNC: 74 MG/DL (ref 70–110)
GLUCOSE SERPL-MCNC: 77 MG/DL (ref 70–110)
GLUCOSE SERPL-MCNC: 78 MG/DL (ref 70–110)
GLUCOSE SERPL-MCNC: 79 MG/DL (ref 70–110)
HCT VFR BLD AUTO: 20.7 % (ref 40–54)
HCT VFR BLD AUTO: 23.2 % (ref 40–54)
HGB BLD-MCNC: 7.7 G/DL (ref 14–18)
HGB BLD-MCNC: 8.6 G/DL (ref 14–18)
IMM GRANULOCYTES # BLD AUTO: 0.03 K/UL (ref 0–0.04)
IMM GRANULOCYTES # BLD AUTO: 0.03 K/UL (ref 0–0.04)
IMM GRANULOCYTES NFR BLD AUTO: 0.4 % (ref 0–0.5)
IMM GRANULOCYTES NFR BLD AUTO: 0.5 % (ref 0–0.5)
INR PPP: 1.7 (ref 0.8–1.2)
LYMPHOCYTES # BLD AUTO: 0.5 K/UL (ref 1–4.8)
LYMPHOCYTES # BLD AUTO: 1 K/UL (ref 1–4.8)
LYMPHOCYTES NFR BLD: 13.6 % (ref 18–48)
LYMPHOCYTES NFR BLD: 9.2 % (ref 18–48)
MAGNESIUM SERPL-MCNC: 1.4 MG/DL (ref 1.6–2.6)
MCH RBC QN AUTO: 36.7 PG (ref 27–31)
MCH RBC QN AUTO: 36.8 PG (ref 27–31)
MCHC RBC AUTO-ENTMCNC: 37.1 G/DL (ref 32–36)
MCHC RBC AUTO-ENTMCNC: 37.2 G/DL (ref 32–36)
MCV RBC AUTO: 99 FL (ref 82–98)
MCV RBC AUTO: 99 FL (ref 82–98)
MONOCYTES # BLD AUTO: 0.8 K/UL (ref 0.3–1)
MONOCYTES # BLD AUTO: 1 K/UL (ref 0.3–1)
MONOCYTES NFR BLD: 13.4 % (ref 4–15)
MONOCYTES NFR BLD: 13.5 % (ref 4–15)
NEUTROPHILS # BLD AUTO: 4.2 K/UL (ref 1.8–7.7)
NEUTROPHILS # BLD AUTO: 5.2 K/UL (ref 1.8–7.7)
NEUTROPHILS NFR BLD: 71.6 % (ref 38–73)
NEUTROPHILS NFR BLD: 76.1 % (ref 38–73)
NRBC BLD-RTO: 0 /100 WBC
NRBC BLD-RTO: 0 /100 WBC
PLATELET # BLD AUTO: 70 K/UL (ref 150–450)
PLATELET # BLD AUTO: 98 K/UL (ref 150–450)
PMV BLD AUTO: 10.4 FL (ref 9.2–12.9)
PMV BLD AUTO: 10.6 FL (ref 9.2–12.9)
POTASSIUM SERPL-SCNC: 4 MMOL/L (ref 3.5–5.1)
POTASSIUM SERPL-SCNC: 4.2 MMOL/L (ref 3.5–5.1)
POTASSIUM SERPL-SCNC: 4.4 MMOL/L (ref 3.5–5.1)
POTASSIUM SERPL-SCNC: 4.5 MMOL/L (ref 3.5–5.1)
PROT SERPL-MCNC: 5.3 G/DL (ref 6–8.4)
PROTHROMBIN TIME: 18 SEC (ref 9–12.5)
RBC # BLD AUTO: 2.1 M/UL (ref 4.6–6.2)
RBC # BLD AUTO: 2.34 M/UL (ref 4.6–6.2)
SODIUM SERPL-SCNC: 113 MMOL/L (ref 136–145)
SODIUM SERPL-SCNC: 117 MMOL/L (ref 136–145)
SODIUM SERPL-SCNC: 119 MMOL/L (ref 136–145)
SODIUM SERPL-SCNC: 119 MMOL/L (ref 136–145)
SODIUM SERPL-SCNC: 120 MMOL/L (ref 136–145)
SODIUM SERPL-SCNC: 120 MMOL/L (ref 136–145)
WBC # BLD AUTO: 5.56 K/UL (ref 3.9–12.7)
WBC # BLD AUTO: 7.26 K/UL (ref 3.9–12.7)

## 2021-12-01 PROCEDURE — 85025 COMPLETE CBC W/AUTO DIFF WBC: CPT | Performed by: STUDENT IN AN ORGANIZED HEALTH CARE EDUCATION/TRAINING PROGRAM

## 2021-12-01 PROCEDURE — 99222 1ST HOSP IP/OBS MODERATE 55: CPT | Mod: ,,, | Performed by: INTERNAL MEDICINE

## 2021-12-01 PROCEDURE — 99291 PR CRITICAL CARE, E/M 30-74 MINUTES: ICD-10-PCS | Mod: ,,, | Performed by: INTERNAL MEDICINE

## 2021-12-01 PROCEDURE — 85610 PROTHROMBIN TIME: CPT | Performed by: STUDENT IN AN ORGANIZED HEALTH CARE EDUCATION/TRAINING PROGRAM

## 2021-12-01 PROCEDURE — 25000003 PHARM REV CODE 250: Performed by: STUDENT IN AN ORGANIZED HEALTH CARE EDUCATION/TRAINING PROGRAM

## 2021-12-01 PROCEDURE — 99223 PR INITIAL HOSPITAL CARE,LEVL III: ICD-10-PCS | Mod: ,,, | Performed by: INTERNAL MEDICINE

## 2021-12-01 PROCEDURE — 99291 CRITICAL CARE FIRST HOUR: CPT | Mod: ,,, | Performed by: INTERNAL MEDICINE

## 2021-12-01 PROCEDURE — 63600175 PHARM REV CODE 636 W HCPCS: Performed by: STUDENT IN AN ORGANIZED HEALTH CARE EDUCATION/TRAINING PROGRAM

## 2021-12-01 PROCEDURE — 83735 ASSAY OF MAGNESIUM: CPT | Performed by: STUDENT IN AN ORGANIZED HEALTH CARE EDUCATION/TRAINING PROGRAM

## 2021-12-01 PROCEDURE — 80053 COMPREHEN METABOLIC PANEL: CPT | Performed by: STUDENT IN AN ORGANIZED HEALTH CARE EDUCATION/TRAINING PROGRAM

## 2021-12-01 PROCEDURE — 80048 BASIC METABOLIC PNL TOTAL CA: CPT | Performed by: STUDENT IN AN ORGANIZED HEALTH CARE EDUCATION/TRAINING PROGRAM

## 2021-12-01 PROCEDURE — 99223 1ST HOSP IP/OBS HIGH 75: CPT | Mod: ,,, | Performed by: INTERNAL MEDICINE

## 2021-12-01 PROCEDURE — A4217 STERILE WATER/SALINE, 500 ML: HCPCS | Performed by: STUDENT IN AN ORGANIZED HEALTH CARE EDUCATION/TRAINING PROGRAM

## 2021-12-01 PROCEDURE — 80048 BASIC METABOLIC PNL TOTAL CA: CPT | Mod: 91 | Performed by: STUDENT IN AN ORGANIZED HEALTH CARE EDUCATION/TRAINING PROGRAM

## 2021-12-01 PROCEDURE — 99222 PR INITIAL HOSPITAL CARE,LEVL II: ICD-10-PCS | Mod: ,,, | Performed by: INTERNAL MEDICINE

## 2021-12-01 PROCEDURE — 20000000 HC ICU ROOM

## 2021-12-01 PROCEDURE — 94761 N-INVAS EAR/PLS OXIMETRY MLT: CPT

## 2021-12-01 PROCEDURE — C9113 INJ PANTOPRAZOLE SODIUM, VIA: HCPCS | Performed by: STUDENT IN AN ORGANIZED HEALTH CARE EDUCATION/TRAINING PROGRAM

## 2021-12-01 PROCEDURE — P9045 ALBUMIN (HUMAN), 5%, 250 ML: HCPCS | Mod: JG

## 2021-12-01 PROCEDURE — 63600175 PHARM REV CODE 636 W HCPCS: Mod: JG

## 2021-12-01 RX ORDER — DIAZEPAM 5 MG/1
10 TABLET ORAL EVERY 6 HOURS
Status: DISCONTINUED | OUTPATIENT
Start: 2021-12-01 | End: 2021-12-01

## 2021-12-01 RX ORDER — LORAZEPAM 2 MG/ML
INJECTION INTRAMUSCULAR
Status: DISPENSED
Start: 2021-12-01 | End: 2021-12-01

## 2021-12-01 RX ORDER — DEXMEDETOMIDINE HYDROCHLORIDE 4 UG/ML
0-1.4 INJECTION, SOLUTION INTRAVENOUS CONTINUOUS
Status: DISCONTINUED | OUTPATIENT
Start: 2021-12-01 | End: 2021-12-03

## 2021-12-01 RX ORDER — DIAZEPAM 5 MG/1
TABLET ORAL
Status: DISPENSED
Start: 2021-12-01 | End: 2021-12-01

## 2021-12-01 RX ORDER — ALBUMIN HUMAN 50 G/1000ML
25 SOLUTION INTRAVENOUS ONCE
Status: COMPLETED | OUTPATIENT
Start: 2021-12-01 | End: 2021-12-01

## 2021-12-01 RX ORDER — DIAZEPAM 5 MG/1
5 TABLET ORAL EVERY 6 HOURS
Status: ACTIVE | OUTPATIENT
Start: 2021-12-02 | End: 2021-12-02

## 2021-12-01 RX ORDER — NOREPINEPHRINE BITARTRATE/D5W 4MG/250ML
0-.2 PLASTIC BAG, INJECTION (ML) INTRAVENOUS CONTINUOUS
Status: ACTIVE | OUTPATIENT
Start: 2021-12-01 | End: 2021-12-02

## 2021-12-01 RX ORDER — OCTREOTIDE ACETATE 100 UG/ML
50 INJECTION, SOLUTION INTRAVENOUS; SUBCUTANEOUS ONCE
Status: COMPLETED | OUTPATIENT
Start: 2021-12-01 | End: 2021-12-01

## 2021-12-01 RX ORDER — MAGNESIUM SULFATE HEPTAHYDRATE 40 MG/ML
2 INJECTION, SOLUTION INTRAVENOUS
Status: COMPLETED | OUTPATIENT
Start: 2021-12-01 | End: 2021-12-01

## 2021-12-01 RX ADMIN — MAGNESIUM SULFATE 2 G: 2 INJECTION INTRAVENOUS at 12:12

## 2021-12-01 RX ADMIN — LORAZEPAM 2 MG: 2 INJECTION INTRAMUSCULAR; INTRAVENOUS at 02:12

## 2021-12-01 RX ADMIN — Medication 0.02 MCG/KG/MIN: at 08:12

## 2021-12-01 RX ADMIN — LORAZEPAM 2 MG: 2 INJECTION INTRAMUSCULAR; INTRAVENOUS at 12:12

## 2021-12-01 RX ADMIN — LORAZEPAM 2 MG: 2 INJECTION INTRAMUSCULAR; INTRAVENOUS at 09:12

## 2021-12-01 RX ADMIN — MULTIPLE VITAMINS W/ MINERALS TAB 1 TABLET: TAB at 08:12

## 2021-12-01 RX ADMIN — LACTULOSE 10 G: 10 SOLUTION ORAL at 09:12

## 2021-12-01 RX ADMIN — PANTOPRAZOLE SODIUM 40 MG: 40 INJECTION, POWDER, FOR SOLUTION INTRAVENOUS at 10:12

## 2021-12-01 RX ADMIN — CEFTRIAXONE 1 G: 1 INJECTION, POWDER, FOR SOLUTION INTRAMUSCULAR; INTRAVENOUS at 03:12

## 2021-12-01 RX ADMIN — OCTREOTIDE ACETATE 50 MCG/HR: 500 INJECTION, SOLUTION INTRAVENOUS; SUBCUTANEOUS at 09:12

## 2021-12-01 RX ADMIN — DEXMEDETOMIDINE HYDROCHLORIDE 0.8 MCG/KG/HR: 4 INJECTION, SOLUTION INTRAVENOUS at 09:12

## 2021-12-01 RX ADMIN — LACTULOSE 10 G: 10 SOLUTION ORAL at 08:12

## 2021-12-01 RX ADMIN — DIAZEPAM 10 MG: 5 TABLET ORAL at 12:12

## 2021-12-01 RX ADMIN — DEXMEDETOMIDINE HYDROCHLORIDE 0.1 MCG/KG/HR: 4 INJECTION, SOLUTION INTRAVENOUS at 10:12

## 2021-12-01 RX ADMIN — DIAZEPAM 10 MG: 5 TABLET ORAL at 08:12

## 2021-12-01 RX ADMIN — VASOPRESSIN: 20 INJECTION, SOLUTION INTRAVENOUS at 02:12

## 2021-12-01 RX ADMIN — PANTOPRAZOLE SODIUM 40 MG: 40 INJECTION, POWDER, FOR SOLUTION INTRAVENOUS at 08:12

## 2021-12-01 RX ADMIN — Medication 100 MG: at 08:12

## 2021-12-01 RX ADMIN — OCTREOTIDE ACETATE 50 MCG: 100 INJECTION, SOLUTION INTRAVENOUS; SUBCUTANEOUS at 12:12

## 2021-12-01 RX ADMIN — ALBUMIN (HUMAN) 25 G: 12.5 SOLUTION INTRAVENOUS at 03:12

## 2021-12-01 RX ADMIN — OCTREOTIDE ACETATE 50 MCG/HR: 500 INJECTION, SOLUTION INTRAVENOUS; SUBCUTANEOUS at 12:12

## 2021-12-01 RX ADMIN — FOLIC ACID 1 MG: 1 TABLET ORAL at 08:12

## 2021-12-01 RX ADMIN — LORAZEPAM 2 MG: 2 INJECTION INTRAMUSCULAR; INTRAVENOUS at 04:12

## 2021-12-01 RX ADMIN — MAGNESIUM SULFATE 2 G: 2 INJECTION INTRAVENOUS at 03:12

## 2021-12-02 ENCOUNTER — ANESTHESIA (OUTPATIENT)
Dept: ENDOSCOPY | Facility: HOSPITAL | Age: 46
DRG: 432 | End: 2021-12-02
Payer: MEDICAID

## 2021-12-02 DIAGNOSIS — K74.60 HEPATIC CIRRHOSIS, UNSPECIFIED HEPATIC CIRRHOSIS TYPE, UNSPECIFIED WHETHER ASCITES PRESENT: Primary | ICD-10-CM

## 2021-12-02 DIAGNOSIS — I85.10 SECONDARY ESOPHAGEAL VARICES WITHOUT BLEEDING: ICD-10-CM

## 2021-12-02 DIAGNOSIS — K31.89 PORTAL HYPERTENSIVE GASTROPATHY: ICD-10-CM

## 2021-12-02 DIAGNOSIS — K92.1 HEMATOCHEZIA: ICD-10-CM

## 2021-12-02 DIAGNOSIS — I85.10 SECONDARY ESOPHAGEAL VARICES WITHOUT BLEEDING: Primary | ICD-10-CM

## 2021-12-02 DIAGNOSIS — K76.6 PORTAL HYPERTENSIVE GASTROPATHY: ICD-10-CM

## 2021-12-02 LAB
ALBUMIN SERPL BCP-MCNC: 2.2 G/DL (ref 3.5–5.2)
ALP SERPL-CCNC: 125 U/L (ref 55–135)
ALT SERPL W/O P-5'-P-CCNC: 32 U/L (ref 10–44)
ANION GAP SERPL CALC-SCNC: 10 MMOL/L (ref 8–16)
ANION GAP SERPL CALC-SCNC: 11 MMOL/L (ref 8–16)
ANION GAP SERPL CALC-SCNC: 11 MMOL/L (ref 8–16)
ANION GAP SERPL CALC-SCNC: 12 MMOL/L (ref 8–16)
ANION GAP SERPL CALC-SCNC: 13 MMOL/L (ref 8–16)
ANION GAP SERPL CALC-SCNC: 14 MMOL/L (ref 8–16)
ANION GAP SERPL CALC-SCNC: 14 MMOL/L (ref 8–16)
AST SERPL-CCNC: 143 U/L (ref 10–40)
BASOPHILS # BLD AUTO: 0.03 K/UL (ref 0–0.2)
BASOPHILS NFR BLD: 0.3 % (ref 0–1.9)
BASOPHILS NFR BLD: 0.3 % (ref 0–1.9)
BASOPHILS NFR BLD: 0.4 % (ref 0–1.9)
BILIRUB SERPL-MCNC: 23.7 MG/DL (ref 0.1–1)
BUN SERPL-MCNC: 31 MG/DL (ref 6–20)
BUN SERPL-MCNC: 31 MG/DL (ref 6–20)
BUN SERPL-MCNC: 33 MG/DL (ref 6–20)
BUN SERPL-MCNC: 33 MG/DL (ref 6–20)
BUN SERPL-MCNC: 34 MG/DL (ref 6–20)
BUN SERPL-MCNC: 34 MG/DL (ref 6–20)
BUN SERPL-MCNC: 35 MG/DL (ref 6–20)
CALCIUM SERPL-MCNC: 7.2 MG/DL (ref 8.7–10.5)
CALCIUM SERPL-MCNC: 7.3 MG/DL (ref 8.7–10.5)
CALCIUM SERPL-MCNC: 7.4 MG/DL (ref 8.7–10.5)
CALCIUM SERPL-MCNC: 7.6 MG/DL (ref 8.7–10.5)
CALCIUM SERPL-MCNC: 7.7 MG/DL (ref 8.7–10.5)
CHLORIDE SERPL-SCNC: 84 MMOL/L (ref 95–110)
CHLORIDE SERPL-SCNC: 84 MMOL/L (ref 95–110)
CHLORIDE SERPL-SCNC: 85 MMOL/L (ref 95–110)
CHLORIDE SERPL-SCNC: 86 MMOL/L (ref 95–110)
CHLORIDE SERPL-SCNC: 87 MMOL/L (ref 95–110)
CO2 SERPL-SCNC: 21 MMOL/L (ref 23–29)
CO2 SERPL-SCNC: 22 MMOL/L (ref 23–29)
CO2 SERPL-SCNC: 24 MMOL/L (ref 23–29)
CO2 SERPL-SCNC: 25 MMOL/L (ref 23–29)
CREAT SERPL-MCNC: 2.1 MG/DL (ref 0.5–1.4)
CREAT SERPL-MCNC: 2.2 MG/DL (ref 0.5–1.4)
CREAT SERPL-MCNC: 2.2 MG/DL (ref 0.5–1.4)
CREAT SERPL-MCNC: 2.3 MG/DL (ref 0.5–1.4)
CREAT SERPL-MCNC: 2.3 MG/DL (ref 0.5–1.4)
DIFFERENTIAL METHOD: ABNORMAL
EOSINOPHIL # BLD AUTO: 0.1 K/UL (ref 0–0.5)
EOSINOPHIL NFR BLD: 0.6 % (ref 0–8)
EOSINOPHIL NFR BLD: 0.8 % (ref 0–8)
EOSINOPHIL NFR BLD: 0.9 % (ref 0–8)
ERYTHROCYTE [DISTWIDTH] IN BLOOD BY AUTOMATED COUNT: 19.3 % (ref 11.5–14.5)
ERYTHROCYTE [DISTWIDTH] IN BLOOD BY AUTOMATED COUNT: 19.6 % (ref 11.5–14.5)
ERYTHROCYTE [DISTWIDTH] IN BLOOD BY AUTOMATED COUNT: 19.7 % (ref 11.5–14.5)
EST. GFR  (AFRICAN AMERICAN): 37.7 ML/MIN/1.73 M^2
EST. GFR  (AFRICAN AMERICAN): 37.7 ML/MIN/1.73 M^2
EST. GFR  (AFRICAN AMERICAN): 39.8 ML/MIN/1.73 M^2
EST. GFR  (AFRICAN AMERICAN): 39.8 ML/MIN/1.73 M^2
EST. GFR  (AFRICAN AMERICAN): 42.1 ML/MIN/1.73 M^2
EST. GFR  (NON AFRICAN AMERICAN): 32.6 ML/MIN/1.73 M^2
EST. GFR  (NON AFRICAN AMERICAN): 32.6 ML/MIN/1.73 M^2
EST. GFR  (NON AFRICAN AMERICAN): 34.4 ML/MIN/1.73 M^2
EST. GFR  (NON AFRICAN AMERICAN): 34.4 ML/MIN/1.73 M^2
EST. GFR  (NON AFRICAN AMERICAN): 36.4 ML/MIN/1.73 M^2
GLUCOSE SERPL-MCNC: 85 MG/DL (ref 70–110)
GLUCOSE SERPL-MCNC: 87 MG/DL (ref 70–110)
GLUCOSE SERPL-MCNC: 91 MG/DL (ref 70–110)
GLUCOSE SERPL-MCNC: 91 MG/DL (ref 70–110)
GLUCOSE SERPL-MCNC: 94 MG/DL (ref 70–110)
HCT VFR BLD AUTO: 22.2 % (ref 40–54)
HCT VFR BLD AUTO: 23.3 % (ref 40–54)
HCT VFR BLD AUTO: 23.8 % (ref 40–54)
HGB BLD-MCNC: 8 G/DL (ref 14–18)
HGB BLD-MCNC: 8.5 G/DL (ref 14–18)
HGB BLD-MCNC: 8.7 G/DL (ref 14–18)
IMM GRANULOCYTES # BLD AUTO: 0.05 K/UL (ref 0–0.04)
IMM GRANULOCYTES # BLD AUTO: 0.05 K/UL (ref 0–0.04)
IMM GRANULOCYTES # BLD AUTO: 0.1 K/UL (ref 0–0.04)
IMM GRANULOCYTES NFR BLD AUTO: 0.6 % (ref 0–0.5)
IMM GRANULOCYTES NFR BLD AUTO: 0.6 % (ref 0–0.5)
IMM GRANULOCYTES NFR BLD AUTO: 0.9 % (ref 0–0.5)
INR PPP: 1.8 (ref 0.8–1.2)
LYMPHOCYTES # BLD AUTO: 0.7 K/UL (ref 1–4.8)
LYMPHOCYTES # BLD AUTO: 0.7 K/UL (ref 1–4.8)
LYMPHOCYTES # BLD AUTO: 0.8 K/UL (ref 1–4.8)
LYMPHOCYTES NFR BLD: 7.7 % (ref 18–48)
LYMPHOCYTES NFR BLD: 7.8 % (ref 18–48)
LYMPHOCYTES NFR BLD: 8.2 % (ref 18–48)
MAGNESIUM SERPL-MCNC: 2.4 MG/DL (ref 1.6–2.6)
MCH RBC QN AUTO: 35.7 PG (ref 27–31)
MCH RBC QN AUTO: 36.2 PG (ref 27–31)
MCH RBC QN AUTO: 37 PG (ref 27–31)
MCHC RBC AUTO-ENTMCNC: 36 G/DL (ref 32–36)
MCHC RBC AUTO-ENTMCNC: 36.5 G/DL (ref 32–36)
MCHC RBC AUTO-ENTMCNC: 36.6 G/DL (ref 32–36)
MCV RBC AUTO: 101 FL (ref 82–98)
MCV RBC AUTO: 99 FL (ref 82–98)
MCV RBC AUTO: 99 FL (ref 82–98)
MONOCYTES # BLD AUTO: 0.9 K/UL (ref 0.3–1)
MONOCYTES # BLD AUTO: 0.9 K/UL (ref 0.3–1)
MONOCYTES # BLD AUTO: 1.2 K/UL (ref 0.3–1)
MONOCYTES NFR BLD: 10.9 % (ref 4–15)
MONOCYTES NFR BLD: 11 % (ref 4–15)
MONOCYTES NFR BLD: 9.7 % (ref 4–15)
NEUTROPHILS # BLD AUTO: 6.8 K/UL (ref 1.8–7.7)
NEUTROPHILS # BLD AUTO: 7.2 K/UL (ref 1.8–7.7)
NEUTROPHILS # BLD AUTO: 8.6 K/UL (ref 1.8–7.7)
NEUTROPHILS NFR BLD: 79.4 % (ref 38–73)
NEUTROPHILS NFR BLD: 79.5 % (ref 38–73)
NEUTROPHILS NFR BLD: 80.4 % (ref 38–73)
NRBC BLD-RTO: 0 /100 WBC
PLATELET # BLD AUTO: 130 K/UL (ref 150–450)
PLATELET # BLD AUTO: 92 K/UL (ref 150–450)
PLATELET # BLD AUTO: 94 K/UL (ref 150–450)
PMV BLD AUTO: 10.1 FL (ref 9.2–12.9)
PMV BLD AUTO: 10.6 FL (ref 9.2–12.9)
PMV BLD AUTO: 10.7 FL (ref 9.2–12.9)
POTASSIUM SERPL-SCNC: 3.9 MMOL/L (ref 3.5–5.1)
POTASSIUM SERPL-SCNC: 4 MMOL/L (ref 3.5–5.1)
POTASSIUM SERPL-SCNC: 4 MMOL/L (ref 3.5–5.1)
POTASSIUM SERPL-SCNC: 4.2 MMOL/L (ref 3.5–5.1)
POTASSIUM SERPL-SCNC: 4.4 MMOL/L (ref 3.5–5.1)
POTASSIUM SERPL-SCNC: 4.5 MMOL/L (ref 3.5–5.1)
POTASSIUM SERPL-SCNC: 4.8 MMOL/L (ref 3.5–5.1)
PROT SERPL-MCNC: 5.1 G/DL (ref 6–8.4)
PROTHROMBIN TIME: 19 SEC (ref 9–12.5)
RBC # BLD AUTO: 2.24 M/UL (ref 4.6–6.2)
RBC # BLD AUTO: 2.35 M/UL (ref 4.6–6.2)
RBC # BLD AUTO: 2.35 M/UL (ref 4.6–6.2)
SODIUM SERPL-SCNC: 119 MMOL/L (ref 136–145)
SODIUM SERPL-SCNC: 119 MMOL/L (ref 136–145)
SODIUM SERPL-SCNC: 120 MMOL/L (ref 136–145)
SODIUM SERPL-SCNC: 121 MMOL/L (ref 136–145)
SODIUM SERPL-SCNC: 122 MMOL/L (ref 136–145)
WBC # BLD AUTO: 10.77 K/UL (ref 3.9–12.7)
WBC # BLD AUTO: 8.51 K/UL (ref 3.9–12.7)
WBC # BLD AUTO: 8.9 K/UL (ref 3.9–12.7)

## 2021-12-02 PROCEDURE — 63600175 PHARM REV CODE 636 W HCPCS: Performed by: STUDENT IN AN ORGANIZED HEALTH CARE EDUCATION/TRAINING PROGRAM

## 2021-12-02 PROCEDURE — 99291 CRITICAL CARE FIRST HOUR: CPT | Mod: ,,, | Performed by: INTERNAL MEDICINE

## 2021-12-02 PROCEDURE — 85025 COMPLETE CBC W/AUTO DIFF WBC: CPT | Performed by: INTERNAL MEDICINE

## 2021-12-02 PROCEDURE — 85610 PROTHROMBIN TIME: CPT | Performed by: STUDENT IN AN ORGANIZED HEALTH CARE EDUCATION/TRAINING PROGRAM

## 2021-12-02 PROCEDURE — 83735 ASSAY OF MAGNESIUM: CPT | Performed by: STUDENT IN AN ORGANIZED HEALTH CARE EDUCATION/TRAINING PROGRAM

## 2021-12-02 PROCEDURE — 63600175 PHARM REV CODE 636 W HCPCS: Performed by: NURSE ANESTHETIST, CERTIFIED REGISTERED

## 2021-12-02 PROCEDURE — 25000003 PHARM REV CODE 250

## 2021-12-02 PROCEDURE — 99233 SBSQ HOSP IP/OBS HIGH 50: CPT | Mod: ,,, | Performed by: STUDENT IN AN ORGANIZED HEALTH CARE EDUCATION/TRAINING PROGRAM

## 2021-12-02 PROCEDURE — 80048 BASIC METABOLIC PNL TOTAL CA: CPT | Performed by: STUDENT IN AN ORGANIZED HEALTH CARE EDUCATION/TRAINING PROGRAM

## 2021-12-02 PROCEDURE — 25000003 PHARM REV CODE 250: Performed by: STUDENT IN AN ORGANIZED HEALTH CARE EDUCATION/TRAINING PROGRAM

## 2021-12-02 PROCEDURE — D9220A PRA ANESTHESIA: ICD-10-PCS | Mod: CRNA,,, | Performed by: NURSE ANESTHETIST, CERTIFIED REGISTERED

## 2021-12-02 PROCEDURE — C9113 INJ PANTOPRAZOLE SODIUM, VIA: HCPCS | Performed by: STUDENT IN AN ORGANIZED HEALTH CARE EDUCATION/TRAINING PROGRAM

## 2021-12-02 PROCEDURE — D9220A PRA ANESTHESIA: Mod: CRNA,,, | Performed by: NURSE ANESTHETIST, CERTIFIED REGISTERED

## 2021-12-02 PROCEDURE — 20000000 HC ICU ROOM

## 2021-12-02 PROCEDURE — D9220A PRA ANESTHESIA: ICD-10-PCS | Mod: ANES,,, | Performed by: ANESTHESIOLOGY

## 2021-12-02 PROCEDURE — 37000008 HC ANESTHESIA 1ST 15 MINUTES: Performed by: INTERNAL MEDICINE

## 2021-12-02 PROCEDURE — D9220A PRA ANESTHESIA: Mod: ANES,,, | Performed by: ANESTHESIOLOGY

## 2021-12-02 PROCEDURE — P9047 ALBUMIN (HUMAN), 25%, 50ML: HCPCS | Mod: JG | Performed by: STUDENT IN AN ORGANIZED HEALTH CARE EDUCATION/TRAINING PROGRAM

## 2021-12-02 PROCEDURE — 99233 PR SUBSEQUENT HOSPITAL CARE,LEVL III: ICD-10-PCS | Mod: ,,, | Performed by: STUDENT IN AN ORGANIZED HEALTH CARE EDUCATION/TRAINING PROGRAM

## 2021-12-02 PROCEDURE — 37000009 HC ANESTHESIA EA ADD 15 MINS: Performed by: INTERNAL MEDICINE

## 2021-12-02 PROCEDURE — 80053 COMPREHEN METABOLIC PANEL: CPT | Performed by: STUDENT IN AN ORGANIZED HEALTH CARE EDUCATION/TRAINING PROGRAM

## 2021-12-02 PROCEDURE — 43244 EGD VARICES LIGATION: CPT | Mod: ,,, | Performed by: INTERNAL MEDICINE

## 2021-12-02 PROCEDURE — A4217 STERILE WATER/SALINE, 500 ML: HCPCS | Performed by: STUDENT IN AN ORGANIZED HEALTH CARE EDUCATION/TRAINING PROGRAM

## 2021-12-02 PROCEDURE — 99291 PR CRITICAL CARE, E/M 30-74 MINUTES: ICD-10-PCS | Mod: ,,, | Performed by: INTERNAL MEDICINE

## 2021-12-02 PROCEDURE — 80048 BASIC METABOLIC PNL TOTAL CA: CPT | Mod: 91 | Performed by: INTERNAL MEDICINE

## 2021-12-02 PROCEDURE — 85025 COMPLETE CBC W/AUTO DIFF WBC: CPT | Mod: 91 | Performed by: STUDENT IN AN ORGANIZED HEALTH CARE EDUCATION/TRAINING PROGRAM

## 2021-12-02 PROCEDURE — 25000003 PHARM REV CODE 250: Performed by: NURSE ANESTHETIST, CERTIFIED REGISTERED

## 2021-12-02 PROCEDURE — 43244 EGD VARICES LIGATION: CPT | Performed by: INTERNAL MEDICINE

## 2021-12-02 PROCEDURE — 63600175 PHARM REV CODE 636 W HCPCS: Mod: JG | Performed by: STUDENT IN AN ORGANIZED HEALTH CARE EDUCATION/TRAINING PROGRAM

## 2021-12-02 PROCEDURE — 43244 PR EGD, FLEX, W/BAND LIGATION, ESOPH/GASTR VARICES: ICD-10-PCS | Mod: ,,, | Performed by: INTERNAL MEDICINE

## 2021-12-02 PROCEDURE — 27201022: Performed by: INTERNAL MEDICINE

## 2021-12-02 PROCEDURE — 63600175 PHARM REV CODE 636 W HCPCS: Mod: JA | Performed by: STUDENT IN AN ORGANIZED HEALTH CARE EDUCATION/TRAINING PROGRAM

## 2021-12-02 RX ORDER — PANTOPRAZOLE SODIUM 40 MG/1
40 TABLET, DELAYED RELEASE ORAL
Qty: 90 TABLET | Refills: 1 | Status: ON HOLD | OUTPATIENT
Start: 2021-12-02 | End: 2022-02-02 | Stop reason: HOSPADM

## 2021-12-02 RX ORDER — ALBUMIN HUMAN 250 G/1000ML
25 SOLUTION INTRAVENOUS EVERY 8 HOURS
Status: COMPLETED | OUTPATIENT
Start: 2021-12-02 | End: 2021-12-03

## 2021-12-02 RX ORDER — PHENYLEPHRINE HYDROCHLORIDE 10 MG/ML
INJECTION INTRAVENOUS
Status: DISCONTINUED | OUTPATIENT
Start: 2021-12-02 | End: 2021-12-02

## 2021-12-02 RX ORDER — LIDOCAINE HYDROCHLORIDE 20 MG/ML
INJECTION INTRAVENOUS
Status: DISCONTINUED | OUTPATIENT
Start: 2021-12-02 | End: 2021-12-02

## 2021-12-02 RX ORDER — MIDODRINE HYDROCHLORIDE 2.5 MG/1
2.5 TABLET ORAL 3 TIMES DAILY
Status: DISCONTINUED | OUTPATIENT
Start: 2021-12-02 | End: 2021-12-04

## 2021-12-02 RX ORDER — FENTANYL CITRATE 50 UG/ML
INJECTION, SOLUTION INTRAMUSCULAR; INTRAVENOUS
Status: DISCONTINUED | OUTPATIENT
Start: 2021-12-02 | End: 2021-12-02

## 2021-12-02 RX ORDER — PROPOFOL 10 MG/ML
VIAL (ML) INTRAVENOUS
Status: DISCONTINUED | OUTPATIENT
Start: 2021-12-02 | End: 2021-12-02

## 2021-12-02 RX ADMIN — ALBUMIN (HUMAN) 25 G: 12.5 SOLUTION INTRAVENOUS at 09:12

## 2021-12-02 RX ADMIN — SODIUM CHLORIDE: 234 INJECTION INTRAMUSCULAR; INTRAVENOUS; SUBCUTANEOUS at 02:12

## 2021-12-02 RX ADMIN — PHENYLEPHRINE HYDROCHLORIDE 100 MCG: 10 INJECTION INTRAVENOUS at 03:12

## 2021-12-02 RX ADMIN — CEFTRIAXONE 1 G: 1 INJECTION, POWDER, FOR SOLUTION INTRAMUSCULAR; INTRAVENOUS at 12:12

## 2021-12-02 RX ADMIN — PHYTONADIONE 10 MG: 10 INJECTION, EMULSION INTRAMUSCULAR; INTRAVENOUS; SUBCUTANEOUS at 01:12

## 2021-12-02 RX ADMIN — LIDOCAINE HYDROCHLORIDE 100 MG: 20 INJECTION, SOLUTION INTRAVENOUS at 03:12

## 2021-12-02 RX ADMIN — FENTANYL CITRATE 50 MCG: 50 INJECTION, SOLUTION INTRAMUSCULAR; INTRAVENOUS at 03:12

## 2021-12-02 RX ADMIN — LACTULOSE 10 G: 10 SOLUTION ORAL at 08:12

## 2021-12-02 RX ADMIN — PANTOPRAZOLE SODIUM 40 MG: 40 INJECTION, POWDER, FOR SOLUTION INTRAVENOUS at 08:12

## 2021-12-02 RX ADMIN — OCTREOTIDE ACETATE 50 MCG/HR: 500 INJECTION, SOLUTION INTRAVENOUS; SUBCUTANEOUS at 09:12

## 2021-12-02 RX ADMIN — OCTREOTIDE ACETATE 50 MCG/HR: 500 INJECTION, SOLUTION INTRAVENOUS; SUBCUTANEOUS at 06:12

## 2021-12-02 RX ADMIN — MIDODRINE HYDROCHLORIDE 2.5 MG: 2.5 TABLET ORAL at 09:12

## 2021-12-02 RX ADMIN — PROPOFOL 20 MG: 10 INJECTION, EMULSION INTRAVENOUS at 03:12

## 2021-12-02 RX ADMIN — ALBUMIN (HUMAN) 25 G: 12.5 SOLUTION INTRAVENOUS at 04:12

## 2021-12-02 RX ADMIN — PROPOFOL 150 MG: 10 INJECTION, EMULSION INTRAVENOUS at 03:12

## 2021-12-03 LAB
ALBUMIN SERPL BCP-MCNC: 2.5 G/DL (ref 3.5–5.2)
ALP SERPL-CCNC: 117 U/L (ref 55–135)
ALT SERPL W/O P-5'-P-CCNC: 37 U/L (ref 10–44)
ANION GAP SERPL CALC-SCNC: 11 MMOL/L (ref 8–16)
ANION GAP SERPL CALC-SCNC: 16 MMOL/L (ref 8–16)
AST SERPL-CCNC: 199 U/L (ref 10–40)
BASOPHILS # BLD AUTO: 0.03 K/UL (ref 0–0.2)
BASOPHILS # BLD AUTO: 0.04 K/UL (ref 0–0.2)
BASOPHILS NFR BLD: 0.6 % (ref 0–1.9)
BASOPHILS NFR BLD: 0.6 % (ref 0–1.9)
BILIRUB SERPL-MCNC: 23.1 MG/DL (ref 0.1–1)
BUN SERPL-MCNC: 33 MG/DL (ref 6–20)
BUN SERPL-MCNC: 36 MG/DL (ref 6–20)
CALCIUM SERPL-MCNC: 7.5 MG/DL (ref 8.7–10.5)
CALCIUM SERPL-MCNC: 7.7 MG/DL (ref 8.7–10.5)
CHLORIDE SERPL-SCNC: 86 MMOL/L (ref 95–110)
CHLORIDE SERPL-SCNC: 87 MMOL/L (ref 95–110)
CO2 SERPL-SCNC: 23 MMOL/L (ref 23–29)
CO2 SERPL-SCNC: 26 MMOL/L (ref 23–29)
CREAT SERPL-MCNC: 1.7 MG/DL (ref 0.5–1.4)
CREAT SERPL-MCNC: 1.9 MG/DL (ref 0.5–1.4)
DIFFERENTIAL METHOD: ABNORMAL
DIFFERENTIAL METHOD: ABNORMAL
EOSINOPHIL # BLD AUTO: 0.1 K/UL (ref 0–0.5)
EOSINOPHIL # BLD AUTO: 0.1 K/UL (ref 0–0.5)
EOSINOPHIL NFR BLD: 1.1 % (ref 0–8)
EOSINOPHIL NFR BLD: 1.2 % (ref 0–8)
ERYTHROCYTE [DISTWIDTH] IN BLOOD BY AUTOMATED COUNT: 19.9 % (ref 11.5–14.5)
ERYTHROCYTE [DISTWIDTH] IN BLOOD BY AUTOMATED COUNT: 20.7 % (ref 11.5–14.5)
EST. GFR  (AFRICAN AMERICAN): 47.5 ML/MIN/1.73 M^2
EST. GFR  (AFRICAN AMERICAN): 54.3 ML/MIN/1.73 M^2
EST. GFR  (NON AFRICAN AMERICAN): 41.1 ML/MIN/1.73 M^2
EST. GFR  (NON AFRICAN AMERICAN): 47 ML/MIN/1.73 M^2
GLUCOSE SERPL-MCNC: 80 MG/DL (ref 70–110)
GLUCOSE SERPL-MCNC: 80 MG/DL (ref 70–110)
HCT VFR BLD AUTO: 21.2 % (ref 40–54)
HCT VFR BLD AUTO: 21.9 % (ref 40–54)
HGB BLD-MCNC: 7.7 G/DL (ref 14–18)
HGB BLD-MCNC: 7.9 G/DL (ref 14–18)
IMM GRANULOCYTES # BLD AUTO: 0.05 K/UL (ref 0–0.04)
IMM GRANULOCYTES # BLD AUTO: 0.07 K/UL (ref 0–0.04)
IMM GRANULOCYTES NFR BLD AUTO: 0.8 % (ref 0–0.5)
IMM GRANULOCYTES NFR BLD AUTO: 1.4 % (ref 0–0.5)
INR PPP: 1.7 (ref 0.8–1.2)
LYMPHOCYTES # BLD AUTO: 0.7 K/UL (ref 1–4.8)
LYMPHOCYTES # BLD AUTO: 0.9 K/UL (ref 1–4.8)
LYMPHOCYTES NFR BLD: 13.5 % (ref 18–48)
LYMPHOCYTES NFR BLD: 14.4 % (ref 18–48)
MAGNESIUM SERPL-MCNC: 2.4 MG/DL (ref 1.6–2.6)
MCH RBC QN AUTO: 36.1 PG (ref 27–31)
MCH RBC QN AUTO: 36.3 PG (ref 27–31)
MCHC RBC AUTO-ENTMCNC: 36.1 G/DL (ref 32–36)
MCHC RBC AUTO-ENTMCNC: 36.3 G/DL (ref 32–36)
MCV RBC AUTO: 100 FL (ref 82–98)
MCV RBC AUTO: 100 FL (ref 82–98)
MONOCYTES # BLD AUTO: 0.8 K/UL (ref 0.3–1)
MONOCYTES # BLD AUTO: 0.9 K/UL (ref 0.3–1)
MONOCYTES NFR BLD: 14.4 % (ref 4–15)
MONOCYTES NFR BLD: 15.3 % (ref 4–15)
NEUTROPHILS # BLD AUTO: 3.5 K/UL (ref 1.8–7.7)
NEUTROPHILS # BLD AUTO: 4.3 K/UL (ref 1.8–7.7)
NEUTROPHILS NFR BLD: 68 % (ref 38–73)
NEUTROPHILS NFR BLD: 68.7 % (ref 38–73)
NRBC BLD-RTO: 0 /100 WBC
NRBC BLD-RTO: 1 /100 WBC
PHOSPHATE SERPL-MCNC: 3.7 MG/DL (ref 2.7–4.5)
PLATELET # BLD AUTO: 114 K/UL (ref 150–450)
PLATELET # BLD AUTO: 91 K/UL (ref 150–450)
PMV BLD AUTO: 10.1 FL (ref 9.2–12.9)
PMV BLD AUTO: 12.6 FL (ref 9.2–12.9)
POTASSIUM SERPL-SCNC: 3.6 MMOL/L (ref 3.5–5.1)
POTASSIUM SERPL-SCNC: 3.8 MMOL/L (ref 3.5–5.1)
PROT SERPL-MCNC: 5 G/DL (ref 6–8.4)
PROTHROMBIN TIME: 17.6 SEC (ref 9–12.5)
RBC # BLD AUTO: 2.12 M/UL (ref 4.6–6.2)
RBC # BLD AUTO: 2.19 M/UL (ref 4.6–6.2)
SODIUM SERPL-SCNC: 124 MMOL/L (ref 136–145)
SODIUM SERPL-SCNC: 125 MMOL/L (ref 136–145)
WBC # BLD AUTO: 5.11 K/UL (ref 3.9–12.7)
WBC # BLD AUTO: 6.2 K/UL (ref 3.9–12.7)

## 2021-12-03 PROCEDURE — 63600175 PHARM REV CODE 636 W HCPCS: Performed by: STUDENT IN AN ORGANIZED HEALTH CARE EDUCATION/TRAINING PROGRAM

## 2021-12-03 PROCEDURE — 99233 SBSQ HOSP IP/OBS HIGH 50: CPT | Mod: ,,, | Performed by: INTERNAL MEDICINE

## 2021-12-03 PROCEDURE — 80053 COMPREHEN METABOLIC PANEL: CPT | Performed by: STUDENT IN AN ORGANIZED HEALTH CARE EDUCATION/TRAINING PROGRAM

## 2021-12-03 PROCEDURE — 25000003 PHARM REV CODE 250

## 2021-12-03 PROCEDURE — 85610 PROTHROMBIN TIME: CPT | Performed by: STUDENT IN AN ORGANIZED HEALTH CARE EDUCATION/TRAINING PROGRAM

## 2021-12-03 PROCEDURE — 63600175 PHARM REV CODE 636 W HCPCS: Mod: JA

## 2021-12-03 PROCEDURE — P9047 ALBUMIN (HUMAN), 25%, 50ML: HCPCS | Mod: JG | Performed by: STUDENT IN AN ORGANIZED HEALTH CARE EDUCATION/TRAINING PROGRAM

## 2021-12-03 PROCEDURE — 99233 PR SUBSEQUENT HOSPITAL CARE,LEVL III: ICD-10-PCS | Mod: ,,, | Performed by: INTERNAL MEDICINE

## 2021-12-03 PROCEDURE — 99232 PR SUBSEQUENT HOSPITAL CARE,LEVL II: ICD-10-PCS | Mod: ,,, | Performed by: FAMILY MEDICINE

## 2021-12-03 PROCEDURE — 63600175 PHARM REV CODE 636 W HCPCS: Mod: JA | Performed by: STUDENT IN AN ORGANIZED HEALTH CARE EDUCATION/TRAINING PROGRAM

## 2021-12-03 PROCEDURE — 85025 COMPLETE CBC W/AUTO DIFF WBC: CPT | Performed by: NURSE PRACTITIONER

## 2021-12-03 PROCEDURE — 85025 COMPLETE CBC W/AUTO DIFF WBC: CPT | Mod: 91 | Performed by: STUDENT IN AN ORGANIZED HEALTH CARE EDUCATION/TRAINING PROGRAM

## 2021-12-03 PROCEDURE — 99232 SBSQ HOSP IP/OBS MODERATE 35: CPT | Mod: ,,, | Performed by: FAMILY MEDICINE

## 2021-12-03 PROCEDURE — 25000003 PHARM REV CODE 250: Performed by: STUDENT IN AN ORGANIZED HEALTH CARE EDUCATION/TRAINING PROGRAM

## 2021-12-03 PROCEDURE — 83735 ASSAY OF MAGNESIUM: CPT | Performed by: STUDENT IN AN ORGANIZED HEALTH CARE EDUCATION/TRAINING PROGRAM

## 2021-12-03 PROCEDURE — 84100 ASSAY OF PHOSPHORUS: CPT | Performed by: INTERNAL MEDICINE

## 2021-12-03 PROCEDURE — 20000000 HC ICU ROOM

## 2021-12-03 PROCEDURE — P9047 ALBUMIN (HUMAN), 25%, 50ML: HCPCS | Mod: JG

## 2021-12-03 PROCEDURE — C9113 INJ PANTOPRAZOLE SODIUM, VIA: HCPCS | Performed by: STUDENT IN AN ORGANIZED HEALTH CARE EDUCATION/TRAINING PROGRAM

## 2021-12-03 PROCEDURE — 80048 BASIC METABOLIC PNL TOTAL CA: CPT | Performed by: INTERNAL MEDICINE

## 2021-12-03 RX ORDER — CEFTRIAXONE 1 G/1
1 INJECTION, POWDER, FOR SOLUTION INTRAMUSCULAR; INTRAVENOUS
Status: COMPLETED | OUTPATIENT
Start: 2021-12-03 | End: 2021-12-04

## 2021-12-03 RX ORDER — ALBUMIN HUMAN 250 G/1000ML
12.5 SOLUTION INTRAVENOUS 3 TIMES DAILY
Status: COMPLETED | OUTPATIENT
Start: 2021-12-03 | End: 2021-12-04

## 2021-12-03 RX ORDER — PANTOPRAZOLE SODIUM 40 MG/10ML
40 INJECTION, POWDER, LYOPHILIZED, FOR SOLUTION INTRAVENOUS DAILY
Status: DISCONTINUED | OUTPATIENT
Start: 2021-12-04 | End: 2021-12-04

## 2021-12-03 RX ADMIN — ALBUMIN (HUMAN) 12.5 G: 5 SOLUTION INTRAVENOUS at 09:12

## 2021-12-03 RX ADMIN — ALBUMIN (HUMAN) 12.5 G: 5 SOLUTION INTRAVENOUS at 03:12

## 2021-12-03 RX ADMIN — PHYTONADIONE 10 MG: 10 INJECTION, EMULSION INTRAMUSCULAR; INTRAVENOUS; SUBCUTANEOUS at 09:12

## 2021-12-03 RX ADMIN — PANTOPRAZOLE SODIUM 40 MG: 40 INJECTION, POWDER, FOR SOLUTION INTRAVENOUS at 08:12

## 2021-12-03 RX ADMIN — LACTULOSE 10 G: 10 SOLUTION ORAL at 09:12

## 2021-12-03 RX ADMIN — CEFTRIAXONE 1 G: 1 INJECTION, POWDER, FOR SOLUTION INTRAMUSCULAR; INTRAVENOUS at 02:12

## 2021-12-03 RX ADMIN — LACTULOSE 10 G: 10 SOLUTION ORAL at 08:12

## 2021-12-03 RX ADMIN — MIDODRINE HYDROCHLORIDE 2.5 MG: 2.5 TABLET ORAL at 02:12

## 2021-12-03 RX ADMIN — Medication 100 MG: at 08:12

## 2021-12-03 RX ADMIN — OCTREOTIDE ACETATE 50 MCG/HR: 500 INJECTION, SOLUTION INTRAVENOUS; SUBCUTANEOUS at 06:12

## 2021-12-03 RX ADMIN — ALBUMIN (HUMAN) 25 G: 12.5 SOLUTION INTRAVENOUS at 06:12

## 2021-12-03 RX ADMIN — MIDODRINE HYDROCHLORIDE 2.5 MG: 2.5 TABLET ORAL at 09:12

## 2021-12-03 RX ADMIN — FOLIC ACID 1 MG: 1 TABLET ORAL at 08:12

## 2021-12-03 RX ADMIN — MULTIPLE VITAMINS W/ MINERALS TAB 1 TABLET: TAB at 08:12

## 2021-12-03 RX ADMIN — OCTREOTIDE ACETATE 50 MCG/HR: 500 INJECTION, SOLUTION INTRAVENOUS; SUBCUTANEOUS at 02:12

## 2021-12-03 RX ADMIN — LACTULOSE 10 G: 10 SOLUTION ORAL at 02:12

## 2021-12-03 RX ADMIN — LORAZEPAM 2 MG: 2 INJECTION INTRAMUSCULAR; INTRAVENOUS at 03:12

## 2021-12-04 PROBLEM — R79.1 SUPRATHERAPEUTIC INR: Status: ACTIVE | Noted: 2021-12-04

## 2021-12-04 PROBLEM — K76.82 HEPATIC ENCEPHALOPATHY: Status: RESOLVED | Noted: 2021-01-26 | Resolved: 2021-12-04

## 2021-12-04 PROBLEM — E66.09 CLASS 1 OBESITY DUE TO EXCESS CALORIES WITH SERIOUS COMORBIDITY AND BODY MASS INDEX (BMI) OF 33.0 TO 33.9 IN ADULT: Status: ACTIVE | Noted: 2021-12-04

## 2021-12-04 PROBLEM — N17.9 AKI (ACUTE KIDNEY INJURY): Status: RESOLVED | Noted: 2021-12-01 | Resolved: 2021-12-04

## 2021-12-04 PROBLEM — E66.811 CLASS 1 OBESITY DUE TO EXCESS CALORIES WITH SERIOUS COMORBIDITY AND BODY MASS INDEX (BMI) OF 33.0 TO 33.9 IN ADULT: Status: ACTIVE | Noted: 2021-12-04

## 2021-12-04 PROBLEM — F10.20 ALCOHOL USE DISORDER, SEVERE, DEPENDENCE: Status: ACTIVE | Noted: 2021-12-04

## 2021-12-04 LAB
ALBUMIN SERPL BCP-MCNC: 2.6 G/DL (ref 3.5–5.2)
ALP SERPL-CCNC: 110 U/L (ref 55–135)
ALT SERPL W/O P-5'-P-CCNC: 36 U/L (ref 10–44)
ANION GAP SERPL CALC-SCNC: 11 MMOL/L (ref 8–16)
ANION GAP SERPL CALC-SCNC: 13 MMOL/L (ref 8–16)
ANION GAP SERPL CALC-SCNC: 9 MMOL/L (ref 8–16)
AST SERPL-CCNC: 174 U/L (ref 10–40)
BILIRUB SERPL-MCNC: 23.2 MG/DL (ref 0.1–1)
BUN SERPL-MCNC: 19 MG/DL (ref 6–20)
BUN SERPL-MCNC: 22 MG/DL (ref 6–20)
BUN SERPL-MCNC: 27 MG/DL (ref 6–20)
CALCIUM SERPL-MCNC: 7.8 MG/DL (ref 8.7–10.5)
CALCIUM SERPL-MCNC: 8 MG/DL (ref 8.7–10.5)
CALCIUM SERPL-MCNC: 8.2 MG/DL (ref 8.7–10.5)
CHLORIDE SERPL-SCNC: 89 MMOL/L (ref 95–110)
CHLORIDE SERPL-SCNC: 90 MMOL/L (ref 95–110)
CHLORIDE SERPL-SCNC: 95 MMOL/L (ref 95–110)
CO2 SERPL-SCNC: 23 MMOL/L (ref 23–29)
CO2 SERPL-SCNC: 24 MMOL/L (ref 23–29)
CO2 SERPL-SCNC: 29 MMOL/L (ref 23–29)
CREAT SERPL-MCNC: 1.1 MG/DL (ref 0.5–1.4)
CREAT SERPL-MCNC: 1.2 MG/DL (ref 0.5–1.4)
CREAT SERPL-MCNC: 1.3 MG/DL (ref 0.5–1.4)
EST. GFR  (AFRICAN AMERICAN): >60 ML/MIN/1.73 M^2
EST. GFR  (NON AFRICAN AMERICAN): >60 ML/MIN/1.73 M^2
GLUCOSE SERPL-MCNC: 144 MG/DL (ref 70–110)
GLUCOSE SERPL-MCNC: 152 MG/DL (ref 70–110)
GLUCOSE SERPL-MCNC: 199 MG/DL (ref 70–110)
INR PPP: 1.6 (ref 0.8–1.2)
MAGNESIUM SERPL-MCNC: 2.6 MG/DL (ref 1.6–2.6)
PHOSPHATE SERPL-MCNC: 2.2 MG/DL (ref 2.7–4.5)
POTASSIUM SERPL-SCNC: 3.1 MMOL/L (ref 3.5–5.1)
POTASSIUM SERPL-SCNC: 3.5 MMOL/L (ref 3.5–5.1)
POTASSIUM SERPL-SCNC: 4.4 MMOL/L (ref 3.5–5.1)
PROT SERPL-MCNC: 5.1 G/DL (ref 6–8.4)
PROTHROMBIN TIME: 17.2 SEC (ref 9–12.5)
SODIUM SERPL-SCNC: 125 MMOL/L (ref 136–145)
SODIUM SERPL-SCNC: 128 MMOL/L (ref 136–145)
SODIUM SERPL-SCNC: 130 MMOL/L (ref 136–145)

## 2021-12-04 PROCEDURE — 25000003 PHARM REV CODE 250

## 2021-12-04 PROCEDURE — 25000003 PHARM REV CODE 250: Performed by: STUDENT IN AN ORGANIZED HEALTH CARE EDUCATION/TRAINING PROGRAM

## 2021-12-04 PROCEDURE — 84100 ASSAY OF PHOSPHORUS: CPT | Performed by: INTERNAL MEDICINE

## 2021-12-04 PROCEDURE — 99233 PR SUBSEQUENT HOSPITAL CARE,LEVL III: ICD-10-PCS | Mod: ,,, | Performed by: STUDENT IN AN ORGANIZED HEALTH CARE EDUCATION/TRAINING PROGRAM

## 2021-12-04 PROCEDURE — 63600175 PHARM REV CODE 636 W HCPCS

## 2021-12-04 PROCEDURE — 99223 1ST HOSP IP/OBS HIGH 75: CPT | Mod: AF,HB,, | Performed by: PSYCHIATRY & NEUROLOGY

## 2021-12-04 PROCEDURE — 63600175 PHARM REV CODE 636 W HCPCS: Performed by: STUDENT IN AN ORGANIZED HEALTH CARE EDUCATION/TRAINING PROGRAM

## 2021-12-04 PROCEDURE — 80053 COMPREHEN METABOLIC PANEL: CPT | Performed by: STUDENT IN AN ORGANIZED HEALTH CARE EDUCATION/TRAINING PROGRAM

## 2021-12-04 PROCEDURE — 99233 SBSQ HOSP IP/OBS HIGH 50: CPT | Mod: ,,, | Performed by: STUDENT IN AN ORGANIZED HEALTH CARE EDUCATION/TRAINING PROGRAM

## 2021-12-04 PROCEDURE — 83735 ASSAY OF MAGNESIUM: CPT | Performed by: STUDENT IN AN ORGANIZED HEALTH CARE EDUCATION/TRAINING PROGRAM

## 2021-12-04 PROCEDURE — C9113 INJ PANTOPRAZOLE SODIUM, VIA: HCPCS

## 2021-12-04 PROCEDURE — 63600175 PHARM REV CODE 636 W HCPCS: Mod: JA

## 2021-12-04 PROCEDURE — P9047 ALBUMIN (HUMAN), 25%, 50ML: HCPCS | Mod: JG

## 2021-12-04 PROCEDURE — 80048 BASIC METABOLIC PNL TOTAL CA: CPT | Mod: 91

## 2021-12-04 PROCEDURE — 99223 PR INITIAL HOSPITAL CARE,LEVL III: ICD-10-PCS | Mod: AF,HB,, | Performed by: PSYCHIATRY & NEUROLOGY

## 2021-12-04 PROCEDURE — 12000002 HC ACUTE/MED SURGE SEMI-PRIVATE ROOM

## 2021-12-04 PROCEDURE — 85610 PROTHROMBIN TIME: CPT | Performed by: STUDENT IN AN ORGANIZED HEALTH CARE EDUCATION/TRAINING PROGRAM

## 2021-12-04 PROCEDURE — 36415 COLL VENOUS BLD VENIPUNCTURE: CPT

## 2021-12-04 RX ORDER — POTASSIUM CHLORIDE 20 MEQ/1
40 TABLET, EXTENDED RELEASE ORAL 2 TIMES DAILY
Status: COMPLETED | OUTPATIENT
Start: 2021-12-04 | End: 2021-12-04

## 2021-12-04 RX ORDER — HYDROXYZINE PAMOATE 25 MG/1
25 CAPSULE ORAL ONCE
Status: DISCONTINUED | OUTPATIENT
Start: 2021-12-04 | End: 2021-12-07

## 2021-12-04 RX ORDER — PREDNISOLONE SODIUM PHOSPHATE 15 MG/5ML
40 SOLUTION ORAL DAILY
Status: DISCONTINUED | OUTPATIENT
Start: 2021-12-04 | End: 2021-12-09

## 2021-12-04 RX ORDER — PANTOPRAZOLE SODIUM 40 MG/1
40 TABLET, DELAYED RELEASE ORAL DAILY
Status: DISCONTINUED | OUTPATIENT
Start: 2021-12-05 | End: 2021-12-15 | Stop reason: HOSPADM

## 2021-12-04 RX ORDER — LEVETIRACETAM 500 MG/1
1000 TABLET ORAL 2 TIMES DAILY
Status: DISCONTINUED | OUTPATIENT
Start: 2021-12-04 | End: 2021-12-15 | Stop reason: HOSPADM

## 2021-12-04 RX ORDER — SODIUM,POTASSIUM PHOSPHATES 280-250MG
2 POWDER IN PACKET (EA) ORAL 2 TIMES DAILY
Status: COMPLETED | OUTPATIENT
Start: 2021-12-04 | End: 2021-12-04

## 2021-12-04 RX ADMIN — POTASSIUM CHLORIDE 40 MEQ: 1500 TABLET, EXTENDED RELEASE ORAL at 08:12

## 2021-12-04 RX ADMIN — POTASSIUM CHLORIDE 40 MEQ: 1500 TABLET, EXTENDED RELEASE ORAL at 09:12

## 2021-12-04 RX ADMIN — OCTREOTIDE ACETATE 50 MCG/HR: 500 INJECTION, SOLUTION INTRAVENOUS; SUBCUTANEOUS at 12:12

## 2021-12-04 RX ADMIN — OCTREOTIDE ACETATE 50 MCG/HR: 500 INJECTION, SOLUTION INTRAVENOUS; SUBCUTANEOUS at 01:12

## 2021-12-04 RX ADMIN — CEFTRIAXONE 1 G: 1 INJECTION, POWDER, FOR SOLUTION INTRAMUSCULAR; INTRAVENOUS at 01:12

## 2021-12-04 RX ADMIN — Medication 100 MG: at 09:12

## 2021-12-04 RX ADMIN — LORAZEPAM 2 MG: 2 INJECTION INTRAMUSCULAR; INTRAVENOUS at 04:12

## 2021-12-04 RX ADMIN — FOLIC ACID 1 MG: 1 TABLET ORAL at 09:12

## 2021-12-04 RX ADMIN — PHYTONADIONE 10 MG: 10 INJECTION, EMULSION INTRAMUSCULAR; INTRAVENOUS; SUBCUTANEOUS at 06:12

## 2021-12-04 RX ADMIN — PANTOPRAZOLE SODIUM 40 MG: 40 INJECTION, POWDER, FOR SOLUTION INTRAVENOUS at 09:12

## 2021-12-04 RX ADMIN — LEVETIRACETAM 1000 MG: 500 TABLET, FILM COATED ORAL at 08:12

## 2021-12-04 RX ADMIN — POTASSIUM & SODIUM PHOSPHATES POWDER PACK 280-160-250 MG 2 PACKET: 280-160-250 PACK at 08:12

## 2021-12-04 RX ADMIN — PHYTONADIONE 10 MG: 10 INJECTION, EMULSION INTRAMUSCULAR; INTRAVENOUS; SUBCUTANEOUS at 09:12

## 2021-12-04 RX ADMIN — POTASSIUM & SODIUM PHOSPHATES POWDER PACK 280-160-250 MG 2 PACKET: 280-160-250 PACK at 09:12

## 2021-12-04 RX ADMIN — MULTIPLE VITAMINS W/ MINERALS TAB 1 TABLET: TAB at 09:12

## 2021-12-04 RX ADMIN — LACTULOSE 10 G: 10 SOLUTION ORAL at 04:12

## 2021-12-04 RX ADMIN — LACTULOSE 10 G: 10 SOLUTION ORAL at 08:12

## 2021-12-04 RX ADMIN — ALBUMIN (HUMAN) 12.5 G: 5 SOLUTION INTRAVENOUS at 09:12

## 2021-12-04 RX ADMIN — PREDNISOLONE SODIUM PHOSPHATE 40 MG: 15 SOLUTION ORAL at 10:12

## 2021-12-04 RX ADMIN — LACTULOSE 10 G: 10 SOLUTION ORAL at 09:12

## 2021-12-04 RX ADMIN — MIDODRINE HYDROCHLORIDE 2.5 MG: 2.5 TABLET ORAL at 09:12

## 2021-12-05 PROBLEM — R53.81 DEBILITY: Status: ACTIVE | Noted: 2021-04-14

## 2021-12-05 LAB
ALBUMIN SERPL BCP-MCNC: 2.5 G/DL (ref 3.5–5.2)
ALP SERPL-CCNC: 108 U/L (ref 55–135)
ALT SERPL W/O P-5'-P-CCNC: 41 U/L (ref 10–44)
ANION GAP SERPL CALC-SCNC: 12 MMOL/L (ref 8–16)
ANION GAP SERPL CALC-SCNC: 6 MMOL/L (ref 8–16)
ANION GAP SERPL CALC-SCNC: 8 MMOL/L (ref 8–16)
AST SERPL-CCNC: 134 U/L (ref 10–40)
BILIRUB SERPL-MCNC: 23.5 MG/DL (ref 0.1–1)
BUN SERPL-MCNC: 13 MG/DL (ref 6–20)
BUN SERPL-MCNC: 14 MG/DL (ref 6–20)
BUN SERPL-MCNC: 14 MG/DL (ref 6–20)
CALCIUM SERPL-MCNC: 8.1 MG/DL (ref 8.7–10.5)
CALCIUM SERPL-MCNC: 8.2 MG/DL (ref 8.7–10.5)
CALCIUM SERPL-MCNC: 8.3 MG/DL (ref 8.7–10.5)
CHLORIDE SERPL-SCNC: 95 MMOL/L (ref 95–110)
CHLORIDE SERPL-SCNC: 95 MMOL/L (ref 95–110)
CHLORIDE SERPL-SCNC: 97 MMOL/L (ref 95–110)
CO2 SERPL-SCNC: 22 MMOL/L (ref 23–29)
CO2 SERPL-SCNC: 27 MMOL/L (ref 23–29)
CO2 SERPL-SCNC: 29 MMOL/L (ref 23–29)
CREAT SERPL-MCNC: 0.7 MG/DL (ref 0.5–1.4)
CREAT SERPL-MCNC: 0.9 MG/DL (ref 0.5–1.4)
CREAT SERPL-MCNC: 1.1 MG/DL (ref 0.5–1.4)
ERYTHROCYTE [DISTWIDTH] IN BLOOD BY AUTOMATED COUNT: 19.9 % (ref 11.5–14.5)
EST. GFR  (AFRICAN AMERICAN): >60 ML/MIN/1.73 M^2
EST. GFR  (NON AFRICAN AMERICAN): >60 ML/MIN/1.73 M^2
GLUCOSE SERPL-MCNC: 132 MG/DL (ref 70–110)
GLUCOSE SERPL-MCNC: 146 MG/DL (ref 70–110)
GLUCOSE SERPL-MCNC: 153 MG/DL (ref 70–110)
HCT VFR BLD AUTO: 23.7 % (ref 40–54)
HGB BLD-MCNC: 8.1 G/DL (ref 14–18)
INR PPP: 1.6 (ref 0.8–1.2)
MAGNESIUM SERPL-MCNC: 2.6 MG/DL (ref 1.6–2.6)
MCH RBC QN AUTO: 35.8 PG (ref 27–31)
MCHC RBC AUTO-ENTMCNC: 34.2 G/DL (ref 32–36)
MCV RBC AUTO: 105 FL (ref 82–98)
PHOSPHATE SERPL-MCNC: 1.8 MG/DL (ref 2.7–4.5)
PLATELET # BLD AUTO: 83 K/UL (ref 150–450)
PMV BLD AUTO: 9.5 FL (ref 9.2–12.9)
POTASSIUM SERPL-SCNC: 4.2 MMOL/L (ref 3.5–5.1)
POTASSIUM SERPL-SCNC: 4.3 MMOL/L (ref 3.5–5.1)
POTASSIUM SERPL-SCNC: 5.6 MMOL/L (ref 3.5–5.1)
PROT SERPL-MCNC: 5.2 G/DL (ref 6–8.4)
PROTHROMBIN TIME: 17.1 SEC (ref 9–12.5)
RBC # BLD AUTO: 2.26 M/UL (ref 4.6–6.2)
SODIUM SERPL-SCNC: 130 MMOL/L (ref 136–145)
SODIUM SERPL-SCNC: 130 MMOL/L (ref 136–145)
SODIUM SERPL-SCNC: 131 MMOL/L (ref 136–145)
WBC # BLD AUTO: 5.49 K/UL (ref 3.9–12.7)

## 2021-12-05 PROCEDURE — 99233 PR SUBSEQUENT HOSPITAL CARE,LEVL III: ICD-10-PCS | Mod: AF,HB,, | Performed by: PSYCHIATRY & NEUROLOGY

## 2021-12-05 PROCEDURE — 99232 SBSQ HOSP IP/OBS MODERATE 35: CPT | Mod: 95,,, | Performed by: INTERNAL MEDICINE

## 2021-12-05 PROCEDURE — 36415 COLL VENOUS BLD VENIPUNCTURE: CPT | Performed by: INTERNAL MEDICINE

## 2021-12-05 PROCEDURE — 80053 COMPREHEN METABOLIC PANEL: CPT | Performed by: STUDENT IN AN ORGANIZED HEALTH CARE EDUCATION/TRAINING PROGRAM

## 2021-12-05 PROCEDURE — 25000003 PHARM REV CODE 250: Performed by: STUDENT IN AN ORGANIZED HEALTH CARE EDUCATION/TRAINING PROGRAM

## 2021-12-05 PROCEDURE — 84100 ASSAY OF PHOSPHORUS: CPT | Performed by: INTERNAL MEDICINE

## 2021-12-05 PROCEDURE — 99232 PR SUBSEQUENT HOSPITAL CARE,LEVL II: ICD-10-PCS | Mod: 95,,, | Performed by: INTERNAL MEDICINE

## 2021-12-05 PROCEDURE — 36415 COLL VENOUS BLD VENIPUNCTURE: CPT | Performed by: STUDENT IN AN ORGANIZED HEALTH CARE EDUCATION/TRAINING PROGRAM

## 2021-12-05 PROCEDURE — 80048 BASIC METABOLIC PNL TOTAL CA: CPT

## 2021-12-05 PROCEDURE — 85027 COMPLETE CBC AUTOMATED: CPT | Performed by: STUDENT IN AN ORGANIZED HEALTH CARE EDUCATION/TRAINING PROGRAM

## 2021-12-05 PROCEDURE — 36415 COLL VENOUS BLD VENIPUNCTURE: CPT

## 2021-12-05 PROCEDURE — 99233 SBSQ HOSP IP/OBS HIGH 50: CPT | Mod: AF,HB,, | Performed by: PSYCHIATRY & NEUROLOGY

## 2021-12-05 PROCEDURE — 83735 ASSAY OF MAGNESIUM: CPT | Performed by: STUDENT IN AN ORGANIZED HEALTH CARE EDUCATION/TRAINING PROGRAM

## 2021-12-05 PROCEDURE — 25000003 PHARM REV CODE 250: Performed by: INTERNAL MEDICINE

## 2021-12-05 PROCEDURE — 12000002 HC ACUTE/MED SURGE SEMI-PRIVATE ROOM

## 2021-12-05 PROCEDURE — 63600175 PHARM REV CODE 636 W HCPCS: Performed by: STUDENT IN AN ORGANIZED HEALTH CARE EDUCATION/TRAINING PROGRAM

## 2021-12-05 PROCEDURE — 85610 PROTHROMBIN TIME: CPT | Performed by: STUDENT IN AN ORGANIZED HEALTH CARE EDUCATION/TRAINING PROGRAM

## 2021-12-05 PROCEDURE — 80048 BASIC METABOLIC PNL TOTAL CA: CPT | Mod: 91 | Performed by: INTERNAL MEDICINE

## 2021-12-05 RX ORDER — SODIUM,POTASSIUM PHOSPHATES 280-250MG
2 POWDER IN PACKET (EA) ORAL
Status: DISPENSED | OUTPATIENT
Start: 2021-12-05 | End: 2021-12-06

## 2021-12-05 RX ADMIN — LACTULOSE 10 G: 10 SOLUTION ORAL at 03:12

## 2021-12-05 RX ADMIN — PREDNISOLONE SODIUM PHOSPHATE 40 MG: 15 SOLUTION ORAL at 09:12

## 2021-12-05 RX ADMIN — LEVETIRACETAM 1000 MG: 500 TABLET, FILM COATED ORAL at 09:12

## 2021-12-05 RX ADMIN — MULTIPLE VITAMINS W/ MINERALS TAB 1 TABLET: TAB at 09:12

## 2021-12-05 RX ADMIN — LEVETIRACETAM 1000 MG: 500 TABLET, FILM COATED ORAL at 08:12

## 2021-12-05 RX ADMIN — LACTULOSE 10 G: 10 SOLUTION ORAL at 09:12

## 2021-12-05 RX ADMIN — POTASSIUM & SODIUM PHOSPHATES POWDER PACK 280-160-250 MG 2 PACKET: 280-160-250 PACK at 03:12

## 2021-12-05 RX ADMIN — FOLIC ACID 1 MG: 1 TABLET ORAL at 09:12

## 2021-12-05 RX ADMIN — LACTULOSE 10 G: 10 SOLUTION ORAL at 08:12

## 2021-12-05 RX ADMIN — PANTOPRAZOLE SODIUM 40 MG: 40 TABLET, DELAYED RELEASE ORAL at 09:12

## 2021-12-05 RX ADMIN — POTASSIUM & SODIUM PHOSPHATES POWDER PACK 280-160-250 MG 2 PACKET: 280-160-250 PACK at 08:12

## 2021-12-05 RX ADMIN — PHYTONADIONE 10 MG: 10 INJECTION, EMULSION INTRAMUSCULAR; INTRAVENOUS; SUBCUTANEOUS at 10:12

## 2021-12-05 RX ADMIN — Medication 100 MG: at 09:12

## 2021-12-06 LAB
ALBUMIN SERPL BCP-MCNC: 2.5 G/DL (ref 3.5–5.2)
ALP SERPL-CCNC: 113 U/L (ref 55–135)
ALT SERPL W/O P-5'-P-CCNC: 44 U/L (ref 10–44)
ANION GAP SERPL CALC-SCNC: 10 MMOL/L (ref 8–16)
ANION GAP SERPL CALC-SCNC: 7 MMOL/L (ref 8–16)
AST SERPL-CCNC: 120 U/L (ref 10–40)
BILIRUB SERPL-MCNC: 23.3 MG/DL (ref 0.1–1)
BUN SERPL-MCNC: 11 MG/DL (ref 6–20)
BUN SERPL-MCNC: 11 MG/DL (ref 6–20)
CALCIUM SERPL-MCNC: 8.5 MG/DL (ref 8.7–10.5)
CALCIUM SERPL-MCNC: 8.7 MG/DL (ref 8.7–10.5)
CHLORIDE SERPL-SCNC: 98 MMOL/L (ref 95–110)
CHLORIDE SERPL-SCNC: 98 MMOL/L (ref 95–110)
CO2 SERPL-SCNC: 26 MMOL/L (ref 23–29)
CO2 SERPL-SCNC: 29 MMOL/L (ref 23–29)
CREAT SERPL-MCNC: 0.8 MG/DL (ref 0.5–1.4)
CREAT SERPL-MCNC: 0.9 MG/DL (ref 0.5–1.4)
ERYTHROCYTE [DISTWIDTH] IN BLOOD BY AUTOMATED COUNT: 20.4 % (ref 11.5–14.5)
EST. GFR  (AFRICAN AMERICAN): >60 ML/MIN/1.73 M^2
EST. GFR  (AFRICAN AMERICAN): >60 ML/MIN/1.73 M^2
EST. GFR  (NON AFRICAN AMERICAN): >60 ML/MIN/1.73 M^2
EST. GFR  (NON AFRICAN AMERICAN): >60 ML/MIN/1.73 M^2
GLUCOSE SERPL-MCNC: 121 MG/DL (ref 70–110)
GLUCOSE SERPL-MCNC: 127 MG/DL (ref 70–110)
HCT VFR BLD AUTO: 24.6 % (ref 40–54)
HGB BLD-MCNC: 8.4 G/DL (ref 14–18)
INR PPP: 1.5 (ref 0.8–1.2)
MAGNESIUM SERPL-MCNC: 2.4 MG/DL (ref 1.6–2.6)
MCH RBC QN AUTO: 35.9 PG (ref 27–31)
MCHC RBC AUTO-ENTMCNC: 34.1 G/DL (ref 32–36)
MCV RBC AUTO: 105 FL (ref 82–98)
PHOSPHATE SERPL-MCNC: 2.4 MG/DL (ref 2.7–4.5)
PLATELET # BLD AUTO: 77 K/UL (ref 150–450)
PMV BLD AUTO: 9.4 FL (ref 9.2–12.9)
POCT GLUCOSE: 119 MG/DL (ref 70–110)
POCT GLUCOSE: 142 MG/DL (ref 70–110)
POTASSIUM SERPL-SCNC: 4.5 MMOL/L (ref 3.5–5.1)
POTASSIUM SERPL-SCNC: 4.9 MMOL/L (ref 3.5–5.1)
PROT SERPL-MCNC: 5.4 G/DL (ref 6–8.4)
PROTHROMBIN TIME: 16.5 SEC (ref 9–12.5)
RBC # BLD AUTO: 2.34 M/UL (ref 4.6–6.2)
SODIUM SERPL-SCNC: 134 MMOL/L (ref 136–145)
SODIUM SERPL-SCNC: 134 MMOL/L (ref 136–145)
WBC # BLD AUTO: 6.07 K/UL (ref 3.9–12.7)

## 2021-12-06 PROCEDURE — 97161 PT EVAL LOW COMPLEX 20 MIN: CPT

## 2021-12-06 PROCEDURE — 97530 THERAPEUTIC ACTIVITIES: CPT

## 2021-12-06 PROCEDURE — 84100 ASSAY OF PHOSPHORUS: CPT | Performed by: INTERNAL MEDICINE

## 2021-12-06 PROCEDURE — 36415 COLL VENOUS BLD VENIPUNCTURE: CPT | Performed by: INTERNAL MEDICINE

## 2021-12-06 PROCEDURE — 12000002 HC ACUTE/MED SURGE SEMI-PRIVATE ROOM

## 2021-12-06 PROCEDURE — 99232 PR SUBSEQUENT HOSPITAL CARE,LEVL II: ICD-10-PCS | Mod: 95,,, | Performed by: INTERNAL MEDICINE

## 2021-12-06 PROCEDURE — 97165 OT EVAL LOW COMPLEX 30 MIN: CPT

## 2021-12-06 PROCEDURE — 25000003 PHARM REV CODE 250: Performed by: INTERNAL MEDICINE

## 2021-12-06 PROCEDURE — 83735 ASSAY OF MAGNESIUM: CPT | Performed by: INTERNAL MEDICINE

## 2021-12-06 PROCEDURE — 85610 PROTHROMBIN TIME: CPT | Performed by: INTERNAL MEDICINE

## 2021-12-06 PROCEDURE — 99232 SBSQ HOSP IP/OBS MODERATE 35: CPT | Mod: 95,,, | Performed by: INTERNAL MEDICINE

## 2021-12-06 PROCEDURE — 80321 ALCOHOLS BIOMARKERS 1OR 2: CPT | Performed by: INTERNAL MEDICINE

## 2021-12-06 PROCEDURE — 25000003 PHARM REV CODE 250: Performed by: STUDENT IN AN ORGANIZED HEALTH CARE EDUCATION/TRAINING PROGRAM

## 2021-12-06 PROCEDURE — 99232 SBSQ HOSP IP/OBS MODERATE 35: CPT | Mod: AF,HB,, | Performed by: PSYCHIATRY & NEUROLOGY

## 2021-12-06 PROCEDURE — 99232 PR SUBSEQUENT HOSPITAL CARE,LEVL II: ICD-10-PCS | Mod: AF,HB,, | Performed by: PSYCHIATRY & NEUROLOGY

## 2021-12-06 PROCEDURE — 80048 BASIC METABOLIC PNL TOTAL CA: CPT | Performed by: INTERNAL MEDICINE

## 2021-12-06 PROCEDURE — 63600175 PHARM REV CODE 636 W HCPCS: Performed by: STUDENT IN AN ORGANIZED HEALTH CARE EDUCATION/TRAINING PROGRAM

## 2021-12-06 PROCEDURE — 80053 COMPREHEN METABOLIC PANEL: CPT | Performed by: INTERNAL MEDICINE

## 2021-12-06 PROCEDURE — 85027 COMPLETE CBC AUTOMATED: CPT | Performed by: INTERNAL MEDICINE

## 2021-12-06 RX ORDER — GLUCAGON 1 MG
1 KIT INJECTION
Status: DISCONTINUED | OUTPATIENT
Start: 2021-12-06 | End: 2021-12-15 | Stop reason: HOSPADM

## 2021-12-06 RX ORDER — INSULIN ASPART 100 [IU]/ML
0-5 INJECTION, SOLUTION INTRAVENOUS; SUBCUTANEOUS
Status: DISCONTINUED | OUTPATIENT
Start: 2021-12-06 | End: 2021-12-15 | Stop reason: HOSPADM

## 2021-12-06 RX ORDER — IBUPROFEN 200 MG
16 TABLET ORAL
Status: DISCONTINUED | OUTPATIENT
Start: 2021-12-06 | End: 2021-12-15 | Stop reason: HOSPADM

## 2021-12-06 RX ORDER — IBUPROFEN 200 MG
24 TABLET ORAL
Status: DISCONTINUED | OUTPATIENT
Start: 2021-12-06 | End: 2021-12-15 | Stop reason: HOSPADM

## 2021-12-06 RX ADMIN — POTASSIUM & SODIUM PHOSPHATES POWDER PACK 280-160-250 MG 2 PACKET: 280-160-250 PACK at 11:12

## 2021-12-06 RX ADMIN — LACTULOSE 10 G: 10 SOLUTION ORAL at 09:12

## 2021-12-06 RX ADMIN — LEVETIRACETAM 1000 MG: 500 TABLET, FILM COATED ORAL at 09:12

## 2021-12-06 RX ADMIN — FOLIC ACID 1 MG: 1 TABLET ORAL at 09:12

## 2021-12-06 RX ADMIN — MULTIPLE VITAMINS W/ MINERALS TAB 1 TABLET: TAB at 09:12

## 2021-12-06 RX ADMIN — PANTOPRAZOLE SODIUM 40 MG: 40 TABLET, DELAYED RELEASE ORAL at 09:12

## 2021-12-06 RX ADMIN — Medication 100 MG: at 09:12

## 2021-12-06 RX ADMIN — PREDNISOLONE SODIUM PHOSPHATE 40 MG: 15 SOLUTION ORAL at 09:12

## 2021-12-06 RX ADMIN — LACTULOSE 10 G: 10 SOLUTION ORAL at 03:12

## 2021-12-07 LAB
ALBUMIN SERPL BCP-MCNC: 2.6 G/DL (ref 3.5–5.2)
ALP SERPL-CCNC: 118 U/L (ref 55–135)
ALT SERPL W/O P-5'-P-CCNC: 51 U/L (ref 10–44)
ANION GAP SERPL CALC-SCNC: 8 MMOL/L (ref 8–16)
AST SERPL-CCNC: 118 U/L (ref 10–40)
BILIRUB SERPL-MCNC: 24.8 MG/DL (ref 0.1–1)
BUN SERPL-MCNC: 13 MG/DL (ref 6–20)
CALCIUM SERPL-MCNC: 8.5 MG/DL (ref 8.7–10.5)
CHLORIDE SERPL-SCNC: 97 MMOL/L (ref 95–110)
CO2 SERPL-SCNC: 28 MMOL/L (ref 23–29)
CREAT SERPL-MCNC: 0.9 MG/DL (ref 0.5–1.4)
ERYTHROCYTE [DISTWIDTH] IN BLOOD BY AUTOMATED COUNT: 19.9 % (ref 11.5–14.5)
EST. GFR  (AFRICAN AMERICAN): >60 ML/MIN/1.73 M^2
EST. GFR  (NON AFRICAN AMERICAN): >60 ML/MIN/1.73 M^2
ESTIMATED AVG GLUCOSE: 68 MG/DL (ref 68–131)
GLUCOSE SERPL-MCNC: 85 MG/DL (ref 70–110)
HBA1C MFR BLD: 4 % (ref 4–5.6)
HCT VFR BLD AUTO: 25.3 % (ref 40–54)
HGB BLD-MCNC: 8.6 G/DL (ref 14–18)
INR PPP: 1.6 (ref 0.8–1.2)
MCH RBC QN AUTO: 35.2 PG (ref 27–31)
MCHC RBC AUTO-ENTMCNC: 34 G/DL (ref 32–36)
MCV RBC AUTO: 104 FL (ref 82–98)
PHOSPHATE SERPL-MCNC: 3.2 MG/DL (ref 2.7–4.5)
PLATELET # BLD AUTO: 90 K/UL (ref 150–450)
PMV BLD AUTO: 9.4 FL (ref 9.2–12.9)
POCT GLUCOSE: 115 MG/DL (ref 70–110)
POCT GLUCOSE: 142 MG/DL (ref 70–110)
POCT GLUCOSE: 90 MG/DL (ref 70–110)
POTASSIUM SERPL-SCNC: 4.2 MMOL/L (ref 3.5–5.1)
PROT SERPL-MCNC: 5.7 G/DL (ref 6–8.4)
PROTHROMBIN TIME: 16.6 SEC (ref 9–12.5)
RBC # BLD AUTO: 2.44 M/UL (ref 4.6–6.2)
SODIUM SERPL-SCNC: 133 MMOL/L (ref 136–145)
WBC # BLD AUTO: 7.94 K/UL (ref 3.9–12.7)

## 2021-12-07 PROCEDURE — 99232 SBSQ HOSP IP/OBS MODERATE 35: CPT | Mod: 95,,, | Performed by: INTERNAL MEDICINE

## 2021-12-07 PROCEDURE — 25000003 PHARM REV CODE 250: Performed by: STUDENT IN AN ORGANIZED HEALTH CARE EDUCATION/TRAINING PROGRAM

## 2021-12-07 PROCEDURE — 63600175 PHARM REV CODE 636 W HCPCS: Performed by: STUDENT IN AN ORGANIZED HEALTH CARE EDUCATION/TRAINING PROGRAM

## 2021-12-07 PROCEDURE — 99232 PR SUBSEQUENT HOSPITAL CARE,LEVL II: ICD-10-PCS | Mod: ,,, | Performed by: INTERNAL MEDICINE

## 2021-12-07 PROCEDURE — 12000002 HC ACUTE/MED SURGE SEMI-PRIVATE ROOM

## 2021-12-07 PROCEDURE — 36415 COLL VENOUS BLD VENIPUNCTURE: CPT | Performed by: INTERNAL MEDICINE

## 2021-12-07 PROCEDURE — 80053 COMPREHEN METABOLIC PANEL: CPT | Performed by: INTERNAL MEDICINE

## 2021-12-07 PROCEDURE — 85027 COMPLETE CBC AUTOMATED: CPT | Performed by: INTERNAL MEDICINE

## 2021-12-07 PROCEDURE — 85610 PROTHROMBIN TIME: CPT | Performed by: INTERNAL MEDICINE

## 2021-12-07 PROCEDURE — 84100 ASSAY OF PHOSPHORUS: CPT | Performed by: INTERNAL MEDICINE

## 2021-12-07 PROCEDURE — 99232 SBSQ HOSP IP/OBS MODERATE 35: CPT | Mod: ,,, | Performed by: INTERNAL MEDICINE

## 2021-12-07 PROCEDURE — 99232 PR SUBSEQUENT HOSPITAL CARE,LEVL II: ICD-10-PCS | Mod: 95,,, | Performed by: INTERNAL MEDICINE

## 2021-12-07 PROCEDURE — 83036 HEMOGLOBIN GLYCOSYLATED A1C: CPT | Performed by: INTERNAL MEDICINE

## 2021-12-07 RX ADMIN — LACTULOSE 10 G: 10 SOLUTION ORAL at 08:12

## 2021-12-07 RX ADMIN — LEVETIRACETAM 1000 MG: 500 TABLET, FILM COATED ORAL at 09:12

## 2021-12-07 RX ADMIN — LORAZEPAM 2 MG: 2 INJECTION INTRAMUSCULAR; INTRAVENOUS at 04:12

## 2021-12-07 RX ADMIN — FOLIC ACID 1 MG: 1 TABLET ORAL at 09:12

## 2021-12-07 RX ADMIN — LACTULOSE 10 G: 10 SOLUTION ORAL at 02:12

## 2021-12-07 RX ADMIN — LEVETIRACETAM 1000 MG: 500 TABLET, FILM COATED ORAL at 08:12

## 2021-12-07 RX ADMIN — Medication 100 MG: at 09:12

## 2021-12-07 RX ADMIN — LACTULOSE 10 G: 10 SOLUTION ORAL at 09:12

## 2021-12-07 RX ADMIN — PANTOPRAZOLE SODIUM 40 MG: 40 TABLET, DELAYED RELEASE ORAL at 09:12

## 2021-12-07 RX ADMIN — MULTIPLE VITAMINS W/ MINERALS TAB 1 TABLET: TAB at 09:12

## 2021-12-07 RX ADMIN — PREDNISOLONE SODIUM PHOSPHATE 40 MG: 15 SOLUTION ORAL at 09:12

## 2021-12-08 LAB
ALBUMIN SERPL BCP-MCNC: 2.3 G/DL (ref 3.5–5.2)
ALP SERPL-CCNC: 109 U/L (ref 55–135)
ALT SERPL W/O P-5'-P-CCNC: 46 U/L (ref 10–44)
AMMONIA PLAS-SCNC: 55 UMOL/L (ref 10–50)
ANION GAP SERPL CALC-SCNC: 8 MMOL/L (ref 8–16)
AST SERPL-CCNC: 105 U/L (ref 10–40)
BILIRUB SERPL-MCNC: 22.7 MG/DL (ref 0.1–1)
BNP SERPL-MCNC: 64 PG/ML (ref 0–99)
BUN SERPL-MCNC: 13 MG/DL (ref 6–20)
CALCIUM SERPL-MCNC: 8.2 MG/DL (ref 8.7–10.5)
CHLORIDE SERPL-SCNC: 94 MMOL/L (ref 95–110)
CO2 SERPL-SCNC: 26 MMOL/L (ref 23–29)
CREAT SERPL-MCNC: 0.9 MG/DL (ref 0.5–1.4)
ERYTHROCYTE [DISTWIDTH] IN BLOOD BY AUTOMATED COUNT: 19.3 % (ref 11.5–14.5)
EST. GFR  (AFRICAN AMERICAN): >60 ML/MIN/1.73 M^2
EST. GFR  (NON AFRICAN AMERICAN): >60 ML/MIN/1.73 M^2
GLUCOSE SERPL-MCNC: 100 MG/DL (ref 70–110)
HCT VFR BLD AUTO: 25.4 % (ref 40–54)
HGB BLD-MCNC: 8.6 G/DL (ref 14–18)
INR PPP: 1.5 (ref 0.8–1.2)
MCH RBC QN AUTO: 35.4 PG (ref 27–31)
MCHC RBC AUTO-ENTMCNC: 33.9 G/DL (ref 32–36)
MCV RBC AUTO: 105 FL (ref 82–98)
PHOSPHATE SERPL-MCNC: 3.5 MG/DL (ref 2.7–4.5)
PLATELET # BLD AUTO: 91 K/UL (ref 150–450)
PMV BLD AUTO: 10 FL (ref 9.2–12.9)
POCT GLUCOSE: 104 MG/DL (ref 70–110)
POCT GLUCOSE: 117 MG/DL (ref 70–110)
POCT GLUCOSE: 143 MG/DL (ref 70–110)
POCT GLUCOSE: 159 MG/DL (ref 70–110)
POTASSIUM SERPL-SCNC: 3.8 MMOL/L (ref 3.5–5.1)
PROT SERPL-MCNC: 5.2 G/DL (ref 6–8.4)
PROTHROMBIN TIME: 15.7 SEC (ref 9–12.5)
RBC # BLD AUTO: 2.43 M/UL (ref 4.6–6.2)
SODIUM SERPL-SCNC: 128 MMOL/L (ref 136–145)
WBC # BLD AUTO: 7.73 K/UL (ref 3.9–12.7)

## 2021-12-08 PROCEDURE — 63600175 PHARM REV CODE 636 W HCPCS: Performed by: STUDENT IN AN ORGANIZED HEALTH CARE EDUCATION/TRAINING PROGRAM

## 2021-12-08 PROCEDURE — 25000003 PHARM REV CODE 250: Performed by: STUDENT IN AN ORGANIZED HEALTH CARE EDUCATION/TRAINING PROGRAM

## 2021-12-08 PROCEDURE — 36415 COLL VENOUS BLD VENIPUNCTURE: CPT | Performed by: INTERNAL MEDICINE

## 2021-12-08 PROCEDURE — 12000002 HC ACUTE/MED SURGE SEMI-PRIVATE ROOM

## 2021-12-08 PROCEDURE — 82140 ASSAY OF AMMONIA: CPT | Performed by: INTERNAL MEDICINE

## 2021-12-08 PROCEDURE — 85027 COMPLETE CBC AUTOMATED: CPT | Performed by: INTERNAL MEDICINE

## 2021-12-08 PROCEDURE — 99232 SBSQ HOSP IP/OBS MODERATE 35: CPT | Mod: 95,,, | Performed by: INTERNAL MEDICINE

## 2021-12-08 PROCEDURE — 80053 COMPREHEN METABOLIC PANEL: CPT | Performed by: INTERNAL MEDICINE

## 2021-12-08 PROCEDURE — 99232 PR SUBSEQUENT HOSPITAL CARE,LEVL II: ICD-10-PCS | Mod: 95,,, | Performed by: INTERNAL MEDICINE

## 2021-12-08 PROCEDURE — 84100 ASSAY OF PHOSPHORUS: CPT | Performed by: INTERNAL MEDICINE

## 2021-12-08 PROCEDURE — 85610 PROTHROMBIN TIME: CPT | Performed by: INTERNAL MEDICINE

## 2021-12-08 PROCEDURE — 83880 ASSAY OF NATRIURETIC PEPTIDE: CPT | Performed by: INTERNAL MEDICINE

## 2021-12-08 RX ADMIN — PANTOPRAZOLE SODIUM 40 MG: 40 TABLET, DELAYED RELEASE ORAL at 09:12

## 2021-12-08 RX ADMIN — LACTULOSE 10 G: 10 SOLUTION ORAL at 09:12

## 2021-12-08 RX ADMIN — FOLIC ACID 1 MG: 1 TABLET ORAL at 09:12

## 2021-12-08 RX ADMIN — LEVETIRACETAM 1000 MG: 500 TABLET, FILM COATED ORAL at 08:12

## 2021-12-08 RX ADMIN — PREDNISOLONE SODIUM PHOSPHATE 40 MG: 15 SOLUTION ORAL at 09:12

## 2021-12-08 RX ADMIN — LEVETIRACETAM 1000 MG: 500 TABLET, FILM COATED ORAL at 09:12

## 2021-12-08 RX ADMIN — MULTIPLE VITAMINS W/ MINERALS TAB 1 TABLET: TAB at 09:12

## 2021-12-08 RX ADMIN — Medication 100 MG: at 09:12

## 2021-12-08 RX ADMIN — LACTULOSE 10 G: 10 SOLUTION ORAL at 08:12

## 2021-12-08 RX ADMIN — LORAZEPAM 2 MG: 2 INJECTION INTRAMUSCULAR; INTRAVENOUS at 02:12

## 2021-12-09 LAB
ALBUMIN SERPL BCP-MCNC: 2.3 G/DL (ref 3.5–5.2)
ALP SERPL-CCNC: 107 U/L (ref 55–135)
ALT SERPL W/O P-5'-P-CCNC: 51 U/L (ref 10–44)
ANION GAP SERPL CALC-SCNC: 10 MMOL/L (ref 8–16)
AST SERPL-CCNC: 102 U/L (ref 10–40)
BILIRUB SERPL-MCNC: 22.6 MG/DL (ref 0.1–1)
BUN SERPL-MCNC: 14 MG/DL (ref 6–20)
CALCIUM SERPL-MCNC: 8.1 MG/DL (ref 8.7–10.5)
CHLORIDE SERPL-SCNC: 96 MMOL/L (ref 95–110)
CO2 SERPL-SCNC: 24 MMOL/L (ref 23–29)
CREAT SERPL-MCNC: 0.8 MG/DL (ref 0.5–1.4)
ERYTHROCYTE [DISTWIDTH] IN BLOOD BY AUTOMATED COUNT: 18.5 % (ref 11.5–14.5)
EST. GFR  (AFRICAN AMERICAN): >60 ML/MIN/1.73 M^2
EST. GFR  (NON AFRICAN AMERICAN): >60 ML/MIN/1.73 M^2
GLUCOSE SERPL-MCNC: 87 MG/DL (ref 70–110)
HCT VFR BLD AUTO: 25.8 % (ref 40–54)
HGB BLD-MCNC: 8.9 G/DL (ref 14–18)
INR PPP: 1.5 (ref 0.8–1.2)
MCH RBC QN AUTO: 35 PG (ref 27–31)
MCHC RBC AUTO-ENTMCNC: 34.5 G/DL (ref 32–36)
MCV RBC AUTO: 102 FL (ref 82–98)
PHOSPHATE SERPL-MCNC: 3.6 MG/DL (ref 2.7–4.5)
PLATELET # BLD AUTO: 79 K/UL (ref 150–450)
PMV BLD AUTO: 9.7 FL (ref 9.2–12.9)
POCT GLUCOSE: 109 MG/DL (ref 70–110)
POCT GLUCOSE: 113 MG/DL (ref 70–110)
POCT GLUCOSE: 141 MG/DL (ref 70–110)
POCT GLUCOSE: 172 MG/DL (ref 70–110)
POTASSIUM SERPL-SCNC: 3.7 MMOL/L (ref 3.5–5.1)
PROT SERPL-MCNC: 5.4 G/DL (ref 6–8.4)
PROTHROMBIN TIME: 15.6 SEC (ref 9–12.5)
RBC # BLD AUTO: 2.54 M/UL (ref 4.6–6.2)
SODIUM SERPL-SCNC: 130 MMOL/L (ref 136–145)
WBC # BLD AUTO: 10.35 K/UL (ref 3.9–12.7)

## 2021-12-09 PROCEDURE — 85027 COMPLETE CBC AUTOMATED: CPT | Performed by: INTERNAL MEDICINE

## 2021-12-09 PROCEDURE — 84100 ASSAY OF PHOSPHORUS: CPT | Performed by: INTERNAL MEDICINE

## 2021-12-09 PROCEDURE — 99233 PR SUBSEQUENT HOSPITAL CARE,LEVL III: ICD-10-PCS | Mod: 95,,, | Performed by: INTERNAL MEDICINE

## 2021-12-09 PROCEDURE — 82272 OCCULT BLD FECES 1-3 TESTS: CPT | Performed by: INTERNAL MEDICINE

## 2021-12-09 PROCEDURE — 85610 PROTHROMBIN TIME: CPT | Performed by: INTERNAL MEDICINE

## 2021-12-09 PROCEDURE — 99233 SBSQ HOSP IP/OBS HIGH 50: CPT | Mod: 95,,, | Performed by: INTERNAL MEDICINE

## 2021-12-09 PROCEDURE — 63600175 PHARM REV CODE 636 W HCPCS: Performed by: INTERNAL MEDICINE

## 2021-12-09 PROCEDURE — 80053 COMPREHEN METABOLIC PANEL: CPT | Performed by: INTERNAL MEDICINE

## 2021-12-09 PROCEDURE — 25000003 PHARM REV CODE 250: Performed by: STUDENT IN AN ORGANIZED HEALTH CARE EDUCATION/TRAINING PROGRAM

## 2021-12-09 PROCEDURE — 12000002 HC ACUTE/MED SURGE SEMI-PRIVATE ROOM

## 2021-12-09 PROCEDURE — 63600175 PHARM REV CODE 636 W HCPCS: Performed by: STUDENT IN AN ORGANIZED HEALTH CARE EDUCATION/TRAINING PROGRAM

## 2021-12-09 PROCEDURE — 25000003 PHARM REV CODE 250: Performed by: INTERNAL MEDICINE

## 2021-12-09 PROCEDURE — 36415 COLL VENOUS BLD VENIPUNCTURE: CPT | Performed by: INTERNAL MEDICINE

## 2021-12-09 RX ORDER — HYDROXYZINE HYDROCHLORIDE 25 MG/1
25 TABLET, FILM COATED ORAL 3 TIMES DAILY PRN
Status: DISCONTINUED | OUTPATIENT
Start: 2021-12-09 | End: 2021-12-15 | Stop reason: HOSPADM

## 2021-12-09 RX ORDER — TALC
6 POWDER (GRAM) TOPICAL NIGHTLY PRN
Status: DISCONTINUED | OUTPATIENT
Start: 2021-12-09 | End: 2021-12-15 | Stop reason: HOSPADM

## 2021-12-09 RX ADMIN — PHYTONADIONE 10 MG: 10 INJECTION, EMULSION INTRAMUSCULAR; INTRAVENOUS; SUBCUTANEOUS at 10:12

## 2021-12-09 RX ADMIN — LACTULOSE 10 G: 10 SOLUTION ORAL at 09:12

## 2021-12-09 RX ADMIN — LEVETIRACETAM 1000 MG: 500 TABLET, FILM COATED ORAL at 10:12

## 2021-12-09 RX ADMIN — LEVETIRACETAM 1000 MG: 500 TABLET, FILM COATED ORAL at 09:12

## 2021-12-09 RX ADMIN — Medication 6 MG: at 10:12

## 2021-12-09 RX ADMIN — PREDNISOLONE SODIUM PHOSPHATE 40 MG: 15 SOLUTION ORAL at 09:12

## 2021-12-09 RX ADMIN — HYDROXYZINE HYDROCHLORIDE 25 MG: 25 TABLET ORAL at 09:12

## 2021-12-09 RX ADMIN — Medication 100 MG: at 09:12

## 2021-12-09 RX ADMIN — PANTOPRAZOLE SODIUM 40 MG: 40 TABLET, DELAYED RELEASE ORAL at 09:12

## 2021-12-09 RX ADMIN — MULTIPLE VITAMINS W/ MINERALS TAB 1 TABLET: TAB at 09:12

## 2021-12-09 RX ADMIN — LACTULOSE 10 G: 10 SOLUTION ORAL at 10:12

## 2021-12-09 RX ADMIN — FOLIC ACID 1 MG: 1 TABLET ORAL at 09:12

## 2021-12-09 RX ADMIN — LACTULOSE 10 G: 10 SOLUTION ORAL at 03:12

## 2021-12-10 LAB
ALBUMIN SERPL BCP-MCNC: 2.3 G/DL (ref 3.5–5.2)
ALP SERPL-CCNC: 112 U/L (ref 55–135)
ALT SERPL W/O P-5'-P-CCNC: 62 U/L (ref 10–44)
ANION GAP SERPL CALC-SCNC: 12 MMOL/L (ref 8–16)
AST SERPL-CCNC: 117 U/L (ref 10–40)
BILIRUB SERPL-MCNC: 23 MG/DL (ref 0.1–1)
BUN SERPL-MCNC: 16 MG/DL (ref 6–20)
CALCIUM SERPL-MCNC: 8.3 MG/DL (ref 8.7–10.5)
CHLORIDE SERPL-SCNC: 96 MMOL/L (ref 95–110)
CO2 SERPL-SCNC: 23 MMOL/L (ref 23–29)
CREAT SERPL-MCNC: 0.9 MG/DL (ref 0.5–1.4)
ERYTHROCYTE [DISTWIDTH] IN BLOOD BY AUTOMATED COUNT: 18.6 % (ref 11.5–14.5)
EST. GFR  (AFRICAN AMERICAN): >60 ML/MIN/1.73 M^2
EST. GFR  (NON AFRICAN AMERICAN): >60 ML/MIN/1.73 M^2
GLUCOSE SERPL-MCNC: 89 MG/DL (ref 70–110)
HCT VFR BLD AUTO: 26.6 % (ref 40–54)
HGB BLD-MCNC: 8.9 G/DL (ref 14–18)
INR PPP: 1.4 (ref 0.8–1.2)
MCH RBC QN AUTO: 35.2 PG (ref 27–31)
MCHC RBC AUTO-ENTMCNC: 33.5 G/DL (ref 32–36)
MCV RBC AUTO: 105 FL (ref 82–98)
OB PNL STL: POSITIVE
PETH 16:0/18.1 (POPETH): 518 NG/ML
PETH 16:0/18.2 (PLPETH): 248 NG/ML
PHOSPHATE SERPL-MCNC: 3.3 MG/DL (ref 2.7–4.5)
PLATELET # BLD AUTO: 92 K/UL (ref 150–450)
PMV BLD AUTO: 10.1 FL (ref 9.2–12.9)
POCT GLUCOSE: 127 MG/DL (ref 70–110)
POCT GLUCOSE: 128 MG/DL (ref 70–110)
POCT GLUCOSE: 136 MG/DL (ref 70–110)
POCT GLUCOSE: 98 MG/DL (ref 70–110)
POTASSIUM SERPL-SCNC: 3.4 MMOL/L (ref 3.5–5.1)
PROT SERPL-MCNC: 5.6 G/DL (ref 6–8.4)
PROTHROMBIN TIME: 15.1 SEC (ref 9–12.5)
RBC # BLD AUTO: 2.53 M/UL (ref 4.6–6.2)
SODIUM SERPL-SCNC: 131 MMOL/L (ref 136–145)
WBC # BLD AUTO: 12.92 K/UL (ref 3.9–12.7)

## 2021-12-10 PROCEDURE — 63600175 PHARM REV CODE 636 W HCPCS: Performed by: INTERNAL MEDICINE

## 2021-12-10 PROCEDURE — 36415 COLL VENOUS BLD VENIPUNCTURE: CPT | Performed by: INTERNAL MEDICINE

## 2021-12-10 PROCEDURE — 84100 ASSAY OF PHOSPHORUS: CPT | Performed by: INTERNAL MEDICINE

## 2021-12-10 PROCEDURE — 25000003 PHARM REV CODE 250: Performed by: STUDENT IN AN ORGANIZED HEALTH CARE EDUCATION/TRAINING PROGRAM

## 2021-12-10 PROCEDURE — 85610 PROTHROMBIN TIME: CPT | Performed by: INTERNAL MEDICINE

## 2021-12-10 PROCEDURE — 12000002 HC ACUTE/MED SURGE SEMI-PRIVATE ROOM

## 2021-12-10 PROCEDURE — 80053 COMPREHEN METABOLIC PANEL: CPT | Performed by: INTERNAL MEDICINE

## 2021-12-10 PROCEDURE — 99233 PR SUBSEQUENT HOSPITAL CARE,LEVL III: ICD-10-PCS | Mod: 95,,, | Performed by: INTERNAL MEDICINE

## 2021-12-10 PROCEDURE — 85027 COMPLETE CBC AUTOMATED: CPT | Performed by: INTERNAL MEDICINE

## 2021-12-10 PROCEDURE — 99233 SBSQ HOSP IP/OBS HIGH 50: CPT | Mod: 95,,, | Performed by: INTERNAL MEDICINE

## 2021-12-10 PROCEDURE — 25000003 PHARM REV CODE 250: Performed by: INTERNAL MEDICINE

## 2021-12-10 RX ADMIN — MULTIPLE VITAMINS W/ MINERALS TAB 1 TABLET: TAB at 08:12

## 2021-12-10 RX ADMIN — Medication 6 MG: at 09:12

## 2021-12-10 RX ADMIN — LACTULOSE 10 G: 10 SOLUTION ORAL at 09:12

## 2021-12-10 RX ADMIN — PHYTONADIONE 10 MG: 10 INJECTION, EMULSION INTRAMUSCULAR; INTRAVENOUS; SUBCUTANEOUS at 08:12

## 2021-12-10 RX ADMIN — Medication 100 MG: at 08:12

## 2021-12-10 RX ADMIN — LACTULOSE 10 G: 10 SOLUTION ORAL at 02:12

## 2021-12-10 RX ADMIN — LEVETIRACETAM 1000 MG: 500 TABLET, FILM COATED ORAL at 08:12

## 2021-12-10 RX ADMIN — POTASSIUM BICARBONATE 50 MEQ: 978 TABLET, EFFERVESCENT ORAL at 08:12

## 2021-12-10 RX ADMIN — PANTOPRAZOLE SODIUM 40 MG: 40 TABLET, DELAYED RELEASE ORAL at 08:12

## 2021-12-10 RX ADMIN — FOLIC ACID 1 MG: 1 TABLET ORAL at 08:12

## 2021-12-10 RX ADMIN — LEVETIRACETAM 1000 MG: 500 TABLET, FILM COATED ORAL at 09:12

## 2021-12-10 RX ADMIN — PHYTONADIONE 10 MG: 10 INJECTION, EMULSION INTRAMUSCULAR; INTRAVENOUS; SUBCUTANEOUS at 09:12

## 2021-12-10 RX ADMIN — LACTULOSE 10 G: 10 SOLUTION ORAL at 08:12

## 2021-12-11 LAB
ALBUMIN SERPL BCP-MCNC: 2.3 G/DL (ref 3.5–5.2)
ALP SERPL-CCNC: 110 U/L (ref 55–135)
ALT SERPL W/O P-5'-P-CCNC: 69 U/L (ref 10–44)
ANION GAP SERPL CALC-SCNC: 13 MMOL/L (ref 8–16)
AST SERPL-CCNC: 164 U/L (ref 10–40)
BILIRUB SERPL-MCNC: 23.7 MG/DL (ref 0.1–1)
BUN SERPL-MCNC: 17 MG/DL (ref 6–20)
CALCIUM SERPL-MCNC: 8.3 MG/DL (ref 8.7–10.5)
CHLORIDE SERPL-SCNC: 95 MMOL/L (ref 95–110)
CO2 SERPL-SCNC: 24 MMOL/L (ref 23–29)
CREAT SERPL-MCNC: 0.7 MG/DL (ref 0.5–1.4)
ERYTHROCYTE [DISTWIDTH] IN BLOOD BY AUTOMATED COUNT: 19.2 % (ref 11.5–14.5)
EST. GFR  (AFRICAN AMERICAN): >60 ML/MIN/1.73 M^2
EST. GFR  (NON AFRICAN AMERICAN): >60 ML/MIN/1.73 M^2
GLUCOSE SERPL-MCNC: 71 MG/DL (ref 70–110)
HCT VFR BLD AUTO: 26.4 % (ref 40–54)
HGB BLD-MCNC: 8.8 G/DL (ref 14–18)
INR PPP: 1.4 (ref 0.8–1.2)
MCH RBC QN AUTO: 35.2 PG (ref 27–31)
MCHC RBC AUTO-ENTMCNC: 33.3 G/DL (ref 32–36)
MCV RBC AUTO: 106 FL (ref 82–98)
PHOSPHATE SERPL-MCNC: 3.6 MG/DL (ref 2.7–4.5)
PLATELET # BLD AUTO: 93 K/UL (ref 150–450)
PMV BLD AUTO: 11.3 FL (ref 9.2–12.9)
POCT GLUCOSE: 104 MG/DL (ref 70–110)
POCT GLUCOSE: 105 MG/DL (ref 70–110)
POCT GLUCOSE: 140 MG/DL (ref 70–110)
POCT GLUCOSE: 166 MG/DL (ref 70–110)
POTASSIUM SERPL-SCNC: 4.5 MMOL/L (ref 3.5–5.1)
PROT SERPL-MCNC: 5.7 G/DL (ref 6–8.4)
PROTHROMBIN TIME: 15.2 SEC (ref 9–12.5)
RBC # BLD AUTO: 2.5 M/UL (ref 4.6–6.2)
SODIUM SERPL-SCNC: 132 MMOL/L (ref 136–145)
WBC # BLD AUTO: 15.54 K/UL (ref 3.9–12.7)

## 2021-12-11 PROCEDURE — 63600175 PHARM REV CODE 636 W HCPCS: Performed by: INTERNAL MEDICINE

## 2021-12-11 PROCEDURE — 99233 SBSQ HOSP IP/OBS HIGH 50: CPT | Mod: 95,,, | Performed by: INTERNAL MEDICINE

## 2021-12-11 PROCEDURE — 84100 ASSAY OF PHOSPHORUS: CPT | Performed by: INTERNAL MEDICINE

## 2021-12-11 PROCEDURE — 36415 COLL VENOUS BLD VENIPUNCTURE: CPT | Performed by: INTERNAL MEDICINE

## 2021-12-11 PROCEDURE — 25000003 PHARM REV CODE 250: Performed by: INTERNAL MEDICINE

## 2021-12-11 PROCEDURE — 12000002 HC ACUTE/MED SURGE SEMI-PRIVATE ROOM

## 2021-12-11 PROCEDURE — 25000003 PHARM REV CODE 250: Performed by: STUDENT IN AN ORGANIZED HEALTH CARE EDUCATION/TRAINING PROGRAM

## 2021-12-11 PROCEDURE — 85027 COMPLETE CBC AUTOMATED: CPT | Performed by: INTERNAL MEDICINE

## 2021-12-11 PROCEDURE — 99233 PR SUBSEQUENT HOSPITAL CARE,LEVL III: ICD-10-PCS | Mod: 95,,, | Performed by: INTERNAL MEDICINE

## 2021-12-11 PROCEDURE — 85610 PROTHROMBIN TIME: CPT | Performed by: INTERNAL MEDICINE

## 2021-12-11 PROCEDURE — 80053 COMPREHEN METABOLIC PANEL: CPT | Performed by: INTERNAL MEDICINE

## 2021-12-11 RX ORDER — ALBUMIN HUMAN 250 G/1000ML
12.5 SOLUTION INTRAVENOUS
Status: DISCONTINUED | OUTPATIENT
Start: 2021-12-11 | End: 2021-12-15 | Stop reason: HOSPADM

## 2021-12-11 RX ADMIN — Medication 100 MG: at 08:12

## 2021-12-11 RX ADMIN — PHYTONADIONE 10 MG: 10 INJECTION, EMULSION INTRAMUSCULAR; INTRAVENOUS; SUBCUTANEOUS at 10:12

## 2021-12-11 RX ADMIN — PANTOPRAZOLE SODIUM 40 MG: 40 TABLET, DELAYED RELEASE ORAL at 08:12

## 2021-12-11 RX ADMIN — LACTULOSE 10 G: 10 SOLUTION ORAL at 02:12

## 2021-12-11 RX ADMIN — LACTULOSE 10 G: 10 SOLUTION ORAL at 09:12

## 2021-12-11 RX ADMIN — LEVETIRACETAM 1000 MG: 500 TABLET, FILM COATED ORAL at 09:12

## 2021-12-11 RX ADMIN — LACTULOSE 10 G: 10 SOLUTION ORAL at 08:12

## 2021-12-11 RX ADMIN — MULTIPLE VITAMINS W/ MINERALS TAB 1 TABLET: TAB at 08:12

## 2021-12-11 RX ADMIN — FOLIC ACID 1 MG: 1 TABLET ORAL at 08:12

## 2021-12-11 RX ADMIN — Medication 6 MG: at 09:12

## 2021-12-11 RX ADMIN — LEVETIRACETAM 1000 MG: 500 TABLET, FILM COATED ORAL at 08:12

## 2021-12-12 LAB
ALBUMIN SERPL BCP-MCNC: 2.2 G/DL (ref 3.5–5.2)
ALP SERPL-CCNC: 114 U/L (ref 55–135)
ALT SERPL W/O P-5'-P-CCNC: 63 U/L (ref 10–44)
ANION GAP SERPL CALC-SCNC: 10 MMOL/L (ref 8–16)
AST SERPL-CCNC: 117 U/L (ref 10–40)
BILIRUB SERPL-MCNC: 25.1 MG/DL (ref 0.1–1)
BUN SERPL-MCNC: 14 MG/DL (ref 6–20)
CALCIUM SERPL-MCNC: 7.9 MG/DL (ref 8.7–10.5)
CHLORIDE SERPL-SCNC: 94 MMOL/L (ref 95–110)
CO2 SERPL-SCNC: 23 MMOL/L (ref 23–29)
CREAT SERPL-MCNC: 0.8 MG/DL (ref 0.5–1.4)
ERYTHROCYTE [DISTWIDTH] IN BLOOD BY AUTOMATED COUNT: 18.6 % (ref 11.5–14.5)
EST. GFR  (AFRICAN AMERICAN): >60 ML/MIN/1.73 M^2
EST. GFR  (NON AFRICAN AMERICAN): >60 ML/MIN/1.73 M^2
GLUCOSE SERPL-MCNC: 105 MG/DL (ref 70–110)
HCT VFR BLD AUTO: 25.3 % (ref 40–54)
HGB BLD-MCNC: 8.5 G/DL (ref 14–18)
INR PPP: 1.4 (ref 0.8–1.2)
MCH RBC QN AUTO: 35.4 PG (ref 27–31)
MCHC RBC AUTO-ENTMCNC: 33.6 G/DL (ref 32–36)
MCV RBC AUTO: 105 FL (ref 82–98)
PHOSPHATE SERPL-MCNC: 2.8 MG/DL (ref 2.7–4.5)
PLATELET # BLD AUTO: 93 K/UL (ref 150–450)
PMV BLD AUTO: 10.5 FL (ref 9.2–12.9)
POCT GLUCOSE: 112 MG/DL (ref 70–110)
POCT GLUCOSE: 114 MG/DL (ref 70–110)
POCT GLUCOSE: 126 MG/DL (ref 70–110)
POCT GLUCOSE: 155 MG/DL (ref 70–110)
POTASSIUM SERPL-SCNC: 3.4 MMOL/L (ref 3.5–5.1)
PROT SERPL-MCNC: 5.5 G/DL (ref 6–8.4)
PROTHROMBIN TIME: 15 SEC (ref 9–12.5)
RBC # BLD AUTO: 2.4 M/UL (ref 4.6–6.2)
SODIUM SERPL-SCNC: 127 MMOL/L (ref 136–145)
WBC # BLD AUTO: 12.86 K/UL (ref 3.9–12.7)

## 2021-12-12 PROCEDURE — 85027 COMPLETE CBC AUTOMATED: CPT | Performed by: INTERNAL MEDICINE

## 2021-12-12 PROCEDURE — 84100 ASSAY OF PHOSPHORUS: CPT | Performed by: INTERNAL MEDICINE

## 2021-12-12 PROCEDURE — 99233 SBSQ HOSP IP/OBS HIGH 50: CPT | Mod: 95,,, | Performed by: INTERNAL MEDICINE

## 2021-12-12 PROCEDURE — 12000002 HC ACUTE/MED SURGE SEMI-PRIVATE ROOM

## 2021-12-12 PROCEDURE — 36415 COLL VENOUS BLD VENIPUNCTURE: CPT | Performed by: INTERNAL MEDICINE

## 2021-12-12 PROCEDURE — 63600175 PHARM REV CODE 636 W HCPCS: Performed by: INTERNAL MEDICINE

## 2021-12-12 PROCEDURE — 25000003 PHARM REV CODE 250: Performed by: INTERNAL MEDICINE

## 2021-12-12 PROCEDURE — 99233 PR SUBSEQUENT HOSPITAL CARE,LEVL III: ICD-10-PCS | Mod: 95,,, | Performed by: INTERNAL MEDICINE

## 2021-12-12 PROCEDURE — 25000003 PHARM REV CODE 250: Performed by: STUDENT IN AN ORGANIZED HEALTH CARE EDUCATION/TRAINING PROGRAM

## 2021-12-12 PROCEDURE — 80053 COMPREHEN METABOLIC PANEL: CPT | Performed by: INTERNAL MEDICINE

## 2021-12-12 PROCEDURE — 85610 PROTHROMBIN TIME: CPT | Performed by: INTERNAL MEDICINE

## 2021-12-12 RX ORDER — FUROSEMIDE 20 MG/1
20 TABLET ORAL DAILY
Status: DISCONTINUED | OUTPATIENT
Start: 2021-12-12 | End: 2021-12-14

## 2021-12-12 RX ORDER — PROPRANOLOL HYDROCHLORIDE 10 MG/1
10 TABLET ORAL 2 TIMES DAILY
Status: DISCONTINUED | OUTPATIENT
Start: 2021-12-12 | End: 2021-12-15 | Stop reason: HOSPADM

## 2021-12-12 RX ORDER — SPIRONOLACTONE 25 MG/1
50 TABLET ORAL DAILY
Status: DISCONTINUED | OUTPATIENT
Start: 2021-12-12 | End: 2021-12-14

## 2021-12-12 RX ORDER — ACETAMINOPHEN AND CODEINE PHOSPHATE 300; 30 MG/1; MG/1
1 TABLET ORAL EVERY 6 HOURS PRN
Status: ON HOLD | COMMUNITY
Start: 2021-11-19 | End: 2021-12-12

## 2021-12-12 RX ORDER — TRAZODONE HYDROCHLORIDE 100 MG/1
100 TABLET ORAL NIGHTLY
Status: ON HOLD | COMMUNITY
Start: 2021-08-05 | End: 2021-12-15 | Stop reason: HOSPADM

## 2021-12-12 RX ORDER — DICLOFENAC SODIUM 75 MG/1
75 TABLET, DELAYED RELEASE ORAL 2 TIMES DAILY PRN
Status: ON HOLD | COMMUNITY
Start: 2021-10-01 | End: 2021-12-12

## 2021-12-12 RX ADMIN — LACTULOSE 10 G: 10 SOLUTION ORAL at 09:12

## 2021-12-12 RX ADMIN — Medication 6 MG: at 09:12

## 2021-12-12 RX ADMIN — FOLIC ACID 1 MG: 1 TABLET ORAL at 09:12

## 2021-12-12 RX ADMIN — LEVETIRACETAM 1000 MG: 500 TABLET, FILM COATED ORAL at 09:12

## 2021-12-12 RX ADMIN — MULTIPLE VITAMINS W/ MINERALS TAB 1 TABLET: TAB at 09:12

## 2021-12-12 RX ADMIN — Medication 100 MG: at 09:12

## 2021-12-12 RX ADMIN — PHYTONADIONE 10 MG: 10 INJECTION, EMULSION INTRAMUSCULAR; INTRAVENOUS; SUBCUTANEOUS at 09:12

## 2021-12-12 RX ADMIN — PROPRANOLOL HYDROCHLORIDE 10 MG: 10 TABLET ORAL at 09:12

## 2021-12-12 RX ADMIN — PHYTONADIONE 10 MG: 10 INJECTION, EMULSION INTRAMUSCULAR; INTRAVENOUS; SUBCUTANEOUS at 10:12

## 2021-12-12 RX ADMIN — PANTOPRAZOLE SODIUM 40 MG: 40 TABLET, DELAYED RELEASE ORAL at 09:12

## 2021-12-13 ENCOUNTER — TELEPHONE (OUTPATIENT)
Dept: ENDOSCOPY | Facility: HOSPITAL | Age: 46
End: 2021-12-13
Payer: MEDICAID

## 2021-12-13 DIAGNOSIS — I85.00 ESOPHAGEAL VARICES WITHOUT BLEEDING, UNSPECIFIED ESOPHAGEAL VARICES TYPE: Primary | ICD-10-CM

## 2021-12-13 LAB
ALBUMIN SERPL BCP-MCNC: 2 G/DL (ref 3.5–5.2)
ALP SERPL-CCNC: 120 U/L (ref 55–135)
ALT SERPL W/O P-5'-P-CCNC: 59 U/L (ref 10–44)
ANION GAP SERPL CALC-SCNC: 12 MMOL/L (ref 8–16)
AST SERPL-CCNC: 106 U/L (ref 10–40)
BILIRUB SERPL-MCNC: 43.1 MG/DL (ref 0.1–1)
BUN SERPL-MCNC: 14 MG/DL (ref 6–20)
CALCIUM SERPL-MCNC: 7.8 MG/DL (ref 8.7–10.5)
CHLORIDE SERPL-SCNC: 94 MMOL/L (ref 95–110)
CO2 SERPL-SCNC: 22 MMOL/L (ref 23–29)
CREAT SERPL-MCNC: 0.8 MG/DL (ref 0.5–1.4)
ERYTHROCYTE [DISTWIDTH] IN BLOOD BY AUTOMATED COUNT: 18.5 % (ref 11.5–14.5)
EST. GFR  (AFRICAN AMERICAN): >60 ML/MIN/1.73 M^2
EST. GFR  (NON AFRICAN AMERICAN): >60 ML/MIN/1.73 M^2
GLUCOSE SERPL-MCNC: 100 MG/DL (ref 70–110)
HCT VFR BLD AUTO: 25.4 % (ref 40–54)
HGB BLD-MCNC: 8.5 G/DL (ref 14–18)
INR PPP: 1.4 (ref 0.8–1.2)
MAGNESIUM SERPL-MCNC: 2.1 MG/DL (ref 1.6–2.6)
MCH RBC QN AUTO: 35.9 PG (ref 27–31)
MCHC RBC AUTO-ENTMCNC: 33.5 G/DL (ref 32–36)
MCV RBC AUTO: 107 FL (ref 82–98)
PHOSPHATE SERPL-MCNC: 2.7 MG/DL (ref 2.7–4.5)
PLATELET # BLD AUTO: 106 K/UL (ref 150–450)
PMV BLD AUTO: 10.5 FL (ref 9.2–12.9)
POCT GLUCOSE: 108 MG/DL (ref 70–110)
POCT GLUCOSE: 115 MG/DL (ref 70–110)
POCT GLUCOSE: 135 MG/DL (ref 70–110)
POCT GLUCOSE: 144 MG/DL (ref 70–110)
POTASSIUM SERPL-SCNC: 3.2 MMOL/L (ref 3.5–5.1)
PROT SERPL-MCNC: 5.3 G/DL (ref 6–8.4)
PROTHROMBIN TIME: 14.8 SEC (ref 9–12.5)
RBC # BLD AUTO: 2.37 M/UL (ref 4.6–6.2)
SODIUM SERPL-SCNC: 128 MMOL/L (ref 136–145)
WBC # BLD AUTO: 12.14 K/UL (ref 3.9–12.7)

## 2021-12-13 PROCEDURE — 85027 COMPLETE CBC AUTOMATED: CPT | Performed by: INTERNAL MEDICINE

## 2021-12-13 PROCEDURE — 83735 ASSAY OF MAGNESIUM: CPT | Performed by: INTERNAL MEDICINE

## 2021-12-13 PROCEDURE — 84100 ASSAY OF PHOSPHORUS: CPT | Performed by: INTERNAL MEDICINE

## 2021-12-13 PROCEDURE — 99233 PR SUBSEQUENT HOSPITAL CARE,LEVL III: ICD-10-PCS | Mod: 95,,, | Performed by: INTERNAL MEDICINE

## 2021-12-13 PROCEDURE — 85610 PROTHROMBIN TIME: CPT | Performed by: INTERNAL MEDICINE

## 2021-12-13 PROCEDURE — 99232 SBSQ HOSP IP/OBS MODERATE 35: CPT | Mod: ,,, | Performed by: FAMILY MEDICINE

## 2021-12-13 PROCEDURE — 99233 SBSQ HOSP IP/OBS HIGH 50: CPT | Mod: 95,,, | Performed by: INTERNAL MEDICINE

## 2021-12-13 PROCEDURE — 99232 PR SUBSEQUENT HOSPITAL CARE,LEVL II: ICD-10-PCS | Mod: ,,, | Performed by: FAMILY MEDICINE

## 2021-12-13 PROCEDURE — 80053 COMPREHEN METABOLIC PANEL: CPT | Performed by: INTERNAL MEDICINE

## 2021-12-13 PROCEDURE — 12000002 HC ACUTE/MED SURGE SEMI-PRIVATE ROOM

## 2021-12-13 PROCEDURE — 25000003 PHARM REV CODE 250: Performed by: STUDENT IN AN ORGANIZED HEALTH CARE EDUCATION/TRAINING PROGRAM

## 2021-12-13 PROCEDURE — 25000003 PHARM REV CODE 250: Performed by: INTERNAL MEDICINE

## 2021-12-13 PROCEDURE — 36415 COLL VENOUS BLD VENIPUNCTURE: CPT | Performed by: INTERNAL MEDICINE

## 2021-12-13 RX ORDER — SODIUM, POTASSIUM,MAG SULFATES 17.5-3.13G
1 SOLUTION, RECONSTITUTED, ORAL ORAL DAILY
Qty: 1 KIT | Refills: 0 | Status: SHIPPED | OUTPATIENT
Start: 2021-12-13 | End: 2021-12-15

## 2021-12-13 RX ORDER — POTASSIUM CHLORIDE 20 MEQ/1
40 TABLET, EXTENDED RELEASE ORAL ONCE
Status: COMPLETED | OUTPATIENT
Start: 2021-12-13 | End: 2021-12-13

## 2021-12-13 RX ADMIN — FUROSEMIDE 20 MG: 20 TABLET ORAL at 09:12

## 2021-12-13 RX ADMIN — SPIRONOLACTONE 50 MG: 25 TABLET ORAL at 09:12

## 2021-12-13 RX ADMIN — PANTOPRAZOLE SODIUM 40 MG: 40 TABLET, DELAYED RELEASE ORAL at 09:12

## 2021-12-13 RX ADMIN — LACTULOSE 10 G: 10 SOLUTION ORAL at 09:12

## 2021-12-13 RX ADMIN — FOLIC ACID 1 MG: 1 TABLET ORAL at 09:12

## 2021-12-13 RX ADMIN — LEVETIRACETAM 1000 MG: 500 TABLET, FILM COATED ORAL at 09:12

## 2021-12-13 RX ADMIN — MULTIPLE VITAMINS W/ MINERALS TAB 1 TABLET: TAB at 09:12

## 2021-12-13 RX ADMIN — Medication 6 MG: at 09:12

## 2021-12-13 RX ADMIN — LACTULOSE 10 G: 10 SOLUTION ORAL at 03:12

## 2021-12-13 RX ADMIN — Medication 100 MG: at 09:12

## 2021-12-13 RX ADMIN — PROPRANOLOL HYDROCHLORIDE 10 MG: 10 TABLET ORAL at 09:12

## 2021-12-13 RX ADMIN — POTASSIUM CHLORIDE 40 MEQ: 1500 TABLET, EXTENDED RELEASE ORAL at 03:12

## 2021-12-13 RX ADMIN — PROPRANOLOL HYDROCHLORIDE 10 MG: 10 TABLET ORAL at 11:12

## 2021-12-14 LAB
ALBUMIN FLD-MCNC: <0.4 G/DL
ALBUMIN SERPL BCP-MCNC: 2 G/DL (ref 3.5–5.2)
ALP SERPL-CCNC: 115 U/L (ref 55–135)
ALT SERPL W/O P-5'-P-CCNC: 56 U/L (ref 10–44)
ANION GAP SERPL CALC-SCNC: 11 MMOL/L (ref 8–16)
APPEARANCE FLD: CLEAR
AST SERPL-CCNC: 106 U/L (ref 10–40)
BILIRUB SERPL-MCNC: 26 MG/DL (ref 0.1–1)
BODY FLD TYPE: NORMAL
BODY FLUID SOURCE, LDH: NORMAL
BUN SERPL-MCNC: 16 MG/DL (ref 6–20)
CALCIUM SERPL-MCNC: 7.7 MG/DL (ref 8.7–10.5)
CHLORIDE SERPL-SCNC: 95 MMOL/L (ref 95–110)
CO2 SERPL-SCNC: 19 MMOL/L (ref 23–29)
COLOR FLD: YELLOW
CREAT SERPL-MCNC: 0.8 MG/DL (ref 0.5–1.4)
ERYTHROCYTE [DISTWIDTH] IN BLOOD BY AUTOMATED COUNT: 18.2 % (ref 11.5–14.5)
EST. GFR  (AFRICAN AMERICAN): >60 ML/MIN/1.73 M^2
EST. GFR  (NON AFRICAN AMERICAN): >60 ML/MIN/1.73 M^2
GLUCOSE SERPL-MCNC: 92 MG/DL (ref 70–110)
HCT VFR BLD AUTO: 25.7 % (ref 40–54)
HGB BLD-MCNC: 8.3 G/DL (ref 14–18)
INR PPP: 1.4 (ref 0.8–1.2)
LDH FLD L TO P-CCNC: 42 U/L
LYMPHOCYTES NFR FLD MANUAL: 65 %
MCH RBC QN AUTO: 35.5 PG (ref 27–31)
MCHC RBC AUTO-ENTMCNC: 32.3 G/DL (ref 32–36)
MCV RBC AUTO: 110 FL (ref 82–98)
MESOTHL CELL NFR FLD MANUAL: 3 %
MONOS+MACROS NFR FLD MANUAL: 22 %
NEUTROPHILS NFR FLD MANUAL: 10 %
PHOSPHATE SERPL-MCNC: 3 MG/DL (ref 2.7–4.5)
PLATELET # BLD AUTO: 124 K/UL (ref 150–450)
PMV BLD AUTO: 10.5 FL (ref 9.2–12.9)
POCT GLUCOSE: 121 MG/DL (ref 70–110)
POCT GLUCOSE: 150 MG/DL (ref 70–110)
POCT GLUCOSE: 175 MG/DL (ref 70–110)
POCT GLUCOSE: 99 MG/DL (ref 70–110)
POTASSIUM SERPL-SCNC: 3.9 MMOL/L (ref 3.5–5.1)
PROT FLD-MCNC: <1 G/DL
PROT SERPL-MCNC: 5.3 G/DL (ref 6–8.4)
PROTHROMBIN TIME: 14.6 SEC (ref 9–12.5)
RBC # BLD AUTO: 2.34 M/UL (ref 4.6–6.2)
SODIUM SERPL-SCNC: 125 MMOL/L (ref 136–145)
SPECIMEN SOURCE: NORMAL
SPECIMEN SOURCE: NORMAL
WBC # BLD AUTO: 10.65 K/UL (ref 3.9–12.7)
WBC # FLD: 152 /CU MM

## 2021-12-14 PROCEDURE — 99233 PR SUBSEQUENT HOSPITAL CARE,LEVL III: ICD-10-PCS | Mod: 95,,, | Performed by: INTERNAL MEDICINE

## 2021-12-14 PROCEDURE — 89051 BODY FLUID CELL COUNT: CPT | Performed by: INTERNAL MEDICINE

## 2021-12-14 PROCEDURE — 87205 SMEAR GRAM STAIN: CPT | Performed by: INTERNAL MEDICINE

## 2021-12-14 PROCEDURE — 83615 LACTATE (LD) (LDH) ENZYME: CPT | Performed by: INTERNAL MEDICINE

## 2021-12-14 PROCEDURE — 82042 OTHER SOURCE ALBUMIN QUAN EA: CPT | Performed by: INTERNAL MEDICINE

## 2021-12-14 PROCEDURE — 84157 ASSAY OF PROTEIN OTHER: CPT | Performed by: INTERNAL MEDICINE

## 2021-12-14 PROCEDURE — 80053 COMPREHEN METABOLIC PANEL: CPT | Performed by: INTERNAL MEDICINE

## 2021-12-14 PROCEDURE — 36415 COLL VENOUS BLD VENIPUNCTURE: CPT | Performed by: INTERNAL MEDICINE

## 2021-12-14 PROCEDURE — 87075 CULTR BACTERIA EXCEPT BLOOD: CPT | Performed by: INTERNAL MEDICINE

## 2021-12-14 PROCEDURE — 84100 ASSAY OF PHOSPHORUS: CPT | Performed by: INTERNAL MEDICINE

## 2021-12-14 PROCEDURE — 85027 COMPLETE CBC AUTOMATED: CPT | Performed by: INTERNAL MEDICINE

## 2021-12-14 PROCEDURE — 85610 PROTHROMBIN TIME: CPT | Performed by: INTERNAL MEDICINE

## 2021-12-14 PROCEDURE — 87070 CULTURE OTHR SPECIMN AEROBIC: CPT | Performed by: INTERNAL MEDICINE

## 2021-12-14 PROCEDURE — 25000003 PHARM REV CODE 250: Performed by: STUDENT IN AN ORGANIZED HEALTH CARE EDUCATION/TRAINING PROGRAM

## 2021-12-14 PROCEDURE — 25000003 PHARM REV CODE 250: Performed by: INTERNAL MEDICINE

## 2021-12-14 PROCEDURE — 12000002 HC ACUTE/MED SURGE SEMI-PRIVATE ROOM

## 2021-12-14 PROCEDURE — 99233 SBSQ HOSP IP/OBS HIGH 50: CPT | Mod: 95,,, | Performed by: INTERNAL MEDICINE

## 2021-12-14 RX ADMIN — LACTULOSE 10 G: 10 SOLUTION ORAL at 08:12

## 2021-12-14 RX ADMIN — Medication 100 MG: at 08:12

## 2021-12-14 RX ADMIN — PROPRANOLOL HYDROCHLORIDE 10 MG: 10 TABLET ORAL at 08:12

## 2021-12-14 RX ADMIN — LACTULOSE 10 G: 10 SOLUTION ORAL at 04:12

## 2021-12-14 RX ADMIN — LEVETIRACETAM 1000 MG: 500 TABLET, FILM COATED ORAL at 08:12

## 2021-12-14 RX ADMIN — Medication 6 MG: at 09:12

## 2021-12-14 RX ADMIN — FOLIC ACID 1 MG: 1 TABLET ORAL at 08:12

## 2021-12-14 RX ADMIN — MULTIPLE VITAMINS W/ MINERALS TAB 1 TABLET: TAB at 08:12

## 2021-12-14 RX ADMIN — PANTOPRAZOLE SODIUM 40 MG: 40 TABLET, DELAYED RELEASE ORAL at 08:12

## 2021-12-15 VITALS
WEIGHT: 269.81 LBS | HEIGHT: 75 IN | SYSTOLIC BLOOD PRESSURE: 113 MMHG | OXYGEN SATURATION: 98 % | HEART RATE: 77 BPM | BODY MASS INDEX: 33.55 KG/M2 | RESPIRATION RATE: 19 BRPM | DIASTOLIC BLOOD PRESSURE: 54 MMHG | TEMPERATURE: 98 F

## 2021-12-15 PROBLEM — N28.9 ACUTE RENAL INSUFFICIENCY: Status: RESOLVED | Noted: 2021-12-01 | Resolved: 2021-12-04

## 2021-12-15 LAB
ALBUMIN SERPL BCP-MCNC: 2 G/DL (ref 3.5–5.2)
ALP SERPL-CCNC: 123 U/L (ref 55–135)
ALT SERPL W/O P-5'-P-CCNC: 56 U/L (ref 10–44)
ANION GAP SERPL CALC-SCNC: 13 MMOL/L (ref 8–16)
AST SERPL-CCNC: 118 U/L (ref 10–40)
BILIRUB SERPL-MCNC: 26.6 MG/DL (ref 0.1–1)
BUN SERPL-MCNC: 15 MG/DL (ref 6–20)
CALCIUM SERPL-MCNC: 7.9 MG/DL (ref 8.7–10.5)
CHLORIDE SERPL-SCNC: 92 MMOL/L (ref 95–110)
CO2 SERPL-SCNC: 22 MMOL/L (ref 23–29)
CREAT SERPL-MCNC: 0.9 MG/DL (ref 0.5–1.4)
ERYTHROCYTE [DISTWIDTH] IN BLOOD BY AUTOMATED COUNT: 17.9 % (ref 11.5–14.5)
EST. GFR  (AFRICAN AMERICAN): >60 ML/MIN/1.73 M^2
EST. GFR  (NON AFRICAN AMERICAN): >60 ML/MIN/1.73 M^2
GLUCOSE SERPL-MCNC: 88 MG/DL (ref 70–110)
GRAM STN SPEC: NORMAL
GRAM STN SPEC: NORMAL
HCT VFR BLD AUTO: 28.6 % (ref 40–54)
HGB BLD-MCNC: 9.4 G/DL (ref 14–18)
INR PPP: 1.3 (ref 0.8–1.2)
MCH RBC QN AUTO: 35.5 PG (ref 27–31)
MCHC RBC AUTO-ENTMCNC: 32.9 G/DL (ref 32–36)
MCV RBC AUTO: 108 FL (ref 82–98)
PHOSPHATE SERPL-MCNC: 3.2 MG/DL (ref 2.7–4.5)
PLATELET # BLD AUTO: 134 K/UL (ref 150–450)
PMV BLD AUTO: 10.4 FL (ref 9.2–12.9)
POCT GLUCOSE: 131 MG/DL (ref 70–110)
POCT GLUCOSE: 140 MG/DL (ref 70–110)
POTASSIUM SERPL-SCNC: 3.5 MMOL/L (ref 3.5–5.1)
PROT SERPL-MCNC: 5.8 G/DL (ref 6–8.4)
PROTHROMBIN TIME: 14.1 SEC (ref 9–12.5)
RBC # BLD AUTO: 2.65 M/UL (ref 4.6–6.2)
SODIUM SERPL-SCNC: 127 MMOL/L (ref 136–145)
WBC # BLD AUTO: 10.54 K/UL (ref 3.9–12.7)

## 2021-12-15 PROCEDURE — 84100 ASSAY OF PHOSPHORUS: CPT | Performed by: INTERNAL MEDICINE

## 2021-12-15 PROCEDURE — 85610 PROTHROMBIN TIME: CPT | Performed by: INTERNAL MEDICINE

## 2021-12-15 PROCEDURE — 99239 HOSP IP/OBS DSCHRG MGMT >30: CPT | Mod: ,,, | Performed by: INTERNAL MEDICINE

## 2021-12-15 PROCEDURE — 80053 COMPREHEN METABOLIC PANEL: CPT | Performed by: INTERNAL MEDICINE

## 2021-12-15 PROCEDURE — 25000003 PHARM REV CODE 250: Performed by: STUDENT IN AN ORGANIZED HEALTH CARE EDUCATION/TRAINING PROGRAM

## 2021-12-15 PROCEDURE — 99239 PR HOSPITAL DISCHARGE DAY,>30 MIN: ICD-10-PCS | Mod: ,,, | Performed by: INTERNAL MEDICINE

## 2021-12-15 PROCEDURE — 85027 COMPLETE CBC AUTOMATED: CPT | Performed by: INTERNAL MEDICINE

## 2021-12-15 PROCEDURE — 36415 COLL VENOUS BLD VENIPUNCTURE: CPT | Performed by: INTERNAL MEDICINE

## 2021-12-15 RX ORDER — LACTULOSE 10 G/15ML
20 SOLUTION ORAL EVERY 6 HOURS PRN
Status: DISCONTINUED | OUTPATIENT
Start: 2021-12-15 | End: 2021-12-15 | Stop reason: HOSPADM

## 2021-12-15 RX ORDER — LACTULOSE 10 G/15ML
10 SOLUTION ORAL; RECTAL 4 TIMES DAILY
Qty: 1800 ML | Refills: 0 | Status: ON HOLD | OUTPATIENT
Start: 2021-12-15 | End: 2021-12-31 | Stop reason: SDUPTHER

## 2021-12-15 RX ORDER — LANOLIN ALCOHOL/MO/W.PET/CERES
100 CREAM (GRAM) TOPICAL DAILY
Qty: 30 TABLET | Refills: 11 | Status: SHIPPED | OUTPATIENT
Start: 2021-12-16 | End: 2022-01-28 | Stop reason: SDUPTHER

## 2021-12-15 RX ADMIN — Medication 100 MG: at 10:12

## 2021-12-15 RX ADMIN — PANTOPRAZOLE SODIUM 40 MG: 40 TABLET, DELAYED RELEASE ORAL at 10:12

## 2021-12-15 RX ADMIN — LACTULOSE 10 G: 10 SOLUTION ORAL at 10:12

## 2021-12-15 RX ADMIN — LEVETIRACETAM 1000 MG: 500 TABLET, FILM COATED ORAL at 10:12

## 2021-12-15 RX ADMIN — MULTIPLE VITAMINS W/ MINERALS TAB 1 TABLET: TAB at 10:12

## 2021-12-15 RX ADMIN — FOLIC ACID 1 MG: 1 TABLET ORAL at 10:12

## 2021-12-17 ENCOUNTER — TELEPHONE (OUTPATIENT)
Dept: HEPATOLOGY | Facility: CLINIC | Age: 46
End: 2021-12-17
Payer: MEDICAID

## 2021-12-18 LAB — BACTERIA SPEC AEROBE CULT: NO GROWTH

## 2021-12-20 ENCOUNTER — TELEPHONE (OUTPATIENT)
Dept: HEPATOLOGY | Facility: CLINIC | Age: 46
End: 2021-12-20
Payer: MEDICAID

## 2021-12-20 DIAGNOSIS — K70.31 ASCITES DUE TO ALCOHOLIC CIRRHOSIS: Primary | ICD-10-CM

## 2021-12-21 ENCOUNTER — HOSPITAL ENCOUNTER (OUTPATIENT)
Dept: INTERVENTIONAL RADIOLOGY/VASCULAR | Facility: HOSPITAL | Age: 46
Discharge: HOME | End: 2021-12-21
Attending: NURSE PRACTITIONER
Payer: MEDICAID

## 2021-12-21 VITALS — DIASTOLIC BLOOD PRESSURE: 59 MMHG | SYSTOLIC BLOOD PRESSURE: 121 MMHG

## 2021-12-21 DIAGNOSIS — K70.31 ASCITES DUE TO ALCOHOLIC CIRRHOSIS: ICD-10-CM

## 2021-12-21 LAB
APPEARANCE FLD: CLEAR
BODY FLD TYPE: NORMAL
COLOR FLD: YELLOW
LYMPHOCYTES NFR FLD MANUAL: 63 %
MESOTHL CELL NFR FLD MANUAL: 3 %
MONOS+MACROS NFR FLD MANUAL: 29 %
NEUTROPHILS NFR FLD MANUAL: 5 %

## 2021-12-21 PROCEDURE — 49083 IR PARACENTESIS WITH IMAGING: ICD-10-PCS | Mod: ,,, | Performed by: RADIOLOGY

## 2021-12-21 PROCEDURE — 87070 CULTURE OTHR SPECIMN AEROBIC: CPT | Performed by: NURSE PRACTITIONER

## 2021-12-21 PROCEDURE — 49083 ABD PARACENTESIS W/IMAGING: CPT | Performed by: RADIOLOGY

## 2021-12-21 PROCEDURE — A7048 VACUUM DRAIN BOTTLE/TUBE KIT: HCPCS

## 2021-12-21 PROCEDURE — C1729 CATH, DRAINAGE: HCPCS

## 2021-12-21 PROCEDURE — 87205 SMEAR GRAM STAIN: CPT | Performed by: NURSE PRACTITIONER

## 2021-12-21 PROCEDURE — 89051 BODY FLUID CELL COUNT: CPT | Performed by: NURSE PRACTITIONER

## 2021-12-22 LAB — BACTERIA SPEC ANAEROBE CULT: NORMAL

## 2021-12-25 LAB
BACTERIA FLD AEROBE CULT: NO GROWTH
GRAM STN SPEC: NORMAL

## 2021-12-27 ENCOUNTER — PATIENT MESSAGE (OUTPATIENT)
Dept: HEPATOLOGY | Facility: CLINIC | Age: 46
End: 2021-12-27
Payer: MEDICAID

## 2021-12-27 ENCOUNTER — HOSPITAL ENCOUNTER (OUTPATIENT)
Dept: INTERVENTIONAL RADIOLOGY/VASCULAR | Facility: HOSPITAL | Age: 46
Discharge: HOME | End: 2021-12-27
Attending: NURSE PRACTITIONER | Admitting: RADIOLOGY
Payer: MEDICAID

## 2021-12-27 VITALS
SYSTOLIC BLOOD PRESSURE: 130 MMHG | DIASTOLIC BLOOD PRESSURE: 62 MMHG | TEMPERATURE: 98 F | RESPIRATION RATE: 19 BRPM | HEART RATE: 96 BPM

## 2021-12-27 DIAGNOSIS — K70.31 ASCITES DUE TO ALCOHOLIC CIRRHOSIS: ICD-10-CM

## 2021-12-27 LAB
ALBUMIN SERPL BCP-MCNC: 2.3 G/DL (ref 3.5–5.2)
ALP SERPL-CCNC: 140 U/L (ref 55–135)
ALT SERPL W/O P-5'-P-CCNC: 44 U/L (ref 10–44)
AMMONIA PLAS-SCNC: 52 UMOL/L (ref 10–50)
ANION GAP SERPL CALC-SCNC: 10 MMOL/L (ref 8–16)
APPEARANCE FLD: CLEAR
AST SERPL-CCNC: 109 U/L (ref 10–40)
BILIRUB SERPL-MCNC: 22.7 MG/DL (ref 0.1–1)
BODY FLD TYPE: NORMAL
BUN SERPL-MCNC: 28 MG/DL (ref 6–20)
CALCIUM SERPL-MCNC: 8.3 MG/DL (ref 8.7–10.5)
CHLORIDE SERPL-SCNC: 95 MMOL/L (ref 95–110)
CO2 SERPL-SCNC: 18 MMOL/L (ref 23–29)
COLOR FLD: YELLOW
CREAT SERPL-MCNC: 1.7 MG/DL (ref 0.5–1.4)
EST. GFR  (AFRICAN AMERICAN): 54 ML/MIN/1.73 M^2
EST. GFR  (NON AFRICAN AMERICAN): 47 ML/MIN/1.73 M^2
GLUCOSE SERPL-MCNC: 125 MG/DL (ref 70–110)
LYMPHOCYTES NFR FLD MANUAL: 45 %
MONOS+MACROS NFR FLD MANUAL: 4 %
NEUTROPHILS NFR FLD MANUAL: 51 %
POTASSIUM SERPL-SCNC: 3.9 MMOL/L (ref 3.5–5.1)
PROT SERPL-MCNC: 6.4 G/DL (ref 6–8.4)
SODIUM SERPL-SCNC: 123 MMOL/L (ref 136–145)
WBC # FLD: 326 /CU MM

## 2021-12-27 PROCEDURE — C1729 CATH, DRAINAGE: HCPCS

## 2021-12-27 PROCEDURE — 82140 ASSAY OF AMMONIA: CPT | Performed by: RADIOLOGY

## 2021-12-27 PROCEDURE — 87070 CULTURE OTHR SPECIMN AEROBIC: CPT | Performed by: NURSE PRACTITIONER

## 2021-12-27 PROCEDURE — 80053 COMPREHEN METABOLIC PANEL: CPT | Performed by: RADIOLOGY

## 2021-12-27 PROCEDURE — 89051 BODY FLUID CELL COUNT: CPT | Performed by: NURSE PRACTITIONER

## 2021-12-27 PROCEDURE — A7048 VACUUM DRAIN BOTTLE/TUBE KIT: HCPCS

## 2021-12-27 PROCEDURE — 63600175 PHARM REV CODE 636 W HCPCS: Mod: JG | Performed by: RADIOLOGY

## 2021-12-27 PROCEDURE — 36415 COLL VENOUS BLD VENIPUNCTURE: CPT | Performed by: RADIOLOGY

## 2021-12-27 PROCEDURE — 49083 IR PARACENTESIS WITH IMAGING: ICD-10-PCS | Mod: ,,, | Performed by: RADIOLOGY

## 2021-12-27 PROCEDURE — 87205 SMEAR GRAM STAIN: CPT | Performed by: NURSE PRACTITIONER

## 2021-12-27 PROCEDURE — 49083 ABD PARACENTESIS W/IMAGING: CPT | Performed by: RADIOLOGY

## 2021-12-27 PROCEDURE — P9047 ALBUMIN (HUMAN), 25%, 50ML: HCPCS | Mod: JG | Performed by: RADIOLOGY

## 2021-12-27 RX ORDER — ALBUMIN HUMAN 250 G/1000ML
25 SOLUTION INTRAVENOUS ONCE AS NEEDED
Status: COMPLETED | OUTPATIENT
Start: 2021-12-27 | End: 2021-12-27

## 2021-12-27 RX ADMIN — ALBUMIN (HUMAN) 25 G: 5 SOLUTION INTRAVENOUS at 11:12

## 2021-12-28 ENCOUNTER — TELEPHONE (OUTPATIENT)
Dept: HEPATOLOGY | Facility: CLINIC | Age: 46
End: 2021-12-28
Payer: MEDICAID

## 2021-12-29 ENCOUNTER — HOSPITAL ENCOUNTER (OUTPATIENT)
Facility: HOSPITAL | Age: 46
Discharge: HOME | End: 2021-12-31
Attending: EMERGENCY MEDICINE | Admitting: HOSPITALIST
Payer: MEDICAID

## 2021-12-29 DIAGNOSIS — R79.1 SUPRATHERAPEUTIC INR: ICD-10-CM

## 2021-12-29 DIAGNOSIS — E87.1 HYPONATREMIA WITH EXCESS EXTRACELLULAR FLUID VOLUME: ICD-10-CM

## 2021-12-29 DIAGNOSIS — Z20.822 LAB TEST NEGATIVE FOR COVID-19 VIRUS: ICD-10-CM

## 2021-12-29 DIAGNOSIS — R06.02 SOB (SHORTNESS OF BREATH): ICD-10-CM

## 2021-12-29 DIAGNOSIS — M79.602 PAIN OF LEFT UPPER EXTREMITY: ICD-10-CM

## 2021-12-29 DIAGNOSIS — F10.20 ALCOHOL USE DISORDER, SEVERE, DEPENDENCE: ICD-10-CM

## 2021-12-29 DIAGNOSIS — K74.60 DECOMPENSATED HEPATIC CIRRHOSIS: ICD-10-CM

## 2021-12-29 DIAGNOSIS — N17.9 AKI (ACUTE KIDNEY INJURY): Primary | ICD-10-CM

## 2021-12-29 DIAGNOSIS — I95.9 HYPOTENSION, UNSPECIFIED HYPOTENSION TYPE: ICD-10-CM

## 2021-12-29 DIAGNOSIS — K75.9 JAUNDICE DUE TO HEPATITIS: ICD-10-CM

## 2021-12-29 DIAGNOSIS — K72.90 DECOMPENSATED HEPATIC CIRRHOSIS: ICD-10-CM

## 2021-12-29 DIAGNOSIS — E66.09 CLASS 1 OBESITY DUE TO EXCESS CALORIES WITH SERIOUS COMORBIDITY AND BODY MASS INDEX (BMI) OF 33.0 TO 33.9 IN ADULT: ICD-10-CM

## 2021-12-29 LAB
ALBUMIN SERPL BCP-MCNC: 2.1 G/DL (ref 3.5–5.2)
ALP SERPL-CCNC: 161 U/L (ref 55–135)
ALT SERPL W/O P-5'-P-CCNC: 49 U/L (ref 10–44)
AMMONIA PLAS-SCNC: 53 UMOL/L (ref 10–50)
ANION GAP SERPL CALC-SCNC: 11 MMOL/L (ref 8–16)
AST SERPL-CCNC: 146 U/L (ref 10–40)
BACTERIA #/AREA URNS AUTO: NORMAL /HPF
BASOPHILS # BLD AUTO: 0.03 K/UL (ref 0–0.2)
BASOPHILS NFR BLD: 0.3 % (ref 0–1.9)
BILIRUB SERPL-MCNC: 23 MG/DL (ref 0.1–1)
BILIRUB UR QL STRIP: ABNORMAL
BUN SERPL-MCNC: 29 MG/DL (ref 6–20)
CALCIUM SERPL-MCNC: 8 MG/DL (ref 8.7–10.5)
CHLORIDE SERPL-SCNC: 94 MMOL/L (ref 95–110)
CLARITY UR REFRACT.AUTO: CLEAR
CO2 SERPL-SCNC: 19 MMOL/L (ref 23–29)
COLOR UR AUTO: ABNORMAL
CREAT SERPL-MCNC: 1.5 MG/DL (ref 0.5–1.4)
CTP QC/QA: YES
DIFFERENTIAL METHOD: ABNORMAL
EOSINOPHIL # BLD AUTO: 0.1 K/UL (ref 0–0.5)
EOSINOPHIL NFR BLD: 1.1 % (ref 0–8)
ERYTHROCYTE [DISTWIDTH] IN BLOOD BY AUTOMATED COUNT: 14.9 % (ref 11.5–14.5)
EST. GFR  (AFRICAN AMERICAN): >60 ML/MIN/1.73 M^2
EST. GFR  (NON AFRICAN AMERICAN): 54.7 ML/MIN/1.73 M^2
GLUCOSE SERPL-MCNC: 102 MG/DL (ref 70–110)
GLUCOSE UR QL STRIP: NEGATIVE
HCT VFR BLD AUTO: 29 % (ref 40–54)
HGB BLD-MCNC: 9.9 G/DL (ref 14–18)
HGB UR QL STRIP: NEGATIVE
IMM GRANULOCYTES # BLD AUTO: 0.09 K/UL (ref 0–0.04)
IMM GRANULOCYTES NFR BLD AUTO: 0.8 % (ref 0–0.5)
KETONES UR QL STRIP: NEGATIVE
LACTATE SERPL-SCNC: 1.2 MMOL/L (ref 0.5–2.2)
LEUKOCYTE ESTERASE UR QL STRIP: NEGATIVE
LIPASE SERPL-CCNC: 165 U/L (ref 4–60)
LYMPHOCYTES # BLD AUTO: 1.6 K/UL (ref 1–4.8)
LYMPHOCYTES NFR BLD: 14 % (ref 18–48)
MCH RBC QN AUTO: 35 PG (ref 27–31)
MCHC RBC AUTO-ENTMCNC: 34.1 G/DL (ref 32–36)
MCV RBC AUTO: 103 FL (ref 82–98)
MICROSCOPIC COMMENT: NORMAL
MONOCYTES # BLD AUTO: 1.3 K/UL (ref 0.3–1)
MONOCYTES NFR BLD: 11.3 % (ref 4–15)
NEUTROPHILS # BLD AUTO: 8.3 K/UL (ref 1.8–7.7)
NEUTROPHILS NFR BLD: 72.5 % (ref 38–73)
NITRITE UR QL STRIP: NEGATIVE
NRBC BLD-RTO: 0 /100 WBC
PH UR STRIP: 6 [PH] (ref 5–8)
PLATELET # BLD AUTO: 167 K/UL (ref 150–450)
PMV BLD AUTO: 9.9 FL (ref 9.2–12.9)
POTASSIUM SERPL-SCNC: 3.2 MMOL/L (ref 3.5–5.1)
PROT SERPL-MCNC: 6.4 G/DL (ref 6–8.4)
PROT UR QL STRIP: NEGATIVE
RBC # BLD AUTO: 2.83 M/UL (ref 4.6–6.2)
RBC #/AREA URNS AUTO: 1 /HPF (ref 0–4)
SARS-COV-2 RDRP RESP QL NAA+PROBE: NEGATIVE
SODIUM SERPL-SCNC: 124 MMOL/L (ref 136–145)
SP GR UR STRIP: 1.02 (ref 1–1.03)
SQUAMOUS #/AREA URNS AUTO: 1 /HPF
TROPONIN I SERPL DL<=0.01 NG/ML-MCNC: 0.01 NG/ML (ref 0–0.03)
URN SPEC COLLECT METH UR: ABNORMAL
WBC # BLD AUTO: 11.39 K/UL (ref 3.9–12.7)
WBC #/AREA URNS AUTO: 2 /HPF (ref 0–5)

## 2021-12-29 PROCEDURE — 99285 EMERGENCY DEPT VISIT HI MDM: CPT | Mod: 25

## 2021-12-29 PROCEDURE — 80053 COMPREHEN METABOLIC PANEL: CPT | Performed by: PHYSICIAN ASSISTANT

## 2021-12-29 PROCEDURE — 99285 PR EMERGENCY DEPT VISIT,LEVEL V: ICD-10-PCS | Mod: CS,,, | Performed by: EMERGENCY MEDICINE

## 2021-12-29 PROCEDURE — 96360 HYDRATION IV INFUSION INIT: CPT

## 2021-12-29 PROCEDURE — U0002 COVID-19 LAB TEST NON-CDC: HCPCS | Performed by: PHYSICIAN ASSISTANT

## 2021-12-29 PROCEDURE — 93010 ELECTROCARDIOGRAM REPORT: CPT | Mod: ,,, | Performed by: INTERNAL MEDICINE

## 2021-12-29 PROCEDURE — G0378 HOSPITAL OBSERVATION PER HR: HCPCS

## 2021-12-29 PROCEDURE — 93005 ELECTROCARDIOGRAM TRACING: CPT

## 2021-12-29 PROCEDURE — 82140 ASSAY OF AMMONIA: CPT | Performed by: PHYSICIAN ASSISTANT

## 2021-12-29 PROCEDURE — 93010 EKG 12-LEAD: ICD-10-PCS | Mod: ,,, | Performed by: INTERNAL MEDICINE

## 2021-12-29 PROCEDURE — 25000003 PHARM REV CODE 250: Performed by: PHYSICIAN ASSISTANT

## 2021-12-29 PROCEDURE — P9047 ALBUMIN (HUMAN), 25%, 50ML: HCPCS | Mod: JG | Performed by: HOSPITALIST

## 2021-12-29 PROCEDURE — 85025 COMPLETE CBC W/AUTO DIFF WBC: CPT | Performed by: PHYSICIAN ASSISTANT

## 2021-12-29 PROCEDURE — 83605 ASSAY OF LACTIC ACID: CPT | Performed by: PHYSICIAN ASSISTANT

## 2021-12-29 PROCEDURE — 84484 ASSAY OF TROPONIN QUANT: CPT | Performed by: PHYSICIAN ASSISTANT

## 2021-12-29 PROCEDURE — 63600175 PHARM REV CODE 636 W HCPCS: Mod: JG | Performed by: HOSPITALIST

## 2021-12-29 PROCEDURE — 99220 PR INITIAL OBSERVATION CARE,LEVL III: CPT | Mod: ,,, | Performed by: HOSPITALIST

## 2021-12-29 PROCEDURE — 99285 EMERGENCY DEPT VISIT HI MDM: CPT | Mod: CS,,, | Performed by: EMERGENCY MEDICINE

## 2021-12-29 PROCEDURE — 25000003 PHARM REV CODE 250: Performed by: HOSPITALIST

## 2021-12-29 PROCEDURE — 99220 PR INITIAL OBSERVATION CARE,LEVL III: ICD-10-PCS | Mod: ,,, | Performed by: HOSPITALIST

## 2021-12-29 PROCEDURE — 83690 ASSAY OF LIPASE: CPT | Performed by: PHYSICIAN ASSISTANT

## 2021-12-29 PROCEDURE — 81001 URINALYSIS AUTO W/SCOPE: CPT | Performed by: PHYSICIAN ASSISTANT

## 2021-12-29 RX ORDER — THIAMINE HCL 100 MG
100 TABLET ORAL DAILY
Status: DISCONTINUED | OUTPATIENT
Start: 2021-12-30 | End: 2021-12-31 | Stop reason: HOSPADM

## 2021-12-29 RX ORDER — MIDODRINE HYDROCHLORIDE 5 MG/1
5 TABLET ORAL
Status: DISCONTINUED | OUTPATIENT
Start: 2021-12-29 | End: 2021-12-29

## 2021-12-29 RX ORDER — LACTULOSE 10 G/15ML
15 SOLUTION ORAL 3 TIMES DAILY
Status: DISCONTINUED | OUTPATIENT
Start: 2021-12-29 | End: 2021-12-31 | Stop reason: HOSPADM

## 2021-12-29 RX ORDER — SODIUM CHLORIDE 0.9 % (FLUSH) 0.9 %
10 SYRINGE (ML) INJECTION
Status: DISCONTINUED | OUTPATIENT
Start: 2021-12-29 | End: 2021-12-31 | Stop reason: HOSPADM

## 2021-12-29 RX ORDER — TALC
6 POWDER (GRAM) TOPICAL NIGHTLY PRN
Status: DISCONTINUED | OUTPATIENT
Start: 2021-12-29 | End: 2021-12-31 | Stop reason: HOSPADM

## 2021-12-29 RX ORDER — PANTOPRAZOLE SODIUM 40 MG/1
40 TABLET, DELAYED RELEASE ORAL
Status: DISCONTINUED | OUTPATIENT
Start: 2021-12-30 | End: 2021-12-31 | Stop reason: HOSPADM

## 2021-12-29 RX ORDER — NALOXONE HCL 0.4 MG/ML
0.02 VIAL (ML) INJECTION
Status: DISCONTINUED | OUTPATIENT
Start: 2021-12-29 | End: 2021-12-31 | Stop reason: HOSPADM

## 2021-12-29 RX ORDER — IBUPROFEN 200 MG
16 TABLET ORAL
Status: DISCONTINUED | OUTPATIENT
Start: 2021-12-29 | End: 2021-12-31 | Stop reason: HOSPADM

## 2021-12-29 RX ORDER — IBUPROFEN 200 MG
24 TABLET ORAL
Status: DISCONTINUED | OUTPATIENT
Start: 2021-12-29 | End: 2021-12-31 | Stop reason: HOSPADM

## 2021-12-29 RX ORDER — FOLIC ACID 1 MG/1
1 TABLET ORAL DAILY
Status: DISCONTINUED | OUTPATIENT
Start: 2021-12-30 | End: 2021-12-31 | Stop reason: HOSPADM

## 2021-12-29 RX ORDER — ALBUMIN HUMAN 250 G/1000ML
25 SOLUTION INTRAVENOUS ONCE
Status: COMPLETED | OUTPATIENT
Start: 2021-12-29 | End: 2021-12-29

## 2021-12-29 RX ORDER — IPRATROPIUM BROMIDE AND ALBUTEROL SULFATE 2.5; .5 MG/3ML; MG/3ML
3 SOLUTION RESPIRATORY (INHALATION) EVERY 6 HOURS PRN
Status: DISCONTINUED | OUTPATIENT
Start: 2021-12-29 | End: 2021-12-31 | Stop reason: HOSPADM

## 2021-12-29 RX ORDER — ACETAMINOPHEN 325 MG/1
325 TABLET ORAL EVERY 4 HOURS PRN
Status: DISCONTINUED | OUTPATIENT
Start: 2021-12-29 | End: 2021-12-31 | Stop reason: HOSPADM

## 2021-12-29 RX ADMIN — SODIUM CHLORIDE 500 ML: 0.9 INJECTION, SOLUTION INTRAVENOUS at 08:12

## 2021-12-29 RX ADMIN — Medication 6 MG: at 10:12

## 2021-12-29 RX ADMIN — LACTULOSE 15 G: 20 SOLUTION ORAL at 10:12

## 2021-12-29 RX ADMIN — ALBUMIN (HUMAN) 25 G: 0.25 INJECTION, SOLUTION INTRAVENOUS at 10:12

## 2021-12-29 NOTE — ED NOTES
Significant other states that he was sent by MD for hyponatremia, hyperbilirubinemia, and also states his blood pressure has been low. Pt states he has been feeling SOB. Pt's PCP on vacation.   Pt stopped taking Keppra, Propanolol, and Naltrexone. Pt only takes Lactulose, Melatonin, Thiamine.     Patient identifiers for Nilesh Rolon Jr. 46 y.o. male checked and correct.  Chief Complaint   Patient presents with    Abnormal Lab     Sent in by  After lab work. Reports was instructed to come to ED due to decreased Na and abnormal kidney function lab.        Past Medical History:   Diagnosis Date    DONG (acute kidney injury) 12/1/2021    Anxiety     Arthritis     Liver disease     Osteoarthritis     Other meniscus derangements, other medial meniscus, left knee 1/7/2019     Allergies reported: Review of patient's allergies indicates:  No Known Allergies      LOC: Patient is awake, alert, and aware of environment with an appropriate affect. Patient is oriented x 4 and speaking appropriately.  APPEARANCE: Patient resting comfortably and in no acute distress. Patient is clean and well groomed, patient's clothing is properly fastened.  HEENT: Eyes jaundiced.   SKIN: The skin is warm and dry. Patient has normal skin turgor and moist mucus membranes.   MUSKULOSKELETAL: Patient is moving all extremities well, no obvious deformities noted. Pulses intact.   RESPIRATORY: Airway is open and patent. Respirations are spontaneous and slightly labored.   CARDIAC: Patient has a normal rate and rhythm. Pt placed on cardiac monitor. No peripheral edema noted.   ABDOMEN: Mild distention noted. Tender upon palpation.  NEUROLOGICAL: pupils 3 mm, PERRL. Facial expression is symmetrical. Hand grasps are equal bilaterally. Normal sensation in all extremities when touched with finger.

## 2021-12-30 ENCOUNTER — PATIENT MESSAGE (OUTPATIENT)
Dept: ADMINISTRATIVE | Facility: OTHER | Age: 46
End: 2021-12-30
Payer: MEDICAID

## 2021-12-30 LAB
ALBUMIN FLD-MCNC: <0.4 G/DL
ALBUMIN SERPL BCP-MCNC: 2.1 G/DL (ref 3.5–5.2)
ALP SERPL-CCNC: 133 U/L (ref 55–135)
ALT SERPL W/O P-5'-P-CCNC: 42 U/L (ref 10–44)
ANION GAP SERPL CALC-SCNC: 9 MMOL/L (ref 8–16)
APPEARANCE FLD: CLEAR
AST SERPL-CCNC: 130 U/L (ref 10–40)
BASOPHILS # BLD AUTO: 0.05 K/UL (ref 0–0.2)
BASOPHILS NFR BLD: 0.6 % (ref 0–1.9)
BILIRUB SERPL-MCNC: 18.4 MG/DL (ref 0.1–1)
BODY FLD TYPE: NORMAL
BODY FLUID SOURCE, LDH: NORMAL
BUN SERPL-MCNC: 26 MG/DL (ref 6–20)
CALCIUM SERPL-MCNC: 7.9 MG/DL (ref 8.7–10.5)
CHLORIDE SERPL-SCNC: 96 MMOL/L (ref 95–110)
CO2 SERPL-SCNC: 19 MMOL/L (ref 23–29)
COLOR FLD: YELLOW
CREAT SERPL-MCNC: 1.3 MG/DL (ref 0.5–1.4)
DIFFERENTIAL METHOD: ABNORMAL
EOSINOPHIL # BLD AUTO: 0.1 K/UL (ref 0–0.5)
EOSINOPHIL NFR BLD: 1.3 % (ref 0–8)
ERYTHROCYTE [DISTWIDTH] IN BLOOD BY AUTOMATED COUNT: 15 % (ref 11.5–14.5)
EST. GFR  (AFRICAN AMERICAN): >60 ML/MIN/1.73 M^2
EST. GFR  (NON AFRICAN AMERICAN): >60 ML/MIN/1.73 M^2
GLUCOSE SERPL-MCNC: 123 MG/DL (ref 70–110)
GRAM STN SPEC: NORMAL
GRAM STN SPEC: NORMAL
HCT VFR BLD AUTO: 27.6 % (ref 40–54)
HGB BLD-MCNC: 9.3 G/DL (ref 14–18)
IMM GRANULOCYTES # BLD AUTO: 0.04 K/UL (ref 0–0.04)
IMM GRANULOCYTES NFR BLD AUTO: 0.5 % (ref 0–0.5)
INR PPP: 1.4 (ref 0.8–1.2)
LDH FLD L TO P-CCNC: <30 U/L
LYMPHOCYTES # BLD AUTO: 1.1 K/UL (ref 1–4.8)
LYMPHOCYTES NFR BLD: 13 % (ref 18–48)
LYMPHOCYTES NFR FLD MANUAL: 77 %
MCH RBC QN AUTO: 34.8 PG (ref 27–31)
MCHC RBC AUTO-ENTMCNC: 33.7 G/DL (ref 32–36)
MCV RBC AUTO: 103 FL (ref 82–98)
MESOTHL CELL NFR FLD MANUAL: 2 %
MONOCYTES # BLD AUTO: 1.1 K/UL (ref 0.3–1)
MONOCYTES NFR BLD: 13.2 % (ref 4–15)
MONOS+MACROS NFR FLD MANUAL: 16 %
NEUTROPHILS # BLD AUTO: 6 K/UL (ref 1.8–7.7)
NEUTROPHILS NFR BLD: 71.4 % (ref 38–73)
NEUTROPHILS NFR FLD MANUAL: 5 %
NRBC BLD-RTO: 0 /100 WBC
PLATELET # BLD AUTO: 114 K/UL (ref 150–450)
PMV BLD AUTO: 9.6 FL (ref 9.2–12.9)
POCT GLUCOSE: 154 MG/DL (ref 70–110)
POTASSIUM SERPL-SCNC: 3.1 MMOL/L (ref 3.5–5.1)
PROT FLD-MCNC: <1 G/DL
PROT SERPL-MCNC: 5.7 G/DL (ref 6–8.4)
PROTHROMBIN TIME: 15.1 SEC (ref 9–12.5)
RBC # BLD AUTO: 2.67 M/UL (ref 4.6–6.2)
SODIUM SERPL-SCNC: 124 MMOL/L (ref 136–145)
SPECIMEN SOURCE: NORMAL
SPECIMEN SOURCE: NORMAL
WBC # BLD AUTO: 8.33 K/UL (ref 3.9–12.7)
WBC # FLD: 116 /CU MM

## 2021-12-30 PROCEDURE — 99225 PR SUBSEQUENT OBSERVATION CARE,LEVEL II: CPT | Mod: ,,, | Performed by: HOSPITALIST

## 2021-12-30 PROCEDURE — 83615 LACTATE (LD) (LDH) ENZYME: CPT | Performed by: HOSPITALIST

## 2021-12-30 PROCEDURE — 87070 CULTURE OTHR SPECIMN AEROBIC: CPT | Performed by: HOSPITALIST

## 2021-12-30 PROCEDURE — 80053 COMPREHEN METABOLIC PANEL: CPT | Performed by: HOSPITALIST

## 2021-12-30 PROCEDURE — 99214 OFFICE O/P EST MOD 30 MIN: CPT | Mod: ,,, | Performed by: STUDENT IN AN ORGANIZED HEALTH CARE EDUCATION/TRAINING PROGRAM

## 2021-12-30 PROCEDURE — 25000003 PHARM REV CODE 250: Performed by: HOSPITALIST

## 2021-12-30 PROCEDURE — G0378 HOSPITAL OBSERVATION PER HR: HCPCS

## 2021-12-30 PROCEDURE — 63600175 PHARM REV CODE 636 W HCPCS: Mod: JG | Performed by: HOSPITALIST

## 2021-12-30 PROCEDURE — 87205 SMEAR GRAM STAIN: CPT | Performed by: HOSPITALIST

## 2021-12-30 PROCEDURE — 85025 COMPLETE CBC W/AUTO DIFF WBC: CPT | Performed by: HOSPITALIST

## 2021-12-30 PROCEDURE — 89051 BODY FLUID CELL COUNT: CPT | Performed by: HOSPITALIST

## 2021-12-30 PROCEDURE — 85610 PROTHROMBIN TIME: CPT | Performed by: HOSPITALIST

## 2021-12-30 PROCEDURE — 82042 OTHER SOURCE ALBUMIN QUAN EA: CPT | Performed by: HOSPITALIST

## 2021-12-30 PROCEDURE — P9047 ALBUMIN (HUMAN), 25%, 50ML: HCPCS | Mod: JG

## 2021-12-30 PROCEDURE — 36415 COLL VENOUS BLD VENIPUNCTURE: CPT | Performed by: HOSPITALIST

## 2021-12-30 PROCEDURE — P9047 ALBUMIN (HUMAN), 25%, 50ML: HCPCS | Mod: JG | Performed by: HOSPITALIST

## 2021-12-30 PROCEDURE — 84157 ASSAY OF PROTEIN OTHER: CPT | Performed by: HOSPITALIST

## 2021-12-30 PROCEDURE — 99225 PR SUBSEQUENT OBSERVATION CARE,LEVEL II: ICD-10-PCS | Mod: ,,, | Performed by: HOSPITALIST

## 2021-12-30 PROCEDURE — 99214 PR OFFICE/OUTPT VISIT, EST, LEVL IV, 30-39 MIN: ICD-10-PCS | Mod: ,,, | Performed by: STUDENT IN AN ORGANIZED HEALTH CARE EDUCATION/TRAINING PROGRAM

## 2021-12-30 PROCEDURE — 63600175 PHARM REV CODE 636 W HCPCS: Mod: JG

## 2021-12-30 PROCEDURE — 87075 CULTR BACTERIA EXCEPT BLOOD: CPT | Performed by: HOSPITALIST

## 2021-12-30 RX ORDER — ALBUMIN HUMAN 250 G/1000ML
SOLUTION INTRAVENOUS
Status: DISPENSED
Start: 2021-12-30 | End: 2021-12-31

## 2021-12-30 RX ORDER — ALBUMIN HUMAN 250 G/1000ML
50 SOLUTION INTRAVENOUS 2 TIMES DAILY
Status: DISCONTINUED | OUTPATIENT
Start: 2021-12-30 | End: 2021-12-31 | Stop reason: HOSPADM

## 2021-12-30 RX ORDER — POTASSIUM CHLORIDE 20 MEQ/1
40 TABLET, EXTENDED RELEASE ORAL ONCE
Status: COMPLETED | OUTPATIENT
Start: 2021-12-30 | End: 2021-12-30

## 2021-12-30 RX ADMIN — PANTOPRAZOLE SODIUM 40 MG: 40 TABLET, DELAYED RELEASE ORAL at 06:12

## 2021-12-30 RX ADMIN — Medication 6 MG: at 10:12

## 2021-12-30 RX ADMIN — ALBUMIN (HUMAN) 50 G: 5 SOLUTION INTRAVENOUS at 09:12

## 2021-12-30 RX ADMIN — FOLIC ACID 1 MG: 1 TABLET ORAL at 08:12

## 2021-12-30 RX ADMIN — MULTIPLE VITAMINS W/ MINERALS TAB 1 TABLET: TAB at 08:12

## 2021-12-30 RX ADMIN — ALBUMIN (HUMAN) 50 G: 5 SOLUTION INTRAVENOUS at 11:12

## 2021-12-30 RX ADMIN — LACTULOSE 15 G: 20 SOLUTION ORAL at 08:12

## 2021-12-30 RX ADMIN — LACTULOSE 15 G: 20 SOLUTION ORAL at 05:12

## 2021-12-30 RX ADMIN — POTASSIUM CHLORIDE 40 MEQ: 1500 TABLET, EXTENDED RELEASE ORAL at 11:12

## 2021-12-30 RX ADMIN — THIAMINE HCL TAB 100 MG 100 MG: 100 TAB at 08:12

## 2021-12-30 RX ADMIN — LACTULOSE 15 G: 20 SOLUTION ORAL at 10:12

## 2021-12-30 NOTE — PLAN OF CARE
[facility] - Intensive Care ([facility]  Initial Discharge Assessment       Primary Care Provider: Bradford Vega DO, DO (Inactive)    Admission Diagnosis: Hyponatremia with excess extracellular fluid volume [E87.1]  SOB (shortness of breath) [R06.02]  Jaundice due to hepatitis [K75.9]  DONG (acute kidney injury) [N17.9]  Hypotension, unspecified hypotension type [I95.9]  Lab test negative for COVID-19 virus [Z20.822]    Admission Date: 12/29/2021  Expected Discharge Date: 12/31/2021         Payor: MEDICAID / Plan: [place] / Product Type: Managed Medicaid /     Extended Emergency Contact Information  Primary Emergency Contact: Laurel Hinojosa  Address: [address] [place] [address] [place]  Mobile Phone: [phone]  Relation: Mother  Secondary Emergency Contact: Roby Jazmine  Address: [address] [place] [address] [place]  Work Phone: [phone]  Mobile Phone: [phone]  Relation: Significant other    Discharge Plan A: (P) Home with family  Discharge Plan B: (P) Home Health      [id number] - [place] - [address] AT [facility] [address]  [address] [address]  Phone: [phone] Fax: [phone]      Initial Assessment (most recent)       Adult Discharge Assessment - 12/30/21 1103          Discharge Assessment    Assessment Type Discharge Planning Assessment     Confirmed/corrected address, phone number and insurance Yes     Confirmed Demographics Correct on Facesheet     Source of Information family     If unable to respond/provide information was family/caregiver contacted? Yes     Contact Name/Number mother Garcia, [phone]     Does patient/caregiver understand observation status Yes     Communicated ROQUE with patient/caregiver Date not available/Unable to determine     Lives With child(lee ann), adult;child(lee ann), dependent     Facility Arrived From:  HOME     Do you expect to return to your current living situation? Yes     Do you have help at home or someone to help you manage your care at home? Yes     Who are your caregiver(s) and their phone number(s)? Janki Barrera, significant other (P)      Prior to hospitilization cognitive status: Alert/Oriented (P)      Current cognitive status: Alert/Oriented (P)      Walking or Climbing Stairs Difficulty none (P)      Dressing/Bathing Difficulty none (P)      Do you have any problems with: Errands/Grocery (P)      Home Layout Able to live on 1st floor (P)      Equipment Currently Used at Home none (P)      Readmission within 30 days? No (P)      Patient currently being followed by outpatient case management? No (P)      Do you currently have service(s) that help you manage your care at home? No (P)      Is the pt/caregiver preference to resume services with current agency No (P)      Do you take prescription medications? Yes (P)      Do you have prescription coverage? Yes (P)      Coverage Medicaid Healthy Blue (P)      Do you have any problems affording any of your prescribed medications? No (P)      Is the patient taking medications as prescribed? yes (P)      Are you on dialysis? No (P)      Do you take coumadin? No (P)      Discharge Plan A Home with family (P)      Discharge Plan B Home Health (P)      DME Needed Upon Discharge  none (P)                       YONIS attempted to meet with patient at the bedside to conduct the dc planning assessment but patient was resting. YONIS spoke with patients mother Laurel and she reports that patient is currently residing in a one story home with his significant other. Per mother, patient is not on dialysis nor coumadin. She states that patient does not use any DME equipment but is interested in a blood pressure monitor. Patient family will transport him home when he becomes stable to dc.       Vianney Mcnamara, PORTER  [facility]   [phone]

## 2021-12-30 NOTE — PLAN OF CARE
Problem: Adult Inpatient Plan of Care  Goal: Plan of Care Review  Outcome: Ongoing, Progressing  Goal: Patient-Specific Goal (Individualized)  Outcome: Ongoing, Progressing  Goal: Absence of Hospital-Acquired Illness or Injury  Outcome: Ongoing, Progressing  Goal: Optimal Comfort and Wellbeing  Outcome: Ongoing, Progressing  Goal: Readiness for Transition of Care  Outcome: Ongoing, Progressing     Problem: Fluid and Electrolyte Imbalance (Acute Kidney Injury/Impairment)  Goal: Fluid and Electrolyte Balance  Outcome: Ongoing, Progressing     Problem: Oral Intake Inadequate (Acute Kidney Injury/Impairment)  Goal: Optimal Nutrition Intake  Outcome: Ongoing, Progressing     Problem: Renal Function Impairment (Acute Kidney Injury/Impairment)  Goal: Effective Renal Function  Outcome: Ongoing, Progressing

## 2021-12-30 NOTE — NURSING
I assumed care for this patient from the ED. Patient is comfortable in bed, room air, vitals stable, no complaints of pain or discomfort, eyes jaundiced and abdominal area distended, and loose stools (possibly due to lactulose). Wife at bedside. Call bell and tray table are within reach of the patient, bed in lowest position, safety education explained and to call before getting out of bed. Emergency medical equipment at bedside, environment clean and safe.

## 2021-12-30 NOTE — PROCEDURES
Radiology Post-Procedure Note    Pre Op Diagnosis: Ascites  Post Op Diagnosis: Same    Procedure: Ultrasound Guided Paracentesis    Procedure performed by: Gael GASTON, Yadira     Written Informed Consent Obtained: Yes  Specimen Removed: YES clear yellow  Estimated Blood Loss: Minimal    Findings:   Successful paracentesis.  Albumin administered PRN per protocol.    Patient tolerated procedure well.    Yadira Mayo, APRN, FNP  Interventional Radiology  [phone] clinic

## 2021-12-30 NOTE — H&P
Inpatient Radiology Pre-procedure Note    History of Present Illness:  Nilesh Rolon Jr. is a 46 y.o. male who presents for ultrasound guided paracentesis.  Admission H&P reviewed.  Past Medical History:   Diagnosis Date    DONG (acute kidney injury) 12/1/2021    Anxiety     Arthritis     Liver disease     Osteoarthritis     Other meniscus derangements, other medial meniscus, left knee 1/7/2019     Past Surgical History:   Procedure Laterality Date    ARTHROSCOPY OF KNEE Left 1/7/2019    Procedure: ARTHROSCOPY, KNEE;  Surgeon: Derick Kirby MD;  Location: [facility] OR;  Service: Orthopedics;  Laterality: Left;    COLONOSCOPY N/A 7/27/2020    Procedure: COLONOSCOPY;  Surgeon: Sotero Zapata MD;  Location: [facility] ENDO;  Service: Endoscopy;  Laterality: N/A;    ESOPHAGOGASTRODUODENOSCOPY N/A 4/8/2019    Procedure: EGD (ESOPHAGOGASTRODUODENOSCOPY);  Surgeon: Bonita Kendall MD;  Location: [facility];  Service: Endoscopy;  Laterality: N/A;    ESOPHAGOGASTRODUODENOSCOPY N/A 7/27/2020    Procedure: EGD (ESOPHAGOGASTRODUODENOSCOPY);  Surgeon: Sotero Zapata MD;  Location: [facility];  Service: Endoscopy;  Laterality: N/A;    ESOPHAGOGASTRODUODENOSCOPY N/A 12/2/2021    Procedure: EGD (ESOPHAGOGASTRODUODENOSCOPY);  Surgeon: Montez Guerra MD;  Location: [facility] ([address] [place] [address]);  Service: Endoscopy;  Laterality: N/A;    KNEE SURGERY         Review of Systems:   As documented in primary team H&P    Home Meds:   Prior to Admission medications    Medication Sig Start Date End Date Taking? Authorizing Provider   benzoyl peroxide 2.5 % gel 2 (two) times a day. to affected area 2/25/21   Historical Provider   folic acid (FOLVITE) 1 MG tablet TAKE 1 TABLET(1 MG) BY MOUTH EVERY DAY 11/6/21   Dianne Jimenez, NP   ketoconazole (NIZORAL) 2 % cream 2 (two) times a day. to affected area 12/29/20   Historical Provider   lactulose (CHRONULAC) 10 gram/15 mL solution Take 15 mLs (10 g total) by mouth 4 (four) times daily. 12/15/21    Cynthia Pathak MD   levETIRAcetam (KEPPRA) 1000 MG tablet Take 1 tablet (1,000 mg total) by mouth 2 (two) times daily.  Patient not taking: Reported on 12/29/2021 8/6/21 8/6/22  Otoniel Espinoza MD   metroNIDAZOLE (METROGEL) 0.75 % gel 2 (two) times a day. to affected area 3/12/21   Historical Provider   multivitamin capsule Take 1 capsule by mouth once daily.    Historical Provider   pantoprazole (PROTONIX) 40 MG tablet Take 1 tablet (40 mg total) by mouth before breakfast.  Patient not taking: Reported on 12/29/2021 12/2/21 5/31/22  Montez Guerra MD   propranoloL (INDERAL) 20 MG tablet TK 1 T PO  TID 8/20/20   Historical Provider   thiamine 100 MG tablet Take 1 tablet (100 mg total) by mouth once daily. 12/16/21   Cynthia Pathak MD   triamcinolone acetonide 0.025% (KENALOG) 0.025 % cream APPLY A THIN LAYER TO INFLAMED AREA TWICE DAILY 3/19/21   Historical Provider     Scheduled Meds:    albumin human 25%  50 g Intravenous BID    folic acid  1 mg Oral Daily    lactulose  15 g Oral TID    multivitamin  1 tablet Oral Daily    pantoprazole  40 mg Oral Before breakfast    thiamine  100 mg Oral Daily     Continuous Infusions:   PRN Meds:acetaminophen, albuterol-ipratropium, glucose, glucose, melatonin, naloxone, sodium chloride 0.9%  Anticoagulants/Antiplatelets: no anticoagulation    Creatinine 12/30/2021: 1.3    Allergies: Review of patient's allergies indicates:  No Known Allergies  Sedation Hx: have not been any systemic reactions    Vitals:  Temp: 97.7 °F (36.5 °C) (12/30/21 1200)  Pulse: 69 (12/30/21 1454)  Resp: 16 (12/30/21 1454)  BP: 107/65 (12/30/21 1454)  SpO2: 99 % (12/30/21 1454)     Physical Exam:  ASA: 3  Mallampati: n/a    General: no acute distress  Mental Status: alert and oriented to person, place and time  HEENT: normocephalic, atraumatic  Chest: unlabored breathing  Heart: regular heart rate  Abdomen: distended  Extremity: moves all extremities    Plan: ultrasound guided  paracentesis  Sedation Plan: local    RADHA Olguin, FNP  Interventional Radiology  [phone] clinic

## 2021-12-30 NOTE — NURSING
Patient slept throughout night without any c/o of pain or discomfort. Paracentesis scheduled for AM. Wife at bedside. Vitals stable, room air, AAOX4, WCTM.

## 2021-12-30 NOTE — PLAN OF CARE
Pt arrived to [facility] bay1 for inpt para. Pt in no acute distress, placed on monitor, VSS. Awaiting consent.

## 2021-12-30 NOTE — ED PROVIDER NOTES
Encounter Date: 12/29/2021       History     Chief Complaint   Patient presents with    Abnormal Lab     Sent in by  After lab work. Reports was instructed to come to ED due to decreased Na and abnormal kidney function lab.        The patient is a 46 year old male who has a documented past medical history of alcoholic hepatitis, anxiety, arthritis, and chronic knee pain. He reports increased edema, jaundice, and SOB recently. He was evaluated and underwent an outpatient abdominal paracentesis on 12/27/21. Reportedly he had 2 liters of fluid removed. He had labs drawn. He was called and instructed to come to the ER today due to abnormal lab values. His serum sodium level was decreased at 123 and his serum creatinine level was elevated at 1.7. His family states that he is no longer SOB since having the paracentesis completed, but that she thinks his eye appear to be more yellow than before and that his legs/ankles appear to be more swollen. He denies any abdominal pain. He denies any fever or chills.         Review of patient's allergies indicates:  No Known Allergies  Past Medical History:   Diagnosis Date    DONG (acute kidney injury) 12/1/2021    Anxiety     Arthritis     Liver disease     Osteoarthritis     Other meniscus derangements, other medial meniscus, left knee 1/7/2019     Past Surgical History:   Procedure Laterality Date    ARTHROSCOPY OF KNEE Left 1/7/2019    Procedure: ARTHROSCOPY, KNEE;  Surgeon: Derick Kirby MD;  Location: [facility] OR;  Service: Orthopedics;  Laterality: Left;    COLONOSCOPY N/A 7/27/2020    Procedure: COLONOSCOPY;  Surgeon: Sotero Zapata MD;  Location: [facility] ENDO;  Service: Endoscopy;  Laterality: N/A;    ESOPHAGOGASTRODUODENOSCOPY N/A 4/8/2019    Procedure: EGD (ESOPHAGOGASTRODUODENOSCOPY);  Surgeon: Bonita Kendall MD;  Location: [facility] ENDO;  Service: Endoscopy;  Laterality: N/A;    ESOPHAGOGASTRODUODENOSCOPY N/A 7/27/2020    Procedure: EGD (ESOPHAGOGASTRODUODENOSCOPY);   Surgeon: Sotero Zapata MD;  Location: [facility] ENDO;  Service: Endoscopy;  Laterality: N/A;    ESOPHAGOGASTRODUODENOSCOPY N/A 12/2/2021    Procedure: EGD (ESOPHAGOGASTRODUODENOSCOPY);  Surgeon: Montez Guerra MD;  [facility]  Service: Endoscopy;  Laterality: N/A;    KNEE SURGERY       Family History   Problem Relation Age of Onset    Birth defects Neg Hx      Social History     Tobacco Use    Smoking status: Current Every Day Smoker    Smokeless tobacco: Never Used    Tobacco comment: smokes Marijuana only   Substance Use Topics    Alcohol use: Not Currently     Comment: last drink 11/28, drinks 3-4 a day    Drug use: No     Review of Systems   Constitutional: Negative for chills and fever.   HENT: Negative for congestion, rhinorrhea and sore throat.    Eyes: Negative for visual disturbance.   Respiratory: Positive for shortness of breath. Negative for cough and chest tightness.    Cardiovascular: Positive for leg swelling. Negative for chest pain.   Gastrointestinal: Negative for abdominal pain, blood in stool, diarrhea, nausea and vomiting.   Genitourinary: Negative for difficulty urinating.   Musculoskeletal: Negative for myalgias.   Skin: Positive for color change.   Neurological: Negative for tremors, seizures, syncope, numbness and headaches.   Psychiatric/Behavioral: Negative for confusion.       Physical Exam     Initial Vitals [12/29/21 1522]   BP Pulse Resp Temp SpO2   98/68 66 18 98.3 °F (36.8 °C) 100 %      MAP       --         Physical Exam    Nursing note and vitals reviewed.  Constitutional: He appears well-developed and well-nourished. He is not diaphoretic. He appears distressed.   HENT:   Head: Normocephalic.   Mouth/Throat: Oropharynx is clear and moist.   Eyes: Scleral icterus is present.   Neck: Neck supple.   Cardiovascular: Normal rate.   Pulmonary/Chest: No respiratory distress.   Abdominal: He exhibits distension. There is no abdominal tenderness.   Mild  distention. Non-tender. Benign. No SBP.  There is no rebound and no guarding.   Musculoskeletal:         General: Normal range of motion.      Cervical back: Neck supple.      Comments: 1-2 + pretibial and pedal edema.      Neurological: He is alert and oriented to person, place, and time. He has normal strength. No sensory deficit.   No confusion. No tremors or asterixis.    Skin: Skin is warm and dry.   Jaundice    Psychiatric: He has a normal mood and affect. His behavior is normal.         ED Course   Procedures  Labs Reviewed   CBC W/ AUTO DIFFERENTIAL - Abnormal; Notable for the following components:       Result Value    RBC 2.83 (*)     Hemoglobin 9.9 (*)     Hematocrit 29.0 (*)      (*)     MCH 35.0 (*)     RDW 14.9 (*)     Immature Granulocytes 0.8 (*)     Gran # (ANC) 8.3 (*)     Immature Grans (Abs) 0.09 (*)     Mono # 1.3 (*)     Lymph % 14.0 (*)     All other components within normal limits   COMPREHENSIVE METABOLIC PANEL - Abnormal; Notable for the following components:    Sodium 124 (*)     Potassium 3.2 (*)     Chloride 94 (*)     CO2 19 (*)     BUN 29 (*)     Creatinine 1.5 (*)     Calcium 8.0 (*)     Albumin 2.1 (*)     Total Bilirubin 23.0 (*)     Alkaline Phosphatase 161 (*)      (*)     ALT 49 (*)     eGFR if non  54.7 (*)     All other components within normal limits   LIPASE - Abnormal; Notable for the following components:    Lipase 165 (*)     All other components within normal limits   AMMONIA - Abnormal; Notable for the following components:    Ammonia 53 (*)     All other components within normal limits   LACTIC ACID, PLASMA   TROPONIN I   ALCOHOL,MEDICAL (ETHANOL)   PROTIME-INR   URINALYSIS, REFLEX TO URINE CULTURE   SARS-COV-2 RDRP GENE    Narrative:     This test utilizes isothermal nucleic acid amplification   technology to detect the SARS-CoV-2 RdRp nucleic acid segment.   The analytical sensitivity (limit of detection) is 125 genome   equivalents/mL.    A POSITIVE result implies infection with the SARS-CoV-2 virus;   the patient is presumed to be contagious.     A NEGATIVE result means that SARS-CoV-2 nucleic acids are not   present above the limit of detection. A NEGATIVE result should be   treated as presumptive. It does not rule out the possibility of   COVID-19 and should not be the sole basis for treatment decisions.   If COVID-19 is strongly suspected based on clinical and exposure   history, re-testing using an alternate molecular assay should be   considered.   This test is only for use under the Food and Drug   Administration s Emergency Use Authorization (EUA).   Commercial kits are provided by [employer].   Performance characteristics of the EUA have been independently   verified by [facility] Department of   Pathology and Laboratory Medicine.   _________________________________________________________________   The authorized Fact Sheet for Healthcare Providers and the authorized Fact   Sheet for Patients of the ID NOW COVID-19 are available on the FDA   website:     https://www.fda.gov/media/207558/download  https://www.fda.gov/media/581015/download         Results for orders placed or performed during the hospital encounter of 12/29/21   CBC auto differential   Result Value Ref Range    WBC 11.39 3.90 - 12.70 K/uL    RBC 2.83 (L) 4.60 - 6.20 M/uL    Hemoglobin 9.9 (L) 14.0 - 18.0 g/dL    Hematocrit 29.0 (L) 40.0 - 54.0 %     (H) 82 - 98 fL    MCH 35.0 (H) 27.0 - 31.0 pg    MCHC 34.1 32.0 - 36.0 g/dL    RDW 14.9 (H) 11.5 - 14.5 %    Platelets 167 150 - 450 K/uL    MPV 9.9 9.2 - 12.9 fL    Immature Granulocytes 0.8 (H) 0.0 - 0.5 %    Gran # (ANC) 8.3 (H) 1.8 - 7.7 K/uL    Immature Grans (Abs) 0.09 (H) 0.00 - 0.04 K/uL    Lymph # 1.6 1.0 - 4.8 K/uL    Mono # 1.3 (H) 0.3 - 1.0 K/uL    Eos # 0.1 0.0 - 0.5 K/uL    Baso # 0.03 0.00 - 0.20 K/uL    nRBC 0 0 /100 WBC    Gran % 72.5 38.0 - 73.0 %    Lymph % 14.0 (L) 18.0 - 48.0 %     Mono % 11.3 4.0 - 15.0 %    Eosinophil % 1.1 0.0 - 8.0 %    Basophil % 0.3 0.0 - 1.9 %    Differential Method Automated    Comprehensive metabolic panel   Result Value Ref Range    Sodium 124 (L) 136 - 145 mmol/L    Potassium 3.2 (L) 3.5 - 5.1 mmol/L    Chloride 94 (L) 95 - 110 mmol/L    CO2 19 (L) 23 - 29 mmol/L    Glucose 102 70 - 110 mg/dL    BUN 29 (H) 6 - 20 mg/dL    Creatinine 1.5 (H) 0.5 - 1.4 mg/dL    Calcium 8.0 (L) 8.7 - 10.5 mg/dL    Total Protein 6.4 6.0 - 8.4 g/dL    Albumin 2.1 (L) 3.5 - 5.2 g/dL    Total Bilirubin 23.0 (H) 0.1 - 1.0 mg/dL    Alkaline Phosphatase 161 (H) 55 - 135 U/L     (H) 10 - 40 U/L    ALT 49 (H) 10 - 44 U/L    Anion Gap 11 8 - 16 mmol/L    eGFR if African American >60.0 >60 mL/min/1.73 m^2    eGFR if non  54.7 (A) >60 mL/min/1.73 m^2   Lactic acid, plasma   Result Value Ref Range    Lactate (Lactic Acid) 1.2 0.5 - 2.2 mmol/L   Lipase   Result Value Ref Range    Lipase 165 (H) 4 - 60 U/L   Ammonia   Result Value Ref Range    Ammonia 53 (H) 10 - 50 umol/L   Troponin I   Result Value Ref Range    Troponin I 0.010 0.000 - 0.026 ng/mL   POCT COVID-19 Rapid Screening   Result Value Ref Range    POC Rapid COVID Negative Negative     Acceptable Yes             Imaging Results          X-Ray Chest AP Portable (Final result)  Result time 12/29/21 17:54:50    Final result by Varun Anderson DO (12/29/21 17:54:50)                 Impression:      No acute abnormality.      Electronically signed by: Varun Anderson  Date:    12/29/2021  Time:    17:54             Narrative:    EXAMINATION:  XR CHEST AP PORTABLE    CLINICAL HISTORY:  Shortness of breath    TECHNIQUE:  Single frontal view of the chest was performed.    COMPARISON:  Chest radiograph from 11/30/2021.    FINDINGS:  The lungs are hypoexpanded and clear. No focal opacities are seen. The pleural spaces are clear.    The cardiac silhouette is unremarkable.    The visualized osseous structures are  unremarkable.                              Vitals:    12/29/21 1522 12/29/21 1731 12/29/21 1942 12/29/21 1943   BP: 98/68 103/62  (!) 106/59   BP Location: Right arm      Patient Position: Sitting      Pulse: 66 62 60    Resp: 18      Temp: 98.3 °F (36.8 °C)      TempSrc: Oral      SpO2: 100% 100%  100%   Weight: 122 kg (269 lb)             Medications   sodium chloride 0.9% bolus 500 mL (500 mLs Intravenous New Bag 12/29/21 2002)   folic acid tablet 1 mg (has no administration in time range)   lactulose 20 gram/30 mL solution Soln 15 g (has no administration in time range)   pantoprazole EC tablet 40 mg (has no administration in time range)   thiamine tablet 100 mg (has no administration in time range)   multivitamin tablet (has no administration in time range)   sodium chloride 0.9% flush 10 mL (has no administration in time range)   acetaminophen tablet 325 mg (has no administration in time range)   glucose chewable tablet 16 g (has no administration in time range)   glucose chewable tablet 24 g (has no administration in time range)   albuterol-ipratropium 2.5 mg-0.5 mg/3 mL nebulizer solution 3 mL (has no administration in time range)   melatonin tablet 6 mg (has no administration in time range)   naloxone 0.4 mg/mL injection 0.02 mg (has no administration in time range)   albumin human 25% bottle 25 g (has no administration in time range)     Medical Decision Making:   Initial Assessment:   Pt with history of liver disease is 2 days s/p paracentesis, sent to the ER for evaluation due to abnormal labs. Family reports worsening jaundice and lower leg swelling.   Differential Diagnosis:   Liver failure, DONG, Hepatorenal, etc   Clinical Tests:   Lab Tests: Ordered and Reviewed  Radiological Study: Ordered and Reviewed  Medical Tests: Ordered and Reviewed  ED Management:  Vital signs reviewed   Records reviewed   I discussed the case in detail with the ER attending physician   Low BP    specifies obs    Hospital medicine will admit                 Attending Attestation:     Physician Attestation Statement for NP/PA:   I have conducted a face to face encounter with this patient in addition to the NP/PA, due to Medical Complexity    Other NP/PA Attestation Additions:      Medical Decision Makin yo M here for hypoNa and DONG. Hx of alcoholic cirrhosis, s/p para 2 days ago.  On low Na diet, also on fluid restrictions, on lactulose  No n/v/confusion/abd pain/cp    BP soft in the ED, likely fluid down as cause of hypoNa and DONG  + small fluid bolus                      Clinical Impression:   Final diagnoses:  [R06.02] SOB (shortness of breath)  [N17.9] DONG (acute kidney injury) (Primary)  [E87.1] Hyponatremia with excess extracellular fluid volume  [K75.9] Jaundice due to hepatitis  [I95.9] Hypotension, unspecified hypotension type  [Z20.822] Lab test negative for COVID-19 virus          ED Disposition Condition    Observation               Jelani Rodriguez PA-C  21       Yris Jose MD  21

## 2021-12-30 NOTE — PLAN OF CARE
12/30/21 1637   Post-Acute Status   Post-Acute Authorization Placement     SW provided patient spouse with Alcohol addiction resources for [facility], he can admit there tomorrow if he would like.       Vianney Mcnamara LMSW  [facility]   [phone]

## 2021-12-30 NOTE — SEDATION DOCUMENTATION
Para complete. Pt tolerated the procedure well. 6.4 L removed. Site CDI. VS stable. Pt discharged to family.

## 2021-12-30 NOTE — PROGRESS NOTES
Progress Note  Hospital Medicine      Team: [facility] HOSP MED L Uri Dunn MD  Admit Date: 12/29/2021  Principal Problem:  DONG (acute kidney injury)   Patient information was obtained from patient, past medical records and ER records.   Primary care Physician: Bradford Vega DO, DO (Inactive)  Code status: Full Code    SUBJECTIVE:     Follow-up For:  DONG (acute kidney injury)     Interval history/ROS: 12/30: to paracentesis today    HPI: 45yo M with a PMHx of alcoholic cirrhosis who presents to the ED after being referred by hepatologist Dr. Carbone for abnormal labs. Pt. Was recently admitted to [facility] 11/20-12/15 for alcoholic hepatitis complicated by withdrawals and GI bleed requiring MICU stay. He has been receiving outpatient paracentesis since discharge, most recent 12/27 in which 2L were removed. Pt. Had f/u lab work performed today and his Cr was noted to be elevated to 1.7 (baseline normal) with worsening of his hyponatremia (127-->123), so he was referred to the ED for further evaluation. Pt. Reports persistent/worsening edema in his lower extremities as well as low BP at home. He has no other complaints, reporting that his appetite has been ok and he has not had rosalba nausea, vomiting, diarrhea, abdominal pain, fevers, or chills He has not been taking his propranolol, reporting that he is only on lactulose and thiamine.       OBJECTIVE:     Body mass index is 35.08 kg/m².    Vital Signs Range (Last 24H):  Temp:  [97.6 °F (36.4 °C)-98 °F (36.7 °C)]   Pulse:  [60-77]   Resp:  [16-23]   BP: (103-131)/(56-72)   SpO2:  [99 %-100 %]     I & O (Last 24H):    Intake/Output Summary (Last 24 hours) at 12/30/2021 1537  Last data filed at 12/30/2021 0400  Gross per 24 hour   Intake 350 ml   Output 400 ml   Net -50 ml        Physical Exam:  General appearance: no distress, pt. Resting comfortably  Mental status: Alert and oriented x 3  HEENT: Scleral Icterus, PERRL  Neck: supple, thyroid not enlarged  Pulm:   normal  respiratory effort, CTA B, no c/w/r  Card: RRR, S1, S2 normal, no murmur, click, rub or gallop  Abd: soft, NT, Tense Ascites, BS present; no masses, no organomegaly  Ext: 2+ LE edema B/L  Pulses: 2+, symmetric  Skin: Jaundiced, texture, turgor normal. No rashes or lesions  Neuro: CN II-XII grossly intact, no focal numbness or weakness, normal strength and tone        Recent Labs   Lab 12/29/21  1742 12/30/21  0506   * 124*   K 3.2* 3.1*   CL 94* 96   CO2 19* 19*   BUN 29* 26*   CREATININE 1.5* 1.3    123*   CALCIUM 8.0* 7.9*     Recent Labs   Lab 12/27/21  1225 12/29/21  1742 12/30/21  0506   ALKPHOS 140* 161* 133   ALT 44 49* 42   * 146* 130*   ALBUMIN 2.3* 2.1* 2.1*   PROT 6.4 6.4 5.7*   BILITOT 22.7* 23.0* 18.4*   INR  --   --  1.4*       Recent Labs   Lab 12/27/21  1111 12/29/21  1742 12/30/21  0506   WBC  --  11.39 8.33   HGB  --  9.9* 9.3*   HCT  --  29.0* 27.6*   PLT  --  167 114*   GRAN  --  72.5  8.3* 71.4  6.0   SEGS 51  --   --    LYMPH  --  14.0*  1.6 13.0*  1.1   LYMPHS 45  --   --    MONO  --  11.3  1.3* 13.2  1.1*       No results for input(s): POCTGLUCOSE in the last 168 hours.    Recent Labs   Lab 12/29/21 1742   TROPONINI 0.010       Diagnostic Results:  Labs: Reviewed    albumin human 25%, 50 g, Intravenous, BID  folic acid, 1 mg, Oral, Daily  lactulose, 15 g, Oral, TID  multivitamin, 1 tablet, Oral, Daily  pantoprazole, 40 mg, Oral, Before breakfast  thiamine, 100 mg, Oral, Daily        As Needed acetaminophen, albuterol-ipratropium, glucose, glucose, melatonin, naloxone, sodium chloride 0.9%    ASSESSMENT/PLAN:     Assessment: Nilesh Rolon Jr. is a 46 y.o. male here with:     Active Hospital Problems    Diagnosis  POA    *DONG (acute kidney injury) [N17.9]  Unknown    Decompensated hepatic cirrhosis [K72.90, K74.60]  Unknown      Resolved Hospital Problems   No resolved problems to display.        Plan:     DONG  -Cr 1.5 on presentation, baseline <1  -Pt. Noted  to have had paracentesis 12/27, no reported changes in PO intake, vomiting, or diarrhea. BP low-normal  -IV  cc given in ED, will also give albumin and f/u repeat Cr  -Renally dose medications     Decompensated Alcoholic cirrhosis  -INR pending, LFTs relatively stable  -Will repeat paracentesis to rule out SBP as cause of DONG, lower suspicion, however as pt. Without other symptoms  -Continue lactulose for HE prevention  -Hepatology consulted for further assistance with management     Hx of GI Bleed  -EGD/Colonosopy performed last admit, pt. S/p banding of one esophageal varix but bleeding was thought to be hemorrhoidal  Follow up as OP for repeat EGD and C-scope as previously planned  -Hgb stable at 9.4, no reported bleeding at time of admit. F/u INR and continue to trend Hgb     Hyponatremia  -Chronic 2/2 liver disease, treat as above     Alcohol Use Disorder  History of Withdrawal  -Extensive history of ETOH use, required MICU for withdrawal last admit  -Pt. Reports he has stopped drinking, ethanol level ordered and pending     DVT PPx: GORDONs    Tere Sweet MD

## 2021-12-30 NOTE — H&P
Hospital Medicine  History and Physical Exam    Team: [facility] [place] HOSP MED L Uri Dunn MD  Admit Date: 12/29/2021  Principal Problem:  DONG (acute kidney injury)   Patient information was obtained from patient, past medical records and ER records.   Primary care Physician: Bradford Vega DO, DO (Inactive)  Code status: Full Code    HPI: 47yo M with a PMHx of alcoholic cirrhosis who presents to the ED after being referred by hepatologist Dr. Carbone for abnormal labs. Pt. Was recently admitted to [facility] [place] [facility] 11/20-12/15 for alcoholic hepatitis complicated by withdrawals and GI bleed requiring MICU stay. He has been receiving outpatient paracentesis since discharge, most recent 12/27 in which 2L were removed. Pt. Had f/u lab work performed today and his Cr was noted to be elevated to 1.7 (baseline normal) with worsening of his hyponatremia (127-->123), so he was referred to the ED for further evaluation. Pt. Reports persistent/worsening edema in his lower extremities as well as low BP at home. He has no other complaints, reporting that his appetite has been ok and he has not had rosalba nausea, vomiting, diarrhea, abdominal pain, fevers, or chills He has not been taking his propranolol, reporting that he is only on lactulose and thiamine.     Hemoglobin A1C   Date Value Ref Range Status   12/07/2021 4.0 4.0 - 5.6 % Final     Comment:     ADA Screening Guidelines:  5.7-6.4%  Consistent with prediabetes  >or=6.5%  Consistent with diabetes    High levels of fetal hemoglobin interfere with the HbA1C  assay. Heterozygous hemoglobin variants (HbS, HgC, etc)do  not significantly interfere with this assay.   However, presence of multiple variants may affect accuracy.     10/24/2019 4.9 4.0 - 5.6 % Final     Comment:     ADA Screening Guidelines:  5.7-6.4%  Consistent with prediabetes  >or=6.5%  Consistent with diabetes  High levels of fetal hemoglobin interfere with the HbA1C  assay. Heterozygous hemoglobin variants (HbS, HgC, etc)do  not  significantly interfere with this assay.   However, presence of multiple variants may affect accuracy.         Past Medical History: Patient has a past medical history of DONG (acute kidney injury) (12/1/2021), Anxiety, Arthritis, Liver disease, Osteoarthritis, and Other meniscus derangements, other medial meniscus, left knee (1/7/2019).    Past Surgical History: Patient has a past surgical history that includes Arthroscopy of knee (Left, 1/7/2019); Esophagogastroduodenoscopy (N/A, 4/8/2019); Esophagogastroduodenoscopy (N/A, 7/27/2020); Colonoscopy (N/A, 7/27/2020); Knee surgery; and Esophagogastroduodenoscopy (N/A, 12/2/2021).    Social History: Patient reports that he has been smoking. He has never used smokeless tobacco. He reports previous alcohol use. He reports that he does not use drugs.    Family History: No relevant history reported    Medications: reviewed     Allergies: Patient has No Known Allergies.    ROS  Pain Scale: 0 /10   Constitutional: no fever or chills  Respiratory: no cough or shortness of breath  Cardiovascular: no chest pain or palpitations, Positive for LE edema  Gastrointestinal: no nausea or vomiting, Positive for abdominal distension  Genitourinary: no hematuria or dysuria  Integument/Breast: Positive for jaundice  Hematologic/Lymphatic: no easy bruising or lymphadenopathy  Musculoskeletal: no arthralgias or myalgias  Neurological: no seizures or tremors  Behavioral/Psych: no depression or anxiety    PEx  Temp:  [98.3 °F (36.8 °C)]   Pulse:  [66]   Resp:  [18]   BP: (98)/(68)   SpO2:  [100 %]   Body mass index is 33.62 kg/m².   No intake or output data in the 24 hours ending 12/29/21 1929    General appearance: no distress, pt. Resting comfortably  Mental status: Alert and oriented x 3  HEENT: Scleral Icterus, PERRL  Neck: supple, thyroid not enlarged  Pulm:   normal respiratory effort, CTA B, no c/w/r  Card: RRR, S1, S2 normal, no murmur, click, rub or gallop  Abd: soft, NT, Tense  Ascites, BS present; no masses, no organomegaly  Ext: 2+ LE edema B/L  Pulses: 2+, symmetric  Skin: Jaundiced, texture, turgor normal. No rashes or lesions  Neuro: CN II-XII grossly intact, no focal numbness or weakness, normal strength and tone     Recent Results (from the past 24 hour(s))   CBC auto differential    Collection Time: 12/29/21  5:42 PM   Result Value Ref Range    WBC 11.39 3.90 - 12.70 K/uL    RBC 2.83 (L) 4.60 - 6.20 M/uL    Hemoglobin 9.9 (L) 14.0 - 18.0 g/dL    Hematocrit 29.0 (L) 40.0 - 54.0 %     (H) 82 - 98 fL    MCH 35.0 (H) 27.0 - 31.0 pg    MCHC 34.1 32.0 - 36.0 g/dL    RDW 14.9 (H) 11.5 - 14.5 %    Platelets 167 150 - 450 K/uL    MPV 9.9 9.2 - 12.9 fL    Immature Granulocytes 0.8 (H) 0.0 - 0.5 %    Gran # (ANC) 8.3 (H) 1.8 - 7.7 K/uL    Immature Grans (Abs) 0.09 (H) 0.00 - 0.04 K/uL    Lymph # 1.6 1.0 - 4.8 K/uL    Mono # 1.3 (H) 0.3 - 1.0 K/uL    Eos # 0.1 0.0 - 0.5 K/uL    Baso # 0.03 0.00 - 0.20 K/uL    nRBC 0 0 /100 WBC    Gran % 72.5 38.0 - 73.0 %    Lymph % 14.0 (L) 18.0 - 48.0 %    Mono % 11.3 4.0 - 15.0 %    Eosinophil % 1.1 0.0 - 8.0 %    Basophil % 0.3 0.0 - 1.9 %    Differential Method Automated    Comprehensive metabolic panel    Collection Time: 12/29/21  5:42 PM   Result Value Ref Range    Sodium 124 (L) 136 - 145 mmol/L    Potassium 3.2 (L) 3.5 - 5.1 mmol/L    Chloride 94 (L) 95 - 110 mmol/L    CO2 19 (L) 23 - 29 mmol/L    Glucose 102 70 - 110 mg/dL    BUN 29 (H) 6 - 20 mg/dL    Creatinine 1.5 (H) 0.5 - 1.4 mg/dL    Calcium 8.0 (L) 8.7 - 10.5 mg/dL    Total Protein 6.4 6.0 - 8.4 g/dL    Albumin 2.1 (L) 3.5 - 5.2 g/dL    Total Bilirubin 23.0 (H) 0.1 - 1.0 mg/dL    Alkaline Phosphatase 161 (H) 55 - 135 U/L     (H) 10 - 40 U/L    ALT 49 (H) 10 - 44 U/L    Anion Gap 11 8 - 16 mmol/L    eGFR if African American >60.0 >60 mL/min/1.73 m^2    eGFR if non  54.7 (A) >60 mL/min/1.73 m^2   Lactic acid, plasma    Collection Time: 12/29/21  5:42 PM   Result  Value Ref Range    Lactate (Lactic Acid) 1.2 0.5 - 2.2 mmol/L   Lipase    Collection Time: 12/29/21  5:42 PM   Result Value Ref Range    Lipase 165 (H) 4 - 60 U/L   Troponin I    Collection Time: 12/29/21  5:42 PM   Result Value Ref Range    Troponin I 0.010 0.000 - 0.026 ng/mL   POCT COVID-19 Rapid Screening    Collection Time: 12/29/21  6:29 PM   Result Value Ref Range    POC Rapid COVID Negative Negative     Acceptable Yes        No results for input(s): POCTGLUCOSE in the last 168 hours.    There are no hospital problems to display for this patient.      Assessment and Plan:  DONG  -Cr 1.5 on presentation, baseline <1  -Pt. Noted to have had paracentesis 12/27, no reported changes in PO intake, vomiting, or diarrhea. BP low-normal  -IV  cc given in ED, will also give albumin and f/u repeat Cr  -Renally dose medications    Decompensated Alcoholic cirrhosis  -INR pending, LFTs relatively stable  -Will repeat paracentesis to rule out SBP as cause of DONG, lower suspicion, however as pt. Without other symptoms  -Continue lactulose for HE prevention  -Hepatology consulted for further assistance with management     Hx of GI Bleed  -EGD/Colonosopy performed last admit, pt. S/p banding of one esophageal varix but bleeding was thought to be hemorrhoidal  Follow up as OP for repeat EGD and C-scope as previously planned  -Hgb stable at 9.4, no reported bleeding at time of admit. F/u INR and continue to trend Hgb     Hyponatremia  -Chronic 2/2 liver disease, treat as above    Alcohol Use Disorder  History of Withdrawal  -Extensive history of ETOH use, required MICU for withdrawal last admit  -Pt. Reports he has stopped drinking, ethanol level ordered and pending    DVT PPx: SCDs    Uri Dunn MD  Hospital Medicine Staff  [phone] pager

## 2021-12-30 NOTE — CONSULTS
[facility]  Hepatology  Consult Note    Patient Name: Nilesh Rolon Jr.  MRN: [id number]  Admission Date: 12/29/2021  Hospital Length of Stay: 0 days  Code Status: Full Code   Attending Provider: Tere Sweet MD   Consulting Provider: Jazmine Douglas MD  Primary Care Physician: Bradford Vega DO,  (Inactive)  Principal Problem:DONG (acute kidney injury)    Inpatient consult to Hepatology  Consult performed by: Jazmine Douglas MD  Consult ordered by: Uri Dunn MD        Subjective:     HPI: Nilesh Rolon Jr. is a 46 y.o. male with history of alcoholic cirrhosis/hepatitis, who presents for abnormal outpatient labs.  He was recently admitted to the hospital for GI bleeding, alcohol withdrawal and alcoholic hepatitis.  He improved and was told not to drink alcohol to prevent further liver injury.  He was seen in clinic and underwent blood work that showed an DONG with a creatinine of 1.7 and hyponatremia so he was told to come to the hospital to be evaluated.    The patient reports that he was feeling well, complained of some abdominal distension but otherwise asymptomatic.  He had a paracentesis 3 days ago with removal of 3 L. he is not on diuretics at home.  On lactulose with no confusion.  He reports that he did not drink alcohol since his discharge, last drink was 11/19.  He enrolled in group therapy through his [facility].  He has a longstanding history of alcohol use.    On admission, he was afebrile hemodynamically stable.  Labs notable for creatinine of 1.7, up from a baseline of 0.9, bilirubin 22, down from 26, sodium 123. Hepatology is consulted for alcoholic liver disease.    Past Medical History:   Diagnosis Date    DONG (acute kidney injury) 12/1/2021    Anxiety     Arthritis     Liver disease     Osteoarthritis     Other meniscus derangements, other medial meniscus, left knee 1/7/2019       Past Surgical History:   Procedure Laterality Date    ARTHROSCOPY OF  KNEE Left 1/7/2019    Procedure: ARTHROSCOPY, KNEE;  Surgeon: Derick Kirby MD;  Location: [facility] OR;  Service: Orthopedics;  Laterality: Left;    COLONOSCOPY N/A 7/27/2020    Procedure: COLONOSCOPY;  Surgeon: Sotero Zapata MD;  Location: [facility] ENDO;  Service: Endoscopy;  Laterality: N/A;    ESOPHAGOGASTRODUODENOSCOPY N/A 4/8/2019    Procedure: EGD (ESOPHAGOGASTRODUODENOSCOPY);  Surgeon: Bonita Kendall MD;  Location: [facility] ENDO;  Service: Endoscopy;  Laterality: N/A;    ESOPHAGOGASTRODUODENOSCOPY N/A 7/27/2020    Procedure: EGD (ESOPHAGOGASTRODUODENOSCOPY);  Surgeon: Sotero Zapata MD;  Location: [facility] ENDO;  Service: Endoscopy;  Laterality: N/A;    ESOPHAGOGASTRODUODENOSCOPY N/A 12/2/2021    Procedure: EGD (ESOPHAGOGASTRODUODENOSCOPY);  Surgeon: Montez Guerra MD;  Location: [facility] ([address] [place] [address]);  Service: Endoscopy;  Laterality: N/A;    KNEE SURGERY         Family History   Problem Relation Age of Onset    Birth defects Neg Hx        Social History     Socioeconomic History    Marital status: Single   Tobacco Use    Smoking status: Current Every Day Smoker    Smokeless tobacco: Never Used    Tobacco comment: smokes Marijuana only   Substance and Sexual Activity    Alcohol use: Not Currently     Comment: last drink 11/28, drinks 3-4 a day    Drug use: No    Sexual activity: Yes     Partners: Female       No current facility-administered medications on file prior to encounter.     Current Outpatient Medications on File Prior to Encounter   Medication Sig Dispense Refill    benzoyl peroxide 2.5 % gel 2 (two) times a day. to affected area      folic acid (FOLVITE) 1 MG tablet TAKE 1 TABLET(1 MG) BY MOUTH EVERY DAY 30 tablet 2    ketoconazole (NIZORAL) 2 % cream 2 (two) times a day. to affected area      lactulose (CHRONULAC) 10 gram/15 mL solution Take 15 mLs (10 g total) by mouth 4 (four) times daily. 1800 mL 0    levETIRAcetam (KEPPRA) 1000 MG tablet Take 1 tablet (1,000 mg total) by mouth 2 (two)  times daily. (Patient not taking: Reported on 12/29/2021) 60 tablet 11    metroNIDAZOLE (METROGEL) 0.75 % gel 2 (two) times a day. to affected area      multivitamin capsule Take 1 capsule by mouth once daily.      pantoprazole (PROTONIX) 40 MG tablet Take 1 tablet (40 mg total) by mouth before breakfast. (Patient not taking: Reported on 12/29/2021) 90 tablet 1    propranoloL (INDERAL) 20 MG tablet TK 1 T PO  TID      thiamine 100 MG tablet Take 1 tablet (100 mg total) by mouth once daily. 30 tablet 11    triamcinolone acetonide 0.025% (KENALOG) 0.025 % cream APPLY A THIN LAYER TO INFLAMED AREA TWICE DAILY         Review of patient's allergies indicates:  No Known Allergies    Review of Systems   Constitutional: Negative for chills and fever.   HENT: Negative.    Eyes: Negative.    Respiratory: Negative.    Cardiovascular: Negative.    Gastrointestinal: Negative for blood in stool and melena.        Abdominal distension   Genitourinary: Negative.    Musculoskeletal: Negative.    Neurological: Negative.    Psychiatric/Behavioral: Negative.         Objective:     Vitals:    12/30/21 0800   BP:    Pulse:    Resp:    Temp: 97.6 °F (36.4 °C)         Constitutional:  not in acute distress and well developed  HENT: Head: Normal, normocephalic, atraumatic.  Eyes: conjunctiva clear and sclera icteric  Cardiovascular: regular rate and rhythm  Respiratory: normal chest expansion & respiratory effort   and no accessory muscle use  GI: soft and protuberant  Musculoskeletal: no muscular tenderness noted  Skin: jaundice present  Neurological: alert, oriented x3  Psychiatric: mood and affect are within normal limits, pt is a good historian; no memory problems were noted    Significant Labs:  Recent Labs   Lab 12/29/21  1742 12/30/21  0506   HGB 9.9* 9.3*       Lab Results   Component Value Date    WBC 8.33 12/30/2021    HGB 9.3 (L) 12/30/2021    HCT 27.6 (L) 12/30/2021     (H) 12/30/2021     (L) 12/30/2021        Lab Results   Component Value Date     (L) 12/30/2021    K 3.1 (L) 12/30/2021    CL 96 12/30/2021    CO2 19 (L) 12/30/2021    BUN 26 (H) 12/30/2021    CREATININE 1.3 12/30/2021    CALCIUM 7.9 (L) 12/30/2021    ANIONGAP 9 12/30/2021    ESTGFRAFRICA >60.0 12/30/2021    EGFRNONAA >60.0 12/30/2021       Lab Results   Component Value Date    ALT 42 12/30/2021     (H) 12/30/2021    ALKPHOS 133 12/30/2021    BILITOT 18.4 (H) 12/30/2021       Lab Results   Component Value Date    INR 1.4 (H) 12/30/2021    INR 1.3 (H) 12/15/2021    INR 1.4 (H) 12/14/2021       Significant Imaging:  Reviewed pertinent radiology findings.       Assessment/Plan:     Nilesh Rolon Jr. is a 46 y.o. male with history of alcoholic cirrhosis/hepatitis, who presents for abnormal outpatient labs.    Problem List:  1. Alcoholic liver disease  2. DONG  3. Hyponatremia  4. Ascite      Assessment:  Patient presents with DONG, hyponatremia after recent paracentesis.  Differential includes ATN, bile cast nephropathy, HRS.  Would recommend workup of DONG and albumin administration for volume resuscitation.  He would also benefit from another therapeutic paracentesis due to abdominal distention and discomfort which limits his p.o. intake.  The patient would like to enroll in alcohol rehab and would like to obtain resources for that, would recommend social work or addiction psychiatry involvement for that purpose.    Recommendations:  - albumin 50 g b.i.d.  - therapeutic paracentesis  - PETH  - 2 g sodium restriction, 1.5 L water restriction  - alcohol cessation resources per social work or addiction psychiatry      Thank you for involving us in the care of Nilesh Rolon Jr.. Please call with any additional questions, concerns or changes in the patient's clinical status. We will continue to follow.    Jazmine Douglas MD  Gastroenterology & Hepatology Fellow PGY V   [facility]

## 2021-12-31 VITALS
HEART RATE: 81 BPM | TEMPERATURE: 99 F | BODY MASS INDEX: 34.89 KG/M2 | DIASTOLIC BLOOD PRESSURE: 67 MMHG | SYSTOLIC BLOOD PRESSURE: 106 MMHG | OXYGEN SATURATION: 97 % | WEIGHT: 280.63 LBS | HEIGHT: 75 IN | RESPIRATION RATE: 16 BRPM

## 2021-12-31 LAB
ALBUMIN SERPL BCP-MCNC: 2.6 G/DL (ref 3.5–5.2)
ALP SERPL-CCNC: 139 U/L (ref 55–135)
ALT SERPL W/O P-5'-P-CCNC: 38 U/L (ref 10–44)
ANION GAP SERPL CALC-SCNC: 7 MMOL/L (ref 8–16)
AST SERPL-CCNC: 118 U/L (ref 10–40)
BACTERIA FLD AEROBE CULT: NO GROWTH
BILIRUB SERPL-MCNC: 16.1 MG/DL (ref 0.1–1)
BUN SERPL-MCNC: 20 MG/DL (ref 6–20)
CALCIUM SERPL-MCNC: 8 MG/DL (ref 8.7–10.5)
CHLORIDE SERPL-SCNC: 99 MMOL/L (ref 95–110)
CO2 SERPL-SCNC: 19 MMOL/L (ref 23–29)
CREAT SERPL-MCNC: 1.3 MG/DL (ref 0.5–1.4)
EST. GFR  (AFRICAN AMERICAN): >60 ML/MIN/1.73 M^2
EST. GFR  (NON AFRICAN AMERICAN): >60 ML/MIN/1.73 M^2
GLUCOSE SERPL-MCNC: 167 MG/DL (ref 70–110)
GRAM STN SPEC: NORMAL
INR PPP: 1.5 (ref 0.8–1.2)
POTASSIUM SERPL-SCNC: 3.6 MMOL/L (ref 3.5–5.1)
PROT SERPL-MCNC: 5.6 G/DL (ref 6–8.4)
PROTHROMBIN TIME: 16.4 SEC (ref 9–12.5)
SODIUM SERPL-SCNC: 125 MMOL/L (ref 136–145)

## 2021-12-31 PROCEDURE — 80053 COMPREHEN METABOLIC PANEL: CPT | Performed by: HOSPITALIST

## 2021-12-31 PROCEDURE — 0064A HC IMMUNIZ ADMIN, SARS-COV-2 COVID-19 VACC, 50MCG/0.25ML, BOOSTER DOSE: CPT | Performed by: HOSPITALIST

## 2021-12-31 PROCEDURE — 91306 PHARM REV CODE 636 W HCPCS: CPT | Performed by: HOSPITALIST

## 2021-12-31 PROCEDURE — 99225 PR SUBSEQUENT OBSERVATION CARE,LEVEL II: CPT | Mod: ,,, | Performed by: HOSPITALIST

## 2021-12-31 PROCEDURE — 63600175 PHARM REV CODE 636 W HCPCS: Performed by: HOSPITALIST

## 2021-12-31 PROCEDURE — 99225 PR SUBSEQUENT OBSERVATION CARE,LEVEL II: ICD-10-PCS | Mod: ,,, | Performed by: HOSPITALIST

## 2021-12-31 PROCEDURE — 36415 COLL VENOUS BLD VENIPUNCTURE: CPT | Performed by: HOSPITALIST

## 2021-12-31 PROCEDURE — 90471 IMMUNIZATION ADMIN: CPT | Performed by: HOSPITALIST

## 2021-12-31 PROCEDURE — 80321 ALCOHOLS BIOMARKERS 1OR 2: CPT | Performed by: HOSPITALIST

## 2021-12-31 PROCEDURE — P9047 ALBUMIN (HUMAN), 25%, 50ML: HCPCS | Mod: JG | Performed by: HOSPITALIST

## 2021-12-31 PROCEDURE — G0378 HOSPITAL OBSERVATION PER HR: HCPCS

## 2021-12-31 PROCEDURE — 85610 PROTHROMBIN TIME: CPT | Performed by: HOSPITALIST

## 2021-12-31 PROCEDURE — 25000003 PHARM REV CODE 250: Performed by: HOSPITALIST

## 2021-12-31 PROCEDURE — 90686 IIV4 VACC NO PRSV 0.5 ML IM: CPT | Performed by: HOSPITALIST

## 2021-12-31 RX ORDER — LACTULOSE 10 G/15ML
20 SOLUTION ORAL; RECTAL 2 TIMES DAILY
Qty: 1800 ML | Refills: 0 | Status: ON HOLD | OUTPATIENT
Start: 2021-12-31 | End: 2022-02-02 | Stop reason: HOSPADM

## 2021-12-31 RX ORDER — PROPRANOLOL HYDROCHLORIDE 20 MG/1
10 TABLET ORAL 2 TIMES DAILY
Qty: 30 TABLET | Refills: 0 | Status: SHIPPED | OUTPATIENT
Start: 2021-12-31 | End: 2022-01-03

## 2021-12-31 RX ADMIN — PANTOPRAZOLE SODIUM 40 MG: 40 TABLET, DELAYED RELEASE ORAL at 07:12

## 2021-12-31 RX ADMIN — INFLUENZA VIRUS VACCINE 0.5 ML: 15; 15; 15; 15 SUSPENSION INTRAMUSCULAR at 12:12

## 2021-12-31 RX ADMIN — FOLIC ACID 1 MG: 1 TABLET ORAL at 09:12

## 2021-12-31 RX ADMIN — THIAMINE HCL TAB 100 MG 100 MG: 100 TAB at 09:12

## 2021-12-31 RX ADMIN — ALBUMIN (HUMAN) 50 G: 5 SOLUTION INTRAVENOUS at 09:12

## 2021-12-31 RX ADMIN — CX-024414 0.25 ML: 0.2 INJECTION, SUSPENSION INTRAMUSCULAR at 01:12

## 2021-12-31 RX ADMIN — MULTIPLE VITAMINS W/ MINERALS TAB 1 TABLET: TAB at 09:12

## 2021-12-31 RX ADMIN — LACTULOSE 15 G: 20 SOLUTION ORAL at 09:12

## 2021-12-31 NOTE — PROGRESS NOTES
[facility]  Hepatology  Progress note    Patient Name: Nilesh Rolon Jr.  MRN: [id number]  Admission Date: 12/29/2021  Hospital Length of Stay: 0 days  Code Status: Full Code   Attending Provider: Tere Sweet MD   Consulting Provider: Jazmine Douglas MD  Primary Care Physician: Bradford Vega DO,  (Inactive)  Principal Problem:DONG (acute kidney injury)    Subjective:     HPI: Nilesh Rolon Jr. is a 46 y.o. male with history of alcoholic cirrhosis/hepatitis, who presents for abnormal outpatient labs.  He was recently admitted to the hospital for GI bleeding, alcohol withdrawal and alcoholic hepatitis.  He improved and was told not to drink alcohol to prevent further liver injury.  He was seen in clinic and underwent blood work that showed an DONG with a creatinine of 1.7 and hyponatremia so he was told to come to the hospital to be evaluated.     The patient reports that he was feeling well, complained of some abdominal distension but otherwise asymptomatic.  He had a paracentesis 3 days ago with removal of 3 L. he is not on diuretics at home.  On lactulose with no confusion.  He reports that he did not drink alcohol since his discharge, last drink was 11/19.  He enrolled in group therapy through his [employer].  He has a longstanding history of alcohol use.     On admission, he was afebrile hemodynamically stable.  Labs notable for creatinine of 1.7, up from a baseline of 0.9, bilirubin 22, down from 26, sodium 123. Hepatology is consulted for alcoholic liver disease.      Interval history:  S/p paracentesis with removal of 6 L yesterday, significantly less distended and was able to eat more yesterday.  Provided with resources for alcohol cessation, interested in going to inpatient vs outpatient rehab upon discharge.  No labs available this morning.    Past Medical History:   Diagnosis Date    DONG (acute kidney injury) 12/1/2021    Anxiety     Arthritis     Liver disease      Osteoarthritis     Other meniscus derangements, other medial meniscus, left knee 1/7/2019       Past Surgical History:   Procedure Laterality Date    ARTHROSCOPY OF KNEE Left 1/7/2019    Procedure: ARTHROSCOPY, KNEE;  Surgeon: Derick Kirby MD;  Location: [facility] OR;  Service: Orthopedics;  Laterality: Left;    COLONOSCOPY N/A 7/27/2020    Procedure: COLONOSCOPY;  Surgeon: Sotero Zapata MD;  Location: [facility] ENDO;  Service: Endoscopy;  Laterality: N/A;    ESOPHAGOGASTRODUODENOSCOPY N/A 4/8/2019    Procedure: EGD (ESOPHAGOGASTRODUODENOSCOPY);  Surgeon: Bonita Kendall MD;  Location: [facility] ENDO;  Service: Endoscopy;  Laterality: N/A;    ESOPHAGOGASTRODUODENOSCOPY N/A 7/27/2020    Procedure: EGD (ESOPHAGOGASTRODUODENOSCOPY);  Surgeon: Sotero Zapata MD;  Location: [facility] ENDO;  Service: Endoscopy;  Laterality: N/A;    ESOPHAGOGASTRODUODENOSCOPY N/A 12/2/2021    Procedure: EGD (ESOPHAGOGASTRODUODENOSCOPY);  Surgeon: Montez Guerra MD;  Location: [facility] (2ND FLR);  Service: Endoscopy;  Laterality: N/A;    KNEE SURGERY         Family History   Problem Relation Age of Onset    Birth defects Neg Hx        Social History     Socioeconomic History    Marital status: Single   Tobacco Use    Smoking status: Never Smoker    Smokeless tobacco: Never Used    Tobacco comment: smokes Marijuana only   Substance and Sexual Activity    Alcohol use: Not Currently     Comment: last drink 11/28, drinks 3-4 a day    Drug use: No    Sexual activity: Yes     Partners: Female       No current facility-administered medications on file prior to encounter.     Current Outpatient Medications on File Prior to Encounter   Medication Sig Dispense Refill    benzoyl peroxide 2.5 % gel 2 (two) times a day. to affected area      folic acid (FOLVITE) 1 MG tablet TAKE 1 TABLET(1 MG) BY MOUTH EVERY DAY 30 tablet 2    ketoconazole (NIZORAL) 2 % cream 2 (two) times a day. to affected area      lactulose (CHRONULAC) 10 gram/15 mL solution  Take 15 mLs (10 g total) by mouth 4 (four) times daily. 1800 mL 0    levETIRAcetam (KEPPRA) 1000 MG tablet Take 1 tablet (1,000 mg total) by mouth 2 (two) times daily. (Patient not taking: Reported on 12/29/2021) 60 tablet 11    metroNIDAZOLE (METROGEL) 0.75 % gel 2 (two) times a day. to affected area      multivitamin capsule Take 1 capsule by mouth once daily.      pantoprazole (PROTONIX) 40 MG tablet Take 1 tablet (40 mg total) by mouth before breakfast. (Patient not taking: Reported on 12/29/2021) 90 tablet 1    propranoloL (INDERAL) 20 MG tablet TK 1 T PO  TID      thiamine 100 MG tablet Take 1 tablet (100 mg total) by mouth once daily. 30 tablet 11    triamcinolone acetonide 0.025% (KENALOG) 0.025 % cream APPLY A THIN LAYER TO INFLAMED AREA TWICE DAILY         Review of patient's allergies indicates:  No Known Allergies      Objective:     Vitals:    12/31/21 0319   BP: (!) 101/57   Pulse:    Resp: 18   Temp: 97.9 °F (36.6 °C)         Constitutional:  not in acute distress and well developed  HENT: Head: Normal, normocephalic, atraumatic.  Eyes: conjunctiva clear and sclera icteric  Cardiovascular: regular rate and rhythm  Respiratory: normal chest expansion & respiratory effort   and no accessory muscle use  GI: soft and protuberant  Musculoskeletal: no muscular tenderness noted  Skin: jaundice present  Neurological: alert, oriented x3  Psychiatric: mood and affect are within normal limits, pt is a good historian; no memory problems were noted     Significant Labs:  Recent Labs   Lab 12/29/21  1742 12/30/21  0506   HGB 9.9* 9.3*       Lab Results   Component Value Date    WBC 8.33 12/30/2021    HGB 9.3 (L) 12/30/2021    HCT 27.6 (L) 12/30/2021     (H) 12/30/2021     (L) 12/30/2021       Lab Results   Component Value Date     (L) 12/30/2021    K 3.1 (L) 12/30/2021    CL 96 12/30/2021    CO2 19 (L) 12/30/2021    BUN 26 (H) 12/30/2021    CREATININE 1.3 12/30/2021    CALCIUM 7.9 (L)  12/30/2021    ANIONGAP 9 12/30/2021    ESTGFRAFRICA >60.0 12/30/2021    EGFRNONAA >60.0 12/30/2021       Lab Results   Component Value Date    ALT 42 12/30/2021     (H) 12/30/2021    ALKPHOS 133 12/30/2021    BILITOT 18.4 (H) 12/30/2021       Lab Results   Component Value Date    INR 1.5 (H) 12/31/2021    INR 1.4 (H) 12/30/2021    INR 1.3 (H) 12/15/2021           Significant Imaging:  Reviewed pertinent radiology findings.       Assessment/Plan:       MELD-Na score: 30 at 12/31/2021  5:22 AM  MELD score: 24 at 12/31/2021  5:22 AM  Calculated from:  Serum Creatinine: 1.3 mg/dL at 12/30/2021  5:06 AM  Serum Sodium: 124 mmol/L (Using min of 125 mmol/L) at 12/30/2021  5:06 AM  Total Bilirubin: 18.4 mg/dL at 12/30/2021  5:06 AM  INR(ratio): 1.5 at 12/31/2021  5:22 AM  Age: 46 years    Problem List:  1. Alcoholic liver disease  2. DONG  3. Hyponatremia  4. Ascite        Assessment:  Patient presents with DONG, hyponatremia after recent paracentesis. Differential includes ATN, bile cast nephropathy, HRS.  Would recommend workup of DONG and albumin administration for volume resuscitation.   The patient is motivated and planning to go to inpatient vs outpatient rehab upon discharge.    Recommendations:  - albumin 50 g b.i.d.  - 2 g sodium restriction, 1.5 L water restriction  -  enrollment in IOP upon discharge    Please obtain daily CBC, BMP, LFT, INR  Thank you for involving us in the care of Christopherkat Rolon Jr.. Please call with any additional questions, concerns or changes in the patient's clinical status.    Jazmine Douglas MD  Gastroenterology & Hepatology Fellow PGY V   [facility]

## 2021-12-31 NOTE — PLAN OF CARE
Problem: Adult Inpatient Plan of Care  Goal: Plan of Care Review  Outcome: Ongoing, Progressing  Goal: Patient-Specific Goal (Individualized)  Outcome: Ongoing, Progressing  Goal: Absence of Hospital-Acquired Illness or Injury  Outcome: Ongoing, Progressing  Goal: Optimal Comfort and Wellbeing  Outcome: Ongoing, Progressing  Goal: Readiness for Transition of Care  Outcome: Ongoing, Progressing     Problem: Fluid and Electrolyte Imbalance (Acute Kidney Injury/Impairment)  Goal: Fluid and Electrolyte Balance  Outcome: Ongoing, Progressing     Problem: Oral Intake Inadequate (Acute Kidney Injury/Impairment)  Goal: Optimal Nutrition Intake  Outcome: Ongoing, Progressing     Problem: Renal Function Impairment (Acute Kidney Injury/Impairment)  Goal: Effective Renal Function  Outcome: Ongoing, Progressing     Problem: Fluid and Electrolyte Imbalance (Liver Failure)  Goal: Fluid and Electrolyte Balance  Outcome: Ongoing, Progressing     Problem: Impaired Coagulation (Liver Failure)  Goal: Optimal Coagulation Function  Outcome: Ongoing, Progressing     Problem: Infection (Liver Failure)  Goal: Absence of Infection Signs and Symptoms  Outcome: Ongoing, Progressing     Problem: Fall Injury Risk  Goal: Absence of Fall and Fall-Related Injury  Outcome: Ongoing, Progressing

## 2021-12-31 NOTE — DISCHARGE INSTRUCTIONS
[facility]    Physical Address:  [address] [place] [address]    Phone: [phone]

## 2021-12-31 NOTE — PLAN OF CARE
Problem: Adult Inpatient Plan of Care  Goal: Plan of Care Review  Outcome: Ongoing, Progressing  Goal: Absence of Hospital-Acquired Illness or Injury  Outcome: Ongoing, Progressing  Goal: Optimal Comfort and Wellbeing  Outcome: Ongoing, Progressing  Goal: Readiness for Transition of Care  Outcome: Ongoing, Progressing     Problem: Fluid and Electrolyte Imbalance (Acute Kidney Injury/Impairment)  Goal: Fluid and Electrolyte Balance  Outcome: Ongoing, Progressing     Problem: Oral Intake Inadequate (Acute Kidney Injury/Impairment)  Goal: Optimal Nutrition Intake  Outcome: Ongoing, Progressing     Problem: Renal Function Impairment (Acute Kidney Injury/Impairment)  Goal: Effective Renal Function  Outcome: Ongoing, Progressing     Problem: Fluid and Electrolyte Imbalance (Liver Failure)  Goal: Fluid and Electrolyte Balance  Outcome: Ongoing, Progressing     Problem: Impaired Coagulation (Liver Failure)  Goal: Optimal Coagulation Function  Outcome: Ongoing, Progressing

## 2021-12-31 NOTE — NURSING
Assumed patient from BIB Enrique at 2000. Patient resting in bed with television on and spouse at bedside. Patient is AAOX4, vitals stable, room air, BP are a little soft but albumin is ordered to help potentially shift, ambulatory to bathroom for bowel and bladder. Patient states no pain or discomfort after procedure today. Bed in lowest position, call bell and tray table within the reach of the patient, environment clean and safe, emergency medical equipment at bedside.

## 2021-12-31 NOTE — CONSULTS
Thank you for your consult to [facility]. We have reviewed the patient chart. This patient does not meet criteria for [facility] medicine service at this time due to Patient has a condition likely to benefit from in-depth physical exam, or palpation / auscultation, or serial abdominal exams, or volume overload in setting of DONG. Will hand back to In-house service.     Anabela Berman MD  Hospital Medicine  [facility]

## 2022-01-03 ENCOUNTER — OFFICE VISIT (OUTPATIENT)
Dept: HEPATOLOGY | Facility: CLINIC | Age: 47
End: 2022-01-03
Attending: INTERNAL MEDICINE
Payer: MEDICAID

## 2022-01-03 ENCOUNTER — HOSPITAL ENCOUNTER (OUTPATIENT)
Dept: PREADMISSION TESTING | Facility: HOSPITAL | Age: 47
Discharge: HOME | End: 2022-01-03
Attending: NURSE PRACTITIONER
Payer: MEDICAID

## 2022-01-03 ENCOUNTER — TELEPHONE (OUTPATIENT)
Dept: INTERVENTIONAL RADIOLOGY/VASCULAR | Facility: HOSPITAL | Age: 47
End: 2022-01-03
Payer: MEDICAID

## 2022-01-03 ENCOUNTER — LAB VISIT (OUTPATIENT)
Dept: LAB | Facility: HOSPITAL | Age: 47
End: 2022-01-03
Attending: INTERNAL MEDICINE
Payer: MEDICAID

## 2022-01-03 ENCOUNTER — TELEPHONE (OUTPATIENT)
Dept: HEPATOLOGY | Facility: CLINIC | Age: 47
End: 2022-01-03

## 2022-01-03 ENCOUNTER — HOSPITAL ENCOUNTER (OUTPATIENT)
Dept: INTERVENTIONAL RADIOLOGY/VASCULAR | Facility: HOSPITAL | Age: 47
Discharge: HOME | End: 2022-01-03
Attending: NURSE PRACTITIONER | Admitting: RADIOLOGY
Payer: MEDICAID

## 2022-01-03 VITALS
RESPIRATION RATE: 18 BRPM | SYSTOLIC BLOOD PRESSURE: 126 MMHG | TEMPERATURE: 98 F | OXYGEN SATURATION: 100 % | DIASTOLIC BLOOD PRESSURE: 67 MMHG | HEART RATE: 88 BPM

## 2022-01-03 VITALS
RESPIRATION RATE: 18 BRPM | DIASTOLIC BLOOD PRESSURE: 71 MMHG | HEART RATE: 102 BPM | OXYGEN SATURATION: 99 % | TEMPERATURE: 98 F | SYSTOLIC BLOOD PRESSURE: 114 MMHG | WEIGHT: 259.06 LBS | HEIGHT: 75 IN | BODY MASS INDEX: 32.21 KG/M2

## 2022-01-03 VITALS
RESPIRATION RATE: 18 BRPM | TEMPERATURE: 98 F | HEART RATE: 83 BPM | DIASTOLIC BLOOD PRESSURE: 69 MMHG | SYSTOLIC BLOOD PRESSURE: 124 MMHG | OXYGEN SATURATION: 100 %

## 2022-01-03 DIAGNOSIS — K70.10 ALCOHOLIC HEPATITIS WITHOUT ASCITES: ICD-10-CM

## 2022-01-03 DIAGNOSIS — K72.90 DECOMPENSATED HEPATIC CIRRHOSIS: ICD-10-CM

## 2022-01-03 DIAGNOSIS — K74.60 DECOMPENSATED HEPATIC CIRRHOSIS: ICD-10-CM

## 2022-01-03 DIAGNOSIS — K74.60 DECOMPENSATED HEPATIC CIRRHOSIS: Primary | ICD-10-CM

## 2022-01-03 DIAGNOSIS — K72.90 DECOMPENSATED HEPATIC CIRRHOSIS: Primary | ICD-10-CM

## 2022-01-03 DIAGNOSIS — K70.31 ASCITES DUE TO ALCOHOLIC CIRRHOSIS: ICD-10-CM

## 2022-01-03 DIAGNOSIS — Z01.812 ENCOUNTER FOR PREOPERATIVE SCREENING LABORATORY TESTING FOR COVID-19 VIRUS: Primary | ICD-10-CM

## 2022-01-03 DIAGNOSIS — Z11.52 ENCOUNTER FOR PREOPERATIVE SCREENING LABORATORY TESTING FOR COVID-19 VIRUS: Primary | ICD-10-CM

## 2022-01-03 DIAGNOSIS — K70.10 ALCOHOLIC HEPATITIS WITHOUT ASCITES: Primary | ICD-10-CM

## 2022-01-03 LAB
ALBUMIN SERPL BCP-MCNC: 3 G/DL (ref 3.5–5.2)
ALP SERPL-CCNC: 131 U/L (ref 55–135)
ALT SERPL W/O P-5'-P-CCNC: 44 U/L (ref 10–44)
ANION GAP SERPL CALC-SCNC: 10 MMOL/L (ref 8–16)
APPEARANCE FLD: NORMAL
AST SERPL-CCNC: 103 U/L (ref 10–40)
BACTERIA SPEC AEROBE CULT: NO GROWTH
BASOPHILS # BLD AUTO: 0.08 K/UL (ref 0–0.2)
BASOPHILS NFR BLD: 0.7 % (ref 0–1.9)
BILIRUB SERPL-MCNC: 19.1 MG/DL (ref 0.1–1)
BODY FLD TYPE: NORMAL
BUN SERPL-MCNC: 15 MG/DL (ref 6–20)
CALCIUM SERPL-MCNC: 8.7 MG/DL (ref 8.7–10.5)
CHLORIDE SERPL-SCNC: 98 MMOL/L (ref 95–110)
CO2 SERPL-SCNC: 18 MMOL/L (ref 23–29)
COLOR FLD: YELLOW
CREAT SERPL-MCNC: 1.3 MG/DL (ref 0.5–1.4)
DIFFERENTIAL METHOD: ABNORMAL
EOSINOPHIL # BLD AUTO: 0.1 K/UL (ref 0–0.5)
EOSINOPHIL NFR BLD: 0.5 % (ref 0–8)
ERYTHROCYTE [DISTWIDTH] IN BLOOD BY AUTOMATED COUNT: 15.6 % (ref 11.5–14.5)
EST. GFR  (AFRICAN AMERICAN): >60 ML/MIN/1.73 M^2
EST. GFR  (NON AFRICAN AMERICAN): >60 ML/MIN/1.73 M^2
GLUCOSE SERPL-MCNC: 120 MG/DL (ref 70–110)
HCT VFR BLD AUTO: 28.1 % (ref 40–54)
HGB BLD-MCNC: 9.6 G/DL (ref 14–18)
IMM GRANULOCYTES # BLD AUTO: 0.04 K/UL (ref 0–0.04)
IMM GRANULOCYTES NFR BLD AUTO: 0.4 % (ref 0–0.5)
INR PPP: 1.5 (ref 0.8–1.2)
LYMPHOCYTES # BLD AUTO: 1.4 K/UL (ref 1–4.8)
LYMPHOCYTES NFR BLD: 11.9 % (ref 18–48)
LYMPHOCYTES NFR FLD MANUAL: 40 %
MCH RBC QN AUTO: 34.3 PG (ref 27–31)
MCHC RBC AUTO-ENTMCNC: 34.2 G/DL (ref 32–36)
MCV RBC AUTO: 100 FL (ref 82–98)
MESOTHL CELL NFR FLD MANUAL: 19 %
MONOCYTES # BLD AUTO: 1.1 K/UL (ref 0.3–1)
MONOCYTES NFR BLD: 9.5 % (ref 4–15)
MONOS+MACROS NFR FLD MANUAL: 37 %
NEUTROPHILS # BLD AUTO: 8.8 K/UL (ref 1.8–7.7)
NEUTROPHILS NFR BLD: 77 % (ref 38–73)
NEUTROPHILS NFR FLD MANUAL: 4 %
NRBC BLD-RTO: 0 /100 WBC
PLATELET # BLD AUTO: 86 K/UL (ref 150–450)
PLATELET BLD QL SMEAR: ABNORMAL
PMV BLD AUTO: 9.8 FL (ref 9.2–12.9)
POTASSIUM SERPL-SCNC: 3.9 MMOL/L (ref 3.5–5.1)
PROT SERPL-MCNC: 6.9 G/DL (ref 6–8.4)
PROTHROMBIN TIME: 15.2 SEC (ref 9–12.5)
RBC # BLD AUTO: 2.8 M/UL (ref 4.6–6.2)
SARS-COV-2 RDRP RESP QL NAA+PROBE: NEGATIVE
SODIUM SERPL-SCNC: 126 MMOL/L (ref 136–145)
WBC # BLD AUTO: 11.38 K/UL (ref 3.9–12.7)
WBC # FLD: 176 /CU MM

## 2022-01-03 PROCEDURE — 49083 ABD PARACENTESIS W/IMAGING: CPT | Performed by: RADIOLOGY

## 2022-01-03 PROCEDURE — 1111F DSCHRG MED/CURRENT MED MERGE: CPT | Mod: CPTII,,, | Performed by: INTERNAL MEDICINE

## 2022-01-03 PROCEDURE — 85025 COMPLETE CBC W/AUTO DIFF WBC: CPT | Performed by: INTERNAL MEDICINE

## 2022-01-03 PROCEDURE — 1159F MED LIST DOCD IN RCRD: CPT | Mod: CPTII,,, | Performed by: INTERNAL MEDICINE

## 2022-01-03 PROCEDURE — 49083 IR PARACENTESIS WITH IMAGING: ICD-10-PCS | Mod: ,,, | Performed by: RADIOLOGY

## 2022-01-03 PROCEDURE — 3078F DIAST BP <80 MM HG: CPT | Mod: CPTII,,, | Performed by: INTERNAL MEDICINE

## 2022-01-03 PROCEDURE — 1160F PR REVIEW ALL MEDS BY PRESCRIBER/CLIN PHARMACIST DOCUMENTED: ICD-10-PCS | Mod: CPTII,,, | Performed by: INTERNAL MEDICINE

## 2022-01-03 PROCEDURE — 3074F PR MOST RECENT SYSTOLIC BLOOD PRESSURE < 130 MM HG: ICD-10-PCS | Mod: CPTII,,, | Performed by: INTERNAL MEDICINE

## 2022-01-03 PROCEDURE — U0002 COVID-19 LAB TEST NON-CDC: HCPCS | Performed by: NURSE PRACTITIONER

## 2022-01-03 PROCEDURE — 3074F SYST BP LT 130 MM HG: CPT | Mod: CPTII,,, | Performed by: INTERNAL MEDICINE

## 2022-01-03 PROCEDURE — 3078F PR MOST RECENT DIASTOLIC BLOOD PRESSURE < 80 MM HG: ICD-10-PCS | Mod: CPTII,,, | Performed by: INTERNAL MEDICINE

## 2022-01-03 PROCEDURE — 85610 PROTHROMBIN TIME: CPT | Performed by: INTERNAL MEDICINE

## 2022-01-03 PROCEDURE — 80053 COMPREHEN METABOLIC PANEL: CPT | Performed by: INTERNAL MEDICINE

## 2022-01-03 PROCEDURE — 99213 PR OFFICE/OUTPT VISIT, EST, LEVL III, 20-29 MIN: ICD-10-PCS | Mod: S$PBB,,, | Performed by: INTERNAL MEDICINE

## 2022-01-03 PROCEDURE — P9047 ALBUMIN (HUMAN), 25%, 50ML: HCPCS | Mod: JG | Performed by: RADIOLOGY

## 2022-01-03 PROCEDURE — 99213 OFFICE O/P EST LOW 20 MIN: CPT | Mod: S$PBB,,, | Performed by: INTERNAL MEDICINE

## 2022-01-03 PROCEDURE — 99999 PR PBB SHADOW E&M-EST. PATIENT-LVL IV: ICD-10-PCS | Mod: PBBFAC,,, | Performed by: INTERNAL MEDICINE

## 2022-01-03 PROCEDURE — C1729 CATH, DRAINAGE: HCPCS

## 2022-01-03 PROCEDURE — 36415 COLL VENOUS BLD VENIPUNCTURE: CPT | Performed by: INTERNAL MEDICINE

## 2022-01-03 PROCEDURE — 89051 BODY FLUID CELL COUNT: CPT | Performed by: NURSE PRACTITIONER

## 2022-01-03 PROCEDURE — 3008F BODY MASS INDEX DOCD: CPT | Mod: CPTII,,, | Performed by: INTERNAL MEDICINE

## 2022-01-03 PROCEDURE — 1160F RVW MEDS BY RX/DR IN RCRD: CPT | Mod: CPTII,,, | Performed by: INTERNAL MEDICINE

## 2022-01-03 PROCEDURE — 63600175 PHARM REV CODE 636 W HCPCS: Mod: JG | Performed by: RADIOLOGY

## 2022-01-03 PROCEDURE — 99999 PR PBB SHADOW E&M-EST. PATIENT-LVL IV: CPT | Mod: PBBFAC,,, | Performed by: INTERNAL MEDICINE

## 2022-01-03 PROCEDURE — 1159F PR MEDICATION LIST DOCUMENTED IN MEDICAL RECORD: ICD-10-PCS | Mod: CPTII,,, | Performed by: INTERNAL MEDICINE

## 2022-01-03 PROCEDURE — 99214 OFFICE O/P EST MOD 30 MIN: CPT | Mod: PBBFAC,25 | Performed by: INTERNAL MEDICINE

## 2022-01-03 PROCEDURE — 3008F PR BODY MASS INDEX (BMI) DOCUMENTED: ICD-10-PCS | Mod: CPTII,,, | Performed by: INTERNAL MEDICINE

## 2022-01-03 PROCEDURE — 1111F PR DISCHARGE MEDS RECONCILED W/ CURRENT OUTPATIENT MED LIST: ICD-10-PCS | Mod: CPTII,,, | Performed by: INTERNAL MEDICINE

## 2022-01-03 RX ORDER — ALBUMIN HUMAN 250 G/1000ML
25 SOLUTION INTRAVENOUS ONCE AS NEEDED
Status: COMPLETED | OUTPATIENT
Start: 2022-01-03 | End: 2022-01-03

## 2022-01-03 RX ADMIN — ALBUMIN (HUMAN) 25 G: 5 SOLUTION INTRAVENOUS at 04:01

## 2022-01-03 NOTE — DISCHARGE INSTRUCTIONS
BATHING:  ? You may shower tomorrow.  DRESSING:  ? Remove dressing tomorrow.        ACTIVITY LEVEL: If you have received sedation or an anesthetic, you may feel sleepy for several hours. Rest until you are more awake. Gradually resume your normal activities    Do not drive, drink alcohol, or sign legal documents for 24 hours, or if taking narcotic pain medication.      DIET: You may resume your home diet. If nausea is present, increase your diet gradually with fluids and bland foods.    Medications: Pain medication should be taken only if needed and as directed. If antibiotics are prescribed, the medication should be taken until completed. You will be given an updated list of you medications.  ? No driving, alcoholic beverages or signing legal documents for next 24 hours if you have had sedation, or while taking pain medication    CALL THE DOCTOR:   For any obvious bleeding (some dried blood over the incision is normal).     Redness, swelling, foul smell around incision or fever over 101.  Shortness of breath.  Persistent pain or nausea not relieved by medication.  Call  [phone]     to speak with an Interventional Radiologist    If any unusual problems or difficulties occur contact your doctor. If you cannot contact your doctor but feel your signs and symptoms warrant a physicians attention return to the emergency room.

## 2022-01-03 NOTE — TELEPHONE ENCOUNTER
Called patient to notify that he will need a COVID test before paracentesis today. Received no answer. Unable to leave voicemail.

## 2022-01-03 NOTE — PROGRESS NOTES
[facility] - Established Patient    Chief Complaint: Follow-up for recent hospital admission with ascites    HPI: This is a 46 y.o. male with PMH noted below, here for follow-up of decompensated alcoholic cirrhosis.     Admitted to the ICU in early 12/2021 with alcoholic hepatitis, hyponatremia, and GI bleeding. EGD revealed large, non-bleeding esophageal varices s/p EBL. Admitted to the hospital again on 12/29 with DONG and recurrent hyponatremia and was found to have large-volume ascites requiring therapeutic paracentesis (6L removed).       EGD (12/2/2021): One column of large esophageal varices in the mid-distal esophagus s/p EBL x1. Portal hypertensive gastropathy.      Abd US 11/30/21 -- hepatomegaly with steatosis, no liver lesions, small-volume ascites      Interval history:  Reports ongoing abstinence and would like to establish care with Psychiatry.   Admits to abdominal bloating but denies any abdominal pain, nausea/vomiting, fever or chills. Denies lower extremity edema.   Taking lactulose multiple times daily and having 5-6 bowel movements daily. Not taking any diuretics or beta blockers.     Fibroscan -  KPa 71.9, F4  , S3     MELD-Na score: 30 at 12/31/2021 10:35 AM  MELD score: 24 at 12/31/2021 10:35 AM  Calculated from:  Serum Creatinine: 1.3 mg/dL at 12/31/2021 10:35 AM  Serum Sodium: 125 mmol/L at 12/31/2021 10:35 AM  Total Bilirubin: 16.1 mg/dL at 12/31/2021 10:35 AM  INR(ratio): 1.5 at 12/31/2021  5:22 AM  Age: 46 years        Current Outpatient Medications   Medication Sig Dispense Refill    folic acid (FOLVITE) 1 MG tablet TAKE 1 TABLET(1 MG) BY MOUTH EVERY DAY 30 tablet 2    ketoconazole (NIZORAL) 2 % cream 2 (two) times a day. to affected area      lactulose (CHRONULAC) 10 gram/15 mL solution Take 30 mLs (20 g total) by mouth 2 (two) times daily. 1800 mL 0    multivitamin capsule Take 1 capsule by mouth once daily.      pantoprazole (PROTONIX) 40 MG tablet Take 1  tablet (40 mg total) by mouth before breakfast. 90 tablet 1    thiamine 100 MG tablet Take 1 tablet (100 mg total) by mouth once daily. 30 tablet 11    triamcinolone acetonide 0.025% (KENALOG) 0.025 % cream APPLY A THIN LAYER TO INFLAMED AREA TWICE DAILY       No current facility-administered medications for this visit.     Review of patient's allergies indicates:  No Known Allergies    Allergy and medication list reviewed and updated.      Past Medical History:  has a past medical history of DONG (acute kidney injury) (12/1/2021), Anxiety, Arthritis, Liver disease, Osteoarthritis, and Other meniscus derangements, other medial meniscus, left knee (1/7/2019).    Past Surgical History:  has a past surgical history that includes Arthroscopy of knee (Left, 1/7/2019); Esophagogastroduodenoscopy (N/A, 4/8/2019); Esophagogastroduodenoscopy (N/A, 7/27/2020); Colonoscopy (N/A, 7/27/2020); Knee surgery; and Esophagogastroduodenoscopy (N/A, 12/2/2021).    The patient's social and family history were reviewed and updated in the appropriate section of the electronic medical record.       Review of Systems   Constitutional: Positive for fatigue. Negative for weight changes.   Respiratory: Negative for shortness of breath.    Cardiovascular: Negative for leg swelling.  Gastrointestinal: Positive for bloating. Negative for abdominal distention, abdominal pain, diarrhea, constipation, nausea, and vomiting. Negative for blood in stool, melena, or hematemesis.  Musculoskeletal: Negative for myalgias or arthralgias.  Skin: Negative for itching or rash.  Neurological: Negative for dizziness or tremors. Negative for confusion, slowed mentation, or memory loss.   Hematological: Does not bruise/bleed easily.   Psychiatric: Negative for mood changes or confusion.      Physical Exam   Constitutional: Well-nourished. No distress. Alert and oriented to person, place, and time.  Eyes: Scleral icterus present.    Pulmonary/Chest: Respiratory  "effort normal, no respiratory distress.   Abdominal: Mild distension, non-tender to palpation.   Extremities: Trace pedal edema bilaterally.   Neurological: No tremor. Gait normal. No asterixis.    Skin: No jaundice. No spider angiomas or palmar erythema.  Psychiatric: Normal mood and affect. Speech, behavior, and thought content normal. No depression or anxiety noted.     Vitals reviewed.  /71 (BP Location: Right arm, Patient Position: Sitting, BP Method: Medium (Automatic))   Pulse 102   Temp 97.5 °F (36.4 °C) (Oral)   Resp 18   Ht 6' 3" (1.905 m)   Wt 117.5 kg (259 lb 0.7 oz)   SpO2 99%   BMI 32.38 kg/m²       Labs - I have personally reviewed pertinent laboratory findings:    Lab Results   Component Value Date    ALT 38 12/31/2021     (H) 12/31/2021    ALKPHOS 139 (H) 12/31/2021    BILITOT 16.1 (H) 12/31/2021    ALBUMIN 2.6 (L) 12/31/2021    INR 1.5 (H) 12/31/2021       Lab Results   Component Value Date    WBC 8.33 12/30/2021    HGB 9.3 (L) 12/30/2021    HCT 27.6 (L) 12/30/2021     (H) 12/30/2021     (L) 12/30/2021       Lab Results   Component Value Date     (L) 12/31/2021    K 3.6 12/31/2021    BUN 20 12/31/2021    CREATININE 1.3 12/31/2021    ESTGFRAFRICA >60.0 12/31/2021    EGFRNONAA >60.0 12/31/2021       Lab Results   Component Value Date    FERRITIN 1,017 (H) 04/07/2019    FESATURATED 52 (H) 04/07/2019    PETH Negative 09/14/2020    HEPBSAG Negative 10/24/2019    HEPBIGM Negative 10/24/2019    HEPBCAB Negative 04/07/2019    HEPCAB Negative 11/30/2021    HEPAIGM Negative 10/24/2019    UCQ89HHAW Negative 11/30/2021       Lab Results   Component Value Date    AFP 3.5 11/30/2021       Diagnostic Studies - I have personally reviewed the following result reports:  Abdominal US - 11/30/2021  EGD - 12/2/2021  Colonoscopy - 7/27/20         ASSESSMENT / PLAN:  46 y.o. male with:    1. Decompensated cirrhosis due to alcohol, complicated by mild HE and large-volume ascites  -- " Recent admission for DONG and hyponatremia; also required therapeutic paracentesis for large-volume ascites   -- MELD-Na 30 on 12/31/2021; Repeat MELD labs today  -- Defer initiation of diuretics for now given hyponatremia and recent DONG  -- Continue HCC screening every 6 months with ultrasound and AFP  -- Needs vaccines for hepatitis B    2. Portal HTN, esophageal varices s/p EBL, h/o GIB  -- EGD on 2/9/2022    3. Hepatic encephalopathy  -- Continue lactulose, titrate for ~3-4 BMs / day    4. Alcohol use disorder (h/o 3 admissions for alc hep)   -- Denies issues with ongoing abstinence (last alcoholic drink on 11/29/2021).  Strongly encouraged to continue abstinence. Discussed that even small amounts of alcohol are not safe. Will place referral to psychiatry and encouraged AA/rehab to assist with long-term abstinence.    5. History of withdrawal seizures  -- Continue Keppra         Orders Placed This Encounter   Procedures    CBC Auto Differential    Comprehensive Metabolic Panel    Protime-INR       Return to clinic in 1 month. Repeat labs today.     Thank you for allowing me to participate in the care of Nilesh Rolon Jr. Case was discussed with Dr. Mixon.     Prem Jurado MD  [employer] Gastro[facility]ology Fellow, PGY-4

## 2022-01-03 NOTE — H&P
Radiology History & Physical      SUBJECTIVE:     Chief Complaint: Recurrent painful, tense ascites     History of Present Illness:  Nilesh Rolon Jr. is a 46 y.o. male with pertinent PMHx of decompensated EtOH hepatic cirrhosis complicated by recurrent abdominal distension and pain 2/2 recurrent painful, tense ascitesrequiring frequent therapeutic large-volume paracentesis.      A new outpatient IR consult received for US-guided percutaneous RUQ-approach therapeutic LVP.    Past Medical History:   Diagnosis Date    DONG (acute kidney injury) 12/1/2021    Anxiety     Arthritis     Liver disease     Osteoarthritis     Other meniscus derangements, other medial meniscus, left knee 1/7/2019     Past Surgical History:   Procedure Laterality Date    ARTHROSCOPY OF KNEE Left 1/7/2019    Procedure: ARTHROSCOPY, KNEE;  Surgeon: Derick Kirby MD;  Location: [facility] OR;  Service: Orthopedics;  Laterality: Left;    COLONOSCOPY N/A 7/27/2020    Procedure: COLONOSCOPY;  Surgeon: Sotero Zapata MD;  Location: [facility];  Service: Endoscopy;  Laterality: N/A;    ESOPHAGOGASTRODUODENOSCOPY N/A 4/8/2019    Procedure: EGD (ESOPHAGOGASTRODUODENOSCOPY);  Surgeon: Bonita Kendall MD;  Location: [facility];  Service: Endoscopy;  Laterality: N/A;    ESOPHAGOGASTRODUODENOSCOPY N/A 7/27/2020    Procedure: EGD (ESOPHAGOGASTRODUODENOSCOPY);  Surgeon: Sotero Zapata MD;  Location: [facility];  Service: Endoscopy;  Laterality: N/A;    ESOPHAGOGASTRODUODENOSCOPY N/A 12/2/2021    Procedure: EGD (ESOPHAGOGASTRODUODENOSCOPY);  Surgeon: Montez Guerra MD;  Location: [facility] ([address] [place] [address]);  Service: Endoscopy;  Laterality: N/A;    KNEE SURGERY       Home Meds:   Prior to Admission medications    Medication Sig Start Date End Date Taking? Authorizing Provider   folic acid (FOLVITE) 1 MG tablet TAKE 1 TABLET(1 MG) BY MOUTH EVERY DAY 11/6/21   Dianne Jimenez NP   ketoconazole (NIZORAL) 2 % cream 2 (two) times a day. to affected area  12/29/20   Historical Provider   lactulose (CHRONULAC) 10 gram/15 mL solution Take 30 mLs (20 g total) by mouth 2 (two) times daily. 12/31/21   Tere Sweet MD   multivitamin capsule Take 1 capsule by mouth once daily.    Historical Provider   pantoprazole (PROTONIX) 40 MG tablet Take 1 tablet (40 mg total) by mouth before breakfast. 12/2/21 5/31/22  Montez Guerra MD   thiamine 100 MG tablet Take 1 tablet (100 mg total) by mouth once daily. 12/16/21   Cynthia Pathak MD   triamcinolone acetonide 0.025% (KENALOG) 0.025 % cream APPLY A THIN LAYER TO INFLAMED AREA TWICE DAILY 3/19/21   Historical Provider   benzoyl peroxide 2.5 % gel 2 (two) times a day. to affected area 2/25/21 1/3/22  Historical Provider   metroNIDAZOLE (METROGEL) 0.75 % gel 2 (two) times a day. to affected area 3/12/21 1/3/22  Historical Provider   propranoloL (INDERAL) 20 MG tablet Take 0.5 tablets (10 mg total) by mouth 2 (two) times daily. 12/31/21 1/3/22  Tere Sweet MD     Anticoagulants/Antiplatelets: no anticoagulation    Allergies: Review of patient's allergies indicates:  No Known Allergies    Sedation History:  no adverse reactions     Review of Systems:   Hematological: no known coagulopathies  Respiratory: no cough, shortness of breath, or wheezing  Cardiovascular: no chest pain or dyspnea on exertion  Gastrointestinal: positive for - abdominal pain and distension  Genito-Urinary: no dysuria, trouble voiding, or hematuria  Musculoskeletal: negative  Neurological: no TIA or stroke symptoms       OBJECTIVE:     Vital Signs (Most Recent)     Physical Exam:  General: no acute distress  Mental Status: alert and oriented to person, place and time  HEENT: normocephalic, atraumatic  Chest: unlabored breathing  Heart: regular heart rate  Abdomen: +distended. No TTP/r/g.  Extremity: moves all extremities    Laboratory  Lab Results   Component Value Date    INR 1.5 (H) 01/03/2022       Lab Results   Component Value Date    WBC  11.38 01/03/2022    HGB 9.6 (L) 01/03/2022    HCT 28.1 (L) 01/03/2022     (H) 01/03/2022    PLT 86 (L) 01/03/2022      Lab Results   Component Value Date     (H) 12/31/2021     (L) 12/31/2021    K 3.6 12/31/2021    CL 99 12/31/2021    CO2 19 (L) 12/31/2021    BUN 20 12/31/2021    CREATININE 1.3 12/31/2021    CALCIUM 8.0 (L) 12/31/2021    MG 2.1 12/13/2021    ALT 38 12/31/2021     (H) 12/31/2021    ALBUMIN 2.6 (L) 12/31/2021    BILITOT 16.1 (H) 12/31/2021    BILIDIR 3.7 (H) 07/27/2020     ASSESSMENT/PLAN:     46 y.o. male with pertinent PMHx of decompensated EtOH hepatic cirrhosis complicated by recurrent abdominal distension and pain 2/2 recurrent painful, tense ascitesrequiring frequent therapeutic large-volume paracentesis.     1. Recurrent painful, tense ascites - Will attempt US-guided percutaneous RUQ-approach therapeutic LVP with local anesthetic only and albumin infusion post PRN as indicated per institutional protocol.     Risks (including, but not limited to, pain, bleeding, infection, damage to nearby structures, failure to obtain sufficient material for a diagnosis, the need for additional procedures, and death), benefits, and alternatives were discussed with the patient. All questions were answered to the best of my abilities. The patient wishes to proceed with the procedure. Written informed consent was obtained.     Thank you for considering IR for the care of your patient.      Chris Chahal MD  Interventional Radiology

## 2022-01-03 NOTE — BRIEF OP NOTE
Radiology Post-Procedure Note     Pre Op Diagnosis: Recurrent painful, tense ascites  Post Op Diagnosis: Same     Procedure: 1. US-guided percutaneous RUQ-approach therapeutic LVP     Procedure performed by: Chris Chahal MD     Written Informed Consent Obtained: Yes  Specimen Removed: YES, 3,200-cc of thin, bright-yellow ascitic fluid  Estimated Blood Loss: none     Findings:   Successful US-guided percutaneous RUQ-approach therapeutic LVP with local anesthetic only and albumin infusion post PRN as indicated per institutional protocol. Patient tolerated the procedure well. No immediate post-procedural complications noted.      Thank you for considering IR for the care of your patient.      Chris Chahal MD  Interventional Radiology

## 2022-01-03 NOTE — DISCHARGE SUMMARY
Radiology Discharge Summary      Hospital Course: No complications    Admit Date: 1/3/2022  Discharge Date: 01/03/2022     Instructions Given to Patient: Yes  Diet: Resume prior diet  Activity: activity as tolerated    Description of Condition on Discharge: Good  Vital Signs (Most Recent): BP: 130/67 (01/03/22 1504)    Discharge Disposition: Home    Discharge Diagnosis:  46 y.o. male with decompensated EtOH hepatic cirrhosis complicated by recurrent abdominal distension and pain 2/2 recurrent painful, tense ascites s/p successful US-guided percutaneous RUQ-approach therapeutic LVP with local anesthetic only and albumin infusion post PRN as indicated per institutional protocol. Patient tolerated the procedure well. No immediate post-procedural complications noted.      Thank you for considering IR for the care of your patient.      Chris Chahal MD  Interventional Radiology

## 2022-01-04 ENCOUNTER — TELEPHONE (OUTPATIENT)
Dept: TRANSPLANT | Facility: CLINIC | Age: 47
End: 2022-01-04
Payer: MEDICAID

## 2022-01-04 ENCOUNTER — TELEPHONE (OUTPATIENT)
Dept: HEPATOLOGY | Facility: CLINIC | Age: 47
End: 2022-01-04
Payer: MEDICAID

## 2022-01-04 ENCOUNTER — PATIENT MESSAGE (OUTPATIENT)
Dept: TRANSPLANT | Facility: HOSPITAL | Age: 47
End: 2022-01-04
Payer: MEDICAID

## 2022-01-04 NOTE — TELEPHONE ENCOUNTER
Regarding: Referral  Pt's mom call in regards to a referral being sent to the [facility] requesting call back.   Call .    Call returned to Laurel, mother of the patient at [phone].  She stated I was told first by Juliana when he got out of the hospital on the 30th, and then Dr Mixon that they will be placing a referral for Nilesh to go to [facility]. I have not heard from anyone yet. He is getting desperate.    I will send the message to the Provider. Please get back with us in a couple of days if you do not hear anything. Voiced understanding.

## 2022-01-04 NOTE — TELEPHONE ENCOUNTER
called patient to answer any questions patient had on alcohol use resources. Patient reported needing resources sent to him so he is able to call and check availability to enroll in a program. Per hepatologist request to get patient enrolled in [facility]  ([phone]) with referral needed,  called [facility] to get information on referall and enrollment. [facility] requires the patient to be actively drinking a month before enrollment. Patient reported being sober for 2 months which would not make him a candidate for the inpatient program at [facility] but could make him a candidate for the intensive outpatient program. Social work reported this information to the patient and also sent patient other intensive outpatient programs in his area and AA resources. Patient verbalized understanding. Social work remains available.

## 2022-01-04 NOTE — TELEPHONE ENCOUNTER
----- Message from Miko Porter sent at 1/4/2022 12:49 PM CST -----  Regarding: Referral  Pt's mom call in regards to a referral being sent to the [facility] requesting call back    Call

## 2022-01-05 LAB — BACTERIA SPEC ANAEROBE CULT: NORMAL

## 2022-01-06 LAB
PETH 16:0/18.1 (POPETH): <10 NG/ML
PETH 16:0/18.2 (PLPETH): <10 NG/ML

## 2022-01-07 ENCOUNTER — TELEPHONE (OUTPATIENT)
Dept: HEPATOLOGY | Facility: CLINIC | Age: 47
End: 2022-01-07
Payer: MEDICAID

## 2022-01-07 ENCOUNTER — TELEPHONE (OUTPATIENT)
Dept: TRANSPLANT | Facility: CLINIC | Age: 47
End: 2022-01-07
Payer: MEDICAID

## 2022-01-07 NOTE — TELEPHONE ENCOUNTER
----- Message from Miko Porter sent at 1/7/2022  1:07 PM CST -----  Regarding: call back  Pt's mom call to inform Dr Apurva bain pt's pulse is high    Call

## 2022-01-07 NOTE — TELEPHONE ENCOUNTER
Returned mothers call. Mother is concerned that patient pulse is high. She said it was 108 this morning, but this pulse is not consistent all the time it can be 100 or 101 sometimes. Will route message to Dr. Mixon to see what he advices.

## 2022-01-07 NOTE — TELEPHONE ENCOUNTER
sent referral to [facility] ([phone]) via E-mail to their  as requested by patients hepatologist. Per in take coordinator at [facility] patient does not qualify for their inpatient program due to being more than one month sober and also does not qualify for their IOP due to not being inpatient at [facility]. Patient was sent other IOP resources in his area. Patient to contact IOP of his choice for enrollment.

## 2022-01-08 ENCOUNTER — HOSPITAL ENCOUNTER (EMERGENCY)
Facility: HOSPITAL | Age: 47
Discharge: HOME | End: 2022-01-08
Attending: EMERGENCY MEDICINE
Payer: MEDICAID

## 2022-01-08 VITALS
BODY MASS INDEX: 32.33 KG/M2 | OXYGEN SATURATION: 100 % | HEIGHT: 75 IN | SYSTOLIC BLOOD PRESSURE: 124 MMHG | WEIGHT: 260 LBS | TEMPERATURE: 98 F | HEART RATE: 90 BPM | RESPIRATION RATE: 18 BRPM | DIASTOLIC BLOOD PRESSURE: 79 MMHG

## 2022-01-08 DIAGNOSIS — K74.60 HEPATIC CIRRHOSIS, UNSPECIFIED HEPATIC CIRRHOSIS TYPE, UNSPECIFIED WHETHER ASCITES PRESENT: Primary | ICD-10-CM

## 2022-01-08 DIAGNOSIS — R06.02 SHORTNESS OF BREATH: ICD-10-CM

## 2022-01-08 DIAGNOSIS — R14.0 ABDOMINAL DISTENSION: ICD-10-CM

## 2022-01-08 LAB
ALBUMIN SERPL BCP-MCNC: 2.8 G/DL (ref 3.5–5.2)
ALP SERPL-CCNC: 139 U/L (ref 55–135)
ALT SERPL W/O P-5'-P-CCNC: 37 U/L (ref 10–44)
ANION GAP SERPL CALC-SCNC: 12 MMOL/L (ref 8–16)
AST SERPL-CCNC: 86 U/L (ref 10–40)
BASOPHILS # BLD AUTO: 0.04 K/UL (ref 0–0.2)
BASOPHILS NFR BLD: 0.5 % (ref 0–1.9)
BILIRUB SERPL-MCNC: 16.3 MG/DL (ref 0.1–1)
BNP SERPL-MCNC: 52 PG/ML (ref 0–99)
BUN SERPL-MCNC: 12 MG/DL (ref 6–20)
CALCIUM SERPL-MCNC: 8.3 MG/DL (ref 8.7–10.5)
CHLORIDE SERPL-SCNC: 96 MMOL/L (ref 95–110)
CO2 SERPL-SCNC: 15 MMOL/L (ref 23–29)
CREAT SERPL-MCNC: 1.2 MG/DL (ref 0.5–1.4)
CTP QC/QA: YES
DIFFERENTIAL METHOD: ABNORMAL
EOSINOPHIL # BLD AUTO: 0.1 K/UL (ref 0–0.5)
EOSINOPHIL NFR BLD: 0.9 % (ref 0–8)
ERYTHROCYTE [DISTWIDTH] IN BLOOD BY AUTOMATED COUNT: 16.2 % (ref 11.5–14.5)
EST. GFR  (AFRICAN AMERICAN): >60 ML/MIN/1.73 M^2
EST. GFR  (NON AFRICAN AMERICAN): >60 ML/MIN/1.73 M^2
GLUCOSE SERPL-MCNC: 126 MG/DL (ref 70–110)
HCT VFR BLD AUTO: 24.4 % (ref 40–54)
HGB BLD-MCNC: 8.7 G/DL (ref 14–18)
IMM GRANULOCYTES # BLD AUTO: 0.04 K/UL (ref 0–0.04)
IMM GRANULOCYTES NFR BLD AUTO: 0.5 % (ref 0–0.5)
INR PPP: 1.5 (ref 0.8–1.2)
LYMPHOCYTES # BLD AUTO: 1 K/UL (ref 1–4.8)
LYMPHOCYTES NFR BLD: 10.9 % (ref 18–48)
MCH RBC QN AUTO: 34.8 PG (ref 27–31)
MCHC RBC AUTO-ENTMCNC: 35.7 G/DL (ref 32–36)
MCV RBC AUTO: 98 FL (ref 82–98)
MONOCYTES # BLD AUTO: 0.8 K/UL (ref 0.3–1)
MONOCYTES NFR BLD: 9.4 % (ref 4–15)
NEUTROPHILS # BLD AUTO: 6.8 K/UL (ref 1.8–7.7)
NEUTROPHILS NFR BLD: 77.8 % (ref 38–73)
NRBC BLD-RTO: 0 /100 WBC
PLATELET # BLD AUTO: 77 K/UL (ref 150–450)
PMV BLD AUTO: 10.4 FL (ref 9.2–12.9)
POTASSIUM SERPL-SCNC: 4.4 MMOL/L (ref 3.5–5.1)
PROT SERPL-MCNC: 6.5 G/DL (ref 6–8.4)
PROTHROMBIN TIME: 15.2 SEC (ref 9–12.5)
RBC # BLD AUTO: 2.5 M/UL (ref 4.6–6.2)
SARS-COV-2 RDRP RESP QL NAA+PROBE: NEGATIVE
SODIUM SERPL-SCNC: 123 MMOL/L (ref 136–145)
TROPONIN I SERPL DL<=0.01 NG/ML-MCNC: <0.006 NG/ML (ref 0–0.03)
WBC # BLD AUTO: 8.71 K/UL (ref 3.9–12.7)

## 2022-01-08 PROCEDURE — 93010 EKG 12-LEAD: ICD-10-PCS | Mod: ,,, | Performed by: INTERNAL MEDICINE

## 2022-01-08 PROCEDURE — 80053 COMPREHEN METABOLIC PANEL: CPT | Performed by: EMERGENCY MEDICINE

## 2022-01-08 PROCEDURE — 93005 ELECTROCARDIOGRAM TRACING: CPT

## 2022-01-08 PROCEDURE — 85025 COMPLETE CBC W/AUTO DIFF WBC: CPT | Performed by: EMERGENCY MEDICINE

## 2022-01-08 PROCEDURE — 83880 ASSAY OF NATRIURETIC PEPTIDE: CPT | Performed by: EMERGENCY MEDICINE

## 2022-01-08 PROCEDURE — U0002 COVID-19 LAB TEST NON-CDC: HCPCS | Performed by: EMERGENCY MEDICINE

## 2022-01-08 PROCEDURE — 84484 ASSAY OF TROPONIN QUANT: CPT | Performed by: EMERGENCY MEDICINE

## 2022-01-08 PROCEDURE — 99285 EMERGENCY DEPT VISIT HI MDM: CPT | Mod: 25

## 2022-01-08 PROCEDURE — 85610 PROTHROMBIN TIME: CPT | Performed by: EMERGENCY MEDICINE

## 2022-01-08 PROCEDURE — 93010 ELECTROCARDIOGRAM REPORT: CPT | Mod: ,,, | Performed by: INTERNAL MEDICINE

## 2022-01-08 PROCEDURE — 36000 PLACE NEEDLE IN VEIN: CPT

## 2022-01-08 NOTE — ED PROVIDER NOTES
Encounter Date: 1/8/2022       History     Chief Complaint   Patient presents with    Shortness of Breath     Pt presents to ED with c/o increased SOB for 2 weeks. Pt reports he takes fluid pills but they are not working. Pt denies CP at triage.      Patient is a 45 yo M with a pmhx of ETOH abuse, cirrhosis, osteoarthritis who presents ED with complaint of shortness of breath.  Patient reports worsening shortness of breath for the past 3 days associated with exertion.  He does report intermittent rectal bleeding described as bright red blood, and he states that he has been diagnosed with esophageal varices and hemorrhoids in the past.  He denies any associated chest pain, abdominal pain, cough, fever or chills.  He states that he has not had a drink for several months, and  he denies any current or recent episodes of anxiety, tremulousness,  seizures or blackouts.  He does have a history of DTs in the past, however.  Of note is the fact that he states he undergoes therapeutic paracentesis weekly (he is scheduled for this procedure next Monday at [facility]). He states that he is being followed by hepatology at [facility].         Review of patient's allergies indicates:  No Known Allergies  Past Medical History:   Diagnosis Date    DONG (acute kidney injury) 12/1/2021    Anxiety     Arthritis     Cirrhosis     Hypertension     Liver disease     Osteoarthritis     Other meniscus derangements, other medial meniscus, left knee 1/7/2019     Past Surgical History:   Procedure Laterality Date    ARTHROSCOPY OF KNEE Left 1/7/2019    Procedure: ARTHROSCOPY, KNEE;  Surgeon: Derick Kirby MD;  Location: [facility] OR;  Service: Orthopedics;  Laterality: Left;    COLONOSCOPY N/A 7/27/2020    Procedure: COLONOSCOPY;  Surgeon: Sotero Zapata MD;  Location: [facility] ENDO;  Service: Endoscopy;  Laterality: N/A;    ESOPHAGOGASTRODUODENOSCOPY N/A 4/8/2019    Procedure: EGD (ESOPHAGOGASTRODUODENOSCOPY);  Surgeon: Bonita Kendall,  MD;  Location: [facility];  Service: Endoscopy;  Laterality: N/A;    ESOPHAGOGASTRODUODENOSCOPY N/A 7/27/2020    Procedure: EGD (ESOPHAGOGASTRODUODENOSCOPY);  Surgeon: Sotero Zapata MD;  Location: [facility];  Service: Endoscopy;  Laterality: N/A;    ESOPHAGOGASTRODUODENOSCOPY N/A 12/2/2021    Procedure: EGD (ESOPHAGOGASTRODUODENOSCOPY);  Surgeon: Montez Guerra MD;  Location: [facility]);  Service: Endoscopy;  Laterality: N/A;    KNEE SURGERY       Family History   Problem Relation Age of Onset    Birth defects Neg Hx      Social History     Tobacco Use    Smoking status: Never Smoker    Smokeless tobacco: Never Used    Tobacco comment: smokes Marijuana only   Substance Use Topics    Alcohol use: Not Currently     Comment: last drink 11/28, drinks 3-4 a day    Drug use: No     Review of Systems   Constitutional: Positive for fatigue. Negative for chills, fever and unexpected weight change.   Respiratory: Positive for shortness of breath. Negative for cough, chest tightness, wheezing and stridor.    Cardiovascular: Negative for chest pain, palpitations and leg swelling.   Gastrointestinal: Positive for blood in stool. Negative for abdominal pain, constipation, nausea and vomiting.   Genitourinary: Negative for frequency.   Musculoskeletal: Negative for back pain.   Allergic/Immunologic: Negative for immunocompromised state.   Neurological: Negative for dizziness and headaches.   Psychiatric/Behavioral: Negative for agitation and confusion.       Physical Exam     Initial Vitals [01/08/22 0907]   BP Pulse Resp Temp SpO2   (!) 163/89 108 (!) 24 99.9 °F (37.7 °C) 100 %      MAP       --         Physical Exam    Nursing note and vitals reviewed.  Constitutional: He appears well-developed and well-nourished.   HENT:   Head: Normocephalic and atraumatic.   Eyes: EOM are normal. Pupils are equal, round, and reactive to light. Scleral icterus is present.   Scleral icterus    Neck: Neck supple.   Normal  range of motion.  Cardiovascular: Normal rate, regular rhythm, normal heart sounds and intact distal pulses.   Pulmonary/Chest: Breath sounds normal.   Lungs clear to auscultation bilaterally.    Abdominal: Abdomen is soft. Bowel sounds are normal. He exhibits distension. He exhibits no fluid wave. There is no abdominal tenderness.   Mild abdominal distension   No fluid wave  Abdomen non tender to palpation    There is no rebound and no guarding.   Musculoskeletal:         General: No tenderness or edema. Normal range of motion.      Cervical back: Normal range of motion and neck supple.      Comments: No lower extremity edema or tenderness       Neurological: He is alert and oriented to person, place, and time. He has normal strength. GCS score is 15. GCS eye subscore is 4. GCS verbal subscore is 5. GCS motor subscore is 6.   Skin: Skin is warm and dry. Capillary refill takes less than 2 seconds.   Psychiatric: He has a normal mood and affect. Thought content normal.         ED Course   Procedures  Labs Reviewed   CBC W/ AUTO DIFFERENTIAL - Abnormal; Notable for the following components:       Result Value    RBC 2.50 (*)     Hemoglobin 8.7 (*)     Hematocrit 24.4 (*)     MCH 34.8 (*)     RDW 16.2 (*)     Platelets 77 (*)     Gran % 77.8 (*)     Lymph % 10.9 (*)     All other components within normal limits   COMPREHENSIVE METABOLIC PANEL - Abnormal; Notable for the following components:    Sodium 123 (*)     CO2 15 (*)     Glucose 126 (*)     Calcium 8.3 (*)     Albumin 2.8 (*)     Total Bilirubin 16.3 (*)     Alkaline Phosphatase 139 (*)     AST 86 (*)     All other components within normal limits   PROTIME-INR - Abnormal; Notable for the following components:    Prothrombin Time 15.2 (*)     INR 1.5 (*)     All other components within normal limits   TROPONIN I   B-TYPE NATRIURETIC PEPTIDE   SARS-COV-2 RDRP GENE     EKG Readings: (Independently Interpreted)   Rhythm: Normal Sinus Rhythm. Heart Rate: 93. ST  Segments: Normal ST Segments. T Waves: Normal.   Low voltage QRS        Imaging Results          X-Ray Chest AP Portable (Final result)  Result time 01/08/22 10:18:02    Final result by Robert Hinds Jr., MD (01/08/22 10:18:02)                 Impression:      No acute cardiopulmonary disease      Electronically signed by: Robert Gusman Jr  Date:    01/08/2022  Time:    10:18             Narrative:    EXAMINATION:  XR CHEST AP PORTABLE    CLINICAL HISTORY:  Shortness of breath    TECHNIQUE:  Single frontal view of the chest was performed.    COMPARISON:  12/29/2021    FINDINGS:  Cardiomediastinal silhouettewithin normal limits for age.    Lungs grossly clear within limitations of portable technique    Lateral aspect of the right lung base and right cardiophrenic angle are cut off of the film.  No definite pleural fluid fluid.  Mild elevation of the right hemidiaphragm.                                 Medications - No data to display  Medical Decision Making:   Clinical Tests:   Lab Tests: Ordered and Reviewed  Radiological Study: Ordered and Reviewed  ED Management:  - VS notable for HR of 101; BP, RR and temp WNL  - CXR without acute cardiopulmonary process per my interpretation, final radiology read   - CBC w/diff notable for Hb of 8.7; Hct of 24.4  - CMP notable for a Na of 123, Cr of 1.2, T bili of 16.3; AST 86; ALT 37   - Troponin WNL   - BNP WNL   - PT/INR elevated, but baseline in this patient with liver disease   - COVID 19 swab NEGATIVE  - MELD score today of 30  - low suspicion for SBP, hepatic encephalopathy, ascites requiring emergent therapeutic and diagnostic paracentesis; I discussed the case, at length, with the on-call [facility] hepatologist, Dr. Bran, who did not feel the patient merited further emergent intervention and/or admission at this time as the patient's workup is at the patient's baseline, his vitals are stable; he is afebrile without overt abdominal pain; symptoms likely  secondary to patient's worsening hepatic function, but as patient has a therapeutic paracentesis scheduled for Monday,there appears to be no indication for admission at this time; very low suspicion for shortness of breath with cardiac or pulmonary etiology as the patient's cardiopulmonary workup is unremarkable; he is COVID negative; a believe this to be an exacerbation of his worsening hepatic function but I do not feel the patient requires emergent intervention this time; after discussing all the laboratory results, discussion I had with Dr. Bran, the patient stated to me that he felt well enough to go home and basically came to the ED in order to be checked out. He stated to me they felt comfortable for discharge at this time with appropriate follow-up for the aforementioned therapeutic paracentesis on Monday January 10th, 2022. I gave the patient very strict return precautions for new or worsening of symptoms; he verbalized understanding of this and expressed willingness to comply my recommendations; he has stated he is comfortable with plan for discharge at this time  - No further intervention is indicated at this time after having taken into account the patient's history, physical exam findings, and empirical and objective data obtained during the patient's emergency department workup.   - The patient is at low risk for an emergent medical condition at this time, and I am of the belief that that it is safe to discharge the patient from the emergency department.   - The patient is instructed to follow up as outpatient as indicated on the discharge paperwork.    - I have discussed the specifics of the workup with the patient and the patient has verbalized understanding of the details of the workup, the diagnosis, the treatment plan, and the need for outpatient follow-up.    - Although the patient has no emergent etiology today this does not preclude the development of an emergent condition so, in addition,  I have advised the patient that they can return to the ED and/or activate EMS at any time with worsening of their symptoms, change of their symptoms, or with any other medical complaint.    - The patient remained comfortable and stable during their visit in the ED.    - Discharge and follow-up instructions discussed with the patient who expressed understanding and willingness to comply with my recommendations.  - Results of all emergency department tests  discussed thoroughly with patient; all patient questions answered; pt in agreement with plan  - Pt instructed to follow up with PCP in 2-3 days for recheck of today's complaints  - Pt given strict emergency department return precautions for any new or worsening of symptoms  - Pt discharged from the emergency department in stable condition, in no acute distress               ED Course as of 01/08/22 1552   Sat Jan 08, 2022   1202 Discussed case with on call hepatologist, Dr. Bran, who reviewed the specifics of the case. He did not feel that the patient required admission at this time.  [LC]   1212 Discussed findings of laboratory analysis, radiographs with patient.  Patient comfortable with discharge at this time.  He will follow-up for his therapeutic paracentesis as scheduled.  Return precautions discussed with patient who verbalized understanding and willingness to comply my recommendations.  Patient stable for discharge at this time. [LC]      ED Course User Index  [LC] Freddie Baldwin MD             Clinical Impression:   Final diagnoses:  [R06.02] Shortness of breath  [K74.60] Hepatic cirrhosis, unspecified hepatic cirrhosis type, unspecified whether ascites present (Primary)  [R14.0] Abdominal distension          ED Disposition Condition    Discharge Stable        ED Prescriptions     None        Follow-up Information     Follow up With Specialties Details Why Contact Jamaal Vega DO    [address] [place] [address]  [id number]  [phone]             Freddie Baldwin MD  01/08/22 1553

## 2022-01-08 NOTE — DISCHARGE INSTRUCTIONS
Please follow up for your scheduled paracentesis.     Return to the ED immediately for any new or worsening symptoms.

## 2022-01-08 NOTE — ED NOTES
Pt presents to ed with c/o cough for a few days. Pt also reports SOB at rest and with exertion. Pt has hx of cirrhosis. Wife states pt last had paracentesis 6 days ago. Pt presents with abdominal distension. Pt c/o rectal bleeding with bright red blood. Pt currently seeing GI for rectal bleeding. Denies n/v. Denies CP. Pt denies etoh use at present. Cardiac mon placed. VS stable. Will continue to monitor.

## 2022-01-09 RX ORDER — LANOLIN ALCOHOL/MO/W.PET/CERES
100 CREAM (GRAM) TOPICAL DAILY
Qty: 30 TABLET | Refills: 11 | Status: SHIPPED | OUTPATIENT
Start: 2022-01-09 | End: 2023-01-09

## 2022-01-09 NOTE — DISCHARGE SUMMARY
DISCHARGE SUMMARY  Hospital Medicine    Team: [facility] MED [facility]    Patient Name: Nilesh Rolon Jr.  YOB: 1975    Admit Date: 12/29/2021    Discharge Date: 12/31/2021    Discharge Attending Physician: Tere Sweet     Admitting Resident    Diagnoses:  Active Hospital Problems    Diagnosis  POA    *DONG (acute kidney injury) [N17.9]  Unknown    Decompensated hepatic cirrhosis [K72.90, K74.60]  Unknown      Resolved Hospital Problems   No resolved problems to display.       Discharged Condition: admit problems have stabilized       HOSPITAL COURSE:      Initial Presentation:    45yo M with a PMHx of alcoholic cirrhosis who presents to the ED after being referred by hepatologist Dr. Carbone for abnormal labs. Pt. Was recently admitted to [facility] 11/20-12/15 for alcoholic hepatitis complicated by withdrawals and GI bleed requiring MICU stay. He has been receiving outpatient paracentesis since discharge, most recent 12/27 in which 2L were removed. Pt. Had f/u lab work performed today and his Cr was noted to be elevated to 1.7 (baseline normal) with worsening of his hyponatremia (127-->123), so he was referred to the ED for further evaluation. Pt. Reports persistent/worsening edema in his lower extremities as well as low BP at home. He has no other complaints, reporting that his appetite has been ok and he has not had rosalba nausea, vomiting, diarrhea, abdominal pain, fevers, or chills He has not been taking his propranolol, reporting that he is only on lactulose and thiamine.       Course of Principle Problem for Admission:    DONG  -Cr 1.5 on presentation, baseline <1  -Pt. Noted to have had paracentesis 12/27, no reported changes in PO intake, vomiting, or diarrhea. BP low-normal  -IV  cc given in ED, will also give albumin and f/u repeat Cr  -Renally dose medications     Decompensated Alcoholic cirrhosis  -INR pending, LFTs relatively stable  -Will repeat paracentesis to rule out SBP as cause of DONG,  lower suspicion, however as pt. Without other symptoms  -Continue lactulose for HE prevention  -Hepatology consulted for further assistance with management     Hx of GI Bleed  -EGD/Colonosopy performed last admit, pt. S/p banding of one esophageal varix but bleeding was thought to be hemorrhoidal  Follow up as OP for repeat EGD and C-scope as previously planned        CONSULTS: hepatology     Last CBC/BMP/HgbA1c (if applicable):  Recent Results (from the past 336 hour(s))   CBC auto differential    Collection Time: 01/08/22 10:11 AM   Result Value Ref Range    WBC 8.71 3.90 - 12.70 K/uL    Hemoglobin 8.7 (L) 14.0 - 18.0 g/dL    Hematocrit 24.4 (L) 40.0 - 54.0 %    Platelets 77 (L) 150 - 450 K/uL   CBC Auto Differential    Collection Time: 01/03/22 10:40 AM   Result Value Ref Range    WBC 11.38 3.90 - 12.70 K/uL    Hemoglobin 9.6 (L) 14.0 - 18.0 g/dL    Hematocrit 28.1 (L) 40.0 - 54.0 %    Platelets 86 (L) 150 - 450 K/uL   CBC with Automated Differential    Collection Time: 12/30/21  5:06 AM   Result Value Ref Range    WBC 8.33 3.90 - 12.70 K/uL    Hemoglobin 9.3 (L) 14.0 - 18.0 g/dL    Hematocrit 27.6 (L) 40.0 - 54.0 %    Platelets 114 (L) 150 - 450 K/uL     No results found for this or any previous visit (from the past 336 hour(s)).  Lab Results   Component Value Date    HGBA1C 4.0 12/07/2021       Disposition:   Home with Home Health       Future Scheduled Appointments:  Future Appointments   Date Time Provider Department Center   1/10/2022  7:10 AM APPOINTMENT LAB[facility]   1/10/2022  9:30 AM [facility] IR[address] [place] [address]   1/19/2022  7:00 AM APPOINTMENT LAB, [facility]   1/19/2022  8:00 AM [facility] IR1 [facility]   1/26/2022  7:00 AM APPOINTMENT LAB, [facility]   1/26/2022  9:30 AM [facility] IR1 [facility] IR [facility]   2/15/2022 10:30 AM Pb Mixon MD [facility] HEPAT Prakash Lopez       Follow-up Plans from This  Hospitalization:  Hepatology, IR for paracentesis    Discharge Medication List:        Medication List      CHANGE how you take these medications    lactulose 10 gram/15 mL solution  Commonly known as: CHRONULAC  Take 30 mLs (20 g total) by mouth 2 (two) times daily.  What changed:   · how much to take  · when to take this        CONTINUE taking these medications    folic acid 1 MG tablet  Commonly known as: FOLVITE  TAKE 1 TABLET(1 MG) BY MOUTH EVERY DAY     ketoconazole 2 % cream  Commonly known as: NIZORAL     multivitamin capsule     pantoprazole 40 MG tablet  Commonly known as: PROTONIX  Take 1 tablet (40 mg total) by mouth before breakfast.     thiamine 100 MG tablet  Take 1 tablet (100 mg total) by mouth once daily.     triamcinolone acetonide 0.025% 0.025 % cream  Commonly known as: KENALOG        STOP taking these medications    levETIRAcetam 1000 MG tablet  Commonly known as: KEPPRA     metroNIDAZOLE 0.75 % gel  Commonly known as: METROGEL     propranoloL 20 MG tablet  Commonly known as: INDERAL           Where to Get Your Medications      These medications were sent to [facility] [address], [place] [address]    Hours: Mon-Fri 7a-7p, Sat-Sun 10a-4p Phone: [phone]   · lactulose 10 gram/15 mL solution         Patient Instructions:  Discharge Procedure Orders   Ambulatory referral/consult to Hepatology   Standing Status: Future   Referral Priority: Routine Referral Type: Consultation   Referral Reason: Specialty Services Required   Requested Specialty: Hepatology   Number of Visits Requested: 1     Ambulatory referral/consult to Addiction Psychiatry   Standing Status: Future   Referral Priority: Routine Referral Type: Psychiatric   Referral Reason: Specialty Services Required   Requested Specialty: Addiction Medicine   Number of Visits Requested: 1       Signing Physician:  Tere Sweet MD

## 2022-01-10 ENCOUNTER — PATIENT MESSAGE (OUTPATIENT)
Dept: HEPATOLOGY | Facility: CLINIC | Age: 47
End: 2022-01-10
Payer: MEDICAID

## 2022-01-10 ENCOUNTER — HOSPITAL ENCOUNTER (OUTPATIENT)
Dept: INTERVENTIONAL RADIOLOGY/VASCULAR | Facility: HOSPITAL | Age: 47
Discharge: HOME | End: 2022-01-10
Attending: NURSE PRACTITIONER | Admitting: RADIOLOGY
Payer: MEDICAID

## 2022-01-10 ENCOUNTER — LAB VISIT (OUTPATIENT)
Dept: LAB | Facility: HOSPITAL | Age: 47
End: 2022-01-10
Attending: INTERNAL MEDICINE
Payer: MEDICAID

## 2022-01-10 VITALS
RESPIRATION RATE: 17 BRPM | TEMPERATURE: 98 F | DIASTOLIC BLOOD PRESSURE: 70 MMHG | SYSTOLIC BLOOD PRESSURE: 124 MMHG | HEART RATE: 90 BPM | OXYGEN SATURATION: 100 %

## 2022-01-10 DIAGNOSIS — K70.31 ASCITES DUE TO ALCOHOLIC CIRRHOSIS: ICD-10-CM

## 2022-01-10 DIAGNOSIS — K70.31 ALCOHOLIC CIRRHOSIS OF LIVER WITH ASCITES: Primary | ICD-10-CM

## 2022-01-10 DIAGNOSIS — K74.60 DECOMPENSATED HEPATIC CIRRHOSIS: ICD-10-CM

## 2022-01-10 DIAGNOSIS — K70.10 ALCOHOLIC HEPATITIS WITHOUT ASCITES: ICD-10-CM

## 2022-01-10 DIAGNOSIS — K72.90 DECOMPENSATED HEPATIC CIRRHOSIS: ICD-10-CM

## 2022-01-10 LAB
ALBUMIN SERPL BCP-MCNC: 2.7 G/DL (ref 3.5–5.2)
ALP SERPL-CCNC: 134 U/L (ref 55–135)
ALT SERPL W/O P-5'-P-CCNC: 37 U/L (ref 10–44)
ANION GAP SERPL CALC-SCNC: 9 MMOL/L (ref 8–16)
APPEARANCE FLD: NORMAL
AST SERPL-CCNC: 77 U/L (ref 10–40)
BASOPHILS # BLD AUTO: 0.08 K/UL (ref 0–0.2)
BASOPHILS NFR BLD: 0.8 % (ref 0–1.9)
BILIRUB SERPL-MCNC: 14.2 MG/DL (ref 0.1–1)
BODY FLD TYPE: NORMAL
BUN SERPL-MCNC: 15 MG/DL (ref 6–20)
CALCIUM SERPL-MCNC: 8.5 MG/DL (ref 8.7–10.5)
CHLORIDE SERPL-SCNC: 97 MMOL/L (ref 95–110)
CO2 SERPL-SCNC: 17 MMOL/L (ref 23–29)
COLOR FLD: YELLOW
CREAT SERPL-MCNC: 1.4 MG/DL (ref 0.5–1.4)
DIFFERENTIAL METHOD: ABNORMAL
EOSINOPHIL # BLD AUTO: 0.1 K/UL (ref 0–0.5)
EOSINOPHIL NFR BLD: 1.2 % (ref 0–8)
ERYTHROCYTE [DISTWIDTH] IN BLOOD BY AUTOMATED COUNT: 16.5 % (ref 11.5–14.5)
EST. GFR  (AFRICAN AMERICAN): >60 ML/MIN/1.73 M^2
EST. GFR  (NON AFRICAN AMERICAN): 59 ML/MIN/1.73 M^2
GLUCOSE SERPL-MCNC: 102 MG/DL (ref 70–110)
HCT VFR BLD AUTO: 23.3 % (ref 40–54)
HGB BLD-MCNC: 8.5 G/DL (ref 14–18)
IMM GRANULOCYTES # BLD AUTO: 0.04 K/UL (ref 0–0.04)
IMM GRANULOCYTES NFR BLD AUTO: 0.4 % (ref 0–0.5)
INR PPP: 1.5 (ref 0.8–1.2)
LYMPHOCYTES # BLD AUTO: 2 K/UL (ref 1–4.8)
LYMPHOCYTES NFR BLD: 18.9 % (ref 18–48)
LYMPHOCYTES NFR FLD MANUAL: 51 %
MCH RBC QN AUTO: 34.7 PG (ref 27–31)
MCHC RBC AUTO-ENTMCNC: 36.5 G/DL (ref 32–36)
MCV RBC AUTO: 95 FL (ref 82–98)
MESOTHL CELL NFR FLD MANUAL: 25 %
MONOCYTES # BLD AUTO: 1.2 K/UL (ref 0.3–1)
MONOCYTES NFR BLD: 11.5 % (ref 4–15)
MONOS+MACROS NFR FLD MANUAL: 13 %
NEUTROPHILS # BLD AUTO: 7 K/UL (ref 1.8–7.7)
NEUTROPHILS NFR BLD: 67.2 % (ref 38–73)
NEUTROPHILS NFR FLD MANUAL: 11 %
NRBC BLD-RTO: 0 /100 WBC
PLATELET # BLD AUTO: 99 K/UL (ref 150–450)
PLATELET BLD QL SMEAR: ABNORMAL
PMV BLD AUTO: 10.7 FL (ref 9.2–12.9)
POTASSIUM SERPL-SCNC: 4.4 MMOL/L (ref 3.5–5.1)
PROT SERPL-MCNC: 6.3 G/DL (ref 6–8.4)
PROTHROMBIN TIME: 15.3 SEC (ref 9–12.5)
RBC # BLD AUTO: 2.45 M/UL (ref 4.6–6.2)
SODIUM SERPL-SCNC: 123 MMOL/L (ref 136–145)
WBC # BLD AUTO: 10.48 K/UL (ref 3.9–12.7)
WBC # FLD: 114 /CU MM

## 2022-01-10 PROCEDURE — 36415 COLL VENOUS BLD VENIPUNCTURE: CPT | Performed by: INTERNAL MEDICINE

## 2022-01-10 PROCEDURE — 63600175 PHARM REV CODE 636 W HCPCS: Mod: JG | Performed by: RADIOLOGY

## 2022-01-10 PROCEDURE — 49083 ABD PARACENTESIS W/IMAGING: CPT | Performed by: RADIOLOGY

## 2022-01-10 PROCEDURE — 80053 COMPREHEN METABOLIC PANEL: CPT | Performed by: INTERNAL MEDICINE

## 2022-01-10 PROCEDURE — 49083 IR PARACENTESIS WITH IMAGING: ICD-10-PCS | Mod: ,,, | Performed by: RADIOLOGY

## 2022-01-10 PROCEDURE — 85610 PROTHROMBIN TIME: CPT | Performed by: INTERNAL MEDICINE

## 2022-01-10 PROCEDURE — 89051 BODY FLUID CELL COUNT: CPT | Performed by: NURSE PRACTITIONER

## 2022-01-10 PROCEDURE — P9047 ALBUMIN (HUMAN), 25%, 50ML: HCPCS | Mod: JG | Performed by: RADIOLOGY

## 2022-01-10 PROCEDURE — C1729 CATH, DRAINAGE: HCPCS

## 2022-01-10 PROCEDURE — 25000003 PHARM REV CODE 250: Performed by: RADIOLOGY

## 2022-01-10 PROCEDURE — 85025 COMPLETE CBC W/AUTO DIFF WBC: CPT | Performed by: INTERNAL MEDICINE

## 2022-01-10 RX ORDER — ALBUMIN HUMAN 250 G/1000ML
25 SOLUTION INTRAVENOUS ONCE AS NEEDED
Status: DISCONTINUED | OUTPATIENT
Start: 2022-01-10 | End: 2022-01-10

## 2022-01-10 RX ORDER — ALBUMIN HUMAN 250 G/1000ML
50 SOLUTION INTRAVENOUS ONCE AS NEEDED
Status: COMPLETED | OUTPATIENT
Start: 2022-01-10 | End: 2022-01-10

## 2022-01-10 RX ORDER — SODIUM CHLORIDE 9 MG/ML
INJECTION, SOLUTION INTRAVENOUS ONCE
Status: COMPLETED | OUTPATIENT
Start: 2022-01-10 | End: 2022-01-10

## 2022-01-10 RX ADMIN — ALBUMIN (HUMAN) 50 G: 5 SOLUTION INTRAVENOUS at 11:01

## 2022-01-10 RX ADMIN — SODIUM CHLORIDE: 0.9 INJECTION, SOLUTION INTRAVENOUS at 11:01

## 2022-01-10 NOTE — DISCHARGE INSTRUCTIONS
BATHING:  ? You may shower tomorrow.  DRESSING:  ? Remove dressing tomorrow.        ACTIVITY LEVEL: If you have received sedation or an anesthetic, you may feel sleepy for several hours. Rest until you are more awake. Gradually resume your normal activities    Do not drive, drink alcohol, or sign legal documents for 24 hours, or if taking narcotic pain medication.      DIET: You may resume your home diet. If nausea is present, increase your diet gradually with fluids and bland foods.    Medications: Pain medication should be taken only if needed and as directed. If antibiotics are prescribed, the medication should be taken until completed. You will be given an updated list of you medications.  ? No driving, alcoholic beverages or signing legal documents for next 24 hours if you have had sedation, or while taking pain medication    CALL THE DOCTOR:   For any obvious bleeding (some dried blood over the incision is normal).     Redness, swelling, foul smell around incision or fever over 101.  Shortness of breath.  Persistent pain or nausea not relieved by medication.  Call  [phone]     to speak with an Interventional Radiologist    If any unusual problems or difficulties occur contact your doctor. If you cannot contact your doctor but feel your signs and symptoms warrant a physicians attention return to the emergency room.

## 2022-01-10 NOTE — PLAN OF CARE
Patient received in Same Day Surgery at 10:30 am in Phase II.  Patient had completed care at 12:25 pm and was discharged at 12:30pm

## 2022-01-10 NOTE — H&P
Radiology History & Physical      SUBJECTIVE:     Chief Complaint: Recurrent painful, tense ascites     History of Present Illness:  Nilesh Rolon Jr. is a 46 y.o. male with pertinent PMHx of decompensated EtOH hepatic cirrhosis complicated by recurrent abdominal distension and pain 2/2 recurrent painful, tense ascitesrequiring frequent therapeutic large-volume paracentesis.      A new outpatient IR consult received for US-guided percutaneous RUQ-approach therapeutic LVP.    Past Medical History:   Diagnosis Date    DONG (acute kidney injury) 12/1/2021    Anxiety     Arthritis     Cirrhosis     Hypertension     Liver disease     Osteoarthritis     Other meniscus derangements, other medial meniscus, left knee 1/7/2019     Past Surgical History:   Procedure Laterality Date    ARTHROSCOPY OF KNEE Left 1/7/2019    Procedure: ARTHROSCOPY, KNEE;  Surgeon: Derick Kirby MD;  Location: [facility] OR;  Service: Orthopedics;  Laterality: Left;    COLONOSCOPY N/A 7/27/2020    Procedure: COLONOSCOPY;  Surgeon: Sotero Zapata MD;  Location: [facility];  Service: Endoscopy;  Laterality: N/A;    ESOPHAGOGASTRODUODENOSCOPY N/A 4/8/2019    Procedure: EGD (ESOPHAGOGASTRODUODENOSCOPY);  Surgeon: Bonita Kendall MD;  Location: [facility];  Service: Endoscopy;  Laterality: N/A;    ESOPHAGOGASTRODUODENOSCOPY N/A 7/27/2020    Procedure: EGD (ESOPHAGOGASTRODUODENOSCOPY);  Surgeon: Sotero Zapata MD;  Location: [facility];  Service: Endoscopy;  Laterality: N/A;    ESOPHAGOGASTRODUODENOSCOPY N/A 12/2/2021    Procedure: EGD (ESOPHAGOGASTRODUODENOSCOPY);  Surgeon: Montez Guerra MD;  Location: [facility] ([address] [place] [address]);  Service: Endoscopy;  Laterality: N/A;    KNEE SURGERY       Home Meds:   Prior to Admission medications    Medication Sig Start Date End Date Taking? Authorizing Provider   folic acid (FOLVITE) 1 MG tablet TAKE 1 TABLET(1 MG) BY MOUTH EVERY DAY 11/6/21   Dianne Jimenez NP   ketoconazole (NIZORAL) 2 % cream 2 (two)  times a day. to affected area 12/29/20   Historical Provider   lactulose (CHRONULAC) 10 gram/15 mL solution Take 30 mLs (20 g total) by mouth 2 (two) times daily. 12/31/21   Tere Sweet MD   multivitamin capsule Take 1 capsule by mouth once daily.    Historical Provider   pantoprazole (PROTONIX) 40 MG tablet Take 1 tablet (40 mg total) by mouth before breakfast. 12/2/21 5/31/22  Montez Guerra MD   thiamine 100 MG tablet Take 1 tablet (100 mg total) by mouth once daily. 12/16/21   Cynthia Pathak MD   thiamine 100 MG tablet Take 1 tablet (100 mg total) by mouth once daily. 1/9/22 1/9/23  Otoniel Espinoza MD   triamcinolone acetonide 0.025% (KENALOG) 0.025 % cream APPLY A THIN LAYER TO INFLAMED AREA TWICE DAILY 3/19/21   Historical Provider     Anticoagulants/Antiplatelets: no anticoagulation    Allergies: Review of patient's allergies indicates:  No Known Allergies    Sedation History:  no adverse reactions     Review of Systems:   Hematological: no known coagulopathies  Respiratory: no cough, shortness of breath, or wheezing  Cardiovascular: no chest pain or dyspnea on exertion  Gastrointestinal: positive for - abdominal pain and distension  Genito-Urinary: no dysuria, trouble voiding, or hematuria  Musculoskeletal: negative  Neurological: no TIA or stroke symptoms       OBJECTIVE:     Vital Signs (Most Recent)       Physical Exam:  General: no acute distress  Mental Status: alert and oriented to person, place and time  HEENT: normocephalic, atraumatic  Chest: unlabored breathing  Heart: regular heart rate  Abdomen: +distended. No TTP/r/g.  Extremity: moves all extremities    Laboratory  Lab Results   Component Value Date    INR 1.5 (H) 01/08/2022       Lab Results   Component Value Date    WBC 10.48 01/10/2022    HGB 8.5 (L) 01/10/2022    HCT 23.3 (L) 01/10/2022    MCV 95 01/10/2022    PLT 99 (L) 01/10/2022      Lab Results   Component Value Date     (H) 01/08/2022     (L) 01/08/2022     K 4.4 01/08/2022    CL 96 01/08/2022    CO2 15 (L) 01/08/2022    BUN 12 01/08/2022    CREATININE 1.2 01/08/2022    CALCIUM 8.3 (L) 01/08/2022    MG 2.1 12/13/2021    ALT 37 01/08/2022    AST 86 (H) 01/08/2022    ALBUMIN 2.8 (L) 01/08/2022    BILITOT 16.3 (H) 01/08/2022    BILIDIR 3.7 (H) 07/27/2020     ASSESSMENT/PLAN:     46 y.o. male with pertinent PMHx of decompensated EtOH hepatic cirrhosis complicated by recurrent abdominal distension and pain 2/2 recurrent painful, tense ascitesrequiring frequent therapeutic large-volume paracentesis.     1. Recurrent painful, tense ascites - Will attempt US-guided percutaneous RUQ-approach therapeutic LVP with local anesthetic only and albumin infusion post PRN as indicated per institutional protocol.     Risks (including, but not limited to, pain, bleeding, infection, damage to nearby structures, failure to obtain sufficient material for a diagnosis, the need for additional procedures, and death), benefits, and alternatives were discussed with the patient. All questions were answered to the best of my abilities. The patient wishes to proceed with the procedure. Written informed consent was obtained.     Thank you for considering IR for the care of your patient.      Chris Chahal MD  Interventional Radiology

## 2022-01-10 NOTE — DISCHARGE SUMMARY
Radiology Discharge Summary      Hospital Course: No complications    Admit Date: 1/10/2022  Discharge Date: 01/10/2022     Instructions Given to Patient: Yes  Diet: Resume prior diet  Activity: activity as tolerated    Description of Condition on Discharge: Stable  Vital Signs (Most Recent): BP: 109/68 (01/10/22 1020)    Discharge Disposition: Home    Discharge Diagnosis:  46 y.o. male with decompensated EtOH hepatic cirrhosis complicated by recurrent abdominal distension and pain 2/2 recurrent painful, tense ascites s/p successful US-guided percutaneous RUQ-approach therapeutic LVP with local anesthetic only and albumin infusion post PRN as indicated per institutional protocol. Patient tolerated the procedure well. No immediate post-procedural complications noted.      Thank you for considering IR for the care of your patient.      Chris Chahal MD  Interventional Radiology

## 2022-01-10 NOTE — BRIEF OP NOTE
Radiology Post-Procedure Note     Pre Op Diagnosis: Recurrent painful, tense ascites  Post Op Diagnosis: Same     Procedure: 1. US-guided percutaneous RUQ-approach therapeutic LVP     Procedure performed by: Chris Chahal MD     Written Informed Consent Obtained: Yes  Specimen Removed: YES, 6,100-cc of thin, bright-yellow ascitic fluid  Estimated Blood Loss: none     Findings:   Successful US-guided percutaneous RUQ-approach therapeutic LVP with local anesthetic only and albumin infusion post PRN as indicated per institutional protocol. Patient tolerated the procedure well. No immediate post-procedural complications noted.      Thank you for considering IR for the care of your patient.      Chris Chahal MD  Interventional Radiology

## 2022-01-11 ENCOUNTER — TELEPHONE (OUTPATIENT)
Dept: ENDOSCOPY | Facility: HOSPITAL | Age: 47
End: 2022-01-11
Payer: MEDICAID

## 2022-01-11 ENCOUNTER — TELEPHONE (OUTPATIENT)
Dept: GASTROENTEROLOGY | Facility: CLINIC | Age: 47
End: 2022-01-11
Payer: MEDICAID

## 2022-01-11 ENCOUNTER — PATIENT MESSAGE (OUTPATIENT)
Dept: ENDOSCOPY | Facility: HOSPITAL | Age: 47
End: 2022-01-11
Payer: MEDICAID

## 2022-01-11 ENCOUNTER — TELEPHONE (OUTPATIENT)
Dept: TRANSPLANT | Facility: HOSPITAL | Age: 47
End: 2022-01-11
Payer: MEDICAID

## 2022-01-11 NOTE — TELEPHONE ENCOUNTER
----- Message from Cydney Jenkins sent at 1/11/2022  1:46 PM CST -----  Regarding: Question and Concerns  Contact: [phone]  Laurel patients mother calling to speak with office. Laurel stated that patient is supposed to have a colonoscopy while having his endoscopy procedure on 2/9. Please call [phone]      Thank You and Have a great day

## 2022-01-11 NOTE — TELEPHONE ENCOUNTER
Please see below. Pt not in work up for liver transplant. Also reference recent  notes on this patient.    ----- Message from Azucena Catherine LCSW sent at 1/11/2022  9:08 AM CST -----  Regarding: RE: rehab assistance  Ranulfo Lopes,  This patient is not under our care. We forwarded this to Yadira Delgadillo. We have also spoken to the patient regarding this and we did send him resources via [employer]    Thank you  Azucena      ----- Message -----  From: Moses Palma  Sent: 1/10/2022   3:11 PM CST  To: [facility] Transplant Social Workers  Subject: rehab assistance                                 Patients Mother is calling to try to see if she can get her son into Rehab for liver. Said she called last week and never got a call back.         Zahida Ph.

## 2022-01-12 ENCOUNTER — LAB VISIT (OUTPATIENT)
Dept: LAB | Facility: HOSPITAL | Age: 47
End: 2022-01-12
Attending: INTERNAL MEDICINE
Payer: MEDICAID

## 2022-01-12 DIAGNOSIS — K70.31 ALCOHOLIC CIRRHOSIS OF LIVER WITH ASCITES: ICD-10-CM

## 2022-01-12 LAB
ALBUMIN SERPL BCP-MCNC: 2.9 G/DL (ref 3.5–5.2)
ALP SERPL-CCNC: 141 U/L (ref 55–135)
ALT SERPL W/O P-5'-P-CCNC: 31 U/L (ref 10–44)
ANION GAP SERPL CALC-SCNC: 7 MMOL/L (ref 8–16)
AST SERPL-CCNC: 70 U/L (ref 10–40)
BASOPHILS # BLD AUTO: 0.04 K/UL (ref 0–0.2)
BASOPHILS NFR BLD: 0.4 % (ref 0–1.9)
BILIRUB SERPL-MCNC: 12.7 MG/DL (ref 0.1–1)
BUN SERPL-MCNC: 14 MG/DL (ref 6–20)
CALCIUM SERPL-MCNC: 8.4 MG/DL (ref 8.7–10.5)
CHLORIDE SERPL-SCNC: 99 MMOL/L (ref 95–110)
CO2 SERPL-SCNC: 19 MMOL/L (ref 23–29)
CREAT SERPL-MCNC: 1.3 MG/DL (ref 0.5–1.4)
DIFFERENTIAL METHOD: ABNORMAL
EOSINOPHIL # BLD AUTO: 0.1 K/UL (ref 0–0.5)
EOSINOPHIL NFR BLD: 1.1 % (ref 0–8)
ERYTHROCYTE [DISTWIDTH] IN BLOOD BY AUTOMATED COUNT: 16.4 % (ref 11.5–14.5)
EST. GFR  (AFRICAN AMERICAN): >60 ML/MIN/1.73 M^2
EST. GFR  (NON AFRICAN AMERICAN): >60 ML/MIN/1.73 M^2
GLUCOSE SERPL-MCNC: 112 MG/DL (ref 70–110)
HCT VFR BLD AUTO: 23.9 % (ref 40–54)
HGB BLD-MCNC: 8.6 G/DL (ref 14–18)
IMM GRANULOCYTES # BLD AUTO: 0.06 K/UL (ref 0–0.04)
IMM GRANULOCYTES NFR BLD AUTO: 0.6 % (ref 0–0.5)
LYMPHOCYTES # BLD AUTO: 1.8 K/UL (ref 1–4.8)
LYMPHOCYTES NFR BLD: 18.8 % (ref 18–48)
MCH RBC QN AUTO: 34.1 PG (ref 27–31)
MCHC RBC AUTO-ENTMCNC: 36 G/DL (ref 32–36)
MCV RBC AUTO: 95 FL (ref 82–98)
MONOCYTES # BLD AUTO: 0.9 K/UL (ref 0.3–1)
MONOCYTES NFR BLD: 10.1 % (ref 4–15)
NEUTROPHILS # BLD AUTO: 6.4 K/UL (ref 1.8–7.7)
NEUTROPHILS NFR BLD: 69 % (ref 38–73)
NRBC BLD-RTO: 0 /100 WBC
PLATELET # BLD AUTO: 86 K/UL (ref 150–450)
PMV BLD AUTO: 9.4 FL (ref 9.2–12.9)
POTASSIUM SERPL-SCNC: 4.1 MMOL/L (ref 3.5–5.1)
PROT SERPL-MCNC: 6.5 G/DL (ref 6–8.4)
RBC # BLD AUTO: 2.52 M/UL (ref 4.6–6.2)
SODIUM SERPL-SCNC: 125 MMOL/L (ref 136–145)
WBC # BLD AUTO: 9.32 K/UL (ref 3.9–12.7)

## 2022-01-12 PROCEDURE — 85025 COMPLETE CBC W/AUTO DIFF WBC: CPT | Performed by: INTERNAL MEDICINE

## 2022-01-12 PROCEDURE — 36415 COLL VENOUS BLD VENIPUNCTURE: CPT | Performed by: INTERNAL MEDICINE

## 2022-01-12 PROCEDURE — 80053 COMPREHEN METABOLIC PANEL: CPT | Performed by: INTERNAL MEDICINE

## 2022-01-13 ENCOUNTER — TELEPHONE (OUTPATIENT)
Dept: HEPATOLOGY | Facility: CLINIC | Age: 47
End: 2022-01-13
Payer: MEDICAID

## 2022-01-14 ENCOUNTER — TELEPHONE (OUTPATIENT)
Dept: GASTROENTEROLOGY | Facility: CLINIC | Age: 47
End: 2022-01-14
Payer: MEDICAID

## 2022-01-14 DIAGNOSIS — Z01.812 PRE-PROCEDURE LAB EXAM: ICD-10-CM

## 2022-01-14 NOTE — TELEPHONE ENCOUNTER
Spoke to patient, rescheduled patients EGD/Colonoscopy procedure to 01/20/2022 and patients covid test to 01/18/2022. Sent suprep instructions via portal. Verbal understanding.

## 2022-01-16 ENCOUNTER — PATIENT MESSAGE (OUTPATIENT)
Dept: TRANSPLANT | Facility: HOSPITAL | Age: 47
End: 2022-01-16
Payer: MEDICAID

## 2022-01-17 ENCOUNTER — PATIENT MESSAGE (OUTPATIENT)
Dept: ENDOSCOPY | Facility: HOSPITAL | Age: 47
End: 2022-01-17
Payer: MEDICAID

## 2022-01-18 ENCOUNTER — TELEPHONE (OUTPATIENT)
Dept: ENDOSCOPY | Facility: HOSPITAL | Age: 47
End: 2022-01-18
Payer: MEDICAID

## 2022-01-18 ENCOUNTER — LAB VISIT (OUTPATIENT)
Dept: FAMILY MEDICINE | Facility: CLINIC | Age: 47
End: 2022-01-18
Payer: MEDICAID

## 2022-01-18 DIAGNOSIS — Z01.812 PRE-PROCEDURE LAB EXAM: ICD-10-CM

## 2022-01-18 PROCEDURE — U0003 INFECTIOUS AGENT DETECTION BY NUCLEIC ACID (DNA OR RNA); SEVERE ACUTE RESPIRATORY SYNDROME CORONAVIRUS 2 (SARS-COV-2) (CORONAVIRUS DISEASE [COVID-19]), AMPLIFIED PROBE TECHNIQUE, MAKING USE OF HIGH THROUGHPUT TECHNOLOGIES AS DESCRIBED BY CMS-2020-01-R: HCPCS | Performed by: INTERNAL MEDICINE

## 2022-01-18 PROCEDURE — U0005 INFEC AGEN DETEC AMPLI PROBE: HCPCS | Performed by: INTERNAL MEDICINE

## 2022-01-18 NOTE — TELEPHONE ENCOUNTER
Spoke with patient about arrival time @ 1200  Covid test = Pending    Prep instructions reviewed: the day before the procedure, follow a clear liquid diet all day, then start the first 1/2 of prep at 5pm and take 2nd 1/2 of prep @0700  Pt must be completely NPO when prep completed @0900              Medications: Do not take Insulin or oral diabetic medications the day of the procedure.  Take as prescribed: heart, seizure and blood pressure medication in the morning with a sip of water (less than an ounce).  Take any breathing medications and bring inhalers to hospital with you Leave all valuables and jewelry at home.     Wear comfortable clothes to procedure to change into hospital gown You cannot drive for 24 hours after your procedure because you will receive sedation for your procedure to make you comfortable.  A ride must be provided at discharge.

## 2022-01-19 ENCOUNTER — PATIENT MESSAGE (OUTPATIENT)
Dept: HEPATOLOGY | Facility: CLINIC | Age: 47
End: 2022-01-19
Payer: MEDICAID

## 2022-01-19 ENCOUNTER — TELEPHONE (OUTPATIENT)
Dept: HEPATOLOGY | Facility: CLINIC | Age: 47
End: 2022-01-19
Payer: MEDICAID

## 2022-01-19 ENCOUNTER — HOSPITAL ENCOUNTER (EMERGENCY)
Facility: HOSPITAL | Age: 47
Discharge: HOME | End: 2022-01-19
Attending: EMERGENCY MEDICINE
Payer: MEDICAID

## 2022-01-19 ENCOUNTER — HOSPITAL ENCOUNTER (OUTPATIENT)
Dept: INTERVENTIONAL RADIOLOGY/VASCULAR | Facility: HOSPITAL | Age: 47
Discharge: HOME | End: 2022-01-19
Attending: NURSE PRACTITIONER | Admitting: RADIOLOGY
Payer: MEDICAID

## 2022-01-19 VITALS — DIASTOLIC BLOOD PRESSURE: 77 MMHG | OXYGEN SATURATION: 100 % | SYSTOLIC BLOOD PRESSURE: 132 MMHG

## 2022-01-19 VITALS
DIASTOLIC BLOOD PRESSURE: 73 MMHG | TEMPERATURE: 98 F | HEART RATE: 103 BPM | OXYGEN SATURATION: 100 % | SYSTOLIC BLOOD PRESSURE: 118 MMHG | RESPIRATION RATE: 18 BRPM

## 2022-01-19 DIAGNOSIS — K70.31 ASCITES DUE TO ALCOHOLIC CIRRHOSIS: ICD-10-CM

## 2022-01-19 DIAGNOSIS — R17 JAUNDICE: Primary | ICD-10-CM

## 2022-01-19 DIAGNOSIS — R79.1 ELEVATED INR: ICD-10-CM

## 2022-01-19 DIAGNOSIS — K74.60 HEPATIC CIRRHOSIS, UNSPECIFIED HEPATIC CIRRHOSIS TYPE, UNSPECIFIED WHETHER ASCITES PRESENT: ICD-10-CM

## 2022-01-19 LAB
APPEARANCE FLD: NORMAL
BODY FLD TYPE: NORMAL
COLOR FLD: YELLOW
INR PPP: 1.7 (ref 0.8–1.2)
LYMPHOCYTES NFR FLD MANUAL: 78 %
MESOTHL CELL NFR FLD MANUAL: 1 %
MONOS+MACROS NFR FLD MANUAL: 18 %
NEUTROPHILS NFR FLD MANUAL: 3 %
PROTHROMBIN TIME: 17.3 SEC (ref 9–12.5)
SARS-COV-2 RNA RESP QL NAA+PROBE: NOT DETECTED
SARS-COV-2- CYCLE NUMBER: NORMAL
WBC # FLD: 140 /CU MM

## 2022-01-19 PROCEDURE — 49083 IR PARACENTESIS WITH IMAGING: ICD-10-PCS | Mod: ,,, | Performed by: RADIOLOGY

## 2022-01-19 PROCEDURE — 89051 BODY FLUID CELL COUNT: CPT | Performed by: NURSE PRACTITIONER

## 2022-01-19 PROCEDURE — 49083 ABD PARACENTESIS W/IMAGING: CPT | Performed by: RADIOLOGY

## 2022-01-19 PROCEDURE — P9047 ALBUMIN (HUMAN), 25%, 50ML: HCPCS | Mod: JG | Performed by: RADIOLOGY

## 2022-01-19 PROCEDURE — C1729 CATH, DRAINAGE: HCPCS

## 2022-01-19 PROCEDURE — 63600175 PHARM REV CODE 636 W HCPCS: Mod: JG | Performed by: RADIOLOGY

## 2022-01-19 PROCEDURE — 99281 EMR DPT VST MAYX REQ PHY/QHP: CPT | Mod: 25

## 2022-01-19 PROCEDURE — 85610 PROTHROMBIN TIME: CPT | Mod: 91 | Performed by: NURSE PRACTITIONER

## 2022-01-19 RX ORDER — ALBUMIN HUMAN 250 G/1000ML
50 SOLUTION INTRAVENOUS ONCE AS NEEDED
Status: COMPLETED | OUTPATIENT
Start: 2022-01-19 | End: 2022-01-19

## 2022-01-19 RX ADMIN — ALBUMIN (HUMAN) 50 G: 25 SOLUTION INTRAVENOUS at 10:01

## 2022-01-19 NOTE — DISCHARGE SUMMARY
Radiology Discharge Summary      Hospital Course: No complications    Admit Date: 1/19/2022  Discharge Date: 01/19/2022     Instructions Given to Patient: Yes  Diet: Resume prior diet  Activity: activity as tolerated    Description of Condition on Discharge: Stable  Vital Signs (Most Recent): BP: 130/73 (01/19/22 0911)  SpO2: 100 % (01/19/22 0911)    Discharge Disposition: Home    Discharge Diagnosis:  46 y.o. male with decompensated EtOH hepatic cirrhosis complicated by recurrent abdominal distension and pain 2/2 recurrent painful, tense ascites s/p successful US-guided percutaneous RUQ-approach therapeutic LVP with local anesthetic only and albumin infusion post PRN as indicated per institutional protocol. Patient tolerated the procedure well. No immediate post-procedural complications noted.     Of note, pt INR on studies today with significant interval increase from 1.5 to 7.2 increasing bleeding risk. Will transport pt to ER for treatment/correction of INR and albumin infusion.     Thank you for considering IR for the care of your patient.      Chris Chahal MD  Interventional Radiology

## 2022-01-19 NOTE — BRIEF OP NOTE
Radiology Post-Procedure Note     Pre Op Diagnosis: Recurrent painful, tense ascites  Post Op Diagnosis: Same     Procedure: 1. US-guided percutaneous RUQ-approach therapeutic LVP     Procedure performed by: Chris Chahal MD     Written Informed Consent Obtained: Yes  Specimen Removed: YES, 6,600-cc of thin, serosanguinous ascitic fluid  Estimated Blood Loss: none     Findings:   Successful US-guided percutaneous RUQ-approach therapeutic LVP with local anesthetic only and albumin infusion post PRN as indicated per institutional protocol. Patient tolerated the procedure well. No immediate post-procedural complications noted.      Of note, pt INR on studies today with significant interval increase from 1.5 to 7.2 increasing bleeding risk. Will transport pt to ER for treatment/correction of INR and albumin infusion.    Thank you for considering IR for the care of your patient.      Chris Chahal MD  Interventional Radiology

## 2022-01-19 NOTE — TELEPHONE ENCOUNTER
Received secure chat message from Dr Mixon to have the patient repeat the INR due to the larger swing in the value.    Call placed to the patient. Spoke to the patient and SO Kira. They were told to go to the ER for Albumin Infusion and have the Labs repeated.  We are going to [facility] due to less wait time in ER.    I called the pt back. They had turned around and went back to [facility]. They put Dr Chahal on the phone. He said he was going to give him Albumin, redraw labs and if still elevated will direct to ER for correction.

## 2022-01-19 NOTE — SEDATION DOCUMENTATION
Call received from patient's liver MD's office requesting patient go to ED post paracentesis for treatment of INR.

## 2022-01-19 NOTE — SEDATION DOCUMENTATION
Patient, family member and Rad MD  for decision for patient to go to [facility] ED as it is closer to their home and possibly less wait time for treatment. Edis FUENTES will call [place] ED.

## 2022-01-19 NOTE — H&P
Radiology History & Physical      SUBJECTIVE:     Chief Complaint: Recurrent painful, tense ascites     History of Present Illness:  Nilesh Rolon Jr. is a 46 y.o. male with pertinent PMHx of decompensated EtOH hepatic cirrhosis complicated by recurrent abdominal distension and pain 2/2 recurrent painful, tense ascitesrequiring frequent therapeutic large-volume paracentesis.      A new outpatient IR consult received for US-guided percutaneous RUQ-approach therapeutic LVP.    Past Medical History:   Diagnosis Date    DONG (acute kidney injury) 12/1/2021    Anxiety     Arthritis     Cirrhosis     Hypertension     Liver disease     Osteoarthritis     Other meniscus derangements, other medial meniscus, left knee 1/7/2019     Past Surgical History:   Procedure Laterality Date    ARTHROSCOPY OF KNEE Left 1/7/2019    Procedure: ARTHROSCOPY, KNEE;  Surgeon: Derick Kirby MD;  Location: [facility] OR;  Service: Orthopedics;  Laterality: Left;    COLONOSCOPY N/A 7/27/2020    Procedure: COLONOSCOPY;  Surgeon: Sotero Zapata MD;  Location: [facility];  Service: Endoscopy;  Laterality: N/A;    ESOPHAGOGASTRODUODENOSCOPY N/A 4/8/2019    Procedure: EGD (ESOPHAGOGASTRODUODENOSCOPY);  Surgeon: Bonita Kendall MD;  Location: [facility];  Service: Endoscopy;  Laterality: N/A;    ESOPHAGOGASTRODUODENOSCOPY N/A 7/27/2020    Procedure: EGD (ESOPHAGOGASTRODUODENOSCOPY);  Surgeon: Sotero Zapata MD;  Location: [facility];  Service: Endoscopy;  Laterality: N/A;    ESOPHAGOGASTRODUODENOSCOPY N/A 12/2/2021    Procedure: EGD (ESOPHAGOGASTRODUODENOSCOPY);  Surgeon: Montez Guerra MD;  Location: [facility] ([address] [place] [address]);  Service: Endoscopy;  Laterality: N/A;    KNEE SURGERY       Home Meds:   Prior to Admission medications    Medication Sig Start Date End Date Taking? Authorizing Provider   folic acid (FOLVITE) 1 MG tablet TAKE 1 TABLET(1 MG) BY MOUTH EVERY DAY 11/6/21   Dianne Jimenez NP   ketoconazole (NIZORAL) 2 % cream 2 (two)  times a day. to affected area 12/29/20   Historical Provider   lactulose (CHRONULAC) 10 gram/15 mL solution Take 30 mLs (20 g total) by mouth 2 (two) times daily. 12/31/21   Tere Sweet MD   multivitamin capsule Take 1 capsule by mouth once daily.    Historical Provider   pantoprazole (PROTONIX) 40 MG tablet Take 1 tablet (40 mg total) by mouth before breakfast. 12/2/21 5/31/22  Montez Guerra MD   thiamine 100 MG tablet Take 1 tablet (100 mg total) by mouth once daily. 12/16/21   Cynthia Pathak MD   thiamine 100 MG tablet Take 1 tablet (100 mg total) by mouth once daily. 1/9/22 1/9/23  Otoniel Espinoza MD   triamcinolone acetonide 0.025% (KENALOG) 0.025 % cream APPLY A THIN LAYER TO INFLAMED AREA TWICE DAILY 3/19/21   Historical Provider     Anticoagulants/Antiplatelets: no anticoagulation    Allergies: Review of patient's allergies indicates:  No Known Allergies    Sedation History:  no adverse reactions     Review of Systems:   Hematological: no known coagulopathies  Respiratory: no cough, shortness of breath, or wheezing  Cardiovascular: no chest pain or dyspnea on exertion  Gastrointestinal: positive for - abdominal pain and distension  Genito-Urinary: no dysuria, trouble voiding, or hematuria  Musculoskeletal: negative  Neurological: no TIA or stroke symptoms       OBJECTIVE:     Vital Signs (Most Recent)     Physical Exam:  General: no acute distress  Mental Status: alert and oriented to person, place and time  HEENT: normocephalic, atraumatic  Chest: unlabored breathing  Heart: regular heart rate  Abdomen: +distended. No TTP/r/g.  Extremity: moves all extremities    Laboratory  Lab Results   Component Value Date    INR 1.5 (H) 01/10/2022       Lab Results   Component Value Date    WBC 8.30 01/19/2022    HGB 8.7 (L) 01/19/2022    HCT 24.5 (L) 01/19/2022    MCV 95 01/19/2022     (L) 01/19/2022      Lab Results   Component Value Date     (H) 01/12/2022     (L) 01/12/2022     K 4.1 01/12/2022    CL 99 01/12/2022    CO2 19 (L) 01/12/2022    BUN 14 01/12/2022    CREATININE 1.3 01/12/2022    CALCIUM 8.4 (L) 01/12/2022    MG 2.1 12/13/2021    ALT 31 01/12/2022    AST 70 (H) 01/12/2022    ALBUMIN 2.9 (L) 01/12/2022    BILITOT 12.7 (H) 01/12/2022    BILIDIR 3.7 (H) 07/27/2020     ASSESSMENT/PLAN:     46 y.o. male with pertinent PMHx of decompensated EtOH hepatic cirrhosis complicated by recurrent abdominal distension and pain 2/2 recurrent painful, tense ascitesrequiring frequent therapeutic large-volume paracentesis.     1. Recurrent painful, tense ascites - Will attempt US-guided percutaneous RUQ-approach therapeutic LVP with local anesthetic only and albumin infusion post PRN as indicated per institutional protocol.     Risks (including, but not limited to, pain, bleeding, infection, damage to nearby structures, failure to obtain sufficient material for a diagnosis, the need for additional procedures, and death), benefits, and alternatives were discussed with the patient. All questions were answered to the best of my abilities. The patient wishes to proceed with the procedure. Written informed consent was obtained.     Thank you for considering IR for the care of your patient.      Chris Chahal MD  Interventional Radiology

## 2022-01-19 NOTE — SEDATION DOCUMENTATION
Pressure held to site by RT and small pressure dressing applied, site CDI, patient will be escorted via stretcher to ED by RT and RN. Albumin to be given in ED per MD. Ascites 6600 ml removed, sample sent to lab, Ascites color varied from yellow to caden.

## 2022-01-19 NOTE — ED PROVIDER NOTES
Encounter Date: 1/19/2022       History     Chief Complaint   Patient presents with    Jaundice     Pt has hx of cirrhosis, scheduled for EGD tomorrow, had blood work done completed today with INR- 1.7, and PT-17.3      46-year-old male with presents the emergency room with concerns that his INR is elevated.  Patient states that he had repeat labs when he went to Interventional Radiology today and had a paracentesis and albumin infusion.  Patient states he also checked his labs on my chart but his girlfriend advised that he come in for evaluation.  Denies any new symptoms.    The history is provided by the patient.     Review of patient's allergies indicates:  No Known Allergies  Past Medical History:   Diagnosis Date    DONG (acute kidney injury) 12/1/2021    Anxiety     Arthritis     Cirrhosis     Hypertension     Liver disease     Osteoarthritis     Other meniscus derangements, other medial meniscus, left knee 1/7/2019     Past Surgical History:   Procedure Laterality Date    ARTHROSCOPY OF KNEE Left 1/7/2019    Procedure: ARTHROSCOPY, KNEE;  Surgeon: Derick Kirby MD;  Location: [facility];  Service: Orthopedics;  Laterality: Left;    COLONOSCOPY N/A 7/27/2020    Procedure: COLONOSCOPY;  Surgeon: Sotero Zapata MD;  Location: [facility];  Service: Endoscopy;  Laterality: N/A;    ESOPHAGOGASTRODUODENOSCOPY N/A 4/8/2019    Procedure: EGD (ESOPHAGOGASTRODUODENOSCOPY);  Surgeon: Bonita Kendall MD;  Location: [facility];  Service: Endoscopy;  Laterality: N/A;    ESOPHAGOGASTRODUODENOSCOPY N/A 7/27/2020    Procedure: EGD (ESOPHAGOGASTRODUODENOSCOPY);  Surgeon: Sotero Zapata MD;  Location: [facility];  Service: Endoscopy;  Laterality: N/A;    ESOPHAGOGASTRODUODENOSCOPY N/A 12/2/2021    Procedure: EGD (ESOPHAGOGASTRODUODENOSCOPY);  Surgeon: Montez Guerra MD;  Location: [facility] ([address] [place] [address]);  Service: Endoscopy;  Laterality: N/A;    KNEE SURGERY       Family History   Problem Relation Age of Onset    Birth  defects Neg Hx      Social History     Tobacco Use    Smoking status: Never Smoker    Smokeless tobacco: Never Used    Tobacco comment: smokes Marijuana only   Substance Use Topics    Alcohol use: Not Currently     Comment: last drink 11/28, drinks 3-4 a day    Drug use: No     Review of Systems   Constitutional: Negative for fever.   HENT: Negative for sore throat.    Respiratory: Negative for shortness of breath.    Cardiovascular: Negative for chest pain.   Gastrointestinal: Positive for abdominal distention (Ascites). Negative for nausea.   Genitourinary: Negative for dysuria.   Musculoskeletal: Negative for back pain.   Skin: Positive for color change. Negative for rash.   Neurological: Negative for weakness.   Hematological: Does not bruise/bleed easily.   All other systems reviewed and are negative.    Physical Exam     Initial Vitals [01/19/22 1500]   BP Pulse Resp Temp SpO2   118/73 103 18 97.7 °F (36.5 °C) 100 %      MAP       --         Physical Exam    Nursing note and vitals reviewed.  Constitutional: He appears well-developed and well-nourished.   HENT:   Head: Normocephalic and atraumatic.   Eyes: Conjunctivae and EOM are normal. Pupils are equal, round, and reactive to light. Scleral icterus is present.   Neck:   Normal range of motion.  Cardiovascular: Normal rate, regular rhythm, normal heart sounds and intact distal pulses. Exam reveals no gallop and no friction rub.    No murmur heard.  Pulmonary/Chest: Breath sounds normal. No respiratory distress. He has no wheezes. He has no rhonchi. He has no rales. He exhibits no tenderness.   Abdominal: Abdomen is soft. Bowel sounds are normal. He exhibits distension (Chronic). He exhibits no mass. There is no abdominal tenderness. There is no rebound and no guarding.   Musculoskeletal:         General: No tenderness or edema. Normal range of motion.      Cervical back: Normal range of motion.     Neurological: He is alert and oriented to person,  place, and time. He has normal strength. GCS score is 15. GCS eye subscore is 4. GCS verbal subscore is 5. GCS motor subscore is 6.   Skin: Skin is warm. Capillary refill takes less than 2 seconds.        ED Course   Procedures  Labs Reviewed - No data to display        Medications - No data to display  Medical Decision Making:   Initial Assessment:   46-year-old male which presents to the emergency room with concerns of an elevated INR.  Repeat INR are already resulted in epic.  Differential Diagnosis:   Encounter for abnormal lab, elevated INR, hemolyzed specimen  ED Management:  Patient examined and continues to be jaundice but according to his lab work is labs are improving.  Patient was not advised to come to the emergency room as his repeat INR was normal.  Patient advised of these findings and discharge.  Patient is scheduled for EGD tomorrow and just wanted to make sure everything was okay. Patient given strict return precautions and voiced understanding of all discharge instructions. Pt was stable at discharge.                           Clinical Impression:   Final diagnoses:  [R17] Jaundice (Primary)  [R79.1] Elevated INR  [K74.60] Hepatic cirrhosis, unspecified hepatic cirrhosis type, unspecified whether ascites present          ED Disposition Condition    Discharge Stable        ED Prescriptions     None        Follow-up Information     Follow up With Specialties Details Why Contact Info    Pb Mixon MD Transplant, Hepatology, Gastroenterology Schedule an appointment as soon as possible for a visit  As needed [address]  [facility] [id number]  [phone]             Katarina Hector, HARLEY  01/19/22 1709

## 2022-01-19 NOTE — TELEPHONE ENCOUNTER
Received call from Adela OCAMPO with [facility] with a critical lab value. INR 7.22.  Read back and verified.    Patient scheduled to have a Paracentesis done at the [facility] location this AM.  Call placed to [facility] [phone] and spoke with Stefano. Critical results relayed. Stefano stated the Dr is aware of it.  Do you want to direct the patient to the ED after?  Yes, please. They will notify the patient.    Will notify Provider.

## 2022-01-20 ENCOUNTER — PATIENT MESSAGE (OUTPATIENT)
Dept: ENDOSCOPY | Facility: HOSPITAL | Age: 47
End: 2022-01-20

## 2022-01-20 ENCOUNTER — ANESTHESIA (OUTPATIENT)
Dept: ENDOSCOPY | Facility: HOSPITAL | Age: 47
End: 2022-01-20
Payer: MEDICAID

## 2022-01-20 ENCOUNTER — HOSPITAL ENCOUNTER (OUTPATIENT)
Facility: HOSPITAL | Age: 47
Discharge: HOME | End: 2022-01-20
Attending: INTERNAL MEDICINE | Admitting: INTERNAL MEDICINE
Payer: MEDICAID

## 2022-01-20 ENCOUNTER — ANESTHESIA EVENT (OUTPATIENT)
Dept: ENDOSCOPY | Facility: HOSPITAL | Age: 47
End: 2022-01-20
Payer: MEDICAID

## 2022-01-20 VITALS
TEMPERATURE: 98 F | HEIGHT: 75 IN | WEIGHT: 255 LBS | SYSTOLIC BLOOD PRESSURE: 110 MMHG | DIASTOLIC BLOOD PRESSURE: 63 MMHG | RESPIRATION RATE: 20 BRPM | OXYGEN SATURATION: 100 % | BODY MASS INDEX: 31.71 KG/M2 | HEART RATE: 104 BPM

## 2022-01-20 DIAGNOSIS — K63.5 COLON POLYP: ICD-10-CM

## 2022-01-20 PROCEDURE — 25000003 PHARM REV CODE 250: Performed by: INTERNAL MEDICINE

## 2022-01-20 PROCEDURE — 25000003 PHARM REV CODE 250: Performed by: NURSE ANESTHETIST, CERTIFIED REGISTERED

## 2022-01-20 PROCEDURE — 43239 EGD BIOPSY SINGLE/MULTIPLE: CPT | Performed by: INTERNAL MEDICINE

## 2022-01-20 PROCEDURE — G0105 COLORECTAL SCRN; HI RISK IND: HCPCS | Performed by: INTERNAL MEDICINE

## 2022-01-20 PROCEDURE — 37000009 HC ANESTHESIA EA ADD 15 MINS: Performed by: INTERNAL MEDICINE

## 2022-01-20 PROCEDURE — 88312 SPECIAL STAINS GROUP 1: CPT | Mod: 26,,, | Performed by: PATHOLOGY

## 2022-01-20 PROCEDURE — 45378 DIAGNOSTIC COLONOSCOPY: CPT | Mod: ,,, | Performed by: INTERNAL MEDICINE

## 2022-01-20 PROCEDURE — 88312 PR  SPECIAL STAINS,GROUP I: ICD-10-PCS | Mod: 26,,, | Performed by: PATHOLOGY

## 2022-01-20 PROCEDURE — 00813 ANES UPR LWR GI NDSC PX: CPT | Performed by: INTERNAL MEDICINE

## 2022-01-20 PROCEDURE — 88305 TISSUE EXAM BY PATHOLOGIST: CPT | Mod: 26,,, | Performed by: PATHOLOGY

## 2022-01-20 PROCEDURE — 88305 TISSUE EXAM BY PATHOLOGIST: ICD-10-PCS | Mod: 26,,, | Performed by: PATHOLOGY

## 2022-01-20 PROCEDURE — 88312 SPECIAL STAINS GROUP 1: CPT | Performed by: PATHOLOGY

## 2022-01-20 PROCEDURE — 88305 TISSUE EXAM BY PATHOLOGIST: CPT | Performed by: PATHOLOGY

## 2022-01-20 PROCEDURE — 45378 PR COLONOSCOPY,DIAGNOSTIC: ICD-10-PCS | Mod: ,,, | Performed by: INTERNAL MEDICINE

## 2022-01-20 PROCEDURE — 43239 PR EGD, FLEX, W/BIOPSY, SGL/MULTI: ICD-10-PCS | Mod: 51,,, | Performed by: INTERNAL MEDICINE

## 2022-01-20 PROCEDURE — 27201012 HC FORCEPS, HOT/COLD, DISP: Performed by: INTERNAL MEDICINE

## 2022-01-20 PROCEDURE — 37000008 HC ANESTHESIA 1ST 15 MINUTES: Performed by: INTERNAL MEDICINE

## 2022-01-20 PROCEDURE — 63600175 PHARM REV CODE 636 W HCPCS: Performed by: NURSE ANESTHETIST, CERTIFIED REGISTERED

## 2022-01-20 PROCEDURE — 43239 EGD BIOPSY SINGLE/MULTIPLE: CPT | Mod: 51,,, | Performed by: INTERNAL MEDICINE

## 2022-01-20 RX ORDER — LIDOCAINE HYDROCHLORIDE 20 MG/ML
INJECTION, SOLUTION EPIDURAL; INFILTRATION; INTRACAUDAL; PERINEURAL
Status: DISCONTINUED | OUTPATIENT
Start: 2022-01-20 | End: 2022-01-20

## 2022-01-20 RX ORDER — DEXMEDETOMIDINE HYDROCHLORIDE 100 UG/ML
INJECTION, SOLUTION INTRAVENOUS
Status: DISCONTINUED | OUTPATIENT
Start: 2022-01-20 | End: 2022-01-20

## 2022-01-20 RX ORDER — SODIUM CHLORIDE 0.9 % (FLUSH) 0.9 %
10 SYRINGE (ML) INJECTION
Status: DISCONTINUED | OUTPATIENT
Start: 2022-01-20 | End: 2022-01-20 | Stop reason: HOSPADM

## 2022-01-20 RX ORDER — SODIUM CHLORIDE 9 MG/ML
INJECTION, SOLUTION INTRAVENOUS CONTINUOUS
Status: DISCONTINUED | OUTPATIENT
Start: 2022-01-20 | End: 2022-01-20 | Stop reason: HOSPADM

## 2022-01-20 RX ORDER — PROPOFOL 10 MG/ML
VIAL (ML) INTRAVENOUS
Status: DISCONTINUED | OUTPATIENT
Start: 2022-01-20 | End: 2022-01-20

## 2022-01-20 RX ORDER — DEXTROMETHORPHAN/PSEUDOEPHED 2.5-7.5/.8
DROPS ORAL
Status: COMPLETED | OUTPATIENT
Start: 2022-01-20 | End: 2022-01-20

## 2022-01-20 RX ORDER — PROPOFOL 10 MG/ML
VIAL (ML) INTRAVENOUS CONTINUOUS PRN
Status: DISCONTINUED | OUTPATIENT
Start: 2022-01-20 | End: 2022-01-20

## 2022-01-20 RX ADMIN — SIMETHICONE 40 MG: 20 SUSPENSION/ DROPS ORAL at 01:01

## 2022-01-20 RX ADMIN — PROPOFOL 150 MCG/KG/MIN: 10 INJECTION, EMULSION INTRAVENOUS at 01:01

## 2022-01-20 RX ADMIN — LIDOCAINE HYDROCHLORIDE 50 MG: 20 INJECTION, SOLUTION EPIDURAL; INFILTRATION; INTRACAUDAL; PERINEURAL at 01:01

## 2022-01-20 RX ADMIN — DEXMEDETOMIDINE HCL 8 MCG: 100 INJECTION INTRAVENOUS at 01:01

## 2022-01-20 RX ADMIN — TOPICAL ANESTHETIC 1 EACH: 200 SPRAY DENTAL; PERIODONTAL at 01:01

## 2022-01-20 RX ADMIN — PROPOFOL 70 MG: 10 INJECTION, EMULSION INTRAVENOUS at 01:01

## 2022-01-20 RX ADMIN — GLYCOPYRROLATE 0.2 MG: 0.2 INJECTION, SOLUTION INTRAMUSCULAR; INTRAVITREAL at 01:01

## 2022-01-20 RX ADMIN — SODIUM CHLORIDE: 0.9 INJECTION, SOLUTION INTRAVENOUS at 01:01

## 2022-01-20 NOTE — H&P
Short Stay Endoscopy History and Physical    PCP - Bradford Vega DO, DO (Inactive)    Procedure - EGD/Colonoscopy  ASA - III  Mallampati - per anesthesia  History of Anesthesia problems - no  Family history Anesthesia problems - no     HPI:  This is a 46 y.o. male here for evaluation of : EV surveillance. CRC screen      ROS:  Constitutional: No fevers, chills, No weight loss  ENT: No allergies  CV: No chest pain  Pulm: No shortness of breath  GI: see HPI  Derm: No rash    Medical History:  has a past medical history of DONG (acute kidney injury) (12/1/2021), Anxiety, Arthritis, Cirrhosis, Hypertension, Liver disease, Osteoarthritis, and Other meniscus derangements, other medial meniscus, left knee (1/7/2019).    Surgical History:  has a past surgical history that includes Arthroscopy of knee (Left, 1/7/2019); Esophagogastroduodenoscopy (N/A, 4/8/2019); Esophagogastroduodenoscopy (N/A, 7/27/2020); Colonoscopy (N/A, 7/27/2020); Knee surgery; and Esophagogastroduodenoscopy (N/A, 12/2/2021).    Family History: family history is not on file.. Otherwise no colon cancer, inflammatory bowel disease, or GI malignancies.    Social History:  reports that he has never smoked. He has never used smokeless tobacco. He reports previous alcohol use. He reports that he does not use drugs.    Review of patient's allergies indicates:  No Known Allergies    Medications:   Medications Prior to Admission   Medication Sig Dispense Refill Last Dose    folic acid (FOLVITE) 1 MG tablet TAKE 1 TABLET(1 MG) BY MOUTH EVERY DAY 30 tablet 2 1/19/2022 at Unknown time    lactulose (CHRONULAC) 10 gram/15 mL solution Take 30 mLs (20 g total) by mouth 2 (two) times daily. 1800 mL 0 1/19/2022 at Unknown time    multivitamin capsule Take 1 capsule by mouth once daily.   1/19/2022 at Unknown time    pantoprazole (PROTONIX) 40 MG tablet Take 1 tablet (40 mg total) by mouth before breakfast. 90 tablet 1 1/19/2022 at Unknown time    thiamine 100 MG  tablet Take 1 tablet (100 mg total) by mouth once daily. 30 tablet 11 1/19/2022 at Unknown time    thiamine 100 MG tablet Take 1 tablet (100 mg total) by mouth once daily. 30 tablet 11 1/19/2022 at Unknown time    ketoconazole (NIZORAL) 2 % cream 2 (two) times a day. to affected area       triamcinolone acetonide 0.025% (KENALOG) 0.025 % cream APPLY A THIN LAYER TO INFLAMED AREA TWICE DAILY            Objective Findings:    Vital Signs: see nursing notes  Physical Exam:  General Appearance: Well appearing in no acute distress  Eyes:    No scleral icterus  ENT: Neck supple  Lungs: CTA anteriorly  Heart:  S1, S2 normal, no murmurs heard  Abdomen: Soft, non tender, non distended with positive bowel sounds. No hepatosplenomegaly, ascites, or mass  Extremities: no edema  Skin: No rash      Labs:  Lab Results   Component Value Date    WBC 8.30 01/19/2022    HGB 8.7 (L) 01/19/2022    HCT 24.5 (L) 01/19/2022     (L) 01/19/2022    CHOL 242 (H) 04/06/2016    TRIG 278 (H) 04/06/2016    HDL 40 04/06/2016    ALT 27 01/19/2022    AST 66 (H) 01/19/2022     (L) 01/19/2022    K 3.6 01/19/2022     01/19/2022    CREATININE 1.1 01/19/2022    BUN 9 01/19/2022    CO2 11 (L) 01/19/2022    TSH 2.679 07/23/2020    PSA 0.54 04/06/2016    INR 1.7 (H) 01/19/2022    HGBA1C 4.0 12/07/2021       I have explained the risks and benefits of endoscopy procedures to the patient including but not limited to bleeding, perforation, infection, and death.    Jacob Andrea MD

## 2022-01-20 NOTE — ANESTHESIA PREPROCEDURE EVALUATION
[facility]  Anesthesia Pre-Operative Evaluation         Patient Name: Nilesh Rolon Jr.  YOB: 1975  MRN: [id number]    SUBJECTIVE:     Pre-operative evaluation for Procedure(s) (LRB):  EGD (ESOPHAGOGASTRODUODENOSCOPY) (N/A)     01/20/2022    Nilesh Rolon Jr. is a 46 y.o. male w/ a significant PMHx of  ETOH cirrhosis (complicated by on-going ETOH abuse) and GI bleeding in 07/2020 during hospitalizaqtion for ETOH withdrawal. On arrival, vital signs normal on room air. Labs notable for anemia (Hgb 9 from 14 in 01/2021), thrombocytopenia (130), hyponatremia (112), DONG, transaminitis ( with normal ALT), and slightly elevated lipase (63). Abdominal US showed likely cirrhosis with small volume of ascites; no biliary abnormalities. Abdominal dopplers showed normal flow through portal veins. He was admitted to MICU for treatment of hyponatremia and ETOH withdrawal requiring initiation of PRECEDEX. GI consulted on 12/01 with concern for rectal bleeding. Pt  now presents for the above procedure.      LDA:   Peripheral Intravenous Line       Peripheral IV - Single Lumen 11/30/21 1020 20 G;1 in Left Upper Arm 1 day        Peripheral IV - Single Lumen 11/30/21 1755 20 G;1 in Right Forearm 1 day         Prev airway:   Placement Date: 01/07/19; Placement Time: 0720; Method of Intubation: Direct laryngoscopy; Inserted by: Anesthesia Resident; Airway Device: Endotracheal Tube; Mask Ventilation: Easy; Intubated: Postinduction; Blade: Pricila #4; Airway Device Size: 7.5; Style: Cuffed; Cuff Inflation: Minimal occlusive pressure; Placement Verified By: Auscultation, Capnometry, ETT Condensation; Grade: Grade I; Intubation Findings: Positive EtCO2, Bilateral breath sounds, Atraumatic/Condition of teeth unchanged;  Depth of Insertion: 23; Securment: Lips; Complications: None; Breath Sounds: Equal Bilateral; Insertion Attempts: 1; Removal Date: 01/07/19;  Removal Time: 0746    Drips:    Continuous  Medication Ordered Dose/Rate, Route, Frequency Last Action   dexmedetomidine (PRECEDEX) 400mcg/100mL 0.9% NaCL infusion 0-1.4 mcg/kg/hr, IV, Continuous Verify Only, 0.2 mcg/kg/hr at 12/01 1700   octreotide (SANDOSTATIN) 500 mcg in sodium chloride 0.9% 100 mL infusion 50 mcg/hr, IV, Continuous Verify Only, 50 mcg/hr at 12/01 1700         Patient Active Problem List   Diagnosis    Alcohol withdrawal    Thrombocytopenia    History of alcohol abuse    Hyponatremia    Gastrointestinal hemorrhage    Delirium tremens    Alcoholic hepatitis without ascites    Alcohol withdrawal seizure without complication    Hx of colonic polyps    History of GI bleed    Alcoholic cirrhosis    Decompensated hepatic cirrhosis    Primary osteoarthritis of left knee    Osteoarthritis of right shoulder    Pain of left upper extremity    Debility    Stiffness in joint    Alcohol use disorder, severe, dependence    Supratherapeutic INR    Class 1 obesity due to excess calories with serious comorbidity and body mass index (BMI) of 33.0 to 33.9 in adult    DONG (acute kidney injury)    Jaundice    Elevated INR    Hepatic cirrhosis       Review of patient's allergies indicates:  No Known Allergies    Current Inpatient Medications:      Current Outpatient Medications on File Prior to Visit   Medication Sig Dispense Refill    folic acid (FOLVITE) 1 MG tablet TAKE 1 TABLET(1 MG) BY MOUTH EVERY DAY 30 tablet 2    ketoconazole (NIZORAL) 2 % cream 2 (two) times a day. to affected area      lactulose (CHRONULAC) 10 gram/15 mL solution Take 30 mLs (20 g total) by mouth 2 (two) times daily. 1800 mL 0    multivitamin capsule Take 1 capsule by mouth once daily.      pantoprazole (PROTONIX) 40 MG tablet Take 1 tablet (40 mg total) by mouth before breakfast. 90 tablet 1    thiamine 100 MG tablet Take 1 tablet (100 mg total) by mouth once daily. 30 tablet 11    thiamine 100 MG tablet Take 1 tablet (100 mg total) by  mouth once daily. 30 tablet 11    triamcinolone acetonide 0.025% (KENALOG) 0.025 % cream APPLY A THIN LAYER TO INFLAMED AREA TWICE DAILY       No current facility-administered medications on file prior to visit.       Past Surgical History:   Procedure Laterality Date    ARTHROSCOPY OF KNEE Left 1/7/2019    Procedure: ARTHROSCOPY, KNEE;  Surgeon: Derick Kirby MD;  Location: [facility] OR;  Service: Orthopedics;  Laterality: Left;    COLONOSCOPY N/A 7/27/2020    Procedure: COLONOSCOPY;  Surgeon: Sotero Zapata MD;  Location: [facility];  Service: Endoscopy;  Laterality: N/A;    ESOPHAGOGASTRODUODENOSCOPY N/A 4/8/2019    Procedure: EGD (ESOPHAGOGASTRODUODENOSCOPY);  Surgeon: Bonita Kendall MD;  Location: [facility];  Service: Endoscopy;  Laterality: N/A;    ESOPHAGOGASTRODUODENOSCOPY N/A 7/27/2020    Procedure: EGD (ESOPHAGOGASTRODUODENOSCOPY);  Surgeon: Sotero Zapata MD;  Location: [facility];  Service: Endoscopy;  Laterality: N/A;    ESOPHAGOGASTRODUODENOSCOPY N/A 12/2/2021    Procedure: EGD (ESOPHAGOGASTRODUODENOSCOPY);  Surgeon: Montez Guerra MD;  [facility] ([address] [place] [address]);  Service: Endoscopy;  Laterality: N/A;    KNEE SURGERY         Social History     Socioeconomic History    Marital status: Single   Tobacco Use    Smoking status: Never Smoker    Smokeless tobacco: Never Used    Tobacco comment: smokes Marijuana only   Substance and Sexual Activity    Alcohol use: Not Currently     Comment: last drink 11/28, drinks 3-4 a day    Drug use: No    Sexual activity: Yes     Partners: Female       OBJECTIVE:     Vital Signs Range (Last 24H):  Temp:  [36.5 °C (97.7 °F)]   Pulse:  [103]   Resp:  [18]   BP: (118)/(73)   SpO2:  [100 %]       CBC:   Recent Labs     01/19/22  0711   WBC 8.30   RBC 2.57*   HGB 8.7*   HCT 24.5*   *   MCV 95   MCH 33.9*   MCHC 35.5       CMP:   Recent Labs     01/19/22  0711   *   K 3.6      CO2 11*   BUN 9   CREATININE 1.1   GLU 98   CALCIUM 7.9*   ALBUMIN  2.6*   PROT 6.1   ALKPHOS 143*   ALT 27   AST 66*   BILITOT 9.6*       INR:  Recent Labs     01/19/22  0711 01/19/22  1121   INR 7.2* 1.7*       Diagnostic Studies: No relevant studies.    EKG: 10/24/19  Sinus tachycardia         2D ECHO:  No results found for this or any previous visit.      ASSESSMENT/PLAN:                                                                                                                  01/20/2022  Nilesh Rolon Jr. is a 46 y.o., male.    Pre-op Assessment    I have reviewed the Patient Summary Reports.     I have reviewed the Nursing Notes.    I have reviewed the Medications.     Review of Systems  Anesthesia Hx:  No problems with previous Anesthesia Denies Hx of Anesthetic complications  History of prior surgery of interest to airway management or planning: Denies Family Hx of Anesthesia complications.   Denies Personal Hx of Anesthesia complications.   Social:  Non-Smoker    Hematology/Oncology:     Oncology Normal     Cardiovascular:   Denies Hypertension.   Denies Angina. ECG has been reviewed.    Pulmonary:   Denies Shortness of breath.  Denies Recent URI.    Renal/:   Chronic Renal Disease (DONG)    Hepatic/GI:   Denies GERD. Denies Liver Disease. Hepatitis (Alcoholic hepatitis )    Musculoskeletal:   Arthritis     Neurological:   Denies CVA. Denies Seizures.    Endocrine:  Endocrine Normal Denies Diabetes.    Psych:   Psychiatric History (Alcohol abuse)          Physical Exam  General:  Obesity    Airway/Jaw/Neck:  Airway Findings: Mouth Opening: Normal Tongue: Normal  General Airway Assessment: Adult  Mallampati: II  TM Distance: Normal, at least 6 cm  Jaw/Neck Findings:  Neck ROM: Normal ROM  Neck Findings: Normal    Eyes/Ears/Nose:  EYES/EARS/NOSE FINDINGS: Normal   Dental:  Dental Findings: In tact   Chest/Lungs:  Chest/Lungs Findings: Normal Respiratory Rate     Heart/Vascular:  Heart Findings: Rate: Normal  Rhythm: Regular Rhythm        Mental  Status:  Mental Status Findings:  Cooperative, Alert and Oriented         Anesthesia Plan  Type of Anesthesia, risks & benefits discussed:  Anesthesia Type:  general, regional, MAC    Patient's Preference:   Plan Factors:          Intra-op Monitoring Plan: standard ASA monitors  Intra-op Monitoring Plan Comments:   Post Op Pain Control Plan: multimodal analgesia, IV/PO Opioids PRN and per primary service following discharge from PACU  Post Op Pain Control Plan Comments:     Induction:   IV  Beta Blocker:  Patient is not currently on a Beta-Blocker (No further documentation required).       Informed Consent: Patient understands risks and agrees with Anesthesia plan.  Questions answered. Anesthesia consent signed with patient.  ASA Score: 3     Day of Surgery Review of History & Physical:  There are no significant changes.  H&P update referred to the surgeon.         Ready For Surgery From Anesthesia Perspective.

## 2022-01-20 NOTE — ANESTHESIA POSTPROCEDURE EVALUATION
Anesthesia Post Evaluation    Patient: Nilesh Rolon     Procedure(s) Performed: Procedure(s) (LRB):  EGD (ESOPHAGOGASTRODUODENOSCOPY) (N/A)  COLONOSCOPY Suprep Vaccinated (N/A)    Final Anesthesia Type: MAC      Patient location during evaluation: GI PACU  Patient participation: Yes- Able to Participate  Level of consciousness: awake and alert  Post-procedure vital signs: reviewed and stable  Pain management: adequate  Airway patency: patent    PONV status at discharge: No PONV  Anesthetic complications: no      Cardiovascular status: blood pressure returned to baseline and hemodynamically stable  Respiratory status: unassisted, spontaneous ventilation and room air  Hydration status: euvolemic  Follow-up not needed.          Vitals Value Taken Time   /64 01/20/22 1303   Temp 36.7 °C (98.1 °F) 01/20/22 1303   Pulse 99 01/20/22 1303   Resp 18 01/20/22 1303   SpO2 100 % 01/20/22 1303         No case tracking events are documented in the log.      Pain/Maribel Score: No data recorded

## 2022-01-20 NOTE — [employer]ATION PATIENT INSTRUCTIONS
Discharge Summary/Instructions after an Endoscopic Procedure  Patient Name: Nilesh Rolon  Patient MRN: [id number]  Patient YOB: 1975 Thursday, January 20, 2022  Jacob Andrea MD  Dear patient,  As a result of recent federal legislation (The Federal Cures Act), you may   receive lab or pathology results from your procedure in your [facility]   account before your physician is able to contact you. Your physician or   their representative will relay the results to you with their   recommendations at their soonest availability.  Thank you,  Your health is very important to us during the Covid Crisis. Following your   procedure today, you will receive a daily text for 2 weeks asking about   signs or symptoms of Covid 19.  Please respond to this text when you   receive it so we can follow up and keep you as safe as possible.   RESTRICTIONS:  During your procedure today, you received medications for sedation.  These   medications may affect your judgment, balance and coordination.  Therefore,   for 24 hours, you have the following restrictions:   - DO NOT drive a car, operate machinery, make legal/financial decisions,   sign important papers or drink alcohol.    ACTIVITY:  Today: no heavy lifting, straining or running due to procedural   sedation/anesthesia.  The following day: return to full activity including work.  DIET:  Eat and drink normally unless instructed otherwise.     TREATMENT FOR COMMON SIDE EFFECTS:  - Mild abdominal pain, nausea, belching, bloating or excessive gas:  rest,   eat lightly and use a heating pad.  - Sore Throat: treat with throat lozenges and/or gargle with warm salt   water.  - Because air was used during the procedure, expelling large amounts of air   from your rectum or belching is normal.  - If a bowel prep was taken, you may not have a bowel movement for 1-3 days.    This is normal.  SYMPTOMS TO WATCH FOR AND REPORT TO YOUR PHYSICIAN:  1. Abdominal pain or bloating,  other than gas cramps.  2. Chest pain.  3. Back pain.  4. Signs of infection such as: chills or fever occurring within 24 hours   after the procedure.  5. Rectal bleeding, which would show as bright red, maroon, or black stools.   (A tablespoon of blood from the rectum is not serious, especially if   hemorrhoids are present.)  6. Vomiting.  7. Weakness or dizziness.  GO DIRECTLY TO THE NEAREST EMERGENCY ROOM IF YOU HAVE ANY OF THE FOLLOWING:      Difficulty breathing              Chills and/or fever over 101 F   Persistent vomiting and/or vomiting blood   Severe abdominal pain   Severe chest pain   Black, tarry stools   Bleeding- more than one tablespoon   Any other symptom or condition that you feel may need urgent attention  Your doctor recommends these additional instructions:  If any biopsies were taken, your doctors clinic will contact you in 1 to 2   weeks with any results.  - Discharge patient to home.   - Resume previous diet.   - Continue present medications.   - Repeat colonoscopy in 5 years for surveillance.   - Refer to a colo-rectal surgeon at appointment to be scheduled.   - Return to referring physician PRN.   - Patient has a contact number available for emergencies.  The signs and   symptoms of potential delayed complications were discussed with the   patient.  Return to normal activities tomorrow.  Written discharge   instructions were provided to the patient.  For questions, problems or results please call your physician - Jacob Andrea MD.  EMERGENCY PHONE NUMBER: [phone],  LAB RESULTS: [phone]  IF A COMPLICATION OR EMERGENCY SITUATION ARISES AND YOU ARE UNABLE TO REACH   YOUR PHYSICIAN - GO DIRECTLY TO THE EMERGENCY ROOM.  Jacob Andrea MD  1/20/2022 1:54:57 PM  This report has been verified and signed electronically.  Dear patient,  As a result of recent federal legislation (The Federal Cures Act), you may   receive lab or pathology results from your  procedure in your [facility]   account before your physician is able to contact you. Your physician or   their representative will relay the results to you with their   recommendations at their soonest availability.  Thank you,  PROVATION

## 2022-01-20 NOTE — PROVATION PATIENT INSTRUCTIONS
Discharge Summary/Instructions after an Endoscopic Procedure  Patient Name: Nilesh Rolon  Patient MRN: [id number]  Patient YOB: 1975 Thursday, January 20, 2022  Jacob Andrea MD  Dear patient,  As a result of recent federal legislation (The Federal Cures Act), you may   receive lab or pathology results from your procedure in your [facility]   account before your physician is able to contact you. Your physician or   their representative will relay the results to you with their   recommendations at their soonest availability.  Thank you,  Your health is very important to us during the Covid Crisis. Following your   procedure today, you will receive a daily text for 2 weeks asking about   signs or symptoms of Covid 19.  Please respond to this text when you   receive it so we can follow up and keep you as safe as possible.   RESTRICTIONS:  During your procedure today, you received medications for sedation.  These   medications may affect your judgment, balance and coordination.  Therefore,   for 24 hours, you have the following restrictions:   - DO NOT drive a car, operate machinery, make legal/financial decisions,   sign important papers or drink alcohol.    ACTIVITY:  Today: no heavy lifting, straining or running due to procedural   sedation/anesthesia.  The following day: return to full activity including work.  DIET:  Eat and drink normally unless instructed otherwise.     TREATMENT FOR COMMON SIDE EFFECTS:  - Mild abdominal pain, nausea, belching, bloating or excessive gas:  rest,   eat lightly and use a heating pad.  - Sore Throat: treat with throat lozenges and/or gargle with warm salt   water.  - Because air was used during the procedure, expelling large amounts of air   from your rectum or belching is normal.  - If a bowel prep was taken, you may not have a bowel movement for 1-3 days.    This is normal.  SYMPTOMS TO WATCH FOR AND REPORT TO YOUR PHYSICIAN:  1. Abdominal pain or bloating,  other than gas cramps.  2. Chest pain.  3. Back pain.  4. Signs of infection such as: chills or fever occurring within 24 hours   after the procedure.  5. Rectal bleeding, which would show as bright red, maroon, or black stools.   (A tablespoon of blood from the rectum is not serious, especially if   hemorrhoids are present.)  6. Vomiting.  7. Weakness or dizziness.  GO DIRECTLY TO THE NEAREST EMERGENCY ROOM IF YOU HAVE ANY OF THE FOLLOWING:      Difficulty breathing              Chills and/or fever over 101 F   Persistent vomiting and/or vomiting blood   Severe abdominal pain   Severe chest pain   Black, tarry stools   Bleeding- more than one tablespoon   Any other symptom or condition that you feel may need urgent attention  Your doctor recommends these additional instructions:  If any biopsies were taken, your doctors clinic will contact you in 1 to 2   weeks with any results.  - Discharge patient to home.   - Resume previous diet.   - Continue present medications.   - Await pathology results.   - Perform a colonoscopy as previously scheduled.  For questions, problems or results please call your physician - Jacob Andrea MD.  EMERGENCY PHONE NUMBER: [phone],  LAB RESULTS: [phone]  IF A COMPLICATION OR EMERGENCY SITUATION ARISES AND YOU ARE UNABLE TO REACH   YOUR PHYSICIAN - GO DIRECTLY TO THE EMERGENCY ROOM.  Jacob Andrea MD  1/20/2022 1:37:28 PM  This report has been verified and signed electronically.  Dear patient,  As a result of recent federal legislation (The Federal Cures Act), you may   receive lab or pathology results from your procedure in your [facility]   account before your physician is able to contact you. Your physician or   their representative will relay the results to you with their   recommendations at their soonest availability.  Thank you,  PROVATION

## 2022-01-20 NOTE — TRANSFER OF CARE
"Anesthesia Transfer of Care Note    Patient: Nilesh Rolon Jr.    Procedure(s) Performed: Procedure(s) (LRB):  EGD (ESOPHAGOGASTRODUODENOSCOPY) (N/A)  COLONOSCOPY Suprep Vaccinated (N/A)    Patient location: GI    Anesthesia Type: MAC    Transport from OR: Transported from OR on room air with adequate spontaneous ventilation    Post pain: adequate analgesia    Post assessment: no apparent anesthetic complications and tolerated procedure well    Post vital signs: stable    Level of consciousness: awake and alert    Nausea/Vomiting: no nausea/vomiting    Complications: none    Transfer of care protocol was followed      Last vitals:   Visit Vitals  /64 (BP Location: Left arm, Patient Position: Lying)   Pulse 99   Temp 36.7 °C (98.1 °F) (Skin)   Resp 18   Ht 6' 3" (1.905 m)   Wt 115.7 kg (255 lb)   SpO2 100%   BMI 31.87 kg/m²     "

## 2022-01-21 ENCOUNTER — TELEPHONE (OUTPATIENT)
Dept: ENDOSCOPY | Facility: HOSPITAL | Age: 47
End: 2022-01-21
Payer: MEDICAID

## 2022-01-21 ENCOUNTER — TELEPHONE (OUTPATIENT)
Dept: SURGERY | Facility: CLINIC | Age: 47
End: 2022-01-21
Payer: MEDICAID

## 2022-01-24 ENCOUNTER — OFFICE VISIT (OUTPATIENT)
Dept: SURGERY | Facility: CLINIC | Age: 47
End: 2022-01-24
Payer: MEDICAID

## 2022-01-24 ENCOUNTER — TELEPHONE (OUTPATIENT)
Dept: SURGERY | Facility: CLINIC | Age: 47
End: 2022-01-24
Payer: MEDICAID

## 2022-01-24 ENCOUNTER — PATIENT MESSAGE (OUTPATIENT)
Dept: SURGERY | Facility: CLINIC | Age: 47
End: 2022-01-24

## 2022-01-24 VITALS
SYSTOLIC BLOOD PRESSURE: 123 MMHG | DIASTOLIC BLOOD PRESSURE: 65 MMHG | HEIGHT: 75 IN | HEART RATE: 107 BPM | WEIGHT: 267.88 LBS | BODY MASS INDEX: 33.31 KG/M2

## 2022-01-24 DIAGNOSIS — K64.9 HEMORRHOIDS, UNSPECIFIED HEMORRHOID TYPE: ICD-10-CM

## 2022-01-24 PROCEDURE — 1159F PR MEDICATION LIST DOCUMENTED IN MEDICAL RECORD: ICD-10-PCS | Mod: CPTII,,, | Performed by: SURGERY

## 2022-01-24 PROCEDURE — 3078F PR MOST RECENT DIASTOLIC BLOOD PRESSURE < 80 MM HG: ICD-10-PCS | Mod: CPTII,,, | Performed by: SURGERY

## 2022-01-24 PROCEDURE — 3008F BODY MASS INDEX DOCD: CPT | Mod: CPTII,,, | Performed by: SURGERY

## 2022-01-24 PROCEDURE — 3008F PR BODY MASS INDEX (BMI) DOCUMENTED: ICD-10-PCS | Mod: CPTII,,, | Performed by: SURGERY

## 2022-01-24 PROCEDURE — 99203 OFFICE O/P NEW LOW 30 MIN: CPT | Mod: S$PBB,,, | Performed by: SURGERY

## 2022-01-24 PROCEDURE — 99203 PR OFFICE/OUTPT VISIT, NEW, LEVL III, 30-44 MIN: ICD-10-PCS | Mod: S$PBB,,, | Performed by: SURGERY

## 2022-01-24 PROCEDURE — 3074F PR MOST RECENT SYSTOLIC BLOOD PRESSURE < 130 MM HG: ICD-10-PCS | Mod: CPTII,,, | Performed by: SURGERY

## 2022-01-24 PROCEDURE — 3078F DIAST BP <80 MM HG: CPT | Mod: CPTII,,, | Performed by: SURGERY

## 2022-01-24 PROCEDURE — 99999 PR PBB SHADOW E&M-EST. PATIENT-LVL III: ICD-10-PCS | Mod: PBBFAC,,, | Performed by: SURGERY

## 2022-01-24 PROCEDURE — 99999 PR PBB SHADOW E&M-EST. PATIENT-LVL III: CPT | Mod: PBBFAC,,, | Performed by: SURGERY

## 2022-01-24 PROCEDURE — 99213 OFFICE O/P EST LOW 20 MIN: CPT | Mod: PBBFAC | Performed by: SURGERY

## 2022-01-24 PROCEDURE — 3074F SYST BP LT 130 MM HG: CPT | Mod: CPTII,,, | Performed by: SURGERY

## 2022-01-24 PROCEDURE — 1159F MED LIST DOCD IN RCRD: CPT | Mod: CPTII,,, | Performed by: SURGERY

## 2022-01-24 NOTE — PROGRESS NOTES
CRS Office Visit History and Physical    Referring Md:   Jacob Andrea Md  [address] [facility]  [address]  [place] [address]    SUBJECTIVE:     Chief Complaint: hemorrhoids    History of Present Illness:  The patient is a new patient to this practice.   Course is as follows:  Nilesh Rolon Jr. is a 46 y.o. male with decompensated EtOH cirrhosis who presents for evaluation of hemorrhoids. He reports a painful hemorrhoid that has been bothering him with bowel movements. He reports a small amount of intermittent blood per rectum.  His hemoglobin has been stable. He does have some firm bowel movements, denies straining.  He takes lactulose for his cirrhosis and has regular paracenteses for ascites.      Last Colonoscopy:   1/2022  Recommendation:        - Discharge patient to home.                          - Resume previous diet.                          - Continue present medications.                          - Repeat colonoscopy in 5 years for surveillance.                          - Refer to a colo-rectal surgeon at appointment to                          be scheduled.                          - Return to referring physician PRN.                          - Patient has a contact number available for                          emergencies. The signs and symptoms of potential                          delayed complications were discussed with the                          patient. Return to normal activities tomorrow.                          Written discharge instructions were provided to                          the patient.   Jacob Andrea MD   1/20/2022 1:54:57 PM     Review of patient's allergies indicates:  No Known Allergies    Past Medical History:   Diagnosis Date    DONG (acute kidney injury) 12/1/2021    Anxiety     Arthritis     Cirrhosis     Hypertension     Liver disease     Osteoarthritis     Other meniscus derangements, other medial meniscus, left knee 1/7/2019     Past  Surgical History:   Procedure Laterality Date    ARTHROSCOPY OF KNEE Left 1/7/2019    Procedure: ARTHROSCOPY, KNEE;  Surgeon: Derick Kirby MD;  Location: [facility];  Service: Orthopedics;  Laterality: Left;    COLONOSCOPY N/A 7/27/2020    Procedure: COLONOSCOPY;  Surgeon: Sotero Zapata MD;  Location: [facility];  Service: Endoscopy;  Laterality: N/A;    COLONOSCOPY N/A 1/20/2022    Procedure: COLONOSCOPY Suprep Vaccinated;  Surgeon: Jacob Andrea MD;  Location: [facility];  Service: Endoscopy;  Laterality: N/A;    ESOPHAGOGASTRODUODENOSCOPY N/A 4/8/2019    Procedure: EGD (ESOPHAGOGASTRODUODENOSCOPY);  Surgeon: Bonita Kendall MD;  Location: [facility];  Service: Endoscopy;  Laterality: N/A;    ESOPHAGOGASTRODUODENOSCOPY N/A 7/27/2020    Procedure: EGD (ESOPHAGOGASTRODUODENOSCOPY);  Surgeon: Sotero Zapata MD;  Location: [facility];  Service: Endoscopy;  Laterality: N/A;    ESOPHAGOGASTRODUODENOSCOPY N/A 12/2/2021    Procedure: EGD (ESOPHAGOGASTRODUODENOSCOPY);  Surgeon: Montez Guerra MD;  Location: [facility] ([address] [place] [address]);  Service: Endoscopy;  Laterality: N/A;    ESOPHAGOGASTRODUODENOSCOPY N/A 1/20/2022    Procedure: EGD (ESOPHAGOGASTRODUODENOSCOPY);  Surgeon: Jacob Andrea MD;  Location: [facility];  Service: Endoscopy;  Laterality: N/A;  please order CBC with plts and INR day of case.    KNEE SURGERY       Family History   Problem Relation Age of Onset    Birth defects Neg Hx      Social History     Tobacco Use    Smoking status: Never Smoker    Smokeless tobacco: Never Used    Tobacco comment: smokes Marijuana only   Substance Use Topics    Alcohol use: Not Currently     Comment: last drink 11/28, drinks 3-4 a day    Drug use: No        Review of Systems:  Review of Systems   Constitutional: Negative.    HENT: Negative.    Eyes: Negative.    Respiratory: Negative.    Cardiovascular: Negative.    Gastrointestinal:        See HPI    Genitourinary: Negative.    Musculoskeletal:  "Negative.    Skin:        jaundice   Neurological: Negative.    Psychiatric/Behavioral: Negative.        OBJECTIVE:     Vital Signs (Most Recent)  /65 (BP Location: Left arm, Patient Position: Sitting, BP Method: Large (Automatic))   Pulse 107   Ht 6' 3" (1.905 m)   Wt 121.5 kg (267 lb 13.7 oz)   BMI 33.48 kg/m²     Physical Exam:  General: 46 y.o. male in no distress   Neuro: alert and oriented x 4.  Moves all extremities.     HEENT: normocephalic, atraumatic, PERRL, EOMI   Respiratory: respirations are even and unlabored  Cardiac: regular rate and rhythm  Abdomen: distended, soft, nontender   Extremities: Warm dry and intact  Skin: no rashes  Anorectal: resolving, external thrombosed hemorrhoid in the left anterolateral position     Labs:   Component Ref Range & Units 5 d ago   (1/19/22) 5 d ago   (1/19/22) 2 wk ago   (1/10/22) 2 wk ago   (1/8/22) 3 wk ago   (1/3/22) 3 wk ago   (12/31/21) 3 wk ago   (12/30/21)   Prothrombin Time 9.0 - 12.5 sec 17.3 High   67.1 High   15.3 High   15.2 High   15.2 High   16.4 High   15.1 High     INR 0.8 - 1.2 1.7 High   7.2 High Panic  CM  1.5 High  CM  1.5 High  CM  1.5 High  CM            Imaging:   NA      ASSESSMENT/PLAN:     Nilesh was seen today for colon polyps.    Diagnoses and all orders for this visit:    Colon polyp  -     Ambulatory referral/consult to Colorectal Surgery        46 y.o. male with decompensated EtOH cirrhosis who presents for evaluation of hemorrhoids    - patient has small resolving, external thrombosed hemorrhoid in the left lateral position.   - Would not recommend banding of internal hemorrhoids at this time as patient's hemoglobin is stable and he is at increased risk of post-procedure complications including bleeding.   - discussed improved bowel regimen, will start daily fiber supplement in addition to his lactulose.  Okay for miralax if no improvement.  Patient has ascites and paracenteses, counseled patient to not drink excessive water " given his ascites.   - sitz baths for comfort   - follow up PRN    Claribel Prado MD  Staff Surgeon  Colon & Rectal Surgery

## 2022-01-24 NOTE — TELEPHONE ENCOUNTER
----- Message from Marlys Peters sent at 1/24/2022  2:36 PM CST -----  Type:  Patient Returning Call    Who Called: Pt's mom  Who Left Message for Patient: NA  Does the patient know what this is regarding?: Patient was given medication for hemorrhoid treatment and he cannot take any other medications besides the ones that he is currently taking. Pt would like to discuss other treatment options for hemorrhoids.   Would the patient rather a call back or a response via [facility]? Call   Best Call Back Number: [phone]  Additional Information: Please assist, thank you!

## 2022-01-26 ENCOUNTER — PATIENT MESSAGE (OUTPATIENT)
Dept: HEPATOLOGY | Facility: CLINIC | Age: 47
End: 2022-01-26
Payer: MEDICAID

## 2022-01-26 ENCOUNTER — LAB VISIT (OUTPATIENT)
Dept: LAB | Facility: HOSPITAL | Age: 47
End: 2022-01-26
Attending: INTERNAL MEDICINE
Payer: MEDICAID

## 2022-01-26 ENCOUNTER — HOSPITAL ENCOUNTER (OUTPATIENT)
Dept: INTERVENTIONAL RADIOLOGY/VASCULAR | Facility: HOSPITAL | Age: 47
Discharge: HOME | End: 2022-01-26
Attending: NURSE PRACTITIONER
Payer: MEDICAID

## 2022-01-26 VITALS
DIASTOLIC BLOOD PRESSURE: 72 MMHG | RESPIRATION RATE: 18 BRPM | HEART RATE: 106 BPM | TEMPERATURE: 98 F | OXYGEN SATURATION: 100 % | SYSTOLIC BLOOD PRESSURE: 125 MMHG

## 2022-01-26 DIAGNOSIS — K70.31 ASCITES DUE TO ALCOHOLIC CIRRHOSIS: ICD-10-CM

## 2022-01-26 DIAGNOSIS — K70.31 ALCOHOLIC CIRRHOSIS OF LIVER WITH ASCITES: ICD-10-CM

## 2022-01-26 DIAGNOSIS — K70.10 ALCOHOLIC HEPATITIS WITHOUT ASCITES: ICD-10-CM

## 2022-01-26 LAB
ALBUMIN SERPL BCP-MCNC: 2.9 G/DL (ref 3.5–5.2)
ALBUMIN SERPL BCP-MCNC: 2.9 G/DL (ref 3.5–5.2)
ALP SERPL-CCNC: 154 U/L (ref 55–135)
ALP SERPL-CCNC: 154 U/L (ref 55–135)
ALT SERPL W/O P-5'-P-CCNC: 25 U/L (ref 10–44)
ALT SERPL W/O P-5'-P-CCNC: 25 U/L (ref 10–44)
ANION GAP SERPL CALC-SCNC: 14 MMOL/L (ref 8–16)
ANION GAP SERPL CALC-SCNC: 14 MMOL/L (ref 8–16)
APPEARANCE FLD: NORMAL
AST SERPL-CCNC: 52 U/L (ref 10–40)
AST SERPL-CCNC: 52 U/L (ref 10–40)
BASOPHILS # BLD AUTO: 0.1 K/UL (ref 0–0.2)
BASOPHILS NFR BLD: 0.6 % (ref 0–1.9)
BILIRUB SERPL-MCNC: 10.1 MG/DL (ref 0.1–1)
BILIRUB SERPL-MCNC: 10.1 MG/DL (ref 0.1–1)
BODY FLD TYPE: NORMAL
BUN SERPL-MCNC: 11 MG/DL (ref 6–20)
BUN SERPL-MCNC: 11 MG/DL (ref 6–20)
CALCIUM SERPL-MCNC: 8.6 MG/DL (ref 8.7–10.5)
CALCIUM SERPL-MCNC: 8.6 MG/DL (ref 8.7–10.5)
CHLORIDE SERPL-SCNC: 104 MMOL/L (ref 95–110)
CHLORIDE SERPL-SCNC: 104 MMOL/L (ref 95–110)
CO2 SERPL-SCNC: 15 MMOL/L (ref 23–29)
CO2 SERPL-SCNC: 15 MMOL/L (ref 23–29)
COLOR FLD: YELLOW
CREAT SERPL-MCNC: 1.4 MG/DL (ref 0.5–1.4)
CREAT SERPL-MCNC: 1.4 MG/DL (ref 0.5–1.4)
DIFFERENTIAL METHOD: ABNORMAL
EOSINOPHIL # BLD AUTO: 0 K/UL (ref 0–0.5)
EOSINOPHIL NFR BLD: 0.2 % (ref 0–8)
EOSINOPHIL NFR FLD MANUAL: 1 %
ERYTHROCYTE [DISTWIDTH] IN BLOOD BY AUTOMATED COUNT: 16.9 % (ref 11.5–14.5)
EST. GFR  (AFRICAN AMERICAN): >60 ML/MIN/1.73 M^2
EST. GFR  (AFRICAN AMERICAN): >60 ML/MIN/1.73 M^2
EST. GFR  (NON AFRICAN AMERICAN): 59 ML/MIN/1.73 M^2
EST. GFR  (NON AFRICAN AMERICAN): 59 ML/MIN/1.73 M^2
GLUCOSE SERPL-MCNC: 106 MG/DL (ref 70–110)
GLUCOSE SERPL-MCNC: 106 MG/DL (ref 70–110)
HCT VFR BLD AUTO: 27.3 % (ref 40–54)
HGB BLD-MCNC: 9.6 G/DL (ref 14–18)
IMM GRANULOCYTES # BLD AUTO: 0.09 K/UL (ref 0–0.04)
IMM GRANULOCYTES NFR BLD AUTO: 0.5 % (ref 0–0.5)
INR PPP: 1.8 (ref 0.8–1.2)
LYMPHOCYTES # BLD AUTO: 2.1 K/UL (ref 1–4.8)
LYMPHOCYTES NFR BLD: 11.9 % (ref 18–48)
LYMPHOCYTES NFR FLD MANUAL: 10 %
MCH RBC QN AUTO: 32.5 PG (ref 27–31)
MCHC RBC AUTO-ENTMCNC: 35.2 G/DL (ref 32–36)
MCV RBC AUTO: 93 FL (ref 82–98)
MONOCYTES # BLD AUTO: 1.6 K/UL (ref 0.3–1)
MONOCYTES NFR BLD: 9 % (ref 4–15)
NEUTROPHILS # BLD AUTO: 13.4 K/UL (ref 1.8–7.7)
NEUTROPHILS NFR BLD: 77.8 % (ref 38–73)
NEUTROPHILS NFR FLD MANUAL: 89 %
NRBC BLD-RTO: 0 /100 WBC
PLATELET # BLD AUTO: 110 K/UL (ref 150–450)
PMV BLD AUTO: 10 FL (ref 9.2–12.9)
POTASSIUM SERPL-SCNC: 3.7 MMOL/L (ref 3.5–5.1)
POTASSIUM SERPL-SCNC: 3.7 MMOL/L (ref 3.5–5.1)
PROT SERPL-MCNC: 6.7 G/DL (ref 6–8.4)
PROT SERPL-MCNC: 6.7 G/DL (ref 6–8.4)
PROTHROMBIN TIME: 17.9 SEC (ref 9–12.5)
RBC # BLD AUTO: 2.95 M/UL (ref 4.6–6.2)
SODIUM SERPL-SCNC: 133 MMOL/L (ref 136–145)
SODIUM SERPL-SCNC: 133 MMOL/L (ref 136–145)
WBC # BLD AUTO: 17.24 K/UL (ref 3.9–12.7)
WBC # FLD: 491 /CU MM

## 2022-01-26 PROCEDURE — 85610 PROTHROMBIN TIME: CPT | Performed by: INTERNAL MEDICINE

## 2022-01-26 PROCEDURE — 80053 COMPREHEN METABOLIC PANEL: CPT | Performed by: INTERNAL MEDICINE

## 2022-01-26 PROCEDURE — 63600175 PHARM REV CODE 636 W HCPCS: Mod: JG | Performed by: RADIOLOGY

## 2022-01-26 PROCEDURE — 85025 COMPLETE CBC W/AUTO DIFF WBC: CPT | Performed by: INTERNAL MEDICINE

## 2022-01-26 PROCEDURE — 87070 CULTURE OTHR SPECIMN AEROBIC: CPT | Performed by: NURSE PRACTITIONER

## 2022-01-26 PROCEDURE — 49083 IR PARACENTESIS WITH IMAGING: ICD-10-PCS | Mod: ,,, | Performed by: RADIOLOGY

## 2022-01-26 PROCEDURE — 49083 ABD PARACENTESIS W/IMAGING: CPT | Performed by: RADIOLOGY

## 2022-01-26 PROCEDURE — 87205 SMEAR GRAM STAIN: CPT | Performed by: NURSE PRACTITIONER

## 2022-01-26 PROCEDURE — P9047 ALBUMIN (HUMAN), 25%, 50ML: HCPCS | Mod: JG | Performed by: RADIOLOGY

## 2022-01-26 PROCEDURE — 89051 BODY FLUID CELL COUNT: CPT | Performed by: NURSE PRACTITIONER

## 2022-01-26 PROCEDURE — 87075 CULTR BACTERIA EXCEPT BLOOD: CPT | Performed by: NURSE PRACTITIONER

## 2022-01-26 PROCEDURE — 36415 COLL VENOUS BLD VENIPUNCTURE: CPT | Performed by: INTERNAL MEDICINE

## 2022-01-26 PROCEDURE — C1729 CATH, DRAINAGE: HCPCS

## 2022-01-26 PROCEDURE — 25000003 PHARM REV CODE 250: Performed by: RADIOLOGY

## 2022-01-26 RX ORDER — LIDOCAINE HYDROCHLORIDE 20 MG/ML
INJECTION, SOLUTION INFILTRATION; PERINEURAL CODE/TRAUMA/SEDATION MEDICATION
Status: DISCONTINUED | OUTPATIENT
Start: 2022-01-26 | End: 2022-01-27 | Stop reason: HOSPADM

## 2022-01-26 RX ORDER — ALBUMIN HUMAN 250 G/1000ML
50 SOLUTION INTRAVENOUS ONCE AS NEEDED
Status: COMPLETED | OUTPATIENT
Start: 2022-01-26 | End: 2022-01-26

## 2022-01-26 RX ADMIN — ALBUMIN (HUMAN) 50 G: 5 SOLUTION INTRAVENOUS at 11:01

## 2022-01-26 RX ADMIN — LIDOCAINE HYDROCHLORIDE 5 ML: 20 INJECTION, SOLUTION INFILTRATION; PERINEURAL at 10:01

## 2022-01-26 NOTE — DISCHARGE INSTRUCTIONS
BATHING:  ? You may shower tomorrow.  DRESSING:  ? Remove dressing tomorrow.        ACTIVITY LEVEL: If you have received sedation or an anesthetic, you may feel sleepy for several hours. Rest until you are more awake. Gradually resume your normal activities    Do not drive, drink alcohol, or sign legal documents for 24 hours, or if taking narcotic pain medication.      DIET: You may resume your home diet. If nausea is present, increase your diet gradually with fluids and bland foods.    Medications: Pain medication should be taken only if needed and as directed. If antibiotics are prescribed, the medication should be taken until completed. You will be given an updated list of you medications.  ? No driving, alcoholic beverages or signing legal documents for next 24 hours if you have had sedation, or while taking pain medication    CALL THE DOCTOR:   For any obvious bleeding (some dried blood over the incision is normal).     Redness, swelling, foul smell around incision or fever over 101.  Shortness of breath.  Persistent pain or nausea not relieved by medication.  Call  [phone]     to speak with an Interventional Radiologist    If any unusual problems or difficulties occur contact your doctor. If you cannot contact your doctor but feel your signs and symptoms warrant a physicians attention return to the emergency room.         Patient Education       Abdominal Paracentesis Discharge Instructions   About this topic   The belly has the liver, stomach, bowels and other organs in it. In most cases there is only a small amount of fluid in the belly. But, some illnesses cause fluid to collect in the belly. This is called ascites. Too much fluid in the belly may happen from injury, infection, liver disease, or some kinds of cancers.  An abdominal paracentesis is a procedure done to get rid of the extra fluid in the belly. It may also help your doctor learn the cause of the extra fluid.  What care is needed at home?    · Ask your doctor what you need to do when you go home. Make sure you ask questions if you do not understand what the doctor says. This way you will know what you need to do.  · Talk to your doctor about how to care for your drainage site. Ask your doctor about:  ? When you should change your bandages. Fluid may still come out from the site.  ? When you may take a bath or shower  ? If you need to be careful with lifting things over 10 pounds (4.5 kg)  ? When you may go back to your normal activities like work or driving  · Be sure to wash your hands before and after touching your wound or dressing.  · Ask your doctor if you need a low salt diet.  · Based on the reason for the ascites, your doctor may tell you not to drink beer, wine, or mixed drinks (alcohol).  · The doctor may ask you to measure your belly with a tape measure and weigh yourself each day.  What follow-up care is needed?   Your doctor may ask you to make visits to the office to check on your progress. Be sure to keep these visits.  What drugs may be needed?   Your doctor may order drugs to:  · Get rid of extra fluid  · Build up the protein in your blood  Will physical activity be limited?   You may have to limit your activity for a short time. Talk to your doctor about the right amount of activity for you.  What problems could happen?   · Infection  · Bleeding  · Injury to an organ near where fluid was removed, such as the liver, spleen, or other organs  · Drop in blood pressure due to the amount of fluid that was removed  When do I need to call the doctor?   · Signs of infection. These include fever of 100.4°F (38°C) or higher, chills.  · Very bad belly pain  · Trouble breathing  · Blood in the urine  · Bleeding or fluid leak from the site does not stop in 24 to 48 hours  Teach Back: Helping You Understand   The Teach Back Method helps you understand the information we are giving you. After you talk with the staff, tell them in your own words what  you learned. This helps to make sure the staff has described each thing clearly. It also helps to explain things that may have been confusing. Before going home, make sure you can do these:  · I can tell you about my procedure.  · I can tell you how to care for my drainage site.  · I can tell you what I will do if I have a fever, chills, blood in my urine, belly pain, or trouble breathing.  Last Reviewed Date   2021-02-09  Consumer Information Use and Disclaimer   This information is not specific medical advice and does not replace information you receive from your health care provider. This is only a brief summary of general information. It does NOT include all information about conditions, illnesses, injuries, tests, procedures, treatments, therapies, discharge instructions or life-style choices that may apply to you. You must talk with your health care provider for complete information about your health and treatment options. This information should not be used to decide whether or not to accept your health care providers advice, instructions or recommendations. Only your health care provider has the knowledge and training to provide advice that is right for you.  Copyright   Copyright © 2021 [employer] and its affiliates and/or licensors. All rights reserved.      Fall Prevention  Millions of people fall every year and injure themselves. You may have had anesthesia or sedation which may increase your risk of falling. You may have health issues that put you at an increased risk of falling.     Here are ways to reduce your risk of falling.  ·   · Make your home safe by keeping walkways clear of objects you may trip over.  · Use non-slip pads under rugs. Do not use area rugs or small throw rugs.  · Use non-slip mats in bathtubs and showers.  · Install handrails and lights on staircases.  · Do not walk in poorly lit areas.  · Do not stand on chairs or wobbly ladders.  · Use caution when reaching overhead or  looking upward. This position can cause a loss of balance.  · Be sure your shoes fit properly, have non-slip bottoms and are in good condition.   · Wear shoes both inside and out. Avoid going barefoot or wearing slippers.  · Be cautious when going up and down stairs, curbs, and when walking on uneven sidewalks.  · If your balance is poor, consider using a cane or walker.  · If your fall was related to alcohol use, stop or limit alcohol intake.   · If your fall was related to use of sleeping medicines, talk to your doctor about this. You may need to reduce your dosage at bedtime if you awaken during the night to go to the bathroom.    · To reduce the need for nighttime bathroom trips:  ¨ Avoid drinking fluids for several hours before going to bed  ¨ Empty your bladder before going to bed  ¨ Men can keep a urinal at the bedside  · Stay as active as you can. Balance, flexibility, strength, and endurance all come from exercise. They all play a role in preventing falls. Ask your healthcare provider which types of activity are right for you.  · Get your vision checked on a regular basis.  · If you have pets, know where they are before you stand up or walk so you don't trip over them.  · Use night lights.

## 2022-01-26 NOTE — PROCEDURES
[facility]  Interventional Radiology  High Risk Procedure - Outpatient    Date: 01/26/2022 Time: 11:05 AM    Pre-Op Diagnosis: Recurrent ascites    Post-Op Diagnosis: same    Procedure Performed by: Richie Acuña MD    Assistant: none    Procedure: U/S guided paracentesis    Specimen/Tissue Removed: 8500 mL of clear yellow fluid    Estimated Blood Loss: Less than 5 mL    Procedure Note/Findings: U/S guided paracentesis via right abdominal approach with 5 Frisian centesis needle. No immediate post-procedure complications noted.            Please refer to dictated report for additional details.

## 2022-01-26 NOTE — DISCHARGE SUMMARY
Radiology Discharge Summary      Admit date: 1/26/2022  9:30 AM  Discharge date: January 26, 2022    Instructions Given to patient: YesVerbal    Diet: Regular    Activity:NO Restrictions    Medications on discharge (List): Refer to Discharge Medication List    Hospital Course: U/S guided large volume paracentesis    Description of Condition on Discharge: stable    Discharge Disposition: Home    Discharge Diagnosis: Recurrent ascites due to alcoholic cirrhosis    Discharge Procedure Orders   Diet general     Call MD for:  redness, tenderness, or signs of infection (pain, swelling, redness, odor or green/yellow discharge around incision site)     Call MD for:  temperature >100.4     Call MD for:  severe uncontrolled pain     Remove dressing in 24 hours     Activity as tolerated

## 2022-01-26 NOTE — H&P
[facility] [place] - Interventional Radiology  History & Physical - Short Stay  Interventional Radiology    SUBJECTIVE:     Chief Complaint/Reason for Admission: Recurrent ascites    Informant(s):  self and Electronic Health Record    History of Present Illness:  Nilesh Rolon Jr. is a 46 y.o. male with a history of ascites due to alcoholic cirrhosis.    Patient presents for paracentesis.    Scheduled Meds:   Continuous Infusions:   PRN Meds: LIDOcaine HCL 20 mg/ml (2%)    Review of patient's allergies indicates:  No Known Allergies    Past Medical History:   Diagnosis Date    DONG (acute kidney injury) 12/1/2021    Anxiety     Arthritis     Cirrhosis     Hypertension     Liver disease     Osteoarthritis     Other meniscus derangements, other medial meniscus, left knee 1/7/2019     Past Surgical History:   Procedure Laterality Date    ARTHROSCOPY OF KNEE Left 1/7/2019    Procedure: ARTHROSCOPY, KNEE;  Surgeon: Derick Kirby MD;  Location: [facility];  Service: Orthopedics;  Laterality: Left;    COLONOSCOPY N/A 7/27/2020    Procedure: COLONOSCOPY;  Surgeon: Sotero Zapata MD;  Location: [facility];  Service: Endoscopy;  Laterality: N/A;    COLONOSCOPY N/A 1/20/2022    Procedure: COLONOSCOPY Suprep Vaccinated;  Surgeon: Jacob Andrea MD;  Location: [facility];  Service: Endoscopy;  Laterality: N/A;    ESOPHAGOGASTRODUODENOSCOPY N/A 4/8/2019    Procedure: EGD (ESOPHAGOGASTRODUODENOSCOPY);  Surgeon: Bonita Kendall MD;  Location: [facility];  Service: Endoscopy;  Laterality: N/A;    ESOPHAGOGASTRODUODENOSCOPY N/A 7/27/2020    Procedure: EGD (ESOPHAGOGASTRODUODENOSCOPY);  Surgeon: Sotero Zapata MD;  Location: [facility];  Service: Endoscopy;  Laterality: N/A;    ESOPHAGOGASTRODUODENOSCOPY N/A 12/2/2021    Procedure: EGD (ESOPHAGOGASTRODUODENOSCOPY);  Surgeon: Montez Guerra MD;  Location: [facility] ([address] [place] [address]);  Service: Endoscopy;  Laterality: N/A;    ESOPHAGOGASTRODUODENOSCOPY N/A 1/20/2022     Procedure: EGD (ESOPHAGOGASTRODUODENOSCOPY);  Surgeon: Jacob Andrea MD;  Location: [facility];  Service: Endoscopy;  Laterality: N/A;  please order CBC with plts and INR day of case.    KNEE SURGERY       Family History   Problem Relation Age of Onset    Birth defects Neg Hx      Social History     Tobacco Use    Smoking status: Never Smoker    Smokeless tobacco: Never Used    Tobacco comment: smokes Marijuana only   Substance Use Topics    Alcohol use: Not Currently     Comment: last drink 11/28, drinks 3-4 a day    Drug use: No        Review of Systems:  ROS not obtained    OBJECTIVE:     Vital Signs (Most Recent):  BP: 126/76 (01/26/22 1100)    Physical Exam:  Abdomen: distended  Alert and oriented    Laboratory  CBC:   Lab Results   Component Value Date/Time    WBC 17.24 (H) 01/26/2022 07:12 AM    RBC 2.95 (L) 01/26/2022 07:12 AM    HGB 9.6 (L) 01/26/2022 07:12 AM    HCT 27.3 (L) 01/26/2022 07:12 AM    HCT 31 (L) 11/30/2021 10:26 AM     (L) 01/26/2022 07:12 AM    MCV 93 01/26/2022 07:12 AM    MCH 32.5 (H) 01/26/2022 07:12 AM    MCHC 35.2 01/26/2022 07:12 AM     Coagulation:   Lab Results   Component Value Date/Time    INR 1.8 (H) 01/26/2022 07:12 AM    APTT 29.9 04/07/2019 04:09 AM         ASSESSMENT/PLAN:     Ascites.    Patient will undergo paracentesis.    Sedation Plan: Lidocaine local only

## 2022-01-27 ENCOUNTER — PATIENT MESSAGE (OUTPATIENT)
Dept: HEPATOLOGY | Facility: CLINIC | Age: 47
End: 2022-01-27
Payer: MEDICAID

## 2022-01-27 ENCOUNTER — PATIENT MESSAGE (OUTPATIENT)
Dept: GASTROENTEROLOGY | Facility: CLINIC | Age: 47
End: 2022-01-27
Payer: MEDICAID

## 2022-01-27 LAB
FINAL PATHOLOGIC DIAGNOSIS: NORMAL
GROSS: NORMAL
Lab: NORMAL

## 2022-01-28 ENCOUNTER — PATIENT MESSAGE (OUTPATIENT)
Dept: GASTROENTEROLOGY | Facility: CLINIC | Age: 47
End: 2022-01-28
Payer: MEDICAID

## 2022-01-28 ENCOUNTER — TELEPHONE (OUTPATIENT)
Dept: GASTROENTEROLOGY | Facility: CLINIC | Age: 47
End: 2022-01-28
Payer: MEDICAID

## 2022-01-28 ENCOUNTER — TELEPHONE (OUTPATIENT)
Dept: HEPATOLOGY | Facility: CLINIC | Age: 47
End: 2022-01-28
Payer: MEDICAID

## 2022-01-28 ENCOUNTER — HOSPITAL ENCOUNTER (INPATIENT)
Facility: HOSPITAL | Age: 47
LOS: 5 days | Discharge: HOME | DRG: 372 | End: 2022-02-02
Attending: EMERGENCY MEDICINE | Admitting: FAMILY MEDICINE
Payer: MEDICAID

## 2022-01-28 DIAGNOSIS — R17 JAUNDICE: ICD-10-CM

## 2022-01-28 DIAGNOSIS — K65.2 SPONTANEOUS BACTERIAL PERITONITIS: Primary | ICD-10-CM

## 2022-01-28 DIAGNOSIS — E87.6 HYPOKALEMIA: ICD-10-CM

## 2022-01-28 DIAGNOSIS — R07.9 CHEST PAIN: ICD-10-CM

## 2022-01-28 PROBLEM — D64.9 ANEMIA: Status: ACTIVE | Noted: 2022-01-28

## 2022-01-28 PROBLEM — R05.9 COUGH: Status: ACTIVE | Noted: 2022-01-28

## 2022-01-28 PROBLEM — B37.81 CANDIDIASIS OF ESOPHAGUS: Status: ACTIVE | Noted: 2022-01-28

## 2022-01-28 LAB
ALBUMIN SERPL BCP-MCNC: 3 G/DL (ref 3.5–5.2)
ALP SERPL-CCNC: 139 U/L (ref 55–135)
ALT SERPL W/O P-5'-P-CCNC: 22 U/L (ref 10–44)
AMMONIA PLAS-SCNC: 32 UMOL/L (ref 10–50)
ANION GAP SERPL CALC-SCNC: 10 MMOL/L (ref 8–16)
AST SERPL-CCNC: 44 U/L (ref 10–40)
BASOPHILS # BLD AUTO: 0.06 K/UL (ref 0–0.2)
BASOPHILS NFR BLD: 0.7 % (ref 0–1.9)
BILIRUB SERPL-MCNC: 8.9 MG/DL (ref 0.1–1)
BUN SERPL-MCNC: 12 MG/DL (ref 6–20)
CALCIUM SERPL-MCNC: 8.2 MG/DL (ref 8.7–10.5)
CHLORIDE SERPL-SCNC: 102 MMOL/L (ref 95–110)
CO2 SERPL-SCNC: 18 MMOL/L (ref 23–29)
CREAT SERPL-MCNC: 1.2 MG/DL (ref 0.5–1.4)
CTP QC/QA: YES
DIFFERENTIAL METHOD: ABNORMAL
EOSINOPHIL # BLD AUTO: 0.1 K/UL (ref 0–0.5)
EOSINOPHIL NFR BLD: 1 % (ref 0–8)
ERYTHROCYTE [DISTWIDTH] IN BLOOD BY AUTOMATED COUNT: 16.7 % (ref 11.5–14.5)
EST. GFR  (AFRICAN AMERICAN): >60 ML/MIN/1.73 M^2
EST. GFR  (NON AFRICAN AMERICAN): >60 ML/MIN/1.73 M^2
GLUCOSE SERPL-MCNC: 102 MG/DL (ref 70–110)
HCT VFR BLD AUTO: 25.9 % (ref 40–54)
HGB BLD-MCNC: 9 G/DL (ref 14–18)
IMM GRANULOCYTES # BLD AUTO: 0.03 K/UL (ref 0–0.04)
IMM GRANULOCYTES NFR BLD AUTO: 0.3 % (ref 0–0.5)
INR PPP: 1.7 (ref 0.8–1.2)
LACTATE SERPL-SCNC: 1.5 MMOL/L (ref 0.5–2.2)
LIPASE SERPL-CCNC: 46 U/L (ref 4–60)
LYMPHOCYTES # BLD AUTO: 1.6 K/UL (ref 1–4.8)
LYMPHOCYTES NFR BLD: 18.2 % (ref 18–48)
MAGNESIUM SERPL-MCNC: 2 MG/DL (ref 1.6–2.6)
MCH RBC QN AUTO: 32 PG (ref 27–31)
MCHC RBC AUTO-ENTMCNC: 34.7 G/DL (ref 32–36)
MCV RBC AUTO: 92 FL (ref 82–98)
MONOCYTES # BLD AUTO: 0.8 K/UL (ref 0.3–1)
MONOCYTES NFR BLD: 9.5 % (ref 4–15)
NEUTROPHILS # BLD AUTO: 6.1 K/UL (ref 1.8–7.7)
NEUTROPHILS NFR BLD: 70.3 % (ref 38–73)
NRBC BLD-RTO: 0 /100 WBC
PLATELET # BLD AUTO: 107 K/UL (ref 150–450)
PMV BLD AUTO: 10.4 FL (ref 9.2–12.9)
POTASSIUM SERPL-SCNC: 3.2 MMOL/L (ref 3.5–5.1)
PROT SERPL-MCNC: 6.2 G/DL (ref 6–8.4)
PROTHROMBIN TIME: 17.5 SEC (ref 9–12.5)
RBC # BLD AUTO: 2.81 M/UL (ref 4.6–6.2)
SARS-COV-2 RDRP RESP QL NAA+PROBE: NEGATIVE
SODIUM SERPL-SCNC: 130 MMOL/L (ref 136–145)
WBC # BLD AUTO: 8.62 K/UL (ref 3.9–12.7)

## 2022-01-28 PROCEDURE — 12000002 HC ACUTE/MED SURGE SEMI-PRIVATE ROOM

## 2022-01-28 PROCEDURE — 85610 PROTHROMBIN TIME: CPT | Performed by: NURSE PRACTITIONER

## 2022-01-28 PROCEDURE — 63600175 PHARM REV CODE 636 W HCPCS: Performed by: EMERGENCY MEDICINE

## 2022-01-28 PROCEDURE — 96365 THER/PROPH/DIAG IV INF INIT: CPT

## 2022-01-28 PROCEDURE — 82140 ASSAY OF AMMONIA: CPT | Performed by: NURSE PRACTITIONER

## 2022-01-28 PROCEDURE — 99285 EMERGENCY DEPT VISIT HI MDM: CPT | Mod: 25

## 2022-01-28 PROCEDURE — 87040 BLOOD CULTURE FOR BACTERIA: CPT | Mod: 59 | Performed by: NURSE PRACTITIONER

## 2022-01-28 PROCEDURE — 85025 COMPLETE CBC W/AUTO DIFF WBC: CPT | Performed by: NURSE PRACTITIONER

## 2022-01-28 PROCEDURE — P9047 ALBUMIN (HUMAN), 25%, 50ML: HCPCS | Mod: JG | Performed by: EMERGENCY MEDICINE

## 2022-01-28 PROCEDURE — 83690 ASSAY OF LIPASE: CPT | Performed by: NURSE PRACTITIONER

## 2022-01-28 PROCEDURE — 96367 TX/PROPH/DG ADDL SEQ IV INF: CPT

## 2022-01-28 PROCEDURE — 83605 ASSAY OF LACTIC ACID: CPT | Performed by: NURSE PRACTITIONER

## 2022-01-28 PROCEDURE — 96361 HYDRATE IV INFUSION ADD-ON: CPT

## 2022-01-28 PROCEDURE — 96366 THER/PROPH/DIAG IV INF ADDON: CPT

## 2022-01-28 PROCEDURE — 83735 ASSAY OF MAGNESIUM: CPT

## 2022-01-28 PROCEDURE — 63600175 PHARM REV CODE 636 W HCPCS

## 2022-01-28 PROCEDURE — U0002 COVID-19 LAB TEST NON-CDC: HCPCS | Performed by: NURSE PRACTITIONER

## 2022-01-28 PROCEDURE — 25000003 PHARM REV CODE 250

## 2022-01-28 PROCEDURE — 80053 COMPREHEN METABOLIC PANEL: CPT | Performed by: NURSE PRACTITIONER

## 2022-01-28 RX ORDER — ASPIRIN 325 MG
50000 TABLET, DELAYED RELEASE (ENTERIC COATED) ORAL
Status: ON HOLD | COMMUNITY
Start: 2022-01-27 | End: 2022-02-02 | Stop reason: HOSPADM

## 2022-01-28 RX ORDER — ALBUMIN HUMAN 250 G/1000ML
25 SOLUTION INTRAVENOUS
Status: COMPLETED | OUTPATIENT
Start: 2022-01-28 | End: 2022-01-28

## 2022-01-28 RX ORDER — TALC
6 POWDER (GRAM) TOPICAL NIGHTLY PRN
Status: DISCONTINUED | OUTPATIENT
Start: 2022-01-28 | End: 2022-02-02 | Stop reason: HOSPADM

## 2022-01-28 RX ORDER — AMOXICILLIN 250 MG
1 CAPSULE ORAL 2 TIMES DAILY
Status: DISCONTINUED | OUTPATIENT
Start: 2022-01-28 | End: 2022-02-02 | Stop reason: HOSPADM

## 2022-01-28 RX ORDER — GUAIFENESIN 100 MG/5ML
200 SOLUTION ORAL EVERY 4 HOURS PRN
Status: DISCONTINUED | OUTPATIENT
Start: 2022-01-28 | End: 2022-02-02 | Stop reason: HOSPADM

## 2022-01-28 RX ORDER — ONDANSETRON 2 MG/ML
4 INJECTION INTRAMUSCULAR; INTRAVENOUS EVERY 8 HOURS PRN
Status: DISCONTINUED | OUTPATIENT
Start: 2022-01-28 | End: 2022-02-02 | Stop reason: HOSPADM

## 2022-01-28 RX ORDER — SODIUM CHLORIDE 0.9 % (FLUSH) 0.9 %
10 SYRINGE (ML) INJECTION EVERY 8 HOURS PRN
Status: DISCONTINUED | OUTPATIENT
Start: 2022-01-28 | End: 2022-02-02 | Stop reason: HOSPADM

## 2022-01-28 RX ORDER — ALBUMIN HUMAN 250 G/1000ML
25 SOLUTION INTRAVENOUS
Status: COMPLETED | OUTPATIENT
Start: 2022-01-28 | End: 2022-01-29

## 2022-01-28 RX ORDER — SIMETHICONE 80 MG
1 TABLET,CHEWABLE ORAL 4 TIMES DAILY PRN
Status: DISCONTINUED | OUTPATIENT
Start: 2022-01-28 | End: 2022-02-02 | Stop reason: HOSPADM

## 2022-01-28 RX ORDER — PROPRANOLOL HYDROCHLORIDE 20 MG/1
20 TABLET ORAL 3 TIMES DAILY
Status: ON HOLD | COMMUNITY
Start: 2022-01-11 | End: 2022-02-02 | Stop reason: HOSPADM

## 2022-01-28 RX ORDER — FLUCONAZOLE 2 MG/ML
400 INJECTION, SOLUTION INTRAVENOUS ONCE
Status: COMPLETED | OUTPATIENT
Start: 2022-01-28 | End: 2022-01-28

## 2022-01-28 RX ORDER — IBUPROFEN 200 MG
16 TABLET ORAL
Status: DISCONTINUED | OUTPATIENT
Start: 2022-01-28 | End: 2022-02-02 | Stop reason: HOSPADM

## 2022-01-28 RX ORDER — ACETAMINOPHEN 325 MG/1
650 TABLET ORAL EVERY 4 HOURS PRN
Status: DISCONTINUED | OUTPATIENT
Start: 2022-01-28 | End: 2022-02-02 | Stop reason: HOSPADM

## 2022-01-28 RX ORDER — THIAMINE HCL 100 MG
100 TABLET ORAL DAILY
Status: DISCONTINUED | OUTPATIENT
Start: 2022-01-29 | End: 2022-02-02 | Stop reason: HOSPADM

## 2022-01-28 RX ORDER — MAG HYDROX/ALUMINUM HYD/SIMETH 200-200-20
30 SUSPENSION, ORAL (FINAL DOSE FORM) ORAL 4 TIMES DAILY PRN
Status: DISCONTINUED | OUTPATIENT
Start: 2022-01-28 | End: 2022-02-02 | Stop reason: HOSPADM

## 2022-01-28 RX ORDER — IBUPROFEN 200 MG
24 TABLET ORAL
Status: DISCONTINUED | OUTPATIENT
Start: 2022-01-28 | End: 2022-02-02 | Stop reason: HOSPADM

## 2022-01-28 RX ORDER — LACTULOSE 10 G/15ML
20 SOLUTION ORAL 3 TIMES DAILY
Status: DISCONTINUED | OUTPATIENT
Start: 2022-01-28 | End: 2022-01-28

## 2022-01-28 RX ORDER — PANTOPRAZOLE SODIUM 40 MG/1
40 TABLET, DELAYED RELEASE ORAL
Status: DISCONTINUED | OUTPATIENT
Start: 2022-01-29 | End: 2022-02-02 | Stop reason: HOSPADM

## 2022-01-28 RX ORDER — GLUCAGON 1 MG
1 KIT INJECTION
Status: DISCONTINUED | OUTPATIENT
Start: 2022-01-28 | End: 2022-02-02 | Stop reason: HOSPADM

## 2022-01-28 RX ORDER — POTASSIUM CHLORIDE 20 MEQ/1
40 TABLET, EXTENDED RELEASE ORAL ONCE
Status: COMPLETED | OUTPATIENT
Start: 2022-01-28 | End: 2022-01-28

## 2022-01-28 RX ORDER — FLUCONAZOLE 200 MG/1
400 TABLET ORAL DAILY
Status: DISCONTINUED | OUTPATIENT
Start: 2022-01-29 | End: 2022-02-02 | Stop reason: HOSPADM

## 2022-01-28 RX ORDER — FLUCONAZOLE 200 MG/1
200 TABLET ORAL DAILY
Qty: 15 TABLET | Refills: 0 | Status: ON HOLD | OUTPATIENT
Start: 2022-01-28 | End: 2022-02-02 | Stop reason: HOSPADM

## 2022-01-28 RX ORDER — CEFOXITIN SODIUM 2 G/50ML
2 INJECTION, SOLUTION INTRAVENOUS
Status: COMPLETED | OUTPATIENT
Start: 2022-01-28 | End: 2022-01-28

## 2022-01-28 RX ADMIN — FLUCONAZOLE 400 MG: 400 INJECTION, SOLUTION INTRAVENOUS at 09:01

## 2022-01-28 RX ADMIN — SENNOSIDES AND DOCUSATE SODIUM 1 TABLET: 8.6; 5 TABLET ORAL at 09:01

## 2022-01-28 RX ADMIN — ALBUMIN (HUMAN) 25 G: 12.5 SOLUTION INTRAVENOUS at 05:01

## 2022-01-28 RX ADMIN — POTASSIUM CHLORIDE 40 MEQ: 1500 TABLET, EXTENDED RELEASE ORAL at 09:01

## 2022-01-28 RX ADMIN — GUAIFENESIN 200 MG: 100 SOLUTION ORAL at 11:01

## 2022-01-28 RX ADMIN — Medication 6 MG: at 09:01

## 2022-01-28 RX ADMIN — CEFOXITIN SODIUM 2 G: 2 INJECTION, SOLUTION INTRAVENOUS at 06:01

## 2022-01-28 NOTE — TELEPHONE ENCOUNTER
----- Message from Jacob Andrea MD sent at 1/28/2022  7:58 AM CST -----  Candida confirmed.  Course of antifungals sent to pharmacy.

## 2022-01-28 NOTE — TELEPHONE ENCOUNTER
Call returned to Zahida, mother of the patient [phone]. She stated Nilesh is on his way to [facility].  Allowed to verbalize concerns. Questions answered.

## 2022-01-28 NOTE — TELEPHONE ENCOUNTER
----- Message from Nicolas Jasso sent at 1/28/2022 12:57 PM CST -----  Regarding: Speak with office  Contact: Zahida(pt mom)  Pt mom would like to speak with office.    Zahida #[phone]

## 2022-01-28 NOTE — ED PROVIDER NOTES
Encounter Date: 1/28/2022    SCRIBE #1 NOTE: I, John Dunn, am scribing for, and in the presence of, Camilla Sierra NP.       History     Chief Complaint   Patient presents with    Jaundice     Pt is jaundice, had EGD completed 8 days ago with showed acute fungal esophagitis, + abd bloating, pt states sent to ed by MD      46-year-old male presents to the ED due to Juandice. Patient referred to ED after having EGD completed 8 days ago that showed acute esophagitis. Patient also reports abdominal distension, lower abdominal pain, sore throat, and 1 occurrence of diarrhea this morning. Patient reports his eyes started to turn yellow yesterday. He denies fever, chest pain, bloody stool, SOB, or other associated symptoms. PMHx of Liver disease and Cirrhosis. Patient states that he stopped drinking alcohol 2 months ago.      The history is provided by the patient.     Review of patient's allergies indicates:  No Known Allergies  Past Medical History:   Diagnosis Date    DONG (acute kidney injury) 12/1/2021    Anxiety     Arthritis     Cirrhosis     Hypertension     Liver disease     Osteoarthritis     Other meniscus derangements, other medial meniscus, left knee 1/7/2019     Past Surgical History:   Procedure Laterality Date    ARTHROSCOPY OF KNEE Left 1/7/2019    Procedure: ARTHROSCOPY, KNEE;  Surgeon: Derick Kirby MD;  Location: [facility];  Service: Orthopedics;  Laterality: Left;    COLONOSCOPY N/A 7/27/2020    Procedure: COLONOSCOPY;  Surgeon: Sotero Zapata MD;  Location: [facility];  Service: Endoscopy;  Laterality: N/A;    COLONOSCOPY N/A 1/20/2022    Procedure: COLONOSCOPY Suprep Vaccinated;  Surgeon: Jacob Andrea MD;  Location: [facility];  Service: Endoscopy;  Laterality: N/A;    ESOPHAGOGASTRODUODENOSCOPY N/A 4/8/2019    Procedure: EGD (ESOPHAGOGASTRODUODENOSCOPY);  Surgeon: Bonita Kendall MD;  Location: [facility];  Service: Endoscopy;  Laterality: N/A;    ESOPHAGOGASTRODUODENOSCOPY  N/A 7/27/2020    Procedure: EGD (ESOPHAGOGASTRODUODENOSCOPY);  Surgeon: Sotero Zapata MD;  Location: [facility];  Service: Endoscopy;  Laterality: N/A;    ESOPHAGOGASTRODUODENOSCOPY N/A 12/2/2021    Procedure: EGD (ESOPHAGOGASTRODUODENOSCOPY);  Surgeon: Montez Guerra MD;  [facility]  Service: Endoscopy;  Laterality: N/A;    ESOPHAGOGASTRODUODENOSCOPY N/A 1/20/2022    Procedure: EGD (ESOPHAGOGASTRODUODENOSCOPY);  Surgeon: aJcob Andrea MD;  Location: [facility] ENDO;  Service: Endoscopy;  Laterality: N/A;  please order CBC with plts and INR day of case.    KNEE SURGERY       Family History   Problem Relation Age of Onset    Birth defects Neg Hx      Social History     Tobacco Use    Smoking status: Never Smoker    Smokeless tobacco: Never Used    Tobacco comment: smokes Marijuana only   Substance Use Topics    Alcohol use: Not Currently     Comment: last drink 11/28, drinks 3-4 a day    Drug use: No     Review of Systems   Constitutional: Negative for chills and fever.   HENT: Positive for sore throat.    Eyes: Negative for redness.   Respiratory: Negative for shortness of breath.    Cardiovascular: Negative for chest pain.   Gastrointestinal: Positive for abdominal distention, abdominal pain and diarrhea. Negative for anal bleeding, blood in stool, nausea and vomiting.   Genitourinary: Negative for dysuria and hematuria.   Musculoskeletal: Negative for back pain.   Skin: Negative for rash.   Neurological: Negative for headaches.       Physical Exam     Initial Vitals [01/28/22 1319]   BP Pulse Resp Temp SpO2   118/78 (!) 111 18 97.9 °F (36.6 °C) 100 %      MAP       --         Physical Exam    Nursing note and vitals reviewed.  Constitutional: He appears well-developed and well-nourished. He is not diaphoretic. He is Obese . No distress.   Patient is obese with ascites.   HENT:   Head: Normocephalic and atraumatic.   Eyes: Conjunctivae are normal. Right eye exhibits no  discharge. Left eye exhibits no discharge. Scleral icterus is present.   Neck:   Normal range of motion.  Cardiovascular: Normal rate and regular rhythm.   Pulmonary/Chest: Breath sounds normal. No respiratory distress.   Abdominal: Abdomen is soft. He exhibits distension. Bowel sounds are increased.   Musculoskeletal:         General: Normal range of motion.      Cervical back: Normal range of motion.     Neurological: He is alert and oriented to person, place, and time.   Skin: Skin is warm and dry. Capillary refill takes less than 2 seconds. No rash noted. No pallor.   Jaundice   Psychiatric: He has a normal mood and affect. His behavior is normal. Judgment and thought content normal.         ED Course   Procedures  Labs Reviewed   COMPREHENSIVE METABOLIC PANEL - Abnormal; Notable for the following components:       Result Value    Sodium 130 (*)     Potassium 3.2 (*)     CO2 18 (*)     Calcium 8.2 (*)     Albumin 3.0 (*)     Total Bilirubin 8.9 (*)     Alkaline Phosphatase 139 (*)     AST 44 (*)     All other components within normal limits   CBC W/ AUTO DIFFERENTIAL - Abnormal; Notable for the following components:    RBC 2.81 (*)     Hemoglobin 9.0 (*)     Hematocrit 25.9 (*)     MCH 32.0 (*)     RDW 16.7 (*)     Platelets 107 (*)     All other components within normal limits   PROTIME-INR - Abnormal; Notable for the following components:    Prothrombin Time 17.5 (*)     INR 1.7 (*)     All other components within normal limits   COMPREHENSIVE METABOLIC PANEL - Abnormal; Notable for the following components:    Sodium 130 (*)     CO2 18 (*)     Calcium 8.0 (*)     Total Protein 5.1 (*)     Albumin 2.6 (*)     Total Bilirubin 7.5 (*)     All other components within normal limits   CBC W/ AUTO DIFFERENTIAL - Abnormal; Notable for the following components:    RBC 2.27 (*)     Hemoglobin 7.4 (*)     Hematocrit 20.5 (*)     MCH 32.6 (*)     MCHC 36.1 (*)     RDW 16.6 (*)     Platelets 70 (*)     All other  components within normal limits   LIPASE   LACTIC ACID, PLASMA   AMMONIA   MAGNESIUM   MAGNESIUM   SARS-COV-2 RDRP GENE    Narrative:     This test utilizes isothermal nucleic acid amplification   technology to detect the SARS-CoV-2 RdRp nucleic acid segment.   The analytical sensitivity (limit of detection) is 125 genome   equivalents/mL.   A POSITIVE result implies infection with the SARS-CoV-2 virus;   the patient is presumed to be contagious.     A NEGATIVE result means that SARS-CoV-2 nucleic acids are not   present above the limit of detection. A NEGATIVE result should be   treated as presumptive. It does not rule out the possibility of   COVID-19 and should not be the sole basis for treatment decisions.   If COVID-19 is strongly suspected based on clinical and exposure   history, re-testing using an alternate molecular assay should be   considered.   This test is only for use under the Food and Drug   Administration s Emergency Use Authorization (EUA).   Commercial kits are provided by [employer].   Performance characteristics of the EUA have been independently   verified by [facility] Department of   Pathology and Laboratory Medicine.   _________________________________________________________________   The authorized Fact Sheet for Healthcare Providers and the authorized Fact   Sheet for Patients of the ID NOW COVID-19 are available on the FDA   website:     https://www.fda.gov/media/527759/download  https://www.fda.gov/media/885792/download                Imaging Results    None          Medications   sodium chloride 0.9% flush 10 mL (has no administration in time range)   acetaminophen tablet 650 mg (0 mg Oral Return to Cabinet 1/29/22 1641)   senna-docusate 8.6-50 mg per tablet 1 tablet (1 tablet Oral Given 1/30/22 4622)   ondansetron injection 4 mg (has no administration in time range)   glucose chewable tablet 16 g (has no administration in time range)   glucose chewable tablet 24 g  (has no administration in time range)   glucagon (human recombinant) injection 1 mg (has no administration in time range)   melatonin tablet 6 mg (6 mg Oral Not Given 1/29/22 2350)   simethicone chewable tablet 80 mg (80 mg Oral Given 1/30/22 1350)   aluminum-magnesium hydroxide-simethicone 200-200-20 mg/5 mL suspension 30 mL (has no administration in time range)   fluconazole tablet 400 mg (400 mg Oral Given 1/30/22 0822)   dextrose 10% bolus 125 mL (has no administration in time range)   dextrose 10% bolus 250 mL (has no administration in time range)   pantoprazole EC tablet 40 mg (40 mg Oral Given 1/30/22 0624)   multivitamin tablet (1 tablet Oral Given 1/30/22 0822)   thiamine tablet 100 mg (100 mg Oral Given 1/30/22 0822)   guaiFENesin 100 mg/5 ml syrup 200 mg (200 mg Oral Given 1/29/22 2253)   cefTRIAXone (ROCEPHIN) 2 g/50 mL D5W IVPB (0 g Intravenous Stopped 1/30/22 0723)   albumin human 25% bottle 25 g (0 g Intravenous Stopped 1/28/22 1829)   ceFOXItin 2 g/50ml Dextrose IVPB (0 g Intravenous Stopped 1/28/22 1914)   potassium chloride SA CR tablet 40 mEq (40 mEq Oral Given 1/28/22 2159)   fluconazole (DIFLUCAN) IVPB 400 mg 200 mL (0 mg Intravenous Stopped 1/28/22 2352)   albumin human 25% bottle 25 g (0 g Intravenous Stopped 1/29/22 0108)   albumin human 25% bottle 50 g (0 g Intravenous Stopped 1/30/22 1449)     Medical Decision Making:   Initial Assessment:   46-year-old male presents to the ED due to Juandice.  Clinical Tests:   Lab Tests: Reviewed and Ordered          Scribe Attestation:   Scribe #1: I performed the above scribed service and the documentation accurately describes the services I performed. I attest to the accuracy of the note.    Attending Attestation:     Physician Attestation Statement for NP/PA:   I discussed this assessment and plan of this patient with the NP/PA, but I did not personally examine the patient. The face to face encounter was performed by the NP/PA.              ED Course  as of 01/30/22 1726   Fri Jan 28, 2022   1319 Pt was notified per pcp to come to the ER due to spontaneous bacterial peritonitis. Here for admission and possible repeat paracentesis.  [DT]   1402 ESOPHAGUS, BIOPSY:   - Acute fungal esophagitis   - Fungal yeast and pseudohyphae, morphologically consistent with Candida   spp., confirmed with a properly controlled GMS cytochemical stain  [DT]   1420 Attempting to locate the number for the pts nephrologist.  [DT]   1556 Attempting again to contact Dr Ambriz.  [DT]   1621 Unable to get in touch via phone with Dr Mixon, therefore secure chat started. Awaiting for a response.  [DT]   1634 [facility] paged for admission.  [DT]   1704 Orders from Dr Mixon taken. Plan for admission. Waiting on call back.  [DT]   1800 [facility] capped. Will admit to [facility] [DT]   1823 Spoke with Yajaira for admission.  [DT]      ED Course User Index  [DT] Camilla Sierra NP             Clinical Impression:   Final diagnoses:  [K65.2] Spontaneous bacterial peritonitis (Primary)  [E87.6] Hypokalemia  [R17] Jaundice          ED Disposition Condition    Admit           I, Camilla Sierra NP, personally performed the services described in this documentation.All medical record entries made by the scribe were at my direction and in my presence.I have reviewed the chart and agree that the record reflects my personal performance and is accurate and complete.      Camilla Sierra NP  01/28/22 1825       Camilla Sierra NP  01/28/22 2118       Claude Walden MD  01/30/22 1726

## 2022-01-28 NOTE — Clinical Note
Diagnosis: Spontaneous bacterial peritonitis [567.23.ICD-9-CM]   Future Attending Provider: UVALDO NICHOLE [[id number]]   Admitting Provider:: UVALDO NICHOLE [[id number]]

## 2022-01-29 ENCOUNTER — NURSE TRIAGE (OUTPATIENT)
Dept: ADMINISTRATIVE | Facility: CLINIC | Age: 47
End: 2022-01-29
Payer: MEDICAID

## 2022-01-29 LAB
ALBUMIN SERPL BCP-MCNC: 2.6 G/DL (ref 3.5–5.2)
ALP SERPL-CCNC: 114 U/L (ref 55–135)
ALT SERPL W/O P-5'-P-CCNC: 18 U/L (ref 10–44)
ANION GAP SERPL CALC-SCNC: 9 MMOL/L (ref 8–16)
AST SERPL-CCNC: 37 U/L (ref 10–40)
BASOPHILS # BLD AUTO: 0.05 K/UL (ref 0–0.2)
BASOPHILS NFR BLD: 1 % (ref 0–1.9)
BILIRUB SERPL-MCNC: 7.5 MG/DL (ref 0.1–1)
BUN SERPL-MCNC: 11 MG/DL (ref 6–20)
CALCIUM SERPL-MCNC: 8 MG/DL (ref 8.7–10.5)
CHLORIDE SERPL-SCNC: 103 MMOL/L (ref 95–110)
CO2 SERPL-SCNC: 18 MMOL/L (ref 23–29)
CREAT SERPL-MCNC: 1.1 MG/DL (ref 0.5–1.4)
DIFFERENTIAL METHOD: ABNORMAL
EOSINOPHIL # BLD AUTO: 0.1 K/UL (ref 0–0.5)
EOSINOPHIL NFR BLD: 1.9 % (ref 0–8)
ERYTHROCYTE [DISTWIDTH] IN BLOOD BY AUTOMATED COUNT: 16.6 % (ref 11.5–14.5)
EST. GFR  (AFRICAN AMERICAN): >60 ML/MIN/1.73 M^2
EST. GFR  (NON AFRICAN AMERICAN): >60 ML/MIN/1.73 M^2
GLUCOSE SERPL-MCNC: 107 MG/DL (ref 70–110)
HCT VFR BLD AUTO: 20.5 % (ref 40–54)
HGB BLD-MCNC: 7.4 G/DL (ref 14–18)
IMM GRANULOCYTES # BLD AUTO: 0.01 K/UL (ref 0–0.04)
IMM GRANULOCYTES NFR BLD AUTO: 0.2 % (ref 0–0.5)
LYMPHOCYTES # BLD AUTO: 1.4 K/UL (ref 1–4.8)
LYMPHOCYTES NFR BLD: 27.7 % (ref 18–48)
MAGNESIUM SERPL-MCNC: 1.9 MG/DL (ref 1.6–2.6)
MCH RBC QN AUTO: 32.6 PG (ref 27–31)
MCHC RBC AUTO-ENTMCNC: 36.1 G/DL (ref 32–36)
MCV RBC AUTO: 90 FL (ref 82–98)
MONOCYTES # BLD AUTO: 0.6 K/UL (ref 0.3–1)
MONOCYTES NFR BLD: 12.1 % (ref 4–15)
NEUTROPHILS # BLD AUTO: 2.9 K/UL (ref 1.8–7.7)
NEUTROPHILS NFR BLD: 57.1 % (ref 38–73)
NRBC BLD-RTO: 0 /100 WBC
PLATELET # BLD AUTO: 70 K/UL (ref 150–450)
PMV BLD AUTO: 9.7 FL (ref 9.2–12.9)
POTASSIUM SERPL-SCNC: 3.5 MMOL/L (ref 3.5–5.1)
PROT SERPL-MCNC: 5.1 G/DL (ref 6–8.4)
RBC # BLD AUTO: 2.27 M/UL (ref 4.6–6.2)
SODIUM SERPL-SCNC: 130 MMOL/L (ref 136–145)
WBC # BLD AUTO: 5.13 K/UL (ref 3.9–12.7)

## 2022-01-29 PROCEDURE — 85025 COMPLETE CBC W/AUTO DIFF WBC: CPT

## 2022-01-29 PROCEDURE — 94761 N-INVAS EAR/PLS OXIMETRY MLT: CPT

## 2022-01-29 PROCEDURE — 99233 SBSQ HOSP IP/OBS HIGH 50: CPT | Mod: ,,, | Performed by: INTERNAL MEDICINE

## 2022-01-29 PROCEDURE — 99233 PR SUBSEQUENT HOSPITAL CARE,LEVL III: ICD-10-PCS | Mod: ,,, | Performed by: INTERNAL MEDICINE

## 2022-01-29 PROCEDURE — 25000003 PHARM REV CODE 250

## 2022-01-29 PROCEDURE — 63600175 PHARM REV CODE 636 W HCPCS: Mod: JG

## 2022-01-29 PROCEDURE — 83735 ASSAY OF MAGNESIUM: CPT

## 2022-01-29 PROCEDURE — 11000001 HC ACUTE MED/SURG PRIVATE ROOM

## 2022-01-29 PROCEDURE — 80053 COMPREHEN METABOLIC PANEL: CPT

## 2022-01-29 PROCEDURE — P9047 ALBUMIN (HUMAN), 25%, 50ML: HCPCS | Mod: JG

## 2022-01-29 RX ADMIN — THIAMINE HCL TAB 100 MG 100 MG: 100 TAB at 11:01

## 2022-01-29 RX ADMIN — SIMETHICONE 80 MG: 80 TABLET, CHEWABLE ORAL at 12:01

## 2022-01-29 RX ADMIN — GUAIFENESIN 200 MG: 100 SOLUTION ORAL at 10:01

## 2022-01-29 RX ADMIN — SENNOSIDES AND DOCUSATE SODIUM 1 TABLET: 8.6; 5 TABLET ORAL at 09:01

## 2022-01-29 RX ADMIN — FLUCONAZOLE 400 MG: 200 TABLET ORAL at 11:01

## 2022-01-29 RX ADMIN — THERA TABS 1 TABLET: TAB at 11:01

## 2022-01-29 RX ADMIN — ALBUMIN HUMAN 12.5 G: 0.25 SOLUTION INTRAVENOUS at 12:01

## 2022-01-29 RX ADMIN — CEFTRIAXONE 1 G: 1 INJECTION, SOLUTION INTRAVENOUS at 06:01

## 2022-01-29 RX ADMIN — PANTOPRAZOLE SODIUM 40 MG: 40 TABLET, DELAYED RELEASE ORAL at 06:01

## 2022-01-29 RX ADMIN — SENNOSIDES AND DOCUSATE SODIUM 1 TABLET: 8.6; 5 TABLET ORAL at 11:01

## 2022-01-29 RX ADMIN — Medication 6 MG: at 09:01

## 2022-01-29 NOTE — ASSESSMENT & PLAN NOTE
Na 130 on admit  Likely 2/2 to decompensated cirrhosis  Neuro checks q4hr  Monitor and trend CMP daily; correct electrolytes as needed

## 2022-01-29 NOTE — ASSESSMENT & PLAN NOTE
Patient reports cessation of alcohol two months ago  Continue to monitor for s/s of alcohol withdrawal and DTs  Resume home thiamine

## 2022-01-29 NOTE — PHARMACY MED REC
"Admission Medication History     The home medication history was taken by Catrina Hughes CPhT.    Medication history obtained from,Patient verified    You may go to "Admission" then "Reconcile Home Medications" tabs to review and/or act upon these items.      The home medication list has been updated by the Pharmacy department.    Please read ALL comments highlighted in yellow.    Please address this information as you see fit.     Feel free to contact us if you have any questions or require assistance.      The medications listed below were removed from the home medication list.  Please reorder if appropriate:  Patient reports no longer taking the following medication(s):   Kenalog cream 0.025%   nizoral cream 2%           Catrina Hughes CPhT.  Ext [phone]                  .          "

## 2022-01-29 NOTE — ASSESSMENT & PLAN NOTE
-H/H is 9.0/25.9, which is stable and consistent with previous laboratory measurements. Patient exhibits no signs or symptoms of acute bleeding  -No indication for blood transfusion  -Continue to monitor serial CBC  -Transfuse for Hgb <7 or if symptomatic

## 2022-01-29 NOTE — ASSESSMENT & PLAN NOTE
Alcoholic cirrhosis  Jaundice and ascites on physical exam  Thrombocytopenia, hyponatremia, and hypoalbuminemia on admit labs  PT/INR 17.5/1.7  , AST 44  Received 25g albumin in ED  Will order another dose of 25g albumin   Continue symptom management  Consult IR

## 2022-01-29 NOTE — ASSESSMENT & PLAN NOTE
Patient has hypokalemia which is currently controlled.   Last electrolytes reviewed- Recent Labs   Lab 01/28/22  1531   K 3.2*   .   Patient received KCL 40mEq PO x1  Will continue to replace potassium and monitor electrolytes closely with daily BMP.   Monitor on telemetry

## 2022-01-29 NOTE — PLAN OF CARE
Problem: Adult Inpatient Plan of Care  Goal: Plan of Care Review  Outcome: Ongoing, Progressing      01/29/22 0924   Shift   Virtual Nurse - Rounding Complete  (POC reviewed with patient via room telephone. Instructed patient to call for assistance. Allowed time for questions. Denies any needs at present time.)   Virtual Nurse - Patient Verbalized Approval Of Other (See Comments)  (No VN monitor in room, VN called patient's room)   Type of Frequent Check   Type Patient Rounds

## 2022-01-29 NOTE — TELEPHONE ENCOUNTER
Reason for Disposition   Patient already left for the hospital/clinic.    Protocols used: NO CONTACT OR DUPLICATE CONTACT CALL-A-LEIDY    Nilesh 's wife and his mother called to say he is in ED in [facility].  They want to make sure he is not being given tylenol, as he is in liver failure.  Encouraged them to speak with those providing care for him in the ED, as triage RN does not provide advice or directions to those providers caring for hospitalized patients.  They state understanding. No triage, no additional questions.

## 2022-01-29 NOTE — H&P
[facility] Emergency Dept  [facility]  History & Physical    Patient Name: Nilesh Rolon Jr.  MRN: [id number]  Patient Class: OP- Observation  Admission Date: 1/28/2022  Attending Physician: Savita Bianchi*   Primary Care Provider: Bradford Vega DO, DO (Inactive)         Patient information was obtained from patient, past medical records and ER records.     Subjective:     Principal Problem:Spontaneous bacterial peritonitis    Chief Complaint:   Chief Complaint   Patient presents with    Jaundice     Pt is jaundice, had EGD completed 8 days ago with showed acute fungal esophagitis, + abd bloating, pt states sent to ed by MD         HPI: Nilesh Rolon is a 47 y/o male with PMHx Cirrhosis, Liver disease, HTN, chronic alcohol abuse, DONG, arthritis, and anxiety presents to [facility] ED for evaluation of jaundice and concern for spontaneous bacterial peritonitis. Patient reports he was referred to ED after having an EGD done 8 days ago which show acute esophagitis. Patient reports abdominal distension, lower abdominal pain, sore throat, and one episode of diarrhea this morning. Patient also reports dry cough and continued shortness of breath. Patient denies fever, chills, chest pain, bloody stool, nausea or vomiting. Patient denies hematuria, dysuria, or urinary frequency. Patient reports he has a long history of drinking alcohol but stopped two months ago. Patient was recently seen by Dr. Andrea for EGD on 01/20/22. EGD reveal small (<5mm) esophageal varices with esophageal plaques suspicious for candidiasis which was biopsied. Esophagus biopsy confirmed acute fungal esophagitis, fungal yeast, and pseudohyphae morphologically consistent with Candida spp., confirmed with a properly controlled GMS cytochemical stain. Patient was recently seen by his Hepatologist Dr. Mixon and had a paracentesis on 01/26/22 with 8500ml pleural fluid removed. Patient will be placed into hospital medicine  observation services under my care in collaboration with Dr. Savita Raya.         Past Medical History:   Diagnosis Date    DONG (acute kidney injury) 12/1/2021    Anxiety     Arthritis     Cirrhosis     Hypertension     Liver disease     Osteoarthritis     Other meniscus derangements, other medial meniscus, left knee 1/7/2019       Past Surgical History:   Procedure Laterality Date    ARTHROSCOPY OF KNEE Left 1/7/2019    Procedure: ARTHROSCOPY, KNEE;  Surgeon: Derick Kirby MD;  Location: [facility] OR;  Service: Orthopedics;  Laterality: Left;    COLONOSCOPY N/A 7/27/2020    Procedure: COLONOSCOPY;  Surgeon: Sotero Zapata MD;  Location: [facility];  Service: Endoscopy;  Laterality: N/A;    COLONOSCOPY N/A 1/20/2022    Procedure: COLONOSCOPY Suprep Vaccinated;  Surgeon: Jacob Andrea MD;  Location: [facility];  Service: Endoscopy;  Laterality: N/A;    ESOPHAGOGASTRODUODENOSCOPY N/A 4/8/2019    Procedure: EGD (ESOPHAGOGASTRODUODENOSCOPY);  Surgeon: Bonita Kendall MD;  Location: [facility];  Service: Endoscopy;  Laterality: N/A;    ESOPHAGOGASTRODUODENOSCOPY N/A 7/27/2020    Procedure: EGD (ESOPHAGOGASTRODUODENOSCOPY);  Surgeon: Sotero Zapata MD;  Location: [facility];  Service: Endoscopy;  Laterality: N/A;    ESOPHAGOGASTRODUODENOSCOPY N/A 12/2/2021    Procedure: EGD (ESOPHAGOGASTRODUODENOSCOPY);  Surgeon: Montez Guerra MD;  Location: [facility] ([address] [place] [address]);  Service: Endoscopy;  Laterality: N/A;    ESOPHAGOGASTRODUODENOSCOPY N/A 1/20/2022    Procedure: EGD (ESOPHAGOGASTRODUODENOSCOPY);  Surgeon: Jacob Andrea MD;  Location: [facility];  Service: Endoscopy;  Laterality: N/A;  please order CBC with plts and INR day of case.    KNEE SURGERY         Review of patient's allergies indicates:  No Known Allergies    No current facility-administered medications on file prior to encounter.     Current Outpatient Medications on File Prior to Encounter   Medication Sig    lactulose (CHRONULAC)  10 gram/15 mL solution Take 30 mLs (20 g total) by mouth 2 (two) times daily.    multivitamin capsule Take 1 capsule by mouth once daily.    thiamine 100 MG tablet Take 1 tablet (100 mg total) by mouth once daily.    cholecalciferol, vitamin D3, 1,250 mcg (50,000 unit) capsule Take 50,000 Units by mouth every 7 days.    fluconazole (DIFLUCAN) 200 MG Tab Take 1 tablet (200 mg total) by mouth once daily. Take 2 tablets (400 mg total) by mouth one daily one then 1 tablet (200 mg total) by mouth daily to complete a 14 day course. for 14 days    folic acid (FOLVITE) 1 MG tablet TAKE 1 TABLET(1 MG) BY MOUTH EVERY DAY (Patient not taking: Reported on 1/28/2022)    pantoprazole (PROTONIX) 40 MG tablet Take 1 tablet (40 mg total) by mouth before breakfast. (Patient not taking: Reported on 1/28/2022)    propranoloL (INDERAL) 20 MG tablet Take 20 mg by mouth 3 (three) times daily.    [DISCONTINUED] ketoconazole (NIZORAL) 2 % cream 2 (two) times a day. to affected area    [DISCONTINUED] thiamine 100 MG tablet Take 1 tablet (100 mg total) by mouth once daily.    [DISCONTINUED] triamcinolone acetonide 0.025% (KENALOG) 0.025 % cream APPLY A THIN LAYER TO INFLAMED AREA TWICE DAILY     Family History    None       Tobacco Use    Smoking status: Never Smoker    Smokeless tobacco: Never Used    Tobacco comment: smokes Marijuana only   Substance and Sexual Activity    Alcohol use: Not Currently     Comment: last drink 11/28, drinks 3-4 a day    Drug use: No    Sexual activity: Yes     Partners: Female     Review of Systems   Constitutional: Negative for chills, diaphoresis and fever.   HENT: Negative for hearing loss and sore throat.    Eyes: Negative for visual disturbance.   Respiratory: Positive for cough and shortness of breath.    Cardiovascular: Negative for chest pain and leg swelling.   Gastrointestinal: Positive for abdominal distention. Negative for abdominal pain, diarrhea, nausea and vomiting.    Genitourinary: Negative for difficulty urinating and flank pain.   Musculoskeletal: Negative for back pain.   Skin: Positive for color change. Negative for rash and wound.   Neurological: Negative for dizziness, weakness and headaches.   Psychiatric/Behavioral: Negative for agitation and confusion.     Objective:     Vital Signs (Most Recent):  Temp: 97.9 °F (36.6 °C) (01/28/22 1319)  Pulse: 100 (01/28/22 1831)  Resp: 18 (01/28/22 1319)  BP: 105/69 (01/28/22 1831)  SpO2: 100 % (01/28/22 1831) Vital Signs (24h Range):  Temp:  [97.9 °F (36.6 °C)] 97.9 °F (36.6 °C)  Pulse:  [100-111] 100  Resp:  [18] 18  SpO2:  [100 %] 100 %  BP: (105-118)/(69-78) 105/69        There is no height or weight on file to calculate BMI.    Physical Exam  Vitals and nursing note reviewed.   Constitutional:       General: He is not in acute distress.     Appearance: Normal appearance. He is not ill-appearing.   HENT:      Head: Normocephalic and atraumatic.      Mouth/Throat:      Mouth: Mucous membranes are moist.   Eyes:      General: Scleral icterus present.      Extraocular Movements: Extraocular movements intact.      Pupils: Pupils are equal, round, and reactive to light.      Comments: Mild scleral icterus   Cardiovascular:      Rate and Rhythm: Normal rate and regular rhythm.      Pulses: Normal pulses.      Heart sounds: Normal heart sounds. No murmur heard.  No friction rub. No gallop.    Pulmonary:      Effort: Pulmonary effort is normal. No respiratory distress.      Breath sounds: No wheezing or rhonchi.   Abdominal:      General: Bowel sounds are normal. There is distension.      Palpations: Abdomen is soft.      Tenderness: There is abdominal tenderness. There is no guarding or rebound.   Musculoskeletal:         General: No swelling.      Cervical back: Normal range of motion and neck supple.      Right lower leg: No edema.      Left lower leg: No edema.   Skin:     General: Skin is warm and dry.      Capillary Refill:  Capillary refill takes less than 2 seconds.      Coloration: Skin is jaundiced.      Findings: No bruising, erythema or rash.   Neurological:      General: No focal deficit present.      Mental Status: He is alert and oriented to person, place, and time.      GCS: GCS eye subscore is 4. GCS verbal subscore is 5. GCS motor subscore is 6.   Psychiatric:         Attention and Perception: Attention normal.         Mood and Affect: Mood normal.         Speech: Speech normal.         Behavior: Behavior normal. Behavior is cooperative.           CRANIAL NERVES     CN III, IV, VI   Pupils are equal, round, and reactive to light.       Significant Labs:   All pertinent labs within the past 24 hours have been reviewed.  A1C:   Recent Labs   Lab 12/07/21  0516   HGBA1C 4.0     Bilirubin:   Recent Labs   Lab 01/10/22  0901 01/12/22  0711 01/19/22  0711 01/26/22  0712 01/28/22  1531   BILITOT 14.2* 12.7* 9.6* 10.1*  10.1* 8.9*     Blood Culture: No results for input(s): LABBLOO in the last 48 hours.  BMP:   Recent Labs   Lab 01/28/22  1531 01/28/22  1920     --    *  --    K 3.2*  --      --    CO2 18*  --    BUN 12  --    CREATININE 1.2  --    CALCIUM 8.2*  --    MG  --  2.0     CBC:   Recent Labs   Lab 01/28/22  1531   WBC 8.62   HGB 9.0*   HCT 25.9*   *     CMP:   Recent Labs   Lab 01/28/22  1531   *   K 3.2*      CO2 18*      BUN 12   CREATININE 1.2   CALCIUM 8.2*   PROT 6.2   ALBUMIN 3.0*   BILITOT 8.9*   ALKPHOS 139*   AST 44*   ALT 22   ANIONGAP 10   EGFRNONAA >60     Cardiac Markers: No results for input(s): CKMB, MYOGLOBIN, BNP, TROPISTAT in the last 48 hours.  Coagulation:   Recent Labs   Lab 01/28/22  1531   INR 1.7*     Lactic Acid:   Recent Labs   Lab 01/28/22  1531   LACTATE 1.5     Lipase:   Recent Labs   Lab 01/28/22  1531   LIPASE 46     Magnesium:   Recent Labs   Lab 01/28/22  1920   MG 2.0     Significant Imaging: I have reviewed all pertinent imaging  results/findings within the past 24 hours.    Assessment/Plan:     * Spontaneous bacterial peritonitis  Recently had paracentesis on 1/26/22 with 8500ml removed  Pleural fluid cultures concerning for SBP  Patient was referred to ED today to be evaluated   Received Cefoxitin 2g IV in ED x1  Will continue with Ceftriaxone 1g IV q24   IR consulted         Candidiasis of esophagus with fungal esophagitis   Recent EGD on 01/20/22 revealed small (<5mm) esophageal varices with esophageal plaques suspicious for candidiasis which was biopsied. Esophagus biopsy confirmed acute fungal esophagitis, fungal yeast, and pseudohyphae morphologically consistent with Candida spp., confirmed with a properly controlled GMS cytochemical stain  Patient was prescribed antifungals yesterday but has not started course  Will initiate fluconazole 400mg IV x1 then switch to 400mg PO daily   Resume pantoprazole 40mg PO daily      Cough  Reports persistent dry cough  Guaifenesin PRN for cough      Hypokalemia  Patient has hypokalemia which is currently controlled.   Last electrolytes reviewed- Recent Labs   Lab 01/28/22  1531   K 3.2*   .   Patient received KCL 40mEq PO x1  Will continue to replace potassium and monitor electrolytes closely with daily BMP.   Monitor on telemetry          Anemia  -H/H is 9.0/25.9, which is stable and consistent with previous laboratory measurements. Patient exhibits no signs or symptoms of acute bleeding  -No indication for blood transfusion  -Continue to monitor serial CBC  -Transfuse for Hgb <7 or if symptomatic        Decompensated hepatic cirrhosis  Alcoholic cirrhosis  Jaundice and ascites on physical exam  Thrombocytopenia, hyponatremia, and hypoalbuminemia on admit labs  PT/INR 17.5/1.7  , AST 44  Received 25g albumin in ED  Will order another dose of 25g albumin   Continue symptom management  Consult IR           Alcoholic cirrhosis  See above      Hyponatremia  Na 130 on admit  Likely 2/2 to  decompensated cirrhosis  Neuro checks q4hr  Monitor and trend CMP daily; correct electrolytes as needed    History of alcohol abuse  Patient reports cessation of alcohol two months ago  Continue to monitor for s/s of alcohol withdrawal and DTs  Resume home thiamine        Thrombocytopenia  Likely 2/2 to liver disease  Platelets 107k on admit  No active signs of bleeding at this time  Continue to monitor and trend CBC daily      Ascites  See above        VTE Risk Mitigation (From admission, onward)         Ordered     IP VTE HIGH RISK PATIENT  Once         01/28/22 1831     Place sequential compression device  Until discontinued         01/28/22 1831                   Yajaira Solitario, DNP, ACNPC-AG, CCRN  Hospitalist  Department of Hospital Medicine  [facility]   [phone]

## 2022-01-29 NOTE — ASSESSMENT & PLAN NOTE
Recent EGD on 01/20/22 revealed small (<5mm) esophageal varices with esophageal plaques suspicious for candidiasis which was biopsied. Esophagus biopsy confirmed acute fungal esophagitis, fungal yeast, and pseudohyphae morphologically consistent with Candida spp., confirmed with a properly controlled GMS cytochemical stain  Patient was prescribed antifungals yesterday but has not started course  Will initiate fluconazole 400mg IV x1 then switch to 400mg PO daily   Resume pantoprazole 40mg PO daily

## 2022-01-29 NOTE — SUBJECTIVE & OBJECTIVE
Interval History: NAEON. Pt doing well today. Denies significant abdominal pain. Drop in Hgb noted. Denies overt blood loss    Review of Systems   Constitutional: Negative for chills, diaphoresis and fever.   HENT: Negative for hearing loss and sore throat.    Eyes: Negative for visual disturbance.   Respiratory: Positive for cough and shortness of breath.    Cardiovascular: Negative for chest pain and leg swelling.   Gastrointestinal: Positive for abdominal distention. Negative for abdominal pain, diarrhea, nausea and vomiting.   Genitourinary: Negative for difficulty urinating and flank pain.   Musculoskeletal: Negative for back pain.   Skin: Positive for color change. Negative for rash and wound.   Neurological: Negative for dizziness, weakness and headaches.   Psychiatric/Behavioral: Negative for agitation and confusion.     Objective:     Vital Signs (Most Recent):  Temp: 97.1 °F (36.2 °C) (01/29/22 1212)  Pulse: 97 (01/29/22 0827)  Resp: 20 (01/29/22 1212)  BP: 121/71 (01/29/22 1212)  SpO2: 99 % (01/29/22 1212) Vital Signs (24h Range):  Temp:  [97.1 °F (36.2 °C)-98 °F (36.7 °C)] 97.1 °F (36.2 °C)  Pulse:  [] 97  Resp:  [12-20] 20  SpO2:  [98 %-100 %] 99 %  BP: ()/(59-73) 121/71     Weight: 111.3 kg (245 lb 6 oz)  Body mass index is 30.67 kg/m².    Intake/Output Summary (Last 24 hours) at 1/29/2022 1544  Last data filed at 1/29/2022 0644  Gross per 24 hour   Intake 318.43 ml   Output --   Net 318.43 ml      Physical Exam  Vitals and nursing note reviewed.   Constitutional:       General: He is not in acute distress.     Appearance: Normal appearance. He is not ill-appearing.   HENT:      Head: Normocephalic and atraumatic.      Mouth/Throat:      Mouth: Mucous membranes are moist.   Eyes:      General: Scleral icterus present.      Extraocular Movements: Extraocular movements intact.      Pupils: Pupils are equal, round, and reactive to light.      Comments: Mild scleral icterus   Cardiovascular:       Rate and Rhythm: Normal rate and regular rhythm.      Pulses: Normal pulses.      Heart sounds: Normal heart sounds. No murmur heard.  No friction rub. No gallop.    Pulmonary:      Effort: Pulmonary effort is normal. No respiratory distress.      Breath sounds: No wheezing or rhonchi.   Abdominal:      General: Bowel sounds are normal. There is distension.      Palpations: Abdomen is soft.      Tenderness: There is abdominal tenderness. There is no guarding or rebound.   Musculoskeletal:         General: No swelling.      Cervical back: Normal range of motion and neck supple.      Right lower leg: No edema.      Left lower leg: No edema.   Skin:     General: Skin is warm and dry.      Capillary Refill: Capillary refill takes less than 2 seconds.      Coloration: Skin is jaundiced.      Findings: No bruising, erythema or rash.   Neurological:      General: No focal deficit present.      Mental Status: He is alert and oriented to person, place, and time.      GCS: GCS eye subscore is 4. GCS verbal subscore is 5. GCS motor subscore is 6.   Psychiatric:         Attention and Perception: Attention normal.         Mood and Affect: Mood normal.         Speech: Speech normal.         Behavior: Behavior normal. Behavior is cooperative.         Significant Labs:   All pertinent labs within the past 24 hours have been reviewed.  Recent Lab Results       01/29/22  0323   01/28/22  1920   01/28/22  1832   01/28/22  1657        Albumin 2.6             Alkaline Phosphatase 114             ALT 18             Ammonia       32       Anion Gap 9             AST 37             Baso # 0.05             Basophil % 1.0             BILIRUBIN TOTAL 7.5  Comment: For infants and newborns, interpretation of results should be based  on gestational age, weight and in agreement with clinical  observations.    Premature Infant recommended reference ranges:  Up to 24 hours.............<8.0 mg/dL  Up to 48 hours............<12.0 mg/dL  3-5  days..................<15.0 mg/dL  6-29 days.................<15.0 mg/dL               BUN 11             Calcium 8.0             Chloride 103             CO2 18             Creatinine 1.1             Differential Method Automated             eGFR if  >60             eGFR if non  >60  Comment: Calculation used to obtain the estimated glomerular filtration  rate (eGFR) is the CKD-EPI equation.                Eos # 0.1             Eosinophil % 1.9             Glucose 107             Gran # (ANC) 2.9             Gran % 57.1             Hematocrit 20.5             Hemoglobin 7.4             Immature Grans (Abs) 0.01  Comment: Mild elevation in immature granulocytes is non specific and   can be seen in a variety of conditions including stress response,   acute inflammation, trauma and pregnancy. Correlation with other   laboratory and clinical findings is essential.               Immature Granulocytes 0.2             Lymph # 1.4             Lymph % 27.7             Magnesium 1.9   2.0           MCH 32.6             MCHC 36.1             MCV 90             Mono # 0.6             Mono % 12.1             MPV 9.7             nRBC 0             Platelets 70             Potassium 3.5             PROTEIN TOTAL 5.1              Acceptable     Yes         RBC 2.27             RDW 16.6             SARS-CoV-2 RNA, Amplification, Qual     Negative         Sodium 130             WBC 5.13                   Significant Imaging: I have reviewed all pertinent imaging results/findings within the past 24 hours.

## 2022-01-29 NOTE — NURSING
Patient up to floor from ED via stretcher. Patient AAOX4 in NAD, denies pain at this time. Patient oriented to room and use of call bell. Patient with no complaints at this time. VSS. Will continue to monitor.

## 2022-01-29 NOTE — ASSESSMENT & PLAN NOTE
Likely 2/2 to liver disease  Platelets 107k on admit  No active signs of bleeding at this time  Continue to monitor and trend CBC daily

## 2022-01-29 NOTE — SUBJECTIVE & OBJECTIVE
Past Medical History:   Diagnosis Date    DONG (acute kidney injury) 12/1/2021    Anxiety     Arthritis     Cirrhosis     Hypertension     Liver disease     Osteoarthritis     Other meniscus derangements, other medial meniscus, left knee 1/7/2019       Past Surgical History:   Procedure Laterality Date    ARTHROSCOPY OF KNEE Left 1/7/2019    Procedure: ARTHROSCOPY, KNEE;  Surgeon: Derick Kirby MD;  Location: [facility] OR;  Service: Orthopedics;  Laterality: Left;    COLONOSCOPY N/A 7/27/2020    Procedure: COLONOSCOPY;  Surgeon: Sotero Zapata MD;  Location: [facility];  Service: Endoscopy;  Laterality: N/A;    COLONOSCOPY N/A 1/20/2022    Procedure: COLONOSCOPY Suprep Vaccinated;  Surgeon: Jacob Andrea MD;  Location: [facility];  Service: Endoscopy;  Laterality: N/A;    ESOPHAGOGASTRODUODENOSCOPY N/A 4/8/2019    Procedure: EGD (ESOPHAGOGASTRODUODENOSCOPY);  Surgeon: Bonita Kendall MD;  Location: [facility];  Service: Endoscopy;  Laterality: N/A;    ESOPHAGOGASTRODUODENOSCOPY N/A 7/27/2020    Procedure: EGD (ESOPHAGOGASTRODUODENOSCOPY);  Surgeon: Sotero Zapata MD;  Location: [facility];  Service: Endoscopy;  Laterality: N/A;    ESOPHAGOGASTRODUODENOSCOPY N/A 12/2/2021    Procedure: EGD (ESOPHAGOGASTRODUODENOSCOPY);  Surgeon: Montez Gurera MD;  Location: [address]);  Service: Endoscopy;  Laterality: N/A;    ESOPHAGOGASTRODUODENOSCOPY N/A 1/20/2022    Procedure: EGD (ESOPHAGOGASTRODUODENOSCOPY);  Surgeon: Jacob Andrea MD;  Location: [facility];  Service: Endoscopy;  Laterality: N/A;  please order CBC with plts and INR day of case.    KNEE SURGERY         Review of patient's allergies indicates:  No Known Allergies    No current facility-administered medications on file prior to encounter.     Current Outpatient Medications on File Prior to Encounter   Medication Sig    lactulose (CHRONULAC) 10 gram/15 mL solution Take 30 mLs (20 g total) by mouth 2 (two) times daily.     multivitamin capsule Take 1 capsule by mouth once daily.    thiamine 100 MG tablet Take 1 tablet (100 mg total) by mouth once daily.    cholecalciferol, vitamin D3, 1,250 mcg (50,000 unit) capsule Take 50,000 Units by mouth every 7 days.    fluconazole (DIFLUCAN) 200 MG Tab Take 1 tablet (200 mg total) by mouth once daily. Take 2 tablets (400 mg total) by mouth one daily one then 1 tablet (200 mg total) by mouth daily to complete a 14 day course. for 14 days    folic acid (FOLVITE) 1 MG tablet TAKE 1 TABLET(1 MG) BY MOUTH EVERY DAY (Patient not taking: Reported on 1/28/2022)    pantoprazole (PROTONIX) 40 MG tablet Take 1 tablet (40 mg total) by mouth before breakfast. (Patient not taking: Reported on 1/28/2022)    propranoloL (INDERAL) 20 MG tablet Take 20 mg by mouth 3 (three) times daily.    [DISCONTINUED] ketoconazole (NIZORAL) 2 % cream 2 (two) times a day. to affected area    [DISCONTINUED] thiamine 100 MG tablet Take 1 tablet (100 mg total) by mouth once daily.    [DISCONTINUED] triamcinolone acetonide 0.025% (KENALOG) 0.025 % cream APPLY A THIN LAYER TO INFLAMED AREA TWICE DAILY     Family History    None       Tobacco Use    Smoking status: Never Smoker    Smokeless tobacco: Never Used    Tobacco comment: smokes Marijuana only   Substance and Sexual Activity    Alcohol use: Not Currently     Comment: last drink 11/28, drinks 3-4 a day    Drug use: No    Sexual activity: Yes     Partners: Female     Review of Systems   Constitutional: Negative for chills, diaphoresis and fever.   HENT: Negative for hearing loss and sore throat.    Eyes: Negative for visual disturbance.   Respiratory: Positive for cough and shortness of breath.    Cardiovascular: Negative for chest pain and leg swelling.   Gastrointestinal: Positive for abdominal distention. Negative for abdominal pain, diarrhea, nausea and vomiting.   Genitourinary: Negative for difficulty urinating and flank pain.   Musculoskeletal: Negative  for back pain.   Skin: Positive for color change. Negative for rash and wound.   Neurological: Negative for dizziness, weakness and headaches.   Psychiatric/Behavioral: Negative for agitation and confusion.     Objective:     Vital Signs (Most Recent):  Temp: 97.9 °F (36.6 °C) (01/28/22 1319)  Pulse: 100 (01/28/22 1831)  Resp: 18 (01/28/22 1319)  BP: 105/69 (01/28/22 1831)  SpO2: 100 % (01/28/22 1831) Vital Signs (24h Range):  Temp:  [97.9 °F (36.6 °C)] 97.9 °F (36.6 °C)  Pulse:  [100-111] 100  Resp:  [18] 18  SpO2:  [100 %] 100 %  BP: (105-118)/(69-78) 105/69        There is no height or weight on file to calculate BMI.    Physical Exam  Vitals and nursing note reviewed.   Constitutional:       General: He is not in acute distress.     Appearance: Normal appearance. He is not ill-appearing.   HENT:      Head: Normocephalic and atraumatic.      Mouth/Throat:      Mouth: Mucous membranes are moist.   Eyes:      General: Scleral icterus present.      Extraocular Movements: Extraocular movements intact.      Pupils: Pupils are equal, round, and reactive to light.      Comments: Mild scleral icterus   Cardiovascular:      Rate and Rhythm: Normal rate and regular rhythm.      Pulses: Normal pulses.      Heart sounds: Normal heart sounds. No murmur heard.  No friction rub. No gallop.    Pulmonary:      Effort: Pulmonary effort is normal. No respiratory distress.      Breath sounds: No wheezing or rhonchi.   Abdominal:      General: Bowel sounds are normal. There is distension.      Palpations: Abdomen is soft.      Tenderness: There is abdominal tenderness. There is no guarding or rebound.   Musculoskeletal:         General: No swelling.      Cervical back: Normal range of motion and neck supple.      Right lower leg: No edema.      Left lower leg: No edema.   Skin:     General: Skin is warm and dry.      Capillary Refill: Capillary refill takes less than 2 seconds.      Coloration: Skin is jaundiced.      Findings: No  bruising, erythema or rash.   Neurological:      General: No focal deficit present.      Mental Status: He is alert and oriented to person, place, and time.      GCS: GCS eye subscore is 4. GCS verbal subscore is 5. GCS motor subscore is 6.   Psychiatric:         Attention and Perception: Attention normal.         Mood and Affect: Mood normal.         Speech: Speech normal.         Behavior: Behavior normal. Behavior is cooperative.           CRANIAL NERVES     CN III, IV, VI   Pupils are equal, round, and reactive to light.       Significant Labs:   All pertinent labs within the past 24 hours have been reviewed.  A1C:   Recent Labs   Lab 12/07/21  0516   HGBA1C 4.0     Bilirubin:   Recent Labs   Lab 01/10/22  0901 01/12/22  0711 01/19/22  0711 01/26/22  0712 01/28/22  1531   BILITOT 14.2* 12.7* 9.6* 10.1*  10.1* 8.9*     Blood Culture: No results for input(s): LABBLOO in the last 48 hours.  BMP:   Recent Labs   Lab 01/28/22  1531 01/28/22  1920     --    *  --    K 3.2*  --      --    CO2 18*  --    BUN 12  --    CREATININE 1.2  --    CALCIUM 8.2*  --    MG  --  2.0     CBC:   Recent Labs   Lab 01/28/22  1531   WBC 8.62   HGB 9.0*   HCT 25.9*   *     CMP:   Recent Labs   Lab 01/28/22  1531   *   K 3.2*      CO2 18*      BUN 12   CREATININE 1.2   CALCIUM 8.2*   PROT 6.2   ALBUMIN 3.0*   BILITOT 8.9*   ALKPHOS 139*   AST 44*   ALT 22   ANIONGAP 10   EGFRNONAA >60     Cardiac Markers: No results for input(s): CKMB, MYOGLOBIN, BNP, TROPISTAT in the last 48 hours.  Coagulation:   Recent Labs   Lab 01/28/22  1531   INR 1.7*     Lactic Acid:   Recent Labs   Lab 01/28/22  1531   LACTATE 1.5     Lipase:   Recent Labs   Lab 01/28/22  1531   LIPASE 46     Magnesium:   Recent Labs   Lab 01/28/22  1920   MG 2.0     Significant Imaging: I have reviewed all pertinent imaging results/findings within the past 24 hours.

## 2022-01-29 NOTE — ASSESSMENT & PLAN NOTE
Recently had paracentesis on 1/26/22 with 8500ml removed  Pleural fluid cultures concerning for SBP  Patient was referred to ED today to be evaluated   Received Cefoxitin 2g IV in ED x1  Will continue with Ceftriaxone 2g IV q24   Called hematology lab at [facility]: will add on cultures from previously obtained ascitic fluid

## 2022-01-29 NOTE — PROGRESS NOTES
[facility]  Progress Note    Patient Name: Nilesh Rolon Jr.  MRN: [id number]  Patient Class: IP- Inpatient   Admission Date: 1/28/2022  Length of Stay: 1 days  Attending Physician: Jacob Andrea,*  Primary Care Provider: Bradford Vega DO, DO (Inactive)        Subjective:     Principal Problem:Spontaneous bacterial peritonitis        HPI:  Nilesh Rolon is a 47 y/o male with PMHx Cirrhosis, Liver disease, HTN, chronic alcohol abuse, DONG, arthritis, and anxiety presents to [facility] ED for evaluation of jaundice and concern for spontaneous bacterial peritonitis. Patient reports he was referred to ED after having an EGD done 8 days ago which show acute esophagitis. Patient reports abdominal distension, lower abdominal pain, sore throat, and one episode of diarrhea this morning. Patient also reports dry cough and continued shortness of breath. Patient denies fever, chills, chest pain, bloody stool, nausea or vomiting. Patient denies hematuria, dysuria, or urinary frequency. Patient reports he has a long history of drinking alcohol but stopped two months ago. Patient was recently seen by Dr. Andrea for EGD on 01/20/22. EGD reveal small (<5mm) esophageal varices with esophageal plaques suspicious for candidiasis which was biopsied. Esophagus biopsy confirmed acute fungal esophagitis, fungal yeast, and pseudohyphae morphologically consistent with Candida spp., confirmed with a properly controlled GMS cytochemical stain. Patient was recently seen by his Hepatologist Dr. Mixon and had a paracentesis on 01/26/22 with 8500ml pleural fluid removed. Patient will be placed into hospital medicine observation services under my care in collaboration with Dr. Savita Raya.         Overview/Hospital Course:  No notes on file    Interval History: NAEON. Pt doing well today. Denies significant abdominal pain. Drop in Hgb noted. Denies overt blood loss    Review of Systems    Constitutional: Negative for chills, diaphoresis and fever.   HENT: Negative for hearing loss and sore throat.    Eyes: Negative for visual disturbance.   Respiratory: Positive for cough and shortness of breath.    Cardiovascular: Negative for chest pain and leg swelling.   Gastrointestinal: Positive for abdominal distention. Negative for abdominal pain, diarrhea, nausea and vomiting.   Genitourinary: Negative for difficulty urinating and flank pain.   Musculoskeletal: Negative for back pain.   Skin: Positive for color change. Negative for rash and wound.   Neurological: Negative for dizziness, weakness and headaches.   Psychiatric/Behavioral: Negative for agitation and confusion.     Objective:     Vital Signs (Most Recent):  Temp: 97.1 °F (36.2 °C) (01/29/22 1212)  Pulse: 97 (01/29/22 0827)  Resp: 20 (01/29/22 1212)  BP: 121/71 (01/29/22 1212)  SpO2: 99 % (01/29/22 1212) Vital Signs (24h Range):  Temp:  [97.1 °F (36.2 °C)-98 °F (36.7 °C)] 97.1 °F (36.2 °C)  Pulse:  [] 97  Resp:  [12-20] 20  SpO2:  [98 %-100 %] 99 %  BP: ()/(59-73) 121/71     Weight: 111.3 kg (245 lb 6 oz)  Body mass index is 30.67 kg/m².    Intake/Output Summary (Last 24 hours) at 1/29/2022 1544  Last data filed at 1/29/2022 0644  Gross per 24 hour   Intake 318.43 ml   Output --   Net 318.43 ml      Physical Exam  Vitals and nursing note reviewed.   Constitutional:       General: He is not in acute distress.     Appearance: Normal appearance. He is not ill-appearing.   HENT:      Head: Normocephalic and atraumatic.      Mouth/Throat:      Mouth: Mucous membranes are moist.   Eyes:      General: Scleral icterus present.      Extraocular Movements: Extraocular movements intact.      Pupils: Pupils are equal, round, and reactive to light.      Comments: Mild scleral icterus   Cardiovascular:      Rate and Rhythm: Normal rate and regular rhythm.      Pulses: Normal pulses.      Heart sounds: Normal heart sounds. No murmur heard.  No  friction rub. No gallop.    Pulmonary:      Effort: Pulmonary effort is normal. No respiratory distress.      Breath sounds: No wheezing or rhonchi.   Abdominal:      General: Bowel sounds are normal. There is distension.      Palpations: Abdomen is soft.      Tenderness: There is abdominal tenderness. There is no guarding or rebound.   Musculoskeletal:         General: No swelling.      Cervical back: Normal range of motion and neck supple.      Right lower leg: No edema.      Left lower leg: No edema.   Skin:     General: Skin is warm and dry.      Capillary Refill: Capillary refill takes less than 2 seconds.      Coloration: Skin is jaundiced.      Findings: No bruising, erythema or rash.   Neurological:      General: No focal deficit present.      Mental Status: He is alert and oriented to person, place, and time.      GCS: GCS eye subscore is 4. GCS verbal subscore is 5. GCS motor subscore is 6.   Psychiatric:         Attention and Perception: Attention normal.         Mood and Affect: Mood normal.         Speech: Speech normal.         Behavior: Behavior normal. Behavior is cooperative.         Significant Labs:   All pertinent labs within the past 24 hours have been reviewed.  Recent Lab Results       01/29/22  0323   01/28/22  1920   01/28/22  1832   01/28/22  1657        Albumin 2.6             Alkaline Phosphatase 114             ALT 18             Ammonia       32       Anion Gap 9             AST 37             Baso # 0.05             Basophil % 1.0             BILIRUBIN TOTAL 7.5  Comment: For infants and newborns, interpretation of results should be based  on gestational age, weight and in agreement with clinical  observations.    Premature Infant recommended reference ranges:  Up to 24 hours.............<8.0 mg/dL  Up to 48 hours............<12.0 mg/dL  3-5 days..................<15.0 mg/dL  6-29 days.................<15.0 mg/dL               BUN 11             Calcium 8.0             Chloride 103              CO2 18             Creatinine 1.1             Differential Method Automated             eGFR if  >60             eGFR if non  >60  Comment: Calculation used to obtain the estimated glomerular filtration  rate (eGFR) is the CKD-EPI equation.                Eos # 0.1             Eosinophil % 1.9             Glucose 107             Gran # (ANC) 2.9             Gran % 57.1             Hematocrit 20.5             Hemoglobin 7.4             Immature Grans (Abs) 0.01  Comment: Mild elevation in immature granulocytes is non specific and   can be seen in a variety of conditions including stress response,   acute inflammation, trauma and pregnancy. Correlation with other   laboratory and clinical findings is essential.               Immature Granulocytes 0.2             Lymph # 1.4             Lymph % 27.7             Magnesium 1.9   2.0           MCH 32.6             MCHC 36.1             MCV 90             Mono # 0.6             Mono % 12.1             MPV 9.7             nRBC 0             Platelets 70             Potassium 3.5             PROTEIN TOTAL 5.1              Acceptable     Yes         RBC 2.27             RDW 16.6             SARS-CoV-2 RNA, Amplification, Qual     Negative         Sodium 130             WBC 5.13                   Significant Imaging: I have reviewed all pertinent imaging results/findings within the past 24 hours.      Assessment/Plan:      * Spontaneous bacterial peritonitis  Recently had paracentesis on 1/26/22 with 8500ml removed  Pleural fluid cultures concerning for SBP  Patient was referred to ED today to be evaluated   Received Cefoxitin 2g IV in ED x1  Will continue with Ceftriaxone 2g IV q24   Called hematology lab at [facility]: will add on cultures from previously obtained ascitic fluid        Cough  Reports persistent dry cough  Guaifenesin PRN for cough      Hypokalemia  Patient has hypokalemia which is currently  controlled.   Last electrolytes reviewed- Recent Labs   Lab 01/28/22  1531   K 3.2*   .   Patient received KCL 40mEq PO x1  Will continue to replace potassium and monitor electrolytes closely with daily BMP.   Monitor on telemetry          Anemia  -H/H is 9.0/25.9, which is stable and consistent with previous laboratory measurements. Patient exhibits no signs or symptoms of acute bleeding  -No indication for blood transfusion  -Continue to monitor serial CBC  -Transfuse for Hgb <7 or if symptomatic        Candidiasis of esophagus with fungal esophagitis   Recent EGD on 01/20/22 revealed small (<5mm) esophageal varices with esophageal plaques suspicious for candidiasis which was biopsied. Esophagus biopsy confirmed acute fungal esophagitis, fungal yeast, and pseudohyphae morphologically consistent with Candida spp., confirmed with a properly controlled GMS cytochemical stain  Patient was prescribed antifungals yesterday but has not started course  Will initiate fluconazole 400mg IV x1 then switch to 400mg PO daily   Resume pantoprazole 40mg PO daily      Decompensated hepatic cirrhosis  Alcoholic cirrhosis  Jaundice and ascites on physical exam  Thrombocytopenia, hyponatremia, and hypoalbuminemia on admit labs  PT/INR 17.5/1.7  , AST 44  Received 25g albumin in ED  Will order another dose of 25g albumin   Continue symptom management  Consult IR           Alcoholic cirrhosis  See above      Hyponatremia  Na 130 on admit  Likely 2/2 to decompensated cirrhosis  Neuro checks q4hr  Monitor and trend CMP daily; correct electrolytes as needed    History of alcohol abuse  Patient reports cessation of alcohol two months ago  Continue to monitor for s/s of alcohol withdrawal and DTs  Resume home thiamine        Thrombocytopenia  Likely 2/2 to liver disease  Platelets 107k on admit  No active signs of bleeding at this time  Continue to monitor and trend CBC daily      Ascites  See above        VTE Risk Mitigation (From  admission, onward)         Ordered     IP VTE HIGH RISK PATIENT  Once         01/28/22 1831     Place sequential compression device  Until discontinued         01/28/22 1831                Discharge Planning   ROQUE:      Code Status: Full Code   Is the patient medically ready for discharge?:     Reason for patient still in hospital (select all that apply): Laboratory test and Treatment                     Jacob Andrea MD  Department of [facility]   [facility]

## 2022-01-29 NOTE — ASSESSMENT & PLAN NOTE
Recently had paracentesis on 1/26/22 with 8500ml removed  Pleural fluid cultures concerning for SBP  Patient was referred to ED today to be evaluated   Received Cefoxitin 2g IV in ED x1  Will continue with Ceftriaxone 1g IV q24   IR consulted

## 2022-01-29 NOTE — HPI
Nilesh Rolon is a 45 y/o male with PMHx Cirrhosis, Liver disease, HTN, chronic alcohol abuse, DONG, arthritis, and anxiety presents to [facility] ED for evaluation of jaundice and concern for spontaneous bacterial peritonitis. Patient reports he was referred to ED after having an EGD done 8 days ago which show acute esophagitis. Patient reports abdominal distension, lower abdominal pain, sore throat, and one episode of diarrhea this morning. Patient also reports dry cough and continued shortness of breath. Patient denies fever, chills, chest pain, bloody stool, nausea or vomiting. Patient denies hematuria, dysuria, or urinary frequency. Patient reports he has a long history of drinking alcohol but stopped two months ago. Patient was recently seen by Dr. Andrea for EGD on 01/20/22. EGD reveal small (<5mm) esophageal varices with esophageal plaques suspicious for candidiasis which was biopsied. Esophagus biopsy confirmed acute fungal esophagitis, fungal yeast, and pseudohyphae morphologically consistent with Candida spp., confirmed with a properly controlled GMS cytochemical stain. Patient was recently seen by his Hepatologist Dr. Mixon and had a paracentesis on 01/26/22 with 8500ml pleural fluid removed. Patient will be placed into hospital medicine observation services under my care in collaboration with Dr. Savita Raya.

## 2022-01-30 LAB
ALBUMIN SERPL BCP-MCNC: 2.6 G/DL (ref 3.5–5.2)
ALP SERPL-CCNC: 143 U/L (ref 55–135)
ALT SERPL W/O P-5'-P-CCNC: 19 U/L (ref 10–44)
ANION GAP SERPL CALC-SCNC: 10 MMOL/L (ref 8–16)
AST SERPL-CCNC: 38 U/L (ref 10–40)
BASOPHILS # BLD AUTO: 0.04 K/UL (ref 0–0.2)
BASOPHILS NFR BLD: 0.8 % (ref 0–1.9)
BILIRUB SERPL-MCNC: 5.8 MG/DL (ref 0.1–1)
BUN SERPL-MCNC: 9 MG/DL (ref 6–20)
CALCIUM SERPL-MCNC: 7.8 MG/DL (ref 8.7–10.5)
CHLORIDE SERPL-SCNC: 106 MMOL/L (ref 95–110)
CO2 SERPL-SCNC: 16 MMOL/L (ref 23–29)
CREAT SERPL-MCNC: 1 MG/DL (ref 0.5–1.4)
DIFFERENTIAL METHOD: ABNORMAL
EOSINOPHIL # BLD AUTO: 0.1 K/UL (ref 0–0.5)
EOSINOPHIL NFR BLD: 1.9 % (ref 0–8)
ERYTHROCYTE [DISTWIDTH] IN BLOOD BY AUTOMATED COUNT: 16.6 % (ref 11.5–14.5)
EST. GFR  (AFRICAN AMERICAN): >60 ML/MIN/1.73 M^2
EST. GFR  (NON AFRICAN AMERICAN): >60 ML/MIN/1.73 M^2
FERRITIN SERPL-MCNC: 51 NG/ML (ref 20–300)
GLUCOSE SERPL-MCNC: 116 MG/DL (ref 70–110)
HCT VFR BLD AUTO: 22.8 % (ref 40–54)
HGB BLD-MCNC: 7.9 G/DL (ref 14–18)
IMM GRANULOCYTES # BLD AUTO: 0.02 K/UL (ref 0–0.04)
IMM GRANULOCYTES NFR BLD AUTO: 0.4 % (ref 0–0.5)
IRON SERPL-MCNC: 18 UG/DL (ref 45–160)
LYMPHOCYTES # BLD AUTO: 1.2 K/UL (ref 1–4.8)
LYMPHOCYTES NFR BLD: 21.8 % (ref 18–48)
MCH RBC QN AUTO: 31.7 PG (ref 27–31)
MCHC RBC AUTO-ENTMCNC: 34.6 G/DL (ref 32–36)
MCV RBC AUTO: 92 FL (ref 82–98)
MONOCYTES # BLD AUTO: 0.6 K/UL (ref 0.3–1)
MONOCYTES NFR BLD: 12.1 % (ref 4–15)
NEUTROPHILS # BLD AUTO: 3.3 K/UL (ref 1.8–7.7)
NEUTROPHILS NFR BLD: 63 % (ref 38–73)
NRBC BLD-RTO: 0 /100 WBC
PLATELET # BLD AUTO: 82 K/UL (ref 150–450)
PMV BLD AUTO: 10.3 FL (ref 9.2–12.9)
POTASSIUM SERPL-SCNC: 3.3 MMOL/L (ref 3.5–5.1)
PROT SERPL-MCNC: 5.2 G/DL (ref 6–8.4)
RBC # BLD AUTO: 2.49 M/UL (ref 4.6–6.2)
SATURATED IRON: 21 % (ref 20–50)
SODIUM SERPL-SCNC: 132 MMOL/L (ref 136–145)
TOTAL IRON BINDING CAPACITY: 86 UG/DL (ref 250–450)
TRANSFERRIN SERPL-MCNC: 58 MG/DL (ref 200–375)
WBC # BLD AUTO: 5.28 K/UL (ref 3.9–12.7)

## 2022-01-30 PROCEDURE — 25000003 PHARM REV CODE 250

## 2022-01-30 PROCEDURE — 82728 ASSAY OF FERRITIN: CPT | Performed by: INTERNAL MEDICINE

## 2022-01-30 PROCEDURE — 36415 COLL VENOUS BLD VENIPUNCTURE: CPT | Performed by: INTERNAL MEDICINE

## 2022-01-30 PROCEDURE — 11000001 HC ACUTE MED/SURG PRIVATE ROOM

## 2022-01-30 PROCEDURE — 84466 ASSAY OF TRANSFERRIN: CPT | Performed by: INTERNAL MEDICINE

## 2022-01-30 PROCEDURE — P9047 ALBUMIN (HUMAN), 25%, 50ML: HCPCS | Mod: JG | Performed by: INTERNAL MEDICINE

## 2022-01-30 PROCEDURE — 99233 PR SUBSEQUENT HOSPITAL CARE,LEVL III: ICD-10-PCS | Mod: ,,, | Performed by: INTERNAL MEDICINE

## 2022-01-30 PROCEDURE — 80053 COMPREHEN METABOLIC PANEL: CPT | Performed by: INTERNAL MEDICINE

## 2022-01-30 PROCEDURE — 63600175 PHARM REV CODE 636 W HCPCS: Mod: JG | Performed by: INTERNAL MEDICINE

## 2022-01-30 PROCEDURE — 99233 SBSQ HOSP IP/OBS HIGH 50: CPT | Mod: ,,, | Performed by: INTERNAL MEDICINE

## 2022-01-30 PROCEDURE — 94761 N-INVAS EAR/PLS OXIMETRY MLT: CPT

## 2022-01-30 PROCEDURE — 85025 COMPLETE CBC W/AUTO DIFF WBC: CPT | Performed by: INTERNAL MEDICINE

## 2022-01-30 RX ORDER — ALBUMIN HUMAN 250 G/1000ML
50 SOLUTION INTRAVENOUS EVERY 4 HOURS
Status: COMPLETED | OUTPATIENT
Start: 2022-01-30 | End: 2022-01-30

## 2022-01-30 RX ADMIN — SENNOSIDES AND DOCUSATE SODIUM 1 TABLET: 8.6; 5 TABLET ORAL at 09:01

## 2022-01-30 RX ADMIN — FLUCONAZOLE 400 MG: 200 TABLET ORAL at 08:01

## 2022-01-30 RX ADMIN — THERA TABS 1 TABLET: TAB at 08:01

## 2022-01-30 RX ADMIN — ALBUMIN HUMAN 50 G: 0.25 SOLUTION INTRAVENOUS at 09:01

## 2022-01-30 RX ADMIN — CEFTRIAXONE SODIUM 2 G: 2 INJECTION, POWDER, FOR SOLUTION INTRAMUSCULAR; INTRAVENOUS at 06:01

## 2022-01-30 RX ADMIN — SIMETHICONE 80 MG: 80 TABLET, CHEWABLE ORAL at 01:01

## 2022-01-30 RX ADMIN — Medication 6 MG: at 09:01

## 2022-01-30 RX ADMIN — ALBUMIN HUMAN 50 G: 0.25 SOLUTION INTRAVENOUS at 01:01

## 2022-01-30 RX ADMIN — PANTOPRAZOLE SODIUM 40 MG: 40 TABLET, DELAYED RELEASE ORAL at 06:01

## 2022-01-30 RX ADMIN — SENNOSIDES AND DOCUSATE SODIUM 1 TABLET: 8.6; 5 TABLET ORAL at 08:01

## 2022-01-30 RX ADMIN — THIAMINE HCL TAB 100 MG 100 MG: 100 TAB at 08:01

## 2022-01-30 NOTE — ASSESSMENT & PLAN NOTE
Recently had paracentesis on 1/26/22 with 8500ml removed  Pleural fluid cultures concerning for SBP  Patient was referred to ED today to be evaluated   Received Cefoxitin 2g IV in ED x1  Will continue with Ceftriaxone 2g IV q24   Dose albumin today, 1 g/kg  Consider repeating paracentesis this week to evaluate for interval change in cell count  Follow cultures from 1/26 paracentesis

## 2022-01-30 NOTE — PLAN OF CARE
Plan of care reviewed. Patient stated he was in pain in the abdomen. He refused Tylenol. Both scleras are yellow.   Did receive a PRN dose of guaifenesin.   No s/s of infection from paracentesis site. Will continue current plan of care.

## 2022-01-30 NOTE — PROGRESS NOTES
[facility] Medicine  Progress Note    Patient Name: Nilesh Rolon Jr.  MRN: [id number]  Patient Class: IP- Inpatient   Admission Date: 1/28/2022  Length of Stay: 2 days  Attending Physician: Jacob Andrea,*  Primary Care Provider: Bradford Vega DO, DO (Inactive)        Subjective:     Principal Problem:Spontaneous bacterial peritonitis        HPI:  Nilesh Rolon is a 45 y/o male with PMHx Cirrhosis, Liver disease, HTN, chronic alcohol abuse, DONG, arthritis, and anxiety presents to [facility] ED for evaluation of jaundice and concern for spontaneous bacterial peritonitis. Patient reports he was referred to ED after having an EGD done 8 days ago which show acute esophagitis. Patient reports abdominal distension, lower abdominal pain, sore throat, and one episode of diarrhea this morning. Patient also reports dry cough and continued shortness of breath. Patient denies fever, chills, chest pain, bloody stool, nausea or vomiting. Patient denies hematuria, dysuria, or urinary frequency. Patient reports he has a long history of drinking alcohol but stopped two months ago. Patient was recently seen by Dr. Andrea for EGD on 01/20/22. EGD reveal small (<5mm) esophageal varices with esophageal plaques suspicious for candidiasis which was biopsied. Esophagus biopsy confirmed acute fungal esophagitis, fungal yeast, and pseudohyphae morphologically consistent with Candida spp., confirmed with a properly controlled GMS cytochemical stain. Patient was recently seen by his Hepatologist Dr. Mixon and had a paracentesis on 01/26/22 with 8500ml pleural fluid removed. Patient will be placed into hospital medicine observation services under my care in collaboration with Dr. Savita Raya.         Overview/Hospital Course:  No notes on file    Interval History:  No acute events overnight.  Patient does report ongoing abdominal pain and distension.  Denies fever chills.  Anemia noted again.    Review  of Systems   Constitutional: Negative for chills, diaphoresis and fever.   HENT: Negative for hearing loss and sore throat.    Eyes: Negative for visual disturbance.   Respiratory: Positive for cough and shortness of breath.    Cardiovascular: Negative for chest pain and leg swelling.   Gastrointestinal: Positive for abdominal distention. Negative for abdominal pain, diarrhea, nausea and vomiting.   Genitourinary: Negative for difficulty urinating and flank pain.   Musculoskeletal: Negative for back pain.   Skin: Positive for color change. Negative for rash and wound.   Neurological: Negative for dizziness, weakness and headaches.   Psychiatric/Behavioral: Negative for agitation and confusion.     Objective:     Vital Signs (Most Recent):  Temp: 97.5 °F (36.4 °C) (01/30/22 1106)  Pulse: 94 (01/30/22 1106)  Resp: 20 (01/30/22 1106)  BP: 118/72 (01/30/22 1106)  SpO2: 100 % (01/30/22 1106) Vital Signs (24h Range):  Temp:  [97.1 °F (36.2 °C)-98.5 °F (36.9 °C)] 97.5 °F (36.4 °C)  Pulse:  [] 94  Resp:  [18-20] 20  SpO2:  [96 %-100 %] 100 %  BP: (102-123)/(56-74) 118/72     Weight: 111.3 kg (245 lb 6 oz)  Body mass index is 30.67 kg/m².  No intake or output data in the 24 hours ending 01/30/22 1145   Physical Exam  Vitals and nursing note reviewed.   Constitutional:       General: He is not in acute distress.     Appearance: Normal appearance. He is not ill-appearing.   HENT:      Head: Normocephalic and atraumatic.      Mouth/Throat:      Mouth: Mucous membranes are moist.   Eyes:      General: Scleral icterus present.      Extraocular Movements: Extraocular movements intact.      Pupils: Pupils are equal, round, and reactive to light.      Comments: Mild scleral icterus   Cardiovascular:      Rate and Rhythm: Normal rate and regular rhythm.      Pulses: Normal pulses.      Heart sounds: Normal heart sounds. No murmur heard.  No friction rub. No gallop.    Pulmonary:      Effort: Pulmonary effort is normal. No  respiratory distress.      Breath sounds: No wheezing or rhonchi.   Abdominal:      General: Bowel sounds are normal. There is distension.      Palpations: Abdomen is soft.      Tenderness: There is abdominal tenderness. There is no guarding or rebound.   Musculoskeletal:         General: No swelling.      Cervical back: Normal range of motion and neck supple.      Right lower leg: No edema.      Left lower leg: No edema.   Skin:     General: Skin is warm and dry.      Capillary Refill: Capillary refill takes less than 2 seconds.      Coloration: Skin is jaundiced.      Findings: No bruising, erythema or rash.   Neurological:      General: No focal deficit present.      Mental Status: He is alert and oriented to person, place, and time.      GCS: GCS eye subscore is 4. GCS verbal subscore is 5. GCS motor subscore is 6.   Psychiatric:         Attention and Perception: Attention normal.         Mood and Affect: Mood normal.         Speech: Speech normal.         Behavior: Behavior normal. Behavior is cooperative.         Significant Labs:   All pertinent labs within the past 24 hours have been reviewed.  Recent Lab Results       01/30/22  0543        Albumin 2.6       Alkaline Phosphatase 143       ALT 19       Anion Gap 10       AST 38       Baso # 0.04       Basophil % 0.8       BILIRUBIN TOTAL 5.8  Comment: For infants and newborns, interpretation of results should be based  on gestational age, weight and in agreement with clinical  observations.    Premature Infant recommended reference ranges:  Up to 24 hours.............<8.0 mg/dL  Up to 48 hours............<12.0 mg/dL  3-5 days..................<15.0 mg/dL  6-29 days.................<15.0 mg/dL         BUN 9       Calcium 7.8       Chloride 106       CO2 16       Creatinine 1.0       Differential Method Automated       eGFR if  >60       eGFR if non  >60  Comment: Calculation used to obtain the estimated glomerular  filtration  rate (eGFR) is the CKD-EPI equation.          Eos # 0.1       Eosinophil % 1.9       Ferritin 51       Glucose 116       Gran # (ANC) 3.3       Gran % 63.0       Hematocrit 22.8       Hemoglobin 7.9       Immature Grans (Abs) 0.02  Comment: Mild elevation in immature granulocytes is non specific and   can be seen in a variety of conditions including stress response,   acute inflammation, trauma and pregnancy. Correlation with other   laboratory and clinical findings is essential.         Immature Granulocytes 0.4       Lymph # 1.2       Lymph % 21.8       MCH 31.7       MCHC 34.6       MCV 92       Mono # 0.6       Mono % 12.1       MPV 10.3       nRBC 0       Platelets 82       Potassium 3.3       PROTEIN TOTAL 5.2       RBC 2.49       RDW 16.6       Sodium 132       WBC 5.28             Significant Imaging: I have reviewed all pertinent imaging results/findings within the past 24 hours.      Assessment/Plan:      * Spontaneous bacterial peritonitis  Recently had paracentesis on 1/26/22 with 8500ml removed  Pleural fluid cultures concerning for SBP  Patient was referred to ED today to be evaluated   Received Cefoxitin 2g IV in ED x1  Will continue with Ceftriaxone 2g IV q24   Dose albumin today, 1 g/kg  Consider repeating paracentesis this week to evaluate for interval change in cell count  Follow cultures from 1/26 paracentesis      Cough  Reports persistent dry cough  Guaifenesin PRN for cough      Hypokalemia  Patient has hypokalemia which is currently controlled.   Last electrolytes reviewed- Recent Labs   Lab 01/28/22  1531   K 3.2*   .   Patient received KCL 40mEq PO x1  Will continue to replace potassium and monitor electrolytes closely with daily BMP.   Monitor on telemetry          Anemia  -H/H is 9.0/25.9, which is stable and consistent with previous laboratory measurements. Patient exhibits no signs or symptoms of acute bleeding  -No indication for blood transfusion  -Continue to monitor  serial CBC  -Transfuse for Hgb <7 or if symptomatic        Candidiasis of esophagus with fungal esophagitis   Recent EGD on 01/20/22 revealed small (<5mm) esophageal varices with esophageal plaques suspicious for candidiasis which was biopsied. Esophagus biopsy confirmed acute fungal esophagitis, fungal yeast, and pseudohyphae morphologically consistent with Candida spp., confirmed with a properly controlled GMS cytochemical stain  Patient was prescribed antifungals yesterday but has not started course  Will initiate fluconazole 400mg IV x1 then switch to 400mg PO daily   Resume pantoprazole 40mg PO daily      Decompensated hepatic cirrhosis  Alcoholic cirrhosis  Jaundice and ascites on physical exam  Thrombocytopenia, hyponatremia, and hypoalbuminemia on admit labs  PT/INR 17.5/1.7  , AST 44  Received 25g albumin in ED  Will order another dose of 25g albumin   Continue symptom management  Consult IR           Alcoholic cirrhosis  See above      Hyponatremia  Na 130 on admit  Likely 2/2 to decompensated cirrhosis  Neuro checks q4hr  Monitor and trend CMP daily; correct electrolytes as needed    History of alcohol abuse  Patient reports cessation of alcohol two months ago  Continue to monitor for s/s of alcohol withdrawal and DTs  Resume home thiamine        Thrombocytopenia  Likely 2/2 to liver disease  Platelets 107k on admit  No active signs of bleeding at this time  Continue to monitor and trend CBC daily      Ascites  See above        VTE Risk Mitigation (From admission, onward)         Ordered     IP VTE HIGH RISK PATIENT  Once         01/28/22 1831     Place sequential compression device  Until discontinued         01/28/22 1831                Discharge Planning   ROQUE:      Code Status: Full Code   Is the patient medically ready for discharge?:     Reason for patient still in hospital (select all that apply): Patient trending condition, Laboratory test and Treatment                     Jacob AREVALO  MD [place]  Department of [facility] Medicine   [facility]

## 2022-01-30 NOTE — SUBJECTIVE & OBJECTIVE
Interval History:  No acute events overnight.  Patient does report ongoing abdominal pain and distension.  Denies fever chills.  Anemia noted again.    Review of Systems   Constitutional: Negative for chills, diaphoresis and fever.   HENT: Negative for hearing loss and sore throat.    Eyes: Negative for visual disturbance.   Respiratory: Positive for cough and shortness of breath.    Cardiovascular: Negative for chest pain and leg swelling.   Gastrointestinal: Positive for abdominal distention. Negative for abdominal pain, diarrhea, nausea and vomiting.   Genitourinary: Negative for difficulty urinating and flank pain.   Musculoskeletal: Negative for back pain.   Skin: Positive for color change. Negative for rash and wound.   Neurological: Negative for dizziness, weakness and headaches.   Psychiatric/Behavioral: Negative for agitation and confusion.     Objective:     Vital Signs (Most Recent):  Temp: 97.5 °F (36.4 °C) (01/30/22 1106)  Pulse: 94 (01/30/22 1106)  Resp: 20 (01/30/22 1106)  BP: 118/72 (01/30/22 1106)  SpO2: 100 % (01/30/22 1106) Vital Signs (24h Range):  Temp:  [97.1 °F (36.2 °C)-98.5 °F (36.9 °C)] 97.5 °F (36.4 °C)  Pulse:  [] 94  Resp:  [18-20] 20  SpO2:  [96 %-100 %] 100 %  BP: (102-123)/(56-74) 118/72     Weight: 111.3 kg (245 lb 6 oz)  Body mass index is 30.67 kg/m².  No intake or output data in the 24 hours ending 01/30/22 1145   Physical Exam  Vitals and nursing note reviewed.   Constitutional:       General: He is not in acute distress.     Appearance: Normal appearance. He is not ill-appearing.   HENT:      Head: Normocephalic and atraumatic.      Mouth/Throat:      Mouth: Mucous membranes are moist.   Eyes:      General: Scleral icterus present.      Extraocular Movements: Extraocular movements intact.      Pupils: Pupils are equal, round, and reactive to light.      Comments: Mild scleral icterus   Cardiovascular:      Rate and Rhythm: Normal rate and regular rhythm.      Pulses:  Normal pulses.      Heart sounds: Normal heart sounds. No murmur heard.  No friction rub. No gallop.    Pulmonary:      Effort: Pulmonary effort is normal. No respiratory distress.      Breath sounds: No wheezing or rhonchi.   Abdominal:      General: Bowel sounds are normal. There is distension.      Palpations: Abdomen is soft.      Tenderness: There is abdominal tenderness. There is no guarding or rebound.   Musculoskeletal:         General: No swelling.      Cervical back: Normal range of motion and neck supple.      Right lower leg: No edema.      Left lower leg: No edema.   Skin:     General: Skin is warm and dry.      Capillary Refill: Capillary refill takes less than 2 seconds.      Coloration: Skin is jaundiced.      Findings: No bruising, erythema or rash.   Neurological:      General: No focal deficit present.      Mental Status: He is alert and oriented to person, place, and time.      GCS: GCS eye subscore is 4. GCS verbal subscore is 5. GCS motor subscore is 6.   Psychiatric:         Attention and Perception: Attention normal.         Mood and Affect: Mood normal.         Speech: Speech normal.         Behavior: Behavior normal. Behavior is cooperative.         Significant Labs:   All pertinent labs within the past 24 hours have been reviewed.  Recent Lab Results       01/30/22  0543        Albumin 2.6       Alkaline Phosphatase 143       ALT 19       Anion Gap 10       AST 38       Baso # 0.04       Basophil % 0.8       BILIRUBIN TOTAL 5.8  Comment: For infants and newborns, interpretation of results should be based  on gestational age, weight and in agreement with clinical  observations.    Premature Infant recommended reference ranges:  Up to 24 hours.............<8.0 mg/dL  Up to 48 hours............<12.0 mg/dL  3-5 days..................<15.0 mg/dL  6-29 days.................<15.0 mg/dL         BUN 9       Calcium 7.8       Chloride 106       CO2 16       Creatinine 1.0       Differential Method  Automated       eGFR if  >60       eGFR if non  >60  Comment: Calculation used to obtain the estimated glomerular filtration  rate (eGFR) is the CKD-EPI equation.          Eos # 0.1       Eosinophil % 1.9       Ferritin 51       Glucose 116       Gran # (ANC) 3.3       Gran % 63.0       Hematocrit 22.8       Hemoglobin 7.9       Immature Grans (Abs) 0.02  Comment: Mild elevation in immature granulocytes is non specific and   can be seen in a variety of conditions including stress response,   acute inflammation, trauma and pregnancy. Correlation with other   laboratory and clinical findings is essential.         Immature Granulocytes 0.4       Lymph # 1.2       Lymph % 21.8       MCH 31.7       MCHC 34.6       MCV 92       Mono # 0.6       Mono % 12.1       MPV 10.3       nRBC 0       Platelets 82       Potassium 3.3       PROTEIN TOTAL 5.2       RBC 2.49       RDW 16.6       Sodium 132       WBC 5.28             Significant Imaging: I have reviewed all pertinent imaging results/findings within the past 24 hours.

## 2022-01-31 ENCOUNTER — PATIENT MESSAGE (OUTPATIENT)
Dept: ENDOSCOPY | Facility: HOSPITAL | Age: 47
End: 2022-01-31
Payer: MEDICAID

## 2022-01-31 LAB
ALBUMIN SERPL BCP-MCNC: 3.2 G/DL (ref 3.5–5.2)
ALP SERPL-CCNC: 113 U/L (ref 55–135)
ALT SERPL W/O P-5'-P-CCNC: 17 U/L (ref 10–44)
ANION GAP SERPL CALC-SCNC: 8 MMOL/L (ref 8–16)
AST SERPL-CCNC: 35 U/L (ref 10–40)
BASOPHILS # BLD AUTO: 0.03 K/UL (ref 0–0.2)
BASOPHILS NFR BLD: 0.8 % (ref 0–1.9)
BILIRUB SERPL-MCNC: 4.7 MG/DL (ref 0.1–1)
BUN SERPL-MCNC: 7 MG/DL (ref 6–20)
CALCIUM SERPL-MCNC: 8 MG/DL (ref 8.7–10.5)
CHLORIDE SERPL-SCNC: 107 MMOL/L (ref 95–110)
CO2 SERPL-SCNC: 18 MMOL/L (ref 23–29)
CREAT SERPL-MCNC: 0.9 MG/DL (ref 0.5–1.4)
DIFFERENTIAL METHOD: ABNORMAL
EOSINOPHIL # BLD AUTO: 0.1 K/UL (ref 0–0.5)
EOSINOPHIL NFR BLD: 1.6 % (ref 0–8)
ERYTHROCYTE [DISTWIDTH] IN BLOOD BY AUTOMATED COUNT: 17.1 % (ref 11.5–14.5)
EST. GFR  (AFRICAN AMERICAN): >60 ML/MIN/1.73 M^2
EST. GFR  (NON AFRICAN AMERICAN): >60 ML/MIN/1.73 M^2
GLUCOSE SERPL-MCNC: 118 MG/DL (ref 70–110)
HCT VFR BLD AUTO: 22.2 % (ref 40–54)
HGB BLD-MCNC: 7.5 G/DL (ref 14–18)
IMM GRANULOCYTES # BLD AUTO: 0.01 K/UL (ref 0–0.04)
IMM GRANULOCYTES NFR BLD AUTO: 0.3 % (ref 0–0.5)
LYMPHOCYTES # BLD AUTO: 0.9 K/UL (ref 1–4.8)
LYMPHOCYTES NFR BLD: 23.8 % (ref 18–48)
MAGNESIUM SERPL-MCNC: 1.9 MG/DL (ref 1.6–2.6)
MCH RBC QN AUTO: 31.5 PG (ref 27–31)
MCHC RBC AUTO-ENTMCNC: 33.8 G/DL (ref 32–36)
MCV RBC AUTO: 93 FL (ref 82–98)
MONOCYTES # BLD AUTO: 0.5 K/UL (ref 0.3–1)
MONOCYTES NFR BLD: 13.8 % (ref 4–15)
NEUTROPHILS # BLD AUTO: 2.3 K/UL (ref 1.8–7.7)
NEUTROPHILS NFR BLD: 59.7 % (ref 38–73)
NRBC BLD-RTO: 0 /100 WBC
PLATELET # BLD AUTO: 62 K/UL (ref 150–450)
PMV BLD AUTO: 9.1 FL (ref 9.2–12.9)
POTASSIUM SERPL-SCNC: 3.4 MMOL/L (ref 3.5–5.1)
PROT SERPL-MCNC: 5.4 G/DL (ref 6–8.4)
RBC # BLD AUTO: 2.38 M/UL (ref 4.6–6.2)
SODIUM SERPL-SCNC: 133 MMOL/L (ref 136–145)
WBC # BLD AUTO: 3.83 K/UL (ref 3.9–12.7)

## 2022-01-31 PROCEDURE — 83735 ASSAY OF MAGNESIUM: CPT | Performed by: INTERNAL MEDICINE

## 2022-01-31 PROCEDURE — 25000003 PHARM REV CODE 250

## 2022-01-31 PROCEDURE — 85025 COMPLETE CBC W/AUTO DIFF WBC: CPT | Performed by: INTERNAL MEDICINE

## 2022-01-31 PROCEDURE — P9047 ALBUMIN (HUMAN), 25%, 50ML: HCPCS | Mod: JG | Performed by: INTERNAL MEDICINE

## 2022-01-31 PROCEDURE — 36415 COLL VENOUS BLD VENIPUNCTURE: CPT | Performed by: INTERNAL MEDICINE

## 2022-01-31 PROCEDURE — 80053 COMPREHEN METABOLIC PANEL: CPT | Performed by: INTERNAL MEDICINE

## 2022-01-31 PROCEDURE — 63600175 PHARM REV CODE 636 W HCPCS: Mod: JG | Performed by: INTERNAL MEDICINE

## 2022-01-31 PROCEDURE — 87070 CULTURE OTHR SPECIMN AEROBIC: CPT | Performed by: INTERNAL MEDICINE

## 2022-01-31 PROCEDURE — 63600175 PHARM REV CODE 636 W HCPCS: Performed by: INTERNAL MEDICINE

## 2022-01-31 PROCEDURE — 94761 N-INVAS EAR/PLS OXIMETRY MLT: CPT

## 2022-01-31 PROCEDURE — 87075 CULTR BACTERIA EXCEPT BLOOD: CPT | Performed by: INTERNAL MEDICINE

## 2022-01-31 PROCEDURE — 25000003 PHARM REV CODE 250: Performed by: RADIOLOGY

## 2022-01-31 PROCEDURE — 25000003 PHARM REV CODE 250: Performed by: INTERNAL MEDICINE

## 2022-01-31 PROCEDURE — 11000001 HC ACUTE MED/SURG PRIVATE ROOM

## 2022-01-31 RX ORDER — LANOLIN ALCOHOL/MO/W.PET/CERES
1 CREAM (GRAM) TOPICAL DAILY
Status: DISCONTINUED | OUTPATIENT
Start: 2022-01-31 | End: 2022-02-02 | Stop reason: HOSPADM

## 2022-01-31 RX ORDER — LIDOCAINE HYDROCHLORIDE 10 MG/ML
INJECTION INFILTRATION; PERINEURAL CODE/TRAUMA/SEDATION MEDICATION
Status: COMPLETED | OUTPATIENT
Start: 2022-01-31 | End: 2022-01-31

## 2022-01-31 RX ORDER — ALBUMIN HUMAN 250 G/1000ML
40 SOLUTION INTRAVENOUS ONCE
Status: COMPLETED | OUTPATIENT
Start: 2022-01-31 | End: 2022-01-31

## 2022-01-31 RX ADMIN — Medication 6 MG: at 09:01

## 2022-01-31 RX ADMIN — ALBUMIN HUMAN 40 G: 0.25 SOLUTION INTRAVENOUS at 03:01

## 2022-01-31 RX ADMIN — CEFTRIAXONE SODIUM 2 G: 2 INJECTION, POWDER, FOR SOLUTION INTRAMUSCULAR; INTRAVENOUS at 06:01

## 2022-01-31 RX ADMIN — FLUCONAZOLE 400 MG: 200 TABLET ORAL at 10:01

## 2022-01-31 RX ADMIN — PANTOPRAZOLE SODIUM 40 MG: 40 TABLET, DELAYED RELEASE ORAL at 06:01

## 2022-01-31 RX ADMIN — THERA TABS 1 TABLET: TAB at 10:01

## 2022-01-31 RX ADMIN — THIAMINE HCL TAB 100 MG 100 MG: 100 TAB at 10:01

## 2022-01-31 RX ADMIN — LIDOCAINE HYDROCHLORIDE 5 ML: 10 INJECTION, SOLUTION INFILTRATION; PERINEURAL at 08:01

## 2022-01-31 RX ADMIN — FERROUS SULFATE TAB 325 MG (65 MG ELEMENTAL FE) 1 EACH: 325 (65 FE) TAB at 10:01

## 2022-01-31 RX ADMIN — SENNOSIDES AND DOCUSATE SODIUM 1 TABLET: 8.6; 5 TABLET ORAL at 09:01

## 2022-01-31 RX ADMIN — SENNOSIDES AND DOCUSATE SODIUM 1 TABLET: 8.6; 5 TABLET ORAL at 10:01

## 2022-01-31 NOTE — ASSESSMENT & PLAN NOTE
Alcoholic cirrhosis  Jaundice and ascites on physical exam  Thrombocytopenia, hyponatremia, and hypoalbuminemia on admit labs  PT/INR 17.5/1.7  , AST 44  Received albumin 25g x 2  Continue symptom management

## 2022-01-31 NOTE — PROGRESS NOTES
[facility]  Progress Note    Patient Name: Nilesh Rolon Jr.  MRN: [id number]  Patient Class: IP- Inpatient   Admission Date: 1/28/2022  Length of Stay: 3 days  Attending Physician: Gen Be MD  Primary Care Provider: Bradford Vega DO, DO (Inactive)        Subjective:     Principal Problem:Spontaneous bacterial peritonitis        HPI:  Nilesh Rolon is a 47 y/o male with PMHx Cirrhosis, Liver disease, HTN, chronic alcohol abuse, DONG, arthritis, and anxiety presents to [facility] ED for evaluation of jaundice and concern for spontaneous bacterial peritonitis. Patient reports he was referred to ED after having an EGD done 8 days ago which show acute esophagitis. Patient reports abdominal distension, lower abdominal pain, sore throat, and one episode of diarrhea this morning. Patient also reports dry cough and continued shortness of breath. Patient denies fever, chills, chest pain, bloody stool, nausea or vomiting. Patient denies hematuria, dysuria, or urinary frequency. Patient reports he has a long history of drinking alcohol but stopped two months ago. Patient was recently seen by Dr. Andrea for EGD on 01/20/22. EGD reveal small (<5mm) esophageal varices with esophageal plaques suspicious for candidiasis which was biopsied. Esophagus biopsy confirmed acute fungal esophagitis, fungal yeast, and pseudohyphae morphologically consistent with Candida spp., confirmed with a properly controlled GMS cytochemical stain. Patient was recently seen by his Hepatologist Dr. Mixon and had a paracentesis on 01/26/22 with 8500ml pleural fluid removed. Patient will be placed into hospital medicine observation services under my care in collaboration with Dr. Savita Raya.         Overview/Hospital Course:  No notes on file    Interval History:  No acute events overnight.  S/p repeat paracentesis today with removal of 5.3L. Abdominal pain and distension improving.  Denies fever or chills.      Review of Systems   Constitutional: Negative for chills, diaphoresis and fever.   HENT: Negative for hearing loss and sore throat.    Eyes: Negative for visual disturbance.   Respiratory: Negative for cough and shortness of breath.    Cardiovascular: Negative for chest pain and leg swelling.   Gastrointestinal: Positive for abdominal distention (improving). Negative for abdominal pain, diarrhea, nausea and vomiting.   Genitourinary: Negative for difficulty urinating and flank pain.   Musculoskeletal: Negative for back pain.   Skin: Positive for color change. Negative for rash and wound.   Neurological: Negative for dizziness, weakness and headaches.   Psychiatric/Behavioral: Negative for agitation and confusion.     Objective:     Vital Signs (Most Recent):  Temp: 96.3 °F (35.7 °C) (01/31/22 1126)  Pulse: 98 (01/31/22 1126)  Resp: 20 (01/31/22 1126)  BP: (!) 99/52 (01/31/22 1126)  SpO2: 99 % (01/31/22 1156) Vital Signs (24h Range):  Temp:  [96.3 °F (35.7 °C)-99.1 °F (37.3 °C)] 96.3 °F (35.7 °C)  Pulse:  [] 98  Resp:  [20] 20  SpO2:  [97 %-100 %] 99 %  BP: ()/(52-69) 99/52     Weight: 111.3 kg (245 lb 6 oz)  Body mass index is 30.67 kg/m².    Intake/Output Summary (Last 24 hours) at 1/31/2022 1410  Last data filed at 1/30/2022 2240  Gross per 24 hour   Intake 250 ml   Output --   Net 250 ml      Physical Exam  Vitals and nursing note reviewed.   Constitutional:       General: He is not in acute distress.     Appearance: Normal appearance. He is not ill-appearing.   HENT:      Head: Normocephalic and atraumatic.      Mouth/Throat:      Mouth: Mucous membranes are moist.   Eyes:      General: Scleral icterus present.      Extraocular Movements: Extraocular movements intact.      Pupils: Pupils are equal, round, and reactive to light.      Comments: Mild scleral icterus   Cardiovascular:      Rate and Rhythm: Normal rate and regular rhythm.      Pulses: Normal pulses.      Heart sounds: Normal heart  sounds. No murmur heard.  No friction rub. No gallop.    Pulmonary:      Effort: Pulmonary effort is normal. No respiratory distress.      Breath sounds: No wheezing or rhonchi.   Abdominal:      General: Bowel sounds are normal. There is distension (improving).      Palpations: Abdomen is soft.      Tenderness: There is abdominal tenderness (improving). There is no guarding or rebound.   Musculoskeletal:         General: No swelling.      Cervical back: Normal range of motion and neck supple.      Right lower leg: No edema.      Left lower leg: No edema.   Skin:     General: Skin is warm and dry.      Capillary Refill: Capillary refill takes less than 2 seconds.      Coloration: Skin is jaundiced.      Findings: No bruising, erythema or rash.   Neurological:      General: No focal deficit present.      Mental Status: He is alert and oriented to person, place, and time.      GCS: GCS eye subscore is 4. GCS verbal subscore is 5. GCS motor subscore is 6.   Psychiatric:         Attention and Perception: Attention normal.         Mood and Affect: Mood normal.         Speech: Speech normal.         Behavior: Behavior normal. Behavior is cooperative.         Significant Labs:   All pertinent labs within the past 24 hours have been reviewed.  Recent Lab Results       01/31/22  1134        Albumin 3.2       Alkaline Phosphatase 113       ALT 17       Anion Gap 8       AST 35       Baso # 0.03       Basophil % 0.8       BILIRUBIN TOTAL 4.7  Comment: For infants and newborns, interpretation of results should be based  on gestational age, weight and in agreement with clinical  observations.    Premature Infant recommended reference ranges:  Up to 24 hours.............<8.0 mg/dL  Up to 48 hours............<12.0 mg/dL  3-5 days..................<15.0 mg/dL  6-29 days.................<15.0 mg/dL         BUN 7       Calcium 8.0       Chloride 107       CO2 18       Creatinine 0.9       Differential Method Automated       eGFR if   >60       eGFR if non  >60  Comment: Calculation used to obtain the estimated glomerular filtration  rate (eGFR) is the CKD-EPI equation.          Eos # 0.1       Eosinophil % 1.6       Glucose 118       Gran # (ANC) 2.3       Gran % 59.7       Hematocrit 22.2       Hemoglobin 7.5       Immature Grans (Abs) 0.01  Comment: Mild elevation in immature granulocytes is non specific and   can be seen in a variety of conditions including stress response,   acute inflammation, trauma and pregnancy. Correlation with other   laboratory and clinical findings is essential.         Immature Granulocytes 0.3       Lymph # 0.9       Lymph % 23.8       Magnesium 1.9       MCH 31.5       MCHC 33.8       MCV 93       Mono # 0.5       Mono % 13.8       MPV 9.1       nRBC 0       Platelets 62       Potassium 3.4       PROTEIN TOTAL 5.4       RBC 2.38       RDW 17.1       Sodium 133       WBC 3.83             Significant Imaging: I have reviewed all pertinent imaging results/findings within the past 24 hours.      Assessment/Plan:      * Spontaneous bacterial peritonitis  Recently had paracentesis on 1/26/22 with 8500ml removed  Pleural fluid cultures concerning for SBP  Patient was referred to ED today to be evaluated   Received Cefoxitin 2g IV in ED x1  Consider repeating paracentesis this week to evaluate for interval change in cell count  IR performed another paracentesis 1/31 w/ removal of 5.3L  Will give another dose of albumin 8g per L removed  Will continue with Ceftriaxone 2g IV q24   Follow cultures from 1/26 paracentesis    Cough  Reports persistent dry cough  Guaifenesin PRN for cough      Hypokalemia  Patient has hypokalemia which is currently controlled.   Last electrolytes reviewed-   Recent Labs   Lab 01/30/22  0543 01/31/22  1134   K 3.3* 3.4*   .   Patient received KCL 40mEq PO x1  Will continue to replace potassium and monitor electrolytes closely with daily BMP.   Monitor on  telemetry          Anemia  -H/H is 9.0/25.9, which is stable and consistent with previous laboratory measurements. Patient exhibits no signs or symptoms of acute bleeding  -No indication for blood transfusion  -Continue to monitor serial CBC  -Transfuse for Hgb <7 or if symptomatic        Candidiasis of esophagus with fungal esophagitis   Recent EGD on 01/20/22 revealed small (<5mm) esophageal varices with esophageal plaques suspicious for candidiasis which was biopsied. Esophagus biopsy confirmed acute fungal esophagitis, fungal yeast, and pseudohyphae morphologically consistent with Candida spp., confirmed with a properly controlled GMS cytochemical stain  Patient was prescribed antifungals yesterday but has not started course  Will initiate fluconazole 400mg IV x1 then switch to 400mg PO daily   Resume pantoprazole 40mg PO daily      Decompensated hepatic cirrhosis  Alcoholic cirrhosis  Jaundice and ascites on physical exam  Thrombocytopenia, hyponatremia, and hypoalbuminemia on admit labs  PT/INR 17.5/1.7  , AST 44  Received albumin 25g x 2  Continue symptom management            Alcoholic cirrhosis  See above      Hyponatremia  Na 130 on admit  Likely 2/2 to decompensated cirrhosis  Neuro checks q4hr  Monitor and trend CMP daily; correct electrolytes as needed    History of alcohol abuse  Patient reports cessation of alcohol two months ago  Continue to monitor for s/s of alcohol withdrawal and DTs  Resume home thiamine        Thrombocytopenia  Likely 2/2 to liver disease  Platelets 107k on admit  No active signs of bleeding at this time  Continue to monitor and trend CBC daily      Ascites  See above        VTE Risk Mitigation (From admission, onward)         Ordered     IP VTE HIGH RISK PATIENT  Once         01/28/22 1831     Place sequential compression device  Until discontinued         01/28/22 1831                Discharge Planning   ROQUE:      Code Status: Full Code   Is the patient medically ready  for discharge?:     Reason for patient still in hospital (select all that apply): Patient trending condition, Laboratory test and Treatment                     Gen Be MD  [facility] of [facility]   [facility]

## 2022-01-31 NOTE — SUBJECTIVE & OBJECTIVE
Interval History:  No acute events overnight.  S/p repeat paracentesis today with removal of 5.3L. Abdominal pain and distension improving.  Denies fever or chills.     Review of Systems   Constitutional: Negative for chills, diaphoresis and fever.   HENT: Negative for hearing loss and sore throat.    Eyes: Negative for visual disturbance.   Respiratory: Negative for cough and shortness of breath.    Cardiovascular: Negative for chest pain and leg swelling.   Gastrointestinal: Positive for abdominal distention (improving). Negative for abdominal pain, diarrhea, nausea and vomiting.   Genitourinary: Negative for difficulty urinating and flank pain.   Musculoskeletal: Negative for back pain.   Skin: Positive for color change. Negative for rash and wound.   Neurological: Negative for dizziness, weakness and headaches.   Psychiatric/Behavioral: Negative for agitation and confusion.     Objective:     Vital Signs (Most Recent):  Temp: 96.3 °F (35.7 °C) (01/31/22 1126)  Pulse: 98 (01/31/22 1126)  Resp: 20 (01/31/22 1126)  BP: (!) 99/52 (01/31/22 1126)  SpO2: 99 % (01/31/22 1156) Vital Signs (24h Range):  Temp:  [96.3 °F (35.7 °C)-99.1 °F (37.3 °C)] 96.3 °F (35.7 °C)  Pulse:  [] 98  Resp:  [20] 20  SpO2:  [97 %-100 %] 99 %  BP: ()/(52-69) 99/52     Weight: 111.3 kg (245 lb 6 oz)  Body mass index is 30.67 kg/m².    Intake/Output Summary (Last 24 hours) at 1/31/2022 1410  Last data filed at 1/30/2022 2240  Gross per 24 hour   Intake 250 ml   Output --   Net 250 ml      Physical Exam  Vitals and nursing note reviewed.   Constitutional:       General: He is not in acute distress.     Appearance: Normal appearance. He is not ill-appearing.   HENT:      Head: Normocephalic and atraumatic.      Mouth/Throat:      Mouth: Mucous membranes are moist.   Eyes:      General: Scleral icterus present.      Extraocular Movements: Extraocular movements intact.      Pupils: Pupils are equal, round, and reactive to light.       Comments: Mild scleral icterus   Cardiovascular:      Rate and Rhythm: Normal rate and regular rhythm.      Pulses: Normal pulses.      Heart sounds: Normal heart sounds. No murmur heard.  No friction rub. No gallop.    Pulmonary:      Effort: Pulmonary effort is normal. No respiratory distress.      Breath sounds: No wheezing or rhonchi.   Abdominal:      General: Bowel sounds are normal. There is distension (improving).      Palpations: Abdomen is soft.      Tenderness: There is abdominal tenderness (improving). There is no guarding or rebound.   Musculoskeletal:         General: No swelling.      Cervical back: Normal range of motion and neck supple.      Right lower leg: No edema.      Left lower leg: No edema.   Skin:     General: Skin is warm and dry.      Capillary Refill: Capillary refill takes less than 2 seconds.      Coloration: Skin is jaundiced.      Findings: No bruising, erythema or rash.   Neurological:      General: No focal deficit present.      Mental Status: He is alert and oriented to person, place, and time.      GCS: GCS eye subscore is 4. GCS verbal subscore is 5. GCS motor subscore is 6.   Psychiatric:         Attention and Perception: Attention normal.         Mood and Affect: Mood normal.         Speech: Speech normal.         Behavior: Behavior normal. Behavior is cooperative.         Significant Labs:   All pertinent labs within the past 24 hours have been reviewed.  Recent Lab Results       01/31/22  1134        Albumin 3.2       Alkaline Phosphatase 113       ALT 17       Anion Gap 8       AST 35       Baso # 0.03       Basophil % 0.8       BILIRUBIN TOTAL 4.7  Comment: For infants and newborns, interpretation of results should be based  on gestational age, weight and in agreement with clinical  observations.    Premature Infant recommended reference ranges:  Up to 24 hours.............<8.0 mg/dL  Up to 48 hours............<12.0 mg/dL  3-5 days..................<15.0 mg/dL  6-29  days.................<15.0 mg/dL         BUN 7       Calcium 8.0       Chloride 107       CO2 18       Creatinine 0.9       Differential Method Automated       eGFR if  >60       eGFR if non  >60  Comment: Calculation used to obtain the estimated glomerular filtration  rate (eGFR) is the CKD-EPI equation.          Eos # 0.1       Eosinophil % 1.6       Glucose 118       Gran # (ANC) 2.3       Gran % 59.7       Hematocrit 22.2       Hemoglobin 7.5       Immature Grans (Abs) 0.01  Comment: Mild elevation in immature granulocytes is non specific and   can be seen in a variety of conditions including stress response,   acute inflammation, trauma and pregnancy. Correlation with other   laboratory and clinical findings is essential.         Immature Granulocytes 0.3       Lymph # 0.9       Lymph % 23.8       Magnesium 1.9       MCH 31.5       MCHC 33.8       MCV 93       Mono # 0.5       Mono % 13.8       MPV 9.1       nRBC 0       Platelets 62       Potassium 3.4       PROTEIN TOTAL 5.4       RBC 2.38       RDW 17.1       Sodium 133       WBC 3.83             Significant Imaging: I have reviewed all pertinent imaging results/findings within the past 24 hours.

## 2022-01-31 NOTE — PLAN OF CARE
Andrés met with pt and pt's mother at bedside to complete assessment. Pt was admitted 1/28/22 for spontaneous bacterial peritonitis and jaundice. Pt will have Girlfriend Jazmine [phone] and mother Laurel [phone] help transport pt home at time of d/c. Pt does not use any equipment but mother stated that he will need a shower chair. SW instructed pt and mother that insurance would not cover a shower chair and they could get one cheaper elsewhere, pt stated they would get one outside the hospital. Sw requested f/u apts. White board updated with CM name and contact information.  Discharge brochure provided.  Pt encouraged to call with any questions or concerns.  Cm will continue to follow pt through transitions of care and assist with any discharge needs.    Brian Rivera, MSW  [phone]    Future Appointments   Date Time Provider Department Center   2/2/2022 11:00 AM [facility]   2/9/2022 10:30 AM [facility]   2/15/2022 10:30 AM Pb Mixon MD [facility]   2/16/2022 11:00 AM [facility]   2/23/2022  9:30 AM [facility]        01/31/22 1652   Discharge Assessment   Assessment Type Discharge Planning Assessment   Confirmed/corrected address, phone number and insurance Yes   Confirmed Demographics Correct on Facesheet   Source of Information patient;family   When was your last doctors appointment? 01/24/22   Communicated ROQUE with patient/caregiver Yes   Reason For Admission Spontaneous bacterial peritonitis   Lives With significant other   Facility Arrived From: home   Do you expect to return to your current living situation? Yes   Do you have help at home or someone to help you manage your care at home? Yes   Who are your caregiver(s) and their phone number(s)? Girlfriend Jazmine [phone] and mother Laurel [phone]   Prior to hospitilization cognitive status: Alert/Oriented   Current cognitive status: Alert/Oriented    Walking or Climbing Stairs Difficulty none   Dressing/Bathing Difficulty none   Home Layout Able to live on 1st floor   Equipment Currently Used at Home none   Readmission within 30 days? No   Patient currently being followed by outpatient case management? No   Do you currently have service(s) that help you manage your care at home? No   Do you take prescription medications? No   Do you have prescription coverage? Yes   Coverage Medicaid   Do you have any problems affording any of your prescribed medications? No   Is the patient taking medications as prescribed? yes   Who is going to help you get home at discharge? Girlfriend Jazmine [phone] and mother Laurel [phone]   How do you get to doctors appointments? family or friend will provide   Are you on dialysis? No   Do you take coumadin? No   Discharge Plan A Home with family   Discharge Plan B Home   DME Needed Upon Discharge  other (see comments)  (TBD)   Discharge Plan discussed with: Patient;Parent(s)   Name(s) and Number(s) Girlfriend Jazmine [phone] and mother Laurel [phone]   Discharge Barriers Identified None   Relationship/Environment   Name(s) of Who Lives With Patient Girlfriend Jazmine [phone] and mother Laurel [phone]

## 2022-01-31 NOTE — ASSESSMENT & PLAN NOTE
Patient has hypokalemia which is currently controlled.   Last electrolytes reviewed-   Recent Labs   Lab 01/30/22  0543 01/31/22  1134   K 3.3* 3.4*   .   Patient received KCL 40mEq PO x1  Will continue to replace potassium and monitor electrolytes closely with daily BMP.   Monitor on telemetry

## 2022-01-31 NOTE — PLAN OF CARE
Plan of care reviewed. Patient slept all night. No acute distress or complaints this shift. Will continue current plan of care.

## 2022-01-31 NOTE — ASSESSMENT & PLAN NOTE
Recently had paracentesis on 1/26/22 with 8500ml removed  Pleural fluid cultures concerning for SBP  Patient was referred to ED today to be evaluated   Received Cefoxitin 2g IV in ED x1  Consider repeating paracentesis this week to evaluate for interval change in cell count  IR performed another paracentesis 1/31 w/ removal of 5.3L  Will give another dose of albumin 8g per L removed  Will continue with Ceftriaxone 2g IV q24   Follow cultures from 1/26 paracentesis

## 2022-01-31 NOTE — PROCEDURES
Interventional Radiology Immediate Post-Procedure Note    Pre-Op Diagnosis: Ascites  Post-Op Diagnosis: Same    Procedure: US-guided paracentesis    Procedure performed by: Geo Dumont MD  Assistants: None    Estimated Blood Loss: Minimal  Specimen Removed: Yes    Findings/description of procedure:  Large ascites. 5.3 L bright yellow, almost clear fluid was removed from the RLQ.    No immediate complications. Patient tolerated procedure well. Please see full dictated procedure report for additional details and recommendations.      Geo Dumont MD  [facility]  Pager [phone]

## 2022-01-31 NOTE — CONSULTS
Interventional Radiology Consult/Pre-Procedure Note      Chief Complaint/Reason for Consult: Ascites    History of Present Illness:  Nilesh Rolon Jr. is a 46 y.o. male with the PMH listed below who presents with ascites.    Admission H&P reviewed.    Past Medical History:   Diagnosis Date    DONG (acute kidney injury) 12/1/2021    Anxiety     Arthritis     Cirrhosis     Hypertension     Liver disease     Osteoarthritis     Other meniscus derangements, other medial meniscus, left knee 1/7/2019     Past Surgical History:   Procedure Laterality Date    ARTHROSCOPY OF KNEE Left 1/7/2019    Procedure: ARTHROSCOPY, KNEE;  Surgeon: Derick Kirby MD;  Location: [facility] OR;  Service: Orthopedics;  Laterality: Left;    COLONOSCOPY N/A 7/27/2020    Procedure: COLONOSCOPY;  Surgeon: Sotero Zapata MD;  Location: [facility];  Service: Endoscopy;  Laterality: N/A;    COLONOSCOPY N/A 1/20/2022    Procedure: COLONOSCOPY Suprep Vaccinated;  Surgeon: Jacob Andrea MD;  Location: [facility];  Service: Endoscopy;  Laterality: N/A;    ESOPHAGOGASTRODUODENOSCOPY N/A 4/8/2019    Procedure: EGD (ESOPHAGOGASTRODUODENOSCOPY);  Surgeon: Bonita Kendall MD;  Location: [facility];  Service: Endoscopy;  Laterality: N/A;    ESOPHAGOGASTRODUODENOSCOPY N/A 7/27/2020    Procedure: EGD (ESOPHAGOGASTRODUODENOSCOPY);  Surgeon: Sotero Zapata MD;  Location: [facility];  Service: Endoscopy;  Laterality: N/A;    ESOPHAGOGASTRODUODENOSCOPY N/A 12/2/2021    Procedure: EGD (ESOPHAGOGASTRODUODENOSCOPY);  Surgeon: Montez Guerra MD;  [facility]  Service: Endoscopy;  Laterality: N/A;    ESOPHAGOGASTRODUODENOSCOPY N/A 1/20/2022    Procedure: EGD (ESOPHAGOGASTRODUODENOSCOPY);  Surgeon: Jacob Andrea MD;  Location: [facility];  Service: Endoscopy;  Laterality: N/A;  please order CBC with plts and INR day of case.    KNEE SURGERY         Allergies:   Review of patient's allergies indicates:  No Known  Allergies    Scheduled Meds:    cefTRIAXone (ROCEPHIN) IVPB  2 g Intravenous Q24H    ferrous sulfate  1 tablet Oral Daily    fluconazole  400 mg Oral Daily    multivitamin  1 tablet Oral Daily    pantoprazole  40 mg Oral Before breakfast    senna-docusate 8.6-50 mg  1 tablet Oral BID    thiamine  100 mg Oral Daily     Continuous Infusions:   PRN Meds:acetaminophen, aluminum-magnesium hydroxide-simethicone, dextrose 10%, dextrose 10%, glucagon (human recombinant), glucose, glucose, guaiFENesin 100 mg/5 ml, melatonin, ondansetron, simethicone, sodium chloride 0.9%    Anticoagulation/Antiplatelet Meds: no anticoagulation    Review of Systems:   As documented in admission H&P.    Physical Exam:  Temp: 99.1 °F (37.3 °C) (01/31/22 0525)  Pulse: 96 (01/31/22 0711)  Resp: 20 (01/31/22 0711)  BP: 103/69 (01/31/22 0711)  SpO2: 97 % (01/31/22 0711)     General: NAD  HEENT: Normocephalic, sclera icteric, oropharynx clear  Neck: Supple, no palpable lymphadenopathy  Heart: RRR  Lungs: Symmetric excursions, breathing unlabored  Abd: Distended, soft, NT  Extremities: LEIGH  Neuro: Gross nonfocal    Labs:  Recent Labs   Lab 01/28/22  1531   INR 1.7*       Recent Labs   Lab 01/30/22  0543   WBC 5.28   HGB 7.9*   HCT 22.8*   MCV 92   PLT 82*      Recent Labs   Lab 01/29/22  0323 01/29/22  0323 01/30/22  0543      < > 116*   *   < > 132*   K 3.5   < > 3.3*      < > 106   CO2 18*   < > 16*   BUN 11   < > 9   CREATININE 1.1   < > 1.0   CALCIUM 8.0*   < > 7.8*   MG 1.9  --   --    ALT 18   < > 19   AST 37   < > 38   ALBUMIN 2.6*   < > 2.6*   BILITOT 7.5*   < > 5.8*    < > = values in this interval not displayed.       Assessment/Plan:   Ascites. Will undergo paracentesis today.    Sedation: None    Risks (including, but not limited to, pain, bleeding, infection, damage to nearby structures, treatment failure/recurrence, and the need for additional procedures), potential benefits, and alternatives were discussed  with the patient. All questions were answered to the best of my abilities. The patient wishes to proceed. Written informed consent was obtained.      Geo Dumont MD  [facility]  Pager [phone]

## 2022-02-01 LAB
ALBUMIN SERPL BCP-MCNC: 3.2 G/DL (ref 3.5–5.2)
ALP SERPL-CCNC: 93 U/L (ref 55–135)
ALT SERPL W/O P-5'-P-CCNC: 16 U/L (ref 10–44)
ANION GAP SERPL CALC-SCNC: 9 MMOL/L (ref 8–16)
AST SERPL-CCNC: 34 U/L (ref 10–40)
BASOPHILS # BLD AUTO: 0.02 K/UL (ref 0–0.2)
BASOPHILS NFR BLD: 0.5 % (ref 0–1.9)
BILIRUB SERPL-MCNC: 4.3 MG/DL (ref 0.1–1)
BUN SERPL-MCNC: 6 MG/DL (ref 6–20)
CALCIUM SERPL-MCNC: 8.1 MG/DL (ref 8.7–10.5)
CHLORIDE SERPL-SCNC: 111 MMOL/L (ref 95–110)
CO2 SERPL-SCNC: 17 MMOL/L (ref 23–29)
CREAT SERPL-MCNC: 0.8 MG/DL (ref 0.5–1.4)
DIFFERENTIAL METHOD: ABNORMAL
EOSINOPHIL # BLD AUTO: 0.1 K/UL (ref 0–0.5)
EOSINOPHIL NFR BLD: 2 % (ref 0–8)
ERYTHROCYTE [DISTWIDTH] IN BLOOD BY AUTOMATED COUNT: 16.8 % (ref 11.5–14.5)
EST. GFR  (AFRICAN AMERICAN): >60 ML/MIN/1.73 M^2
EST. GFR  (NON AFRICAN AMERICAN): >60 ML/MIN/1.73 M^2
GLUCOSE SERPL-MCNC: 103 MG/DL (ref 70–110)
HCT VFR BLD AUTO: 21.5 % (ref 40–54)
HGB BLD-MCNC: 7.7 G/DL (ref 14–18)
IMM GRANULOCYTES # BLD AUTO: 0.02 K/UL (ref 0–0.04)
IMM GRANULOCYTES NFR BLD AUTO: 0.5 % (ref 0–0.5)
LYMPHOCYTES # BLD AUTO: 1.1 K/UL (ref 1–4.8)
LYMPHOCYTES NFR BLD: 28.9 % (ref 18–48)
MAGNESIUM SERPL-MCNC: 1.8 MG/DL (ref 1.6–2.6)
MCH RBC QN AUTO: 32.2 PG (ref 27–31)
MCHC RBC AUTO-ENTMCNC: 35.8 G/DL (ref 32–36)
MCV RBC AUTO: 90 FL (ref 82–98)
MONOCYTES # BLD AUTO: 0.5 K/UL (ref 0.3–1)
MONOCYTES NFR BLD: 12 % (ref 4–15)
NEUTROPHILS # BLD AUTO: 2.2 K/UL (ref 1.8–7.7)
NEUTROPHILS NFR BLD: 56.1 % (ref 38–73)
NRBC BLD-RTO: 0 /100 WBC
PLATELET # BLD AUTO: 63 K/UL (ref 150–450)
PMV BLD AUTO: 9.4 FL (ref 9.2–12.9)
POTASSIUM SERPL-SCNC: 3.3 MMOL/L (ref 3.5–5.1)
PROT SERPL-MCNC: 5.2 G/DL (ref 6–8.4)
RBC # BLD AUTO: 2.39 M/UL (ref 4.6–6.2)
SODIUM SERPL-SCNC: 137 MMOL/L (ref 136–145)
WBC # BLD AUTO: 3.91 K/UL (ref 3.9–12.7)

## 2022-02-01 PROCEDURE — 25000003 PHARM REV CODE 250: Performed by: INTERNAL MEDICINE

## 2022-02-01 PROCEDURE — 11000001 HC ACUTE MED/SURG PRIVATE ROOM

## 2022-02-01 PROCEDURE — 85025 COMPLETE CBC W/AUTO DIFF WBC: CPT | Performed by: INTERNAL MEDICINE

## 2022-02-01 PROCEDURE — 83735 ASSAY OF MAGNESIUM: CPT | Performed by: INTERNAL MEDICINE

## 2022-02-01 PROCEDURE — 80053 COMPREHEN METABOLIC PANEL: CPT | Performed by: INTERNAL MEDICINE

## 2022-02-01 PROCEDURE — 25000003 PHARM REV CODE 250

## 2022-02-01 PROCEDURE — 94761 N-INVAS EAR/PLS OXIMETRY MLT: CPT

## 2022-02-01 PROCEDURE — 36415 COLL VENOUS BLD VENIPUNCTURE: CPT | Performed by: INTERNAL MEDICINE

## 2022-02-01 PROCEDURE — 63600175 PHARM REV CODE 636 W HCPCS: Performed by: INTERNAL MEDICINE

## 2022-02-01 RX ADMIN — FLUCONAZOLE 400 MG: 200 TABLET ORAL at 08:02

## 2022-02-01 RX ADMIN — SENNOSIDES AND DOCUSATE SODIUM 1 TABLET: 8.6; 5 TABLET ORAL at 08:02

## 2022-02-01 RX ADMIN — PANTOPRAZOLE SODIUM 40 MG: 40 TABLET, DELAYED RELEASE ORAL at 05:02

## 2022-02-01 RX ADMIN — THIAMINE HCL TAB 100 MG 100 MG: 100 TAB at 08:02

## 2022-02-01 RX ADMIN — GUAIFENESIN 200 MG: 100 SOLUTION ORAL at 08:02

## 2022-02-01 RX ADMIN — THERA TABS 1 TABLET: TAB at 08:02

## 2022-02-01 RX ADMIN — Medication 6 MG: at 08:02

## 2022-02-01 RX ADMIN — CEFTRIAXONE SODIUM 2 G: 2 INJECTION, POWDER, FOR SOLUTION INTRAMUSCULAR; INTRAVENOUS at 05:02

## 2022-02-01 RX ADMIN — FERROUS SULFATE TAB 325 MG (65 MG ELEMENTAL FE) 1 EACH: 325 (65 FE) TAB at 08:02

## 2022-02-01 NOTE — CONSULTS
Thank you for your consult to [facility]. We have reviewed the patient chart. This patient does meet criteria for [facility] service at this time. Will assume care on 02/01/22 at 7AM.

## 2022-02-01 NOTE — SUBJECTIVE & OBJECTIVE
Interval History:  No acute events overnight.  S/p repeat paracentesis yesterday with removal of 5.3L. Abdominal pain and distension improving.  Denies fever or chills. Continue Ceftriaxone    Review of Systems   Constitutional: Negative for chills and fever.   HENT: Negative for hearing loss and sore throat.    Eyes: Negative for visual disturbance.   Respiratory: Negative for cough and shortness of breath.    Cardiovascular: Negative for chest pain and leg swelling.   Gastrointestinal: Positive for abdominal distention (improving). Negative for abdominal pain, diarrhea, nausea and vomiting.   Genitourinary: Negative for difficulty urinating and flank pain.   Musculoskeletal: Negative for back pain.   Skin: Positive for color change. Negative for rash and wound.   Neurological: Negative for dizziness, weakness and headaches.   Psychiatric/Behavioral: Negative for agitation and confusion.     Objective:     Vital Signs (Most Recent):  Temp: 98.3 °F (36.8 °C) (02/01/22 0733)  Pulse: 96 (02/01/22 0733)  Resp: 20 (02/01/22 0733)  BP: 109/65 (02/01/22 0733)  SpO2: 99 % (02/01/22 0733) Vital Signs (24h Range):  Temp:  [96.3 °F (35.7 °C)-98.4 °F (36.9 °C)] 98.3 °F (36.8 °C)  Pulse:  [] 96  Resp:  [18-20] 20  SpO2:  [98 %-100 %] 99 %  BP: ()/(52-71) 109/65     Weight: 111.3 kg (245 lb 6 oz)  Body mass index is 30.67 kg/m².  No intake or output data in the 24 hours ending 02/01/22 0903   Physical Exam  Vitals and nursing note reviewed.   Constitutional:       General: He is not in acute distress.     Appearance: Normal appearance. He is not ill-appearing.   HENT:      Head: Normocephalic and atraumatic.      Mouth/Throat:      Mouth: Mucous membranes are moist.   Eyes:      General: Scleral icterus present.      Extraocular Movements: Extraocular movements intact.      Pupils: Pupils are equal, round, and reactive to light.      Comments: Mild scleral icterus   Cardiovascular:      Rate and Rhythm: Normal rate  and regular rhythm.      Pulses: Normal pulses.      Heart sounds: Normal heart sounds. No murmur heard.  No friction rub. No gallop.    Pulmonary:      Effort: Pulmonary effort is normal. No respiratory distress.      Breath sounds: No wheezing or rhonchi.   Abdominal:      General: Bowel sounds are normal. There is distension (improving).      Palpations: Abdomen is soft.      Tenderness: There is abdominal tenderness (improving). There is no guarding or rebound.   Musculoskeletal:         General: No swelling.      Cervical back: Normal range of motion and neck supple.      Right lower leg: No edema.      Left lower leg: No edema.   Skin:     General: Skin is warm and dry.      Capillary Refill: Capillary refill takes less than 2 seconds.      Coloration: Skin is jaundiced.      Findings: No bruising, erythema or rash.   Neurological:      General: No focal deficit present.      Mental Status: He is alert and oriented to person, place, and time.      GCS: GCS eye subscore is 4. GCS verbal subscore is 5. GCS motor subscore is 6.   Psychiatric:         Attention and Perception: Attention normal.         Mood and Affect: Mood normal.         Speech: Speech normal.         Behavior: Behavior normal. Behavior is cooperative.         Significant Labs:   All pertinent labs within the past 24 hours have been reviewed.  Recent Lab Results       02/01/22  0523   01/31/22  1134        Albumin 3.2   3.2       Alkaline Phosphatase 93   113       ALT 16   17       Anion Gap 9   8       AST 34   35       Baso # 0.02   0.03       Basophil % 0.5   0.8       BILIRUBIN TOTAL 4.3  Comment: For infants and newborns, interpretation of results should be based  on gestational age, weight and in agreement with clinical  observations.    Premature Infant recommended reference ranges:  Up to 24 hours.............<8.0 mg/dL  Up to 48 hours............<12.0 mg/dL  3-5 days..................<15.0 mg/dL  6-29 days.................<15.0  mg/dL     4.7  Comment: For infants and newborns, interpretation of results should be based  on gestational age, weight and in agreement with clinical  observations.    Premature Infant recommended reference ranges:  Up to 24 hours.............<8.0 mg/dL  Up to 48 hours............<12.0 mg/dL  3-5 days..................<15.0 mg/dL  6-29 days.................<15.0 mg/dL         BUN 6   7       Calcium 8.1   8.0       Chloride 111   107       CO2 17   18       Creatinine 0.8   0.9       Differential Method Automated   Automated       eGFR if  >60   >60       eGFR if non  >60  Comment: Calculation used to obtain the estimated glomerular filtration  rate (eGFR) is the CKD-EPI equation.      >60  Comment: Calculation used to obtain the estimated glomerular filtration  rate (eGFR) is the CKD-EPI equation.          Eos # 0.1   0.1       Eosinophil % 2.0   1.6       Glucose 103   118       Gran # (ANC) 2.2   2.3       Gran % 56.1   59.7       Hematocrit 21.5   22.2       Hemoglobin 7.7   7.5       Immature Grans (Abs) 0.02  Comment: Mild elevation in immature granulocytes is non specific and   can be seen in a variety of conditions including stress response,   acute inflammation, trauma and pregnancy. Correlation with other   laboratory and clinical findings is essential.     0.01  Comment: Mild elevation in immature granulocytes is non specific and   can be seen in a variety of conditions including stress response,   acute inflammation, trauma and pregnancy. Correlation with other   laboratory and clinical findings is essential.         Immature Granulocytes 0.5   0.3       Lymph # 1.1   0.9       Lymph % 28.9   23.8       Magnesium 1.8   1.9       MCH 32.2   31.5       MCHC 35.8   33.8       MCV 90   93       Mono # 0.5   0.5       Mono % 12.0   13.8       MPV 9.4   9.1       nRBC 0   0       Platelets 63   62       Potassium 3.3   3.4       PROTEIN TOTAL 5.2   5.4       RBC 2.39   2.38        RDW 16.8   17.1       Sodium 137   133       WBC 3.91   3.83             Significant Imaging: I have reviewed all pertinent imaging results/findings within the past 24 hours.

## 2022-02-01 NOTE — PLAN OF CARE
YONIS sent HH referral to [facility]. SW will follow up.       [facility] was not able to accept pt, they were out of network.     YONIS sent new HH referral to 24 agencies. YONIS will follow.     Brian Miguel, MSW  [phone]    Future Appointments   Date Time Provider Department Center   2/9/2022 10:30 AM [facility]   2/15/2022 10:30 AM Pb Mixon MD [facility] HEPAT [facility]   2/16/2022 11:00 AM [facility]   2/23/2022  9:30 AM [facility]   3/2/2022 11:00 AM [facility]        02/01/22 0946   Post-Acute Status   Coverage Medicaid   Hospital Resources/Appts/Education Provided Appointments scheduled by Navigator/Coordinator   Discharge Delays (!) Post-Acute Set-up   Discharge Plan   Discharge Plan A Home Health   Discharge Plan B Home with family

## 2022-02-01 NOTE — PLAN OF CARE
Plan of care reviewed.  No acute distress or complaints this shift.  Will continue current plan of care.

## 2022-02-02 ENCOUNTER — PATIENT MESSAGE (OUTPATIENT)
Dept: ADMINISTRATIVE | Facility: OTHER | Age: 47
End: 2022-02-02
Payer: MEDICAID

## 2022-02-02 VITALS
SYSTOLIC BLOOD PRESSURE: 104 MMHG | TEMPERATURE: 99 F | HEIGHT: 75 IN | HEART RATE: 94 BPM | RESPIRATION RATE: 20 BRPM | DIASTOLIC BLOOD PRESSURE: 65 MMHG | OXYGEN SATURATION: 95 % | BODY MASS INDEX: 30.51 KG/M2 | WEIGHT: 245.38 LBS

## 2022-02-02 LAB
BACTERIA FLD AEROBE CULT: NO GROWTH
BACTERIA SPEC ANAEROBE CULT: NORMAL
GRAM STN SPEC: NORMAL

## 2022-02-02 PROCEDURE — 94761 N-INVAS EAR/PLS OXIMETRY MLT: CPT

## 2022-02-02 PROCEDURE — 25000003 PHARM REV CODE 250: Performed by: INTERNAL MEDICINE

## 2022-02-02 PROCEDURE — G0180 MD CERTIFICATION HHA PATIENT: HCPCS | Mod: ,,, | Performed by: FAMILY MEDICINE

## 2022-02-02 PROCEDURE — 63600175 PHARM REV CODE 636 W HCPCS: Performed by: INTERNAL MEDICINE

## 2022-02-02 PROCEDURE — G0180 PR HOME HEALTH MD CERTIFICATION: ICD-10-PCS | Mod: ,,, | Performed by: FAMILY MEDICINE

## 2022-02-02 PROCEDURE — 25000003 PHARM REV CODE 250

## 2022-02-02 RX ORDER — FERROUS SULFATE 325(65) MG
325 TABLET ORAL 2 TIMES DAILY
Qty: 60 TABLET | Refills: 2 | Status: SHIPPED | OUTPATIENT
Start: 2022-02-02 | End: 2022-05-23 | Stop reason: SDUPTHER

## 2022-02-02 RX ORDER — PROPRANOLOL HYDROCHLORIDE 20 MG/1
20 TABLET ORAL 3 TIMES DAILY
Qty: 90 TABLET | Refills: 11 | Status: SHIPPED | OUTPATIENT
Start: 2022-02-02 | End: 2022-02-04

## 2022-02-02 RX ORDER — PANTOPRAZOLE SODIUM 40 MG/1
40 TABLET, DELAYED RELEASE ORAL DAILY
Qty: 30 TABLET | Refills: 11 | Status: SHIPPED | OUTPATIENT
Start: 2022-02-02 | End: 2023-09-26

## 2022-02-02 RX ORDER — FLUCONAZOLE 200 MG/1
400 TABLET ORAL DAILY
Qty: 28 TABLET | Refills: 0 | Status: SHIPPED | OUTPATIENT
Start: 2022-02-02 | End: 2022-02-16

## 2022-02-02 RX ORDER — LACTULOSE 10 G/15ML
10 SOLUTION ORAL; RECTAL 2 TIMES DAILY
Qty: 300 ML | Refills: 2 | Status: SHIPPED | OUTPATIENT
Start: 2022-02-02 | End: 2022-02-15 | Stop reason: SDUPTHER

## 2022-02-02 RX ADMIN — THERA TABS 1 TABLET: TAB at 09:02

## 2022-02-02 RX ADMIN — FERROUS SULFATE TAB 325 MG (65 MG ELEMENTAL FE) 1 EACH: 325 (65 FE) TAB at 09:02

## 2022-02-02 RX ADMIN — FLUCONAZOLE 400 MG: 200 TABLET ORAL at 09:02

## 2022-02-02 RX ADMIN — CEFTRIAXONE SODIUM 2 G: 2 INJECTION, POWDER, FOR SOLUTION INTRAMUSCULAR; INTRAVENOUS at 05:02

## 2022-02-02 RX ADMIN — SENNOSIDES AND DOCUSATE SODIUM 1 TABLET: 8.6; 5 TABLET ORAL at 09:02

## 2022-02-02 RX ADMIN — PANTOPRAZOLE SODIUM 40 MG: 40 TABLET, DELAYED RELEASE ORAL at 05:02

## 2022-02-02 RX ADMIN — THIAMINE HCL TAB 100 MG 100 MG: 100 TAB at 09:02

## 2022-02-02 NOTE — PLAN OF CARE
[facility]      HOME HEALTH ORDERS  FACE TO FACE ENCOUNTER    Patient Name: Nilesh Rolon Jr.  YOB: 1975    PCP: Bradford Vega DO, DO (Inactive)   PCP Address: [address] [place] [id number]  PCP Phone Number: [phone]  PCP Fax: [phone]    Encounter Date: 1/28/22    Admit to Home Health    Diagnoses:  Active Hospital Problems    Diagnosis  POA    *Spontaneous bacterial peritonitis [K65.2]  Yes    Candidiasis of esophagus with fungal esophagitis  [B37.81]  Yes    Anemia [D64.9]  Yes    Hypokalemia [E87.6]  Yes    Cough [R05.9]  Yes    Alcoholic cirrhosis [K70.30]  Yes    Decompensated hepatic cirrhosis [K72.90, K74.60]  Yes    Thrombocytopenia [D69.6]  Yes    History of alcohol abuse [F10.11]  Yes    Hyponatremia [E87.1]  Yes    Ascites [R18.8]  Yes      Resolved Hospital Problems   No resolved problems to display.       Follow Up Appointments:  Future Appointments   Date Time Provider Department Center   2/9/2022 10:30 AM [facility]   2/15/2022 10:30 AM Pb Mixon MD [facility] HEPAT [facility]   2/16/2022 11:00 AM [facility]   2/23/2022  9:30 AM [facility]   3/2/2022 11:00 AM [facility]       Allergies:Review of patient's allergies indicates:  No Known Allergies    Medications: Review discharge medications with patient and family and provide education.    Current Facility-Administered Medications   Medication Dose Route Frequency Provider Last Rate Last Admin    acetaminophen tablet 650 mg  650 mg Oral Q4H PRN Yajaira Solitario NP        aluminum-magnesium hydroxide-simethicone 200-200-20 mg/5 mL suspension 30 mL  30 mL Oral QID PRN Yajaira Solitario NP        cefTRIAXone (ROCEPHIN) 2 g/50 mL D5W IVPB  2 g Intravenous Q24H Jacob Andrea MD   Stopped at 02/02/22 0645    dextrose 10% bolus 125 mL  12.5 g Intravenous PRN Yajaira Solitario, NP         dextrose 10% bolus 250 mL  25 g Intravenous PRN Yajaira T. Mendoza-Dave, NP        ferrous sulfate tablet 1 each  1 tablet Oral Daily Gen Be MD   1 each at 02/02/22 0902    fluconazole tablet 400 mg  400 mg Oral Daily Yajaira T. Mendoza-Dave, NP   400 mg at 02/02/22 0901    glucagon (human recombinant) injection 1 mg  1 mg Intramuscular PRN Yajaira T. Mendoza-Dave, NP        glucose chewable tablet 16 g  16 g Oral PRN Yajaira T. Mendoza-Dave, NP        glucose chewable tablet 24 g  24 g Oral PRN Yajaira T. Mendoza-Dave, NP        guaiFENesin 100 mg/5 ml syrup 200 mg  200 mg Oral Q4H PRN Yajaira T. Mendoza-Dave, NP   200 mg at 02/01/22 2034    melatonin tablet 6 mg  6 mg Oral Nightly PRN Yajaira T. Mendoza-Dave, NP   6 mg at 02/01/22 2033    multivitamin tablet  1 tablet Oral Daily Yajaira T. Mendoza-Dave, NP   1 tablet at 02/02/22 0902    ondansetron injection 4 mg  4 mg Intravenous Q8H PRN Yajaira T. Mendoza-Dave, NP        pantoprazole EC tablet 40 mg  40 mg Oral Before breakfast Yajaira T. Mendoza-Dave, NP   40 mg at 02/02/22 0545    senna-docusate 8.6-50 mg per tablet 1 tablet  1 tablet Oral BID Yajaira T. Mendoza-Dave, NP   1 tablet at 02/02/22 0900    simethicone chewable tablet 80 mg  1 tablet Oral QID PRN Yajaira T. Mendoza-Dave, NP   80 mg at 01/30/22 1350    sodium chloride 0.9% flush 10 mL  10 mL Intravenous Q8H PRN Yajaira T. Mendoza-Dave, NP        thiamine tablet 100 mg  100 mg Oral Daily Yajaira T. Mendoza-Dave, NP   100 mg at 02/02/22 0901     Current Outpatient Medications   Medication Sig Dispense Refill    thiamine 100 MG tablet Take 1 tablet (100 mg total) by mouth once daily. 30 tablet 11    ferrous sulfate (FEOSOL) 325 mg (65 mg iron) Tab tablet Take 1 tablet (325 mg total) by mouth 2 (two) times daily. 60 tablet 2    fluconazole (DIFLUCAN) 200 MG Tab Take 2 tablets (400 mg total) by mouth once daily. for 14 days 28 tablet 0    folic acid (FOLVITE) 1 MG tablet TAKE 1 TABLET(1 MG) BY MOUTH EVERY DAY  (Patient not taking: Reported on 1/28/2022) 30 tablet 2    lactulose (CHRONULAC) 10 gram/15 mL solution Take 15 mLs (10 g total) by mouth 2 (two) times daily. 300 mL 2    multivitamin Tab Take 1 tablet by mouth once daily. 30 tablet 2    pantoprazole (PROTONIX) 40 MG tablet Take 1 tablet (40 mg total) by mouth once daily. 30 tablet 11    propranoloL (INDERAL) 20 MG tablet Take 1 tablet (20 mg total) by mouth 3 (three) times daily. 90 tablet 11     Discharge Medication List as of 2/2/2022 11:30 AM      START taking these medications    Details   ferrous sulfate (FEOSOL) 325 mg (65 mg iron) Tab tablet Take 1 tablet (325 mg total) by mouth 2 (two) times daily., Starting Wed 2/2/2022, Normal      multivitamin Tab Take 1 tablet by mouth once daily., Starting Wed 2/2/2022, Normal         CONTINUE these medications which have CHANGED    Details   fluconazole (DIFLUCAN) 200 MG Tab Take 2 tablets (400 mg total) by mouth once daily. for 14 days, Starting Wed 2/2/2022, Until Wed 2/16/2022, Normal      lactulose (CHRONULAC) 10 gram/15 mL solution Take 15 mLs (10 g total) by mouth 2 (two) times daily., Starting Wed 2/2/2022, Normal      pantoprazole (PROTONIX) 40 MG tablet Take 1 tablet (40 mg total) by mouth once daily., Starting Wed 2/2/2022, Until Thu 2/2/2023, Normal      propranoloL (INDERAL) 20 MG tablet Take 1 tablet (20 mg total) by mouth 3 (three) times daily., Starting Wed 2/2/2022, Until Thu 2/2/2023, Normal         CONTINUE these medications which have NOT CHANGED    Details   thiamine 100 MG tablet Take 1 tablet (100 mg total) by mouth once daily., Starting Sun 1/9/2022, Until Mon 1/9/2023, Normal      folic acid (FOLVITE) 1 MG tablet TAKE 1 TABLET(1 MG) BY MOUTH EVERY DAY, Normal         STOP taking these medications       multivitamin capsule Comments:   Reason for Stopping:         cholecalciferol, vitamin D3, 1,250 mcg (50,000 unit) capsule Comments:   Reason for Stopping:                 I have seen and  examined this patient within the last 30 days. My clinical findings that support the need for the home health skilled services and home bound status are the following:no   Weakness/numbness causing balance and gait disturbance due to Weakness/Debility making it taxing to leave home.     Diet:   regular diet        Referrals/ Consults  Physical Therapy to evaluate and treat. Evaluate for home safety and equipment needs; Establish/upgrade home exercise program. Perform / instruct on therapeutic exercises, gait training, transfer training, and Range of Motion.  Occupational Therapy to evaluate and treat. Evaluate home environment for safety and equipment needs. Perform/Instruct on transfers, ADL training, ROM, and therapeutic exercises.    Activities:   activity as tolerated    Nursing:   Agency to admit patient within 24 hours of hospital discharge unless specified on physician order or at patient request    SN to complete comprehensive assessment including routine vital signs. Instruct on disease process and s/s of complications to report to MD. Review/verify medication list sent home with the patient at time of discharge  and instruct patient/caregiver as needed. Frequency may be adjusted depending on start of care date.     Skilled nurse to perform up to 3 visits PRN for symptoms related to diagnosis    Notify MD if SBP > 160 or < 90; DBP > 90 or < 50; HR > 120 or < 50; Temp > 101; O2 < 88%; Other:       Ok to schedule additional visits based on staff availability and patient request on consecutive days within the home health episode.    When multiple disciplines ordered:    Start of Care occurs on Sunday - Wednesday schedule remaining discipline evaluations as ordered on separate consecutive days following the start of care.    Thursday SOC -schedule subsequent evaluations Friday and Monday the following week.     Friday - Saturday SOC - schedule subsequent discipline evaluations on consecutive days starting  Monday of the following week.    For all post-discharge communication and subsequent orders please contact patient's primary care physician. If unable to reach primary care physician or do not receive response within 30 minutes, please contact  for clinical staff order clarification        Home Health Aide:  Nursing Three times weekly, Physical Therapy Three times weekly and Occupational Therapy Three times weekly        I certify that this patient is confined to his home and needs intermittent skilled nursing care, physical therapy and occupational therapy.

## 2022-02-02 NOTE — PROGRESS NOTES
[facility] Medicine  Telemedicine Progress Note    Patient Name: Nilesh Rolon Jr.  MRN: [id number]  Patient Class: IP- Inpatient   Admission Date: 1/28/2022  Length of Stay: 5 days  Attending Physician: No att. providers found  Primary Care Provider: Bradford Vega DO, DO (Inactive)          Subjective:     Principal Problem:Spontaneous bacterial peritonitis        HPI:  Nilesh Rolon is a 45 y/o male with PMHx Cirrhosis, Liver disease, HTN, chronic alcohol abuse, DNOG, arthritis, and anxiety presents to [facility] ED for evaluation of jaundice and concern for spontaneous bacterial peritonitis. Patient reports he was referred to ED after having an EGD done 8 days ago which show acute esophagitis. Patient reports abdominal distension, lower abdominal pain, sore throat, and one episode of diarrhea this morning. Patient also reports dry cough and continued shortness of breath. Patient denies fever, chills, chest pain, bloody stool, nausea or vomiting. Patient denies hematuria, dysuria, or urinary frequency. Patient reports he has a long history of drinking alcohol but stopped two months ago. Patient was recently seen by Dr. Andrea for EGD on 01/20/22. EGD reveal small (<5mm) esophageal varices with esophageal plaques suspicious for candidiasis which was biopsied. Esophagus biopsy confirmed acute fungal esophagitis, fungal yeast, and pseudohyphae morphologically consistent with Candida spp., confirmed with a properly controlled GMS cytochemical stain. Patient was recently seen by his Hepatologist Dr. Mixon and had a paracentesis on 01/26/22 with 8500ml pleural fluid removed. Patient will be placed into hospital medicine observation services under my care in collaboration with Dr. Saivta Raya.         Overview/Hospital Course:  No notes on file    No new subjective & objective note has been filed under this hospital service since the last note was generated.      Assessment/Plan:       * Spontaneous bacterial peritonitis  Recently had paracentesis on 1/26/22 with 8500ml removed  Pleural fluid cultures concerning for SBP  Patient was referred to ED today to be evaluated   Received Cefoxitin 2g IV in ED x1  Consider repeating paracentesis this week to evaluate for interval change in cell count  IR performed another paracentesis 1/31 w/ removal of 5.3L  give another dose of albumin 8g per L removed  continue with Ceftriaxone 2g IV q24 -all cultures are negative.  DC on discharge  Follow cultures from 1/26 paracentesis    Cough  Reports persistent dry cough  Guaifenesin PRN for cough      Hypokalemia  Patient has hypokalemia which is currently controlled.   Last electrolytes reviewed-   Recent Labs   Lab 02/01/22  0523   K 3.3*   .   Patient received KCL 40mEq PO x1  Will continue to replace potassium and monitor electrolytes closely with daily BMP.   Monitor on telemetry          Anemia  -H/H is 9.0/25.9, which is stable and consistent with previous laboratory measurements. Patient exhibits no signs or symptoms of acute bleeding  -No indication for blood transfusion  -Continue to monitor serial CBC  -Transfuse for Hgb <7 or if symptomatic        Candidiasis of esophagus with fungal esophagitis   Recent EGD on 01/20/22 revealed small (<5mm) esophageal varices with esophageal plaques suspicious for candidiasis which was biopsied. Esophagus biopsy confirmed acute fungal esophagitis, fungal yeast, and pseudohyphae morphologically consistent with Candida spp., confirmed with a properly controlled GMS cytochemical stain  Patient was prescribed antifungals yesterday but has not started course  Will initiate fluconazole 400mg IV x1 then switch to 400mg PO daily   Resume pantoprazole 40mg PO daily      Decompensated hepatic cirrhosis  Alcoholic cirrhosis  Jaundice and ascites on physical exam  Thrombocytopenia, hyponatremia, and hypoalbuminemia on admit labs  PT/INR 17.5/1.7  , AST 44  Received  albumin 25g x 2  Continue symptom management  Follow-up with outpatient hepatology            Alcoholic cirrhosis  See above      Hyponatremia  Na 130 on admit  Likely 2/2 to decompensated cirrhosis  Neuro checks q4hr  Monitor and trend CMP daily; correct electrolytes as needed    History of alcohol abuse  Patient reports cessation of alcohol two months ago  Continue to monitor for s/s of alcohol withdrawal and DTs  Resume home thiamine        Thrombocytopenia  Likely 2/2 to liver disease  Platelets 107k on admit  No active signs of bleeding at this time  Continue to monitor and trend CBC daily      Ascites  See above        VTE Risk Mitigation (From admission, onward)         Ordered     IP VTE HIGH RISK PATIENT  Once         01/28/22 1831     Place sequential compression device  Until discontinued         01/28/22 1831                      I have assessed these finding virtually using telemed platform and with assistance of bedside nurse                 The attending portion of this evaluation, treatment, and documentation was performed per Savita Bianchi MD via Telemedicine AudioVisual using the secure [employer] software platform with 2 way audio/video. The provider was located off-site and the patient is located in the hospital. The aforementioned video software was utilized to document the relevant history and physical exam    Savita Bianchi MD  Department of Hospital Medicine   [facility]

## 2022-02-02 NOTE — ASSESSMENT & PLAN NOTE
Recently had paracentesis on 1/26/22 with 8500ml removed  Pleural fluid cultures concerning for SBP  Patient was referred to ED today to be evaluated   Received Cefoxitin 2g IV in ED x1  Consider repeating paracentesis this week to evaluate for interval change in cell count  IR performed another paracentesis 1/31 w/ removal of 5.3L  give another dose of albumin 8g per L removed  continue with Ceftriaxone 2g IV q24 -all cultures are negative.  DC on discharge  Follow cultures from 1/26 paracentesis

## 2022-02-02 NOTE — PLAN OF CARE
Discharge orders noted. AVS prepared with medication list, importance of medication compliance, follow up appointments, diet, home care instructions, treatment plan, self management, and when to seek medical attention. Detailed clinical reference list attached. AVS will be printed and reviewed with patient by bedside nurse.

## 2022-02-02 NOTE — DISCHARGE SUMMARY
[facility] Medicine  Discharge Summary      Patient Name: Nilesh Rolon Jr.  MRN: [id number]  Patient Class: IP- Inpatient  Admission Date: 1/28/2022  Hospital Length of Stay: 5 days  Discharge Date and Time:  02/02/2022 12:28 PM  Attending Physician: Madeleine att. providers found   Discharging Provider: Savita Bianchi MD  Primary Care Provider: Bradford Vega DO, DO (Inactive)      HPI:   Nilesh Rolon is a 47 y/o male with PMHx Cirrhosis, Liver disease, HTN, chronic alcohol abuse, DONG, arthritis, and anxiety presents to [facility] ED for evaluation of jaundice and concern for spontaneous bacterial peritonitis. Patient reports he was referred to ED after having an EGD done 8 days ago which show acute esophagitis. Patient reports abdominal distension, lower abdominal pain, sore throat, and one episode of diarrhea this morning. Patient also reports dry cough and continued shortness of breath. Patient denies fever, chills, chest pain, bloody stool, nausea or vomiting. Patient denies hematuria, dysuria, or urinary frequency. Patient reports he has a long history of drinking alcohol but stopped two months ago. Patient was recently seen by Dr. Andrea for EGD on 01/20/22. EGD reveal small (<5mm) esophageal varices with esophageal plaques suspicious for candidiasis which was biopsied. Esophagus biopsy confirmed acute fungal esophagitis, fungal yeast, and pseudohyphae morphologically consistent with Candida spp., confirmed with a properly controlled GMS cytochemical stain. Patient was recently seen by his Hepatologist Dr. Mixon and had a paracentesis on 01/26/22 with 8500ml pleural fluid removed. Patient will be placed into hospital medicine observation services under my care in collaboration with Dr. Savita Raya.         * No surgery found *      Hospital Course:   No notes on file     Goals of Care Treatment Preferences:  Code Status: Full Code      Consults:   Consults (From admission,  onward)        Status Ordering Provider     Inpatient virtual consult to Hospital Medicine  Once        Provider:  (Not yet assigned)    Completed ORLY TAVARES     Inpatient consult to Interventional Radiology  Once        Provider:  (Not yet assigned)    Completed SRI MARTI          * Spontaneous bacterial peritonitis  Recently had paracentesis on 1/26/22 with 8500ml removed  Pleural fluid cultures concerning for SBP  Patient was referred to ED today to be evaluated   Received Cefoxitin 2g IV in ED x1  Consider repeating paracentesis this week to evaluate for interval change in cell count  IR performed another paracentesis 1/31 w/ removal of 5.3L  give another dose of albumin 8g per L removed  continue with Ceftriaxone 2g IV q24 -all cultures are negative.  DC on discharge  Follow cultures from 1/26 paracentesis    Cough  Reports persistent dry cough  Guaifenesin PRN for cough      Hypokalemia  Patient has hypokalemia which is currently controlled.   Last electrolytes reviewed-   Recent Labs   Lab 02/01/22  0523   K 3.3*   .   Patient received KCL 40mEq PO x1  Will continue to replace potassium and monitor electrolytes closely with daily BMP.   Monitor on telemetry          Anemia  -H/H is 9.0/25.9, which is stable and consistent with previous laboratory measurements. Patient exhibits no signs or symptoms of acute bleeding  -No indication for blood transfusion  -Continue to monitor serial CBC  -Transfuse for Hgb <7 or if symptomatic        Candidiasis of esophagus with fungal esophagitis   Recent EGD on 01/20/22 revealed small (<5mm) esophageal varices with esophageal plaques suspicious for candidiasis which was biopsied. Esophagus biopsy confirmed acute fungal esophagitis, fungal yeast, and pseudohyphae morphologically consistent with Candida spp., confirmed with a properly controlled GMS cytochemical stain  Patient was prescribed antifungals yesterday but has not started course  Will initiate  fluconazole 400mg IV x1 then switch to 400mg PO daily   Resume pantoprazole 40mg PO daily      Decompensated hepatic cirrhosis  Alcoholic cirrhosis  Jaundice and ascites on physical exam  Thrombocytopenia, hyponatremia, and hypoalbuminemia on admit labs  PT/INR 17.5/1.7  , AST 44  Received albumin 25g x 2  Continue symptom management  Follow-up with outpatient hepatology            Hyponatremia  Na 130 on admit  Likely 2/2 to decompensated cirrhosis  Neuro checks q4hr  Monitor and trend CMP daily; correct electrolytes as needed    History of alcohol abuse  Patient reports cessation of alcohol two months ago  Continue to monitor for s/s of alcohol withdrawal and DTs  Resume home thiamine        Thrombocytopenia  Likely 2/2 to liver disease  Platelets 107k on admit  No active signs of bleeding at this time  Continue to monitor and trend CBC daily        Final Active Diagnoses:    Diagnosis Date Noted POA    PRINCIPAL PROBLEM:  Spontaneous bacterial peritonitis [K65.2] 01/28/2022 Yes    Candidiasis of esophagus with fungal esophagitis  [B37.81] 01/28/2022 Yes    Anemia [D64.9] 01/28/2022 Yes    Hypokalemia [E87.6] 01/28/2022 Yes    Cough [R05.9] 01/28/2022 Yes    Alcoholic cirrhosis [K70.30] 01/26/2021 Yes    Decompensated hepatic cirrhosis [K72.90, K74.60] 01/26/2021 Yes    Thrombocytopenia [D69.6] 04/07/2019 Yes    History of alcohol abuse [F10.11] 04/07/2019 Yes    Hyponatremia [E87.1] 04/07/2019 Yes    Ascites [R18.8] 04/07/2019 Yes      Problems Resolved During this Admission:       Discharged Condition: stable    Disposition: Home or Self Care    Follow Up:   Follow-up Information     Bradford Vega DO, DO In 1 week.    Contact information:  [address] [place] [address] [phone]                       Patient Instructions:      Ambulatory referral/consult to [facility]   Standing Status: Future   Referral Priority: Routine Referral Type:  Consultation   Referral Reason: Specialty Services Required   Number of Visits Requested: 1     Diet Adult Regular           Pending Diagnostic Studies:     None         Medications:  Reconciled Home Medications:      Medication List      START taking these medications    FeroSuL 325 mg (65 mg iron) Tab tablet  Generic drug: ferrous sulfate  Take 1 tablet (325 mg total) by mouth 2 (two) times daily.     THEREMS-M 9 mg iron-400 mcg Tab tablet  Generic drug: multivit-iron-FA-calcium-mins  Take 1 tablet by mouth once daily.  Replaces: multivitamin capsule        CHANGE how you take these medications    fluconazole 200 MG Tab  Commonly known as: DIFLUCAN  Take 2 tablets (400 mg total) by mouth once daily. for 14 days  What changed:   · how much to take  · additional instructions     lactulose 10 gram/15 mL solution  Commonly known as: CHRONULAC  Take 15 mLs (10 g total) by mouth 2 (two) times daily.  What changed: how much to take     pantoprazole 40 MG tablet  Commonly known as: PROTONIX  Take 1 tablet (40 mg total) by mouth once daily.  What changed: when to take this        CONTINUE taking these medications    folic acid 1 MG tablet  Commonly known as: FOLVITE  TAKE 1 TABLET(1 MG) BY MOUTH EVERY DAY     propranoloL 20 MG tablet  Commonly known as: INDERAL  Take 1 tablet (20 mg total) by mouth 3 (three) times daily.     thiamine 100 MG tablet  Take 1 tablet (100 mg total) by mouth once daily.        STOP taking these medications    cholecalciferol (vitamin D3) 1,250 mcg (50,000 unit) capsule     multivitamin capsule  Replaced by: THEREMS-M 9 mg iron-400 mcg Tab tablet            Indwelling Lines/Drains at time of discharge:   Lines/Drains/Airways     None                 Time spent on the discharge of patient: 35 minutes         The attending portion of this evaluation, treatment, and documentation was performed per Savita Bianchi MD via Telemedicine AudioVisual using the secure [email or web address] software platform with  2 way audio/video. The provider was located off-site and the patient is located in the hospital. The aforementioned video software was utilized to document the relevant history and physical exam    Savita Bianchi MD  Department of [facility] Medicine  [facility]

## 2022-02-02 NOTE — ASSESSMENT & PLAN NOTE
Alcoholic cirrhosis  Jaundice and ascites on physical exam  Thrombocytopenia, hyponatremia, and hypoalbuminemia on admit labs  PT/INR 17.5/1.7  , AST 44  Received albumin 25g x 2  Continue symptom management  Follow-up with outpatient hepatology

## 2022-02-02 NOTE — SUBJECTIVE & OBJECTIVE
Interval History:  No acute events overnight. Complained of lower abdominal pain, especially with activity.  S/p repeat paracentesis yesterday with removal of 5.3L. Abdominal pain and distension improving.  Denies fever or chills. Continue Ceftriaxone  Plan to discharge home with home health today    Review of Systems   Constitutional: Negative for chills and fever.   HENT: Negative for hearing loss and sore throat.    Eyes: Negative for visual disturbance.   Respiratory: Negative for cough and shortness of breath.    Cardiovascular: Negative for chest pain and leg swelling.   Gastrointestinal: Positive for abdominal distention (improving). Negative for abdominal pain, diarrhea, nausea and vomiting.   Genitourinary: Negative for difficulty urinating and flank pain.   Musculoskeletal: Negative for back pain.   Skin: Positive for color change. Negative for rash and wound.   Neurological: Negative for dizziness, weakness and headaches.   Psychiatric/Behavioral: Negative for agitation and confusion.     Objective:     Vital Signs (Most Recent):  Temp: 98.1 °F (36.7 °C) (02/02/22 0524)  Pulse: 94 (02/02/22 0524)  Resp: 18 (02/02/22 0524)  BP: 104/62 (02/02/22 0524)  SpO2: 99 % (02/02/22 0524) Vital Signs (24h Range):  Temp:  [97.3 °F (36.3 °C)-98.4 °F (36.9 °C)] 98.1 °F (36.7 °C)  Pulse:  [] 94  Resp:  [18-20] 18  SpO2:  [99 %-100 %] 99 %  BP: ()/(54-73) 104/62     Weight: 111.3 kg (245 lb 6 oz)  Body mass index is 30.67 kg/m².  No intake or output data in the 24 hours ending 02/02/22 0709   Physical Exam  Vitals and nursing note reviewed.   Constitutional:       General: He is not in acute distress.     Appearance: Normal appearance. He is not ill-appearing.   HENT:      Head: Normocephalic and atraumatic.      Mouth/Throat:      Mouth: Mucous membranes are moist.   Eyes:      General: Scleral icterus present.      Extraocular Movements: Extraocular movements intact.      Pupils: Pupils are equal, round, and  reactive to light.      Comments: Mild scleral icterus   Cardiovascular:      Rate and Rhythm: Normal rate and regular rhythm.      Pulses: Normal pulses.      Heart sounds: Normal heart sounds. No murmur heard.  No friction rub. No gallop.    Pulmonary:      Effort: Pulmonary effort is normal. No respiratory distress.      Breath sounds: No wheezing or rhonchi.   Abdominal:      General: Bowel sounds are normal. There is distension (improving).      Palpations: Abdomen is soft.      Tenderness: There is abdominal tenderness (improving). There is no guarding or rebound.   Musculoskeletal:         General: No swelling.      Cervical back: Normal range of motion and neck supple.      Right lower leg: No edema.      Left lower leg: No edema.   Skin:     General: Skin is warm and dry.      Capillary Refill: Capillary refill takes less than 2 seconds.      Coloration: Skin is jaundiced.      Findings: No bruising, erythema or rash.   Neurological:      General: No focal deficit present.      Mental Status: He is alert and oriented to person, place, and time.      GCS: GCS eye subscore is 4. GCS verbal subscore is 5. GCS motor subscore is 6.   Psychiatric:         Attention and Perception: Attention normal.         Mood and Affect: Mood normal.         Speech: Speech normal.         Behavior: Behavior normal. Behavior is cooperative.         Significant Labs:   All pertinent labs within the past 24 hours have been reviewed.  Recent Lab Results     None          Significant Imaging: I have reviewed all pertinent imaging results/findings within the past 24 hours.

## 2022-02-02 NOTE — ASSESSMENT & PLAN NOTE
Patient has hypokalemia which is currently controlled.   Last electrolytes reviewed-   Recent Labs   Lab 02/01/22  0523   K 3.3*   .   Patient received KCL 40mEq PO x1  Will continue to replace potassium and monitor electrolytes closely with daily BMP.   Monitor on telemetry

## 2022-02-02 NOTE — PLAN OF CARE
Plan of care reviewed with the patient. Scheduled medicines given and the patient tolerated well. Given Guaifenesin 5 ml for cough, congestion and melatonin 6mg for sleep upon request. Fall and safety precautions are in place. Patient was not in acute distress in the shift. Advised the patient to call for assistance. Continued monitoring the patient.

## 2022-02-02 NOTE — PROGRESS NOTES
[facility]  Telemedicine Progress Note    Patient Name: Nilesh Rolon Jr.  MRN: [id number]  Patient Class: IP- Inpatient   Admission Date: 1/28/2022  Length of Stay: 4 days  Attending Physician: Savita Bianchi*  Primary Care Provider: Bradford Vega DO, DO (Inactive)          Subjective:     Principal Problem:Spontaneous bacterial peritonitis        HPI:  Nilesh Rolon is a 47 y/o male with PMHx Cirrhosis, Liver disease, HTN, chronic alcohol abuse, DONG, arthritis, and anxiety presents to [facility] ED for evaluation of jaundice and concern for spontaneous bacterial peritonitis. Patient reports he was referred to ED after having an EGD done 8 days ago which show acute esophagitis. Patient reports abdominal distension, lower abdominal pain, sore throat, and one episode of diarrhea this morning. Patient also reports dry cough and continued shortness of breath. Patient denies fever, chills, chest pain, bloody stool, nausea or vomiting. Patient denies hematuria, dysuria, or urinary frequency. Patient reports he has a long history of drinking alcohol but stopped two months ago. Patient was recently seen by Dr. Andrea for EGD on 01/20/22. EGD reveal small (<5mm) esophageal varices with esophageal plaques suspicious for candidiasis which was biopsied. Esophagus biopsy confirmed acute fungal esophagitis, fungal yeast, and pseudohyphae morphologically consistent with Candida spp., confirmed with a properly controlled GMS cytochemical stain. Patient was recently seen by his Hepatologist Dr. Mixon and had a paracentesis on 01/26/22 with 8500ml pleural fluid removed. Patient will be placed into hospital medicine observation services under my care in collaboration with Dr. Savita Raya.         Overview/Hospital Course:  No notes on file    Interval History:  No acute events overnight.  S/p repeat paracentesis yesterday with removal of 5.3L. Abdominal pain and distension  improving.  Denies fever or chills. Continue Ceftriaxone    Review of Systems   Constitutional: Negative for chills and fever.   HENT: Negative for hearing loss and sore throat.    Eyes: Negative for visual disturbance.   Respiratory: Negative for cough and shortness of breath.    Cardiovascular: Negative for chest pain and leg swelling.   Gastrointestinal: Positive for abdominal distention (improving). Negative for abdominal pain, diarrhea, nausea and vomiting.   Genitourinary: Negative for difficulty urinating and flank pain.   Musculoskeletal: Negative for back pain.   Skin: Positive for color change. Negative for rash and wound.   Neurological: Negative for dizziness, weakness and headaches.   Psychiatric/Behavioral: Negative for agitation and confusion.     Objective:     Vital Signs (Most Recent):  Temp: 98.3 °F (36.8 °C) (02/01/22 0733)  Pulse: 96 (02/01/22 0733)  Resp: 20 (02/01/22 0733)  BP: 109/65 (02/01/22 0733)  SpO2: 99 % (02/01/22 0733) Vital Signs (24h Range):  Temp:  [96.3 °F (35.7 °C)-98.4 °F (36.9 °C)] 98.3 °F (36.8 °C)  Pulse:  [] 96  Resp:  [18-20] 20  SpO2:  [98 %-100 %] 99 %  BP: ()/(52-71) 109/65     Weight: 111.3 kg (245 lb 6 oz)  Body mass index is 30.67 kg/m².  No intake or output data in the 24 hours ending 02/01/22 0903   Physical Exam  Vitals and nursing note reviewed.   Constitutional:       General: He is not in acute distress.     Appearance: Normal appearance. He is not ill-appearing.   HENT:      Head: Normocephalic and atraumatic.      Mouth/Throat:      Mouth: Mucous membranes are moist.   Eyes:      General: Scleral icterus present.      Extraocular Movements: Extraocular movements intact.      Pupils: Pupils are equal, round, and reactive to light.      Comments: Mild scleral icterus   Cardiovascular:      Rate and Rhythm: Normal rate and regular rhythm.      Pulses: Normal pulses.      Heart sounds: Normal heart sounds. No murmur heard.  No friction rub. No gallop.     Pulmonary:      Effort: Pulmonary effort is normal. No respiratory distress.      Breath sounds: No wheezing or rhonchi.   Abdominal:      General: Bowel sounds are normal. There is distension (improving).      Palpations: Abdomen is soft.      Tenderness: There is abdominal tenderness (improving). There is no guarding or rebound.   Musculoskeletal:         General: No swelling.      Cervical back: Normal range of motion and neck supple.      Right lower leg: No edema.      Left lower leg: No edema.   Skin:     General: Skin is warm and dry.      Capillary Refill: Capillary refill takes less than 2 seconds.      Coloration: Skin is jaundiced.      Findings: No bruising, erythema or rash.   Neurological:      General: No focal deficit present.      Mental Status: He is alert and oriented to person, place, and time.      GCS: GCS eye subscore is 4. GCS verbal subscore is 5. GCS motor subscore is 6.   Psychiatric:         Attention and Perception: Attention normal.         Mood and Affect: Mood normal.         Speech: Speech normal.         Behavior: Behavior normal. Behavior is cooperative.         Significant Labs:   All pertinent labs within the past 24 hours have been reviewed.  Recent Lab Results       02/01/22  0523   01/31/22  1134        Albumin 3.2   3.2       Alkaline Phosphatase 93   113       ALT 16   17       Anion Gap 9   8       AST 34   35       Baso # 0.02   0.03       Basophil % 0.5   0.8       BILIRUBIN TOTAL 4.3  Comment: For infants and newborns, interpretation of results should be based  on gestational age, weight and in agreement with clinical  observations.    Premature Infant recommended reference ranges:  Up to 24 hours.............<8.0 mg/dL  Up to 48 hours............<12.0 mg/dL  3-5 days..................<15.0 mg/dL  6-29 days.................<15.0 mg/dL     4.7  Comment: For infants and newborns, interpretation of results should be based  on gestational age, weight and in agreement with  clinical  observations.    Premature Infant recommended reference ranges:  Up to 24 hours.............<8.0 mg/dL  Up to 48 hours............<12.0 mg/dL  3-5 days..................<15.0 mg/dL  6-29 days.................<15.0 mg/dL         BUN 6   7       Calcium 8.1   8.0       Chloride 111   107       CO2 17   18       Creatinine 0.8   0.9       Differential Method Automated   Automated       eGFR if  >60   >60       eGFR if non  >60  Comment: Calculation used to obtain the estimated glomerular filtration  rate (eGFR) is the CKD-EPI equation.      >60  Comment: Calculation used to obtain the estimated glomerular filtration  rate (eGFR) is the CKD-EPI equation.          Eos # 0.1   0.1       Eosinophil % 2.0   1.6       Glucose 103   118       Gran # (ANC) 2.2   2.3       Gran % 56.1   59.7       Hematocrit 21.5   22.2       Hemoglobin 7.7   7.5       Immature Grans (Abs) 0.02  Comment: Mild elevation in immature granulocytes is non specific and   can be seen in a variety of conditions including stress response,   acute inflammation, trauma and pregnancy. Correlation with other   laboratory and clinical findings is essential.     0.01  Comment: Mild elevation in immature granulocytes is non specific and   can be seen in a variety of conditions including stress response,   acute inflammation, trauma and pregnancy. Correlation with other   laboratory and clinical findings is essential.         Immature Granulocytes 0.5   0.3       Lymph # 1.1   0.9       Lymph % 28.9   23.8       Magnesium 1.8   1.9       MCH 32.2   31.5       MCHC 35.8   33.8       MCV 90   93       Mono # 0.5   0.5       Mono % 12.0   13.8       MPV 9.4   9.1       nRBC 0   0       Platelets 63   62       Potassium 3.3   3.4       PROTEIN TOTAL 5.2   5.4       RBC 2.39   2.38       RDW 16.8   17.1       Sodium 137   133       WBC 3.91   3.83             Significant Imaging: I have reviewed all pertinent imaging  results/findings within the past 24 hours.      Assessment/Plan:      * Spontaneous bacterial peritonitis  Recently had paracentesis on 1/26/22 with 8500ml removed  Pleural fluid cultures concerning for SBP  Patient was referred to ED today to be evaluated   Received Cefoxitin 2g IV in ED x1  Consider repeating paracentesis this week to evaluate for interval change in cell count  IR performed another paracentesis 1/31 w/ removal of 5.3L  Will give another dose of albumin 8g per L removed  Will continue with Ceftriaxone 2g IV q24   Follow cultures from 1/26 paracentesis    Cough  Reports persistent dry cough  Guaifenesin PRN for cough      Hypokalemia  Patient has hypokalemia which is currently controlled.   Last electrolytes reviewed-   Recent Labs   Lab 01/30/22  0543 01/31/22  1134   K 3.3* 3.4*   .   Patient received KCL 40mEq PO x1  Will continue to replace potassium and monitor electrolytes closely with daily BMP.   Monitor on telemetry          Anemia  -H/H is 9.0/25.9, which is stable and consistent with previous laboratory measurements. Patient exhibits no signs or symptoms of acute bleeding  -No indication for blood transfusion  -Continue to monitor serial CBC  -Transfuse for Hgb <7 or if symptomatic        Candidiasis of esophagus with fungal esophagitis   Recent EGD on 01/20/22 revealed small (<5mm) esophageal varices with esophageal plaques suspicious for candidiasis which was biopsied. Esophagus biopsy confirmed acute fungal esophagitis, fungal yeast, and pseudohyphae morphologically consistent with Candida spp., confirmed with a properly controlled GMS cytochemical stain  Patient was prescribed antifungals yesterday but has not started course  Will initiate fluconazole 400mg IV x1 then switch to 400mg PO daily   Resume pantoprazole 40mg PO daily      Decompensated hepatic cirrhosis  Alcoholic cirrhosis  Jaundice and ascites on physical exam  Thrombocytopenia, hyponatremia, and hypoalbuminemia on admit  labs  PT/INR 17.5/1.7  , AST 44  Received albumin 25g x 2  Continue symptom management            Alcoholic cirrhosis  See above      Hyponatremia  Na 130 on admit  Likely 2/2 to decompensated cirrhosis  Neuro checks q4hr  Monitor and trend CMP daily; correct electrolytes as needed    History of alcohol abuse  Patient reports cessation of alcohol two months ago  Continue to monitor for s/s of alcohol withdrawal and DTs  Resume home thiamine        Thrombocytopenia  Likely 2/2 to liver disease  Platelets 107k on admit  No active signs of bleeding at this time  Continue to monitor and trend CBC daily      Ascites  See above        VTE Risk Mitigation (From admission, onward)         Ordered     IP VTE HIGH RISK PATIENT  Once         01/28/22 1831     Place sequential compression device  Until discontinued         01/28/22 1831                      I have assessed these finding virtually using telemed platform and with assistance of bedside nurse                 The attending portion of this evaluation, treatment, and documentation was performed per Savita Bianchi MD via Telemedicine AudioVisual using the secure [employer] software platform with 2 way audio/video. The provider was located off-site and the patient is located in the hospital. The aforementioned video software was utilized to document the relevant history and physical exam    Savita Bianchi MD  Department of Hospital Medicine   [place] - Telemetry

## 2022-02-03 ENCOUNTER — TELEPHONE (OUTPATIENT)
Dept: PRIMARY CARE CLINIC | Facility: CLINIC | Age: 47
End: 2022-02-03
Payer: MEDICAID

## 2022-02-03 PROBLEM — F10.939 ALCOHOL WITHDRAWAL: Status: RESOLVED | Noted: 2019-04-07 | Resolved: 2022-02-03

## 2022-02-03 PROBLEM — K74.60 HEPATIC CIRRHOSIS: Status: RESOLVED | Noted: 2022-01-19 | Resolved: 2022-02-03

## 2022-02-03 PROBLEM — R79.1 SUPRATHERAPEUTIC INR: Status: RESOLVED | Noted: 2021-12-04 | Resolved: 2022-02-03

## 2022-02-03 PROBLEM — E87.6 HYPOKALEMIA: Status: RESOLVED | Noted: 2022-01-28 | Resolved: 2022-02-03

## 2022-02-03 PROBLEM — R56.9 ALCOHOL WITHDRAWAL SEIZURE WITHOUT COMPLICATION: Status: RESOLVED | Noted: 2020-07-23 | Resolved: 2022-02-03

## 2022-02-03 PROBLEM — E87.1 HYPONATREMIA: Status: RESOLVED | Noted: 2019-04-07 | Resolved: 2022-02-03

## 2022-02-03 PROBLEM — K72.90 DECOMPENSATED HEPATIC CIRRHOSIS: Status: RESOLVED | Noted: 2021-01-26 | Resolved: 2022-02-03

## 2022-02-03 PROBLEM — K74.60 DECOMPENSATED HEPATIC CIRRHOSIS: Status: RESOLVED | Noted: 2021-01-26 | Resolved: 2022-02-03

## 2022-02-03 PROBLEM — F10.931 DELIRIUM TREMENS: Status: RESOLVED | Noted: 2019-10-24 | Resolved: 2022-02-03

## 2022-02-03 PROBLEM — F10.20 ALCOHOL USE DISORDER, SEVERE, DEPENDENCE: Status: RESOLVED | Noted: 2021-12-04 | Resolved: 2022-02-03

## 2022-02-03 PROBLEM — N17.9 AKI (ACUTE KIDNEY INJURY): Status: RESOLVED | Noted: 2021-12-29 | Resolved: 2022-02-03

## 2022-02-03 PROBLEM — F10.930 ALCOHOL WITHDRAWAL SEIZURE WITHOUT COMPLICATION: Status: RESOLVED | Noted: 2020-07-23 | Resolved: 2022-02-03

## 2022-02-03 PROBLEM — K65.2 SPONTANEOUS BACTERIAL PERITONITIS: Status: RESOLVED | Noted: 2022-01-28 | Resolved: 2022-02-03

## 2022-02-03 PROBLEM — R05.9 COUGH: Status: RESOLVED | Noted: 2022-01-28 | Resolved: 2022-02-03

## 2022-02-03 PROBLEM — K70.31 ALCOHOLIC CIRRHOSIS OF LIVER WITH ASCITES: Status: ACTIVE | Noted: 2021-01-26

## 2022-02-03 LAB
BACTERIA BLD CULT: NORMAL
BACTERIA BLD CULT: NORMAL
BACTERIA SPEC AEROBE CULT: NO GROWTH

## 2022-02-03 NOTE — PROGRESS NOTES
[facility]   New Visit Progress Note   Recent Hospital Discharge     PRESENTING HISTORY     Chief Complaint/Reason for Admission:  Follow up Hospital Discharge   PCP: Bradford Vega DO, DO (Inactive)    History of Present Illness:  Mr. Nilesh Rolon Jr. is a 46 y.o. male who was recently admitted to the hospital.    [facility] Medicine  Discharge Summary        Patient Name: Nilesh Rolon Jr.  MRN: [id number]  Patient Class: IP- Inpatient  Admission Date: 1/28/2022  Hospital Length of Stay: 5 days  Discharge Date and Time:  02/02/2022 12:28 PM  Attending Physician: Madeleine att. providers found   Discharging Provider: Savita Bianchi MD  Primary Care Provider: Bradford Vega DO, DO (Inactive)  ___________________________________________________________________    Today:  P[facility] to [facility] for initial hospital follow up.  Recently hospitalized for evaluation of jaundice and concern for spontaneous bacterial peritonitis.   Hx significant for EtOH abuse, quit ~ 2 months prior.  Recent outpatient EGD ( 1/20/22) with small esophageal varices, and Candida spp. esophagitis - confirmed by biopsy.   Patient underwent outpatient paracentesis ( 1/26/22) removal ~ 8500 ml.   Patient presented to [facility] ED on 1/28/22 with complaints of abdominal pain/distention/sore throat/diarrhea.   Patient admitted to [facility].   Pleural fluid cultures reviewed from outpatient paracentesis on 1/26/22-> concerning for SBP.  Empiric Rocephin initiated.  Repeat paracentesis performed in hospital (1/31/22) 5.3 L removed, fluid sent for analysis and culture.   Final peritoneal fluid cultures from 1/26/22 - No growth.  Final peritoneal fluid cultures from 1/31/22- Aerobic Cx No growth, Anaerobic Cx- in progress.   Blood Cx 1/28/22- no growth x 5 days.   Rocephin discontinued.  Oral Fluconazole initiated for esophageal candidiasis.   Patient discharged to home.      Patient accompanied today by his wife.  Ambulatory and independent with ADL's.  Reports compliance with all medication.  Propanolol removed from [facility] as family says he was taken off months ago (marginal blood pressure) and it should not have been restarted at hospital discharge.  Compliant with Lactulose- having ~ 4 bowel movements daily.     C/O ABD distention- has IR drainage scheduled for 2/9/22 but would like to get it done sooner- he will call [facility] to change schedule.    Dry cough reported- worse after meals and when supine.    Review of Systems  General ROS: negative for chills, fever or weight loss  Psychological ROS: negative for hallucination, depression or suicidal ideation  Ophthalmic ROS: negative for blurry vision, photophobia or eye pain  ENT ROS: negative for epistaxis, sore throat or rhinorrhea  Respiratory ROS: + cough,  No shortness of breath, or wheezing  Cardiovascular ROS: no chest pain or dyspnea on exertion  Gastrointestinal ROS: + abdominal distention and  abdominal pain, no change in bowel habits, or black/ bloody stools  + bleeding hemorrhoids   Genito-Urinary ROS: no dysuria, trouble voiding, or hematuria  Musculoskeletal ROS: negative for gait disturbance or muscular weakness  Neurological ROS: no syncope or seizures; no ataxia  Dermatological ROS: negative for pruritis, rash and jaundice      PAST HISTORY:     Past Medical History:   Diagnosis Date    DONG (acute kidney injury) 12/1/2021    Anxiety     Arthritis     Cirrhosis     Hypertension     Liver disease     Osteoarthritis     Other meniscus derangements, other medial meniscus, left knee 1/7/2019       Past Surgical History:   Procedure Laterality Date    ARTHROSCOPY OF KNEE Left 1/7/2019    Procedure: ARTHROSCOPY, KNEE;  Surgeon: Derick Kirby MD;  Location: [facility] OR;  Service: Orthopedics;  Laterality: Left;    COLONOSCOPY N/A 7/27/2020    Procedure: COLONOSCOPY;  Surgeon: Sotero Zapata MD;  Location:  [facility] ENDO;  Service: Endoscopy;  Laterality: N/A;    COLONOSCOPY N/A 1/20/2022    Procedure: COLONOSCOPY Suprep Vaccinated;  Surgeon: Jacob Andrea MD;  Location: [facility];  Service: Endoscopy;  Laterality: N/A;    ESOPHAGOGASTRODUODENOSCOPY N/A 4/8/2019    Procedure: EGD (ESOPHAGOGASTRODUODENOSCOPY);  Surgeon: Bonita eKndall MD;  Location: [facility];  Service: Endoscopy;  Laterality: N/A;    ESOPHAGOGASTRODUODENOSCOPY N/A 7/27/2020    Procedure: EGD (ESOPHAGOGASTRODUODENOSCOPY);  Surgeon: Sotero Zapata MD;  Location: [facility];  Service: Endoscopy;  Laterality: N/A;    ESOPHAGOGASTRODUODENOSCOPY N/A 12/2/2021    Procedure: EGD (ESOPHAGOGASTRODUODENOSCOPY);  Surgeon: Montez Guerra MD;  Location: [facility] ([address] [place] [address]);  Service: Endoscopy;  Laterality: N/A;    ESOPHAGOGASTRODUODENOSCOPY N/A 1/20/2022    Procedure: EGD (ESOPHAGOGASTRODUODENOSCOPY);  Surgeon: Jacob Andrea MD;  Location: [facility];  Service: Endoscopy;  Laterality: N/A;  please order CBC with plts and INR day of case.    KNEE SURGERY         Family History   Problem Relation Age of Onset    Birth defects Neg Hx          MEDICATIONS & ALLERGIES:     Current Outpatient Medications on File Prior to Visit   Medication Sig Dispense Refill    ferrous sulfate (FEOSOL) 325 mg (65 mg iron) Tab tablet Take 1 tablet (325 mg total) by mouth 2 (two) times daily. 60 tablet 2    fluconazole (DIFLUCAN) 200 MG Tab Take 2 tablets (400 mg total) by mouth once daily. for 14 days 28 tablet 0    folic acid (FOLVITE) 1 MG tablet TAKE 1 TABLET(1 MG) BY MOUTH EVERY DAY (Patient not taking: Reported on 1/28/2022) 30 tablet 2    lactulose (CHRONULAC) 10 gram/15 mL solution Take 15 mLs (10 g total) by mouth 2 (two) times daily. 300 mL 2    multivitamin Tab Take 1 tablet by mouth once daily. 30 tablet 2    pantoprazole (PROTONIX) 40 MG tablet Take 1 tablet (40 mg total) by mouth once daily. 30 tablet 11    propranoloL (INDERAL) 20 MG  tablet Take 1 tablet (20 mg total) by mouth 3 (three) times daily. 90 tablet 11    thiamine 100 MG tablet Take 1 tablet (100 mg total) by mouth once daily. 30 tablet 11     Current Facility-Administered Medications on File Prior to Visit   Medication Dose Route Frequency Provider Last Rate Last Admin    [DISCONTINUED] acetaminophen tablet 650 mg  650 mg Oral Q4H PRN Yajaira T. MARILIN Solitario        [DISCONTINUED] aluminum-magnesium hydroxide-simethicone 200-200-20 mg/5 mL suspension 30 mL  30 mL Oral QID PRN Yajaira T. MARILIN Solitario        [DISCONTINUED] cefTRIAXone (ROCEPHIN) 2 g/50 mL D5W IVPB  2 g Intravenous Q24H Jacob Andrea MD   Stopped at 02/02/22 0645    [DISCONTINUED] dextrose 10% bolus 125 mL  12.5 g Intravenous PRN Yajaira T. MARILIN Solitario        [DISCONTINUED] dextrose 10% bolus 250 mL  25 g Intravenous PRN Yajaira T. MARILIN Solitario        [DISCONTINUED] ferrous sulfate tablet 1 each  1 tablet Oral Daily Gen Be MD   1 each at 02/02/22 0902    [DISCONTINUED] fluconazole tablet 400 mg  400 mg Oral Daily Yajaira T. MARILIN Solitario   400 mg at 02/02/22 0901    [DISCONTINUED] glucagon (human recombinant) injection 1 mg  1 mg Intramuscular PRN Yajaira T. MARILIN Solitario        [DISCONTINUED] glucose chewable tablet 16 g  16 g Oral PRN Yajaira T. Kelly-MARILIN Acosta        [DISCONTINUED] glucose chewable tablet 24 g  24 g Oral PRN Yajaira T. MARILIN Solitario        [DISCONTINUED] guaiFENesin 100 mg/5 ml syrup 200 mg  200 mg Oral Q4H PRN Yajaira T. MARILIN Solitario   200 mg at 02/01/22 2034    [DISCONTINUED] melatonin tablet 6 mg  6 mg Oral Nightly PRN Yajaira T. Kelly-MARILIN Acosta   6 mg at 02/01/22 2033    [DISCONTINUED] multivitamin tablet  1 tablet Oral Daily Yajaira T. Kelly-Dave, NP   1 tablet at 02/02/22 0902    [DISCONTINUED] ondansetron injection 4 mg  4 mg Intravenous Q8H PRN Yajaira T. MARILIN Solitario        [DISCONTINUED] pantoprazole EC tablet 40 mg  40 mg Oral Before breakfast Yajaira T.  "Mendoza-Dave, NP   40 mg at 02/02/22 0545    [DISCONTINUED] senna-docusate 8.6-50 mg per tablet 1 tablet  1 tablet Oral BID Yajaira T. Kelly-Dave, NP   1 tablet at 02/02/22 0900    [DISCONTINUED] simethicone chewable tablet 80 mg  1 tablet Oral QID PRN Yajaira T. Mendoza-Dave, NP   80 mg at 01/30/22 1350    [DISCONTINUED] sodium chloride 0.9% flush 10 mL  10 mL Intravenous Q8H PRN Yajaira LESLIE Mendoza-Dave, NP        [DISCONTINUED] thiamine tablet 100 mg  100 mg Oral Daily Yajaira LESLIE Mendoza-Dave, NP   100 mg at 02/02/22 0901        Review of patient's allergies indicates:  No Known Allergies    OBJECTIVE:     Vital Signs:  /78   Pulse 94   Temp 97.8 °F (36.6 °C)   Ht 6' 3" (1.905 m)   Wt 112.8 kg (248 lb 9.1 oz)   SpO2 99%   BMI 31.07 kg/m²   Wt Readings from Last 3 Encounters:   01/29/22 0827 111.3 kg (245 lb 6 oz)   01/29/22 0213 121.5 kg (267 lb 13.7 oz)   01/29/22 0200 121.5 kg (267 lb 13.7 oz)   01/29/22 0101 121.5 kg (267 lb 13.7 oz)   01/24/22 1329 121.5 kg (267 lb 13.7 oz)   01/20/22 1303 115.7 kg (255 lb)     There is no height or weight on file to calculate BMI.        Physical Exam:  /78   Pulse 94   Temp 97.8 °F (36.6 °C)   Ht 6' 3" (1.905 m)   Wt 112.8 kg (248 lb 9.1 oz)   SpO2 99%   BMI 31.07 kg/m²   General appearance: alert, cooperative, no distress  Constitutional:Oriented to person, place, and time  + Jaundice    HEENT: Normocephalic, atraumatic, neck symmetrical, no nasal discharge   Eyes: conjunctivae/corneas clear, PERRL, EOM's intact  Lungs: clear to auscultation bilaterally, no dullness to percussion bilaterally  Heart: regular rate and rhythm without rub; no displacement of the PMI   Abdomen: + distended, tense ascites;  non-tender; bowel sounds normoactive  Extremities: extremities symmetric; no clubbing, cyanosis, + trace pre tibial edema   Integument: Skin  texture, turgor normal; no rashes; hair distrubution normal  Neurologic: Alert and oriented X 3, normal strength, " normal coordination and gait  Psychiatric: no pressured speech; normal affect; no evidence of impaired cognition     Laboratory  Lab Results   Component Value Date    WBC 3.91 02/01/2022    HGB 7.7 (L) 02/01/2022    HCT 21.5 (L) 02/01/2022    MCV 90 02/01/2022    PLT 63 (L) 02/01/2022     BMP  Lab Results   Component Value Date     02/01/2022    K 3.3 (L) 02/01/2022     (H) 02/01/2022    CO2 17 (L) 02/01/2022    BUN 6 02/01/2022    CREATININE 0.8 02/01/2022    CALCIUM 8.1 (L) 02/01/2022    ANIONGAP 9 02/01/2022    ESTGFRAFRICA >60 02/01/2022    EGFRNONAA >60 02/01/2022     Lab Results   Component Value Date    ALT 16 02/01/2022    AST 34 02/01/2022    ALKPHOS 93 02/01/2022    BILITOT 4.3 (H) 02/01/2022     Lab Results   Component Value Date    INR 1.7 (H) 01/28/2022    INR 1.8 (H) 01/26/2022    INR 1.7 (H) 01/19/2022     Lab Results   Component Value Date    HGBA1C 4.0 12/07/2021       ASSESSMENT & PLAN:       Alcoholic cirrhosis of liver with ascites  - recent hospitalization as above  - see Hepatology team 2/15/22  - continue current medication regimen  - IR paracentesis as scheduled  - labs today   -     Protime-INR; Future; Expected date: 02/04/2022  -     CBC Auto Differential; Future; Expected date: 02/05/2022  -     Comprehensive Metabolic Panel; Future; Expected date: 02/04/2022    Candidiasis of esophagus with fungal esophagitis   - continue oral Fluconazole as directed     History of alcohol abuse  - now abstinent     Chronic liver failure without hepatic coma  - family requesting Nutrition consult   -     Ambulatory referral/consult to Nutrition Services; Future; Expected date: 02/11/2022    Cough  - etiology unclear, concern for laryngeal reflux  - continue PPI  - GERD instructions/education provided    Hemorrhoids   - evaluated by CRS team Jan 2022  - team deferred banding due to risk of post procedure complications  - patient inquiring about sclerotherapy which was thought to be an option-  I have messaged CRS team for more information       [facility] will [facility] released from [facility].  He will see his PCP, Dr Bradford Vega, as scheduled in March 2022.     Instructions for the patient:      Scheduled Follow-up :  Future Appointments   Date Time Provider Department Center   2/9/2022 10:30 AM [facility] IR1 WB RAD IR [facility]   2/15/2022 10:30 AM Pb Mixon MD [facility] HEPAT [address]   2/16/2022 11:00 AM [facility] IR1 WB RAD IR [facility]   2/23/2022  9:30 AM [facility] RAD IR [facility]   3/2/2022 11:00 AM [facility] IR1 WB RAD IR [facility]       Post Visit Medication List:     Medication List          Accurate as of February 4, 2022  9:52 AM. If you have any questions, ask your nurse or doctor.            CONTINUE taking these medications    FeroSuL 325 mg (65 mg iron) Tab tablet  Generic drug: ferrous sulfate  Take 1 tablet (325 mg total) by mouth 2 (two) times daily.     fluconazole 200 MG Tab  Commonly known as: DIFLUCAN  Take 2 tablets (400 mg total) by mouth once daily. for 14 days     folic acid 1 MG tablet  Commonly known as: FOLVITE  TAKE 1 TABLET(1 MG) BY MOUTH EVERY DAY     lactulose 10 gram/15 mL solution  Commonly known as: CHRONULAC  Take 15 mLs (10 g total) by mouth 2 (two) times daily.     pantoprazole 40 MG tablet  Commonly known as: PROTONIX  Take 1 tablet (40 mg total) by mouth once daily.     THEREMS-M 9 mg iron-400 mcg Tab tablet  Generic drug: multivit-iron-FA-calcium-mins  Take 1 tablet by mouth once daily.     thiamine 100 MG tablet  Take 1 tablet (100 mg total) by mouth once daily.            Signing Physician:  Tala Arroyo MD

## 2022-02-04 ENCOUNTER — LAB VISIT (OUTPATIENT)
Dept: LAB | Facility: HOSPITAL | Age: 47
End: 2022-02-04
Attending: INTERNAL MEDICINE
Payer: MEDICAID

## 2022-02-04 ENCOUNTER — PATIENT OUTREACH (OUTPATIENT)
Dept: ADMINISTRATIVE | Facility: CLINIC | Age: 47
End: 2022-02-04
Payer: MEDICAID

## 2022-02-04 ENCOUNTER — OFFICE VISIT (OUTPATIENT)
Dept: PRIMARY CARE CLINIC | Facility: CLINIC | Age: 47
End: 2022-02-04
Payer: MEDICAID

## 2022-02-04 VITALS
HEART RATE: 94 BPM | TEMPERATURE: 98 F | BODY MASS INDEX: 30.9 KG/M2 | OXYGEN SATURATION: 99 % | HEIGHT: 75 IN | SYSTOLIC BLOOD PRESSURE: 122 MMHG | DIASTOLIC BLOOD PRESSURE: 78 MMHG | WEIGHT: 248.56 LBS

## 2022-02-04 DIAGNOSIS — K70.31 ALCOHOLIC CIRRHOSIS OF LIVER WITH ASCITES: Primary | ICD-10-CM

## 2022-02-04 DIAGNOSIS — K70.31 ALCOHOLIC CIRRHOSIS OF LIVER WITH ASCITES: ICD-10-CM

## 2022-02-04 DIAGNOSIS — K72.10 CHRONIC LIVER FAILURE WITHOUT HEPATIC COMA: ICD-10-CM

## 2022-02-04 DIAGNOSIS — K64.9 HEMORRHOIDS, UNSPECIFIED HEMORRHOID TYPE: ICD-10-CM

## 2022-02-04 DIAGNOSIS — R05.9 COUGH: ICD-10-CM

## 2022-02-04 DIAGNOSIS — B37.81 CANDIDIASIS OF ESOPHAGUS: ICD-10-CM

## 2022-02-04 DIAGNOSIS — F10.11 HISTORY OF ALCOHOL ABUSE: ICD-10-CM

## 2022-02-04 LAB
ALBUMIN SERPL BCP-MCNC: 3.4 G/DL (ref 3.5–5.2)
ALP SERPL-CCNC: 106 U/L (ref 55–135)
ALT SERPL W/O P-5'-P-CCNC: 17 U/L (ref 10–44)
ANION GAP SERPL CALC-SCNC: 10 MMOL/L (ref 8–16)
AST SERPL-CCNC: 46 U/L (ref 10–40)
BASOPHILS # BLD AUTO: 0.03 K/UL (ref 0–0.2)
BASOPHILS NFR BLD: 0.6 % (ref 0–1.9)
BILIRUB SERPL-MCNC: 5.6 MG/DL (ref 0.1–1)
BUN SERPL-MCNC: 8 MG/DL (ref 6–20)
CALCIUM SERPL-MCNC: 9 MG/DL (ref 8.7–10.5)
CHLORIDE SERPL-SCNC: 108 MMOL/L (ref 95–110)
CO2 SERPL-SCNC: 19 MMOL/L (ref 23–29)
CREAT SERPL-MCNC: 1 MG/DL (ref 0.5–1.4)
DIFFERENTIAL METHOD: ABNORMAL
EOSINOPHIL # BLD AUTO: 0.1 K/UL (ref 0–0.5)
EOSINOPHIL NFR BLD: 1.9 % (ref 0–8)
ERYTHROCYTE [DISTWIDTH] IN BLOOD BY AUTOMATED COUNT: 17.6 % (ref 11.5–14.5)
EST. GFR  (AFRICAN AMERICAN): >60 ML/MIN/1.73 M^2
EST. GFR  (NON AFRICAN AMERICAN): >60 ML/MIN/1.73 M^2
GLUCOSE SERPL-MCNC: 96 MG/DL (ref 70–110)
HCT VFR BLD AUTO: 25.4 % (ref 40–54)
HGB BLD-MCNC: 8.8 G/DL (ref 14–18)
IMM GRANULOCYTES # BLD AUTO: 0.01 K/UL (ref 0–0.04)
IMM GRANULOCYTES NFR BLD AUTO: 0.2 % (ref 0–0.5)
INR PPP: 1.8 (ref 0.8–1.2)
LYMPHOCYTES # BLD AUTO: 1.6 K/UL (ref 1–4.8)
LYMPHOCYTES NFR BLD: 33.6 % (ref 18–48)
MCH RBC QN AUTO: 32 PG (ref 27–31)
MCHC RBC AUTO-ENTMCNC: 34.6 G/DL (ref 32–36)
MCV RBC AUTO: 92 FL (ref 82–98)
MONOCYTES # BLD AUTO: 0.5 K/UL (ref 0.3–1)
MONOCYTES NFR BLD: 11.4 % (ref 4–15)
NEUTROPHILS # BLD AUTO: 2.5 K/UL (ref 1.8–7.7)
NEUTROPHILS NFR BLD: 52.3 % (ref 38–73)
NRBC BLD-RTO: 0 /100 WBC
PLATELET # BLD AUTO: 90 K/UL (ref 150–450)
PMV BLD AUTO: 10 FL (ref 9.2–12.9)
POTASSIUM SERPL-SCNC: 4.2 MMOL/L (ref 3.5–5.1)
PROT SERPL-MCNC: 6.3 G/DL (ref 6–8.4)
PROTHROMBIN TIME: 17.8 SEC (ref 9–12.5)
RBC # BLD AUTO: 2.75 M/UL (ref 4.6–6.2)
SODIUM SERPL-SCNC: 137 MMOL/L (ref 136–145)
WBC # BLD AUTO: 4.73 K/UL (ref 3.9–12.7)

## 2022-02-04 PROCEDURE — 80053 COMPREHEN METABOLIC PANEL: CPT | Performed by: INTERNAL MEDICINE

## 2022-02-04 PROCEDURE — 3008F BODY MASS INDEX DOCD: CPT | Mod: CPTII,,, | Performed by: INTERNAL MEDICINE

## 2022-02-04 PROCEDURE — 85610 PROTHROMBIN TIME: CPT | Performed by: INTERNAL MEDICINE

## 2022-02-04 PROCEDURE — 3078F PR MOST RECENT DIASTOLIC BLOOD PRESSURE < 80 MM HG: ICD-10-PCS | Mod: CPTII,,, | Performed by: INTERNAL MEDICINE

## 2022-02-04 PROCEDURE — 3074F SYST BP LT 130 MM HG: CPT | Mod: CPTII,,, | Performed by: INTERNAL MEDICINE

## 2022-02-04 PROCEDURE — 1111F PR DISCHARGE MEDS RECONCILED W/ CURRENT OUTPATIENT MED LIST: ICD-10-PCS | Mod: CPTII,,, | Performed by: INTERNAL MEDICINE

## 2022-02-04 PROCEDURE — 3074F PR MOST RECENT SYSTOLIC BLOOD PRESSURE < 130 MM HG: ICD-10-PCS | Mod: CPTII,,, | Performed by: INTERNAL MEDICINE

## 2022-02-04 PROCEDURE — 99215 PR OFFICE/OUTPT VISIT, EST, LEVL V, 40-54 MIN: ICD-10-PCS | Mod: S$PBB,,, | Performed by: INTERNAL MEDICINE

## 2022-02-04 PROCEDURE — 99215 OFFICE O/P EST HI 40 MIN: CPT | Mod: S$PBB,,, | Performed by: INTERNAL MEDICINE

## 2022-02-04 PROCEDURE — 99214 OFFICE O/P EST MOD 30 MIN: CPT | Mod: PBBFAC,PO | Performed by: INTERNAL MEDICINE

## 2022-02-04 PROCEDURE — 36415 COLL VENOUS BLD VENIPUNCTURE: CPT | Performed by: INTERNAL MEDICINE

## 2022-02-04 PROCEDURE — 1111F DSCHRG MED/CURRENT MED MERGE: CPT | Mod: CPTII,,, | Performed by: INTERNAL MEDICINE

## 2022-02-04 PROCEDURE — 99999 PR PBB SHADOW E&M-EST. PATIENT-LVL IV: ICD-10-PCS | Mod: PBBFAC,,, | Performed by: INTERNAL MEDICINE

## 2022-02-04 PROCEDURE — 3008F PR BODY MASS INDEX (BMI) DOCUMENTED: ICD-10-PCS | Mod: CPTII,,, | Performed by: INTERNAL MEDICINE

## 2022-02-04 PROCEDURE — 99999 PR PBB SHADOW E&M-EST. PATIENT-LVL IV: CPT | Mod: PBBFAC,,, | Performed by: INTERNAL MEDICINE

## 2022-02-04 PROCEDURE — 1159F MED LIST DOCD IN RCRD: CPT | Mod: CPTII,,, | Performed by: INTERNAL MEDICINE

## 2022-02-04 PROCEDURE — 3078F DIAST BP <80 MM HG: CPT | Mod: CPTII,,, | Performed by: INTERNAL MEDICINE

## 2022-02-04 PROCEDURE — 85025 COMPLETE CBC W/AUTO DIFF WBC: CPT | Performed by: INTERNAL MEDICINE

## 2022-02-04 PROCEDURE — 1159F PR MEDICATION LIST DOCUMENTED IN MEDICAL RECORD: ICD-10-PCS | Mod: CPTII,,, | Performed by: INTERNAL MEDICINE

## 2022-02-04 NOTE — PATIENT INSTRUCTIONS
Patient Education       Peritonitis Discharge Instructions   About this topic   You have a thin lining on all sides of your belly called the peritoneum. It also covers many of the organs in your belly. If this thin lining becomes swollen and sore, you have peritonitis. You may have an infection as well. Peritonitis is a serious problem and can be dangerous if not treated.  What care is needed at home?   · Ask your doctor what you need to do when you go home. Make sure you ask questions if you do not understand what the doctor says. This way you will know what you need to do.  · If you do peritoneal dialysis, you may have to switch to some other type of dialysis while you heal.  · Take extra care if you are doing peritoneal dialysis. Wash your hands before touching your catheter. Wear a mask and use antiseptic solution to clean skin around the catheter. Ask your doctor for tips on how to care for your dialysis tube.  · If you had surgery, talk to your doctor about how to care for your cut site. Ask your doctor about:  ? When you should change your bandages  ? When you may take a bath or shower  ? If you need to be careful with lifting things over 10 pounds (4.5 kg)  ? When you may go back to your normal activities like work or driving  ? If you need to have staples or stitches removed  · Be sure to wash your hands before and after touching your wound or dressing.  · Take extra care if you have a feeding tube. Wash your hands before touching the tube. Clean the skin around your tube daily. Ask your doctor for tips on how to care for your feeding tube.  What follow-up care is needed?   Your doctor may ask you to make visits to the office to check on your progress. Be sure to keep these visits. Your doctor may send you to an expert. You may need to see a stomach doctor called a gastroenterologist. Other times, your doctor may have you visit a surgeon.  What drugs may be needed?   The doctor may order drugs to:  · Fight  an infection  · Prevent a new infection  · Keep extra fluid out of your body  · Help with pain  Will physical activity be limited?   Your activity may be limited until you are feeling better. If you had surgery, you may need to rest for some time while you heal. Ask your doctor what is safe for you to do.  What problems could happen?   · Infection  · Abscess  · Dead bowel tissue  · Adhesions or scar tissue  · Liver or kidney problems  · Infection moves to the bloodstream  · Shock  · Death  What can be done to prevent this health problem?   · Wash your hands before you touch or care for a tube that goes in your belly. Take extra care with dialysis catheters or feeding tubes.  · Clean the skin around your catheter or tube daily.  · If you have liver disease and the fluid called ascites, you may get a drug to stop peritonitis from coming back.  When do I need to call the doctor?   · Signs of infection. These include a fever of 100.4°F (38°C) or higher, chills, wound that will not heal.  · Confusion or disorientation  · Belly is more sore, bigger, or more firm  · Upset stomach, throwing up, or loose stools  · Signs of wound infection. These include swelling, redness, warmth around the wound; too much pain when touched; yellowish, greenish, or bloody discharge; foul smell coming from the cut site; cut site opens up.  · Not passing as much urine as normal  · Having no bowel movements at all and not passing gas  · If you are on peritoneal dialysis and you notice cloudy fluid or fluid with chunks in it  · You are not feeling better in 2 to 3 days or you are feeling worse  Teach Back: Helping You Understand   The Teach Back Method helps you understand the information we are giving you. The idea is simple. After talking with the staff, tell them in your own words what you were just told. This helps to make sure the staff has covered each thing clearly. It also helps to explain things that may have been a bit confusing. Before  going home, make sure you are able to do these:  · I can tell you about my condition.  · I can tell you how to care for my cut site, if I have one.  · I can tell you what changes I need to make with my diet.  · I can tell you what I will do if I have a fever or swelling, redness, or warmth around my wound.  Where can I learn more?   [employer]  https://www.betterhealth.[place].gov.[place]/health/ConditionsAndTreatments/peritonitis   NHS Choices  http://www.nhs.uk/conditions/peritonitis/pages/introduction.aspx   Last Reviewed Date   2019-07-05  Consumer Information Use and Disclaimer   This information is not specific medical advice and does not replace information you receive from your health care provider. This is only a brief summary of general information. It does NOT include all information about conditions, illnesses, injuries, tests, procedures, treatments, therapies, discharge instructions or life-style choices that may apply to you. You must talk with your health care provider for complete information about your health and treatment options. This information should not be used to decide whether or not to accept your health care providers advice, instructions or recommendations. Only your health care provider has the knowledge and training to provide advice that is right for you.  Copyright   Copyright © 2021 [employer] and its affiliates and/or licensors. All rights reserved.  Adrianna teaching reviewed with Nilesh Rolon Jr.   . Nilesh Rolon Jr.   verbalized understanding.    Education was provided based on the patient's discharge diagnosis using the attached Adrianna patient education as a reference.

## 2022-02-04 NOTE — PATIENT INSTRUCTIONS
Patient Education       Acid Reflux and GERD in Adults Discharge Instructions   About this topic   GERD stands for gastroesophageal reflux disease. It is sometimes called reflux or acid reflux. Acid reflux happens when your stomach acid backs up into your esophagus, the tube that carries your food from your mouth to your stomach. This can be uncomfortable. You may have stomach or chest pain (heartburn), trouble swallowing, or an upset stomach. Some people have a cough or sore throat.  Most of the time, you can use over-the-counter medicines to help with this problem.       What care is needed at home?   · Ask your doctor what you need to do when you go home. Make sure you ask questions if you do not understand what the doctor says.  · Raise the head of your bed by 6 to 8 inches (15 to 20 cm). Use wood or rubber blocks under 2 legs or try a foam wedge under your mattress. Just sleeping with your head raised on pillows is not enough.  · Avoid beer, wine, and mixed drinks and avoid caffeine.  · Keep a healthy weight. If you are too heavy, lose weight.  · If you smoke, try to quit. Your doctor or nurse can help.  · Keep a diary of your signs. Write down what you had to eat before you had reflux. This will help you learn which foods cause you problems. For some people, they need to avoid coffee, chocolate, alcohol, spicy or fatty foods, or peppermint.  · Avoid eating for 2 to 3 hours before bedtime. Lying down after you eat can make reflux worse.  · Avoid belts and clothes that are too tight.  What follow-up care is needed?   Your doctor may ask you to make visits to the office to check on your progress. Be sure to keep these visits.  What drugs may be needed?   The doctor may order drugs to:  · Relieve heartburn  · Prevent reflux  · Lessen acid production  · Heal the esophageal lining  Will physical activity be limited?   Your physical activities will not be limited.  What problems could happen?    · Asthma  · Precancerous changes in the food pipe  · Long-term cough  · Dental problems  · Higher risk of cancer of the food pipe. This is esophageal cancer.  · Narrowing of the food pipe. This is a stricture.  · Open sore in the food pipe. This is an ulcer.  When do I need to call the doctor?   · You have signs of a heart attack, which may include:  ? Severe chest pain, pressure, or discomfort with:  § Breathing trouble, sweating, upset stomach, or cold, clammy skin.  § Pain in your arms, back, or jaw.  § Worse pain with activity like walking up stairs.  § Fast or irregular heartbeat.  § Feeling dizzy, faint, or weak.  · You have sudden, severe belly pain or the belly pain is constant.  · You have blood in the undigested food and acid that comes up, or stool that looks red, black, or like tar.  · You feel like your food gets stuck or you have pain when you swallow.  · You lose weight when you are not trying to.  · You choke when you are eating.  · Your reflux is very bad, very frequent, or not helped by over-the-counter medicines.  · You keep throwing up.  Teach Back: Helping You Understand   The Teach Back Method helps you understand the information we are giving you. After you talk with the staff, tell them in your own words what you learned. This helps to make sure the staff has described each thing clearly. It also helps to explain things that may have been confusing. Before going home, make sure you can do these:  · I can tell you about my condition.  · I can tell you what changes I need to make with my eating habits to ease the reflux.  · I can tell you what I will do if I am throwing up fluid that looks like blood or coffee grounds.  Where can I learn more?   [employer]  https://familydoctor.org/condition/refluxacid-reflux/   [facility] Choices  https://www.nhs.uk/conditions/heartburn-and-acid-reflux/   Last Reviewed Date   2021-06-09  Consumer Information Use and Disclaimer   This  information is not specific medical advice and does not replace information you receive from your health care provider. This is only a brief summary of general information. It does NOT include all information about conditions, illnesses, injuries, tests, procedures, treatments, therapies, discharge instructions or life-style choices that may apply to you. You must talk with your health care provider for complete information about your health and treatment options. This information should not be used to decide whether or not to accept your health care providers advice, instructions or recommendations. Only your health care provider has the knowledge and training to provide advice that is right for you.  Copyright   Copyright © 2021 [employer] and its affiliates and/or licensors. All rights reserved.  Patient Education       Acid Reflux, Adult and Adolescent ED   General Information   You came to the Emergency Department (ED) for acid reflux. Doctors sometimes call this gastroesophageal reflux or GERD. Acid reflux happens when your stomach acid backs up into your esophagus, the tube that carries your food from your mouth to your stomach. This can be uncomfortable. You may have stomach or chest pain (heartburn), trouble swallowing, or an upset stomach. Some people have a cough or sore throat.  Most of the time, you can use over-the-counter medicines to help with this problem.  What care is needed at home?   · Call your regular doctor to let them know you were in the ED. Make a follow-up appointment if you were told to.  · Raise the head of your bed by 6 to 8 inches (15 to 20 cm). Use wood or rubber blocks under 2 legs or try a foam wedge under your mattress. Just sleeping with your head raised on pillows is not enough.  · Avoid beer, wine, and mixed drinks and avoid caffeine.  · Keep a healthy weight. If you are too heavy, lose weight.  · If you smoke, try to quit. Your doctor or nurse can help.  · Keep a diary of  your signs. Write down what you had to eat before you had reflux. This will help you learn which foods cause you problems. For some people, they need to avoid coffee, chocolate, alcohol, spicy or fatty foods, or peppermint.  · Avoid eating for 2 to 3 hours before bedtime. Lying down after you eat can make reflux worse.  · Avoid belts and clothes that are too tight.  When do I need to get emergency help?   · Call for an ambulance right away if:   ? You have signs of a heart attack, which may include:  § Severe chest pain, pressure, or discomfort with:  § Breathing trouble, sweating, upset stomach, or cold, clammy skin.  § Pain in your arms, back, or jaw.  § Worse pain with activity like walking up stairs.  § Fast or irregular heartbeat.  § Feeling dizzy, faint, or weak.  ? You have sudden, severe belly pain or the belly pain is constant.  · Return to the ED if:   ? You have blood in the undigested food and acid that comes up, or stool that looks red, black, or like tar.  When do I need to call the doctor?   · You feel like your food gets stuck or you have pain when you swallow.  · You lose weight when you are not trying to.  · You choke when you are eating.  · Your reflux is very bad, very frequent, or not helped by over-the-counter medicines.  · You keep throwing up.  · You have new or worsening symptoms.  Last Reviewed Date   2021-03-05  Consumer Information Use and Disclaimer   This information is not specific medical advice and does not replace information you receive from your health care provider. This is only a brief summary of general information. It does NOT include all information about conditions, illnesses, injuries, tests, procedures, treatments, therapies, discharge instructions or life-style choices that may apply to you. You must talk with your health care provider for complete information about your health and treatment options. This information should not be used to decide whether or not to accept  your health care providers advice, instructions or recommendations. Only your health care provider has the knowledge and training to provide advice that is right for you.  Copyright   Copyright © 2021 [employer] and its affiliates and/or licensors. All rights reserved.

## 2022-02-04 NOTE — PROGRESS NOTES
C3 nurse spoke with Nilesh Rolon Jr.   for a [facility] post hospital discharge follow up call. The patient has a scheduled HOSFU appointment with Tala Arroyo on 2/4/2022 @ 830AM.

## 2022-02-07 ENCOUNTER — PES CALL (OUTPATIENT)
Dept: HOME HEALTH SERVICES | Facility: CLINIC | Age: 47
End: 2022-02-07
Payer: MEDICAID

## 2022-02-07 LAB — BACTERIA SPEC ANAEROBE CULT: NORMAL

## 2022-02-08 ENCOUNTER — PES CALL (OUTPATIENT)
Dept: HOME HEALTH SERVICES | Facility: CLINIC | Age: 47
End: 2022-02-08
Payer: MEDICAID

## 2022-02-09 ENCOUNTER — HOSPITAL ENCOUNTER (OUTPATIENT)
Dept: INTERVENTIONAL RADIOLOGY/VASCULAR | Facility: HOSPITAL | Age: 47
Discharge: HOME | End: 2022-02-09
Attending: INTERNAL MEDICINE
Payer: MEDICAID

## 2022-02-09 VITALS
OXYGEN SATURATION: 100 % | DIASTOLIC BLOOD PRESSURE: 56 MMHG | SYSTOLIC BLOOD PRESSURE: 99 MMHG | TEMPERATURE: 98 F | HEART RATE: 70 BPM | RESPIRATION RATE: 18 BRPM

## 2022-02-09 DIAGNOSIS — K74.60 DECOMPENSATED HEPATIC CIRRHOSIS: ICD-10-CM

## 2022-02-09 DIAGNOSIS — K72.90 DECOMPENSATED HEPATIC CIRRHOSIS: ICD-10-CM

## 2022-02-09 DIAGNOSIS — K70.31 ASCITES DUE TO ALCOHOLIC CIRRHOSIS: Primary | ICD-10-CM

## 2022-02-09 PROCEDURE — A7048 VACUUM DRAIN BOTTLE/TUBE KIT: HCPCS

## 2022-02-09 PROCEDURE — 49083 IR PARACENTESIS WITH IMAGING: ICD-10-PCS | Mod: ,,, | Performed by: RADIOLOGY

## 2022-02-09 PROCEDURE — 49083 ABD PARACENTESIS W/IMAGING: CPT | Performed by: RADIOLOGY

## 2022-02-09 PROCEDURE — 63600175 PHARM REV CODE 636 W HCPCS: Mod: JG | Performed by: RADIOLOGY

## 2022-02-09 PROCEDURE — C1729 CATH, DRAINAGE: HCPCS

## 2022-02-09 PROCEDURE — P9047 ALBUMIN (HUMAN), 25%, 50ML: HCPCS | Mod: JG | Performed by: RADIOLOGY

## 2022-02-09 RX ORDER — ALBUMIN HUMAN 250 G/1000ML
25 SOLUTION INTRAVENOUS ONCE AS NEEDED
Status: COMPLETED | OUTPATIENT
Start: 2022-02-09 | End: 2022-02-09

## 2022-02-09 RX ADMIN — ALBUMIN (HUMAN) 25 G: 5 SOLUTION INTRAVENOUS at 11:02

## 2022-02-09 NOTE — PROCEDURES
[facility] Interventional Radiology  Interventional Radiology  High Risk Procedure - Outpatient    Date: 02/09/2022 Time: 11:01 AM    Pre-Op Diagnosis: Recurrent ascites    Post-Op Diagnosis: same    Procedure Performed by: Richie Acuña MD    Assistant: none    Procedure: U/S guided paracentesis    Specimen/Tissue Removed: 2400 mL of clear yellow ascites    Estimated Blood Loss: Less than 5 mL    Procedure Note/Findings: U/S guided paracentesis via right lateral abdominal approach with 5 Yoruba centesis needle. No immediate poost-procedure complications noted.            Please refer to dictated report for additional details.

## 2022-02-09 NOTE — DISCHARGE INSTRUCTIONS
BATHING:  ? You may shower tomorrow.  DRESSING:  ? Remove dressing tomorrow.        ACTIVITY LEVEL: If you have received sedation or an anesthetic, you may feel sleepy for several hours. Rest until you are more awake. Gradually resume your normal activities    Do not drive, drink alcohol, or sign legal documents for 24 hours, or if taking narcotic pain medication.      DIET: You may resume your home diet. If nausea is present, increase your diet gradually with fluids and bland foods.    Medications: Pain medication should be taken only if needed and as directed. If antibiotics are prescribed, the medication should be taken until completed. You will be given an updated list of you medications.  ? No driving, alcoholic beverages or signing legal documents for next 24 hours if you have had sedation, or while taking pain medication    CALL THE DOCTOR:   For any obvious bleeding (some dried blood over the incision is normal).     Redness, swelling, foul smell around incision or fever over 101.  Shortness of breath.  Persistent pain or nausea not relieved by medication.  Call  [phone]     to speak with an Interventional Radiologist    If any unusual problems or difficulties occur contact your doctor. If you cannot contact your doctor but feel your signs and symptoms warrant a physicians attention return to the emergency room.         Patient Education       Abdominal Paracentesis Discharge Instructions   About this topic   The belly has the liver, stomach, bowels and other organs in it. In most cases there is only a small amount of fluid in the belly. But, some illnesses cause fluid to collect in the belly. This is called ascites. Too much fluid in the belly may happen from injury, infection, liver disease, or some kinds of cancers.  An abdominal paracentesis is a procedure done to get rid of the extra fluid in the belly. It may also help your doctor learn the cause of the extra fluid.  What care is needed at home?    · Ask your doctor what you need to do when you go home. Make sure you ask questions if you do not understand what the doctor says. This way you will know what you need to do.  · Talk to your doctor about how to care for your drainage site. Ask your doctor about:  ? When you should change your bandages. Fluid may still come out from the site.  ? When you may take a bath or shower  ? If you need to be careful with lifting things over 10 pounds (4.5 kg)  ? When you may go back to your normal activities like work or driving  · Be sure to wash your hands before and after touching your wound or dressing.  · Ask your doctor if you need a low salt diet.  · Based on the reason for the ascites, your doctor may tell you not to drink beer, wine, or mixed drinks (alcohol).  · The doctor may ask you to measure your belly with a tape measure and weigh yourself each day.  What follow-up care is needed?   Your doctor may ask you to make visits to the office to check on your progress. Be sure to keep these visits.  What drugs may be needed?   Your doctor may order drugs to:  · Get rid of extra fluid  · Build up the protein in your blood  Will physical activity be limited?   You may have to limit your activity for a short time. Talk to your doctor about the right amount of activity for you.  What problems could happen?   · Infection  · Bleeding  · Injury to an organ near where fluid was removed, such as the liver, spleen, or other organs  · Drop in blood pressure due to the amount of fluid that was removed  When do I need to call the doctor?   · Signs of infection. These include fever of 100.4°F (38°C) or higher, chills.  · Very bad belly pain  · Trouble breathing  · Blood in the urine  · Bleeding or fluid leak from the site does not stop in 24 to 48 hours  Teach Back: Helping You Understand   The Teach Back Method helps you understand the information we are giving you. After you talk with the staff, tell them in your own words what  you learned. This helps to make sure the staff has described each thing clearly. It also helps to explain things that may have been confusing. Before going home, make sure you can do these:  · I can tell you about my procedure.  · I can tell you how to care for my drainage site.  · I can tell you what I will do if I have a fever, chills, blood in my urine, belly pain, or trouble breathing.  Last Reviewed Date   2021-02-09  Consumer Information Use and Disclaimer   This information is not specific medical advice and does not replace information you receive from your health care provider. This is only a brief summary of general information. It does NOT include all information about conditions, illnesses, injuries, tests, procedures, treatments, therapies, discharge instructions or life-style choices that may apply to you. You must talk with your health care provider for complete information about your health and treatment options. This information should not be used to decide whether or not to accept your health care providers advice, instructions or recommendations. Only your health care provider has the knowledge and training to provide advice that is right for you.  Copyright   Copyright © 2021 [employer], [employer] and its affiliates and/or licensors. All rights reserved.      Fall Prevention  Millions of people fall every year and injure themselves. You may have had anesthesia or sedation which may increase your risk of falling. You may have health issues that put you at an increased risk of falling.     Here are ways to reduce your risk of falling.  ·   · Make your home safe by keeping walkways clear of objects you may trip over.  · Use non-slip pads under rugs. Do not use area rugs or small throw rugs.  · Use non-slip mats in bathtubs and showers.  · Install handrails and lights on staircases.  · Do not walk in poorly lit areas.  · Do not stand on chairs or wobbly ladders.  · Use caution when reaching overhead or  looking upward. This position can cause a loss of balance.  · Be sure your shoes fit properly, have non-slip bottoms and are in good condition.   · Wear shoes both inside and out. Avoid going barefoot or wearing slippers.  · Be cautious when going up and down stairs, curbs, and when walking on uneven sidewalks.  · If your balance is poor, consider using a cane or walker.  · If your fall was related to alcohol use, stop or limit alcohol intake.   · If your fall was related to use of sleeping medicines, talk to your doctor about this. You may need to reduce your dosage at bedtime if you awaken during the night to go to the bathroom.    · To reduce the need for nighttime bathroom trips:  ¨ Avoid drinking fluids for several hours before going to bed  ¨ Empty your bladder before going to bed  ¨ Men can keep a urinal at the bedside  · Stay as active as you can. Balance, flexibility, strength, and endurance all come from exercise. They all play a role in preventing falls. Ask your healthcare provider which types of activity are right for you.  · Get your vision checked on a regular basis.  · If you have pets, know where they are before you stand up or walk so you don't trip over them.  · Use night lights.

## 2022-02-09 NOTE — DISCHARGE SUMMARY
Radiology Discharge Summary      Admit date: 2/9/2022 10:26 AM  Discharge date: February 9, 2022    Instructions Given to patient: YesVerbal    Diet: Regular    Activity:NO Restrictions    Medications on discharge (List): Refer to Discharge Medication List    Hospital Course: U/S guided paracentesis    Description of Condition on Discharge: stable    Discharge Disposition: Home    Discharge Diagnosis: Recurrent ascites    Discharge Procedure Orders   Diet general     Call MD for:  redness, tenderness, or signs of infection (pain, swelling, redness, odor or green/yellow discharge around incision site)     Call MD for:  temperature >100.4     Call MD for:  severe uncontrolled pain     Remove dressing in 24 hours     Activity as tolerated

## 2022-02-09 NOTE — H&P
[facility] - Interventional Radiology  History & Physical - Short Stay  Interventional Radiology    SUBJECTIVE:     Chief Complaint/Reason for Admission: Recurrent ascites    Informant(s):  self and Electronic Health Record    History of Present Illness:  Nilesh Rolon Jr. is a 46 y.o. male with a history of recurrent ascites.    Patient presents for paracentesis.    Scheduled Meds:   Continuous Infusions:   PRN Meds:     Review of patient's allergies indicates:  No Known Allergies    Past Medical History:   Diagnosis Date    DONG (acute kidney injury) 12/1/2021    Anxiety     Arthritis     Cirrhosis     Hypertension     Liver disease     Osteoarthritis     Other meniscus derangements, other medial meniscus, left knee 1/7/2019     Past Surgical History:   Procedure Laterality Date    ARTHROSCOPY OF KNEE Left 1/7/2019    Procedure: ARTHROSCOPY, KNEE;  Surgeon: Derick Kirby MD;  Location[facility];  Service: Orthopedics;  Laterality: Left;    COLONOSCOPY N/A 7/27/2020    Procedure: COLONOSCOPY;  Surgeon: Sotero Zapata MD;  Location: [facility];  Service: Endoscopy;  Laterality: N/A;    COLONOSCOPY N/A 1/20/2022    Procedure: COLONOSCOPY Suprep Vaccinated;  Surgeon: Jacob Andrea MD;  Location: [facility];  Service: Endoscopy;  Laterality: N/A;    ESOPHAGOGASTRODUODENOSCOPY N/A 4/8/2019    Procedure: EGD (ESOPHAGOGASTRODUODENOSCOPY);  Surgeon: Bonita Kendall MD;  Location: [facility];  Service: Endoscopy;  Laterality: N/A;    ESOPHAGOGASTRODUODENOSCOPY N/A 7/27/2020    Procedure: EGD (ESOPHAGOGASTRODUODENOSCOPY);  Surgeon: Sotero Zapata MD;  Location: [facility];  Service: Endoscopy;  Laterality: N/A;    ESOPHAGOGASTRODUODENOSCOPY N/A 12/2/2021    Procedure: EGD (ESOPHAGOGASTRODUODENOSCOPY);  Surgeon: Montez Guerra MD;  [facility] [place] [address]);  Service: Endoscopy;  Laterality: N/A;    ESOPHAGOGASTRODUODENOSCOPY N/A 1/20/2022    Procedure: EGD (ESOPHAGOGASTRODUODENOSCOPY);   Surgeon: Jacob Andrea MD;  Location: [facility];  Service: Endoscopy;  Laterality: N/A;  please order CBC with plts and INR day of case.    KNEE SURGERY       Family History   Problem Relation Age of Onset    Birth defects Neg Hx      Social History     Tobacco Use    Smoking status: Never Smoker    Smokeless tobacco: Never Used    Tobacco comment: smokes Marijuana only   Substance Use Topics    Alcohol use: Not Currently     Comment: last drink 11/28, drinks 3-4 a day    Drug use: No        Review of Systems:  ROS not obtained.    OBJECTIVE:     Vital Signs (Most Recent):  BP: (!) 100/55 (02/09/22 1056)    Physical Exam:  alert and oriented    Laboratory  CBC:   Lab Results   Component Value Date/Time    WBC 4.73 02/04/2022 09:40 AM    RBC 2.75 (L) 02/04/2022 09:40 AM    HGB 8.8 (L) 02/04/2022 09:40 AM    HCT 25.4 (L) 02/04/2022 09:40 AM    HCT 31 (L) 11/30/2021 10:26 AM    PLT 90 (L) 02/04/2022 09:40 AM    MCV 92 02/04/2022 09:40 AM    MCH 32.0 (H) 02/04/2022 09:40 AM    MCHC 34.6 02/04/2022 09:40 AM     Coagulation:   Lab Results   Component Value Date/Time    INR 1.8 (H) 02/04/2022 09:40 AM    APTT 29.9 04/07/2019 04:09 AM       ASSESSMENT/PLAN:     Ascites.    Patient will undergo paracentesis.    Sedation Plan: Lidocaine local only

## 2022-02-10 ENCOUNTER — PATIENT MESSAGE (OUTPATIENT)
Dept: HEPATOLOGY | Facility: CLINIC | Age: 47
End: 2022-02-10
Payer: MEDICAID

## 2022-02-10 DIAGNOSIS — K70.31 ALCOHOLIC CIRRHOSIS OF LIVER WITH ASCITES: Primary | ICD-10-CM

## 2022-02-12 ENCOUNTER — PES CALL (OUTPATIENT)
Dept: HOME HEALTH SERVICES | Facility: CLINIC | Age: 47
End: 2022-02-12
Payer: MEDICAID

## 2022-02-15 ENCOUNTER — LAB VISIT (OUTPATIENT)
Dept: LAB | Facility: HOSPITAL | Age: 47
End: 2022-02-15
Attending: INTERNAL MEDICINE
Payer: MEDICAID

## 2022-02-15 ENCOUNTER — OFFICE VISIT (OUTPATIENT)
Dept: HEPATOLOGY | Facility: CLINIC | Age: 47
End: 2022-02-15
Attending: INTERNAL MEDICINE
Payer: MEDICAID

## 2022-02-15 ENCOUNTER — LAB VISIT (OUTPATIENT)
Dept: LAB | Facility: HOSPITAL | Age: 47
End: 2022-02-15
Payer: MEDICAID

## 2022-02-15 ENCOUNTER — PATIENT MESSAGE (OUTPATIENT)
Dept: HEPATOLOGY | Facility: CLINIC | Age: 47
End: 2022-02-15

## 2022-02-15 VITALS
TEMPERATURE: 96 F | WEIGHT: 246.94 LBS | HEART RATE: 67 BPM | HEIGHT: 75 IN | SYSTOLIC BLOOD PRESSURE: 103 MMHG | RESPIRATION RATE: 18 BRPM | OXYGEN SATURATION: 100 % | DIASTOLIC BLOOD PRESSURE: 62 MMHG | BODY MASS INDEX: 30.7 KG/M2

## 2022-02-15 DIAGNOSIS — K70.31 ALCOHOLIC CIRRHOSIS OF LIVER WITH ASCITES: ICD-10-CM

## 2022-02-15 DIAGNOSIS — K70.31 ALCOHOLIC CIRRHOSIS OF LIVER WITH ASCITES: Primary | ICD-10-CM

## 2022-02-15 DIAGNOSIS — K72.90 DECOMPENSATED HEPATIC CIRRHOSIS: ICD-10-CM

## 2022-02-15 DIAGNOSIS — K74.60 DECOMPENSATED HEPATIC CIRRHOSIS: ICD-10-CM

## 2022-02-15 DIAGNOSIS — K70.10 ALCOHOLIC HEPATITIS WITHOUT ASCITES: ICD-10-CM

## 2022-02-15 DIAGNOSIS — E66.09 CLASS 1 OBESITY DUE TO EXCESS CALORIES WITH SERIOUS COMORBIDITY AND BODY MASS INDEX (BMI) OF 33.0 TO 33.9 IN ADULT: ICD-10-CM

## 2022-02-15 LAB
ALBUMIN SERPL BCP-MCNC: 2.9 G/DL (ref 3.5–5.2)
ALBUMIN SERPL BCP-MCNC: 3.1 G/DL (ref 3.5–5.2)
ALP SERPL-CCNC: 103 U/L (ref 55–135)
ALP SERPL-CCNC: 121 U/L (ref 55–135)
ALT SERPL W/O P-5'-P-CCNC: 14 U/L (ref 10–44)
ALT SERPL W/O P-5'-P-CCNC: 16 U/L (ref 10–44)
ANION GAP SERPL CALC-SCNC: 10 MMOL/L (ref 8–16)
ANION GAP SERPL CALC-SCNC: 7 MMOL/L (ref 8–16)
AST SERPL-CCNC: 49 U/L (ref 10–40)
AST SERPL-CCNC: 53 U/L (ref 10–40)
BASOPHILS # BLD AUTO: 0.01 K/UL (ref 0–0.2)
BASOPHILS # BLD AUTO: 0.02 K/UL (ref 0–0.2)
BASOPHILS NFR BLD: 0.2 % (ref 0–1.9)
BASOPHILS NFR BLD: 0.4 % (ref 0–1.9)
BILIRUB SERPL-MCNC: 4.8 MG/DL (ref 0.1–1)
BILIRUB SERPL-MCNC: 5.7 MG/DL (ref 0.1–1)
BUN SERPL-MCNC: 5 MG/DL (ref 6–20)
BUN SERPL-MCNC: 7 MG/DL (ref 6–20)
CALCIUM SERPL-MCNC: 8 MG/DL (ref 8.7–10.5)
CALCIUM SERPL-MCNC: 9.1 MG/DL (ref 8.7–10.5)
CHLORIDE SERPL-SCNC: 106 MMOL/L (ref 95–110)
CHLORIDE SERPL-SCNC: 109 MMOL/L (ref 95–110)
CO2 SERPL-SCNC: 19 MMOL/L (ref 23–29)
CO2 SERPL-SCNC: 21 MMOL/L (ref 23–29)
CREAT SERPL-MCNC: 1 MG/DL (ref 0.5–1.4)
CREAT SERPL-MCNC: 1 MG/DL (ref 0.5–1.4)
DIFFERENTIAL METHOD: ABNORMAL
DIFFERENTIAL METHOD: ABNORMAL
EOSINOPHIL # BLD AUTO: 0.1 K/UL (ref 0–0.5)
EOSINOPHIL # BLD AUTO: 0.1 K/UL (ref 0–0.5)
EOSINOPHIL NFR BLD: 2.2 % (ref 0–8)
EOSINOPHIL NFR BLD: 2.8 % (ref 0–8)
ERYTHROCYTE [DISTWIDTH] IN BLOOD BY AUTOMATED COUNT: 17.4 % (ref 11.5–14.5)
ERYTHROCYTE [DISTWIDTH] IN BLOOD BY AUTOMATED COUNT: 17.8 % (ref 11.5–14.5)
EST. GFR  (AFRICAN AMERICAN): >60 ML/MIN/1.73 M^2
EST. GFR  (AFRICAN AMERICAN): >60 ML/MIN/1.73 M^2
EST. GFR  (NON AFRICAN AMERICAN): >60 ML/MIN/1.73 M^2
EST. GFR  (NON AFRICAN AMERICAN): >60 ML/MIN/1.73 M^2
GLUCOSE SERPL-MCNC: 110 MG/DL (ref 70–110)
GLUCOSE SERPL-MCNC: 111 MG/DL (ref 70–110)
HCT VFR BLD AUTO: 26.8 % (ref 40–54)
HCT VFR BLD AUTO: 27.2 % (ref 40–54)
HGB BLD-MCNC: 8.9 G/DL (ref 14–18)
HGB BLD-MCNC: 9.5 G/DL (ref 14–18)
IMM GRANULOCYTES # BLD AUTO: 0.01 K/UL (ref 0–0.04)
IMM GRANULOCYTES # BLD AUTO: 0.02 K/UL (ref 0–0.04)
IMM GRANULOCYTES NFR BLD AUTO: 0.2 % (ref 0–0.5)
IMM GRANULOCYTES NFR BLD AUTO: 0.5 % (ref 0–0.5)
INR PPP: 1.7 (ref 0.8–1.2)
INR PPP: 1.8 (ref 0.8–1.2)
LYMPHOCYTES # BLD AUTO: 1.9 K/UL (ref 1–4.8)
LYMPHOCYTES # BLD AUTO: 2.3 K/UL (ref 1–4.8)
LYMPHOCYTES NFR BLD: 44.5 % (ref 18–48)
LYMPHOCYTES NFR BLD: 50.1 % (ref 18–48)
MCH RBC QN AUTO: 30.6 PG (ref 27–31)
MCH RBC QN AUTO: 31.9 PG (ref 27–31)
MCHC RBC AUTO-ENTMCNC: 33.2 G/DL (ref 32–36)
MCHC RBC AUTO-ENTMCNC: 34.9 G/DL (ref 32–36)
MCV RBC AUTO: 91 FL (ref 82–98)
MCV RBC AUTO: 92 FL (ref 82–98)
MONOCYTES # BLD AUTO: 0.4 K/UL (ref 0.3–1)
MONOCYTES # BLD AUTO: 0.4 K/UL (ref 0.3–1)
MONOCYTES NFR BLD: 9.4 % (ref 4–15)
MONOCYTES NFR BLD: 9.4 % (ref 4–15)
NEUTROPHILS # BLD AUTO: 1.7 K/UL (ref 1.8–7.7)
NEUTROPHILS # BLD AUTO: 1.8 K/UL (ref 1.8–7.7)
NEUTROPHILS NFR BLD: 37.1 % (ref 38–73)
NEUTROPHILS NFR BLD: 43.2 % (ref 38–73)
NRBC BLD-RTO: 0 /100 WBC
NRBC BLD-RTO: 0 /100 WBC
PLATELET # BLD AUTO: 70 K/UL (ref 150–450)
PLATELET # BLD AUTO: 73 K/UL (ref 150–450)
PMV BLD AUTO: 10.8 FL (ref 9.2–12.9)
PMV BLD AUTO: 10.9 FL (ref 9.2–12.9)
POTASSIUM SERPL-SCNC: 3.9 MMOL/L (ref 3.5–5.1)
POTASSIUM SERPL-SCNC: 4.1 MMOL/L (ref 3.5–5.1)
PROT SERPL-MCNC: 6.1 G/DL (ref 6–8.4)
PROT SERPL-MCNC: 6.3 G/DL (ref 6–8.4)
PROTHROMBIN TIME: 17.2 SEC (ref 9–12.5)
PROTHROMBIN TIME: 17.6 SEC (ref 9–12.5)
RBC # BLD AUTO: 2.91 M/UL (ref 4.6–6.2)
RBC # BLD AUTO: 2.98 M/UL (ref 4.6–6.2)
SODIUM SERPL-SCNC: 135 MMOL/L (ref 136–145)
SODIUM SERPL-SCNC: 137 MMOL/L (ref 136–145)
WBC # BLD AUTO: 4.16 K/UL (ref 3.9–12.7)
WBC # BLD AUTO: 4.59 K/UL (ref 3.9–12.7)

## 2022-02-15 PROCEDURE — 1160F PR REVIEW ALL MEDS BY PRESCRIBER/CLIN PHARMACIST DOCUMENTED: ICD-10-PCS | Mod: CPTII,,, | Performed by: INTERNAL MEDICINE

## 2022-02-15 PROCEDURE — 36415 COLL VENOUS BLD VENIPUNCTURE: CPT | Performed by: INTERNAL MEDICINE

## 2022-02-15 PROCEDURE — 85025 COMPLETE CBC W/AUTO DIFF WBC: CPT | Mod: 91 | Performed by: INTERNAL MEDICINE

## 2022-02-15 PROCEDURE — 86706 HEP B SURFACE ANTIBODY: CPT | Performed by: INTERNAL MEDICINE

## 2022-02-15 PROCEDURE — 3078F PR MOST RECENT DIASTOLIC BLOOD PRESSURE < 80 MM HG: ICD-10-PCS | Mod: CPTII,,, | Performed by: INTERNAL MEDICINE

## 2022-02-15 PROCEDURE — 99213 PR OFFICE/OUTPT VISIT, EST, LEVL III, 20-29 MIN: ICD-10-PCS | Mod: S$PBB,,, | Performed by: INTERNAL MEDICINE

## 2022-02-15 PROCEDURE — 80321 ALCOHOLS BIOMARKERS 1OR 2: CPT | Performed by: INTERNAL MEDICINE

## 2022-02-15 PROCEDURE — 82103 ALPHA-1-ANTITRYPSIN TOTAL: CPT | Performed by: INTERNAL MEDICINE

## 2022-02-15 PROCEDURE — 99213 OFFICE O/P EST LOW 20 MIN: CPT | Mod: S$PBB,,, | Performed by: INTERNAL MEDICINE

## 2022-02-15 PROCEDURE — 3074F SYST BP LT 130 MM HG: CPT | Mod: CPTII,,, | Performed by: INTERNAL MEDICINE

## 2022-02-15 PROCEDURE — 86790 VIRUS ANTIBODY NOS: CPT | Performed by: INTERNAL MEDICINE

## 2022-02-15 PROCEDURE — 3074F PR MOST RECENT SYSTOLIC BLOOD PRESSURE < 130 MM HG: ICD-10-PCS | Mod: CPTII,,, | Performed by: INTERNAL MEDICINE

## 2022-02-15 PROCEDURE — 80053 COMPREHEN METABOLIC PANEL: CPT | Mod: 91 | Performed by: INTERNAL MEDICINE

## 2022-02-15 PROCEDURE — 1159F PR MEDICATION LIST DOCUMENTED IN MEDICAL RECORD: ICD-10-PCS | Mod: CPTII,,, | Performed by: INTERNAL MEDICINE

## 2022-02-15 PROCEDURE — 1160F RVW MEDS BY RX/DR IN RCRD: CPT | Mod: CPTII,,, | Performed by: INTERNAL MEDICINE

## 2022-02-15 PROCEDURE — 3078F DIAST BP <80 MM HG: CPT | Mod: CPTII,,, | Performed by: INTERNAL MEDICINE

## 2022-02-15 PROCEDURE — 85025 COMPLETE CBC W/AUTO DIFF WBC: CPT | Performed by: INTERNAL MEDICINE

## 2022-02-15 PROCEDURE — 3008F BODY MASS INDEX DOCD: CPT | Mod: CPTII,,, | Performed by: INTERNAL MEDICINE

## 2022-02-15 PROCEDURE — 1111F PR DISCHARGE MEDS RECONCILED W/ CURRENT OUTPATIENT MED LIST: ICD-10-PCS | Mod: CPTII,,, | Performed by: INTERNAL MEDICINE

## 2022-02-15 PROCEDURE — 85610 PROTHROMBIN TIME: CPT | Performed by: INTERNAL MEDICINE

## 2022-02-15 PROCEDURE — 85610 PROTHROMBIN TIME: CPT | Mod: 91 | Performed by: INTERNAL MEDICINE

## 2022-02-15 PROCEDURE — 1111F DSCHRG MED/CURRENT MED MERGE: CPT | Mod: CPTII,,, | Performed by: INTERNAL MEDICINE

## 2022-02-15 PROCEDURE — 99999 PR PBB SHADOW E&M-EST. PATIENT-LVL IV: CPT | Mod: PBBFAC,,, | Performed by: INTERNAL MEDICINE

## 2022-02-15 PROCEDURE — 1159F MED LIST DOCD IN RCRD: CPT | Mod: CPTII,,, | Performed by: INTERNAL MEDICINE

## 2022-02-15 PROCEDURE — 99999 PR PBB SHADOW E&M-EST. PATIENT-LVL IV: ICD-10-PCS | Mod: PBBFAC,,, | Performed by: INTERNAL MEDICINE

## 2022-02-15 PROCEDURE — 3008F PR BODY MASS INDEX (BMI) DOCUMENTED: ICD-10-PCS | Mod: CPTII,,, | Performed by: INTERNAL MEDICINE

## 2022-02-15 PROCEDURE — 99214 OFFICE O/P EST MOD 30 MIN: CPT | Mod: PBBFAC | Performed by: INTERNAL MEDICINE

## 2022-02-15 PROCEDURE — 80053 COMPREHEN METABOLIC PANEL: CPT | Performed by: INTERNAL MEDICINE

## 2022-02-15 RX ORDER — LACTULOSE 10 G/15ML
10 SOLUTION ORAL; RECTAL 2 TIMES DAILY
Qty: 300 ML | Refills: 2 | Status: SHIPPED | OUTPATIENT
Start: 2022-02-15 | End: 2022-02-18 | Stop reason: SDUPTHER

## 2022-02-16 ENCOUNTER — HOSPITAL ENCOUNTER (OUTPATIENT)
Dept: INTERVENTIONAL RADIOLOGY/VASCULAR | Facility: HOSPITAL | Age: 47
Discharge: HOME | End: 2022-02-16
Attending: INTERNAL MEDICINE
Payer: MEDICAID

## 2022-02-16 VITALS
OXYGEN SATURATION: 99 % | DIASTOLIC BLOOD PRESSURE: 66 MMHG | RESPIRATION RATE: 18 BRPM | TEMPERATURE: 98 F | SYSTOLIC BLOOD PRESSURE: 112 MMHG | HEART RATE: 74 BPM

## 2022-02-16 DIAGNOSIS — K72.90 DECOMPENSATED HEPATIC CIRRHOSIS: ICD-10-CM

## 2022-02-16 DIAGNOSIS — K74.60 DECOMPENSATED HEPATIC CIRRHOSIS: ICD-10-CM

## 2022-02-16 PROCEDURE — P9047 ALBUMIN (HUMAN), 25%, 50ML: HCPCS | Mod: JG | Performed by: INTERNAL MEDICINE

## 2022-02-16 PROCEDURE — P9047 ALBUMIN (HUMAN), 25%, 50ML: HCPCS | Mod: JG | Performed by: STUDENT IN AN ORGANIZED HEALTH CARE EDUCATION/TRAINING PROGRAM

## 2022-02-16 PROCEDURE — 63600175 PHARM REV CODE 636 W HCPCS: Mod: JG | Performed by: INTERNAL MEDICINE

## 2022-02-16 PROCEDURE — 49083 ABD PARACENTESIS W/IMAGING: CPT | Performed by: STUDENT IN AN ORGANIZED HEALTH CARE EDUCATION/TRAINING PROGRAM

## 2022-02-16 PROCEDURE — 63600175 PHARM REV CODE 636 W HCPCS: Mod: JG | Performed by: STUDENT IN AN ORGANIZED HEALTH CARE EDUCATION/TRAINING PROGRAM

## 2022-02-16 PROCEDURE — 49083 IR PARACENTESIS WITH IMAGING: ICD-10-PCS | Mod: ,,, | Performed by: STUDENT IN AN ORGANIZED HEALTH CARE EDUCATION/TRAINING PROGRAM

## 2022-02-16 PROCEDURE — C1729 CATH, DRAINAGE: HCPCS

## 2022-02-16 RX ORDER — ALBUMIN HUMAN 250 G/1000ML
12.5 SOLUTION INTRAVENOUS ONCE
Status: DISCONTINUED | OUTPATIENT
Start: 2022-02-16 | End: 2022-02-16

## 2022-02-16 RX ORDER — ALBUMIN HUMAN 250 G/1000ML
12.5 SOLUTION INTRAVENOUS ONCE
Status: COMPLETED | OUTPATIENT
Start: 2022-02-16 | End: 2022-02-16

## 2022-02-16 RX ADMIN — ALBUMIN HUMAN 12.5 G: 0.25 SOLUTION INTRAVENOUS at 12:02

## 2022-02-16 NOTE — PLAN OF CARE
Pt verbalized understanding of discharge instruction and has no questions at this time. Pt verbalized feeling much better after infusion and demonstrated readiness to go home.

## 2022-02-16 NOTE — H&P
Radiology History & Physical      SUBJECTIVE:     Chief Complaint: ascites    History of Present Illness:  Nilesh Rolon Jr. is a 46 y.o. male who presents for paracentesis  Past Medical History:   Diagnosis Date    DONG (acute kidney injury) 12/1/2021    Anxiety     Arthritis     Cirrhosis     Hypertension     Liver disease     Osteoarthritis     Other meniscus derangements, other medial meniscus, left knee 1/7/2019     Past Surgical History:   Procedure Laterality Date    ARTHROSCOPY OF KNEE Left 1/7/2019    Procedure: ARTHROSCOPY, KNEE;  Surgeon: Derick Kirby MD;  Location: [facility];  Service: Orthopedics;  Laterality: Left;    COLONOSCOPY N/A 7/27/2020    Procedure: COLONOSCOPY;  Surgeon: Sotero Zapata MD;  Location: [facility];  Service: Endoscopy;  Laterality: N/A;    COLONOSCOPY N/A 1/20/2022    Procedure: COLONOSCOPY Suprep Vaccinated;  Surgeon: Jacob Andrea MD;  Location: [facility];  Service: Endoscopy;  Laterality: N/A;    ESOPHAGOGASTRODUODENOSCOPY N/A 4/8/2019    Procedure: EGD (ESOPHAGOGASTRODUODENOSCOPY);  Surgeon: Bonita Kendall MD;  Location: [facility];  Service: Endoscopy;  Laterality: N/A;    ESOPHAGOGASTRODUODENOSCOPY N/A 7/27/2020    Procedure: EGD (ESOPHAGOGASTRODUODENOSCOPY);  Surgeon: Sotero Zapata MD;  Location: [facility];  Service: Endoscopy;  Laterality: N/A;    ESOPHAGOGASTRODUODENOSCOPY N/A 12/2/2021    Procedure: EGD (ESOPHAGOGASTRODUODENOSCOPY);  Surgeon: Montez Guerra MD;  Location: [facility] ([address] [place] [address]);  Service: Endoscopy;  Laterality: N/A;    ESOPHAGOGASTRODUODENOSCOPY N/A 1/20/2022    Procedure: EGD (ESOPHAGOGASTRODUODENOSCOPY);  Surgeon: Jacob Andrea MD;  Location: [facility];  Service: Endoscopy;  Laterality: N/A;  please order CBC with plts and INR day of case.    KNEE SURGERY         Home Meds:   Prior to Admission medications    Medication Sig Start Date End Date Taking? Authorizing Provider   ferrous sulfate (FEOSOL) 325 mg (65  mg iron) Tab tablet Take 1 tablet (325 mg total) by mouth 2 (two) times daily. 2/2/22   Savita Bianchi MD   fluconazole (DIFLUCAN) 200 MG Tab Take 2 tablets (400 mg total) by mouth once daily. for 14 days  Patient not taking: Reported on 2/15/2022 2/2/22 2/16/22  Savita Bianchi MD   folic acid (FOLVITE) 1 MG tablet TAKE 1 TABLET(1 MG) BY MOUTH EVERY DAY 11/6/21   Dianne Jimenez NP   lactulose (CHRONULAC) 10 gram/15 mL solution Take 15 mLs (10 g total) by mouth 2 (two) times daily. 2/15/22   Pb Mixon MD   multivitamin Tab Take 1 tablet by mouth once daily. 2/2/22   Savita Bianchi MD   pantoprazole (PROTONIX) 40 MG tablet Take 1 tablet (40 mg total) by mouth once daily. 2/2/22 2/2/23  Savita Bianchi MD   thiamine 100 MG tablet Take 1 tablet (100 mg total) by mouth once daily. 1/9/22 1/9/23  Otoniel Espinoza MD     Anticoagulants/Antiplatelets: no anticoagulation    Allergies: Review of patient's allergies indicates:  No Known Allergies  Sedation History:  no adverse reactions    Review of Systems:   Hematological: no known coagulopathies  Respiratory: no shortness of breath  Cardiovascular: no chest pain  Gastrointestinal: no abdominal pain  Genito-Urinary: no dysuria  Musculoskeletal: negative  Neurological: no TIA or stroke symptoms         OBJECTIVE:     Vital Signs (Most Recent)       Physical Exam:  ASA: 2  Mallampati: 2    General: no acute distress  Mental Status: alert and oriented to person, place and time  HEENT: normocephalic, atraumatic  Chest: unlabored breathing  Heart: regular heart rate  Abdomen: nondistended  Extremity: moves all extremities    Laboratory  Lab Results   Component Value Date    INR 1.7 (H) 02/15/2022       Lab Results   Component Value Date    WBC 4.16 02/15/2022    HGB 9.5 (L) 02/15/2022    HCT 27.2 (L) 02/15/2022    MCV 91 02/15/2022    PLT 73 (L) 02/15/2022      Lab Results   Component Value Date     02/15/2022     " 02/15/2022    K 3.9 02/15/2022     02/15/2022    CO2 21 (L) 02/15/2022    BUN 5 (L) 02/15/2022    CREATININE 1.0 02/15/2022    CALCIUM 9.1 02/15/2022    MG 1.8 02/01/2022    ALT 14 02/15/2022    AST 49 (H) 02/15/2022    ALBUMIN 3.1 (L) 02/15/2022    BILITOT 5.7 (H) 02/15/2022    BILIDIR 3.7 (H) 07/27/2020       ASSESSMENT/PLAN:     Sedation Plan: local  Patient will undergo paracentesis.    Benja Taveras MD ([place])  Interventional Radiology  [phone]        "

## 2022-02-16 NOTE — DISCHARGE INSTRUCTIONS
BATHING:  ? You may shower tomorrow.  DRESSING:  ? Remove dressing tomorrow.        ACTIVITY LEVEL: If you have received sedation or an anesthetic, you may feel sleepy for several hours. Rest until you are more awake. Gradually resume your normal activities    Do not drive, drink alcohol, or sign legal documents for 24 hours, or if taking narcotic pain medication.      DIET: You may resume your home diet. If nausea is present, increase your diet gradually with fluids and bland foods.    Medications: Pain medication should be taken only if needed and as directed. If antibiotics are prescribed, the medication should be taken until completed. You will be given an updated list of you medications.  ? No driving, alcoholic beverages or signing legal documents for next 24 hours if you have had sedation, or while taking pain medication    CALL THE DOCTOR:   For any obvious bleeding (some dried blood over the incision is normal).     Redness, swelling, foul smell around incision or fever over 101.  Shortness of breath.  Persistent pain or nausea not relieved by medication.  Call  [phone]     to speak with an Interventional Radiologist    If any unusual problems or difficulties occur contact your doctor. If you cannot contact your doctor but feel your signs and symptoms warrant a physicians attention return to the emergency room.

## 2022-02-17 ENCOUNTER — PATIENT MESSAGE (OUTPATIENT)
Dept: HEPATOLOGY | Facility: CLINIC | Age: 47
End: 2022-02-17
Payer: MEDICAID

## 2022-02-17 DIAGNOSIS — K70.10 ALCOHOLIC HEPATITIS WITHOUT ASCITES: ICD-10-CM

## 2022-02-17 DIAGNOSIS — K74.60 DECOMPENSATED HEPATIC CIRRHOSIS: ICD-10-CM

## 2022-02-17 DIAGNOSIS — K72.90 DECOMPENSATED HEPATIC CIRRHOSIS: ICD-10-CM

## 2022-02-17 DIAGNOSIS — K70.31 ALCOHOLIC CIRRHOSIS OF LIVER WITH ASCITES: Primary | ICD-10-CM

## 2022-02-17 LAB
HAV IGG SER QL IA: POSITIVE
PETH 16:0/18.1 (POPETH): <10 NG/ML
PETH 16:0/18.2 (PLPETH): <10 NG/ML

## 2022-02-18 RX ORDER — LACTULOSE 10 G/15ML
10 SOLUTION ORAL; RECTAL 2 TIMES DAILY
Qty: 1800 ML | Refills: 2 | Status: SHIPPED | OUTPATIENT
Start: 2022-02-18 | End: 2022-03-09 | Stop reason: SDUPTHER

## 2022-02-20 NOTE — PROGRESS NOTES
HEPATOLOGY FOLLOW UP    Referring Physician:   Current Corresponding Physician:     Nilesh Rolon Jr. is here for follow up of Cirrhosis and Ascites      HPI  This 46-year-old gentleman has decompensated liver disease due to alcoholic cirrhosis.  He has been abstinent from alcohol since November.  He has gone through outpatient rehab.  Gradually his hyponatremia in liver synthetic function appear to be improving.  He still has troublesome ascites and wrist requiring large volume paracenteses weekly.  He describes no encephalopathy.  Does have lower extremity edema.  He has no symptoms of GI bleeding.    Outpatient Encounter Medications as of 2/15/2022   Medication Sig Dispense Refill    ferrous sulfate (FEOSOL) 325 mg (65 mg iron) Tab tablet Take 1 tablet (325 mg total) by mouth 2 (two) times daily. 60 tablet 2    folic acid (FOLVITE) 1 MG tablet TAKE 1 TABLET(1 MG) BY MOUTH EVERY DAY 30 tablet 2    multivitamin Tab Take 1 tablet by mouth once daily. 30 tablet 2    pantoprazole (PROTONIX) 40 MG tablet Take 1 tablet (40 mg total) by mouth once daily. 30 tablet 11    thiamine 100 MG tablet Take 1 tablet (100 mg total) by mouth once daily. 30 tablet 11    [DISCONTINUED] lactulose (CHRONULAC) 10 gram/15 mL solution Take 15 mLs (10 g total) by mouth 2 (two) times daily. 300 mL 2    [] fluconazole (DIFLUCAN) 200 MG Tab Take 2 tablets (400 mg total) by mouth once daily. for 14 days (Patient not taking: Reported on 2/15/2022) 28 tablet 0    [DISCONTINUED] lactulose (CHRONULAC) 10 gram/15 mL solution Take 15 mLs (10 g total) by mouth 2 (two) times daily. 300 mL 2     No facility-administered encounter medications on file as of 2/15/2022.     Review of patient's allergies indicates:  No Known Allergies  Past Medical History:   Diagnosis Date    DONG (acute kidney injury) 2021    Anxiety     Arthritis     Cirrhosis     Hypertension     Liver disease     Osteoarthritis     Other meniscus  derangements, other medial meniscus, left knee 1/7/2019       Review of Systems   Constitutional: Positive for fatigue. Negative for activity change, appetite change, chills and unexpected weight change.   HENT: Negative for congestion, facial swelling and tinnitus.    Eyes: Negative for visual disturbance.   Respiratory: Negative for cough, shortness of breath and wheezing.    Cardiovascular: Positive for leg swelling. Negative for chest pain and palpitations.   Gastrointestinal: Positive for abdominal distention.   Genitourinary: Negative for dysuria.   Musculoskeletal: Negative for arthralgias, joint swelling and myalgias.   Neurological: Negative for syncope and headaches.   Hematological: Does not bruise/bleed easily.   Psychiatric/Behavioral: Negative for confusion.     Vitals:    02/15/22 1055   BP: 103/62   Pulse: 67   Resp: 18   Temp: 96.1 °F (35.6 °C)       Physical Exam  Constitutional:       Appearance: He is well-developed.   Eyes:      General: No scleral icterus.  Cardiovascular:      Rate and Rhythm: Normal rate and regular rhythm.      Heart sounds: Normal heart sounds.   Pulmonary:      Effort: Pulmonary effort is normal. No respiratory distress.      Breath sounds: Normal breath sounds. No wheezing.   Abdominal:      General: Bowel sounds are normal. There is no distension.      Palpations: Abdomen is soft. There is shifting dullness and fluid wave. There is no mass.      Tenderness: There is no abdominal tenderness. There is no rebound.   Musculoskeletal:         General: Normal range of motion.   Lymphadenopathy:      Cervical: No cervical adenopathy.   Skin:     General: Skin is warm and dry.   Neurological:      Mental Status: He is alert and oriented to person, place, and time.         MELD-Na score: 19 at 2/15/2022 12:33 PM  MELD score: 19 at 2/15/2022 12:33 PM  Calculated from:  Serum Creatinine: 1.0 mg/dL at 2/15/2022 12:33 PM  Serum Sodium: 137 mmol/L at 2/15/2022 12:33 PM  Total  Bilirubin: 5.7 mg/dL at 2/15/2022 12:33 PM  INR(ratio): 1.7 at 2/15/2022 12:33 PM  Age: 46 years    Lab Results   Component Value Date     02/15/2022    BUN 5 (L) 02/15/2022    CREATININE 1.0 02/15/2022    CALCIUM 9.1 02/15/2022     02/15/2022    K 3.9 02/15/2022     02/15/2022    PROT 6.3 02/15/2022    CO2 21 (L) 02/15/2022    ANIONGAP 10 02/15/2022    WBC 4.16 02/15/2022    RBC 2.98 (L) 02/15/2022    HGB 9.5 (L) 02/15/2022    HCT 27.2 (L) 02/15/2022    HCT 31 (L) 11/30/2021    MCV 91 02/15/2022    MCH 31.9 (H) 02/15/2022    MCHC 34.9 02/15/2022     Lab Results   Component Value Date    RDW 17.4 (H) 02/15/2022    PLT 73 (L) 02/15/2022    MPV 10.8 02/15/2022    GRAN 1.8 02/15/2022    GRAN 43.2 02/15/2022    LYMPH 1.9 02/15/2022    LYMPH 44.5 02/15/2022    MONO 0.4 02/15/2022    MONO 9.4 02/15/2022    EOSINOPHIL 2.2 02/15/2022    BASOPHIL 0.2 02/15/2022    EOS 0.1 02/15/2022    EOS 1 01/26/2022    BASO 0.01 02/15/2022    APTT 29.9 04/07/2019    GROUPTRH A POS 07/23/2020    BNP 52 01/08/2022    CHOL 242 (H) 04/06/2016    TRIG 278 (H) 04/06/2016    HDL 40 04/06/2016    CHOLHDL 16.5 (L) 04/06/2016    TOTALCHOLEST 6.1 (H) 04/06/2016    ALBUMIN 3.1 (L) 02/15/2022    BILIDIR 3.7 (H) 07/27/2020    AST 49 (H) 02/15/2022    ALT 14 02/15/2022    ALKPHOS 121 02/15/2022    MG 1.8 02/01/2022    LABPROT 17.2 (H) 02/15/2022    INR 1.7 (H) 02/15/2022    PSA 0.54 04/06/2016       Assessment and Plan:  Patient Active Problem List   Diagnosis    Ascites    Thrombocytopenia    History of alcohol abuse    Gastrointestinal hemorrhage    Hx of colonic polyps    History of GI bleed    Alcoholic cirrhosis of liver with ascites    Primary osteoarthritis of left knee    Osteoarthritis of right shoulder    Pain of left upper extremity    Debility    Stiffness in joint    Class 1 obesity due to excess calories with serious comorbidity and body mass index (BMI) of 33.0 to 33.9 in adult    Elevated INR     Hemorrhoids    Candidiasis of esophagus with fungal esophagitis     Anemia     Nilesh BERMAN Gonzales Fry. is a 46 y.o. male withCirrhosis and Ascites    Impression:  Decompensated liver disease due to alcoholic cirrhosis with a meld score of 19    Plan:  Encouraged continued abstinence from alcohol.  He will continue to have large volume paracenteses weekly.  Will monitor his liver function carefully.  If despite continued abstinence there is no evidence of further recovery I will review visit the possibility of liver transplant.  He will return to clinic in 6 weeks time

## 2022-02-23 ENCOUNTER — HOSPITAL ENCOUNTER (OUTPATIENT)
Dept: INTERVENTIONAL RADIOLOGY/VASCULAR | Facility: HOSPITAL | Age: 47
Discharge: HOME | End: 2022-02-23
Attending: INTERNAL MEDICINE | Admitting: RADIOLOGY
Payer: MEDICAID

## 2022-02-23 ENCOUNTER — LAB VISIT (OUTPATIENT)
Dept: LAB | Facility: HOSPITAL | Age: 47
End: 2022-02-23
Attending: INTERNAL MEDICINE
Payer: MEDICAID

## 2022-02-23 VITALS
TEMPERATURE: 99 F | HEART RATE: 68 BPM | OXYGEN SATURATION: 100 % | DIASTOLIC BLOOD PRESSURE: 68 MMHG | SYSTOLIC BLOOD PRESSURE: 108 MMHG

## 2022-02-23 DIAGNOSIS — K70.10 ALCOHOLIC HEPATITIS WITHOUT ASCITES: ICD-10-CM

## 2022-02-23 DIAGNOSIS — K70.31 ALCOHOLIC CIRRHOSIS OF LIVER WITH ASCITES: ICD-10-CM

## 2022-02-23 DIAGNOSIS — K72.90 DECOMPENSATED HEPATIC CIRRHOSIS: ICD-10-CM

## 2022-02-23 DIAGNOSIS — K74.60 DECOMPENSATED HEPATIC CIRRHOSIS: ICD-10-CM

## 2022-02-23 LAB
ALBUMIN SERPL BCP-MCNC: 2.9 G/DL (ref 3.5–5.2)
ALP SERPL-CCNC: 121 U/L (ref 55–135)
ALT SERPL W/O P-5'-P-CCNC: 18 U/L (ref 10–44)
ANION GAP SERPL CALC-SCNC: 8 MMOL/L (ref 8–16)
AST SERPL-CCNC: 43 U/L (ref 10–40)
BASOPHILS # BLD AUTO: 0.04 K/UL (ref 0–0.2)
BASOPHILS NFR BLD: 0.8 % (ref 0–1.9)
BILIRUB SERPL-MCNC: 5 MG/DL (ref 0.1–1)
BUN SERPL-MCNC: 6 MG/DL (ref 6–20)
CALCIUM SERPL-MCNC: 8.6 MG/DL (ref 8.7–10.5)
CHLORIDE SERPL-SCNC: 109 MMOL/L (ref 95–110)
CO2 SERPL-SCNC: 21 MMOL/L (ref 23–29)
CREAT SERPL-MCNC: 1.1 MG/DL (ref 0.5–1.4)
DIFFERENTIAL METHOD: ABNORMAL
EOSINOPHIL # BLD AUTO: 0.2 K/UL (ref 0–0.5)
EOSINOPHIL NFR BLD: 3 % (ref 0–8)
ERYTHROCYTE [DISTWIDTH] IN BLOOD BY AUTOMATED COUNT: 18 % (ref 11.5–14.5)
EST. GFR  (AFRICAN AMERICAN): >60 ML/MIN/1.73 M^2
EST. GFR  (NON AFRICAN AMERICAN): >60 ML/MIN/1.73 M^2
GLUCOSE SERPL-MCNC: 112 MG/DL (ref 70–110)
HBV SURFACE AB SER-ACNC: NEGATIVE M[IU]/ML
HCT VFR BLD AUTO: 29 % (ref 40–54)
HGB BLD-MCNC: 9.8 G/DL (ref 14–18)
IMM GRANULOCYTES # BLD AUTO: 0.01 K/UL (ref 0–0.04)
IMM GRANULOCYTES NFR BLD AUTO: 0.2 % (ref 0–0.5)
INR PPP: 1.7 (ref 0.8–1.2)
LYMPHOCYTES # BLD AUTO: 2 K/UL (ref 1–4.8)
LYMPHOCYTES NFR BLD: 40.2 % (ref 18–48)
MCH RBC QN AUTO: 31.3 PG (ref 27–31)
MCHC RBC AUTO-ENTMCNC: 33.8 G/DL (ref 32–36)
MCV RBC AUTO: 93 FL (ref 82–98)
MONOCYTES # BLD AUTO: 0.6 K/UL (ref 0.3–1)
MONOCYTES NFR BLD: 12.5 % (ref 4–15)
NEUTROPHILS # BLD AUTO: 2.2 K/UL (ref 1.8–7.7)
NEUTROPHILS NFR BLD: 43.3 % (ref 38–73)
NRBC BLD-RTO: 0 /100 WBC
PLATELET # BLD AUTO: 83 K/UL (ref 150–450)
PMV BLD AUTO: 10.8 FL (ref 9.2–12.9)
POTASSIUM SERPL-SCNC: 4.5 MMOL/L (ref 3.5–5.1)
PROT SERPL-MCNC: 6.2 G/DL (ref 6–8.4)
PROTHROMBIN TIME: 17.2 SEC (ref 9–12.5)
RBC # BLD AUTO: 3.13 M/UL (ref 4.6–6.2)
SODIUM SERPL-SCNC: 138 MMOL/L (ref 136–145)
WBC # BLD AUTO: 5.05 K/UL (ref 3.9–12.7)

## 2022-02-23 PROCEDURE — 25000003 PHARM REV CODE 250: Performed by: RADIOLOGY

## 2022-02-23 PROCEDURE — C1729 CATH, DRAINAGE: HCPCS

## 2022-02-23 PROCEDURE — 85025 COMPLETE CBC W/AUTO DIFF WBC: CPT | Performed by: INTERNAL MEDICINE

## 2022-02-23 PROCEDURE — 49083 ABD PARACENTESIS W/IMAGING: CPT | Performed by: RADIOLOGY

## 2022-02-23 PROCEDURE — 49083 IR PARACENTESIS WITH IMAGING: ICD-10-PCS | Mod: ,,, | Performed by: RADIOLOGY

## 2022-02-23 PROCEDURE — 36415 COLL VENOUS BLD VENIPUNCTURE: CPT | Performed by: INTERNAL MEDICINE

## 2022-02-23 PROCEDURE — P9047 ALBUMIN (HUMAN), 25%, 50ML: HCPCS | Mod: JG | Performed by: RADIOLOGY

## 2022-02-23 PROCEDURE — 63600175 PHARM REV CODE 636 W HCPCS: Mod: JG | Performed by: RADIOLOGY

## 2022-02-23 PROCEDURE — 80053 COMPREHEN METABOLIC PANEL: CPT | Performed by: INTERNAL MEDICINE

## 2022-02-23 PROCEDURE — 85610 PROTHROMBIN TIME: CPT | Performed by: INTERNAL MEDICINE

## 2022-02-23 RX ORDER — ALBUMIN HUMAN 250 G/1000ML
37.5 SOLUTION INTRAVENOUS ONCE AS NEEDED
Status: COMPLETED | OUTPATIENT
Start: 2022-02-23 | End: 2022-02-23

## 2022-02-23 RX ORDER — LIDOCAINE HYDROCHLORIDE 20 MG/ML
INJECTION, SOLUTION INFILTRATION; PERINEURAL CODE/TRAUMA/SEDATION MEDICATION
Status: COMPLETED | OUTPATIENT
Start: 2022-02-23 | End: 2022-02-23

## 2022-02-23 RX ADMIN — LIDOCAINE HYDROCHLORIDE 10 ML: 20 INJECTION, SOLUTION INFILTRATION; PERINEURAL at 11:02

## 2022-02-23 RX ADMIN — ALBUMIN HUMAN 37.5 G: 0.25 SOLUTION INTRAVENOUS at 01:02

## 2022-02-23 NOTE — H&P
Radiology History & Physical      SUBJECTIVE:     Chief Complaint: Recurrent painful, tense ascites     History of Present Illness:  Nilesh Rolon Jr. is a 46 y.o. male with pertinent PMHx of decompensated EtOH hepatic cirrhosis complicated by recurrent abdominal distension and pain 2/2 recurrent painful, tense ascitesrequiring frequent therapeutic large-volume paracentesis.      A new outpatient IR consult received for US-guided percutaneous RUQ-approach therapeutic LVP.    Past Medical History:   Diagnosis Date    DONG (acute kidney injury) 12/1/2021    Anxiety     Arthritis     Cirrhosis     Hypertension     Liver disease     Osteoarthritis     Other meniscus derangements, other medial meniscus, left knee 1/7/2019     Past Surgical History:   Procedure Laterality Date    ARTHROSCOPY OF KNEE Left 1/7/2019    Procedure: ARTHROSCOPY, KNEE;  Surgeon: Derick Kirby MD;  Location: [facility];  Service: Orthopedics;  Laterality: Left;    COLONOSCOPY N/A 7/27/2020    Procedure: COLONOSCOPY;  Surgeon: Sotero Zapata MD;  Location: [facility];  Service: Endoscopy;  Laterality: N/A;    COLONOSCOPY N/A 1/20/2022    Procedure: COLONOSCOPY Suprep Vaccinated;  Surgeon: Jacob Andrea MD;  Location: [facility];  Service: Endoscopy;  Laterality: N/A;    ESOPHAGOGASTRODUODENOSCOPY N/A 4/8/2019    Procedure: EGD (ESOPHAGOGASTRODUODENOSCOPY);  Surgeon: Bonita Kendall MD;  Location: [facility];  Service: Endoscopy;  Laterality: N/A;    ESOPHAGOGASTRODUODENOSCOPY N/A 7/27/2020    Procedure: EGD (ESOPHAGOGASTRODUODENOSCOPY);  Surgeon: Sotero Zapata MD;  Location: [facility];  Service: Endoscopy;  Laterality: N/A;    ESOPHAGOGASTRODUODENOSCOPY N/A 12/2/2021    Procedure: EGD (ESOPHAGOGASTRODUODENOSCOPY);  Surgeon: Montez Guerra MD;  Location: [facility] ([address] [place] [address]);  Service: Endoscopy;  Laterality: N/A;    ESOPHAGOGASTRODUODENOSCOPY N/A 1/20/2022    Procedure: EGD (ESOPHAGOGASTRODUODENOSCOPY);  Surgeon:  Jacob Andrea MD;  Location: [facility];  Service: Endoscopy;  Laterality: N/A;  please order CBC with plts and INR day of case.    KNEE SURGERY       Home Meds:   Prior to Admission medications    Medication Sig Start Date End Date Taking? Authorizing Provider   ferrous sulfate (FEOSOL) 325 mg (65 mg iron) Tab tablet Take 1 tablet (325 mg total) by mouth 2 (two) times daily. 2/2/22   Savita Bianchi MD   folic acid (FOLVITE) 1 MG tablet TAKE 1 TABLET(1 MG) BY MOUTH EVERY DAY 11/6/21   Dianne Jimenez NP   lactulose (CHRONULAC) 10 gram/15 mL solution Take 15 mLs (10 g total) by mouth 2 (two) times daily. 2/18/22   Pb Mixon MD   multivitamin Tab Take 1 tablet by mouth once daily. 2/2/22   Savita Bianchi MD   pantoprazole (PROTONIX) 40 MG tablet Take 1 tablet (40 mg total) by mouth once daily. 2/2/22 2/2/23  Savita Bianchi MD   thiamine 100 MG tablet Take 1 tablet (100 mg total) by mouth once daily. 1/9/22 1/9/23  Otoniel Espinoza MD     Anticoagulants/Antiplatelets: no anticoagulation    Allergies: Review of patient's allergies indicates:  No Known Allergies    Sedation History:  no adverse reactions     Review of Systems:   Hematological: no known coagulopathies  Respiratory: no cough, shortness of breath, or wheezing  Cardiovascular: no chest pain or dyspnea on exertion  Gastrointestinal: positive for - abdominal pain and distension  Genito-Urinary: no dysuria, trouble voiding, or hematuria  Musculoskeletal: negative  Neurological: no TIA or stroke symptoms       OBJECTIVE:     Vital Signs (Most Recent)     Physical Exam:  General: no acute distress  Mental Status: alert and oriented to person, place and time  HEENT: normocephalic, atraumatic  Chest: unlabored breathing  Heart: regular heart rate  Abdomen: +distended. No TTP/r/g.  Extremity: moves all extremities    Laboratory  Lab Results   Component Value Date    INR 1.7 (H) 02/23/2022       Lab Results    Component Value Date    WBC 5.05 02/23/2022    HGB 9.8 (L) 02/23/2022    HCT 29.0 (L) 02/23/2022    MCV 93 02/23/2022    PLT 83 (L) 02/23/2022      Lab Results   Component Value Date     (H) 02/23/2022     02/23/2022    K 4.5 02/23/2022     02/23/2022    CO2 21 (L) 02/23/2022    BUN 6 02/23/2022    CREATININE 1.1 02/23/2022    CALCIUM 8.6 (L) 02/23/2022    MG 1.8 02/01/2022    ALT 18 02/23/2022    AST 43 (H) 02/23/2022    ALBUMIN 2.9 (L) 02/23/2022    BILITOT 5.0 (H) 02/23/2022    BILIDIR 3.7 (H) 07/27/2020     ASSESSMENT/PLAN:     46 y.o. male with pertinent PMHx of decompensated EtOH hepatic cirrhosis complicated by recurrent abdominal distension and pain 2/2 recurrent painful, tense ascitesrequiring frequent therapeutic large-volume paracentesis.     1. Recurrent painful, tense ascites - Will attempt US-guided percutaneous RUQ-approach therapeutic LVP with local anesthetic only and albumin infusion post PRN as indicated per institutional protocol.     Risks (including, but not limited to, pain, bleeding, infection, damage to nearby structures, failure to obtain sufficient material for a diagnosis, the need for additional procedures, and death), benefits, and alternatives were discussed with the patient. All questions were answered to the best of my abilities. The patient wishes to proceed with the procedure. Written informed consent was obtained.     Thank you for considering IR for the care of your patient.      Chris Chahal MD  Interventional Radiology

## 2022-02-23 NOTE — BRIEF OP NOTE
Radiology Post-Procedure Note     Pre Op Diagnosis: Recurrent painful, tense ascites  Post Op Diagnosis: Same     Procedure: 1. US-guided percutaneous RUQ-approach therapeutic LVP     Procedure performed by: Chris Chahal MD     Written Informed Consent Obtained: Yes  Specimen Removed: YES, 4,600-cc of thin, serosanguinous ascitic fluid  Estimated Blood Loss: none     Findings:   Successful US-guided percutaneous RUQ-approach therapeutic LVP with local anesthetic only and albumin infusion post PRN as indicated per institutional protocol. Patient tolerated the procedure well. No immediate post-procedural complications noted.      Thank you for considering IR for the care of your patient.      Chris Chahal MD  Interventional Radiology

## 2022-02-23 NOTE — DISCHARGE SUMMARY
Radiology Discharge Summary      Hospital Course: No complications    Admit Date: 2/23/2022  Discharge Date: 02/23/2022     Instructions Given to Patient: Yes  Diet: Resume prior diet  Activity: activity as tolerated    Description of Condition on Discharge: Stable  Vital Signs (Most Recent):      Discharge Disposition: Home    Discharge Diagnosis:  46 y.o. male with decompensated EtOH hepatic cirrhosis complicated by recurrent abdominal distension and pain 2/2 recurrent painful, tense ascites s/p successful US-guided percutaneous RUQ-approach therapeutic LVP with local anesthetic only and albumin infusion post PRN as indicated per institutional protocol. Patient tolerated the procedure well. No immediate post-procedural complications noted.      Thank you for considering IR for the care of your patient.      Chris Chahal MD  Interventional Radiology

## 2022-02-25 LAB
A1AT PHENOTYP SERPL-IMP: NORMAL BANDS
A1AT SERPL NEPH-MCNC: 112 MG/DL (ref 100–190)

## 2022-03-02 ENCOUNTER — PATIENT MESSAGE (OUTPATIENT)
Dept: HEPATOLOGY | Facility: CLINIC | Age: 47
End: 2022-03-02
Payer: MEDICAID

## 2022-03-02 ENCOUNTER — HOSPITAL ENCOUNTER (OUTPATIENT)
Dept: INTERVENTIONAL RADIOLOGY/VASCULAR | Facility: HOSPITAL | Age: 47
Discharge: HOME | End: 2022-03-02
Attending: INTERNAL MEDICINE | Admitting: RADIOLOGY
Payer: MEDICAID

## 2022-03-02 ENCOUNTER — LAB VISIT (OUTPATIENT)
Dept: LAB | Facility: HOSPITAL | Age: 47
End: 2022-03-02
Attending: INTERNAL MEDICINE
Payer: MEDICAID

## 2022-03-02 VITALS
HEART RATE: 78 BPM | OXYGEN SATURATION: 100 % | RESPIRATION RATE: 20 BRPM | DIASTOLIC BLOOD PRESSURE: 51 MMHG | TEMPERATURE: 98 F | SYSTOLIC BLOOD PRESSURE: 95 MMHG

## 2022-03-02 DIAGNOSIS — K70.10 ALCOHOLIC HEPATITIS WITHOUT ASCITES: ICD-10-CM

## 2022-03-02 DIAGNOSIS — K72.90 DECOMPENSATED HEPATIC CIRRHOSIS: ICD-10-CM

## 2022-03-02 DIAGNOSIS — K74.60 DECOMPENSATED HEPATIC CIRRHOSIS: ICD-10-CM

## 2022-03-02 LAB
ALBUMIN SERPL BCP-MCNC: 3 G/DL (ref 3.5–5.2)
ALP SERPL-CCNC: 91 U/L (ref 55–135)
ALT SERPL W/O P-5'-P-CCNC: 14 U/L (ref 10–44)
ANION GAP SERPL CALC-SCNC: 10 MMOL/L (ref 8–16)
AST SERPL-CCNC: 42 U/L (ref 10–40)
BASOPHILS # BLD AUTO: 0.05 K/UL (ref 0–0.2)
BASOPHILS NFR BLD: 0.9 % (ref 0–1.9)
BILIRUB SERPL-MCNC: 3.5 MG/DL (ref 0.1–1)
BUN SERPL-MCNC: 11 MG/DL (ref 6–20)
CALCIUM SERPL-MCNC: 8.6 MG/DL (ref 8.7–10.5)
CHLORIDE SERPL-SCNC: 109 MMOL/L (ref 95–110)
CO2 SERPL-SCNC: 20 MMOL/L (ref 23–29)
CREAT SERPL-MCNC: 1 MG/DL (ref 0.5–1.4)
DIFFERENTIAL METHOD: ABNORMAL
EOSINOPHIL # BLD AUTO: 0.1 K/UL (ref 0–0.5)
EOSINOPHIL NFR BLD: 2.1 % (ref 0–8)
ERYTHROCYTE [DISTWIDTH] IN BLOOD BY AUTOMATED COUNT: 19.3 % (ref 11.5–14.5)
EST. GFR  (AFRICAN AMERICAN): >60 ML/MIN/1.73 M^2
EST. GFR  (NON AFRICAN AMERICAN): >60 ML/MIN/1.73 M^2
GLUCOSE SERPL-MCNC: 89 MG/DL (ref 70–110)
HCT VFR BLD AUTO: 29.8 % (ref 40–54)
HGB BLD-MCNC: 10 G/DL (ref 14–18)
IMM GRANULOCYTES # BLD AUTO: 0.01 K/UL (ref 0–0.04)
IMM GRANULOCYTES NFR BLD AUTO: 0.2 % (ref 0–0.5)
INR PPP: 1.4 (ref 0.8–1.2)
LYMPHOCYTES # BLD AUTO: 1.9 K/UL (ref 1–4.8)
LYMPHOCYTES NFR BLD: 35.8 % (ref 18–48)
MCH RBC QN AUTO: 31.2 PG (ref 27–31)
MCHC RBC AUTO-ENTMCNC: 33.6 G/DL (ref 32–36)
MCV RBC AUTO: 93 FL (ref 82–98)
MONOCYTES # BLD AUTO: 0.6 K/UL (ref 0.3–1)
MONOCYTES NFR BLD: 11.3 % (ref 4–15)
NEUTROPHILS # BLD AUTO: 2.6 K/UL (ref 1.8–7.7)
NEUTROPHILS NFR BLD: 49.7 % (ref 38–73)
NRBC BLD-RTO: 0 /100 WBC
PLATELET # BLD AUTO: 77 K/UL (ref 150–450)
PMV BLD AUTO: 11.4 FL (ref 9.2–12.9)
POTASSIUM SERPL-SCNC: 3.5 MMOL/L (ref 3.5–5.1)
PROT SERPL-MCNC: 6.4 G/DL (ref 6–8.4)
PROTHROMBIN TIME: 14.6 SEC (ref 9–12.5)
RBC # BLD AUTO: 3.21 M/UL (ref 4.6–6.2)
SODIUM SERPL-SCNC: 139 MMOL/L (ref 136–145)
WBC # BLD AUTO: 5.31 K/UL (ref 3.9–12.7)

## 2022-03-02 PROCEDURE — 36415 COLL VENOUS BLD VENIPUNCTURE: CPT | Performed by: INTERNAL MEDICINE

## 2022-03-02 PROCEDURE — 63600175 PHARM REV CODE 636 W HCPCS: Mod: JG | Performed by: RADIOLOGY

## 2022-03-02 PROCEDURE — 49083 ABD PARACENTESIS W/IMAGING: CPT | Performed by: RADIOLOGY

## 2022-03-02 PROCEDURE — 80053 COMPREHEN METABOLIC PANEL: CPT | Performed by: INTERNAL MEDICINE

## 2022-03-02 PROCEDURE — 49083 IR PARACENTESIS WITH IMAGING: ICD-10-PCS | Mod: ,,, | Performed by: RADIOLOGY

## 2022-03-02 PROCEDURE — C1729 CATH, DRAINAGE: HCPCS

## 2022-03-02 PROCEDURE — 85610 PROTHROMBIN TIME: CPT | Performed by: INTERNAL MEDICINE

## 2022-03-02 PROCEDURE — P9047 ALBUMIN (HUMAN), 25%, 50ML: HCPCS | Mod: JG | Performed by: RADIOLOGY

## 2022-03-02 PROCEDURE — 85025 COMPLETE CBC W/AUTO DIFF WBC: CPT | Performed by: INTERNAL MEDICINE

## 2022-03-02 RX ORDER — ALBUMIN HUMAN 250 G/1000ML
25 SOLUTION INTRAVENOUS ONCE AS NEEDED
Status: COMPLETED | OUTPATIENT
Start: 2022-03-02 | End: 2022-03-02

## 2022-03-02 RX ADMIN — ALBUMIN (HUMAN) 25 G: 5 SOLUTION INTRAVENOUS at 11:03

## 2022-03-02 NOTE — BRIEF OP NOTE
Radiology Post-Procedure Note     Pre Op Diagnosis: Recurrent painful, tense ascites  Post Op Diagnosis: Same     Procedure: 1. US-guided percutaneous RUQ-approach therapeutic LVP     Procedure performed by: Chris Chahal MD     Written Informed Consent Obtained: Yes  Specimen Removed: YES, 3,000-cc of thin, serosanguinous ascitic fluid  Estimated Blood Loss: none     Findings:   Successful US-guided percutaneous RUQ-approach therapeutic LVP with local anesthetic only and albumin infusion post PRN as indicated per institutional protocol. Patient tolerated the procedure well. No immediate post-procedural complications noted.      Thank you for considering IR for the care of your patient.      Chris Chahal MD  Interventional Radiology

## 2022-03-02 NOTE — DISCHARGE SUMMARY
Radiology Discharge Summary      Hospital Course: No complications    Admit Date: 3/2/2022  Discharge Date: 03/02/2022     Instructions Given to Patient: Yes  Diet: Resume prior diet  Activity: activity as tolerated    Description of Condition on Discharge: Stable  Vital Signs (Most Recent): BP: 107/76 (03/02/22 1128)    Discharge Disposition: Home    Discharge Diagnosis:  46 y.o. male with decompensated EtOH hepatic cirrhosis complicated by recurrent abdominal distension and pain 2/2 recurrent painful, tense ascites s/p successful US-guided percutaneous RUQ-approach therapeutic LVP with local anesthetic only and albumin infusion post PRN as indicated per institutional protocol. Patient tolerated the procedure well. No immediate post-procedural complications noted.      Thank you for considering IR for the care of your patient.      Chris Chahal MD  Interventional Radiology

## 2022-03-02 NOTE — H&P
Radiology History & Physical      SUBJECTIVE:     Chief Complaint: Recurrent painful, tense ascites     History of Present Illness:  Nilesh Rolon Jr. is a 46 y.o. male with pertinent PMHx of decompensated EtOH hepatic cirrhosis complicated by recurrent abdominal distension and pain 2/2 recurrent painful, tense ascitesrequiring frequent therapeutic large-volume paracentesis.      A new outpatient IR consult received for US-guided percutaneous RUQ-approach therapeutic LVP.    Past Medical History:   Diagnosis Date    DONG (acute kidney injury) 12/1/2021    Anxiety     Arthritis     Cirrhosis     Hypertension     Liver disease     Osteoarthritis     Other meniscus derangements, other medial meniscus, left knee 1/7/2019     Past Surgical History:   Procedure Laterality Date    ARTHROSCOPY OF KNEE Left 1/7/2019    Procedure: ARTHROSCOPY, KNEE;  Surgeon: Derick Kirby MD;  Location: [facility];  Service: Orthopedics;  Laterality: Left;    COLONOSCOPY N/A 7/27/2020    Procedure: COLONOSCOPY;  Surgeon: Sotero Zapata MD;  Location: [facility];  Service: Endoscopy;  Laterality: N/A;    COLONOSCOPY N/A 1/20/2022    Procedure: COLONOSCOPY Suprep Vaccinated;  Surgeon: Jacob Andrea MD;  Location: [facility];  Service: Endoscopy;  Laterality: N/A;    ESOPHAGOGASTRODUODENOSCOPY N/A 4/8/2019    Procedure: EGD (ESOPHAGOGASTRODUODENOSCOPY);  Surgeon: Bonita Kendall MD;  Location: [facility];  Service: Endoscopy;  Laterality: N/A;    ESOPHAGOGASTRODUODENOSCOPY N/A 7/27/2020    Procedure: EGD (ESOPHAGOGASTRODUODENOSCOPY);  Surgeon: Sotero Zapata MD;  Location: [facility];  Service: Endoscopy;  Laterality: N/A;    ESOPHAGOGASTRODUODENOSCOPY N/A 12/2/2021    Procedure: EGD (ESOPHAGOGASTRODUODENOSCOPY);  Surgeon: Montez Guerra MD;  Location: [facility] ([address] [place] [address]);  Service: Endoscopy;  Laterality: N/A;    ESOPHAGOGASTRODUODENOSCOPY N/A 1/20/2022    Procedure: EGD (ESOPHAGOGASTRODUODENOSCOPY);  Surgeon:  Jacob Andrea MD;  Location: [facility];  Service: Endoscopy;  Laterality: N/A;  please order CBC with plts and INR day of case.    KNEE SURGERY       Home Meds:   Prior to Admission medications    Medication Sig Start Date End Date Taking? Authorizing Provider   ferrous sulfate (FEOSOL) 325 mg (65 mg iron) Tab tablet Take 1 tablet (325 mg total) by mouth 2 (two) times daily. 2/2/22   Savita Bianchi MD   folic acid (FOLVITE) 1 MG tablet TAKE 1 TABLET(1 MG) BY MOUTH EVERY DAY 11/6/21   Dianne Jimenez NP   lactulose (CHRONULAC) 10 gram/15 mL solution Take 15 mLs (10 g total) by mouth 2 (two) times daily. 2/18/22   Pb Mixon MD   multivitamin Tab Take 1 tablet by mouth once daily. 2/2/22   Savita Bianchi MD   pantoprazole (PROTONIX) 40 MG tablet Take 1 tablet (40 mg total) by mouth once daily. 2/2/22 2/2/23  Savita Bianchi MD   thiamine 100 MG tablet Take 1 tablet (100 mg total) by mouth once daily. 1/9/22 1/9/23  Otoniel Espinoza MD     Anticoagulants/Antiplatelets: no anticoagulation    Allergies: Review of patient's allergies indicates:  No Known Allergies    Sedation History:  no adverse reactions     Review of Systems:   Hematological: no known coagulopathies  Respiratory: no cough, shortness of breath, or wheezing  Cardiovascular: no chest pain or dyspnea on exertion  Gastrointestinal: positive for - abdominal pain and distension  Genito-Urinary: no dysuria, trouble voiding, or hematuria  Musculoskeletal: negative  Neurological: no TIA or stroke symptoms       OBJECTIVE:     Vital Signs (Most Recent)     Physical Exam:  General: no acute distress  Mental Status: alert and oriented to person, place and time  HEENT: normocephalic, atraumatic  Chest: unlabored breathing  Heart: regular heart rate  Abdomen: +distended. No TTP/r/g.  Extremity: moves all extremities    Laboratory  Lab Results   Component Value Date    INR 1.7 (H) 02/23/2022       Lab Results    Component Value Date    WBC 5.05 02/23/2022    HGB 9.8 (L) 02/23/2022    HCT 29.0 (L) 02/23/2022    MCV 93 02/23/2022    PLT 83 (L) 02/23/2022      Lab Results   Component Value Date     (H) 02/23/2022     02/23/2022    K 4.5 02/23/2022     02/23/2022    CO2 21 (L) 02/23/2022    BUN 6 02/23/2022    CREATININE 1.1 02/23/2022    CALCIUM 8.6 (L) 02/23/2022    MG 1.8 02/01/2022    ALT 18 02/23/2022    AST 43 (H) 02/23/2022    ALBUMIN 2.9 (L) 02/23/2022    BILITOT 5.0 (H) 02/23/2022    BILIDIR 3.7 (H) 07/27/2020     ASSESSMENT/PLAN:     46 y.o. male with pertinent PMHx of decompensated EtOH hepatic cirrhosis complicated by recurrent abdominal distension and pain 2/2 recurrent painful, tense ascitesrequiring frequent therapeutic large-volume paracentesis.     1. Recurrent painful, tense ascites - Will attempt US-guided percutaneous RUQ-approach therapeutic LVP with local anesthetic only and albumin infusion post PRN as indicated per institutional protocol.     Risks (including, but not limited to, pain, bleeding, infection, damage to nearby structures, failure to obtain sufficient material for a diagnosis, the need for additional procedures, and death), benefits, and alternatives were discussed with the patient. All questions were answered to the best of my abilities. The patient wishes to proceed with the procedure. Written informed consent was obtained.     Thank you for considering IR for the care of your patient.      Chris Chahal MD  Interventional Radiology

## 2022-03-09 ENCOUNTER — LAB VISIT (OUTPATIENT)
Dept: LAB | Facility: HOSPITAL | Age: 47
End: 2022-03-09
Attending: INTERNAL MEDICINE
Payer: MEDICAID

## 2022-03-09 ENCOUNTER — HOSPITAL ENCOUNTER (OUTPATIENT)
Dept: INTERVENTIONAL RADIOLOGY/VASCULAR | Facility: HOSPITAL | Age: 47
Discharge: HOME | End: 2022-03-09
Attending: INTERNAL MEDICINE | Admitting: RADIOLOGY
Payer: MEDICAID

## 2022-03-09 VITALS
RESPIRATION RATE: 18 BRPM | OXYGEN SATURATION: 100 % | SYSTOLIC BLOOD PRESSURE: 118 MMHG | HEART RATE: 97 BPM | DIASTOLIC BLOOD PRESSURE: 75 MMHG | TEMPERATURE: 98 F

## 2022-03-09 DIAGNOSIS — K70.31 ALCOHOLIC CIRRHOSIS OF LIVER WITH ASCITES: ICD-10-CM

## 2022-03-09 DIAGNOSIS — K70.10 ALCOHOLIC HEPATITIS WITHOUT ASCITES: ICD-10-CM

## 2022-03-09 DIAGNOSIS — K72.90 DECOMPENSATED HEPATIC CIRRHOSIS: ICD-10-CM

## 2022-03-09 DIAGNOSIS — K74.60 DECOMPENSATED HEPATIC CIRRHOSIS: ICD-10-CM

## 2022-03-09 LAB
ALBUMIN SERPL BCP-MCNC: 3.1 G/DL (ref 3.5–5.2)
ALP SERPL-CCNC: 94 U/L (ref 55–135)
ALT SERPL W/O P-5'-P-CCNC: 19 U/L (ref 10–44)
ANION GAP SERPL CALC-SCNC: 13 MMOL/L (ref 8–16)
AST SERPL-CCNC: 40 U/L (ref 10–40)
BASOPHILS # BLD AUTO: 0.03 K/UL (ref 0–0.2)
BASOPHILS NFR BLD: 0.5 % (ref 0–1.9)
BILIRUB SERPL-MCNC: 4.3 MG/DL (ref 0.1–1)
BUN SERPL-MCNC: 8 MG/DL (ref 6–20)
CALCIUM SERPL-MCNC: 8.9 MG/DL (ref 8.7–10.5)
CHLORIDE SERPL-SCNC: 109 MMOL/L (ref 95–110)
CO2 SERPL-SCNC: 17 MMOL/L (ref 23–29)
CREAT SERPL-MCNC: 1 MG/DL (ref 0.5–1.4)
DIFFERENTIAL METHOD: ABNORMAL
EOSINOPHIL # BLD AUTO: 0.1 K/UL (ref 0–0.5)
EOSINOPHIL NFR BLD: 2 % (ref 0–8)
ERYTHROCYTE [DISTWIDTH] IN BLOOD BY AUTOMATED COUNT: 20.6 % (ref 11.5–14.5)
EST. GFR  (AFRICAN AMERICAN): >60 ML/MIN/1.73 M^2
EST. GFR  (NON AFRICAN AMERICAN): >60 ML/MIN/1.73 M^2
GLUCOSE SERPL-MCNC: 99 MG/DL (ref 70–110)
HCT VFR BLD AUTO: 31.3 % (ref 40–54)
HGB BLD-MCNC: 10.4 G/DL (ref 14–18)
IMM GRANULOCYTES # BLD AUTO: 0.01 K/UL (ref 0–0.04)
IMM GRANULOCYTES NFR BLD AUTO: 0.2 % (ref 0–0.5)
INR PPP: 1.4 (ref 0.8–1.2)
LYMPHOCYTES # BLD AUTO: 1.9 K/UL (ref 1–4.8)
LYMPHOCYTES NFR BLD: 31.4 % (ref 18–48)
MCH RBC QN AUTO: 31.6 PG (ref 27–31)
MCHC RBC AUTO-ENTMCNC: 33.2 G/DL (ref 32–36)
MCV RBC AUTO: 95 FL (ref 82–98)
MONOCYTES # BLD AUTO: 0.6 K/UL (ref 0.3–1)
MONOCYTES NFR BLD: 10 % (ref 4–15)
NEUTROPHILS # BLD AUTO: 3.4 K/UL (ref 1.8–7.7)
NEUTROPHILS NFR BLD: 55.9 % (ref 38–73)
NRBC BLD-RTO: 0 /100 WBC
PLATELET # BLD AUTO: 72 K/UL (ref 150–450)
PMV BLD AUTO: 9.8 FL (ref 9.2–12.9)
POTASSIUM SERPL-SCNC: 3.7 MMOL/L (ref 3.5–5.1)
PROT SERPL-MCNC: 6.8 G/DL (ref 6–8.4)
PROTHROMBIN TIME: 14 SEC (ref 9–12.5)
RBC # BLD AUTO: 3.29 M/UL (ref 4.6–6.2)
SODIUM SERPL-SCNC: 139 MMOL/L (ref 136–145)
WBC # BLD AUTO: 6.12 K/UL (ref 3.9–12.7)

## 2022-03-09 PROCEDURE — 49083 IR PARACENTESIS WITH IMAGING: ICD-10-PCS | Mod: ,,, | Performed by: RADIOLOGY

## 2022-03-09 PROCEDURE — 36415 COLL VENOUS BLD VENIPUNCTURE: CPT | Performed by: INTERNAL MEDICINE

## 2022-03-09 PROCEDURE — P9047 ALBUMIN (HUMAN), 25%, 50ML: HCPCS | Mod: JW,JG | Performed by: RADIOLOGY

## 2022-03-09 PROCEDURE — 85610 PROTHROMBIN TIME: CPT | Performed by: INTERNAL MEDICINE

## 2022-03-09 PROCEDURE — C1729 CATH, DRAINAGE: HCPCS

## 2022-03-09 PROCEDURE — 80053 COMPREHEN METABOLIC PANEL: CPT | Performed by: INTERNAL MEDICINE

## 2022-03-09 PROCEDURE — 85025 COMPLETE CBC W/AUTO DIFF WBC: CPT | Performed by: INTERNAL MEDICINE

## 2022-03-09 PROCEDURE — 63600175 PHARM REV CODE 636 W HCPCS: Mod: JW,JG | Performed by: RADIOLOGY

## 2022-03-09 PROCEDURE — 49083 ABD PARACENTESIS W/IMAGING: CPT | Performed by: RADIOLOGY

## 2022-03-09 RX ORDER — ALBUMIN HUMAN 250 G/1000ML
37.5 SOLUTION INTRAVENOUS ONCE AS NEEDED
Status: COMPLETED | OUTPATIENT
Start: 2022-03-09 | End: 2022-03-09

## 2022-03-09 RX ORDER — ALBUMIN HUMAN 250 G/1000ML
25 SOLUTION INTRAVENOUS ONCE
Status: DISCONTINUED | OUTPATIENT
Start: 2022-03-09 | End: 2022-03-09

## 2022-03-09 RX ORDER — ALBUMIN HUMAN 250 G/1000ML
37.5 SOLUTION INTRAVENOUS ONCE AS NEEDED
Status: DISCONTINUED | OUTPATIENT
Start: 2022-03-09 | End: 2022-03-09

## 2022-03-09 RX ORDER — ALBUMIN HUMAN 250 G/1000ML
25 SOLUTION INTRAVENOUS ONCE AS NEEDED
Status: DISCONTINUED | OUTPATIENT
Start: 2022-03-09 | End: 2022-03-09

## 2022-03-09 RX ADMIN — ALBUMIN (HUMAN) 37.5 G: 5 SOLUTION INTRAVENOUS at 12:03

## 2022-03-09 NOTE — DISCHARGE SUMMARY
Radiology Discharge Summary      Hospital Course: No complications    Admit Date: 3/9/2022  Discharge Date: 03/09/2022     Instructions Given to Patient: Yes  Diet: Resume prior diet  Activity: activity as tolerated    Description of Condition on Discharge: Stable  Vital Signs (Most Recent): BP: 129/77 (03/09/22 1120)    Discharge Disposition: Home    Discharge Diagnosis:  46 y.o. male with decompensated EtOH hepatic cirrhosis complicated by recurrent abdominal distension and pain 2/2 recurrent painful, tense ascites s/p successful US-guided percutaneous RUQ-approach therapeutic LVP with local anesthetic only and albumin infusion post PRN as indicated per institutional protocol. Patient tolerated the procedure well. No immediate post-procedural complications noted.      Thank you for considering IR for the care of your patient.      Chris Chahal MD  Interventional Radiology

## 2022-03-09 NOTE — H&P
Radiology History & Physical      SUBJECTIVE:     Chief Complaint: Recurrent painful, tense ascites     History of Present Illness:  Nilesh Rolon Jr. is a 46 y.o. male with pertinent PMHx of decompensated EtOH hepatic cirrhosis complicated by recurrent abdominal distension and pain 2/2 recurrent painful, tense ascitesrequiring frequent therapeutic large-volume paracentesis.      A new outpatient IR consult received for US-guided percutaneous RUQ-approach therapeutic LVP.    Past Medical History:   Diagnosis Date    DONG (acute kidney injury) 12/1/2021    Anxiety     Arthritis     Cirrhosis     Hypertension     Liver disease     Osteoarthritis     Other meniscus derangements, other medial meniscus, left knee 1/7/2019     Past Surgical History:   Procedure Laterality Date    ARTHROSCOPY OF KNEE Left 1/7/2019    Procedure: ARTHROSCOPY, KNEE;  Surgeon: Derick Kirby MD;  Location: [facility];  Service: Orthopedics;  Laterality: Left;    COLONOSCOPY N/A 7/27/2020    Procedure: COLONOSCOPY;  Surgeon: Sotero Zapata MD;  Location: [facility];  Service: Endoscopy;  Laterality: N/A;    COLONOSCOPY N/A 1/20/2022    Procedure: COLONOSCOPY Suprep Vaccinated;  Surgeon: Jacob Andrea MD;  Location: [facility];  Service: Endoscopy;  Laterality: N/A;    ESOPHAGOGASTRODUODENOSCOPY N/A 4/8/2019    Procedure: EGD (ESOPHAGOGASTRODUODENOSCOPY);  Surgeon: Bonita Kendall MD;  Location: [facility];  Service: Endoscopy;  Laterality: N/A;    ESOPHAGOGASTRODUODENOSCOPY N/A 7/27/2020    Procedure: EGD (ESOPHAGOGASTRODUODENOSCOPY);  Surgeon: Sotero Zapata MD;  Location: [facility];  Service: Endoscopy;  Laterality: N/A;    ESOPHAGOGASTRODUODENOSCOPY N/A 12/2/2021    Procedure: EGD (ESOPHAGOGASTRODUODENOSCOPY);  Surgeon: Montez Guerra MD;  Location: [facility] ([address] [place] [address]);  Service: Endoscopy;  Laterality: N/A;    ESOPHAGOGASTRODUODENOSCOPY N/A 1/20/2022    Procedure: EGD (ESOPHAGOGASTRODUODENOSCOPY);  Surgeon:  Jacob Andrea MD;  Location: [facility];  Service: Endoscopy;  Laterality: N/A;  please order CBC with plts and INR day of case.    KNEE SURGERY       Home Meds:   Prior to Admission medications    Medication Sig Start Date End Date Taking? Authorizing Provider   ferrous sulfate (FEOSOL) 325 mg (65 mg iron) Tab tablet Take 1 tablet (325 mg total) by mouth 2 (two) times daily. 2/2/22   Savita Bianchi MD   folic acid (FOLVITE) 1 MG tablet TAKE 1 TABLET(1 MG) BY MOUTH EVERY DAY 11/6/21   Dianne Jimenez NP   lactulose (CHRONULAC) 10 gram/15 mL solution Take 15 mLs (10 g total) by mouth 2 (two) times daily. 2/18/22   Pb Mixon MD   multivitamin Tab Take 1 tablet by mouth once daily. 2/2/22   Savita Bianchi MD   pantoprazole (PROTONIX) 40 MG tablet Take 1 tablet (40 mg total) by mouth once daily. 2/2/22 2/2/23  Savita Bianchi MD   thiamine 100 MG tablet Take 1 tablet (100 mg total) by mouth once daily. 1/9/22 1/9/23  Otoniel Espinoza MD     Anticoagulants/Antiplatelets: no anticoagulation    Allergies: Review of patient's allergies indicates:  No Known Allergies     Sedation History:  no adverse reactions     Review of Systems:   Hematological: no known coagulopathies  Respiratory: no cough, shortness of breath, or wheezing  Cardiovascular: no chest pain or dyspnea on exertion  Gastrointestinal: positive for - abdominal pain and distension  Genito-Urinary: no dysuria, trouble voiding, or hematuria  Musculoskeletal: negative  Neurological: no TIA or stroke symptoms       OBJECTIVE:     Vital Signs (Most Recent)  BP: (!) 140/88 (03/09/22 1046)    Physical Exam:  General: no acute distress  Mental Status: alert and oriented to person, place and time  HEENT: normocephalic, atraumatic  Chest: unlabored breathing  Heart: regular heart rate  Abdomen: +distended. No TTP/r/g.  Extremity: moves all extremities    Laboratory  Lab Results   Component Value Date    INR 1.4  (H) 03/09/2022       Lab Results   Component Value Date    WBC 6.12 03/09/2022    HGB 10.4 (L) 03/09/2022    HCT 31.3 (L) 03/09/2022    MCV 95 03/09/2022    PLT 72 (L) 03/09/2022      Lab Results   Component Value Date    GLU 99 03/09/2022     03/09/2022    K 3.7 03/09/2022     03/09/2022    CO2 20 (L) 03/02/2022    BUN 8 03/09/2022    CREATININE 1.0 03/09/2022    CALCIUM 8.9 03/09/2022    MG 1.8 02/01/2022    ALT 19 03/09/2022    AST 40 03/09/2022    ALBUMIN 3.1 (L) 03/09/2022    BILITOT 4.3 (H) 03/09/2022    BILIDIR 3.7 (H) 07/27/2020     ASSESSMENT/PLAN:     46 y.o. male with pertinent PMHx of decompensated EtOH hepatic cirrhosis complicated by recurrent abdominal distension and pain 2/2 recurrent painful, tense ascitesrequiring frequent therapeutic large-volume paracentesis.     1. Recurrent painful, tense ascites - Will attempt US-guided percutaneous RUQ-approach therapeutic LVP with local anesthetic only and albumin infusion post PRN as indicated per institutional protocol.     Risks (including, but not limited to, pain, bleeding, infection, damage to nearby structures, failure to obtain sufficient material for a diagnosis, the need for additional procedures, and death), benefits, and alternatives were discussed with the patient. All questions were answered to the best of my abilities. The patient wishes to proceed with the procedure. Written informed consent was obtained.     Thank you for considering IR for the care of your patient.      Chris Chahal MD  Interventional Radiology

## 2022-03-09 NOTE — BRIEF OP NOTE
Radiology Post-Procedure Note     Pre Op Diagnosis: Recurrent painful, tense ascites  Post Op Diagnosis: Same     Procedure: 1. US-guided percutaneous RUQ-approach therapeutic LVP     Procedure performed by: Chris Chahal MD     Written Informed Consent Obtained: Yes  Specimen Removed: YES, 5,000-cc of thin, straw-colored ascitic fluid  Estimated Blood Loss: none     Findings:   Successful US-guided percutaneous RUQ-approach therapeutic LVP with local anesthetic only and albumin infusion post PRN as indicated per institutional protocol. Patient tolerated the procedure well. No immediate post-procedural complications noted.      Thank you for considering IR for the care of your patient.      Chris Chahal MD  Interventional Radiology

## 2022-03-10 ENCOUNTER — TELEPHONE (OUTPATIENT)
Dept: HEPATOLOGY | Facility: CLINIC | Age: 47
End: 2022-03-10
Payer: MEDICAID

## 2022-03-10 ENCOUNTER — PATIENT MESSAGE (OUTPATIENT)
Dept: HEPATOLOGY | Facility: CLINIC | Age: 47
End: 2022-03-10
Payer: MEDICAID

## 2022-03-10 ENCOUNTER — NURSE TRIAGE (OUTPATIENT)
Dept: ADMINISTRATIVE | Facility: CLINIC | Age: 47
End: 2022-03-10
Payer: MEDICAID

## 2022-03-10 RX ORDER — LACTULOSE 10 G/15ML
10 SOLUTION ORAL; RECTAL 2 TIMES DAILY
Qty: 1800 ML | Refills: 2 | Status: SHIPPED | OUTPATIENT
Start: 2022-03-10 | End: 2022-03-11

## 2022-03-10 NOTE — TELEPHONE ENCOUNTER
----- Message from Delma Cruz sent at 3/10/2022  9:25 AM CST -----    ----- Message -----  From: Pb Mixon MD  Sent: 3/10/2022   8:28 AM CST  To: [facility] Post-Liver Transplant Clinical    Results reviewed. No action.

## 2022-03-11 ENCOUNTER — PATIENT MESSAGE (OUTPATIENT)
Dept: HEPATOLOGY | Facility: CLINIC | Age: 47
End: 2022-03-11
Payer: MEDICAID

## 2022-03-11 DIAGNOSIS — K72.90 DECOMPENSATED HEPATIC CIRRHOSIS: ICD-10-CM

## 2022-03-11 DIAGNOSIS — K74.60 DECOMPENSATED HEPATIC CIRRHOSIS: ICD-10-CM

## 2022-03-11 DIAGNOSIS — K70.10 ALCOHOLIC HEPATITIS WITHOUT ASCITES: Primary | ICD-10-CM

## 2022-03-11 RX ORDER — LACTULOSE 10 G/15ML
30 SOLUTION ORAL 2 TIMES DAILY
Qty: 1800 ML | Refills: 6 | Status: CANCELLED | OUTPATIENT
Start: 2022-03-11

## 2022-03-11 RX ORDER — LACTULOSE 10 G/15ML
30 SOLUTION ORAL 2 TIMES DAILY
COMMUNITY
Start: 2022-03-11 | End: 2022-03-14 | Stop reason: SDUPTHER

## 2022-03-11 NOTE — TELEPHONE ENCOUNTER
OCC nurse callback requested regarding rx issue. Unable to reach pt at listed number for callback. VM not setup therefore unable to leave VM.     Reason for Disposition   Second attempt to contact caller AND no contact made. Phone number verified.    Protocols used: NO CONTACT OR DUPLICATE CONTACT CALL-A-AH

## 2022-03-14 DIAGNOSIS — K76.82 HEPATIC ENCEPHALOPATHY: Primary | ICD-10-CM

## 2022-03-14 RX ORDER — LACTULOSE 10 G/15ML
30 SOLUTION ORAL; RECTAL 2 TIMES DAILY
Qty: 1892 ML | Refills: 6 | Status: SHIPPED | OUTPATIENT
Start: 2022-03-14 | End: 2023-05-09 | Stop reason: SDUPTHER

## 2022-03-16 ENCOUNTER — HOSPITAL ENCOUNTER (OUTPATIENT)
Dept: INTERVENTIONAL RADIOLOGY/VASCULAR | Facility: HOSPITAL | Age: 47
Discharge: HOME | End: 2022-03-16
Attending: INTERNAL MEDICINE | Admitting: RADIOLOGY
Payer: MEDICAID

## 2022-03-16 ENCOUNTER — LAB VISIT (OUTPATIENT)
Dept: LAB | Facility: HOSPITAL | Age: 47
End: 2022-03-16
Attending: INTERNAL MEDICINE
Payer: MEDICAID

## 2022-03-16 VITALS
TEMPERATURE: 98 F | HEART RATE: 75 BPM | OXYGEN SATURATION: 100 % | DIASTOLIC BLOOD PRESSURE: 55 MMHG | RESPIRATION RATE: 16 BRPM | SYSTOLIC BLOOD PRESSURE: 100 MMHG

## 2022-03-16 DIAGNOSIS — K70.10 ALCOHOLIC HEPATITIS WITHOUT ASCITES: ICD-10-CM

## 2022-03-16 DIAGNOSIS — K70.31 ALCOHOLIC CIRRHOSIS OF LIVER WITH ASCITES: ICD-10-CM

## 2022-03-16 DIAGNOSIS — K70.31 ALCOHOLIC CIRRHOSIS OF LIVER WITH ASCITES: Primary | ICD-10-CM

## 2022-03-16 LAB
ALBUMIN SERPL BCP-MCNC: 3 G/DL (ref 3.5–5.2)
ALP SERPL-CCNC: 96 U/L (ref 55–135)
ALT SERPL W/O P-5'-P-CCNC: 14 U/L (ref 10–44)
ANION GAP SERPL CALC-SCNC: 9 MMOL/L (ref 8–16)
AST SERPL-CCNC: 36 U/L (ref 10–40)
BASOPHILS # BLD AUTO: 0.04 K/UL (ref 0–0.2)
BASOPHILS NFR BLD: 0.8 % (ref 0–1.9)
BILIRUB SERPL-MCNC: 3.7 MG/DL (ref 0.1–1)
BUN SERPL-MCNC: 8 MG/DL (ref 6–20)
CALCIUM SERPL-MCNC: 8.7 MG/DL (ref 8.7–10.5)
CHLORIDE SERPL-SCNC: 107 MMOL/L (ref 95–110)
CO2 SERPL-SCNC: 21 MMOL/L (ref 23–29)
CREAT SERPL-MCNC: 1.1 MG/DL (ref 0.5–1.4)
DIFFERENTIAL METHOD: ABNORMAL
EOSINOPHIL # BLD AUTO: 0.1 K/UL (ref 0–0.5)
EOSINOPHIL NFR BLD: 2.7 % (ref 0–8)
ERYTHROCYTE [DISTWIDTH] IN BLOOD BY AUTOMATED COUNT: 20 % (ref 11.5–14.5)
EST. GFR  (AFRICAN AMERICAN): >60 ML/MIN/1.73 M^2
EST. GFR  (NON AFRICAN AMERICAN): >60 ML/MIN/1.73 M^2
GLUCOSE SERPL-MCNC: 100 MG/DL (ref 70–110)
HCT VFR BLD AUTO: 30.8 % (ref 40–54)
HGB BLD-MCNC: 10 G/DL (ref 14–18)
IMM GRANULOCYTES # BLD AUTO: 0.01 K/UL (ref 0–0.04)
IMM GRANULOCYTES NFR BLD AUTO: 0.2 % (ref 0–0.5)
INR PPP: 1.4 (ref 0.8–1.2)
LYMPHOCYTES # BLD AUTO: 1.8 K/UL (ref 1–4.8)
LYMPHOCYTES NFR BLD: 34.1 % (ref 18–48)
MCH RBC QN AUTO: 31.6 PG (ref 27–31)
MCHC RBC AUTO-ENTMCNC: 32.5 G/DL (ref 32–36)
MCV RBC AUTO: 98 FL (ref 82–98)
MONOCYTES # BLD AUTO: 0.5 K/UL (ref 0.3–1)
MONOCYTES NFR BLD: 10.3 % (ref 4–15)
NEUTROPHILS # BLD AUTO: 2.7 K/UL (ref 1.8–7.7)
NEUTROPHILS NFR BLD: 51.9 % (ref 38–73)
NRBC BLD-RTO: 0 /100 WBC
PLATELET # BLD AUTO: 79 K/UL (ref 150–450)
PMV BLD AUTO: 10 FL (ref 9.2–12.9)
POTASSIUM SERPL-SCNC: 3.7 MMOL/L (ref 3.5–5.1)
PROT SERPL-MCNC: 6.3 G/DL (ref 6–8.4)
PROTHROMBIN TIME: 14.3 SEC (ref 9–12.5)
RBC # BLD AUTO: 3.16 M/UL (ref 4.6–6.2)
SODIUM SERPL-SCNC: 137 MMOL/L (ref 136–145)
WBC # BLD AUTO: 5.25 K/UL (ref 3.9–12.7)

## 2022-03-16 PROCEDURE — 85610 PROTHROMBIN TIME: CPT | Performed by: INTERNAL MEDICINE

## 2022-03-16 PROCEDURE — 80053 COMPREHEN METABOLIC PANEL: CPT | Performed by: INTERNAL MEDICINE

## 2022-03-16 PROCEDURE — 49083 ABD PARACENTESIS W/IMAGING: CPT | Performed by: RADIOLOGY

## 2022-03-16 PROCEDURE — 85025 COMPLETE CBC W/AUTO DIFF WBC: CPT | Performed by: INTERNAL MEDICINE

## 2022-03-16 PROCEDURE — 49083 IR PARACENTESIS WITH IMAGING: ICD-10-PCS | Mod: ,,, | Performed by: RADIOLOGY

## 2022-03-16 PROCEDURE — 36415 COLL VENOUS BLD VENIPUNCTURE: CPT | Performed by: INTERNAL MEDICINE

## 2022-03-16 PROCEDURE — C1729 CATH, DRAINAGE: HCPCS

## 2022-03-16 PROCEDURE — 63600175 PHARM REV CODE 636 W HCPCS: Mod: JG | Performed by: RADIOLOGY

## 2022-03-16 PROCEDURE — P9047 ALBUMIN (HUMAN), 25%, 50ML: HCPCS | Mod: JG | Performed by: RADIOLOGY

## 2022-03-16 RX ORDER — ALBUMIN HUMAN 250 G/1000ML
50 SOLUTION INTRAVENOUS ONCE AS NEEDED
Status: COMPLETED | OUTPATIENT
Start: 2022-03-16 | End: 2022-03-16

## 2022-03-16 RX ADMIN — ALBUMIN (HUMAN) 50 G: 5 SOLUTION INTRAVENOUS at 11:03

## 2022-03-16 NOTE — BRIEF OP NOTE
Radiology Post-Procedure Note     Pre Op Diagnosis: Recurrent painful, tense ascites  Post Op Diagnosis: Same     Procedure: 1. US-guided percutaneous RUQ-approach therapeutic LVP     Procedure performed by: Chris Chahal MD     Written Informed Consent Obtained: Yes  Specimen Removed: YES, 7,000-cc of thin, straw-colored ascitic fluid  Estimated Blood Loss: none     Findings:   Successful US-guided percutaneous RUQ-approach therapeutic LVP with local anesthetic only and albumin infusion post PRN as indicated per institutional protocol. Patient tolerated the procedure well. No immediate post-procedural complications noted.      Thank you for considering IR for the care of your patient.      Chris Chahal MD  Interventional Radiology

## 2022-03-16 NOTE — H&P
Radiology History & Physical      SUBJECTIVE:     Chief Complaint: Recurrent painful, tense ascites     History of Present Illness:  Nilesh Rolon Jr. is a 46 y.o. male with pertinent PMHx of decompensated EtOH hepatic cirrhosis complicated by recurrent abdominal distension and pain 2/2 recurrent painful, tense ascites requiring frequent decompression for symptom relief.      A new outpatient IR consult received for US-guided percutaneous RUQ-approach therapeutic large-volume paracentesis.    Past Medical History:   Diagnosis Date    DONG (acute kidney injury) 12/1/2021    Anxiety     Arthritis     Cirrhosis     Hypertension     Liver disease     Osteoarthritis     Other meniscus derangements, other medial meniscus, left knee 1/7/2019     Past Surgical History:   Procedure Laterality Date    ARTHROSCOPY OF KNEE Left 1/7/2019    Procedure: ARTHROSCOPY, KNEE;  Surgeon: Derick Kirby MD;  Location: [facility];  Service: Orthopedics;  Laterality: Left;    COLONOSCOPY N/A 7/27/2020    Procedure: COLONOSCOPY;  Surgeon: Sotero Zapata MD;  Location: [facility];  Service: Endoscopy;  Laterality: N/A;    COLONOSCOPY N/A 1/20/2022    Procedure: COLONOSCOPY Suprep Vaccinated;  Surgeon: Jacob Andrea MD;  Location: [facility];  Service: Endoscopy;  Laterality: N/A;    ESOPHAGOGASTRODUODENOSCOPY N/A 4/8/2019    Procedure: EGD (ESOPHAGOGASTRODUODENOSCOPY);  Surgeon: Bonita Kendall MD;  Location: [facility];  Service: Endoscopy;  Laterality: N/A;    ESOPHAGOGASTRODUODENOSCOPY N/A 7/27/2020    Procedure: EGD (ESOPHAGOGASTRODUODENOSCOPY);  Surgeon: Sotero Zapata MD;  Location: [facility];  Service: Endoscopy;  Laterality: N/A;    ESOPHAGOGASTRODUODENOSCOPY N/A 12/2/2021    Procedure: EGD (ESOPHAGOGASTRODUODENOSCOPY);  Surgeon: Montez Guerra MD;  Location: [facility] ([address] [place] [address]);  Service: Endoscopy;  Laterality: N/A;    ESOPHAGOGASTRODUODENOSCOPY N/A 1/20/2022    Procedure: EGD (ESOPHAGOGASTRODUODENOSCOPY);   Surgeon: Jacob Andrea MD;  Location: [facility];  Service: Endoscopy;  Laterality: N/A;  please order CBC with plts and INR day of case.    KNEE SURGERY       Home Meds:   Prior to Admission medications    Medication Sig Start Date End Date Taking? Authorizing Provider   ferrous sulfate (FEOSOL) 325 mg (65 mg iron) Tab tablet Take 1 tablet (325 mg total) by mouth 2 (two) times daily. 2/2/22   Savita Bianchi MD   folic acid (FOLVITE) 1 MG tablet TAKE 1 TABLET(1 MG) BY MOUTH EVERY DAY 11/6/21   Dianne Jimenez NP   lactulose (CHRONULAC) 10 gram/15 mL solution Take 30 mLs (20 g total) by mouth 2 (two) times daily. Goal of 3-4 bowel movements daily 3/14/22   Pb Mixon MD   multivitamin Tab Take 1 tablet by mouth once daily. 2/2/22   Savita Bianchi MD   pantoprazole (PROTONIX) 40 MG tablet Take 1 tablet (40 mg total) by mouth once daily. 2/2/22 2/2/23  Savita Bianchi MD   thiamine 100 MG tablet Take 1 tablet (100 mg total) by mouth once daily. 1/9/22 1/9/23  Otoniel Espinoza MD     Anticoagulants/Antiplatelets: no anticoagulation    Allergies: Review of patient's allergies indicates:  No Known Allergies    Sedation History:  no adverse reactions     Review of Systems:   Hematological: no known coagulopathies  Respiratory: no cough, shortness of breath, or wheezing  Cardiovascular: no chest pain or dyspnea on exertion  Gastrointestinal: positive for - abdominal pain and distension  Genito-Urinary: no dysuria, trouble voiding, or hematuria  Musculoskeletal: negative  Neurological: no TIA or stroke symptoms       OBJECTIVE:     Vital Signs (Most Recent)     Physical Exam:  General: no acute distress  Mental Status: alert and oriented to person, place and time  HEENT: normocephalic, atraumatic  Chest: unlabored breathing  Heart: regular heart rate  Abdomen: +distended. No TTP/r/g.  Extremity: moves all extremities    Laboratory  Lab Results   Component Value Date     INR 1.4 (H) 03/16/2022       Lab Results   Component Value Date    WBC 5.25 03/16/2022    HGB 10.0 (L) 03/16/2022    HCT 30.8 (L) 03/16/2022    MCV 98 03/16/2022    PLT 79 (L) 03/16/2022      Lab Results   Component Value Date    GLU 99 03/09/2022     03/09/2022    K 3.7 03/09/2022     03/09/2022    CO2 17 (L) 03/09/2022    BUN 8 03/09/2022    CREATININE 1.0 03/09/2022    CALCIUM 8.9 03/09/2022    MG 1.8 02/01/2022    ALT 19 03/09/2022    AST 40 03/09/2022    ALBUMIN 3.1 (L) 03/09/2022    BILITOT 4.3 (H) 03/09/2022    BILIDIR 3.7 (H) 07/27/2020     ASSESSMENT/PLAN:     46 y.o. male with pertinent PMHx of decompensated EtOH hepatic cirrhosis complicated by recurrent abdominal distension and pain 2/2 recurrent painful, tense ascitesrequiring frequent therapeutic large-volume paracentesis.     1. Recurrent painful, tense ascites - Will attempt US-guided percutaneous RUQ-approach therapeutic LVP with local anesthetic only and albumin infusion post PRN as indicated per institutional protocol.     Risks (including, but not limited to, pain, bleeding, infection, damage to nearby structures, failure to obtain sufficient material for a diagnosis, the need for additional procedures, and death), benefits, and alternatives were discussed with the patient. All questions were answered to the best of my abilities. The patient wishes to proceed with the procedure. Written informed consent was obtained.     Thank you for considering IR for the care of your patient.      Chris Chahal MD  Interventional Radiology

## 2022-03-16 NOTE — DISCHARGE SUMMARY
Radiology Discharge Summary      Hospital Course: No complications    Admit Date: 3/16/2022  Discharge Date: 03/16/2022     Instructions Given to Patient: Yes  Diet: Resume prior diet  Activity: activity as tolerated    Description of Condition on Discharge: Stable  Vital Signs (Most Recent): BP: 107/65 (03/16/22 1039)    Discharge Disposition: Home    Discharge Diagnosis:  46 y.o. male with decompensated EtOH hepatic cirrhosis complicated by recurrent abdominal distension and pain 2/2 recurrent painful, tense ascites s/p successful US-guided percutaneous RUQ-approach therapeutic LVP with local anesthetic only and albumin infusion post PRN as indicated per institutional protocol. Patient tolerated the procedure well. No immediate post-procedural complications noted.      Thank you for considering IR for the care of your patient.      Chris Chahal MD  Interventional Radiology

## 2022-03-22 ENCOUNTER — PATIENT OUTREACH (OUTPATIENT)
Dept: ADMINISTRATIVE | Facility: OTHER | Age: 47
End: 2022-03-22
Payer: MEDICAID

## 2022-03-22 NOTE — PROGRESS NOTES
CHW - Case Closure    I spoke to Nilesh Rolon via telephone  today.   Pt denied any additional needs at this time and agrees with episode closure at this time.  Provided Nilesh Rolon with my contact information and encouraged him to contact me if additional needs arise.

## 2022-03-23 ENCOUNTER — HOSPITAL ENCOUNTER (OUTPATIENT)
Dept: INTERVENTIONAL RADIOLOGY/VASCULAR | Facility: HOSPITAL | Age: 47
Discharge: HOME | End: 2022-03-23
Attending: INTERNAL MEDICINE
Payer: MEDICAID

## 2022-03-23 ENCOUNTER — TELEPHONE (OUTPATIENT)
Dept: SURGERY | Facility: CLINIC | Age: 47
End: 2022-03-23
Payer: MEDICAID

## 2022-03-23 ENCOUNTER — LAB VISIT (OUTPATIENT)
Dept: LAB | Facility: HOSPITAL | Age: 47
End: 2022-03-23
Attending: INTERNAL MEDICINE
Payer: MEDICAID

## 2022-03-23 VITALS
RESPIRATION RATE: 16 BRPM | HEART RATE: 80 BPM | TEMPERATURE: 98 F | DIASTOLIC BLOOD PRESSURE: 66 MMHG | OXYGEN SATURATION: 99 % | SYSTOLIC BLOOD PRESSURE: 120 MMHG

## 2022-03-23 DIAGNOSIS — K70.31 ALCOHOLIC CIRRHOSIS OF LIVER WITH ASCITES: ICD-10-CM

## 2022-03-23 DIAGNOSIS — K70.10 ALCOHOLIC HEPATITIS WITHOUT ASCITES: ICD-10-CM

## 2022-03-23 LAB
ALBUMIN SERPL BCP-MCNC: 3.1 G/DL (ref 3.5–5.2)
ALP SERPL-CCNC: 123 U/L (ref 55–135)
ALT SERPL W/O P-5'-P-CCNC: 13 U/L (ref 10–44)
ANION GAP SERPL CALC-SCNC: 8 MMOL/L (ref 8–16)
AST SERPL-CCNC: 41 U/L (ref 10–40)
BASOPHILS # BLD AUTO: 0.04 K/UL (ref 0–0.2)
BASOPHILS NFR BLD: 0.6 % (ref 0–1.9)
BILIRUB SERPL-MCNC: 3.9 MG/DL (ref 0.1–1)
BUN SERPL-MCNC: 8 MG/DL (ref 6–20)
CALCIUM SERPL-MCNC: 8.7 MG/DL (ref 8.7–10.5)
CHLORIDE SERPL-SCNC: 110 MMOL/L (ref 95–110)
CO2 SERPL-SCNC: 20 MMOL/L (ref 23–29)
CREAT SERPL-MCNC: 1 MG/DL (ref 0.5–1.4)
DIFFERENTIAL METHOD: ABNORMAL
EOSINOPHIL # BLD AUTO: 0.1 K/UL (ref 0–0.5)
EOSINOPHIL NFR BLD: 1.9 % (ref 0–8)
ERYTHROCYTE [DISTWIDTH] IN BLOOD BY AUTOMATED COUNT: 19.2 % (ref 11.5–14.5)
EST. GFR  (AFRICAN AMERICAN): >60 ML/MIN/1.73 M^2
EST. GFR  (NON AFRICAN AMERICAN): >60 ML/MIN/1.73 M^2
GLUCOSE SERPL-MCNC: 109 MG/DL (ref 70–110)
HCT VFR BLD AUTO: 34.6 % (ref 40–54)
HGB BLD-MCNC: 11.6 G/DL (ref 14–18)
IMM GRANULOCYTES # BLD AUTO: 0.02 K/UL (ref 0–0.04)
IMM GRANULOCYTES NFR BLD AUTO: 0.3 % (ref 0–0.5)
INR PPP: 1.4 (ref 0.8–1.2)
LYMPHOCYTES # BLD AUTO: 1.2 K/UL (ref 1–4.8)
LYMPHOCYTES NFR BLD: 16.8 % (ref 18–48)
MCH RBC QN AUTO: 32.9 PG (ref 27–31)
MCHC RBC AUTO-ENTMCNC: 33.5 G/DL (ref 32–36)
MCV RBC AUTO: 98 FL (ref 82–98)
MONOCYTES # BLD AUTO: 0.5 K/UL (ref 0.3–1)
MONOCYTES NFR BLD: 7.5 % (ref 4–15)
NEUTROPHILS # BLD AUTO: 5 K/UL (ref 1.8–7.7)
NEUTROPHILS NFR BLD: 72.9 % (ref 38–73)
NRBC BLD-RTO: 0 /100 WBC
PLATELET # BLD AUTO: 104 K/UL (ref 150–450)
PMV BLD AUTO: 10.6 FL (ref 9.2–12.9)
POTASSIUM SERPL-SCNC: 4.2 MMOL/L (ref 3.5–5.1)
PROT SERPL-MCNC: 6.8 G/DL (ref 6–8.4)
PROTHROMBIN TIME: 14 SEC (ref 9–12.5)
RBC # BLD AUTO: 3.53 M/UL (ref 4.6–6.2)
SODIUM SERPL-SCNC: 138 MMOL/L (ref 136–145)
WBC # BLD AUTO: 6.84 K/UL (ref 3.9–12.7)

## 2022-03-23 PROCEDURE — 85025 COMPLETE CBC W/AUTO DIFF WBC: CPT | Performed by: INTERNAL MEDICINE

## 2022-03-23 PROCEDURE — 80053 COMPREHEN METABOLIC PANEL: CPT | Performed by: INTERNAL MEDICINE

## 2022-03-23 PROCEDURE — 49083 IR PARACENTESIS WITH IMAGING: ICD-10-PCS | Mod: ,,, | Performed by: RADIOLOGY

## 2022-03-23 PROCEDURE — 49083 ABD PARACENTESIS W/IMAGING: CPT | Performed by: RADIOLOGY

## 2022-03-23 PROCEDURE — P9047 ALBUMIN (HUMAN), 25%, 50ML: HCPCS | Mod: JG | Performed by: RADIOLOGY

## 2022-03-23 PROCEDURE — 25000003 PHARM REV CODE 250: Performed by: RADIOLOGY

## 2022-03-23 PROCEDURE — 63600175 PHARM REV CODE 636 W HCPCS: Mod: JG | Performed by: RADIOLOGY

## 2022-03-23 PROCEDURE — 85610 PROTHROMBIN TIME: CPT | Performed by: INTERNAL MEDICINE

## 2022-03-23 PROCEDURE — C1729 CATH, DRAINAGE: HCPCS

## 2022-03-23 PROCEDURE — 36415 COLL VENOUS BLD VENIPUNCTURE: CPT | Performed by: INTERNAL MEDICINE

## 2022-03-23 RX ORDER — ALBUMIN HUMAN 250 G/1000ML
50 SOLUTION INTRAVENOUS ONCE AS NEEDED
Status: COMPLETED | OUTPATIENT
Start: 2022-03-23 | End: 2022-03-23

## 2022-03-23 RX ORDER — LIDOCAINE HYDROCHLORIDE 20 MG/ML
INJECTION, SOLUTION INFILTRATION; PERINEURAL CODE/TRAUMA/SEDATION MEDICATION
Status: COMPLETED | OUTPATIENT
Start: 2022-03-23 | End: 2022-03-23

## 2022-03-23 RX ADMIN — LIDOCAINE HYDROCHLORIDE 10 ML: 20 INJECTION, SOLUTION INFILTRATION; PERINEURAL at 11:03

## 2022-03-23 RX ADMIN — ALBUMIN (HUMAN) 50 G: 12.5 SOLUTION INTRAVENOUS at 11:03

## 2022-03-23 NOTE — TELEPHONE ENCOUNTER
Spoke with girlfriend of patient and she would like to be scheduled with Dr. Ruiz. Instructed that it is a different department and call transferred.

## 2022-03-23 NOTE — PROCEDURES
[facility] Interventional Radiology  Interventional Radiology  High Risk Procedure - Outpatient    Date: 03/23/2022 Time: 11:56 AM    Pre-Op Diagnosis: Recurrent ascites    Post-Op Diagnosis: same    Procedure Performed by: Richie Acuña MD    Assistant: none    Procedure: U/S guided paracentesis    Specimen/Tissue Removed: 5100 mL of clear yellow ascites    Estimated Blood Loss: Less than 5 mL    Procedure Note/Findings: U/S guided paracentesis via right lateral abdominal approach with 5 Setswana centesis needle. No immediate post-procedure complications noted.            Please refer to dictated report for additional details.

## 2022-03-23 NOTE — H&P
[facility] [place]  [place] & Physical - Short Stay  Interventional Radiology    SUBJECTIVE:     Chief Complaint/Reason for Admission: Recurrent ascites    Informant(s):  self and Electronic Health Record    History of Present Illness:  Nilesh Rolon Jr. is a 46 y.o. male with a history of recurrent ascites.    Patient presents for paracentesis.    Scheduled Meds:   Continuous Infusions:   PRN Meds: LIDOcaine HCL 20 mg/ml (2%)    Review of patient's allergies indicates:  No Known Allergies    Past Medical History:   Diagnosis Date    DONG (acute kidney injury) 12/1/2021    Anxiety     Arthritis     Cirrhosis     Hypertension     Liver disease     Osteoarthritis     Other meniscus derangements, other medial meniscus, left knee 1/7/2019     Past Surgical History:   Procedure Laterality Date    ARTHROSCOPY OF KNEE Left 1/7/2019    Procedure: ARTHROSCOPY, KNEE;  Surgeon: Derick Kirby MD;  Location: [facility];  Service: Orthopedics;  Laterality: Left;    COLONOSCOPY N/A 7/27/2020    Procedure: COLONOSCOPY;  Surgeon: Sotero Zapata MD;  Location: [facility];  Service: Endoscopy;  Laterality: N/A;    COLONOSCOPY N/A 1/20/2022    Procedure: COLONOSCOPY Suprep Vaccinated;  Surgeon: Jacob Andrea MD;  Location: [facility];  Service: Endoscopy;  Laterality: N/A;    ESOPHAGOGASTRODUODENOSCOPY N/A 4/8/2019    Procedure: EGD (ESOPHAGOGASTRODUODENOSCOPY);  Surgeon: Bonita Kendall MD;  Location: [facility];  Service: Endoscopy;  Laterality: N/A;    ESOPHAGOGASTRODUODENOSCOPY N/A 7/27/2020    Procedure: EGD (ESOPHAGOGASTRODUODENOSCOPY);  Surgeon: Sotero Zapata MD;  Location: [facility];  Service: Endoscopy;  Laterality: N/A;    ESOPHAGOGASTRODUODENOSCOPY N/A 12/2/2021    Procedure: EGD (ESOPHAGOGASTRODUODENOSCOPY);  Surgeon: Montez Guerra MD;  Location: [facility] ([address] [place] [address]);  Service: Endoscopy;  Laterality: N/A;    ESOPHAGOGASTRODUODENOSCOPY N/A 1/20/2022    Procedure: EGD  (ESOPHAGOGASTRODUODENOSCOPY);  Surgeon: Jacob nAdrea MD;  Location: [facility];  Service: Endoscopy;  Laterality: N/A;  please order CBC with plts and INR day of case.    KNEE SURGERY       Family History   Problem Relation Age of Onset    Birth defects Neg Hx      Social History     Tobacco Use    Smoking status: Never Smoker    Smokeless tobacco: Never Used    Tobacco comment: smokes Marijuana only   Substance Use Topics    Alcohol use: Not Currently     Comment: last drink 11/28, drinks 3-4 a day    Drug use: No        Review of Systems:  ROS not obtained    OBJECTIVE:     Vital Signs (Most Recent):  BP: 107/63 (03/23/22 1133)    Physical Exam:  Abdomen: distended    Laboratory  CBC:   Lab Results   Component Value Date/Time    WBC 6.84 03/23/2022 09:17 AM    RBC 3.53 (L) 03/23/2022 09:17 AM    HGB 11.6 (L) 03/23/2022 09:17 AM    HCT 34.6 (L) 03/23/2022 09:17 AM    HCT 31 (L) 11/30/2021 10:26 AM     (L) 03/23/2022 09:17 AM    MCV 98 03/23/2022 09:17 AM    MCH 32.9 (H) 03/23/2022 09:17 AM    MCHC 33.5 03/23/2022 09:17 AM     Coagulation:   Lab Results   Component Value Date/Time    INR 1.4 (H) 03/23/2022 09:17 AM    APTT 29.9 04/07/2019 04:09 AM         ASSESSMENT/PLAN:     Ascites.    Patient will undergo paracentesis.    Sedation Plan: Lidocaine local

## 2022-03-23 NOTE — TELEPHONE ENCOUNTER
----- Message from Josie Turk MA sent at 3/23/2022 11:09 AM CDT -----  Part of this is for you.  Aster  ----- Message -----  From: [facility]  Sent: 3/23/2022  10:01 AM CDT  To: [facility] Staff    Type:  Needs Medical Advice    Who Called: PT Caregiver  Would the patient rather a call back or a response via [facility]? Callback   Best Call Back Number: [phone](caregiver) or [phone] (mother)  Additional Information: Pt caregiver requesting callback from office to set up appt for sclerotherapy for a grade 2 Hemorrhoid. Pt is also having stomach pain.Pt has medicaid but also has referral in system for dept.

## 2022-03-24 ENCOUNTER — PATIENT MESSAGE (OUTPATIENT)
Dept: HEPATOLOGY | Facility: CLINIC | Age: 47
End: 2022-03-24
Payer: MEDICAID

## 2022-03-30 ENCOUNTER — HOSPITAL ENCOUNTER (OUTPATIENT)
Dept: INTERVENTIONAL RADIOLOGY/VASCULAR | Facility: HOSPITAL | Age: 47
Discharge: HOME | End: 2022-03-30
Attending: INTERNAL MEDICINE | Admitting: RADIOLOGY
Payer: MEDICAID

## 2022-03-30 ENCOUNTER — LAB VISIT (OUTPATIENT)
Dept: LAB | Facility: HOSPITAL | Age: 47
End: 2022-03-30
Attending: INTERNAL MEDICINE
Payer: MEDICAID

## 2022-03-30 VITALS
HEART RATE: 80 BPM | SYSTOLIC BLOOD PRESSURE: 101 MMHG | OXYGEN SATURATION: 96 % | TEMPERATURE: 98 F | RESPIRATION RATE: 20 BRPM | DIASTOLIC BLOOD PRESSURE: 62 MMHG

## 2022-03-30 DIAGNOSIS — K70.10 ALCOHOLIC HEPATITIS WITHOUT ASCITES: ICD-10-CM

## 2022-03-30 DIAGNOSIS — K70.31 ALCOHOLIC CIRRHOSIS OF LIVER WITH ASCITES: ICD-10-CM

## 2022-03-30 LAB
ALBUMIN SERPL BCP-MCNC: 3 G/DL (ref 3.5–5.2)
ALP SERPL-CCNC: 94 U/L (ref 55–135)
ALT SERPL W/O P-5'-P-CCNC: 15 U/L (ref 10–44)
ANION GAP SERPL CALC-SCNC: 8 MMOL/L (ref 8–16)
AST SERPL-CCNC: 34 U/L (ref 10–40)
BASOPHILS # BLD AUTO: 0.03 K/UL (ref 0–0.2)
BASOPHILS NFR BLD: 0.7 % (ref 0–1.9)
BILIRUB SERPL-MCNC: 2.2 MG/DL (ref 0.1–1)
BUN SERPL-MCNC: 7 MG/DL (ref 6–20)
CALCIUM SERPL-MCNC: 8.3 MG/DL (ref 8.7–10.5)
CHLORIDE SERPL-SCNC: 108 MMOL/L (ref 95–110)
CO2 SERPL-SCNC: 22 MMOL/L (ref 23–29)
CREAT SERPL-MCNC: 1 MG/DL (ref 0.5–1.4)
DIFFERENTIAL METHOD: ABNORMAL
EOSINOPHIL # BLD AUTO: 0.1 K/UL (ref 0–0.5)
EOSINOPHIL NFR BLD: 2.3 % (ref 0–8)
ERYTHROCYTE [DISTWIDTH] IN BLOOD BY AUTOMATED COUNT: 18.3 % (ref 11.5–14.5)
EST. GFR  (AFRICAN AMERICAN): >60 ML/MIN/1.73 M^2
EST. GFR  (NON AFRICAN AMERICAN): >60 ML/MIN/1.73 M^2
GLUCOSE SERPL-MCNC: 105 MG/DL (ref 70–110)
HCT VFR BLD AUTO: 30 % (ref 40–54)
HGB BLD-MCNC: 10.1 G/DL (ref 14–18)
IMM GRANULOCYTES # BLD AUTO: 0.01 K/UL (ref 0–0.04)
IMM GRANULOCYTES NFR BLD AUTO: 0.2 % (ref 0–0.5)
INR PPP: 1.4 (ref 0.8–1.2)
LYMPHOCYTES # BLD AUTO: 1.4 K/UL (ref 1–4.8)
LYMPHOCYTES NFR BLD: 31.1 % (ref 18–48)
MCH RBC QN AUTO: 33.1 PG (ref 27–31)
MCHC RBC AUTO-ENTMCNC: 33.7 G/DL (ref 32–36)
MCV RBC AUTO: 98 FL (ref 82–98)
MONOCYTES # BLD AUTO: 0.4 K/UL (ref 0.3–1)
MONOCYTES NFR BLD: 10.1 % (ref 4–15)
NEUTROPHILS # BLD AUTO: 2.4 K/UL (ref 1.8–7.7)
NEUTROPHILS NFR BLD: 55.6 % (ref 38–73)
NRBC BLD-RTO: 0 /100 WBC
PLATELET # BLD AUTO: 70 K/UL (ref 150–450)
PMV BLD AUTO: 10.4 FL (ref 9.2–12.9)
POTASSIUM SERPL-SCNC: 3.5 MMOL/L (ref 3.5–5.1)
PROT SERPL-MCNC: 6.2 G/DL (ref 6–8.4)
PROTHROMBIN TIME: 14 SEC (ref 9–12.5)
RBC # BLD AUTO: 3.05 M/UL (ref 4.6–6.2)
SODIUM SERPL-SCNC: 138 MMOL/L (ref 136–145)
WBC # BLD AUTO: 4.37 K/UL (ref 3.9–12.7)

## 2022-03-30 PROCEDURE — 36415 COLL VENOUS BLD VENIPUNCTURE: CPT | Performed by: INTERNAL MEDICINE

## 2022-03-30 PROCEDURE — 85025 COMPLETE CBC W/AUTO DIFF WBC: CPT | Performed by: INTERNAL MEDICINE

## 2022-03-30 PROCEDURE — P9047 ALBUMIN (HUMAN), 25%, 50ML: HCPCS | Mod: JG | Performed by: INTERNAL MEDICINE

## 2022-03-30 PROCEDURE — 49083 ABD PARACENTESIS W/IMAGING: CPT | Performed by: RADIOLOGY

## 2022-03-30 PROCEDURE — 85610 PROTHROMBIN TIME: CPT | Performed by: INTERNAL MEDICINE

## 2022-03-30 PROCEDURE — C1729 CATH, DRAINAGE: HCPCS

## 2022-03-30 PROCEDURE — 49083 IR PARACENTESIS WITH IMAGING: ICD-10-PCS | Mod: ,,, | Performed by: RADIOLOGY

## 2022-03-30 PROCEDURE — 80053 COMPREHEN METABOLIC PANEL: CPT | Performed by: INTERNAL MEDICINE

## 2022-03-30 PROCEDURE — 25000003 PHARM REV CODE 250: Performed by: RADIOLOGY

## 2022-03-30 PROCEDURE — 63600175 PHARM REV CODE 636 W HCPCS: Mod: JG | Performed by: INTERNAL MEDICINE

## 2022-03-30 RX ORDER — ALBUMIN HUMAN 250 G/1000ML
37.5 SOLUTION INTRAVENOUS ONCE AS NEEDED
Status: DISCONTINUED | OUTPATIENT
Start: 2022-03-30 | End: 2022-03-30

## 2022-03-30 RX ORDER — LIDOCAINE HYDROCHLORIDE 20 MG/ML
INJECTION, SOLUTION INFILTRATION; PERINEURAL CODE/TRAUMA/SEDATION MEDICATION
Status: COMPLETED | OUTPATIENT
Start: 2022-03-30 | End: 2022-03-30

## 2022-03-30 RX ORDER — ALBUMIN HUMAN 250 G/1000ML
37.5 SOLUTION INTRAVENOUS ONCE AS NEEDED
Status: COMPLETED | OUTPATIENT
Start: 2022-03-30 | End: 2022-03-30

## 2022-03-30 RX ADMIN — ALBUMIN (HUMAN) 37.5 G: 12.5 SOLUTION INTRAVENOUS at 10:03

## 2022-03-30 RX ADMIN — LIDOCAINE HYDROCHLORIDE 10 ML: 20 INJECTION, SOLUTION INFILTRATION; PERINEURAL at 09:03

## 2022-03-30 NOTE — H&P
Radiology History & Physical      SUBJECTIVE:     Chief Complaint: Recurrent painful, tense ascites     History of Present Illness:  Nilesh Rolon Jr. is a 46 y.o. male with pertinent PMHx of decompensated EtOH hepatic cirrhosis complicated by recurrent abdominal distension and pain 2/2 recurrent painful, tense ascites requiring frequent decompression for symptom relief.      A new outpatient IR consult received for US-guided percutaneous RUQ-approach therapeutic large-volume paracentesis.    Past Medical History:   Diagnosis Date    DONG (acute kidney injury) 12/1/2021    Anxiety     Arthritis     Cirrhosis     Hypertension     Liver disease     Osteoarthritis     Other meniscus derangements, other medial meniscus, left knee 1/7/2019     Past Surgical History:   Procedure Laterality Date    ARTHROSCOPY OF KNEE Left 1/7/2019    Procedure: ARTHROSCOPY, KNEE;  Surgeon: Derick Kirby MD;  Location: [facility];  Service: Orthopedics;  Laterality: Left;    COLONOSCOPY N/A 7/27/2020    Procedure: COLONOSCOPY;  Surgeon: Sotero Zapata MD;  Location: [facility];  Service: Endoscopy;  Laterality: N/A;    COLONOSCOPY N/A 1/20/2022    Procedure: COLONOSCOPY Suprep Vaccinated;  Surgeon: Jacob Andrea MD;  Location: [facility];  Service: Endoscopy;  Laterality: N/A;    ESOPHAGOGASTRODUODENOSCOPY N/A 4/8/2019    Procedure: EGD (ESOPHAGOGASTRODUODENOSCOPY);  Surgeon: Bonita Kendall MD;  Location: [facility];  Service: Endoscopy;  Laterality: N/A;    ESOPHAGOGASTRODUODENOSCOPY N/A 7/27/2020    Procedure: EGD (ESOPHAGOGASTRODUODENOSCOPY);  Surgeon: Sotero Zapata MD;  Location: [facility];  Service: Endoscopy;  Laterality: N/A;    ESOPHAGOGASTRODUODENOSCOPY N/A 12/2/2021    Procedure: EGD (ESOPHAGOGASTRODUODENOSCOPY);  Surgeon: Montez Guerra MD;  Location: [facility] ([address] [place] [address]);  Service: Endoscopy;  Laterality: N/A;    ESOPHAGOGASTRODUODENOSCOPY N/A 1/20/2022    Procedure: EGD (ESOPHAGOGASTRODUODENOSCOPY);   Surgeon: Jacob Andrea MD;  Location: [facility];  Service: Endoscopy;  Laterality: N/A;  please order CBC with plts and INR day of case.    KNEE SURGERY       Home Meds:   Prior to Admission medications    Medication Sig Start Date End Date Taking? Authorizing Provider   ferrous sulfate (FEOSOL) 325 mg (65 mg iron) Tab tablet Take 1 tablet (325 mg total) by mouth 2 (two) times daily. 2/2/22   Savita Bianchi MD   folic acid (FOLVITE) 1 MG tablet TAKE 1 TABLET(1 MG) BY MOUTH EVERY DAY 11/6/21   Dianne Jimenez NP   lactulose (CHRONULAC) 10 gram/15 mL solution Take 30 mLs (20 g total) by mouth 2 (two) times daily. Goal of 3-4 bowel movements daily 3/14/22   Pb Mixon MD   multivitamin Tab Take 1 tablet by mouth once daily. 2/2/22   Savita Bianchi MD   pantoprazole (PROTONIX) 40 MG tablet Take 1 tablet (40 mg total) by mouth once daily. 2/2/22 2/2/23  Savita Bianchi MD   thiamine 100 MG tablet Take 1 tablet (100 mg total) by mouth once daily. 1/9/22 1/9/23  Otoniel Espinoza MD     Anticoagulants/Antiplatelets: no anticoagulation    Allergies: Review of patient's allergies indicates:  No Known Allergies    Sedation History:  no adverse reactions     Review of Systems:   Hematological: no known coagulopathies  Respiratory: no cough, shortness of breath, or wheezing  Cardiovascular: no chest pain or dyspnea on exertion  Gastrointestinal: positive for - abdominal pain and distension  Genito-Urinary: no dysuria, trouble voiding, or hematuria  Musculoskeletal: negative  Neurological: no TIA or stroke symptoms       OBJECTIVE:     Vital Signs (Most Recent)        Physical Exam:  General: no acute distress  Mental Status: alert and oriented to person, place and time  HEENT: normocephalic, atraumatic  Chest: unlabored breathing  Heart: regular heart rate  Abdomen: +distended. No TTP/r/g.  Extremity: moves all extremities    Laboratory  Lab Results   Component Value  Date    INR 1.4 (H) 03/30/2022       Lab Results   Component Value Date    WBC 4.37 03/30/2022    HGB 10.1 (L) 03/30/2022    HCT 30.0 (L) 03/30/2022    MCV 98 03/30/2022    PLT 70 (L) 03/30/2022      Lab Results   Component Value Date     03/30/2022     03/30/2022    K 3.5 03/30/2022     03/30/2022    CO2 22 (L) 03/30/2022    BUN 7 03/30/2022    CREATININE 1.0 03/30/2022    CALCIUM 8.3 (L) 03/30/2022    MG 1.8 02/01/2022    ALT 15 03/30/2022    AST 34 03/30/2022    ALBUMIN 3.0 (L) 03/30/2022    BILITOT 2.2 (H) 03/30/2022    BILIDIR 3.7 (H) 07/27/2020     ASSESSMENT/PLAN:     46 y.o. male with pertinent PMHx of decompensated EtOH hepatic cirrhosis complicated by recurrent abdominal distension and pain 2/2 recurrent painful, tense ascitesrequiring frequent therapeutic large-volume paracentesis.     1. Recurrent painful, tense ascites - Will attempt US-guided percutaneous RUQ-approach therapeutic LVP with local anesthetic only and albumin infusion post PRN as indicated per institutional protocol.     Risks (including, but not limited to, pain, bleeding, infection, damage to nearby structures, failure to obtain sufficient material for a diagnosis, the need for additional procedures, and death), benefits, and alternatives were discussed with the patient. All questions were answered to the best of my abilities. The patient wishes to proceed with the procedure. Written informed consent was obtained.     Thank you for considering IR for the care of your patient.      Chris Chahal MD  Interventional Radiology

## 2022-03-30 NOTE — BRIEF OP NOTE
Radiology Post-Procedure Note     Pre Op Diagnosis: Recurrent painful, tense ascites  Post Op Diagnosis: Same     Procedure: 1. US-guided percutaneous RUQ-approach therapeutic LVP     Procedure performed by: Chris Chahal MD     Written Informed Consent Obtained: Yes  Specimen Removed: YES, 5,200-cc of thin, straw-colored ascitic fluid  Estimated Blood Loss: none     Findings:   Successful US-guided percutaneous RUQ-approach therapeutic LVP with local anesthetic only and albumin infusion post PRN as indicated per institutional protocol. Patient tolerated the procedure well. No immediate post-procedural complications noted.      Thank you for considering IR for the care of your patient.      Chris Chahal MD  Interventional Radiology

## 2022-03-30 NOTE — DISCHARGE SUMMARY
Radiology Discharge Summary      Hospital Course: No complications    Admit Date: 3/30/2022  Discharge Date: 03/30/2022     Instructions Given to Patient: Yes  Diet: Resume prior diet  Activity: activity as tolerated    Description of Condition on Discharge: Stable  Vital Signs (Most Recent): Temp: 97.7 °F (36.5 °C) (03/30/22 0957)  Pulse: 70 (03/30/22 0957)  Resp: 18 (03/30/22 0957)  BP: (!) 106/58 (03/30/22 0957)  SpO2: 100 % (03/30/22 0957)    Discharge Disposition: Home    Discharge Diagnosis:  46 y.o. male with decompensated EtOH hepatic cirrhosis complicated by recurrent abdominal distension and pain 2/2 recurrent painful, tense ascites s/p successful US-guided percutaneous RUQ-approach therapeutic LVP with local anesthetic only and albumin infusion post PRN as indicated per institutional protocol. Patient tolerated the procedure well. No immediate post-procedural complications noted.      Thank you for considering IR for the care of your patient.      Chris Chahal MD  Interventional Radiology

## 2022-03-30 NOTE — OR NURSING
Noted band-aid to right lateral side. No redness, swelling, or drainage.      1124  Procedure site clean, dry ,and intact.

## 2022-04-06 ENCOUNTER — LAB VISIT (OUTPATIENT)
Dept: LAB | Facility: HOSPITAL | Age: 47
End: 2022-04-06
Attending: INTERNAL MEDICINE
Payer: MEDICAID

## 2022-04-06 ENCOUNTER — HOSPITAL ENCOUNTER (OUTPATIENT)
Dept: INTERVENTIONAL RADIOLOGY/VASCULAR | Facility: HOSPITAL | Age: 47
Discharge: HOME | End: 2022-04-06
Attending: INTERNAL MEDICINE | Admitting: RADIOLOGY
Payer: MEDICAID

## 2022-04-06 VITALS
TEMPERATURE: 98 F | HEART RATE: 72 BPM | SYSTOLIC BLOOD PRESSURE: 105 MMHG | RESPIRATION RATE: 17 BRPM | DIASTOLIC BLOOD PRESSURE: 62 MMHG | OXYGEN SATURATION: 100 %

## 2022-04-06 DIAGNOSIS — K74.60 DECOMPENSATED HEPATIC CIRRHOSIS: ICD-10-CM

## 2022-04-06 DIAGNOSIS — K70.31 ALCOHOLIC CIRRHOSIS OF LIVER WITH ASCITES: ICD-10-CM

## 2022-04-06 DIAGNOSIS — K72.90 DECOMPENSATED HEPATIC CIRRHOSIS: ICD-10-CM

## 2022-04-06 DIAGNOSIS — K70.10 ALCOHOLIC HEPATITIS WITHOUT ASCITES: ICD-10-CM

## 2022-04-06 LAB
ALBUMIN SERPL BCP-MCNC: 3.3 G/DL (ref 3.5–5.2)
ALP SERPL-CCNC: 97 U/L (ref 55–135)
ALT SERPL W/O P-5'-P-CCNC: 14 U/L (ref 10–44)
ANION GAP SERPL CALC-SCNC: 7 MMOL/L (ref 8–16)
AST SERPL-CCNC: 34 U/L (ref 10–40)
BASOPHILS # BLD AUTO: 0.03 K/UL (ref 0–0.2)
BASOPHILS # BLD AUTO: 0.03 K/UL (ref 0–0.2)
BASOPHILS NFR BLD: 0.6 % (ref 0–1.9)
BASOPHILS NFR BLD: 0.6 % (ref 0–1.9)
BILIRUB SERPL-MCNC: 2.6 MG/DL (ref 0.1–1)
BUN SERPL-MCNC: 6 MG/DL (ref 6–20)
CALCIUM SERPL-MCNC: 8.7 MG/DL (ref 8.7–10.5)
CHLORIDE SERPL-SCNC: 110 MMOL/L (ref 95–110)
CO2 SERPL-SCNC: 22 MMOL/L (ref 23–29)
CREAT SERPL-MCNC: 1 MG/DL (ref 0.5–1.4)
DIFFERENTIAL METHOD: ABNORMAL
DIFFERENTIAL METHOD: ABNORMAL
EOSINOPHIL # BLD AUTO: 0.2 K/UL (ref 0–0.5)
EOSINOPHIL # BLD AUTO: 0.2 K/UL (ref 0–0.5)
EOSINOPHIL NFR BLD: 3 % (ref 0–8)
EOSINOPHIL NFR BLD: 3 % (ref 0–8)
ERYTHROCYTE [DISTWIDTH] IN BLOOD BY AUTOMATED COUNT: 17.2 % (ref 11.5–14.5)
ERYTHROCYTE [DISTWIDTH] IN BLOOD BY AUTOMATED COUNT: 17.2 % (ref 11.5–14.5)
EST. GFR  (AFRICAN AMERICAN): >60 ML/MIN/1.73 M^2
EST. GFR  (NON AFRICAN AMERICAN): >60 ML/MIN/1.73 M^2
GLUCOSE SERPL-MCNC: 94 MG/DL (ref 70–110)
HCT VFR BLD AUTO: 33.2 % (ref 40–54)
HCT VFR BLD AUTO: 33.2 % (ref 40–54)
HGB BLD-MCNC: 11.3 G/DL (ref 14–18)
HGB BLD-MCNC: 11.3 G/DL (ref 14–18)
IMM GRANULOCYTES # BLD AUTO: 0.01 K/UL (ref 0–0.04)
IMM GRANULOCYTES # BLD AUTO: 0.01 K/UL (ref 0–0.04)
IMM GRANULOCYTES NFR BLD AUTO: 0.2 % (ref 0–0.5)
IMM GRANULOCYTES NFR BLD AUTO: 0.2 % (ref 0–0.5)
INR PPP: 1.4 (ref 0.8–1.2)
INR PPP: 1.4 (ref 0.8–1.2)
LYMPHOCYTES # BLD AUTO: 1.9 K/UL (ref 1–4.8)
LYMPHOCYTES # BLD AUTO: 1.9 K/UL (ref 1–4.8)
LYMPHOCYTES NFR BLD: 35.3 % (ref 18–48)
LYMPHOCYTES NFR BLD: 35.3 % (ref 18–48)
MCH RBC QN AUTO: 33.1 PG (ref 27–31)
MCH RBC QN AUTO: 33.1 PG (ref 27–31)
MCHC RBC AUTO-ENTMCNC: 34 G/DL (ref 32–36)
MCHC RBC AUTO-ENTMCNC: 34 G/DL (ref 32–36)
MCV RBC AUTO: 97 FL (ref 82–98)
MCV RBC AUTO: 97 FL (ref 82–98)
MONOCYTES # BLD AUTO: 0.5 K/UL (ref 0.3–1)
MONOCYTES # BLD AUTO: 0.5 K/UL (ref 0.3–1)
MONOCYTES NFR BLD: 10 % (ref 4–15)
MONOCYTES NFR BLD: 10 % (ref 4–15)
NEUTROPHILS # BLD AUTO: 2.7 K/UL (ref 1.8–7.7)
NEUTROPHILS # BLD AUTO: 2.7 K/UL (ref 1.8–7.7)
NEUTROPHILS NFR BLD: 50.9 % (ref 38–73)
NEUTROPHILS NFR BLD: 50.9 % (ref 38–73)
NRBC BLD-RTO: 0 /100 WBC
NRBC BLD-RTO: 0 /100 WBC
PLATELET # BLD AUTO: 95 K/UL (ref 150–450)
PLATELET # BLD AUTO: 95 K/UL (ref 150–450)
PMV BLD AUTO: 10.4 FL (ref 9.2–12.9)
PMV BLD AUTO: 10.4 FL (ref 9.2–12.9)
POTASSIUM SERPL-SCNC: 4.1 MMOL/L (ref 3.5–5.1)
PROT SERPL-MCNC: 6.5 G/DL (ref 6–8.4)
PROTHROMBIN TIME: 14 SEC (ref 9–12.5)
PROTHROMBIN TIME: 14 SEC (ref 9–12.5)
RBC # BLD AUTO: 3.41 M/UL (ref 4.6–6.2)
RBC # BLD AUTO: 3.41 M/UL (ref 4.6–6.2)
SODIUM SERPL-SCNC: 139 MMOL/L (ref 136–145)
WBC # BLD AUTO: 5.29 K/UL (ref 3.9–12.7)
WBC # BLD AUTO: 5.29 K/UL (ref 3.9–12.7)

## 2022-04-06 PROCEDURE — 80053 COMPREHEN METABOLIC PANEL: CPT | Performed by: INTERNAL MEDICINE

## 2022-04-06 PROCEDURE — 63600175 PHARM REV CODE 636 W HCPCS: Mod: JG | Performed by: RADIOLOGY

## 2022-04-06 PROCEDURE — C1729 CATH, DRAINAGE: HCPCS

## 2022-04-06 PROCEDURE — 85025 COMPLETE CBC W/AUTO DIFF WBC: CPT | Performed by: INTERNAL MEDICINE

## 2022-04-06 PROCEDURE — 36415 COLL VENOUS BLD VENIPUNCTURE: CPT | Performed by: INTERNAL MEDICINE

## 2022-04-06 PROCEDURE — P9047 ALBUMIN (HUMAN), 25%, 50ML: HCPCS | Mod: JG | Performed by: RADIOLOGY

## 2022-04-06 PROCEDURE — 49083 IR PARACENTESIS WITH IMAGING: ICD-10-PCS | Mod: ,,, | Performed by: RADIOLOGY

## 2022-04-06 PROCEDURE — 85610 PROTHROMBIN TIME: CPT | Performed by: INTERNAL MEDICINE

## 2022-04-06 PROCEDURE — 49083 ABD PARACENTESIS W/IMAGING: CPT | Performed by: RADIOLOGY

## 2022-04-06 RX ORDER — ALBUMIN HUMAN 250 G/1000ML
37.5 SOLUTION INTRAVENOUS ONCE AS NEEDED
Status: COMPLETED | OUTPATIENT
Start: 2022-04-06 | End: 2022-04-06

## 2022-04-06 RX ADMIN — ALBUMIN (HUMAN) 37.5 G: 25 SOLUTION INTRAVENOUS at 10:04

## 2022-04-06 NOTE — PLAN OF CARE
Pt verbalized readiness to go home and understanding of discharge instructions. PIV removed. Dressing CDI.   
coffee

## 2022-04-06 NOTE — DISCHARGE INSTRUCTIONS
BATHING:  You may shower tomorrow.  DRESSING:  Remove dressing tomorrow.        ACTIVITY LEVEL: If you have received sedation or an anesthetic, you may feel sleepy for several hours. Rest until you are more awake. Gradually resume your normal activities    Do not drive, drink alcohol, or sign legal documents for 24 hours, or if taking narcotic pain medication.      DIET: You may resume your home diet. If nausea is present, increase your diet gradually with fluids and bland foods.    Medications: Pain medication should be taken only if needed and as directed. If antibiotics are prescribed, the medication should be taken until completed. You will be given an updated list of you medications.  No driving, alcoholic beverages or signing legal documents for next 24 hours if you have had sedation, or while taking pain medication    CALL THE DOCTOR:   For any obvious bleeding (some dried blood over the incision is normal).     Redness, swelling, foul smell around incision or fever over 101.  Shortness of breath.  Persistent pain or nausea not relieved by medication.  Call  [phone]     to speak with an Interventional Radiologist    If any unusual problems or difficulties occur contact your doctor. If you cannot contact your doctor but feel your signs and symptoms warrant a physicians attention return to the emergency room.

## 2022-04-06 NOTE — DISCHARGE SUMMARY
Radiology Discharge Summary      Hospital Course: No complications    Admit Date: 4/6/2022  Discharge Date: 04/06/2022     Instructions Given to Patient: Yes  Diet: Resume prior diet  Activity: activity as tolerated    Description of Condition on Discharge: Stable  Vital Signs (Most Recent): BP: 106/73 (04/06/22 0949)    Discharge Disposition: Home    Discharge Diagnosis:  46 y.o. male with decompensated EtOH hepatic cirrhosis complicated by recurrent abdominal distension and pain 2/2 recurrent painful, tense ascites s/p successful US-guided percutaneous RUQ-approach therapeutic LVP with local anesthetic only and albumin infusion post PRN as indicated per institutional protocol. Patient tolerated the procedure well. No immediate post-procedural complications noted.      Thank you for considering IR for the care of your patient.      Chris Chahal MD  Interventional Radiology

## 2022-04-06 NOTE — H&P
Radiology History & Physical      SUBJECTIVE:     Chief Complaint: Recurrent painful, tense ascites     History of Present Illness:  Nilesh Rolon Jr. is a 46 y.o. male with pertinent PMHx of decompensated EtOH hepatic cirrhosis complicated by recurrent abdominal distension and pain 2/2 recurrent painful, tense ascites requiring frequent decompression for symptom relief.      A new outpatient IR consult received for US-guided percutaneous RUQ-approach therapeutic large-volume paracentesis.    Past Medical History:   Diagnosis Date    DONG (acute kidney injury) 12/1/2021    Anxiety     Arthritis     Cirrhosis     Hypertension     Liver disease     Osteoarthritis     Other meniscus derangements, other medial meniscus, left knee 1/7/2019     Past Surgical History:   Procedure Laterality Date    ARTHROSCOPY OF KNEE Left 1/7/2019    Procedure: ARTHROSCOPY, KNEE;  Surgeon: Derick Kirby MD;  Location: [facility];  Service: Orthopedics;  Laterality: Left;    COLONOSCOPY N/A 7/27/2020    Procedure: COLONOSCOPY;  Surgeon: Sotero Zapata MD;  Location: [facility];  Service: Endoscopy;  Laterality: N/A;    COLONOSCOPY N/A 1/20/2022    Procedure: COLONOSCOPY Suprep Vaccinated;  Surgeon: Jacob Andrea MD;  Location: [facility];  Service: Endoscopy;  Laterality: N/A;    ESOPHAGOGASTRODUODENOSCOPY N/A 4/8/2019    Procedure: EGD (ESOPHAGOGASTRODUODENOSCOPY);  Surgeon: Bonita Kendall MD;  Location: [facility];  Service: Endoscopy;  Laterality: N/A;    ESOPHAGOGASTRODUODENOSCOPY N/A 7/27/2020    Procedure: EGD (ESOPHAGOGASTRODUODENOSCOPY);  Surgeon: Sotero Zapata MD;  Location: [facility];  Service: Endoscopy;  Laterality: N/A;    ESOPHAGOGASTRODUODENOSCOPY N/A 12/2/2021    Procedure: EGD (ESOPHAGOGASTRODUODENOSCOPY);  Surgeon: Montez Guerra MD;  Location: [facility] ([address] [place] [address]);  Service: Endoscopy;  Laterality: N/A;    ESOPHAGOGASTRODUODENOSCOPY N/A 1/20/2022    Procedure: EGD (ESOPHAGOGASTRODUODENOSCOPY);   Surgeon: Jacob Andrea MD;  Location: [facility];  Service: Endoscopy;  Laterality: N/A;  please order CBC with plts and INR day of case.    KNEE SURGERY       Home Meds:   Prior to Admission medications    Medication Sig Start Date End Date Taking? Authorizing Provider   ferrous sulfate (FEOSOL) 325 mg (65 mg iron) Tab tablet Take 1 tablet (325 mg total) by mouth 2 (two) times daily. 2/2/22   Savita Bianchi MD   folic acid (FOLVITE) 1 MG tablet TAKE 1 TABLET(1 MG) BY MOUTH EVERY DAY 11/6/21   Dianne Jimenez NP   lactulose (CHRONULAC) 10 gram/15 mL solution Take 30 mLs (20 g total) by mouth 2 (two) times daily. Goal of 3-4 bowel movements daily 3/14/22   Pb Mixon MD   multivitamin Tab Take 1 tablet by mouth once daily. 2/2/22   Savita Bianchi MD   pantoprazole (PROTONIX) 40 MG tablet Take 1 tablet (40 mg total) by mouth once daily. 2/2/22 2/2/23  Savita Bianchi MD   thiamine 100 MG tablet Take 1 tablet (100 mg total) by mouth once daily. 1/9/22 1/9/23  Otoniel Espinoza MD     Anticoagulants/Antiplatelets: no anticoagulation     Allergies: Review of patient's allergies indicates:  No Known Allergies     Sedation History:  no adverse reactions     Review of Systems:   Hematological: no known coagulopathies  Respiratory: no cough, shortness of breath, or wheezing  Cardiovascular: no chest pain or dyspnea on exertion  Gastrointestinal: positive for - abdominal pain and distension  Genito-Urinary: no dysuria, trouble voiding, or hematuria  Musculoskeletal: negative  Neurological: no TIA or stroke symptoms      OBJECTIVE:     Vital Signs (Most Recent)     Physical Exam:  General: no acute distress  Mental Status: alert and oriented to person, place and time  HEENT: normocephalic, atraumatic  Chest: unlabored breathing  Heart: regular heart rate  Abdomen: +distended. No TTP/r/g.  Extremity: moves all extremities    Laboratory  Lab Results   Component Value Date     INR 1.4 (H) 03/30/2022       Lab Results   Component Value Date    WBC 5.29 04/06/2022    WBC 5.29 04/06/2022    HGB 11.3 (L) 04/06/2022    HGB 11.3 (L) 04/06/2022    HCT 33.2 (L) 04/06/2022    HCT 33.2 (L) 04/06/2022    MCV 97 04/06/2022    MCV 97 04/06/2022    PLT 95 (L) 04/06/2022    PLT 95 (L) 04/06/2022      Lab Results   Component Value Date     03/30/2022     03/30/2022    K 3.5 03/30/2022     03/30/2022    CO2 22 (L) 03/30/2022    BUN 7 03/30/2022    CREATININE 1.0 03/30/2022    CALCIUM 8.3 (L) 03/30/2022    MG 1.8 02/01/2022    ALT 15 03/30/2022    AST 34 03/30/2022    ALBUMIN 3.0 (L) 03/30/2022    BILITOT 2.2 (H) 03/30/2022    BILIDIR 3.7 (H) 07/27/2020     ASSESSMENT/PLAN:     46 y.o. male with pertinent PMHx of decompensated EtOH hepatic cirrhosis complicated by recurrent abdominal distension and pain 2/2 recurrent painful, tense ascitesrequiring frequent therapeutic large-volume paracentesis.     1. Recurrent painful, tense ascites - Will attempt US-guided percutaneous RUQ-approach therapeutic LVP with local anesthetic only and albumin infusion post PRN as indicated per institutional protocol.     Risks (including, but not limited to, pain, bleeding, infection, damage to nearby structures, failure to obtain sufficient material for a diagnosis, the need for additional procedures, and death), benefits, and alternatives were discussed with the patient. All questions were answered to the best of my abilities. The patient wishes to proceed with the procedure. Written informed consent was obtained.     Thank you for considering IR for the care of your patient.      Chris Chahal MD  Interventional Radiology

## 2022-04-06 NOTE — BRIEF OP NOTE
Radiology Post-Procedure Note     Pre Op Diagnosis: Recurrent painful, tense ascites  Post Op Diagnosis: Same     Procedure: 1. US-guided percutaneous RUQ-approach therapeutic LVP     Procedure performed by: Chris Chahal MD     Written Informed Consent Obtained: Yes  Specimen Removed: YES, 4,750-cc of thin, straw-colored ascitic fluid  Estimated Blood Loss: none     Findings:   Successful US-guided percutaneous RUQ-approach therapeutic LVP with local anesthetic only and albumin infusion post PRN as indicated per institutional protocol. Patient tolerated the procedure well. No immediate post-procedural complications noted.      Thank you for considering IR for the care of your patient.      Chris Chahal MD  Interventional Radiology

## 2022-04-13 ENCOUNTER — LAB VISIT (OUTPATIENT)
Dept: LAB | Facility: HOSPITAL | Age: 47
End: 2022-04-13
Attending: INTERNAL MEDICINE
Payer: MEDICAID

## 2022-04-13 ENCOUNTER — HOSPITAL ENCOUNTER (OUTPATIENT)
Dept: INTERVENTIONAL RADIOLOGY/VASCULAR | Facility: HOSPITAL | Age: 47
Discharge: HOME | End: 2022-04-13
Attending: INTERNAL MEDICINE | Admitting: RADIOLOGY
Payer: MEDICAID

## 2022-04-13 VITALS
TEMPERATURE: 98 F | HEART RATE: 75 BPM | OXYGEN SATURATION: 100 % | SYSTOLIC BLOOD PRESSURE: 101 MMHG | RESPIRATION RATE: 18 BRPM | DIASTOLIC BLOOD PRESSURE: 60 MMHG

## 2022-04-13 DIAGNOSIS — K72.90 DECOMPENSATED HEPATIC CIRRHOSIS: ICD-10-CM

## 2022-04-13 DIAGNOSIS — K74.60 DECOMPENSATED HEPATIC CIRRHOSIS: ICD-10-CM

## 2022-04-13 DIAGNOSIS — K70.10 ALCOHOLIC HEPATITIS WITHOUT ASCITES: ICD-10-CM

## 2022-04-13 DIAGNOSIS — K70.31 ALCOHOLIC CIRRHOSIS OF LIVER WITH ASCITES: ICD-10-CM

## 2022-04-13 LAB
ALBUMIN SERPL BCP-MCNC: 3 G/DL (ref 3.5–5.2)
ALP SERPL-CCNC: 83 U/L (ref 55–135)
ALT SERPL W/O P-5'-P-CCNC: 18 U/L (ref 10–44)
ANION GAP SERPL CALC-SCNC: 11 MMOL/L (ref 8–16)
AST SERPL-CCNC: 29 U/L (ref 10–40)
BASOPHILS # BLD AUTO: 0.05 K/UL (ref 0–0.2)
BASOPHILS NFR BLD: 0.8 % (ref 0–1.9)
BILIRUB SERPL-MCNC: 2.4 MG/DL (ref 0.1–1)
BUN SERPL-MCNC: 8 MG/DL (ref 6–20)
CALCIUM SERPL-MCNC: 8.5 MG/DL (ref 8.7–10.5)
CHLORIDE SERPL-SCNC: 108 MMOL/L (ref 95–110)
CO2 SERPL-SCNC: 21 MMOL/L (ref 23–29)
CREAT SERPL-MCNC: 1 MG/DL (ref 0.5–1.4)
DIFFERENTIAL METHOD: ABNORMAL
EOSINOPHIL # BLD AUTO: 0.1 K/UL (ref 0–0.5)
EOSINOPHIL NFR BLD: 2.3 % (ref 0–8)
ERYTHROCYTE [DISTWIDTH] IN BLOOD BY AUTOMATED COUNT: 15.4 % (ref 11.5–14.5)
EST. GFR  (AFRICAN AMERICAN): >60 ML/MIN/1.73 M^2
EST. GFR  (NON AFRICAN AMERICAN): >60 ML/MIN/1.73 M^2
GLUCOSE SERPL-MCNC: 96 MG/DL (ref 70–110)
HCT VFR BLD AUTO: 32.4 % (ref 40–54)
HGB BLD-MCNC: 10.9 G/DL (ref 14–18)
IMM GRANULOCYTES # BLD AUTO: 0.01 K/UL (ref 0–0.04)
IMM GRANULOCYTES NFR BLD AUTO: 0.2 % (ref 0–0.5)
INR PPP: 1.3 (ref 0.8–1.2)
LYMPHOCYTES # BLD AUTO: 1.8 K/UL (ref 1–4.8)
LYMPHOCYTES NFR BLD: 29 % (ref 18–48)
MCH RBC QN AUTO: 32.4 PG (ref 27–31)
MCHC RBC AUTO-ENTMCNC: 33.6 G/DL (ref 32–36)
MCV RBC AUTO: 96 FL (ref 82–98)
MONOCYTES # BLD AUTO: 0.5 K/UL (ref 0.3–1)
MONOCYTES NFR BLD: 8.7 % (ref 4–15)
NEUTROPHILS # BLD AUTO: 3.6 K/UL (ref 1.8–7.7)
NEUTROPHILS NFR BLD: 59 % (ref 38–73)
NRBC BLD-RTO: 0 /100 WBC
PLATELET # BLD AUTO: 95 K/UL (ref 150–450)
PMV BLD AUTO: 11 FL (ref 9.2–12.9)
POTASSIUM SERPL-SCNC: 3.3 MMOL/L (ref 3.5–5.1)
PROT SERPL-MCNC: 6.4 G/DL (ref 6–8.4)
PROTHROMBIN TIME: 13.7 SEC (ref 9–12.5)
RBC # BLD AUTO: 3.36 M/UL (ref 4.6–6.2)
SODIUM SERPL-SCNC: 140 MMOL/L (ref 136–145)
WBC # BLD AUTO: 6.07 K/UL (ref 3.9–12.7)

## 2022-04-13 PROCEDURE — 63600175 PHARM REV CODE 636 W HCPCS: Mod: JG | Performed by: RADIOLOGY

## 2022-04-13 PROCEDURE — 85025 COMPLETE CBC W/AUTO DIFF WBC: CPT | Performed by: INTERNAL MEDICINE

## 2022-04-13 PROCEDURE — 85610 PROTHROMBIN TIME: CPT | Performed by: INTERNAL MEDICINE

## 2022-04-13 PROCEDURE — P9047 ALBUMIN (HUMAN), 25%, 50ML: HCPCS | Mod: JG | Performed by: RADIOLOGY

## 2022-04-13 PROCEDURE — 36415 COLL VENOUS BLD VENIPUNCTURE: CPT | Performed by: INTERNAL MEDICINE

## 2022-04-13 PROCEDURE — C1729 CATH, DRAINAGE: HCPCS

## 2022-04-13 PROCEDURE — 49083 IR PARACENTESIS WITH IMAGING: ICD-10-PCS | Mod: ,,, | Performed by: RADIOLOGY

## 2022-04-13 PROCEDURE — 49083 ABD PARACENTESIS W/IMAGING: CPT | Performed by: RADIOLOGY

## 2022-04-13 PROCEDURE — 80053 COMPREHEN METABOLIC PANEL: CPT | Performed by: INTERNAL MEDICINE

## 2022-04-13 RX ORDER — ALBUMIN HUMAN 250 G/1000ML
50 SOLUTION INTRAVENOUS ONCE AS NEEDED
Status: COMPLETED | OUTPATIENT
Start: 2022-04-13 | End: 2022-04-13

## 2022-04-13 RX ADMIN — ALBUMIN (HUMAN) 50 G: 12.5 SOLUTION INTRAVENOUS at 10:04

## 2022-04-13 NOTE — BRIEF OP NOTE
Radiology Post-Procedure Note     Pre Op Diagnosis: Recurrent painful, tense ascites  Post Op Diagnosis: Same     Procedure: 1. US-guided percutaneous RUQ-approach therapeutic LVP     Procedure performed by: Chris Chahal MD     Written Informed Consent Obtained: Yes  Specimen Removed: YES, 6,200-cc of thin, straw-colored ascitic fluid  Estimated Blood Loss: none     Findings:   Successful US-guided percutaneous RUQ-approach therapeutic LVP with local anesthetic only and albumin infusion post PRN as indicated per institutional protocol. Patient tolerated the procedure well. No immediate post-procedural complications noted.      Thank you for considering IR for the care of your patient.      Chris Chahal MD  Interventional Radiology

## 2022-04-13 NOTE — DISCHARGE SUMMARY
Radiology Discharge Summary      Hospital Course: No complications    Admit Date: 4/13/2022  Discharge Date: 04/13/2022     Instructions Given to Patient: Yes  Diet: Resume prior diet  Activity: activity as tolerated    Description of Condition on Discharge: Stable  Vital Signs (Most Recent): Temp: 98.1 °F (36.7 °C) (04/13/22 0955)  Pulse: 75 (04/13/22 0955)  Resp: 18 (04/13/22 0955)  BP: 101/60 (04/13/22 0955)  SpO2: 100 % (04/13/22 0955)    Discharge Disposition: Home    Discharge Diagnosis:  46 y.o. male with decompensated EtOH hepatic cirrhosis complicated by recurrent abdominal distension and pain 2/2 recurrent painful, tense ascites s/p successful US-guided percutaneous RUQ-approach therapeutic LVP with local anesthetic only and albumin infusion post PRN as indicated per institutional protocol. Patient tolerated the procedure well. No immediate post-procedural complications noted.      Thank you for considering IR for the care of your patient.      Chris Chahal MD  Interventional Radiology

## 2022-04-13 NOTE — H&P
Radiology History & Physical      SUBJECTIVE:     Chief Complaint: Recurrent painful, tense ascites     History of Present Illness:  Nilesh Rolon Jr. is a 46 y.o. male with pertinent PMHx of decompensated EtOH hepatic cirrhosis complicated by recurrent abdominal distension and pain 2/2 recurrent painful, tense ascites requiring frequent decompression for symptom relief.      A new outpatient IR consult received for US-guided percutaneous RUQ-approach therapeutic large-volume paracentesis.    Past Medical History:   Diagnosis Date    DONG (acute kidney injury) 12/1/2021    Anxiety     Arthritis     Cirrhosis     Hypertension     Liver disease     Osteoarthritis     Other meniscus derangements, other medial meniscus, left knee 1/7/2019     Past Surgical History:   Procedure Laterality Date    ARTHROSCOPY OF KNEE Left 1/7/2019    Procedure: ARTHROSCOPY, KNEE;  Surgeon: Derick Kirby MD;  Location: [facility];  Service: Orthopedics;  Laterality: Left;    COLONOSCOPY N/A 7/27/2020    Procedure: COLONOSCOPY;  Surgeon: Sotero Zapata MD;  Location: [facility];  Service: Endoscopy;  Laterality: N/A;    COLONOSCOPY N/A 1/20/2022    Procedure: COLONOSCOPY Suprep Vaccinated;  Surgeon: Jacob Andrea MD;  Location: [facility];  Service: Endoscopy;  Laterality: N/A;    ESOPHAGOGASTRODUODENOSCOPY N/A 4/8/2019    Procedure: EGD (ESOPHAGOGASTRODUODENOSCOPY);  Surgeon: Bonita Kendall MD;  Location: [facility];  Service: Endoscopy;  Laterality: N/A;    ESOPHAGOGASTRODUODENOSCOPY N/A 7/27/2020    Procedure: EGD (ESOPHAGOGASTRODUODENOSCOPY);  Surgeon: Sotero Zapata MD;  Location: [facility];  Service: Endoscopy;  Laterality: N/A;    ESOPHAGOGASTRODUODENOSCOPY N/A 12/2/2021    Procedure: EGD (ESOPHAGOGASTRODUODENOSCOPY);  Surgeon: Montez Guerra MD;  Location: [facility] ([address] [place] [address]);  Service: Endoscopy;  Laterality: N/A;    ESOPHAGOGASTRODUODENOSCOPY N/A 1/20/2022    Procedure: EGD (ESOPHAGOGASTRODUODENOSCOPY);   Surgeon: Jacob Andrea MD;  Location: [facility];  Service: Endoscopy;  Laterality: N/A;  please order CBC with plts and INR day of case.    KNEE SURGERY       Home Meds:   Prior to Admission medications    Medication Sig Start Date End Date Taking? Authorizing Provider   ferrous sulfate (FEOSOL) 325 mg (65 mg iron) Tab tablet Take 1 tablet (325 mg total) by mouth 2 (two) times daily. 2/2/22   Savita Bianchi MD   folic acid (FOLVITE) 1 MG tablet TAKE 1 TABLET(1 MG) BY MOUTH EVERY DAY 11/6/21   Dianne Jimenez NP   lactulose (CHRONULAC) 10 gram/15 mL solution Take 30 mLs (20 g total) by mouth 2 (two) times daily. Goal of 3-4 bowel movements daily 3/14/22   Pb Mixon MD   multivitamin Tab Take 1 tablet by mouth once daily. 2/2/22   Savita Bianchi MD   pantoprazole (PROTONIX) 40 MG tablet Take 1 tablet (40 mg total) by mouth once daily. 2/2/22 2/2/23  Savita Bianchi MD   thiamine 100 MG tablet Take 1 tablet (100 mg total) by mouth once daily. 1/9/22 1/9/23  Otoniel Espinoza MD     Anticoagulants/Antiplatelets: no anticoagulation     Allergies: Review of patient's allergies indicates:  No Known Allergies     Sedation History:  no adverse reactions     Review of Systems:   Hematological: no known coagulopathies  Respiratory: no cough, shortness of breath, or wheezing  Cardiovascular: no chest pain or dyspnea on exertion  Gastrointestinal: positive for - abdominal pain and distension  Genito-Urinary: no dysuria, trouble voiding, or hematuria  Musculoskeletal: negative  Neurological: no TIA or stroke symptoms      OBJECTIVE:     Vital Signs (Most Recent)     Physical Exam:  General: no acute distress  Mental Status: alert and oriented to person, place and time  HEENT: normocephalic, atraumatic  Chest: unlabored breathing  Heart: regular heart rate  Abdomen: +distended. No TTP/r/g.  Extremity: moves all extremities    Laboratory  Lab Results   Component Value Date     INR 1.4 (H) 04/06/2022    INR 1.4 (H) 04/06/2022       Lab Results   Component Value Date    WBC 5.29 04/06/2022    WBC 5.29 04/06/2022    HGB 11.3 (L) 04/06/2022    HGB 11.3 (L) 04/06/2022    HCT 33.2 (L) 04/06/2022    HCT 33.2 (L) 04/06/2022    MCV 97 04/06/2022    MCV 97 04/06/2022    PLT 95 (L) 04/06/2022    PLT 95 (L) 04/06/2022      Lab Results   Component Value Date    GLU 94 04/06/2022    GLU 94 04/06/2022    GLU 94 04/06/2022     04/06/2022     04/06/2022     04/06/2022    K 4.1 04/06/2022    K 4.1 04/06/2022    K 4.1 04/06/2022     04/06/2022     04/06/2022     04/06/2022    CO2 22 (L) 04/06/2022    CO2 22 (L) 04/06/2022    CO2 22 (L) 04/06/2022    BUN 6 04/06/2022    BUN 6 04/06/2022    BUN 6 04/06/2022    CREATININE 1.0 04/06/2022    CREATININE 1.0 04/06/2022    CREATININE 1.0 04/06/2022    CALCIUM 8.7 04/06/2022    CALCIUM 8.7 04/06/2022    CALCIUM 8.7 04/06/2022    MG 1.8 02/01/2022    ALT 14 04/06/2022    ALT 14 04/06/2022    ALT 14 04/06/2022    AST 34 04/06/2022    AST 34 04/06/2022    AST 34 04/06/2022    ALBUMIN 3.3 (L) 04/06/2022    ALBUMIN 3.3 (L) 04/06/2022    ALBUMIN 3.3 (L) 04/06/2022    BILITOT 2.6 (H) 04/06/2022    BILITOT 2.6 (H) 04/06/2022    BILITOT 2.6 (H) 04/06/2022    BILIDIR 3.7 (H) 07/27/2020     ASSESSMENT/PLAN:     46 y.o. male with pertinent PMHx of decompensated EtOH hepatic cirrhosis complicated by recurrent abdominal distension and pain 2/2 recurrent painful, tense ascitesrequiring frequent therapeutic large-volume paracentesis.     1. Recurrent painful, tense ascites - Will attempt US-guided percutaneous RUQ-approach therapeutic LVP with local anesthetic only and albumin infusion post PRN as indicated per institutional protocol.     Risks (including, but not limited to, pain, bleeding, infection, damage to nearby structures, failure to obtain sufficient material for a diagnosis, the need for additional procedures, and death), benefits,  and alternatives were discussed with the patient. All questions were answered to the best of my abilities. The patient wishes to proceed with the procedure. Written informed consent was obtained.     Thank you for considering IR for the care of your patient.      Chris Chahal MD  Interventional Radiology

## 2022-04-20 ENCOUNTER — HOSPITAL ENCOUNTER (OUTPATIENT)
Dept: INTERVENTIONAL RADIOLOGY/VASCULAR | Facility: HOSPITAL | Age: 47
Discharge: HOME | End: 2022-04-20
Attending: INTERNAL MEDICINE | Admitting: RADIOLOGY
Payer: MEDICAID

## 2022-04-20 ENCOUNTER — LAB VISIT (OUTPATIENT)
Dept: LAB | Facility: HOSPITAL | Age: 47
End: 2022-04-20
Attending: INTERNAL MEDICINE
Payer: MEDICAID

## 2022-04-20 VITALS
TEMPERATURE: 98 F | SYSTOLIC BLOOD PRESSURE: 108 MMHG | OXYGEN SATURATION: 97 % | RESPIRATION RATE: 17 BRPM | DIASTOLIC BLOOD PRESSURE: 61 MMHG | HEART RATE: 70 BPM

## 2022-04-20 DIAGNOSIS — K70.10 ALCOHOLIC HEPATITIS WITHOUT ASCITES: ICD-10-CM

## 2022-04-20 DIAGNOSIS — K74.60 DECOMPENSATED HEPATIC CIRRHOSIS: ICD-10-CM

## 2022-04-20 DIAGNOSIS — K70.31 ALCOHOLIC CIRRHOSIS OF LIVER WITH ASCITES: ICD-10-CM

## 2022-04-20 DIAGNOSIS — K72.90 DECOMPENSATED HEPATIC CIRRHOSIS: ICD-10-CM

## 2022-04-20 LAB
ALBUMIN SERPL BCP-MCNC: 3 G/DL (ref 3.5–5.2)
ALP SERPL-CCNC: 75 U/L (ref 55–135)
ALT SERPL W/O P-5'-P-CCNC: 16 U/L (ref 10–44)
ANION GAP SERPL CALC-SCNC: 9 MMOL/L (ref 8–16)
AST SERPL-CCNC: 45 U/L (ref 10–40)
BASOPHILS # BLD AUTO: 0.04 K/UL (ref 0–0.2)
BASOPHILS # BLD AUTO: 0.04 K/UL (ref 0–0.2)
BASOPHILS NFR BLD: 0.7 % (ref 0–1.9)
BASOPHILS NFR BLD: 0.7 % (ref 0–1.9)
BILIRUB SERPL-MCNC: 2 MG/DL (ref 0.1–1)
BUN SERPL-MCNC: 8 MG/DL (ref 6–20)
CALCIUM SERPL-MCNC: 8.9 MG/DL (ref 8.7–10.5)
CHLORIDE SERPL-SCNC: 107 MMOL/L (ref 95–110)
CO2 SERPL-SCNC: 20 MMOL/L (ref 23–29)
CREAT SERPL-MCNC: 1 MG/DL (ref 0.5–1.4)
DIFFERENTIAL METHOD: ABNORMAL
DIFFERENTIAL METHOD: ABNORMAL
EOSINOPHIL # BLD AUTO: 0.1 K/UL (ref 0–0.5)
EOSINOPHIL # BLD AUTO: 0.1 K/UL (ref 0–0.5)
EOSINOPHIL NFR BLD: 2.1 % (ref 0–8)
EOSINOPHIL NFR BLD: 2.1 % (ref 0–8)
ERYTHROCYTE [DISTWIDTH] IN BLOOD BY AUTOMATED COUNT: 15.2 % (ref 11.5–14.5)
ERYTHROCYTE [DISTWIDTH] IN BLOOD BY AUTOMATED COUNT: 15.2 % (ref 11.5–14.5)
EST. GFR  (AFRICAN AMERICAN): >60 ML/MIN/1.73 M^2
EST. GFR  (NON AFRICAN AMERICAN): >60 ML/MIN/1.73 M^2
GLUCOSE SERPL-MCNC: 99 MG/DL (ref 70–110)
HCT VFR BLD AUTO: 33.5 % (ref 40–54)
HCT VFR BLD AUTO: 33.5 % (ref 40–54)
HGB BLD-MCNC: 11.3 G/DL (ref 14–18)
HGB BLD-MCNC: 11.3 G/DL (ref 14–18)
IMM GRANULOCYTES # BLD AUTO: 0.01 K/UL (ref 0–0.04)
IMM GRANULOCYTES # BLD AUTO: 0.01 K/UL (ref 0–0.04)
IMM GRANULOCYTES NFR BLD AUTO: 0.2 % (ref 0–0.5)
IMM GRANULOCYTES NFR BLD AUTO: 0.2 % (ref 0–0.5)
INR PPP: 1.4 (ref 0.8–1.2)
INR PPP: 1.4 (ref 0.8–1.2)
LYMPHOCYTES # BLD AUTO: 1.5 K/UL (ref 1–4.8)
LYMPHOCYTES # BLD AUTO: 1.5 K/UL (ref 1–4.8)
LYMPHOCYTES NFR BLD: 25.6 % (ref 18–48)
LYMPHOCYTES NFR BLD: 25.6 % (ref 18–48)
MCH RBC QN AUTO: 32.9 PG (ref 27–31)
MCH RBC QN AUTO: 32.9 PG (ref 27–31)
MCHC RBC AUTO-ENTMCNC: 33.7 G/DL (ref 32–36)
MCHC RBC AUTO-ENTMCNC: 33.7 G/DL (ref 32–36)
MCV RBC AUTO: 98 FL (ref 82–98)
MCV RBC AUTO: 98 FL (ref 82–98)
MONOCYTES # BLD AUTO: 0.4 K/UL (ref 0.3–1)
MONOCYTES # BLD AUTO: 0.4 K/UL (ref 0.3–1)
MONOCYTES NFR BLD: 7.7 % (ref 4–15)
MONOCYTES NFR BLD: 7.7 % (ref 4–15)
NEUTROPHILS # BLD AUTO: 3.6 K/UL (ref 1.8–7.7)
NEUTROPHILS # BLD AUTO: 3.6 K/UL (ref 1.8–7.7)
NEUTROPHILS NFR BLD: 63.7 % (ref 38–73)
NEUTROPHILS NFR BLD: 63.7 % (ref 38–73)
NRBC BLD-RTO: 0 /100 WBC
NRBC BLD-RTO: 0 /100 WBC
PLATELET # BLD AUTO: 78 K/UL (ref 150–450)
PLATELET # BLD AUTO: 78 K/UL (ref 150–450)
PMV BLD AUTO: 9.9 FL (ref 9.2–12.9)
PMV BLD AUTO: 9.9 FL (ref 9.2–12.9)
POTASSIUM SERPL-SCNC: 4.9 MMOL/L (ref 3.5–5.1)
PROT SERPL-MCNC: 6.5 G/DL (ref 6–8.4)
PROTHROMBIN TIME: 13.8 SEC (ref 9–12.5)
PROTHROMBIN TIME: 13.8 SEC (ref 9–12.5)
RBC # BLD AUTO: 3.43 M/UL (ref 4.6–6.2)
RBC # BLD AUTO: 3.43 M/UL (ref 4.6–6.2)
SODIUM SERPL-SCNC: 136 MMOL/L (ref 136–145)
WBC # BLD AUTO: 5.7 K/UL (ref 3.9–12.7)
WBC # BLD AUTO: 5.7 K/UL (ref 3.9–12.7)

## 2022-04-20 PROCEDURE — 49083 ABD PARACENTESIS W/IMAGING: CPT | Performed by: RADIOLOGY

## 2022-04-20 PROCEDURE — 49083 IR PARACENTESIS WITH IMAGING: ICD-10-PCS | Mod: ,,, | Performed by: RADIOLOGY

## 2022-04-20 PROCEDURE — 63600175 PHARM REV CODE 636 W HCPCS: Mod: JG | Performed by: RADIOLOGY

## 2022-04-20 PROCEDURE — 80053 COMPREHEN METABOLIC PANEL: CPT | Performed by: INTERNAL MEDICINE

## 2022-04-20 PROCEDURE — 25000003 PHARM REV CODE 250: Performed by: RADIOLOGY

## 2022-04-20 PROCEDURE — 36415 COLL VENOUS BLD VENIPUNCTURE: CPT | Performed by: INTERNAL MEDICINE

## 2022-04-20 PROCEDURE — 85025 COMPLETE CBC W/AUTO DIFF WBC: CPT | Performed by: INTERNAL MEDICINE

## 2022-04-20 PROCEDURE — P9047 ALBUMIN (HUMAN), 25%, 50ML: HCPCS | Mod: JG | Performed by: RADIOLOGY

## 2022-04-20 PROCEDURE — C1729 CATH, DRAINAGE: HCPCS

## 2022-04-20 PROCEDURE — 85610 PROTHROMBIN TIME: CPT | Performed by: INTERNAL MEDICINE

## 2022-04-20 RX ORDER — LIDOCAINE HYDROCHLORIDE 20 MG/ML
INJECTION, SOLUTION INFILTRATION; PERINEURAL CODE/TRAUMA/SEDATION MEDICATION
Status: COMPLETED | OUTPATIENT
Start: 2022-04-20 | End: 2022-04-20

## 2022-04-20 RX ORDER — ALBUMIN HUMAN 250 G/1000ML
37.5 SOLUTION INTRAVENOUS ONCE AS NEEDED
Status: COMPLETED | OUTPATIENT
Start: 2022-04-20 | End: 2022-04-20

## 2022-04-20 RX ADMIN — ALBUMIN (HUMAN) 37.5 G: 12.5 SOLUTION INTRAVENOUS at 11:04

## 2022-04-20 RX ADMIN — LIDOCAINE HYDROCHLORIDE 10 ML: 20 INJECTION, SOLUTION INFILTRATION; PERINEURAL at 09:04

## 2022-04-20 NOTE — BRIEF OP NOTE
Radiology Post-Procedure Note     Pre Op Diagnosis: Recurrent painful, tense ascites  Post Op Diagnosis: Same     Procedure: 1. US-guided percutaneous RUQ-approach therapeutic LVP     Procedure performed by: Chris Chahal MD     Written Informed Consent Obtained: Yes  Specimen Removed: YES, 6,000-cc of thin, straw-colored ascitic fluid  Estimated Blood Loss: none     Findings:   Successful US-guided percutaneous RUQ-approach therapeutic LVP with local anesthetic only and albumin infusion post PRN as indicated per institutional protocol. Patient tolerated the procedure well. No immediate post-procedural complications noted.      Thank you for considering IR for the care of your patient.      Chris Chahal MD  Interventional Radiology

## 2022-04-20 NOTE — H&P
Radiology History & Physical      SUBJECTIVE:     Chief Complaint: Recurrent painful, tense ascites     History of Present Illness:  Nilesh Rolon Jr. is a 46 y.o. male with pertinent PMHx of decompensated EtOH hepatic cirrhosis complicated by recurrent abdominal distension and pain 2/2 recurrent painful, tense ascites requiring frequent decompression for symptom relief.      A new outpatient IR consult received for US-guided percutaneous RUQ-approach therapeutic large-volume paracentesis.    Past Medical History:   Diagnosis Date    DONG (acute kidney injury) 12/1/2021    Anxiety     Arthritis     Cirrhosis     Hypertension     Liver disease     Osteoarthritis     Other meniscus derangements, other medial meniscus, left knee 1/7/2019     Past Surgical History:   Procedure Laterality Date    ARTHROSCOPY OF KNEE Left 1/7/2019    Procedure: ARTHROSCOPY, KNEE;  Surgeon: Derick Kirby MD;  Location: [facility];  Service: Orthopedics;  Laterality: Left;    COLONOSCOPY N/A 7/27/2020    Procedure: COLONOSCOPY;  Surgeon: Sotero Zapata MD;  Location: [facility];  Service: Endoscopy;  Laterality: N/A;    COLONOSCOPY N/A 1/20/2022    Procedure: COLONOSCOPY Suprep Vaccinated;  Surgeon: Jacob Andrea MD;  Location: [facility];  Service: Endoscopy;  Laterality: N/A;    ESOPHAGOGASTRODUODENOSCOPY N/A 4/8/2019    Procedure: EGD (ESOPHAGOGASTRODUODENOSCOPY);  Surgeon: Bonita Kendall MD;  Location: [facility];  Service: Endoscopy;  Laterality: N/A;    ESOPHAGOGASTRODUODENOSCOPY N/A 7/27/2020    Procedure: EGD (ESOPHAGOGASTRODUODENOSCOPY);  Surgeon: Sotero Zapata MD;  Location: [facility];  Service: Endoscopy;  Laterality: N/A;    ESOPHAGOGASTRODUODENOSCOPY N/A 12/2/2021    Procedure: EGD (ESOPHAGOGASTRODUODENOSCOPY);  Surgeon: Montez Guerra MD;  Location: [facility] ([address] [place] [address]);  Service: Endoscopy;  Laterality: N/A;    ESOPHAGOGASTRODUODENOSCOPY N/A 1/20/2022    Procedure: EGD (ESOPHAGOGASTRODUODENOSCOPY);   Surgeon: Jacob Andrea MD;  Location: [facility];  Service: Endoscopy;  Laterality: N/A;  please order CBC with plts and INR day of case.    KNEE SURGERY       Home Meds:   Prior to Admission medications    Medication Sig Start Date End Date Taking? Authorizing Provider   ferrous sulfate (FEOSOL) 325 mg (65 mg iron) Tab tablet Take 1 tablet (325 mg total) by mouth 2 (two) times daily. 2/2/22   Savita Bianchi MD   folic acid (FOLVITE) 1 MG tablet TAKE 1 TABLET(1 MG) BY MOUTH EVERY DAY 11/6/21   Dianne Jimenez NP   lactulose (CHRONULAC) 10 gram/15 mL solution Take 30 mLs (20 g total) by mouth 2 (two) times daily. Goal of 3-4 bowel movements daily 3/14/22   Pb Mixon MD   multivitamin Tab Take 1 tablet by mouth once daily. 2/2/22   Savita Bianchi MD   pantoprazole (PROTONIX) 40 MG tablet Take 1 tablet (40 mg total) by mouth once daily. 2/2/22 2/2/23  Savita Bianchi MD   thiamine 100 MG tablet Take 1 tablet (100 mg total) by mouth once daily. 1/9/22 1/9/23  Otoniel Espinoza MD     Anticoagulants/Antiplatelets: no anticoagulation     Allergies: Review of patient's allergies indicates:  No Known Allergies     Sedation History:  no adverse reactions     Review of Systems:   Hematological: no known coagulopathies  Respiratory: no cough, shortness of breath, or wheezing  Cardiovascular: no chest pain or dyspnea on exertion  Gastrointestinal: positive for - abdominal pain and distension  Genito-Urinary: no dysuria, trouble voiding, or hematuria  Musculoskeletal: negative  Neurological: no TIA or stroke symptoms      OBJECTIVE:     Vital Signs (Most Recent)     Physical Exam:  General: no acute distress  Mental Status: alert and oriented to person, place and time  HEENT: normocephalic, atraumatic  Chest: unlabored breathing  Heart: regular heart rate  Abdomen: +distended. No TTP/r/g.  Extremity: moves all extremities    Laboratory  Lab Results   Component Value Date     INR 1.4 (H) 04/20/2022    INR 1.4 (H) 04/20/2022       Lab Results   Component Value Date    WBC 5.70 04/20/2022    WBC 5.70 04/20/2022    HGB 11.3 (L) 04/20/2022    HGB 11.3 (L) 04/20/2022    HCT 33.5 (L) 04/20/2022    HCT 33.5 (L) 04/20/2022    MCV 98 04/20/2022    MCV 98 04/20/2022    PLT 78 (L) 04/20/2022    PLT 78 (L) 04/20/2022      Lab Results   Component Value Date    GLU 96 04/13/2022     04/13/2022    K 3.3 (L) 04/13/2022     04/13/2022    CO2 21 (L) 04/13/2022    BUN 8 04/13/2022    CREATININE 1.0 04/13/2022    CALCIUM 8.5 (L) 04/13/2022    MG 1.8 02/01/2022    ALT 18 04/13/2022    AST 29 04/13/2022    ALBUMIN 3.0 (L) 04/13/2022    BILITOT 2.4 (H) 04/13/2022    BILIDIR 3.7 (H) 07/27/2020     ASSESSMENT/PLAN:     46 y.o. male with pertinent PMHx of decompensated EtOH hepatic cirrhosis complicated by recurrent abdominal distension and pain 2/2 recurrent painful, tense ascitesrequiring frequent therapeutic large-volume paracentesis.     1. Recurrent painful, tense ascites - Will attempt US-guided percutaneous RUQ-approach therapeutic LVP with local anesthetic only and albumin infusion post PRN as indicated per institutional protocol.     Risks (including, but not limited to, pain, bleeding, infection, damage to nearby structures, failure to obtain sufficient material for a diagnosis, the need for additional procedures, and death), benefits, and alternatives were discussed with the patient. All questions were answered to the best of my abilities. The patient wishes to proceed with the procedure. Written informed consent was obtained.     Thank you for considering IR for the care of your patient.      Chris Chahal MD  Interventional Radiology

## 2022-04-20 NOTE — DISCHARGE INSTRUCTIONS
BATHING:  You may shower tomorrow.  DRESSING:  Remove dressing tomorrow.        ACTIVITY LEVEL: If you have received sedation or an anesthetic, you may feel sleepy for several hours. Rest until you are more awake. Gradually resume your normal activities    Do not drive, drink alcohol, or sign legal documents for 24 hours, or if taking narcotic pain medication.      DIET: You may resume your home diet. If nausea is present, increase your diet gradually with fluids and bland foods.    Medications: Pain medication should be taken only if needed and as directed. If antibiotics are prescribed, the medication should be taken until completed. You will be given an updated list of you medications.  No driving, alcoholic beverages or signing legal documents for next 24 hours if you have had sedation, or while taking pain medication    CALL THE DOCTOR:   For any obvious bleeding (some dried blood over the incision is normal).     Redness, swelling, foul smell around incision or fever over 101.  Shortness of breath.  Persistent pain or nausea not relieved by medication.  Call  [phone]     to speak with an Interventional Radiologist    If any unusual problems or difficulties occur contact your doctor. If you cannot contact your doctor but feel your signs and symptoms warrant a physicians attention return to the emergency room.

## 2022-04-21 ENCOUNTER — PATIENT MESSAGE (OUTPATIENT)
Dept: HEPATOLOGY | Facility: CLINIC | Age: 47
End: 2022-04-21
Payer: MEDICAID

## 2022-04-25 ENCOUNTER — NUTRITION (OUTPATIENT)
Dept: NUTRITION | Facility: CLINIC | Age: 47
End: 2022-04-25
Payer: MEDICAID

## 2022-04-25 VITALS — HEIGHT: 75 IN | WEIGHT: 246.94 LBS | BODY MASS INDEX: 30.7 KG/M2

## 2022-04-25 DIAGNOSIS — K72.10 CHRONIC LIVER FAILURE WITHOUT HEPATIC COMA: Primary | ICD-10-CM

## 2022-04-25 DIAGNOSIS — Z71.3 DIETARY COUNSELING: ICD-10-CM

## 2022-04-25 PROCEDURE — 99213 OFFICE O/P EST LOW 20 MIN: CPT | Mod: PBBFAC,PO

## 2022-04-25 PROCEDURE — 99999 PR PBB SHADOW E&M-EST. PATIENT-LVL III: ICD-10-PCS | Mod: PBBFAC,,,

## 2022-04-25 PROCEDURE — 97802 MEDICAL NUTRITION INDIV IN: CPT | Mod: PBBFAC,PO | Performed by: DIETITIAN, REGISTERED

## 2022-04-25 PROCEDURE — 99999 PR PBB SHADOW E&M-EST. PATIENT-LVL III: CPT | Mod: PBBFAC,,,

## 2022-04-25 NOTE — PROGRESS NOTES
"Referring Physician:Tala Arroyo MD     Reason for visit:  Chief Complaint   Patient presents with    Cirrhosis    Nutrition Counseling      Initial Visit    :1975     Allergies Reviewed  Meds Reviewed    Anthropometrics  Weight:112 kg (246 lb 14.6 oz)  Height:6' 3" (1.905 m)  BMI:Body mass index is 30.86 kg/m².   IBW:   89 kg  +/-10%    Meds:  Outpatient Medications Prior to Visit   Medication Sig Dispense Refill    ferrous sulfate (FEOSOL) 325 mg (65 mg iron) Tab tablet Take 1 tablet (325 mg total) by mouth 2 (two) times daily. 60 tablet 2    folic acid (FOLVITE) 1 MG tablet TAKE 1 TABLET(1 MG) BY MOUTH EVERY DAY 30 tablet 2    lactulose (CHRONULAC) 10 gram/15 mL solution Take 30 mLs (20 g total) by mouth 2 (two) times daily. Goal of 3-4 bowel movements daily 1892 mL 6    multivitamin Tab Take 1 tablet by mouth once daily. 30 tablet 2    pantoprazole (PROTONIX) 40 MG tablet Take 1 tablet (40 mg total) by mouth once daily. 30 tablet 11    thiamine 100 MG tablet Take 1 tablet (100 mg total) by mouth once daily. 30 tablet 11     No facility-administered medications prior to visit.       Food/Drug Interactions Noted:  n/a    Vitamins/Supplements/Herbs:  See med list    Labs: 2022  Alb  3.0     Nutrition Prescription:   2670 Kcals/day( 30 kcal/kg IBW),  89 g protein( 1.0 g/kg IBW)     Support System:   Pt and his SO Jazmine present for today's encounter.  Jazmine is very supportive and encouraging of pt's diet restrictions/diet regimen and took notes throughout our encounter today.    Diet Hx:   Pt with chronic liver failure, getting weekly paracentesis. Diet:  Low sodium, <1200 ml/day fluid restriction.  Pt usually skips breakfast, and snacks throughout the day (fruits, vegetable salads with lemon and vinegar) instead of eating meals.  Just started drinking one Premier Protein drink every evening, but has not been taking fluid amount into consideration for fluid restriction.  Also asked about " ice cream and snowballs-advised they also need to be counted towards fluid restriction.  Jazmine has started reading food labels; we discussed definition of low sodium diet.  They use salt-free seasonings, herbs and spices to flavor foods.    Current activity level and/or physical limitations:   Pt walks his dog for 30 minutes every day    Motivation to make changes/anticipated barriers and/or expected adherence:   No barriers to adherence identified    Nutrition-Focus Physical Findings:   Pt wearing face covering per COVID19 protocol; pt appears well nourished      Assessment:   Pt and SO very attentive, and they asked numerous relevant questions about foods/beverages recommended and to avoid; how to quantify fluid restriction; how to increase protein in diet regimen; several food safety concerns ie raw oysters/shellfish; reading food labels; grocery shopping and cooking tips; should any foods be counted in fluid restriction ie salads; meal planning; will diet change if/when pt receives liver transplant.  All questions/concerns answered/addressed, and they verbalized understanding of information.  They were advised to follow MD recommendations for diet regimen.    Nutrition Diagnosis:   Food- and nutrition-related knowledge deficit RT lack of prior exposure to information AEB dx cirrhosis    Recommendations:   High protein, low sodium diet. Fluid restriction:  <1200 cc/day  Handouts provided & reviewed:  Cirrhosis Nutrition Therapy; Low-Sodium Nutrition Therapy; Acceptable Salt-Free Seasoning Blends; Sodium-free Flavoring Tips; Reading A Food Label  To be emailed:  List of recommended protein bars; Fluid Restriction handout; Water Content of foods    Strategies Implemented:   Continue reading food labels; carefully monitor fluid intake (avoid snowballs/ice cream unless account for fluid content); increase meat/protein consumption; avoid raw seafood/shellfish-practice food safety guidelines    Consultation Time:45  minutes.  Communicated with referring healthcare provider:  Consult note available in pt's Epic chart per MD discretion    Follow Up:  Pt and SO provided with dietitian contact number and advised to call with questions or make future appointment if further intervention needed.    Note:  I notified transplant dietitian of pt's scheduled appt with me; she advised she will only see him if he is listed.  I did notify pt and SO today that there is a dedicated transplant dietitian for their future reference.

## 2022-04-27 ENCOUNTER — HOSPITAL ENCOUNTER (OUTPATIENT)
Dept: INTERVENTIONAL RADIOLOGY/VASCULAR | Facility: HOSPITAL | Age: 47
Discharge: HOME | End: 2022-04-27
Attending: INTERNAL MEDICINE | Admitting: RADIOLOGY
Payer: MEDICAID

## 2022-04-27 ENCOUNTER — LAB VISIT (OUTPATIENT)
Dept: LAB | Facility: HOSPITAL | Age: 47
End: 2022-04-27
Attending: INTERNAL MEDICINE
Payer: MEDICAID

## 2022-04-27 VITALS
DIASTOLIC BLOOD PRESSURE: 60 MMHG | TEMPERATURE: 98 F | RESPIRATION RATE: 17 BRPM | HEART RATE: 80 BPM | SYSTOLIC BLOOD PRESSURE: 108 MMHG | OXYGEN SATURATION: 95 %

## 2022-04-27 DIAGNOSIS — K70.31 ALCOHOLIC CIRRHOSIS OF LIVER WITH ASCITES: ICD-10-CM

## 2022-04-27 DIAGNOSIS — K70.10 ALCOHOLIC HEPATITIS WITHOUT ASCITES: ICD-10-CM

## 2022-04-27 DIAGNOSIS — K74.60 DECOMPENSATED HEPATIC CIRRHOSIS: ICD-10-CM

## 2022-04-27 DIAGNOSIS — K72.90 DECOMPENSATED HEPATIC CIRRHOSIS: ICD-10-CM

## 2022-04-27 LAB
ALBUMIN SERPL BCP-MCNC: 3 G/DL (ref 3.5–5.2)
ALP SERPL-CCNC: 82 U/L (ref 55–135)
ALT SERPL W/O P-5'-P-CCNC: 14 U/L (ref 10–44)
ANION GAP SERPL CALC-SCNC: 11 MMOL/L (ref 8–16)
AST SERPL-CCNC: 30 U/L (ref 10–40)
BASOPHILS # BLD AUTO: 0.05 K/UL (ref 0–0.2)
BASOPHILS NFR BLD: 1 % (ref 0–1.9)
BILIRUB SERPL-MCNC: 1.6 MG/DL (ref 0.1–1)
BUN SERPL-MCNC: 6 MG/DL (ref 6–20)
CALCIUM SERPL-MCNC: 9.1 MG/DL (ref 8.7–10.5)
CHLORIDE SERPL-SCNC: 110 MMOL/L (ref 95–110)
CO2 SERPL-SCNC: 20 MMOL/L (ref 23–29)
CREAT SERPL-MCNC: 1 MG/DL (ref 0.5–1.4)
DIFFERENTIAL METHOD: ABNORMAL
EOSINOPHIL # BLD AUTO: 0.1 K/UL (ref 0–0.5)
EOSINOPHIL NFR BLD: 2.5 % (ref 0–8)
ERYTHROCYTE [DISTWIDTH] IN BLOOD BY AUTOMATED COUNT: 15 % (ref 11.5–14.5)
EST. GFR  (AFRICAN AMERICAN): >60 ML/MIN/1.73 M^2
EST. GFR  (NON AFRICAN AMERICAN): >60 ML/MIN/1.73 M^2
GLUCOSE SERPL-MCNC: 96 MG/DL (ref 70–110)
HCT VFR BLD AUTO: 34.9 % (ref 40–54)
HGB BLD-MCNC: 12 G/DL (ref 14–18)
IMM GRANULOCYTES # BLD AUTO: 0.01 K/UL (ref 0–0.04)
IMM GRANULOCYTES NFR BLD AUTO: 0.2 % (ref 0–0.5)
INR PPP: 1.3 (ref 0.8–1.2)
LYMPHOCYTES # BLD AUTO: 1.6 K/UL (ref 1–4.8)
LYMPHOCYTES NFR BLD: 33.5 % (ref 18–48)
MCH RBC QN AUTO: 33.3 PG (ref 27–31)
MCHC RBC AUTO-ENTMCNC: 34.4 G/DL (ref 32–36)
MCV RBC AUTO: 97 FL (ref 82–98)
MONOCYTES # BLD AUTO: 0.5 K/UL (ref 0.3–1)
MONOCYTES NFR BLD: 10 % (ref 4–15)
NEUTROPHILS # BLD AUTO: 2.5 K/UL (ref 1.8–7.7)
NEUTROPHILS NFR BLD: 52.8 % (ref 38–73)
NRBC BLD-RTO: 0 /100 WBC
PLATELET # BLD AUTO: 84 K/UL (ref 150–450)
PMV BLD AUTO: 10.5 FL (ref 9.2–12.9)
POTASSIUM SERPL-SCNC: 4 MMOL/L (ref 3.5–5.1)
PROT SERPL-MCNC: 6.3 G/DL (ref 6–8.4)
PROTHROMBIN TIME: 13.5 SEC (ref 9–12.5)
RBC # BLD AUTO: 3.6 M/UL (ref 4.6–6.2)
SODIUM SERPL-SCNC: 141 MMOL/L (ref 136–145)
WBC # BLD AUTO: 4.8 K/UL (ref 3.9–12.7)

## 2022-04-27 PROCEDURE — 49083 IR PARACENTESIS WITH IMAGING: ICD-10-PCS | Mod: ,,, | Performed by: RADIOLOGY

## 2022-04-27 PROCEDURE — 85025 COMPLETE CBC W/AUTO DIFF WBC: CPT | Performed by: INTERNAL MEDICINE

## 2022-04-27 PROCEDURE — 80053 COMPREHEN METABOLIC PANEL: CPT | Performed by: INTERNAL MEDICINE

## 2022-04-27 PROCEDURE — 49083 ABD PARACENTESIS W/IMAGING: CPT | Performed by: RADIOLOGY

## 2022-04-27 PROCEDURE — P9047 ALBUMIN (HUMAN), 25%, 50ML: HCPCS | Mod: JG | Performed by: RADIOLOGY

## 2022-04-27 PROCEDURE — 63600175 PHARM REV CODE 636 W HCPCS: Mod: JG | Performed by: RADIOLOGY

## 2022-04-27 PROCEDURE — 85610 PROTHROMBIN TIME: CPT | Performed by: INTERNAL MEDICINE

## 2022-04-27 PROCEDURE — C1729 CATH, DRAINAGE: HCPCS

## 2022-04-27 PROCEDURE — 36415 COLL VENOUS BLD VENIPUNCTURE: CPT | Performed by: INTERNAL MEDICINE

## 2022-04-27 RX ORDER — ALBUMIN HUMAN 250 G/1000ML
37.5 SOLUTION INTRAVENOUS ONCE AS NEEDED
Status: COMPLETED | OUTPATIENT
Start: 2022-04-27 | End: 2022-04-27

## 2022-04-27 RX ADMIN — ALBUMIN HUMAN 37.5 G: 0.25 SOLUTION INTRAVENOUS at 11:04

## 2022-04-27 NOTE — H&P
Radiology History & Physical      SUBJECTIVE:     Chief Complaint: Recurrent painful, tense ascites     History of Present Illness:  Nilesh Rolon Jr. is a 46 y.o. male with pertinent PMHx of decompensated EtOH hepatic cirrhosis complicated by recurrent abdominal distension and pain 2/2 recurrent painful, tense ascites requiring frequent decompression for symptom relief.      A new outpatient IR consult received for US-guided percutaneous RUQ-approach therapeutic large-volume paracentesis.    Past Medical History:   Diagnosis Date    DONG (acute kidney injury) 12/1/2021    Anxiety     Arthritis     Cirrhosis     Hypertension     Liver disease     Osteoarthritis     Other meniscus derangements, other medial meniscus, left knee 1/7/2019     Past Surgical History:   Procedure Laterality Date    ARTHROSCOPY OF KNEE Left 1/7/2019    Procedure: ARTHROSCOPY, KNEE;  Surgeon: Derick Kirby MD;  Location: [facility];  Service: Orthopedics;  Laterality: Left;    COLONOSCOPY N/A 7/27/2020    Procedure: COLONOSCOPY;  Surgeon: Sotero Zapata MD;  Location: [facility];  Service: Endoscopy;  Laterality: N/A;    COLONOSCOPY N/A 1/20/2022    Procedure: COLONOSCOPY Suprep Vaccinated;  Surgeon: Jacob Andrea MD;  Location: [facility];  Service: Endoscopy;  Laterality: N/A;    ESOPHAGOGASTRODUODENOSCOPY N/A 4/8/2019    Procedure: EGD (ESOPHAGOGASTRODUODENOSCOPY);  Surgeon: Bonita Kendall MD;  Location: [facility];  Service: Endoscopy;  Laterality: N/A;    ESOPHAGOGASTRODUODENOSCOPY N/A 7/27/2020    Procedure: EGD (ESOPHAGOGASTRODUODENOSCOPY);  Surgeon: Sotero Zapata MD;  Location: [facility];  Service: Endoscopy;  Laterality: N/A;    ESOPHAGOGASTRODUODENOSCOPY N/A 12/2/2021    Procedure: EGD (ESOPHAGOGASTRODUODENOSCOPY);  Surgeon: Montez Guerra MD;  Location: [facility] ([address] [place] [address]);  Service: Endoscopy;  Laterality: N/A;    ESOPHAGOGASTRODUODENOSCOPY N/A 1/20/2022    Procedure: EGD (ESOPHAGOGASTRODUODENOSCOPY);   Surgeon: Jacob Andrea MD;  Location: [facility];  Service: Endoscopy;  Laterality: N/A;  please order CBC with plts and INR day of case.    KNEE SURGERY       Home Meds:   Prior to Admission medications    Medication Sig Start Date End Date Taking? Authorizing Provider   ferrous sulfate (FEOSOL) 325 mg (65 mg iron) Tab tablet Take 1 tablet (325 mg total) by mouth 2 (two) times daily. 2/2/22   Savita Bianchi MD   folic acid (FOLVITE) 1 MG tablet TAKE 1 TABLET(1 MG) BY MOUTH EVERY DAY 11/6/21   Dianne Jimenez NP   lactulose (CHRONULAC) 10 gram/15 mL solution Take 30 mLs (20 g total) by mouth 2 (two) times daily. Goal of 3-4 bowel movements daily 3/14/22   Pb Mixon MD   multivitamin Tab Take 1 tablet by mouth once daily. 2/2/22   Savita Bianchi MD   pantoprazole (PROTONIX) 40 MG tablet Take 1 tablet (40 mg total) by mouth once daily. 2/2/22 2/2/23  Savita Bianchi MD   thiamine 100 MG tablet Take 1 tablet (100 mg total) by mouth once daily. 1/9/22 1/9/23  Otoniel Espinoza MD     Anticoagulants/Antiplatelets: no anticoagulation     Allergies: Review of patient's allergies indicates:  No Known Allergies     Sedation History:  no adverse reactions     Review of Systems:   Hematological: no known coagulopathies  Respiratory: no cough, shortness of breath, or wheezing  Cardiovascular: no chest pain or dyspnea on exertion  Gastrointestinal: positive for - abdominal pain and distension  Genito-Urinary: no dysuria, trouble voiding, or hematuria  Musculoskeletal: negative  Neurological: no TIA or stroke symptoms      OBJECTIVE:     Vital Signs (Most Recent)       Physical Exam:  General: no acute distress  Mental Status: alert and oriented to person, place and time  HEENT: normocephalic, atraumatic  Chest: unlabored breathing  Heart: regular heart rate  Abdomen: +distended. No TTP/r/g.  Extremity: moves all extremities    Laboratory  Lab Results   Component Value  Date    INR 1.3 (H) 04/27/2022       Lab Results   Component Value Date    WBC 4.80 04/27/2022    HGB 12.0 (L) 04/27/2022    HCT 34.9 (L) 04/27/2022    MCV 97 04/27/2022    PLT 84 (L) 04/27/2022      Lab Results   Component Value Date    GLU 96 04/27/2022     04/27/2022    K 4.0 04/27/2022     04/27/2022    CO2 20 (L) 04/27/2022    BUN 6 04/27/2022    CREATININE 1.0 04/27/2022    CALCIUM 9.1 04/27/2022    MG 1.8 02/01/2022    ALT 14 04/27/2022    AST 30 04/27/2022    ALBUMIN 3.0 (L) 04/27/2022    BILITOT 1.6 (H) 04/27/2022    BILIDIR 3.7 (H) 07/27/2020     ASSESSMENT/PLAN:     46 y.o. male with pertinent PMHx of decompensated EtOH hepatic cirrhosis complicated by recurrent abdominal distension and pain 2/2 recurrent painful, tense ascitesrequiring frequent therapeutic large-volume paracentesis.     1. Recurrent painful, tense ascites - Will attempt US-guided percutaneous RUQ-approach therapeutic LVP with local anesthetic only and albumin infusion post PRN as indicated per institutional protocol.     Risks (including, but not limited to, pain, bleeding, infection, damage to nearby structures, failure to obtain sufficient material for a diagnosis, the need for additional procedures, and death), benefits, and alternatives were discussed with the patient. All questions were answered to the best of my abilities. The patient wishes to proceed with the procedure. Written informed consent was obtained.     Thank you for considering IR for the care of your patient.      Chris Chahal MD  Interventional Radiology

## 2022-04-27 NOTE — BRIEF OP NOTE
Radiology Post-Procedure Note     Pre Op Diagnosis: Recurrent painful, tense ascites  Post Op Diagnosis: Same     Procedure: 1. US-guided percutaneous RUQ-approach therapeutic LVP     Procedure performed by: Chris Chahal MD     Written Informed Consent Obtained: Yes  Specimen Removed: YES, 5,100-cc of thin, straw-colored ascitic fluid  Estimated Blood Loss: none     Findings:   Successful US-guided percutaneous RUQ-approach therapeutic LVP with local anesthetic only and albumin infusion post PRN as indicated per institutional protocol. Patient tolerated the procedure well. No immediate post-procedural complications noted.      Thank you for considering IR for the care of your patient.      Chris Chahal MD  Interventional Radiology

## 2022-04-27 NOTE — DISCHARGE INSTRUCTIONS
BATHING:  You may shower tomorrow.  DRESSING:  Remove dressing tomorrow.        ACTIVITY LEVEL: If you have received sedation or an anesthetic, you may feel sleepy for several hours. Rest until you are more awake. Gradually resume your normal activities    Do not drive, drink alcohol, or sign legal documents for 24 hours, or if taking narcotic pain medication.      DIET: You may resume your home diet. If nausea is present, increase your diet gradually with fluids and bland foods.    Medications: Pain medication should be taken only if needed and as directed. If antibiotics are prescribed, the medication should be taken until completed. You will be given an updated list of you medications.  No driving, alcoholic beverages or signing legal documents for next 24 hours if you have had sedation, or while taking pain medication    CALL THE DOCTOR:   For any obvious bleeding (some dried blood over the incision is normal).     Redness, swelling, foul smell around incision or fever over 101.  Shortness of breath.  Persistent pain or nausea not relieved by medication.  Call  [phone]     to speak with an Interventional Radiologist    If any unusual problems or difficulties occur contact your doctor. If you cannot contact your doctor but feel your signs and symptoms warrant a physicians attention return to the emergency room.

## 2022-04-27 NOTE — DISCHARGE SUMMARY
Radiology Discharge Summary      Hospital Course: No complications    Admit Date: 4/27/2022  Discharge Date: 04/27/2022     Instructions Given to Patient: Yes  Diet: Resume prior diet  Activity: activity as tolerated    Description of Condition on Discharge: Stable  Vital Signs (Most Recent): BP: 108/72 (04/27/22 1025)    Discharge Disposition: Home    Discharge Diagnosis:  46 y.o. male with decompensated EtOH hepatic cirrhosis complicated by recurrent abdominal distension and pain 2/2 recurrent painful, tense ascites s/p successful US-guided percutaneous RUQ-approach therapeutic LVP with local anesthetic only and albumin infusion post PRN as indicated per institutional protocol. Patient tolerated the procedure well. No immediate post-procedural complications noted.      Thank you for considering IR for the care of your patient.      Chris Chahal MD  Interventional Radiology

## 2022-04-27 NOTE — PLAN OF CARE
Pt verbalized readiness to go home and understanding of discharge instructions. Tolerated albumin infusion. PIV removed. VSS.

## 2022-05-04 ENCOUNTER — HOSPITAL ENCOUNTER (OUTPATIENT)
Dept: INTERVENTIONAL RADIOLOGY/VASCULAR | Facility: HOSPITAL | Age: 47
Discharge: HOME | End: 2022-05-04
Attending: INTERNAL MEDICINE | Admitting: RADIOLOGY
Payer: MEDICAID

## 2022-05-04 ENCOUNTER — LAB VISIT (OUTPATIENT)
Dept: LAB | Facility: HOSPITAL | Age: 47
End: 2022-05-04
Attending: INTERNAL MEDICINE
Payer: MEDICAID

## 2022-05-04 VITALS
DIASTOLIC BLOOD PRESSURE: 67 MMHG | SYSTOLIC BLOOD PRESSURE: 106 MMHG | RESPIRATION RATE: 18 BRPM | HEART RATE: 75 BPM | TEMPERATURE: 98 F | OXYGEN SATURATION: 100 %

## 2022-05-04 DIAGNOSIS — K70.10 ALCOHOLIC HEPATITIS WITHOUT ASCITES: ICD-10-CM

## 2022-05-04 DIAGNOSIS — K72.90 DECOMPENSATED HEPATIC CIRRHOSIS: ICD-10-CM

## 2022-05-04 DIAGNOSIS — K70.11 ASCITES DUE TO ALCOHOLIC HEPATITIS: Primary | ICD-10-CM

## 2022-05-04 DIAGNOSIS — K70.31 ALCOHOLIC CIRRHOSIS OF LIVER WITH ASCITES: ICD-10-CM

## 2022-05-04 DIAGNOSIS — K74.60 DECOMPENSATED HEPATIC CIRRHOSIS: ICD-10-CM

## 2022-05-04 LAB
ALBUMIN SERPL BCP-MCNC: 3 G/DL (ref 3.5–5.2)
ALP SERPL-CCNC: 75 U/L (ref 55–135)
ALT SERPL W/O P-5'-P-CCNC: 14 U/L (ref 10–44)
ANION GAP SERPL CALC-SCNC: 11 MMOL/L (ref 8–16)
AST SERPL-CCNC: 32 U/L (ref 10–40)
BASOPHILS # BLD AUTO: 0.03 K/UL (ref 0–0.2)
BASOPHILS NFR BLD: 0.7 % (ref 0–1.9)
BILIRUB SERPL-MCNC: 1.6 MG/DL (ref 0.1–1)
BUN SERPL-MCNC: 8 MG/DL (ref 6–20)
CALCIUM SERPL-MCNC: 9.1 MG/DL (ref 8.7–10.5)
CHLORIDE SERPL-SCNC: 109 MMOL/L (ref 95–110)
CO2 SERPL-SCNC: 20 MMOL/L (ref 23–29)
CREAT SERPL-MCNC: 1 MG/DL (ref 0.5–1.4)
DIFFERENTIAL METHOD: ABNORMAL
EOSINOPHIL # BLD AUTO: 0.1 K/UL (ref 0–0.5)
EOSINOPHIL NFR BLD: 3 % (ref 0–8)
ERYTHROCYTE [DISTWIDTH] IN BLOOD BY AUTOMATED COUNT: 14.6 % (ref 11.5–14.5)
EST. GFR  (AFRICAN AMERICAN): >60 ML/MIN/1.73 M^2
EST. GFR  (NON AFRICAN AMERICAN): >60 ML/MIN/1.73 M^2
GLUCOSE SERPL-MCNC: 97 MG/DL (ref 70–110)
HCT VFR BLD AUTO: 34.4 % (ref 40–54)
HGB BLD-MCNC: 11.4 G/DL (ref 14–18)
IMM GRANULOCYTES # BLD AUTO: 0.01 K/UL (ref 0–0.04)
IMM GRANULOCYTES NFR BLD AUTO: 0.2 % (ref 0–0.5)
INR PPP: 1.3 (ref 0.8–1.2)
LYMPHOCYTES # BLD AUTO: 1.3 K/UL (ref 1–4.8)
LYMPHOCYTES NFR BLD: 31 % (ref 18–48)
MCH RBC QN AUTO: 32.2 PG (ref 27–31)
MCHC RBC AUTO-ENTMCNC: 33.1 G/DL (ref 32–36)
MCV RBC AUTO: 97 FL (ref 82–98)
MONOCYTES # BLD AUTO: 0.4 K/UL (ref 0.3–1)
MONOCYTES NFR BLD: 10.6 % (ref 4–15)
NEUTROPHILS # BLD AUTO: 2.2 K/UL (ref 1.8–7.7)
NEUTROPHILS NFR BLD: 54.5 % (ref 38–73)
NRBC BLD-RTO: 0 /100 WBC
PLATELET # BLD AUTO: 70 K/UL (ref 150–450)
PMV BLD AUTO: 10.5 FL (ref 9.2–12.9)
POTASSIUM SERPL-SCNC: 4.1 MMOL/L (ref 3.5–5.1)
PROT SERPL-MCNC: 6.4 G/DL (ref 6–8.4)
PROTHROMBIN TIME: 13.7 SEC (ref 9–12.5)
RBC # BLD AUTO: 3.54 M/UL (ref 4.6–6.2)
SODIUM SERPL-SCNC: 140 MMOL/L (ref 136–145)
WBC # BLD AUTO: 4.06 K/UL (ref 3.9–12.7)

## 2022-05-04 PROCEDURE — 49083 IR PARACENTESIS WITH IMAGING: ICD-10-PCS | Mod: ,,, | Performed by: RADIOLOGY

## 2022-05-04 PROCEDURE — 85025 COMPLETE CBC W/AUTO DIFF WBC: CPT | Performed by: INTERNAL MEDICINE

## 2022-05-04 PROCEDURE — 49083 ABD PARACENTESIS W/IMAGING: CPT | Performed by: RADIOLOGY

## 2022-05-04 PROCEDURE — 36415 COLL VENOUS BLD VENIPUNCTURE: CPT | Performed by: INTERNAL MEDICINE

## 2022-05-04 PROCEDURE — 80053 COMPREHEN METABOLIC PANEL: CPT | Performed by: INTERNAL MEDICINE

## 2022-05-04 PROCEDURE — 25000003 PHARM REV CODE 250: Performed by: RADIOLOGY

## 2022-05-04 PROCEDURE — C1729 CATH, DRAINAGE: HCPCS

## 2022-05-04 PROCEDURE — 85610 PROTHROMBIN TIME: CPT | Performed by: INTERNAL MEDICINE

## 2022-05-04 PROCEDURE — 63600175 PHARM REV CODE 636 W HCPCS: Mod: JG | Performed by: RADIOLOGY

## 2022-05-04 PROCEDURE — P9047 ALBUMIN (HUMAN), 25%, 50ML: HCPCS | Mod: JG | Performed by: RADIOLOGY

## 2022-05-04 RX ORDER — LIDOCAINE HYDROCHLORIDE 20 MG/ML
INJECTION, SOLUTION INFILTRATION; PERINEURAL CODE/TRAUMA/SEDATION MEDICATION
Status: COMPLETED | OUTPATIENT
Start: 2022-05-04 | End: 2022-05-04

## 2022-05-04 RX ORDER — ALBUMIN HUMAN 250 G/1000ML
25 SOLUTION INTRAVENOUS ONCE AS NEEDED
Status: COMPLETED | OUTPATIENT
Start: 2022-05-04 | End: 2022-05-04

## 2022-05-04 RX ADMIN — LIDOCAINE HYDROCHLORIDE 10 ML: 20 INJECTION, SOLUTION INFILTRATION; PERINEURAL at 09:05

## 2022-05-04 RX ADMIN — ALBUMIN (HUMAN) 25 G: 12.5 SOLUTION INTRAVENOUS at 10:05

## 2022-05-04 NOTE — DISCHARGE SUMMARY
Radiology Discharge Summary      Hospital Course: No complications    Admit Date: 5/4/2022  Discharge Date: 05/04/2022     Instructions Given to Patient: Yes  Diet: Resume prior diet  Activity: activity as tolerated    Description of Condition on Discharge: Stable  Vital Signs (Most Recent):      Discharge Disposition: Home    Discharge Diagnosis:  46 y.o. male with decompensated EtOH hepatic cirrhosis complicated by recurrent abdominal distension and pain 2/2 recurrent painful, tense ascites s/p successful US-guided percutaneous RUQ-approach therapeutic LVP with local anesthetic only and albumin infusion post PRN as indicated per institutional protocol. Patient tolerated the procedure well. No immediate post-procedural complications noted.      Thank you for considering IR for the care of your patient.      Chris Chahal MD  Interventional Radiology

## 2022-05-04 NOTE — DISCHARGE INSTRUCTIONS
BATHING:  You may shower tomorrow.  DRESSING:  Remove dressing tomorrow.        ACTIVITY LEVEL: If you have received sedation or an anesthetic, you may feel sleepy for several hours. Rest until you are more awake. Gradually resume your normal activities    Do not drive, drink alcohol, or sign legal documents for 24 hours, or if taking narcotic pain medication.      DIET: You may resume your home diet. If nausea is present, increase your diet gradually with fluids and bland foods.    Medications: Pain medication should be taken only if needed and as directed. If antibiotics are prescribed, the medication should be taken until completed. You will be given an updated list of you medications.  No driving, alcoholic beverages or signing legal documents for next 24 hours if you have had sedation, or while taking pain medication    CALL THE DOCTOR:   For any obvious bleeding (some dried blood over the incision is normal).     Redness, swelling, foul smell around incision or fever over 101.  Shortness of breath.  Persistent pain or nausea not relieved by medication.  Call  [phone]     to speak with an Interventional Radiologist    If any unusual problems or difficulties occur contact your doctor. If you cannot contact your doctor but feel your signs and symptoms warrant a physicians attention return to the emergency room.            Fall Prevention  Millions of people fall every year and injure themselves. You may have had anesthesia or sedation which may increase your risk of falling. You may have health issues that put you at an increased risk of falling.     Here are ways to reduce your risk of falling.    Make your home safe by keeping walkways clear of objects you may trip over.  Use non-slip pads under rugs. Do not use area rugs or small throw rugs.  Use non-slip mats in bathtubs and showers.  Install handrails and lights on staircases.  Do not walk in poorly lit areas.  Do not stand on chairs or wobbly  ladders.  Use caution when reaching overhead or looking upward. This position can cause a loss of balance.  Be sure your shoes fit properly, have non-slip bottoms and are in good condition.   Wear shoes both inside and out. Avoid going barefoot or wearing slippers.  Be cautious when going up and down stairs, curbs, and when walking on uneven sidewalks.  If your balance is poor, consider using a cane or walker.  If your fall was related to alcohol use, stop or limit alcohol intake.   If your fall was related to use of sleeping medicines, talk to your doctor about this. You may need to reduce your dosage at bedtime if you awaken during the night to go to the bathroom.    To reduce the need for nighttime bathroom trips:  Avoid drinking fluids for several hours before going to bed  Empty your bladder before going to bed  Men can keep a urinal at the bedside  Stay as active as you can. Balance, flexibility, strength, and endurance all come from exercise. They all play a role in preventing falls. Ask your healthcare provider which types of activity are right for you.  Get your vision checked on a regular basis.  If you have pets, know where they are before you stand up or walk so you don't trip over them.  Use night lights.

## 2022-05-04 NOTE — BRIEF OP NOTE
Radiology Post-Procedure Note     Pre Op Diagnosis: Recurrent painful, tense ascites  Post Op Diagnosis: Same     Procedure: 1. US-guided percutaneous RUQ-approach therapeutic LVP     Procedure performed by: Chris Chahal MD     Written Informed Consent Obtained: Yes  Specimen Removed: YES, 3,800-cc of thin, straw-colored ascitic fluid  Estimated Blood Loss: none     Findings:   Successful US-guided percutaneous RUQ-approach therapeutic LVP with local anesthetic only and albumin infusion post PRN as indicated per institutional protocol. Patient tolerated the procedure well. No immediate post-procedural complications noted.      Thank you for considering IR for the care of your patient.      Chris Chahal MD  Interventional Radiology

## 2022-05-04 NOTE — H&P
Radiology History & Physical      SUBJECTIVE:     Chief Complaint: Recurrent painful, tense ascites     History of Present Illness:  Nilesh Rolon Jr. is a 46 y.o. male with pertinent PMHx of decompensated EtOH hepatic cirrhosis complicated by recurrent abdominal distension and pain 2/2 recurrent painful, tense ascites requiring frequent decompression for symptom relief.      A new outpatient IR consult received for US-guided percutaneous RUQ-approach therapeutic large-volume paracentesis.    Past Medical History:   Diagnosis Date    DONG (acute kidney injury) 12/1/2021    Anxiety     Arthritis     Cirrhosis     Hypertension     Liver disease     Osteoarthritis     Other meniscus derangements, other medial meniscus, left knee 1/7/2019     Past Surgical History:   Procedure Laterality Date    ARTHROSCOPY OF KNEE Left 1/7/2019    Procedure: ARTHROSCOPY, KNEE;  Surgeon: Derick Kirby MD;  Location: [facility];  Service: Orthopedics;  Laterality: Left;    COLONOSCOPY N/A 7/27/2020    Procedure: COLONOSCOPY;  Surgeon: Sotero Zapata MD;  Location: [facility];  Service: Endoscopy;  Laterality: N/A;    COLONOSCOPY N/A 1/20/2022    Procedure: COLONOSCOPY Suprep Vaccinated;  Surgeon: Jacob Andrea MD;  Location: [facility];  Service: Endoscopy;  Laterality: N/A;    ESOPHAGOGASTRODUODENOSCOPY N/A 4/8/2019    Procedure: EGD (ESOPHAGOGASTRODUODENOSCOPY);  Surgeon: Bonita Kendall MD;  Location: [facility];  Service: Endoscopy;  Laterality: N/A;    ESOPHAGOGASTRODUODENOSCOPY N/A 7/27/2020    Procedure: EGD (ESOPHAGOGASTRODUODENOSCOPY);  Surgeon: Sotero Zapata MD;  Location: [facility];  Service: Endoscopy;  Laterality: N/A;    ESOPHAGOGASTRODUODENOSCOPY N/A 12/2/2021    Procedure: EGD (ESOPHAGOGASTRODUODENOSCOPY);  Surgeon: Montez Guerra MD;  Location: [facility] ([address] [place] [address]);  Service: Endoscopy;  Laterality: N/A;    ESOPHAGOGASTRODUODENOSCOPY N/A 1/20/2022    Procedure: EGD (ESOPHAGOGASTRODUODENOSCOPY);   Surgeon: Jacob Andrea MD;  Location: [facility];  Service: Endoscopy;  Laterality: N/A;  please order CBC with plts and INR day of case.    KNEE SURGERY       Home Meds:   Prior to Admission medications    Medication Sig Start Date End Date Taking? Authorizing Provider   ferrous sulfate (FEOSOL) 325 mg (65 mg iron) Tab tablet Take 1 tablet (325 mg total) by mouth 2 (two) times daily. 2/2/22   Savita Bianchi MD   folic acid (FOLVITE) 1 MG tablet TAKE 1 TABLET(1 MG) BY MOUTH EVERY DAY 11/6/21   Dianne Jimenez NP   lactulose (CHRONULAC) 10 gram/15 mL solution Take 30 mLs (20 g total) by mouth 2 (two) times daily. Goal of 3-4 bowel movements daily 3/14/22   Pb Mixon MD   multivitamin Tab Take 1 tablet by mouth once daily. 2/2/22   Savita Bianchi MD   pantoprazole (PROTONIX) 40 MG tablet Take 1 tablet (40 mg total) by mouth once daily. 2/2/22 2/2/23  Savita Bianchi MD   thiamine 100 MG tablet Take 1 tablet (100 mg total) by mouth once daily. 1/9/22 1/9/23  Otoniel Espinoza MD     Anticoagulants/Antiplatelets: no anticoagulation     Allergies: Review of patient's allergies indicates:  No Known Allergies     Sedation History:  no adverse reactions     Review of Systems:   Hematological: no known coagulopathies  Respiratory: no cough, shortness of breath, or wheezing  Cardiovascular: no chest pain or dyspnea on exertion  Gastrointestinal: positive for - abdominal pain and distension  Genito-Urinary: no dysuria, trouble voiding, or hematuria  Musculoskeletal: negative  Neurological: no TIA or stroke symptoms      OBJECTIVE:     Vital Signs (Most Recent)     Physical Exam:  General: no acute distress  Mental Status: alert and oriented to person, place and time  HEENT: normocephalic, atraumatic  Chest: unlabored breathing  Heart: regular heart rate  Abdomen: +distended. No TTP/r/g.  Extremity: moves all extremities    Laboratory  Lab Results   Component Value Date     INR 1.3 (H) 04/27/2022       Lab Results   Component Value Date    WBC 4.80 04/27/2022    HGB 12.0 (L) 04/27/2022    HCT 34.9 (L) 04/27/2022    MCV 97 04/27/2022    PLT 84 (L) 04/27/2022      Lab Results   Component Value Date    GLU 96 04/27/2022     04/27/2022    K 4.0 04/27/2022     04/27/2022    CO2 20 (L) 04/27/2022    BUN 6 04/27/2022    CREATININE 1.0 04/27/2022    CALCIUM 9.1 04/27/2022    MG 1.8 02/01/2022    ALT 14 04/27/2022    AST 30 04/27/2022    ALBUMIN 3.0 (L) 04/27/2022    BILITOT 1.6 (H) 04/27/2022    BILIDIR 3.7 (H) 07/27/2020     ASSESSMENT/PLAN:     46 y.o. male with pertinent PMHx of decompensated EtOH hepatic cirrhosis complicated by recurrent abdominal distension and pain 2/2 recurrent painful, tense ascitesrequiring frequent therapeutic large-volume paracentesis.     1. Recurrent painful, tense ascites - Will attempt US-guided percutaneous RUQ-approach therapeutic LVP with local anesthetic only and albumin infusion post PRN as indicated per institutional protocol.     Risks (including, but not limited to, pain, bleeding, infection, damage to nearby structures, failure to obtain sufficient material for a diagnosis, the need for additional procedures, and death), benefits, and alternatives were discussed with the patient. All questions were answered to the best of my abilities. The patient wishes to proceed with the procedure. Written informed consent was obtained.     Thank you for considering IR for the care of your patient.      Chris Chahal MD  Interventional Radiology

## 2022-05-11 ENCOUNTER — HOSPITAL ENCOUNTER (OUTPATIENT)
Dept: INTERVENTIONAL RADIOLOGY/VASCULAR | Facility: HOSPITAL | Age: 47
Discharge: HOME | End: 2022-05-11
Attending: INTERNAL MEDICINE
Payer: MEDICAID

## 2022-05-11 ENCOUNTER — LAB VISIT (OUTPATIENT)
Dept: LAB | Facility: HOSPITAL | Age: 47
End: 2022-05-11
Attending: INTERNAL MEDICINE
Payer: MEDICAID

## 2022-05-11 VITALS
HEART RATE: 78 BPM | OXYGEN SATURATION: 99 % | DIASTOLIC BLOOD PRESSURE: 70 MMHG | TEMPERATURE: 98 F | SYSTOLIC BLOOD PRESSURE: 105 MMHG

## 2022-05-11 DIAGNOSIS — K70.31 ALCOHOLIC CIRRHOSIS OF LIVER WITH ASCITES: ICD-10-CM

## 2022-05-11 LAB
ALBUMIN SERPL BCP-MCNC: 3.1 G/DL (ref 3.5–5.2)
ALP SERPL-CCNC: 79 U/L (ref 55–135)
ALT SERPL W/O P-5'-P-CCNC: 14 U/L (ref 10–44)
ANION GAP SERPL CALC-SCNC: 12 MMOL/L (ref 8–16)
AST SERPL-CCNC: 31 U/L (ref 10–40)
BILIRUB SERPL-MCNC: 1.6 MG/DL (ref 0.1–1)
BUN SERPL-MCNC: 9 MG/DL (ref 6–20)
CALCIUM SERPL-MCNC: 9.4 MG/DL (ref 8.7–10.5)
CHLORIDE SERPL-SCNC: 111 MMOL/L (ref 95–110)
CO2 SERPL-SCNC: 20 MMOL/L (ref 23–29)
CREAT SERPL-MCNC: 0.9 MG/DL (ref 0.5–1.4)
EST. GFR  (AFRICAN AMERICAN): >60 ML/MIN/1.73 M^2
EST. GFR  (NON AFRICAN AMERICAN): >60 ML/MIN/1.73 M^2
GLUCOSE SERPL-MCNC: 96 MG/DL (ref 70–110)
INR PPP: 1.3 (ref 0.8–1.2)
POTASSIUM SERPL-SCNC: 3.8 MMOL/L (ref 3.5–5.1)
PROT SERPL-MCNC: 6.6 G/DL (ref 6–8.4)
PROTHROMBIN TIME: 13 SEC (ref 9–12.5)
SODIUM SERPL-SCNC: 143 MMOL/L (ref 136–145)

## 2022-05-11 PROCEDURE — 49083 ABD PARACENTESIS W/IMAGING: CPT | Performed by: RADIOLOGY

## 2022-05-11 PROCEDURE — 85610 PROTHROMBIN TIME: CPT | Performed by: INTERNAL MEDICINE

## 2022-05-11 PROCEDURE — C1729 CATH, DRAINAGE: HCPCS

## 2022-05-11 PROCEDURE — 49083 IR PARACENTESIS WITH IMAGING: ICD-10-PCS | Mod: ,,, | Performed by: RADIOLOGY

## 2022-05-11 PROCEDURE — P9047 ALBUMIN (HUMAN), 25%, 50ML: HCPCS | Mod: JG | Performed by: RADIOLOGY

## 2022-05-11 PROCEDURE — 36415 COLL VENOUS BLD VENIPUNCTURE: CPT | Performed by: INTERNAL MEDICINE

## 2022-05-11 PROCEDURE — 63600175 PHARM REV CODE 636 W HCPCS: Mod: JG | Performed by: RADIOLOGY

## 2022-05-11 PROCEDURE — 85025 COMPLETE CBC W/AUTO DIFF WBC: CPT | Performed by: INTERNAL MEDICINE

## 2022-05-11 PROCEDURE — 80053 COMPREHEN METABOLIC PANEL: CPT | Performed by: INTERNAL MEDICINE

## 2022-05-11 RX ORDER — ALBUMIN HUMAN 250 G/1000ML
30 SOLUTION INTRAVENOUS ONCE
Status: COMPLETED | OUTPATIENT
Start: 2022-05-11 | End: 2022-05-11

## 2022-05-11 RX ADMIN — ALBUMIN HUMAN 30 G: 0.25 SOLUTION INTRAVENOUS at 10:05

## 2022-05-11 NOTE — PROCEDURES
Interventional Radiology Immediate Post-Procedure Note    Pre-Op Diagnosis: Ascites  Post-Op Diagnosis: Same    Procedure: US-guided paracentesis    Procedure performed by: Geo Dumont MD  Assistants: None    Estimated Blood Loss: Minimal  Specimen Removed: None sent    Findings/description of procedure:  Large ascites. 5000 mL yellow fluid removed from the RIGHT lower quadrant.    No immediate complications. Patient tolerated procedure well. Please see full dictated procedure report for additional details and recommendations.      Geo Dumont MD  [facility]  Pager [phone]

## 2022-05-11 NOTE — DISCHARGE SUMMARY
Interventional Radiology Short Stay Discharge Summary      Admit Date: 5/11/2022  Discharge Date: 05/11/2022     Hospital Course: Uneventful    Discharge Diagnosis: Ascites s/p paracentesis today    Discharge Condition: Stable    Discharge Disposition: Home    Diet: Resume prior diet    Activity: Activity as tolerated    Follow-up: With referring provider      Geo Dumont MD  [facility]  Pager [phone]

## 2022-05-11 NOTE — DISCHARGE INSTRUCTIONS
BATHING:  You may shower tomorrow.  DRESSING:  Remove dressing tomorrow.        ACTIVITY LEVEL: If you have received sedation or an anesthetic, you may feel sleepy for several hours. Rest until you are more awake. Gradually resume your normal activities    Do not drive, drink alcohol, or sign legal documents for 24 hours, or if taking narcotic pain medication.      DIET: You may resume your home diet. If nausea is present, increase your diet gradually with fluids and bland foods.    Medications: Pain medication should be taken only if needed and as directed. If antibiotics are prescribed, the medication should be taken until completed. You will be given an updated list of you medications.  No driving, alcoholic beverages or signing legal documents for next 24 hours if you have had sedation, or while taking pain medication    CALL THE DOCTOR:   For any obvious bleeding (some dried blood over the incision is normal).     Redness, swelling, foul smell around incision or fever over 101.  Shortness of breath.  Persistent pain or nausea not relieved by medication.  Call  [phone]     to speak with an Interventional Radiologist    If any unusual problems or difficulties occur contact your doctor. If you cannot contact your doctor but feel your signs and symptoms warrant a physicians attention return to the emergency room.

## 2022-05-11 NOTE — PLAN OF CARE
Pt verbalized readiness to go home and understanding of discharge instructions. Dressing CDI. PIV removed.

## 2022-05-11 NOTE — H&P
Interventional Radiology Pre-Procedure History & Physical      Chief Complaint/Reason for Referral: Ascites    History of Present Illness:  Nilesh Rolon Jr. is a 46 y.o. male who presents with ascites. Referred for paracentesis.    Past Medical History:   Diagnosis Date    DONG (acute kidney injury) 12/1/2021    Anxiety     Arthritis     Cirrhosis     Hypertension     Liver disease     Osteoarthritis     Other meniscus derangements, other medial meniscus, left knee 1/7/2019     Past Surgical History:   Procedure Laterality Date    ARTHROSCOPY OF KNEE Left 1/7/2019    Procedure: ARTHROSCOPY, KNEE;  Surgeon: Derick Kirby MD;  Location: [facility];  Service: Orthopedics;  Laterality: Left;    COLONOSCOPY N/A 7/27/2020    Procedure: COLONOSCOPY;  Surgeon: Sotero Zapata MD;  Location: [facility];  Service: Endoscopy;  Laterality: N/A;    COLONOSCOPY N/A 1/20/2022    Procedure: COLONOSCOPY Suprep Vaccinated;  Surgeon: Jacob Andrea MD;  Location: [facility];  Service: Endoscopy;  Laterality: N/A;    ESOPHAGOGASTRODUODENOSCOPY N/A 4/8/2019    Procedure: EGD (ESOPHAGOGASTRODUODENOSCOPY);  Surgeon: Bonita Kendall MD;  Location: [facility];  Service: Endoscopy;  Laterality: N/A;    ESOPHAGOGASTRODUODENOSCOPY N/A 7/27/2020    Procedure: EGD (ESOPHAGOGASTRODUODENOSCOPY);  Surgeon: Sotero Zapata MD;  Location: [facility];  Service: Endoscopy;  Laterality: N/A;    ESOPHAGOGASTRODUODENOSCOPY N/A 12/2/2021    Procedure: EGD (ESOPHAGOGASTRODUODENOSCOPY);  Surgeon: Montez Guerra MD;  Location: [address]);  Service: Endoscopy;  Laterality: N/A;    ESOPHAGOGASTRODUODENOSCOPY N/A 1/20/2022    Procedure: EGD (ESOPHAGOGASTRODUODENOSCOPY);  Surgeon: Jacob Andrea MD;  Location: [facility];  Service: Endoscopy;  Laterality: N/A;  please order CBC with plts and INR day of case.    KNEE SURGERY         Allergies:   Review of patient's allergies indicates:  No Known Allergies     Home Meds:    Prior to Admission medications    Medication Sig Start Date End Date Taking? Authorizing Provider   ferrous sulfate (FEOSOL) 325 mg (65 mg iron) Tab tablet Take 1 tablet (325 mg total) by mouth 2 (two) times daily. 2/2/22   Savita Bianchi MD   folic acid (FOLVITE) 1 MG tablet TAKE 1 TABLET(1 MG) BY MOUTH EVERY DAY 11/6/21   Dianne Jimenez NP   lactulose (CHRONULAC) 10 gram/15 mL solution Take 30 mLs (20 g total) by mouth 2 (two) times daily. Goal of 3-4 bowel movements daily 3/14/22   Pb Mixon MD   multivitamin Tab Take 1 tablet by mouth once daily. 2/2/22   Savita Bianchi MD   pantoprazole (PROTONIX) 40 MG tablet Take 1 tablet (40 mg total) by mouth once daily. 2/2/22 2/2/23  Savita Bianchi MD   thiamine 100 MG tablet Take 1 tablet (100 mg total) by mouth once daily. 1/9/22 1/9/23  Otoniel Espinoza MD       Anticoagulation/Antiplatelet Meds: no anticoagulation    Review of Systems:   Hematological: no known coagulopathies  Respiratory: no shortness of breath  Cardiovascular: no chest pain  Gastrointestinal: no abdominal pain  Genitourinary: no dysuria  Musculoskeletal: negative  Neurological: no TIA or stroke symptoms     Physical Exam:  BP: 118/75 (05/11/22 0956)    General: WNWD, NAD  HEENT: Normocephalic, sclera anicteric, oropharynx clear  Heart: RRR  Lungs: Symmetric excursions, breathing unlabored  Abd: Mildly distended, soft, NT  Extremities: LEIGH  Neuro: Gross nonfocal    Laboratory:  Lab Results   Component Value Date    INR 1.3 (H) 05/11/2022       Lab Results   Component Value Date    WBC 4.06 05/04/2022    HGB 11.4 (L) 05/04/2022    HCT 34.4 (L) 05/04/2022    MCV 97 05/04/2022    PLT 70 (L) 05/04/2022      Lab Results   Component Value Date    GLU 96 05/11/2022     05/11/2022    K 3.8 05/11/2022     (H) 05/11/2022    CO2 20 (L) 05/11/2022    BUN 9 05/11/2022    CREATININE 0.9 05/11/2022    CALCIUM 9.4 05/11/2022    MG 1.8 02/01/2022    ALT  14 05/11/2022    AST 31 05/11/2022    ALBUMIN 3.1 (L) 05/11/2022    BILITOT 1.6 (H) 05/11/2022    BILIDIR 3.7 (H) 07/27/2020       Assessment/Plan:  46 y.o. male with ascites. Will undergo paracentesis under US guidance today.    Sedation: None    Risks (including, but not limited to, pain, bleeding, infection, damage to nearby structures, treatment failure/recurrence, and the need for additional procedures), potential benefits, and alternatives were discussed with the patient. All questions were answered to the best of my abilities. The patient wishes to proceed. Written informed consent was obtained.      Geo Dumont MD  [facility]  Pager [phone]

## 2022-05-12 ENCOUNTER — PATIENT MESSAGE (OUTPATIENT)
Dept: HEPATOLOGY | Facility: CLINIC | Age: 47
End: 2022-05-12
Payer: MEDICAID

## 2022-05-12 ENCOUNTER — OFFICE VISIT (OUTPATIENT)
Dept: ORTHOPEDICS | Facility: CLINIC | Age: 47
End: 2022-05-12
Payer: MEDICAID

## 2022-05-12 VITALS
HEART RATE: 63 BPM | HEIGHT: 75 IN | DIASTOLIC BLOOD PRESSURE: 67 MMHG | WEIGHT: 224.13 LBS | SYSTOLIC BLOOD PRESSURE: 101 MMHG | BODY MASS INDEX: 27.87 KG/M2

## 2022-05-12 DIAGNOSIS — M19.011 PRIMARY OSTEOARTHRITIS OF RIGHT SHOULDER: Primary | ICD-10-CM

## 2022-05-12 LAB
ANISOCYTOSIS BLD QL SMEAR: SLIGHT
BASOPHILS # BLD AUTO: 0.01 K/UL (ref 0–0.2)
BASOPHILS NFR BLD: 0.3 % (ref 0–1.9)
DIFFERENTIAL METHOD: ABNORMAL
EOSINOPHIL # BLD AUTO: 0 K/UL (ref 0–0.5)
EOSINOPHIL NFR BLD: 0.3 % (ref 0–8)
ERYTHROCYTE [DISTWIDTH] IN BLOOD BY AUTOMATED COUNT: 14.7 % (ref 11.5–14.5)
HCT VFR BLD AUTO: 35.9 % (ref 40–54)
HGB BLD-MCNC: 12.2 G/DL (ref 14–18)
HYPOCHROMIA BLD QL SMEAR: ABNORMAL
IMM GRANULOCYTES # BLD AUTO: 0 K/UL (ref 0–0.04)
IMM GRANULOCYTES NFR BLD AUTO: 0 % (ref 0–0.5)
LYMPHOCYTES # BLD AUTO: 0.8 K/UL (ref 1–4.8)
LYMPHOCYTES NFR BLD: 24.5 % (ref 18–48)
MCH RBC QN AUTO: 32.6 PG (ref 27–31)
MCHC RBC AUTO-ENTMCNC: 34 G/DL (ref 32–36)
MCV RBC AUTO: 96 FL (ref 82–98)
MONOCYTES # BLD AUTO: 0.1 K/UL (ref 0.3–1)
MONOCYTES NFR BLD: 2.2 % (ref 4–15)
NEUTROPHILS # BLD AUTO: 2.3 K/UL (ref 1.8–7.7)
NEUTROPHILS NFR BLD: 72.7 % (ref 38–73)
NRBC BLD-RTO: 0 /100 WBC
PLATELET # BLD AUTO: 68 K/UL (ref 150–450)
PLATELET BLD QL SMEAR: ABNORMAL
PMV BLD AUTO: 11.4 FL (ref 9.2–12.9)
RBC # BLD AUTO: 3.74 M/UL (ref 4.6–6.2)
WBC # BLD AUTO: 3.22 K/UL (ref 3.9–12.7)

## 2022-05-12 PROCEDURE — 99999 PR PBB SHADOW E&M-EST. PATIENT-LVL III: CPT | Mod: PBBFAC,,, | Performed by: ORTHOPAEDIC SURGERY

## 2022-05-12 PROCEDURE — 3008F BODY MASS INDEX DOCD: CPT | Mod: CPTII,,, | Performed by: ORTHOPAEDIC SURGERY

## 2022-05-12 PROCEDURE — 3078F PR MOST RECENT DIASTOLIC BLOOD PRESSURE < 80 MM HG: ICD-10-PCS | Mod: CPTII,,, | Performed by: ORTHOPAEDIC SURGERY

## 2022-05-12 PROCEDURE — 3008F PR BODY MASS INDEX (BMI) DOCUMENTED: ICD-10-PCS | Mod: CPTII,,, | Performed by: ORTHOPAEDIC SURGERY

## 2022-05-12 PROCEDURE — 1159F PR MEDICATION LIST DOCUMENTED IN MEDICAL RECORD: ICD-10-PCS | Mod: CPTII,,, | Performed by: ORTHOPAEDIC SURGERY

## 2022-05-12 PROCEDURE — 1159F MED LIST DOCD IN RCRD: CPT | Mod: CPTII,,, | Performed by: ORTHOPAEDIC SURGERY

## 2022-05-12 PROCEDURE — 99999 PR PBB SHADOW E&M-EST. PATIENT-LVL III: ICD-10-PCS | Mod: PBBFAC,,, | Performed by: ORTHOPAEDIC SURGERY

## 2022-05-12 PROCEDURE — 3074F PR MOST RECENT SYSTOLIC BLOOD PRESSURE < 130 MM HG: ICD-10-PCS | Mod: CPTII,,, | Performed by: ORTHOPAEDIC SURGERY

## 2022-05-12 PROCEDURE — 20610 DRAIN/INJ JOINT/BURSA W/O US: CPT | Mod: PBBFAC,PN,RT | Performed by: ORTHOPAEDIC SURGERY

## 2022-05-12 PROCEDURE — 3078F DIAST BP <80 MM HG: CPT | Mod: CPTII,,, | Performed by: ORTHOPAEDIC SURGERY

## 2022-05-12 PROCEDURE — 99214 PR OFFICE/OUTPT VISIT, EST, LEVL IV, 30-39 MIN: ICD-10-PCS | Mod: 25,S$PBB,, | Performed by: ORTHOPAEDIC SURGERY

## 2022-05-12 PROCEDURE — 99214 OFFICE O/P EST MOD 30 MIN: CPT | Mod: 25,S$PBB,, | Performed by: ORTHOPAEDIC SURGERY

## 2022-05-12 PROCEDURE — 99213 OFFICE O/P EST LOW 20 MIN: CPT | Mod: PBBFAC,PN,25 | Performed by: ORTHOPAEDIC SURGERY

## 2022-05-12 PROCEDURE — 3074F SYST BP LT 130 MM HG: CPT | Mod: CPTII,,, | Performed by: ORTHOPAEDIC SURGERY

## 2022-05-12 PROCEDURE — 20610 LARGE JOINT ASPIRATION/INJECTION: R GLENOHUMERAL: ICD-10-PCS | Mod: S$PBB,RT,, | Performed by: ORTHOPAEDIC SURGERY

## 2022-05-12 RX ORDER — TRIAMCINOLONE ACETONIDE 40 MG/ML
40 INJECTION, SUSPENSION INTRA-ARTICULAR; INTRAMUSCULAR
Status: DISCONTINUED | OUTPATIENT
Start: 2022-05-12 | End: 2022-05-12 | Stop reason: HOSPADM

## 2022-05-12 RX ORDER — KETOCONAZOLE 20 MG/G
1 CREAM TOPICAL DAILY
COMMUNITY
Start: 2022-05-06

## 2022-05-12 RX ORDER — DOXYCYCLINE HYCLATE 100 MG
100 TABLET ORAL DAILY
COMMUNITY
Start: 2022-05-06

## 2022-05-12 RX ORDER — ASPIRIN 325 MG
50000 TABLET, DELAYED RELEASE (ENTERIC COATED) ORAL
COMMUNITY
Start: 2022-04-26

## 2022-05-12 RX ADMIN — TRIAMCINOLONE ACETONIDE 40 MG: 40 INJECTION, SUSPENSION INTRA-ARTICULAR; INTRAMUSCULAR at 08:05

## 2022-05-12 NOTE — PROGRESS NOTES
[facility], Orthopedics and Sports Medicine  [facility]    Established Patient Shoulder Office Visit  05/12/2022     Diagnosis:  Right glenohumeral osteoarthritis      Procedure:   (2/24/2021) right GH CSI    Subjective:      Nilesh Rolon Jr. is a 46 y.o. male who returns for follow up treatment of the right shoulder problem.    At the last office visit the patient was prescribed physical therapy, which has not improved the symptoms. He went to therapy for about 2 months.   He previously had a CSI right GH joint, gave about 2 months of pain relief.      The patient has not had advanced imaging of the shoulder.     The patient continues with the following symptoms: pain and stiffness.  The symptoms are unchanged. The patients shoulder hurts and the shot has worn off.  He has pain and crepitation with shoulder motion.     The patient has alcoholic cirrhosis with frequent ascites requiring paracentesis recently.  He is 6 months sober.     Outside reports reviewed: historical medical records, office notes and operative reports.    Past Medical History:   Diagnosis Date    ODNG (acute kidney injury) 12/1/2021    Anxiety     Arthritis     Cirrhosis     Hypertension     Liver disease     Osteoarthritis     Other meniscus derangements, other medial meniscus, left knee 1/7/2019       Patient Active Problem List   Diagnosis    Ascites    Thrombocytopenia    History of alcohol abuse    Gastrointestinal hemorrhage    Hx of colonic polyps    History of GI bleed    Alcoholic cirrhosis of liver with ascites    Primary osteoarthritis of left knee    Primary osteoarthritis of right shoulder    Pain of left upper extremity    Debility    Stiffness in joint    Class 1 obesity due to excess calories with serious comorbidity and body mass index (BMI) of 33.0 to 33.9 in adult    Elevated INR    Hemorrhoids    Candidiasis of esophagus with fungal esophagitis     Anemia       Past  Surgical History:   Procedure Laterality Date    ARTHROSCOPY OF KNEE Left 1/7/2019    Procedure: ARTHROSCOPY, KNEE;  Surgeon: Derick Kirby MD;  Location: [facility];  Service: Orthopedics;  Laterality: Left;    COLONOSCOPY N/A 7/27/2020    Procedure: COLONOSCOPY;  Surgeon: Sotero Zapata MD;  Location: [facility];  Service: Endoscopy;  Laterality: N/A;    COLONOSCOPY N/A 1/20/2022    Procedure: COLONOSCOPY Suprep Vaccinated;  Surgeon: Jacob Andrea MD;  Location: [facility];  Service: Endoscopy;  Laterality: N/A;    ESOPHAGOGASTRODUODENOSCOPY N/A 4/8/2019    Procedure: EGD (ESOPHAGOGASTRODUODENOSCOPY);  Surgeon: Bonita Kendall MD;  Location: [facility];  Service: Endoscopy;  Laterality: N/A;    ESOPHAGOGASTRODUODENOSCOPY N/A 7/27/2020    Procedure: EGD (ESOPHAGOGASTRODUODENOSCOPY);  Surgeon: Sotero Zapata MD;  Location: [facility];  Service: Endoscopy;  Laterality: N/A;    ESOPHAGOGASTRODUODENOSCOPY N/A 12/2/2021    Procedure: EGD (ESOPHAGOGASTRODUODENOSCOPY);  Surgeon: Montez Guerra MD;  Location: [facility] ([address] [place] [address]);  Service: Endoscopy;  Laterality: N/A;    ESOPHAGOGASTRODUODENOSCOPY N/A 1/20/2022    Procedure: EGD (ESOPHAGOGASTRODUODENOSCOPY);  Surgeon: Jacob Andrea MD;  Location: [facility];  Service: Endoscopy;  Laterality: N/A;  please order CBC with plts and INR day of case.    KNEE SURGERY          Current Outpatient Medications   Medication Instructions    cholecalciferol (vitamin D3) 50,000 Units, Oral, Every 7 days    doxycycline (VIBRA-TABS) 100 mg, Oral, Daily    FeroSuL 325 mg, Oral, 2 times daily    folic acid (FOLVITE) 1 MG tablet TAKE 1 TABLET(1 MG) BY MOUTH EVERY DAY    ketoconazole (NIZORAL) 2 % cream 1 application, Topical (Top), Daily    lactulose (CHRONULAC) 10 gram/15 mL solution Take 30 mLs (20 g total) by mouth 2 (two) times daily. Goal of 3-4 bowel movements daily    multivitamin Tab 1 tablet, Oral, Daily    pantoprazole (PROTONIX) 40 mg, Oral, Daily     thiamine 100 mg, Oral, Daily        Review of patient's allergies indicates:  No Known Allergies    Social History     Socioeconomic History    Marital status: Single   Tobacco Use    Smoking status: Never Smoker    Smokeless tobacco: Never Used    Tobacco comment: smokes Marijuana only   Substance and Sexual Activity    Alcohol use: Not Currently     Comment: last drink 11/28, drinks 3-4 a day    Drug use: No    Sexual activity: Yes     Partners: Female       Family History   Problem Relation Age of Onset    Birth defects Neg Hx          Review of Systems   Constitutional: Negative for chills and fever.   HENT: Negative for hearing loss.    Eyes: Negative for blurred vision.   Cardiovascular: Negative for chest pain.   Respiratory: Negative for shortness of breath.    Gastrointestinal: Negative for abdominal pain.   Neurological: Negative for light-headedness.        Objective:      General    Nursing note and vitals reviewed.  Constitutional: He is oriented to person, place, and time. He appears well-developed and well-nourished. No distress.   HENT:   Head: Normocephalic and atraumatic.   Eyes: Pupils are equal, round, and reactive to light.   Cardiovascular: Normal rate and regular rhythm.    Pulmonary/Chest: Effort normal and breath sounds normal. No respiratory distress.   Neurological: He is alert and oriented to person, place, and time.   Psychiatric: He has a normal mood and affect. His behavior is normal.         Right Shoulder Exam     Inspection/Observation   Swelling: absent  Bruising: absent  Atrophy: absent    Tenderness   The patient is tender to palpation of the biceps tendon.    Crepitus   The patient has crepitus of the deltoid.    Range of Motion   Active abduction: 130   Forward Flexion: 130   External Rotation 0 degrees: 20   Internal rotation 0 degrees: L4     Tests & Signs   Drop arm: negative  Vo test: negative  Impingement: positive  Belly Press: negative  Active  Compression Test (Jayuya's Sign): positive  Speed's Test: negative    Other   Sensation: normal    Comments:  Ginny test- positive     Left Shoulder Exam     Inspection/Observation   Swelling: absent  Bruising: absent  Atrophy: absent    Tenderness   The patient is experiencing no tenderness.     Range of Motion   Active abduction:  170 normal   Forward Flexion:  170 normal   External Rotation 0 degrees:  50 normal   Internal rotation 0 degrees:  T12 normal     Tests & Signs   Drop arm: negative  Vo test: negative  Impingement: negative  Belly Press: negative  Active Compression test (Jayuya's Sign): negative  Speed's Test: negative    Other   Sensation: normal     Comments:  Ginny test- negative      Muscle Strength   Right Upper Extremity   Shoulder Abduction: 4/5   Shoulder Internal Rotation: 5/5   Shoulder External Rotation: 4/5   Biceps: 5/5   Left Upper Extremity  Shoulder Abduction: 5/5   Shoulder Internal Rotation: 5/5   Shoulder External Rotation: 5/5   Biceps: 5/5     Reflexes     Left Side  Biceps:  2+  Triceps:  2+  Brachioradialis:  2+  Left Damian's Sign:  Absent    Right Side   Biceps:  2+  Triceps:  2+  Brachioradialis:  2+  Right Damian's Sign:  absent    Vascular Exam     Right Pulses      Radial:                    2+      Left Pulses      Radial:                    2+          Imaging:  No new imaging today.     Large Joint Aspiration/Injection: R glenohumeral    Date/Time: 5/12/2022 8:00 AM  Performed by: Ernesot Diego IV, MD  Authorized by: Ernesto Diego IV, MD     Consent Done?:  Yes (Verbal)  Indications:  Pain  Site marked: the procedure site was marked    Timeout: prior to procedure the correct patient, procedure, and site was verified    Prep: patient was prepped and draped in usual sterile fashion      Local anesthesia used?: Yes    Local anesthetic:  Lidocaine 1% without epinephrine    Details:  Needle Size:  22 G  Ultrasonic Guidance for needle placement?: No    Approach:   Lateral  Location:  Shoulder  Site:  R glenohumeral  Medications:  40 mg triamcinolone acetonide 40 mg/mL  Patient tolerance:  Patient tolerated the procedure well with no immediate complications          Assessment:       Nilesh Rolon Jr. is a 46 y.o. male seen in the office today. The encounter diagnosis was Primary osteoarthritis of right shoulder.  Non-operative treatment is recommended at this time. The patient was given another CSI GH.  Will continue to treat with this modality.  Surgical treatment for his condition would involve a total shoulder arthroplasty but he is a poor surgical candidate.  I discussed nonsurgical treatments and he would like to continue doing these today. The natural history and expected course discussed with patient. Various treatment options were discussed, including their risks and benefits. All of the patient's questions were answered.     Plan:      1. Follow up in 3 months or as  needed if symptoms worsen.  2. Corticosteroid injection given today (right glenohumeral).   3. Continue home exercise program          Ernesto Diego IV, MD   of Clinical Orthopedics  Department of Orthopedic Surgery  [facility]  Office: [phone]  Website: [email or web address]      Orders Placed This Encounter    Large Joint Aspiration/Injection

## 2022-05-13 ENCOUNTER — TELEPHONE (OUTPATIENT)
Dept: HEPATOLOGY | Facility: CLINIC | Age: 47
End: 2022-05-13
Payer: MEDICAID

## 2022-05-13 NOTE — TELEPHONE ENCOUNTER
Discussed platelet count with patient.  He will talk to Dr Mixon about it during his visit in July.

## 2022-05-18 ENCOUNTER — HOSPITAL ENCOUNTER (OUTPATIENT)
Dept: INTERVENTIONAL RADIOLOGY/VASCULAR | Facility: HOSPITAL | Age: 47
Discharge: HOME | End: 2022-05-18
Attending: INTERNAL MEDICINE
Payer: MEDICAID

## 2022-05-18 ENCOUNTER — LAB VISIT (OUTPATIENT)
Dept: LAB | Facility: HOSPITAL | Age: 47
End: 2022-05-18
Attending: INTERNAL MEDICINE
Payer: MEDICAID

## 2022-05-18 VITALS
RESPIRATION RATE: 18 BRPM | HEART RATE: 65 BPM | OXYGEN SATURATION: 100 % | SYSTOLIC BLOOD PRESSURE: 126 MMHG | DIASTOLIC BLOOD PRESSURE: 73 MMHG | TEMPERATURE: 98 F

## 2022-05-18 DIAGNOSIS — K70.31 ALCOHOLIC CIRRHOSIS OF LIVER WITH ASCITES: ICD-10-CM

## 2022-05-18 LAB
ALBUMIN SERPL BCP-MCNC: 3.2 G/DL (ref 3.5–5.2)
ALP SERPL-CCNC: 65 U/L (ref 55–135)
ALT SERPL W/O P-5'-P-CCNC: 24 U/L (ref 10–44)
ANION GAP SERPL CALC-SCNC: 9 MMOL/L (ref 8–16)
AST SERPL-CCNC: 37 U/L (ref 10–40)
BASOPHILS # BLD AUTO: 0.02 K/UL (ref 0–0.2)
BASOPHILS NFR BLD: 0.4 % (ref 0–1.9)
BILIRUB SERPL-MCNC: 1.6 MG/DL (ref 0.1–1)
BUN SERPL-MCNC: 13 MG/DL (ref 6–20)
CALCIUM SERPL-MCNC: 8.7 MG/DL (ref 8.7–10.5)
CHLORIDE SERPL-SCNC: 109 MMOL/L (ref 95–110)
CO2 SERPL-SCNC: 23 MMOL/L (ref 23–29)
CREAT SERPL-MCNC: 1 MG/DL (ref 0.5–1.4)
DIFFERENTIAL METHOD: ABNORMAL
EOSINOPHIL # BLD AUTO: 0.2 K/UL (ref 0–0.5)
EOSINOPHIL NFR BLD: 3.2 % (ref 0–8)
ERYTHROCYTE [DISTWIDTH] IN BLOOD BY AUTOMATED COUNT: 15.3 % (ref 11.5–14.5)
EST. GFR  (AFRICAN AMERICAN): >60 ML/MIN/1.73 M^2
EST. GFR  (NON AFRICAN AMERICAN): >60 ML/MIN/1.73 M^2
GLUCOSE SERPL-MCNC: 96 MG/DL (ref 70–110)
HCT VFR BLD AUTO: 35.9 % (ref 40–54)
HGB BLD-MCNC: 12.2 G/DL (ref 14–18)
IMM GRANULOCYTES # BLD AUTO: 0.01 K/UL (ref 0–0.04)
IMM GRANULOCYTES NFR BLD AUTO: 0.2 % (ref 0–0.5)
INR PPP: 1.3 (ref 0.8–1.2)
LYMPHOCYTES # BLD AUTO: 1.3 K/UL (ref 1–4.8)
LYMPHOCYTES NFR BLD: 28.4 % (ref 18–48)
MCH RBC QN AUTO: 33.2 PG (ref 27–31)
MCHC RBC AUTO-ENTMCNC: 34 G/DL (ref 32–36)
MCV RBC AUTO: 98 FL (ref 82–98)
MONOCYTES # BLD AUTO: 0.5 K/UL (ref 0.3–1)
MONOCYTES NFR BLD: 10 % (ref 4–15)
NEUTROPHILS # BLD AUTO: 2.7 K/UL (ref 1.8–7.7)
NEUTROPHILS NFR BLD: 57.8 % (ref 38–73)
NRBC BLD-RTO: 0 /100 WBC
PLATELET # BLD AUTO: 69 K/UL (ref 150–450)
PMV BLD AUTO: 10.5 FL (ref 9.2–12.9)
POTASSIUM SERPL-SCNC: 4.2 MMOL/L (ref 3.5–5.1)
PROT SERPL-MCNC: 6.1 G/DL (ref 6–8.4)
PROTHROMBIN TIME: 12.9 SEC (ref 9–12.5)
RBC # BLD AUTO: 3.68 M/UL (ref 4.6–6.2)
SODIUM SERPL-SCNC: 141 MMOL/L (ref 136–145)
WBC # BLD AUTO: 4.62 K/UL (ref 3.9–12.7)

## 2022-05-18 PROCEDURE — 85610 PROTHROMBIN TIME: CPT | Performed by: INTERNAL MEDICINE

## 2022-05-18 PROCEDURE — 36415 COLL VENOUS BLD VENIPUNCTURE: CPT | Performed by: INTERNAL MEDICINE

## 2022-05-18 PROCEDURE — 80053 COMPREHEN METABOLIC PANEL: CPT | Performed by: INTERNAL MEDICINE

## 2022-05-18 PROCEDURE — 63600175 PHARM REV CODE 636 W HCPCS: Mod: JG | Performed by: RADIOLOGY

## 2022-05-18 PROCEDURE — 49083 IR PARACENTESIS WITH IMAGING: ICD-10-PCS | Mod: ,,, | Performed by: RADIOLOGY

## 2022-05-18 PROCEDURE — 85025 COMPLETE CBC W/AUTO DIFF WBC: CPT | Performed by: INTERNAL MEDICINE

## 2022-05-18 PROCEDURE — P9047 ALBUMIN (HUMAN), 25%, 50ML: HCPCS | Mod: JG | Performed by: RADIOLOGY

## 2022-05-18 PROCEDURE — C1729 CATH, DRAINAGE: HCPCS

## 2022-05-18 PROCEDURE — 49083 ABD PARACENTESIS W/IMAGING: CPT | Performed by: RADIOLOGY

## 2022-05-18 RX ORDER — ALBUMIN HUMAN 250 G/1000ML
25 SOLUTION INTRAVENOUS ONCE AS NEEDED
Status: COMPLETED | OUTPATIENT
Start: 2022-05-18 | End: 2022-05-18

## 2022-05-18 RX ADMIN — ALBUMIN (HUMAN) 25 G: 12.5 SOLUTION INTRAVENOUS at 11:05

## 2022-05-18 NOTE — DISCHARGE INSTRUCTIONS
BATHING:  You may shower tomorrow.  DRESSING:  Remove dressing tomorrow.        ACTIVITY LEVEL: If you have received sedation or an anesthetic, you may feel sleepy for several hours. Rest until you are more awake. Gradually resume your normal activities    Do not drive, drink alcohol, or sign legal documents for 24 hours, or if taking narcotic pain medication.      DIET: You may resume your home diet. If nausea is present, increase your diet gradually with fluids and bland foods.    Medications: Pain medication should be taken only if needed and as directed. If antibiotics are prescribed, the medication should be taken until completed. You will be given an updated list of you medications.  No driving, alcoholic beverages or signing legal documents for next 24 hours if you have had sedation, or while taking pain medication    CALL THE DOCTOR:   For any obvious bleeding (some dried blood over the incision is normal).     Redness, swelling, foul smell around incision or fever over 101.  Shortness of breath.  Persistent pain or nausea not relieved by medication.  Call  [phone]     to speak with an Interventional Radiologist    If any unusual problems or difficulties occur contact your doctor. If you cannot contact your doctor but feel your signs and symptoms warrant a physicians attention return to the emergency room.

## 2022-05-18 NOTE — DISCHARGE SUMMARY
Radiology Discharge Summary      Admit date: 5/18/2022  9:02 AM  Discharge date: May 18, 2022    Instructions Given to patient: YesVerbal    Diet: Regular    Activity:NO Restrictions    Medications on discharge (List): Refer to Discharge Medication List    Hospital Course: U/S guided paracentesis    Description of Condition on Discharge: stable    Discharge Disposition: Home    Discharge Diagnosis: Recurrent ascites    Discharge Procedure Orders   Diet general     Call MD for:  redness, tenderness, or signs of infection (pain, swelling, redness, odor or green/yellow discharge around incision site)     Call MD for:  temperature >100.4     Call MD for:  severe uncontrolled pain     Remove dressing in 24 hours     Activity as tolerated

## 2022-05-18 NOTE — H&P
[facility] - Interventional Radiology  History & Physical - Short Stay  Interventional Radiology    SUBJECTIVE:     Chief Complaint/Reason for Admission: Recurrent ascites    Informant(s):  self and Electronic Health Record    History of Present Illness:  Nilesh Rolon Jr. is a 46 y.o. male with a history of recurrent ascites.    Patient presents for paracentesis.    Scheduled Meds:   Continuous Infusions:   PRN Meds:     Review of patient's allergies indicates:  No Known Allergies    Past Medical History:   Diagnosis Date    DONG (acute kidney injury) 12/1/2021    Anxiety     Arthritis     Cirrhosis     Hypertension     Liver disease     Osteoarthritis     Other meniscus derangements, other medial meniscus, left knee 1/7/2019     Past Surgical History:   Procedure Laterality Date    ARTHROSCOPY OF KNEE Left 1/7/2019    Procedure: ARTHROSCOPY, KNEE;  Surgeon: Derick Kirby MD;  Location: [facility];  Service: Orthopedics;  Laterality: Left;    COLONOSCOPY N/A 7/27/2020    Procedure: COLONOSCOPY;  Surgeon: Sotero Zapata MD;  Location: [facility];  Service: Endoscopy;  Laterality: N/A;    COLONOSCOPY N/A 1/20/2022    Procedure: COLONOSCOPY Suprep Vaccinated;  Surgeon: Jacob Andrea MD;  Location: [facility];  Service: Endoscopy;  Laterality: N/A;    ESOPHAGOGASTRODUODENOSCOPY N/A 4/8/2019    Procedure: EGD (ESOPHAGOGASTRODUODENOSCOPY);  Surgeon: Bonita Kendall MD;  Location: [facility];  Service: Endoscopy;  Laterality: N/A;    ESOPHAGOGASTRODUODENOSCOPY N/A 7/27/2020    Procedure: EGD (ESOPHAGOGASTRODUODENOSCOPY);  Surgeon: Sotero Zapata MD;  Location: [facility];  Service: Endoscopy;  Laterality: N/A;    ESOPHAGOGASTRODUODENOSCOPY N/A 12/2/2021    Procedure: EGD (ESOPHAGOGASTRODUODENOSCOPY);  Surgeon: Montez Guerra MD;  Location: [facility] ([address] [place] [address]);  Service: Endoscopy;  Laterality: N/A;    ESOPHAGOGASTRODUODENOSCOPY N/A 1/20/2022    Procedure: EGD (ESOPHAGOGASTRODUODENOSCOPY);   Surgeon: Jacob Andrea MD;  Location: [facility];  Service: Endoscopy;  Laterality: N/A;  please order CBC with plts and INR day of case.    KNEE SURGERY       Family History   Problem Relation Age of Onset    Birth defects Neg Hx      Social History     Tobacco Use    Smoking status: Never Smoker    Smokeless tobacco: Never Used    Tobacco comment: smokes Marijuana only   Substance Use Topics    Alcohol use: Not Currently     Comment: last drink 11/28, drinks 3-4 a day    Drug use: No        Review of Systems:  ROS not obtained    OBJECTIVE:     Vital Signs (Most Recent):       Physical Exam:  Lungs: No respiratory distress  Cardiac: regular rate and rhythm  Abdomen: distended  Alert and oriented    Laboratory  CBC:   Lab Results   Component Value Date/Time    WBC 4.62 05/18/2022 07:41 AM    RBC 3.68 (L) 05/18/2022 07:41 AM    HGB 12.2 (L) 05/18/2022 07:41 AM    HCT 35.9 (L) 05/18/2022 07:41 AM    HCT 31 (L) 11/30/2021 10:26 AM    PLT 69 (L) 05/18/2022 07:41 AM    MCV 98 05/18/2022 07:41 AM    MCH 33.2 (H) 05/18/2022 07:41 AM    MCHC 34.0 05/18/2022 07:41 AM     Coagulation:   Lab Results   Component Value Date/Time    INR 1.3 (H) 05/18/2022 07:41 AM    APTT 29.9 04/07/2019 04:09 AM         ASSESSMENT/PLAN:     Ascites.    Patient will undergo paracentesis.    Sedation Plan: Lidocaine local only

## 2022-05-18 NOTE — PLAN OF CARE
Pt verbalized readiness to go home and understanding of discharge instructions. PIV removed. VSS. Dressing CDI.

## 2022-05-18 NOTE — DISCHARGE SUMMARY
Radiology Discharge Summary      Admit date: 3/23/2022 10:56 AM  Discharge date: March 23, 2022    Instructions Given to patient: YesVerbal    Diet: Regular    Activity:NO Restrictions    Medications on discharge (List): Refer to Discharge Medication List    Hospital Course: U/S guided paracentesis    Description of Condition on Discharge: stable    Discharge Disposition: Home    Discharge Diagnosis: Recurrent ascites.    Discharge Procedure Orders   Diet general     Call MD for:  redness, tenderness, or signs of infection (pain, swelling, redness, odor or green/yellow discharge around incision site)     Call MD for:  severe uncontrolled pain     Call MD for:  temperature >100.4     Remove dressing in 24 hours     Activity as tolerated       
General

## 2022-05-21 ENCOUNTER — PATIENT MESSAGE (OUTPATIENT)
Dept: HEPATOLOGY | Facility: CLINIC | Age: 47
End: 2022-05-21
Payer: MEDICAID

## 2022-05-23 ENCOUNTER — PATIENT MESSAGE (OUTPATIENT)
Dept: HEPATOLOGY | Facility: CLINIC | Age: 47
End: 2022-05-23
Payer: MEDICAID

## 2022-05-23 RX ORDER — FERROUS SULFATE 325(65) MG
325 TABLET ORAL 2 TIMES DAILY
Qty: 60 TABLET | Refills: 2 | Status: SHIPPED | OUTPATIENT
Start: 2022-05-23 | End: 2023-05-14 | Stop reason: SDUPTHER

## 2022-05-25 ENCOUNTER — LAB VISIT (OUTPATIENT)
Dept: LAB | Facility: HOSPITAL | Age: 47
End: 2022-05-25
Attending: INTERNAL MEDICINE
Payer: MEDICAID

## 2022-05-25 ENCOUNTER — HOSPITAL ENCOUNTER (OUTPATIENT)
Dept: INTERVENTIONAL RADIOLOGY/VASCULAR | Facility: HOSPITAL | Age: 47
Discharge: HOME | End: 2022-05-25
Attending: INTERNAL MEDICINE | Admitting: RADIOLOGY
Payer: MEDICAID

## 2022-05-25 VITALS
DIASTOLIC BLOOD PRESSURE: 64 MMHG | HEART RATE: 60 BPM | TEMPERATURE: 98 F | SYSTOLIC BLOOD PRESSURE: 102 MMHG | RESPIRATION RATE: 14 BRPM

## 2022-05-25 DIAGNOSIS — K70.31 ALCOHOLIC CIRRHOSIS OF LIVER WITH ASCITES: ICD-10-CM

## 2022-05-25 LAB
ALBUMIN SERPL BCP-MCNC: 3.3 G/DL (ref 3.5–5.2)
ALP SERPL-CCNC: 67 U/L (ref 55–135)
ALT SERPL W/O P-5'-P-CCNC: 20 U/L (ref 10–44)
ANION GAP SERPL CALC-SCNC: 11 MMOL/L (ref 8–16)
AST SERPL-CCNC: 28 U/L (ref 10–40)
BASOPHILS # BLD AUTO: 0.04 K/UL (ref 0–0.2)
BASOPHILS NFR BLD: 0.7 % (ref 0–1.9)
BILIRUB SERPL-MCNC: 2.3 MG/DL (ref 0.1–1)
BUN SERPL-MCNC: 10 MG/DL (ref 6–20)
CALCIUM SERPL-MCNC: 9.4 MG/DL (ref 8.7–10.5)
CHLORIDE SERPL-SCNC: 108 MMOL/L (ref 95–110)
CO2 SERPL-SCNC: 21 MMOL/L (ref 23–29)
CREAT SERPL-MCNC: 1 MG/DL (ref 0.5–1.4)
DIFFERENTIAL METHOD: ABNORMAL
EOSINOPHIL # BLD AUTO: 0.2 K/UL (ref 0–0.5)
EOSINOPHIL NFR BLD: 2.7 % (ref 0–8)
ERYTHROCYTE [DISTWIDTH] IN BLOOD BY AUTOMATED COUNT: 15.2 % (ref 11.5–14.5)
EST. GFR  (AFRICAN AMERICAN): >60 ML/MIN/1.73 M^2
EST. GFR  (NON AFRICAN AMERICAN): >60 ML/MIN/1.73 M^2
GLUCOSE SERPL-MCNC: 83 MG/DL (ref 70–110)
HCT VFR BLD AUTO: 38.4 % (ref 40–54)
HGB BLD-MCNC: 13.2 G/DL (ref 14–18)
IMM GRANULOCYTES # BLD AUTO: 0.01 K/UL (ref 0–0.04)
IMM GRANULOCYTES NFR BLD AUTO: 0.2 % (ref 0–0.5)
INR PPP: 1.2 (ref 0.8–1.2)
LYMPHOCYTES # BLD AUTO: 1.3 K/UL (ref 1–4.8)
LYMPHOCYTES NFR BLD: 22.5 % (ref 18–48)
MCH RBC QN AUTO: 33 PG (ref 27–31)
MCHC RBC AUTO-ENTMCNC: 34.4 G/DL (ref 32–36)
MCV RBC AUTO: 96 FL (ref 82–98)
MONOCYTES # BLD AUTO: 0.5 K/UL (ref 0.3–1)
MONOCYTES NFR BLD: 9.2 % (ref 4–15)
NEUTROPHILS # BLD AUTO: 3.8 K/UL (ref 1.8–7.7)
NEUTROPHILS NFR BLD: 64.7 % (ref 38–73)
NRBC BLD-RTO: 0 /100 WBC
PLATELET # BLD AUTO: 65 K/UL (ref 150–450)
PMV BLD AUTO: 12 FL (ref 9.2–12.9)
POTASSIUM SERPL-SCNC: 4.3 MMOL/L (ref 3.5–5.1)
PROT SERPL-MCNC: 6.8 G/DL (ref 6–8.4)
PROTHROMBIN TIME: 12.6 SEC (ref 9–12.5)
RBC # BLD AUTO: 4 M/UL (ref 4.6–6.2)
SODIUM SERPL-SCNC: 140 MMOL/L (ref 136–145)
WBC # BLD AUTO: 5.87 K/UL (ref 3.9–12.7)

## 2022-05-25 PROCEDURE — C1729 CATH, DRAINAGE: HCPCS

## 2022-05-25 PROCEDURE — 49083 IR PARACENTESIS WITH IMAGING: ICD-10-PCS | Mod: ,,, | Performed by: RADIOLOGY

## 2022-05-25 PROCEDURE — 85025 COMPLETE CBC W/AUTO DIFF WBC: CPT | Performed by: INTERNAL MEDICINE

## 2022-05-25 PROCEDURE — 49083 ABD PARACENTESIS W/IMAGING: CPT | Performed by: RADIOLOGY

## 2022-05-25 PROCEDURE — 36415 COLL VENOUS BLD VENIPUNCTURE: CPT | Performed by: INTERNAL MEDICINE

## 2022-05-25 PROCEDURE — 80053 COMPREHEN METABOLIC PANEL: CPT | Performed by: INTERNAL MEDICINE

## 2022-05-25 PROCEDURE — 63600175 PHARM REV CODE 636 W HCPCS: Mod: JG | Performed by: RADIOLOGY

## 2022-05-25 PROCEDURE — P9047 ALBUMIN (HUMAN), 25%, 50ML: HCPCS | Mod: JG | Performed by: RADIOLOGY

## 2022-05-25 PROCEDURE — 85610 PROTHROMBIN TIME: CPT | Performed by: INTERNAL MEDICINE

## 2022-05-25 RX ORDER — ALBUMIN HUMAN 250 G/1000ML
37.5 SOLUTION INTRAVENOUS ONCE AS NEEDED
Status: COMPLETED | OUTPATIENT
Start: 2022-05-25 | End: 2022-05-25

## 2022-05-25 RX ADMIN — ALBUMIN HUMAN 37.5 G: 0.25 SOLUTION INTRAVENOUS at 10:05

## 2022-05-25 NOTE — H&P
Radiology History & Physical      SUBJECTIVE:     Chief Complaint: Recurrent painful, tense ascites     History of Present Illness:  Nilesh Rolon Jr. is a 46 y.o. male with pertinent PMHx of decompensated EtOH hepatic cirrhosis complicated by recurrent abdominal distension and pain 2/2 recurrent painful, tense ascites requiring frequent decompression for symptom relief.      A new outpatient IR consult received for US-guided percutaneous RUQ-approach therapeutic large-volume paracentesis.    Past Medical History:   Diagnosis Date    DONG (acute kidney injury) 12/1/2021    Anxiety     Arthritis     Cirrhosis     Hypertension     Liver disease     Osteoarthritis     Other meniscus derangements, other medial meniscus, left knee 1/7/2019     Past Surgical History:   Procedure Laterality Date    ARTHROSCOPY OF KNEE Left 1/7/2019    Procedure: ARTHROSCOPY, KNEE;  Surgeon: Derick Kirby MD;  Location: [facility];  Service: Orthopedics;  Laterality: Left;    COLONOSCOPY N/A 7/27/2020    Procedure: COLONOSCOPY;  Surgeon: Sotero Zapata MD;  Location: [facility];  Service: Endoscopy;  Laterality: N/A;    COLONOSCOPY N/A 1/20/2022    Procedure: COLONOSCOPY Suprep Vaccinated;  Surgeon: Jacob Andrea MD;  Location: [facility];  Service: Endoscopy;  Laterality: N/A;    ESOPHAGOGASTRODUODENOSCOPY N/A 4/8/2019    Procedure: EGD (ESOPHAGOGASTRODUODENOSCOPY);  Surgeon: Bonita Kendall MD;  Location: [facility];  Service: Endoscopy;  Laterality: N/A;    ESOPHAGOGASTRODUODENOSCOPY N/A 7/27/2020    Procedure: EGD (ESOPHAGOGASTRODUODENOSCOPY);  Surgeon: Sotero Zapata MD;  Location: [facility];  Service: Endoscopy;  Laterality: N/A;    ESOPHAGOGASTRODUODENOSCOPY N/A 12/2/2021    Procedure: EGD (ESOPHAGOGASTRODUODENOSCOPY);  Surgeon: Montez Guerra MD;  Location: [facility] ([address] [place] [address]);  Service: Endoscopy;  Laterality: N/A;    ESOPHAGOGASTRODUODENOSCOPY N/A 1/20/2022    Procedure: EGD (ESOPHAGOGASTRODUODENOSCOPY);   Surgeon: Jacob Andrea MD;  Location: [facility];  Service: Endoscopy;  Laterality: N/A;  please order CBC with plts and INR day of case.    KNEE SURGERY       Home Meds:   Prior to Admission medications    Medication Sig Start Date End Date Taking? Authorizing Provider   cholecalciferol, vitamin D3, 1,250 mcg (50,000 unit) capsule Take 50,000 Units by mouth every 7 days. 4/26/22   Historical Provider   doxycycline (VIBRA-TABS) 100 MG tablet Take 100 mg by mouth once daily. 5/6/22   Historical Provider   ferrous sulfate (FEOSOL) 325 mg (65 mg iron) Tab tablet Take 1 tablet (325 mg total) by mouth 2 (two) times daily. 5/23/22   Pb Mixon MD   folic acid (FOLVITE) 1 MG tablet TAKE 1 TABLET(1 MG) BY MOUTH EVERY DAY 5/13/22   Dianne Jimenez, NP   ketoconazole (NIZORAL) 2 % cream Apply 1 application topically once daily. 5/6/22   Historical Provider   lactulose (CHRONULAC) 10 gram/15 mL solution Take 30 mLs (20 g total) by mouth 2 (two) times daily. Goal of 3-4 bowel movements daily 3/14/22   Pb Mixon MD   multivitamin Tab Take 1 tablet by mouth once daily. 5/23/22   Pb Mixon MD   pantoprazole (PROTONIX) 40 MG tablet Take 1 tablet (40 mg total) by mouth once daily. 2/2/22 2/2/23  Savita Bianchi MD   thiamine 100 MG tablet Take 1 tablet (100 mg total) by mouth once daily. 1/9/22 1/9/23  Otoniel Espinoza MD     Anticoagulants/Antiplatelets: no anticoagulation     Allergies: Review of patient's allergies indicates:  No Known Allergies     Sedation History:  no adverse reactions     Review of Systems:   Hematological: no known coagulopathies  Respiratory: no cough, shortness of breath, or wheezing  Cardiovascular: no chest pain or dyspnea on exertion  Gastrointestinal: positive for - abdominal pain and distension  Genito-Urinary: no dysuria, trouble voiding, or hematuria  Musculoskeletal: negative  Neurological: no TIA or stroke symptoms      OBJECTIVE:     Vital Signs (Most  Recent)     Physical Exam:  General: no acute distress  Mental Status: alert and oriented to person, place and time  HEENT: normocephalic, atraumatic  Chest: unlabored breathing  Heart: regular heart rate  Abdomen: +distended. No TTP/r/g.  Extremity: moves all extremities    Laboratory  Lab Results   Component Value Date    INR 1.3 (H) 05/18/2022       Lab Results   Component Value Date    WBC 4.62 05/18/2022    HGB 12.2 (L) 05/18/2022    HCT 35.9 (L) 05/18/2022    MCV 98 05/18/2022    PLT 69 (L) 05/18/2022      Lab Results   Component Value Date    GLU 96 05/18/2022     05/18/2022    K 4.2 05/18/2022     05/18/2022    CO2 23 05/18/2022    BUN 13 05/18/2022    CREATININE 1.0 05/18/2022    CALCIUM 8.7 05/18/2022    MG 1.8 02/01/2022    ALT 24 05/18/2022    AST 37 05/18/2022    ALBUMIN 3.2 (L) 05/18/2022    BILITOT 1.6 (H) 05/18/2022    BILIDIR 3.7 (H) 07/27/2020     ASSESSMENT/PLAN:     46 y.o. male with pertinent PMHx of decompensated EtOH hepatic cirrhosis complicated by recurrent abdominal distension and pain 2/2 recurrent painful, tense ascitesrequiring frequent therapeutic large-volume paracentesis.     1. Recurrent painful, tense ascites - Will attempt US-guided percutaneous RUQ-approach therapeutic LVP with local anesthetic only and albumin infusion post PRN as indicated per institutional protocol.     Risks (including, but not limited to, pain, bleeding, infection, damage to nearby structures, failure to obtain sufficient material for a diagnosis, the need for additional procedures, and death), benefits, and alternatives were discussed with the patient. All questions were answered to the best of my abilities. The patient wishes to proceed with the procedure. Written informed consent was obtained.     Thank you for considering IR for the care of your patient.      Chris Chahal MD  Interventional Radiology

## 2022-05-25 NOTE — DISCHARGE INSTRUCTIONS
BATHING:  You may shower tomorrow.  DRESSING:  Remove dressing tomorrow.        ACTIVITY LEVEL: If you have received sedation or an anesthetic, you may feel sleepy for several hours. Rest until you are more awake. Gradually resume your normal activities    Do not drive, drink alcohol, or sign legal documents for 24 hours, or if taking narcotic pain medication.      DIET: You may resume your home diet. If nausea is present, increase your diet gradually with fluids and bland foods.    Medications: Pain medication should be taken only if needed and as directed. If antibiotics are prescribed, the medication should be taken until completed. You will be given an updated list of you medications.  No driving, alcoholic beverages or signing legal documents for next 24 hours if you have had sedation, or while taking pain medication    CALL THE DOCTOR:   For any obvious bleeding (some dried blood over the incision is normal).     Redness, swelling, foul smell around incision or fever over 101.  Shortness of breath.  Persistent pain or nausea not relieved by medication.  Call  [phone]     to speak with an Interventional Radiologist    If any unusual problems or difficulties occur contact your doctor. If you cannot contact your doctor but feel your signs and symptoms warrant a physicians attention return to the emergency room.

## 2022-05-25 NOTE — DISCHARGE SUMMARY
Radiology Discharge Summary      Hospital Course: No complications    Admit Date: 5/25/2022  Discharge Date: 05/25/2022     Instructions Given to Patient: Yes  Diet: Resume prior diet  Activity: activity as tolerated    Description of Condition on Discharge: Stable  Vital Signs (Most Recent): Temp: 98.2 °F (36.8 °C) (05/25/22 1037)  Pulse: 60 (05/25/22 1037)  Resp: 14 (05/25/22 1037)  BP: 102/64 (05/25/22 1037)    Discharge Disposition: Home    Discharge Diagnosis:  46 y.o. male with decompensated EtOH hepatic cirrhosis complicated by recurrent abdominal distension and pain 2/2 recurrent painful, tense ascites s/p successful US-guided percutaneous RUQ-approach therapeutic LVP with local anesthetic only and albumin infusion post PRN as indicated per institutional protocol. Patient tolerated the procedure well. No immediate post-procedural complications noted.      Thank you for considering IR for the care of your patient.      Chris Chahal MD  Interventional Radiology

## 2022-05-25 NOTE — BRIEF OP NOTE
Radiology Post-Procedure Note     Pre Op Diagnosis: Recurrent painful, tense ascites  Post Op Diagnosis: Same     Procedure: 1. US-guided percutaneous RUQ-approach therapeutic LVP     Procedure performed by: Chris Chahal MD     Written Informed Consent Obtained: Yes  Specimen Removed: YES, 2,600-cc of thin, straw-colored ascitic fluid  Estimated Blood Loss: none     Findings:   Successful US-guided percutaneous RUQ-approach therapeutic LVP with local anesthetic only and albumin infusion post PRN as indicated per institutional protocol. Patient tolerated the procedure well. No immediate post-procedural complications noted.      Thank you for considering IR for the care of your patient.      Chris Chahal MD  Interventional Radiology

## 2022-05-25 NOTE — PLAN OF CARE
Pt verbalized readiness to go home and understanding of discharge instructions. Dressing CDI. PIV removed. VSS.

## 2022-06-01 ENCOUNTER — HOSPITAL ENCOUNTER (OUTPATIENT)
Dept: INTERVENTIONAL RADIOLOGY/VASCULAR | Facility: HOSPITAL | Age: 47
Discharge: HOME | End: 2022-06-01
Attending: INTERNAL MEDICINE
Payer: MEDICAID

## 2022-06-01 ENCOUNTER — LAB VISIT (OUTPATIENT)
Dept: LAB | Facility: HOSPITAL | Age: 47
End: 2022-06-01
Attending: INTERNAL MEDICINE
Payer: MEDICAID

## 2022-06-01 DIAGNOSIS — K70.10 ALCOHOLIC HEPATITIS WITHOUT ASCITES: ICD-10-CM

## 2022-06-01 DIAGNOSIS — K70.31 ALCOHOLIC CIRRHOSIS OF LIVER WITH ASCITES: ICD-10-CM

## 2022-06-01 LAB
ALBUMIN SERPL BCP-MCNC: 3.4 G/DL (ref 3.5–5.2)
ALBUMIN SERPL BCP-MCNC: 3.4 G/DL (ref 3.5–5.2)
ALP SERPL-CCNC: 69 U/L (ref 55–135)
ALP SERPL-CCNC: 69 U/L (ref 55–135)
ALT SERPL W/O P-5'-P-CCNC: 17 U/L (ref 10–44)
ALT SERPL W/O P-5'-P-CCNC: 17 U/L (ref 10–44)
ANION GAP SERPL CALC-SCNC: 9 MMOL/L (ref 8–16)
ANION GAP SERPL CALC-SCNC: 9 MMOL/L (ref 8–16)
AST SERPL-CCNC: 30 U/L (ref 10–40)
AST SERPL-CCNC: 30 U/L (ref 10–40)
BASOPHILS # BLD AUTO: 0.03 K/UL (ref 0–0.2)
BASOPHILS NFR BLD: 0.8 % (ref 0–1.9)
BILIRUB SERPL-MCNC: 1.9 MG/DL (ref 0.1–1)
BILIRUB SERPL-MCNC: 1.9 MG/DL (ref 0.1–1)
BUN SERPL-MCNC: 15 MG/DL (ref 6–20)
BUN SERPL-MCNC: 15 MG/DL (ref 6–20)
CALCIUM SERPL-MCNC: 8.8 MG/DL (ref 8.7–10.5)
CALCIUM SERPL-MCNC: 8.8 MG/DL (ref 8.7–10.5)
CHLORIDE SERPL-SCNC: 109 MMOL/L (ref 95–110)
CHLORIDE SERPL-SCNC: 109 MMOL/L (ref 95–110)
CO2 SERPL-SCNC: 22 MMOL/L (ref 23–29)
CO2 SERPL-SCNC: 22 MMOL/L (ref 23–29)
CREAT SERPL-MCNC: 1 MG/DL (ref 0.5–1.4)
CREAT SERPL-MCNC: 1 MG/DL (ref 0.5–1.4)
DIFFERENTIAL METHOD: ABNORMAL
EOSINOPHIL # BLD AUTO: 0.1 K/UL (ref 0–0.5)
EOSINOPHIL NFR BLD: 3.4 % (ref 0–8)
ERYTHROCYTE [DISTWIDTH] IN BLOOD BY AUTOMATED COUNT: 15.4 % (ref 11.5–14.5)
EST. GFR  (AFRICAN AMERICAN): >60 ML/MIN/1.73 M^2
EST. GFR  (AFRICAN AMERICAN): >60 ML/MIN/1.73 M^2
EST. GFR  (NON AFRICAN AMERICAN): >60 ML/MIN/1.73 M^2
EST. GFR  (NON AFRICAN AMERICAN): >60 ML/MIN/1.73 M^2
GLUCOSE SERPL-MCNC: 120 MG/DL (ref 70–110)
GLUCOSE SERPL-MCNC: 120 MG/DL (ref 70–110)
HCT VFR BLD AUTO: 34.8 % (ref 40–54)
HGB BLD-MCNC: 11.9 G/DL (ref 14–18)
IMM GRANULOCYTES # BLD AUTO: 0 K/UL (ref 0–0.04)
IMM GRANULOCYTES NFR BLD AUTO: 0 % (ref 0–0.5)
INR PPP: 1.3 (ref 0.8–1.2)
LYMPHOCYTES # BLD AUTO: 1 K/UL (ref 1–4.8)
LYMPHOCYTES NFR BLD: 26.8 % (ref 18–48)
MCH RBC QN AUTO: 32.9 PG (ref 27–31)
MCHC RBC AUTO-ENTMCNC: 34.2 G/DL (ref 32–36)
MCV RBC AUTO: 96 FL (ref 82–98)
MONOCYTES # BLD AUTO: 0.4 K/UL (ref 0.3–1)
MONOCYTES NFR BLD: 11.3 % (ref 4–15)
NEUTROPHILS # BLD AUTO: 2.1 K/UL (ref 1.8–7.7)
NEUTROPHILS NFR BLD: 57.7 % (ref 38–73)
NRBC BLD-RTO: 0 /100 WBC
PLATELET # BLD AUTO: 64 K/UL (ref 150–450)
PMV BLD AUTO: 10.6 FL (ref 9.2–12.9)
POTASSIUM SERPL-SCNC: 3.3 MMOL/L (ref 3.5–5.1)
POTASSIUM SERPL-SCNC: 3.3 MMOL/L (ref 3.5–5.1)
PROT SERPL-MCNC: 6.2 G/DL (ref 6–8.4)
PROT SERPL-MCNC: 6.2 G/DL (ref 6–8.4)
PROTHROMBIN TIME: 13 SEC (ref 9–12.5)
RBC # BLD AUTO: 3.62 M/UL (ref 4.6–6.2)
SODIUM SERPL-SCNC: 140 MMOL/L (ref 136–145)
SODIUM SERPL-SCNC: 140 MMOL/L (ref 136–145)
WBC # BLD AUTO: 3.55 K/UL (ref 3.9–12.7)

## 2022-06-01 PROCEDURE — 80053 COMPREHEN METABOLIC PANEL: CPT | Performed by: INTERNAL MEDICINE

## 2022-06-01 PROCEDURE — C1729 CATH, DRAINAGE: HCPCS

## 2022-06-01 PROCEDURE — 49083 IR PARACENTESIS WITH IMAGING: ICD-10-PCS | Mod: ,,, | Performed by: RADIOLOGY

## 2022-06-01 PROCEDURE — 49083 ABD PARACENTESIS W/IMAGING: CPT | Performed by: RADIOLOGY

## 2022-06-01 PROCEDURE — 85025 COMPLETE CBC W/AUTO DIFF WBC: CPT | Performed by: INTERNAL MEDICINE

## 2022-06-01 PROCEDURE — 36415 COLL VENOUS BLD VENIPUNCTURE: CPT | Performed by: INTERNAL MEDICINE

## 2022-06-01 PROCEDURE — 85610 PROTHROMBIN TIME: CPT | Performed by: INTERNAL MEDICINE

## 2022-06-01 NOTE — H&P
Radiology History & Physical      SUBJECTIVE:     Chief Complaint: Recurrent painful, tense ascites     History of Present Illness:  Nilesh Rolon Jr. is a 46 y.o. male with pertinent PMHx of decompensated EtOH hepatic cirrhosis complicated by recurrent abdominal distension and pain 2/2 recurrent painful, tense ascites requiring frequent decompression for symptom relief.      A new outpatient IR consult received for US-guided percutaneous RUQ-approach therapeutic large-volume paracentesis.    Past Medical History:   Diagnosis Date    DONG (acute kidney injury) 12/1/2021    Anxiety     Arthritis     Cirrhosis     Hypertension     Liver disease     Osteoarthritis     Other meniscus derangements, other medial meniscus, left knee 1/7/2019     Past Surgical History:   Procedure Laterality Date    ARTHROSCOPY OF KNEE Left 1/7/2019    Procedure: ARTHROSCOPY, KNEE;  Surgeon: Derick Kirby MD;  Location: [facility];  Service: Orthopedics;  Laterality: Left;    COLONOSCOPY N/A 7/27/2020    Procedure: COLONOSCOPY;  Surgeon: Sotero Zapata MD;  Location: [facility];  Service: Endoscopy;  Laterality: N/A;    COLONOSCOPY N/A 1/20/2022    Procedure: COLONOSCOPY Suprep Vaccinated;  Surgeon: Jacob Andrea MD;  Location: [facility];  Service: Endoscopy;  Laterality: N/A;    ESOPHAGOGASTRODUODENOSCOPY N/A 4/8/2019    Procedure: EGD (ESOPHAGOGASTRODUODENOSCOPY);  Surgeon: Bonita Kendall MD;  Location: [facility];  Service: Endoscopy;  Laterality: N/A;    ESOPHAGOGASTRODUODENOSCOPY N/A 7/27/2020    Procedure: EGD (ESOPHAGOGASTRODUODENOSCOPY);  Surgeon: Sotero Zapata MD;  Location: [facility];  Service: Endoscopy;  Laterality: N/A;    ESOPHAGOGASTRODUODENOSCOPY N/A 12/2/2021    Procedure: EGD (ESOPHAGOGASTRODUODENOSCOPY);  Surgeon: Montez Guerra MD;  Location: [facility] ([address] [place] [address]);  Service: Endoscopy;  Laterality: N/A;    ESOPHAGOGASTRODUODENOSCOPY N/A 1/20/2022    Procedure: EGD (ESOPHAGOGASTRODUODENOSCOPY);   Surgeon: Jacob Andrea MD;  Location: [facility];  Service: Endoscopy;  Laterality: N/A;  please order CBC with plts and INR day of case.    KNEE SURGERY       Home Meds:   Prior to Admission medications    Medication Sig Start Date End Date Taking? Authorizing Provider   cholecalciferol, vitamin D3, 1,250 mcg (50,000 unit) capsule Take 50,000 Units by mouth every 7 days. 4/26/22   Historical Provider   doxycycline (VIBRA-TABS) 100 MG tablet Take 100 mg by mouth once daily. 5/6/22   Historical Provider   ferrous sulfate (FEOSOL) 325 mg (65 mg iron) Tab tablet Take 1 tablet (325 mg total) by mouth 2 (two) times daily. 5/23/22   Pb Mixon MD   folic acid (FOLVITE) 1 MG tablet TAKE 1 TABLET(1 MG) BY MOUTH EVERY DAY 5/13/22   Dianne Jimenez, NP   ketoconazole (NIZORAL) 2 % cream Apply 1 application topically once daily. 5/6/22   Historical Provider   lactulose (CHRONULAC) 10 gram/15 mL solution Take 30 mLs (20 g total) by mouth 2 (two) times daily. Goal of 3-4 bowel movements daily 3/14/22   Pb Mixon MD   multivitamin Tab Take 1 tablet by mouth once daily. 5/23/22   Pb Mixon MD   pantoprazole (PROTONIX) 40 MG tablet Take 1 tablet (40 mg total) by mouth once daily. 2/2/22 2/2/23  Savita Bianchi MD   thiamine 100 MG tablet Take 1 tablet (100 mg total) by mouth once daily. 1/9/22 1/9/23  Otoniel Espinoza MD     Anticoagulants/Antiplatelets: no anticoagulation     Allergies: Review of patient's allergies indicates:  No Known Allergies     Sedation History:  no adverse reactions     Review of Systems:   Hematological: no known coagulopathies  Respiratory: no cough, shortness of breath, or wheezing  Cardiovascular: no chest pain or dyspnea on exertion  Gastrointestinal: positive for - abdominal pain and distension  Genito-Urinary: no dysuria, trouble voiding, or hematuria  Musculoskeletal: negative  Neurological: no TIA or stroke symptoms      OBJECTIVE:     Vital Signs (Most  Recent)     Physical Exam:  General: no acute distress  Mental Status: alert and oriented to person, place and time  HEENT: normocephalic, atraumatic  Chest: unlabored breathing  Heart: regular heart rate  Abdomen: +distended. No TTP/r/g.  Extremity: moves all extremities    Laboratory  Lab Results   Component Value Date    INR 1.3 (H) 06/01/2022       Lab Results   Component Value Date    WBC 3.55 (L) 06/01/2022    HGB 11.9 (L) 06/01/2022    HCT 34.8 (L) 06/01/2022    MCV 96 06/01/2022    PLT 64 (L) 06/01/2022      Lab Results   Component Value Date     (H) 06/01/2022     (H) 06/01/2022     06/01/2022     06/01/2022    K 3.3 (L) 06/01/2022    K 3.3 (L) 06/01/2022     06/01/2022     06/01/2022    CO2 22 (L) 06/01/2022    CO2 22 (L) 06/01/2022    BUN 15 06/01/2022    BUN 15 06/01/2022    CREATININE 1.0 06/01/2022    CREATININE 1.0 06/01/2022    CALCIUM 8.8 06/01/2022    CALCIUM 8.8 06/01/2022    MG 1.8 02/01/2022    ALT 17 06/01/2022    ALT 17 06/01/2022    AST 30 06/01/2022    AST 30 06/01/2022    ALBUMIN 3.4 (L) 06/01/2022    ALBUMIN 3.4 (L) 06/01/2022    BILITOT 1.9 (H) 06/01/2022    BILITOT 1.9 (H) 06/01/2022    BILIDIR 3.7 (H) 07/27/2020     ASSESSMENT/PLAN:     46 y.o. male with pertinent PMHx of decompensated EtOH hepatic cirrhosis complicated by recurrent abdominal distension and pain 2/2 recurrent painful, tense ascitesrequiring frequent therapeutic large-volume paracentesis.     1. Recurrent painful, tense ascites - Will attempt US-guided percutaneous RUQ-approach therapeutic LVP with local anesthetic only and albumin infusion post PRN as indicated per institutional protocol.     Risks (including, but not limited to, pain, bleeding, infection, damage to nearby structures, failure to obtain sufficient material for a diagnosis, the need for additional procedures, and death), benefits, and alternatives were discussed with the patient. All questions were answered to the  best of my abilities. The patient wishes to proceed with the procedure. Written informed consent was obtained.     Thank you for considering IR for the care of your patient.      Chris Chahal MD  Interventional Radiology

## 2022-06-01 NOTE — BRIEF OP NOTE
Radiology Post-Procedure Note     Pre Op Diagnosis: Recurrent painful, tense ascites  Post Op Diagnosis: Same     Procedure: 1. US-guided percutaneous RUQ-approach therapeutic LVP     Procedure performed by: Chris Chahal MD     Written Informed Consent Obtained: Yes  Specimen Removed: YES, 1,300-cc of thin, straw-colored ascitic fluid  Estimated Blood Loss: none     Findings:   Successful US-guided percutaneous RUQ-approach therapeutic LVP with local anesthetic only and albumin infusion post PRN as indicated per institutional protocol. Patient tolerated the procedure well. No immediate post-procedural complications noted.      Thank you for considering IR for the care of your patient.      Chris Chahal MD  Interventional Radiology

## 2022-06-01 NOTE — DISCHARGE SUMMARY
Radiology Discharge Summary      Hospital Course: No complications    Admit Date: 6/1/2022  Discharge Date: 06/01/2022     Instructions Given to Patient: Yes  Diet: Resume prior diet  Activity: activity as tolerated    Description of Condition on Discharge: Stable  Vital Signs (Most Recent):      Discharge Disposition: Home    Discharge Diagnosis:  46 y.o. male with decompensated EtOH hepatic cirrhosis complicated by recurrent abdominal distension and pain 2/2 recurrent painful, tense ascites s/p successful US-guided percutaneous RUQ-approach therapeutic LVP with local anesthetic only and albumin infusion post PRN as indicated per institutional protocol. Patient tolerated the procedure well. No immediate post-procedural complications noted.      Thank you for considering IR for the care of your patient.      Chris Chahal MD  Interventional Radiology

## 2022-06-05 ENCOUNTER — PATIENT MESSAGE (OUTPATIENT)
Dept: HEPATOLOGY | Facility: CLINIC | Age: 47
End: 2022-06-05
Payer: MEDICAID

## 2022-06-06 ENCOUNTER — TELEPHONE (OUTPATIENT)
Dept: HEPATOLOGY | Facility: CLINIC | Age: 47
End: 2022-06-06
Payer: MEDICAID

## 2022-06-06 DIAGNOSIS — K70.31 ALCOHOLIC CIRRHOSIS OF LIVER WITH ASCITES: Primary | ICD-10-CM

## 2022-06-10 ENCOUNTER — LAB VISIT (OUTPATIENT)
Dept: LAB | Facility: HOSPITAL | Age: 47
End: 2022-06-10
Attending: INTERNAL MEDICINE
Payer: MEDICAID

## 2022-06-10 ENCOUNTER — HOSPITAL ENCOUNTER (OUTPATIENT)
Dept: INTERVENTIONAL RADIOLOGY/VASCULAR | Facility: HOSPITAL | Age: 47
Discharge: HOME | End: 2022-06-10
Attending: INTERNAL MEDICINE
Payer: MEDICAID

## 2022-06-10 DIAGNOSIS — K70.10 ALCOHOLIC HEPATITIS WITHOUT ASCITES: ICD-10-CM

## 2022-06-10 DIAGNOSIS — K70.31 ALCOHOLIC CIRRHOSIS OF LIVER WITH ASCITES: ICD-10-CM

## 2022-06-10 LAB
ALBUMIN SERPL BCP-MCNC: 3.3 G/DL (ref 3.5–5.2)
ALP SERPL-CCNC: 81 U/L (ref 55–135)
ALT SERPL W/O P-5'-P-CCNC: 19 U/L (ref 10–44)
ANION GAP SERPL CALC-SCNC: 7 MMOL/L (ref 8–16)
AST SERPL-CCNC: 29 U/L (ref 10–40)
BASOPHILS # BLD AUTO: 0.02 K/UL (ref 0–0.2)
BASOPHILS NFR BLD: 0.5 % (ref 0–1.9)
BILIRUB SERPL-MCNC: 1.5 MG/DL (ref 0.1–1)
BUN SERPL-MCNC: 10 MG/DL (ref 6–20)
CALCIUM SERPL-MCNC: 9.2 MG/DL (ref 8.7–10.5)
CHLORIDE SERPL-SCNC: 110 MMOL/L (ref 95–110)
CO2 SERPL-SCNC: 25 MMOL/L (ref 23–29)
CREAT SERPL-MCNC: 0.9 MG/DL (ref 0.5–1.4)
DIFFERENTIAL METHOD: ABNORMAL
EOSINOPHIL # BLD AUTO: 0.2 K/UL (ref 0–0.5)
EOSINOPHIL NFR BLD: 4.5 % (ref 0–8)
ERYTHROCYTE [DISTWIDTH] IN BLOOD BY AUTOMATED COUNT: 15.2 % (ref 11.5–14.5)
EST. GFR  (AFRICAN AMERICAN): >60 ML/MIN/1.73 M^2
EST. GFR  (NON AFRICAN AMERICAN): >60 ML/MIN/1.73 M^2
GLUCOSE SERPL-MCNC: 91 MG/DL (ref 70–110)
HCT VFR BLD AUTO: 37.2 % (ref 40–54)
HGB BLD-MCNC: 12.7 G/DL (ref 14–18)
IMM GRANULOCYTES # BLD AUTO: 0.01 K/UL (ref 0–0.04)
IMM GRANULOCYTES NFR BLD AUTO: 0.2 % (ref 0–0.5)
INR PPP: 1.2 (ref 0.8–1.2)
LYMPHOCYTES # BLD AUTO: 1.3 K/UL (ref 1–4.8)
LYMPHOCYTES NFR BLD: 30.2 % (ref 18–48)
MCH RBC QN AUTO: 32.9 PG (ref 27–31)
MCHC RBC AUTO-ENTMCNC: 34.1 G/DL (ref 32–36)
MCV RBC AUTO: 96 FL (ref 82–98)
MONOCYTES # BLD AUTO: 0.4 K/UL (ref 0.3–1)
MONOCYTES NFR BLD: 9.3 % (ref 4–15)
NEUTROPHILS # BLD AUTO: 2.3 K/UL (ref 1.8–7.7)
NEUTROPHILS NFR BLD: 55.3 % (ref 38–73)
NRBC BLD-RTO: 0 /100 WBC
PLATELET # BLD AUTO: 81 K/UL (ref 150–450)
PMV BLD AUTO: 11.1 FL (ref 9.2–12.9)
POTASSIUM SERPL-SCNC: 4.3 MMOL/L (ref 3.5–5.1)
PROT SERPL-MCNC: 6.6 G/DL (ref 6–8.4)
PROTHROMBIN TIME: 12.7 SEC (ref 9–12.5)
RBC # BLD AUTO: 3.86 M/UL (ref 4.6–6.2)
SODIUM SERPL-SCNC: 142 MMOL/L (ref 136–145)
WBC # BLD AUTO: 4.21 K/UL (ref 3.9–12.7)

## 2022-06-10 PROCEDURE — 36415 COLL VENOUS BLD VENIPUNCTURE: CPT | Performed by: INTERNAL MEDICINE

## 2022-06-10 PROCEDURE — 80053 COMPREHEN METABOLIC PANEL: CPT | Performed by: INTERNAL MEDICINE

## 2022-06-10 PROCEDURE — 85610 PROTHROMBIN TIME: CPT | Performed by: INTERNAL MEDICINE

## 2022-06-10 PROCEDURE — 76705 IR US ABDOMEN LIMITED: ICD-10-PCS | Mod: 26,,, | Performed by: RADIOLOGY

## 2022-06-10 PROCEDURE — 76705 ECHO EXAM OF ABDOMEN: CPT | Mod: TC | Performed by: RADIOLOGY

## 2022-06-10 PROCEDURE — 85025 COMPLETE CBC W/AUTO DIFF WBC: CPT | Performed by: INTERNAL MEDICINE

## 2022-06-10 NOTE — PROCEDURES
Radiology Post-Procedure Note    Pre Op Diagnosis: Ascites, cirrhosis  Post Op Diagnosis: Same    Procedure: Scheduled for paracentesis but US revealed insufficient ascites for safe drainage.  Therefore, paracentesis was canceled.    Procedure performed by: Arun Prado MD    Written Informed Consent Obtained: Yes  Specimen Removed: NO  Estimated Blood Loss: N/A    Findings:   Scheduled for paracentesis.  However, only a small amount of ascites was identified, insufficient for safe drainage.  Therefore, paracentesis was canceled.    Arun Prado MD  Staff Radiologist  Department of Radiology  Pager: [phone]

## 2022-06-10 NOTE — H&P
Radiology History & Physical      SUBJECTIVE:     Chief Complaint: cirrhosis, ascites    History of Present Illness:  Nilesh Rolon Jr. is a 46 y.o. male who presents for paracentesis  Past Medical History:   Diagnosis Date    DONG (acute kidney injury) 12/1/2021    Anxiety     Arthritis     Cirrhosis     Hypertension     Liver disease     Osteoarthritis     Other meniscus derangements, other medial meniscus, left knee 1/7/2019     Past Surgical History:   Procedure Laterality Date    ARTHROSCOPY OF KNEE Left 1/7/2019    Procedure: ARTHROSCOPY, KNEE;  Surgeon: Derick Kirby MD;  Location: [facility];  Service: Orthopedics;  Laterality: Left;    COLONOSCOPY N/A 7/27/2020    Procedure: COLONOSCOPY;  Surgeon: Sotero Zapata MD;  Location: [facility];  Service: Endoscopy;  Laterality: N/A;    COLONOSCOPY N/A 1/20/2022    Procedure: COLONOSCOPY Suprep Vaccinated;  Surgeon: Jacob Andrea MD;  Location: [facility];  Service: Endoscopy;  Laterality: N/A;    ESOPHAGOGASTRODUODENOSCOPY N/A 4/8/2019    Procedure: EGD (ESOPHAGOGASTRODUODENOSCOPY);  Surgeon: Bonita Kendall MD;  Location: [facility];  Service: Endoscopy;  Laterality: N/A;    ESOPHAGOGASTRODUODENOSCOPY N/A 7/27/2020    Procedure: EGD (ESOPHAGOGASTRODUODENOSCOPY);  Surgeon: Sotero Zapata MD;  Location: [facility];  Service: Endoscopy;  Laterality: N/A;    ESOPHAGOGASTRODUODENOSCOPY N/A 12/2/2021    Procedure: EGD (ESOPHAGOGASTRODUODENOSCOPY);  Surgeon: Montez Guerra MD;  Location: [facility] ([address] [place] [address]);  Service: Endoscopy;  Laterality: N/A;    ESOPHAGOGASTRODUODENOSCOPY N/A 1/20/2022    Procedure: EGD (ESOPHAGOGASTRODUODENOSCOPY);  Surgeon: Jaocb Andrea MD;  Location: [facility];  Service: Endoscopy;  Laterality: N/A;  please order CBC with plts and INR day of case.    KNEE SURGERY         Home Meds:   Prior to Admission medications    Medication Sig Start Date End Date Taking? Authorizing Provider   cholecalciferol, vitamin  D3, 1,250 mcg (50,000 unit) capsule Take 50,000 Units by mouth every 7 days. 4/26/22   Historical Provider   doxycycline (VIBRA-TABS) 100 MG tablet Take 100 mg by mouth once daily. 5/6/22   Historical Provider   ferrous sulfate (FEOSOL) 325 mg (65 mg iron) Tab tablet Take 1 tablet (325 mg total) by mouth 2 (two) times daily. 5/23/22   Pb Mixon MD   folic acid (FOLVITE) 1 MG tablet TAKE 1 TABLET(1 MG) BY MOUTH EVERY DAY 5/13/22   Dianne Jimenez NP   ketoconazole (NIZORAL) 2 % cream Apply 1 application topically once daily. 5/6/22   Historical Provider   lactulose (CHRONULAC) 10 gram/15 mL solution Take 30 mLs (20 g total) by mouth 2 (two) times daily. Goal of 3-4 bowel movements daily 3/14/22   Pb Mioxn MD   multivitamin Tab Take 1 tablet by mouth once daily. 5/23/22   Pb Mixon MD   pantoprazole (PROTONIX) 40 MG tablet Take 1 tablet (40 mg total) by mouth once daily. 2/2/22 2/2/23  Savita Bianchi MD   thiamine 100 MG tablet Take 1 tablet (100 mg total) by mouth once daily. 1/9/22 1/9/23  Otoniel Espinoza MD     Anticoagulants/Antiplatelets: no anticoagulation    Allergies: Review of patient's allergies indicates:  No Known Allergies  Sedation History:  no adverse reactions    Review of Systems:   Hematological: no known coagulopathies  Respiratory: no shortness of breath  Cardiovascular: no chest pain  Gastrointestinal: no abdominal pain  Genito-Urinary: no dysuria  Musculoskeletal: negative  Neurological: no TIA or stroke symptoms         OBJECTIVE:     Vital Signs (Most Recent)       Physical Exam:  ASA: 2  Mallampati: N/A    General: no acute distress, c/o being tired, sleepy  Mental Status: alert and oriented to person, place and time  HEENT: normocephalic, atraumatic  Chest: unlabored breathing  Heart: regular heart rate  Abdomen: mildly distended  Extremity: moves all extremities    Laboratory  Lab Results   Component Value Date    INR 1.2 06/10/2022       Lab Results    Component Value Date    WBC 4.21 06/10/2022    HGB 12.7 (L) 06/10/2022    HCT 37.2 (L) 06/10/2022    MCV 96 06/10/2022    PLT 81 (L) 06/10/2022      Lab Results   Component Value Date    GLU 91 06/10/2022     06/10/2022    K 4.3 06/10/2022     06/10/2022    CO2 25 06/10/2022    BUN 10 06/10/2022    CREATININE 0.9 06/10/2022    CALCIUM 9.2 06/10/2022    MG 1.8 02/01/2022    ALT 19 06/10/2022    AST 29 06/10/2022    ALBUMIN 3.3 (L) 06/10/2022    BILITOT 1.5 (H) 06/10/2022    BILIDIR 3.7 (H) 07/27/2020       ASSESSMENT/PLAN:     Sedation Plan: local anesthesia only  Patient will undergo paracentesis.    Arun Prado MD  Staff Radiologist  Department of Radiology  Pager: [phone]

## 2022-06-20 ENCOUNTER — HOSPITAL ENCOUNTER (OUTPATIENT)
Dept: INTERVENTIONAL RADIOLOGY/VASCULAR | Facility: HOSPITAL | Age: 47
Discharge: HOME | End: 2022-06-20
Attending: INTERNAL MEDICINE
Payer: MEDICAID

## 2022-06-20 ENCOUNTER — HOSPITAL ENCOUNTER (OUTPATIENT)
Dept: RADIOLOGY | Facility: HOSPITAL | Age: 47
Discharge: HOME | End: 2022-06-20
Attending: INTERNAL MEDICINE
Payer: MEDICAID

## 2022-06-20 DIAGNOSIS — K70.31 ALCOHOLIC CIRRHOSIS OF LIVER WITH ASCITES: ICD-10-CM

## 2022-06-20 PROCEDURE — 76700 US EXAM ABDOM COMPLETE: CPT | Mod: 26,,, | Performed by: RADIOLOGY

## 2022-06-20 PROCEDURE — 76700 US EXAM ABDOM COMPLETE: CPT | Mod: TC

## 2022-06-20 PROCEDURE — 76705 ECHO EXAM OF ABDOMEN: CPT | Mod: TC | Performed by: RADIOLOGY

## 2022-06-20 PROCEDURE — 76705 IR US ABDOMEN LIMITED: ICD-10-PCS | Mod: 26,,, | Performed by: RADIOLOGY

## 2022-06-20 PROCEDURE — 76700 US ABDOMEN COMPLETE: ICD-10-PCS | Mod: 26,,, | Performed by: RADIOLOGY

## 2022-06-20 NOTE — H&P
Radiology History & Physical      SUBJECTIVE:     Chief Complaint: Recurrent painful, tense ascites     History of Present Illness:  Nilesh Rolon Jr. is a 46 y.o. male with pertinent PMHx of decompensated EtOH hepatic cirrhosis complicated by recurrent abdominal distension and pain 2/2 recurrent painful, tense ascites requiring frequent decompression for symptom relief.      A new outpatient IR consult received for US-guided percutaneous RUQ-approach therapeutic large-volume paracentesis.    Past Medical History:   Diagnosis Date    DONG (acute kidney injury) 12/1/2021    Anxiety     Arthritis     Cirrhosis     Hypertension     Liver disease     Osteoarthritis     Other meniscus derangements, other medial meniscus, left knee 1/7/2019     Past Surgical History:   Procedure Laterality Date    ARTHROSCOPY OF KNEE Left 1/7/2019    Procedure: ARTHROSCOPY, KNEE;  Surgeon: Derick Kirby MD;  Location: [facility];  Service: Orthopedics;  Laterality: Left;    COLONOSCOPY N/A 7/27/2020    Procedure: COLONOSCOPY;  Surgeon: Sotero Zapata MD;  Location: [facility];  Service: Endoscopy;  Laterality: N/A;    COLONOSCOPY N/A 1/20/2022    Procedure: COLONOSCOPY Suprep Vaccinated;  Surgeon: Jacob Andrea MD;  Location: [facility];  Service: Endoscopy;  Laterality: N/A;    ESOPHAGOGASTRODUODENOSCOPY N/A 4/8/2019    Procedure: EGD (ESOPHAGOGASTRODUODENOSCOPY);  Surgeon: Bonita Kendall MD;  Location: [facility];  Service: Endoscopy;  Laterality: N/A;    ESOPHAGOGASTRODUODENOSCOPY N/A 7/27/2020    Procedure: EGD (ESOPHAGOGASTRODUODENOSCOPY);  Surgeon: Sotero Zapata MD;  Location: [facility];  Service: Endoscopy;  Laterality: N/A;    ESOPHAGOGASTRODUODENOSCOPY N/A 12/2/2021    Procedure: EGD (ESOPHAGOGASTRODUODENOSCOPY);  Surgeon: Montez Guerra MD;  Location: [facility] ([address] [place] [address]);  Service: Endoscopy;  Laterality: N/A;    ESOPHAGOGASTRODUODENOSCOPY N/A 1/20/2022    Procedure: EGD (ESOPHAGOGASTRODUODENOSCOPY);   Surgeon: Jacob Andrea MD;  Location: [facility];  Service: Endoscopy;  Laterality: N/A;  please order CBC with plts and INR day of case.    KNEE SURGERY       Home Meds:   Prior to Admission medications    Medication Sig Start Date End Date Taking? Authorizing Provider   cholecalciferol, vitamin D3, 1,250 mcg (50,000 unit) capsule Take 50,000 Units by mouth every 7 days. 4/26/22   Historical Provider   doxycycline (VIBRA-TABS) 100 MG tablet Take 100 mg by mouth once daily. 5/6/22   Historical Provider   ferrous sulfate (FEOSOL) 325 mg (65 mg iron) Tab tablet Take 1 tablet (325 mg total) by mouth 2 (two) times daily. 5/23/22   Pb Mioxn MD   folic acid (FOLVITE) 1 MG tablet TAKE 1 TABLET(1 MG) BY MOUTH EVERY DAY 5/13/22   Dianne Jimenez, NP   ketoconazole (NIZORAL) 2 % cream Apply 1 application topically once daily. 5/6/22   Historical Provider   lactulose (CHRONULAC) 10 gram/15 mL solution Take 30 mLs (20 g total) by mouth 2 (two) times daily. Goal of 3-4 bowel movements daily 3/14/22   Pb Mixon MD   multivitamin Tab Take 1 tablet by mouth once daily. 5/23/22   Pb Mixon MD   pantoprazole (PROTONIX) 40 MG tablet Take 1 tablet (40 mg total) by mouth once daily. 2/2/22 2/2/23  Savita Bianchi MD   thiamine 100 MG tablet Take 1 tablet (100 mg total) by mouth once daily. 1/9/22 1/9/23  Otoniel Espinoza MD     Anticoagulants/Antiplatelets: no anticoagulation     Allergies: Review of patient's allergies indicates:  No Known Allergies     Sedation History:  no adverse reactions     Review of Systems:   Hematological: no known coagulopathies  Respiratory: no cough, shortness of breath, or wheezing  Cardiovascular: no chest pain or dyspnea on exertion  Gastrointestinal: positive for - abdominal pain and distension  Genito-Urinary: no dysuria, trouble voiding, or hematuria  Musculoskeletal: negative  Neurological: no TIA or stroke symptoms      OBJECTIVE:     Vital Signs (Most  Recent)       Physical Exam:  General: no acute distress  Mental Status: alert and oriented to person, place and time  HEENT: normocephalic, atraumatic  Chest: unlabored breathing  Heart: regular heart rate  Abdomen: +distended. No TTP/r/g.  Extremity: moves all extremities    Laboratory  Lab Results   Component Value Date    INR 1.2 06/20/2022       Lab Results   Component Value Date    WBC 4.07 06/20/2022    HGB 12.6 (L) 06/20/2022    HCT 35.9 (L) 06/20/2022    MCV 94 06/20/2022    PLT 72 (L) 06/20/2022      Lab Results   Component Value Date    GLU 91 06/10/2022     06/10/2022    K 4.3 06/10/2022     06/10/2022    CO2 25 06/10/2022    BUN 10 06/10/2022    CREATININE 0.9 06/10/2022    CALCIUM 9.2 06/10/2022    MG 1.8 02/01/2022    ALT 19 06/10/2022    AST 29 06/10/2022    ALBUMIN 3.3 (L) 06/10/2022    BILITOT 1.5 (H) 06/10/2022    BILIDIR 3.7 (H) 07/27/2020     ASSESSMENT/PLAN:     46 y.o. male with pertinent PMHx of decompensated EtOH hepatic cirrhosis complicated by recurrent abdominal distension and pain 2/2 recurrent painful, tense ascitesrequiring frequent therapeutic large-volume paracentesis.     1. Recurrent painful, tense ascites - Will attempt US-guided percutaneous RUQ-approach therapeutic LVP with local anesthetic only and albumin infusion post PRN as indicated per institutional protocol.     Risks (including, but not limited to, pain, bleeding, infection, damage to nearby structures, failure to obtain sufficient material for a diagnosis, the need for additional procedures, and death), benefits, and alternatives were discussed with the patient. All questions were answered to the best of my abilities. The patient wishes to proceed with the procedure. Written informed consent was obtained.     Thank you for considering IR for the care of your patient.      Chris Chahal MD  Interventional Radiology

## 2022-06-20 NOTE — DISCHARGE SUMMARY
Radiology Discharge Summary      Hospital Course: No complications    Admit Date: 6/20/2022  Discharge Date: 06/20/2022     Instructions Given to Patient: Yes  Diet: Resume prior diet  Activity: activity as tolerated    Description of Condition on Discharge: Good  Vital Signs (Most Recent):      Discharge Disposition: Home    Discharge Diagnosis:   46 y.o. male with pertinent PMHx of recurrent painful, tense ascites requiring frequent therapeutic large-volume paracentesis however, pre-op US again reveals pt with trace kamran-hepatic ascites without sizable pocket for safe US-guided paracentesis. Procedure was aborted.     Thank you for considering IR for the care of your patient.      Chris Chahal MD  Interventional Radiology

## 2022-06-20 NOTE — BRIEF OP NOTE
Radiology Post-Procedure Note     Pre Op Diagnosis: Recurrent painful, tense ascites  Post Op Diagnosis: Same     Procedure: 1. Limited abdominal US     Procedure performed by: Chris Chahal MD     Written Informed Consent Obtained: NO  Specimen Removed: NO  Estimated Blood Loss: none     Findings:   Pre-op US again reveals pt with trace kamran-hepatic ascites without sizable pocket for safe US-guided paracentesis. Procedure was aborted.     Thank you for considering IR for the care of your patient.      Chris Chahal MD  Interventional Radiology

## 2022-07-01 ENCOUNTER — HOSPITAL ENCOUNTER (OUTPATIENT)
Dept: INTERVENTIONAL RADIOLOGY/VASCULAR | Facility: HOSPITAL | Age: 47
Discharge: HOME | End: 2022-07-01
Attending: INTERNAL MEDICINE
Payer: MEDICAID

## 2022-07-01 ENCOUNTER — LAB VISIT (OUTPATIENT)
Dept: LAB | Facility: HOSPITAL | Age: 47
End: 2022-07-01
Attending: INTERNAL MEDICINE
Payer: MEDICAID

## 2022-07-01 DIAGNOSIS — K70.10 ALCOHOLIC HEPATITIS WITHOUT ASCITES: ICD-10-CM

## 2022-07-01 DIAGNOSIS — K70.31 ALCOHOLIC CIRRHOSIS OF LIVER WITH ASCITES: ICD-10-CM

## 2022-07-01 LAB
ALBUMIN SERPL BCP-MCNC: 3.4 G/DL (ref 3.5–5.2)
ALP SERPL-CCNC: 78 U/L (ref 55–135)
ALT SERPL W/O P-5'-P-CCNC: 17 U/L (ref 10–44)
ANION GAP SERPL CALC-SCNC: 11 MMOL/L (ref 8–16)
AST SERPL-CCNC: 31 U/L (ref 10–40)
BASOPHILS # BLD AUTO: 0.05 K/UL (ref 0–0.2)
BASOPHILS NFR BLD: 1 % (ref 0–1.9)
BILIRUB SERPL-MCNC: 1.7 MG/DL (ref 0.1–1)
BUN SERPL-MCNC: 13 MG/DL (ref 6–20)
CALCIUM SERPL-MCNC: 9.3 MG/DL (ref 8.7–10.5)
CHLORIDE SERPL-SCNC: 108 MMOL/L (ref 95–110)
CO2 SERPL-SCNC: 21 MMOL/L (ref 23–29)
CREAT SERPL-MCNC: 1.1 MG/DL (ref 0.5–1.4)
DIFFERENTIAL METHOD: ABNORMAL
EOSINOPHIL # BLD AUTO: 0.3 K/UL (ref 0–0.5)
EOSINOPHIL NFR BLD: 5.2 % (ref 0–8)
ERYTHROCYTE [DISTWIDTH] IN BLOOD BY AUTOMATED COUNT: 14.8 % (ref 11.5–14.5)
EST. GFR  (AFRICAN AMERICAN): >60 ML/MIN/1.73 M^2
EST. GFR  (NON AFRICAN AMERICAN): >60 ML/MIN/1.73 M^2
GLUCOSE SERPL-MCNC: 97 MG/DL (ref 70–110)
HCT VFR BLD AUTO: 38.2 % (ref 40–54)
HGB BLD-MCNC: 13.2 G/DL (ref 14–18)
IMM GRANULOCYTES # BLD AUTO: 0.01 K/UL (ref 0–0.04)
IMM GRANULOCYTES NFR BLD AUTO: 0.2 % (ref 0–0.5)
INR PPP: 1.2 (ref 0.8–1.2)
LYMPHOCYTES # BLD AUTO: 1.5 K/UL (ref 1–4.8)
LYMPHOCYTES NFR BLD: 28.5 % (ref 18–48)
MCH RBC QN AUTO: 33.1 PG (ref 27–31)
MCHC RBC AUTO-ENTMCNC: 34.6 G/DL (ref 32–36)
MCV RBC AUTO: 96 FL (ref 82–98)
MONOCYTES # BLD AUTO: 0.6 K/UL (ref 0.3–1)
MONOCYTES NFR BLD: 11.5 % (ref 4–15)
NEUTROPHILS # BLD AUTO: 2.8 K/UL (ref 1.8–7.7)
NEUTROPHILS NFR BLD: 53.6 % (ref 38–73)
NRBC BLD-RTO: 0 /100 WBC
PLATELET # BLD AUTO: 75 K/UL (ref 150–450)
PMV BLD AUTO: 10.8 FL (ref 9.2–12.9)
POTASSIUM SERPL-SCNC: 4.4 MMOL/L (ref 3.5–5.1)
PROT SERPL-MCNC: 7 G/DL (ref 6–8.4)
PROTHROMBIN TIME: 12.7 SEC (ref 9–12.5)
RBC # BLD AUTO: 3.99 M/UL (ref 4.6–6.2)
SODIUM SERPL-SCNC: 140 MMOL/L (ref 136–145)
WBC # BLD AUTO: 5.15 K/UL (ref 3.9–12.7)

## 2022-07-01 PROCEDURE — 76705 ECHO EXAM OF ABDOMEN: CPT | Mod: TC | Performed by: RADIOLOGY

## 2022-07-01 PROCEDURE — 80053 COMPREHEN METABOLIC PANEL: CPT | Performed by: INTERNAL MEDICINE

## 2022-07-01 PROCEDURE — 76705 IR US ABDOMEN LIMITED: ICD-10-PCS | Mod: 26,,, | Performed by: RADIOLOGY

## 2022-07-01 PROCEDURE — 36415 COLL VENOUS BLD VENIPUNCTURE: CPT | Performed by: INTERNAL MEDICINE

## 2022-07-01 PROCEDURE — 85610 PROTHROMBIN TIME: CPT | Performed by: INTERNAL MEDICINE

## 2022-07-01 PROCEDURE — 85025 COMPLETE CBC W/AUTO DIFF WBC: CPT | Performed by: INTERNAL MEDICINE

## 2022-07-01 NOTE — PROGRESS NOTES
This patient with alcoholic cirrhosis known to Interventional Radiology was concerned that he might have recurrent ascites and might be in need of paracentesis.  Limited US of the abdomen was performed, demonstrating no significant ascites.  Therefore, paracentesis was not performed.    Arun Prado MD  Interventional Radiology

## 2022-07-05 ENCOUNTER — OFFICE VISIT (OUTPATIENT)
Dept: HEPATOLOGY | Facility: CLINIC | Age: 47
End: 2022-07-05
Attending: INTERNAL MEDICINE
Payer: MEDICAID

## 2022-07-05 ENCOUNTER — PATIENT MESSAGE (OUTPATIENT)
Dept: ORTHOPEDICS | Facility: CLINIC | Age: 47
End: 2022-07-05
Payer: MEDICAID

## 2022-07-05 VITALS
SYSTOLIC BLOOD PRESSURE: 108 MMHG | WEIGHT: 222 LBS | DIASTOLIC BLOOD PRESSURE: 62 MMHG | BODY MASS INDEX: 27.75 KG/M2 | OXYGEN SATURATION: 98 % | TEMPERATURE: 98 F | HEART RATE: 52 BPM

## 2022-07-05 DIAGNOSIS — K70.31 ALCOHOLIC CIRRHOSIS OF LIVER WITH ASCITES: Primary | ICD-10-CM

## 2022-07-05 PROCEDURE — 3078F PR MOST RECENT DIASTOLIC BLOOD PRESSURE < 80 MM HG: ICD-10-PCS | Mod: CPTII,,, | Performed by: INTERNAL MEDICINE

## 2022-07-05 PROCEDURE — 1160F RVW MEDS BY RX/DR IN RCRD: CPT | Mod: CPTII,,, | Performed by: INTERNAL MEDICINE

## 2022-07-05 PROCEDURE — 1159F MED LIST DOCD IN RCRD: CPT | Mod: CPTII,,, | Performed by: INTERNAL MEDICINE

## 2022-07-05 PROCEDURE — 99213 OFFICE O/P EST LOW 20 MIN: CPT | Mod: S$PBB,,, | Performed by: INTERNAL MEDICINE

## 2022-07-05 PROCEDURE — 1159F PR MEDICATION LIST DOCUMENTED IN MEDICAL RECORD: ICD-10-PCS | Mod: CPTII,,, | Performed by: INTERNAL MEDICINE

## 2022-07-05 PROCEDURE — 3078F DIAST BP <80 MM HG: CPT | Mod: CPTII,,, | Performed by: INTERNAL MEDICINE

## 2022-07-05 PROCEDURE — 99213 PR OFFICE/OUTPT VISIT, EST, LEVL III, 20-29 MIN: ICD-10-PCS | Mod: S$PBB,,, | Performed by: INTERNAL MEDICINE

## 2022-07-05 PROCEDURE — 1160F PR REVIEW ALL MEDS BY PRESCRIBER/CLIN PHARMACIST DOCUMENTED: ICD-10-PCS | Mod: CPTII,,, | Performed by: INTERNAL MEDICINE

## 2022-07-05 PROCEDURE — 99999 PR PBB SHADOW E&M-EST. PATIENT-LVL III: ICD-10-PCS | Mod: PBBFAC,,, | Performed by: INTERNAL MEDICINE

## 2022-07-05 PROCEDURE — 3008F BODY MASS INDEX DOCD: CPT | Mod: CPTII,,, | Performed by: INTERNAL MEDICINE

## 2022-07-05 PROCEDURE — 3074F SYST BP LT 130 MM HG: CPT | Mod: CPTII,,, | Performed by: INTERNAL MEDICINE

## 2022-07-05 PROCEDURE — 3008F PR BODY MASS INDEX (BMI) DOCUMENTED: ICD-10-PCS | Mod: CPTII,,, | Performed by: INTERNAL MEDICINE

## 2022-07-05 PROCEDURE — 99999 PR PBB SHADOW E&M-EST. PATIENT-LVL III: CPT | Mod: PBBFAC,,, | Performed by: INTERNAL MEDICINE

## 2022-07-05 PROCEDURE — 99213 OFFICE O/P EST LOW 20 MIN: CPT | Mod: PBBFAC | Performed by: INTERNAL MEDICINE

## 2022-07-05 PROCEDURE — 3074F PR MOST RECENT SYSTOLIC BLOOD PRESSURE < 130 MM HG: ICD-10-PCS | Mod: CPTII,,, | Performed by: INTERNAL MEDICINE

## 2022-07-05 NOTE — PROGRESS NOTES
HEPATOLOGY FOLLOW UP    Referring Physician:   Current Corresponding Physician:     Nilesh Rolon Jr. is here for follow up of Cirrhosis      HPI  This 47-year-old gentleman has a diagnosis of cirrhosis secondary to history of alcohol use disorder.  His major issue has been ascites.  He had been requiring frequent large volume paracenteses.  He has completed an outpatient rehabilitation program and attends [facility] weekly.  With abstinence his ascites is largely resolved and he is off diuretics.  He describes no symptoms of encephalopathy or symptoms of GI bleeding.  His most recent meld score is 11.     Outpatient Encounter Medications as of 7/5/2022   Medication Sig Dispense Refill    cholecalciferol, vitamin D3, 1,250 mcg (50,000 unit) capsule Take 50,000 Units by mouth every 7 days.      doxycycline (VIBRA-TABS) 100 MG tablet Take 100 mg by mouth once daily.      ferrous sulfate (FEOSOL) 325 mg (65 mg iron) Tab tablet Take 1 tablet (325 mg total) by mouth 2 (two) times daily. 60 tablet 2    folic acid (FOLVITE) 1 MG tablet TAKE 1 TABLET(1 MG) BY MOUTH EVERY DAY 30 tablet 5    ketoconazole (NIZORAL) 2 % cream Apply 1 application topically once daily.      lactulose (CHRONULAC) 10 gram/15 mL solution Take 30 mLs (20 g total) by mouth 2 (two) times daily. Goal of 3-4 bowel movements daily 1892 mL 6    multivitamin Tab Take 1 tablet by mouth once daily. 30 tablet 2    pantoprazole (PROTONIX) 40 MG tablet Take 1 tablet (40 mg total) by mouth once daily. 30 tablet 11    thiamine 100 MG tablet Take 1 tablet (100 mg total) by mouth once daily. 30 tablet 11     No facility-administered encounter medications on file as of 7/5/2022.     Review of patient's allergies indicates:  No Known Allergies  Past Medical History:   Diagnosis Date    DONG (acute kidney injury) 12/1/2021    Anxiety     Arthritis     Cirrhosis     Hypertension     Liver disease     Osteoarthritis     Other meniscus  derangements, other medial meniscus, left knee 1/7/2019       Review of Systems   Constitutional: Negative for activity change, appetite change, chills, fatigue and unexpected weight change.   HENT: Negative for congestion, facial swelling and tinnitus.    Eyes: Negative for visual disturbance.   Respiratory: Negative for cough, shortness of breath and wheezing.    Cardiovascular: Negative for chest pain, palpitations and leg swelling.   Gastrointestinal: Negative for abdominal distention.   Genitourinary: Negative for dysuria.   Musculoskeletal: Positive for arthralgias. Negative for joint swelling and myalgias.   Neurological: Negative for syncope and headaches.   Hematological: Does not bruise/bleed easily.   Psychiatric/Behavioral: Negative for confusion.     Vitals:    07/05/22 1439   BP: 108/62   Pulse: (!) 52   Temp: 98 °F (36.7 °C)       Physical Exam  Constitutional:       Appearance: He is well-developed.   Eyes:      General: No scleral icterus.  Cardiovascular:      Rate and Rhythm: Normal rate and regular rhythm.      Heart sounds: Normal heart sounds.   Pulmonary:      Effort: Pulmonary effort is normal. No respiratory distress.      Breath sounds: Normal breath sounds. No wheezing.   Abdominal:      General: Bowel sounds are normal. There is no distension.      Palpations: Abdomen is soft. There is no shifting dullness, fluid wave or mass.      Tenderness: There is no abdominal tenderness. There is no rebound.   Musculoskeletal:         General: Normal range of motion.   Lymphadenopathy:      Cervical: No cervical adenopathy.   Skin:     General: Skin is warm and dry.   Neurological:      Mental Status: He is alert and oriented to person, place, and time.         MELD-Na score: 11 at 7/1/2022  9:43 AM  MELD score: 11 at 7/1/2022  9:43 AM  Calculated from:  Serum Creatinine: 1.1 mg/dL at 7/1/2022  9:43 AM  Serum Sodium: 140 mmol/L (Using max of 137 mmol/L) at 7/1/2022  9:43 AM  Total Bilirubin: 1.7  mg/dL at 7/1/2022  9:43 AM  INR(ratio): 1.2 at 7/1/2022  9:43 AM  Age: 47 years    Lab Results   Component Value Date    GLU 97 07/01/2022    BUN 13 07/01/2022    CREATININE 1.1 07/01/2022    CALCIUM 9.3 07/01/2022     07/01/2022    K 4.4 07/01/2022     07/01/2022    PROT 7.0 07/01/2022    CO2 21 (L) 07/01/2022    ANIONGAP 11 07/01/2022    WBC 5.15 07/01/2022    RBC 3.99 (L) 07/01/2022    HGB 13.2 (L) 07/01/2022    HCT 38.2 (L) 07/01/2022    HCT 31 (L) 11/30/2021    MCV 96 07/01/2022    MCH 33.1 (H) 07/01/2022    MCHC 34.6 07/01/2022     Lab Results   Component Value Date    RDW 14.8 (H) 07/01/2022    PLT 75 (L) 07/01/2022    MPV 10.8 07/01/2022    GRAN 2.8 07/01/2022    GRAN 53.6 07/01/2022    LYMPH 1.5 07/01/2022    LYMPH 28.5 07/01/2022    MONO 0.6 07/01/2022    MONO 11.5 07/01/2022    EOSINOPHIL 5.2 07/01/2022    BASOPHIL 1.0 07/01/2022    EOS 0.3 07/01/2022    EOS 1 01/26/2022    BASO 0.05 07/01/2022    APTT 29.9 04/07/2019    GROUPTRH A POS 07/23/2020    BNP 52 01/08/2022    CHOL 242 (H) 04/06/2016    TRIG 278 (H) 04/06/2016    HDL 40 04/06/2016    CHOLHDL 16.5 (L) 04/06/2016    TOTALCHOLEST 6.1 (H) 04/06/2016    ALBUMIN 3.4 (L) 07/01/2022    BILIDIR 3.7 (H) 07/27/2020    AST 31 07/01/2022    ALT 17 07/01/2022    ALKPHOS 78 07/01/2022    MG 1.8 02/01/2022    LABPROT 12.7 (H) 07/01/2022    INR 1.2 07/01/2022    PSA 0.54 04/06/2016       Assessment and Plan:  Patient Active Problem List   Diagnosis    Ascites    Thrombocytopenia    History of alcohol abuse    Gastrointestinal hemorrhage    Hx of colonic polyps    History of GI bleed    Alcoholic cirrhosis of liver with ascites    Primary osteoarthritis of left knee    Primary osteoarthritis of right shoulder    Pain of left upper extremity    Debility    Stiffness in joint    Class 1 obesity due to excess calories with serious comorbidity and body mass index (BMI) of 33.0 to 33.9 in adult    Elevated INR    Hemorrhoids    Candidiasis  of esophagus with fungal esophagitis     Anemia     ChristopherANTONELLA Rolon Jr. is a 47 y.o. male withCirrhosis    Impression:  Cirrhosis secondary to a prior history of alcohol use disorder with a current meld score of 11    Plan:  I encouraged continued abstinence.  He will try discontinuing his lactulose.  He continues to follow a low-sodium diet.  He will return to clinic in 6 months time with continued clinical, biochemical and ultrasound surveillance.

## 2022-07-07 ENCOUNTER — OFFICE VISIT (OUTPATIENT)
Dept: ORTHOPEDICS | Facility: CLINIC | Age: 47
End: 2022-07-07
Payer: MEDICAID

## 2022-07-07 ENCOUNTER — PATIENT MESSAGE (OUTPATIENT)
Dept: HEPATOLOGY | Facility: CLINIC | Age: 47
End: 2022-07-07
Payer: MEDICAID

## 2022-07-07 VITALS
DIASTOLIC BLOOD PRESSURE: 68 MMHG | SYSTOLIC BLOOD PRESSURE: 106 MMHG | RESPIRATION RATE: 18 BRPM | BODY MASS INDEX: 27.74 KG/M2 | WEIGHT: 223.13 LBS | HEIGHT: 75 IN | HEART RATE: 62 BPM

## 2022-07-07 DIAGNOSIS — M19.011 PRIMARY OSTEOARTHRITIS OF RIGHT SHOULDER: Primary | ICD-10-CM

## 2022-07-07 PROCEDURE — 3008F PR BODY MASS INDEX (BMI) DOCUMENTED: ICD-10-PCS | Mod: CPTII,,, | Performed by: ORTHOPAEDIC SURGERY

## 2022-07-07 PROCEDURE — 3074F SYST BP LT 130 MM HG: CPT | Mod: CPTII,,, | Performed by: ORTHOPAEDIC SURGERY

## 2022-07-07 PROCEDURE — 20610 LARGE JOINT ASPIRATION/INJECTION: R GLENOHUMERAL: ICD-10-PCS | Mod: S$PBB,RT,, | Performed by: ORTHOPAEDIC SURGERY

## 2022-07-07 PROCEDURE — 3008F BODY MASS INDEX DOCD: CPT | Mod: CPTII,,, | Performed by: ORTHOPAEDIC SURGERY

## 2022-07-07 PROCEDURE — 1159F PR MEDICATION LIST DOCUMENTED IN MEDICAL RECORD: ICD-10-PCS | Mod: CPTII,,, | Performed by: ORTHOPAEDIC SURGERY

## 2022-07-07 PROCEDURE — 99999 PR PBB SHADOW E&M-EST. PATIENT-LVL III: CPT | Mod: PBBFAC,,, | Performed by: ORTHOPAEDIC SURGERY

## 2022-07-07 PROCEDURE — 1159F MED LIST DOCD IN RCRD: CPT | Mod: CPTII,,, | Performed by: ORTHOPAEDIC SURGERY

## 2022-07-07 PROCEDURE — 3074F PR MOST RECENT SYSTOLIC BLOOD PRESSURE < 130 MM HG: ICD-10-PCS | Mod: CPTII,,, | Performed by: ORTHOPAEDIC SURGERY

## 2022-07-07 PROCEDURE — 3078F PR MOST RECENT DIASTOLIC BLOOD PRESSURE < 80 MM HG: ICD-10-PCS | Mod: CPTII,,, | Performed by: ORTHOPAEDIC SURGERY

## 2022-07-07 PROCEDURE — 3078F DIAST BP <80 MM HG: CPT | Mod: CPTII,,, | Performed by: ORTHOPAEDIC SURGERY

## 2022-07-07 PROCEDURE — 99214 PR OFFICE/OUTPT VISIT, EST, LEVL IV, 30-39 MIN: ICD-10-PCS | Mod: 25,S$PBB,, | Performed by: ORTHOPAEDIC SURGERY

## 2022-07-07 PROCEDURE — 99213 OFFICE O/P EST LOW 20 MIN: CPT | Mod: PBBFAC,PN,25 | Performed by: ORTHOPAEDIC SURGERY

## 2022-07-07 PROCEDURE — 99999 PR PBB SHADOW E&M-EST. PATIENT-LVL III: ICD-10-PCS | Mod: PBBFAC,,, | Performed by: ORTHOPAEDIC SURGERY

## 2022-07-07 PROCEDURE — 20610 DRAIN/INJ JOINT/BURSA W/O US: CPT | Mod: PBBFAC,PN | Performed by: ORTHOPAEDIC SURGERY

## 2022-07-07 PROCEDURE — 99214 OFFICE O/P EST MOD 30 MIN: CPT | Mod: 25,S$PBB,, | Performed by: ORTHOPAEDIC SURGERY

## 2022-07-07 RX ORDER — TRIAMCINOLONE ACETONIDE 40 MG/ML
40 INJECTION, SUSPENSION INTRA-ARTICULAR; INTRAMUSCULAR
Status: DISCONTINUED | OUTPATIENT
Start: 2022-07-07 | End: 2022-07-07 | Stop reason: HOSPADM

## 2022-07-07 RX ADMIN — TRIAMCINOLONE ACETONIDE 40 MG: 40 INJECTION, SUSPENSION INTRA-ARTICULAR; INTRAMUSCULAR at 08:07

## 2022-07-07 NOTE — PROGRESS NOTES
[facility], Orthopedics and Sports Medicine  [facility]    Established Patient Shoulder Office Visit  07/07/2022     Diagnosis:  Right glenohumeral osteoarthritis      Procedure:   (2/24/2021) right GH CSI  (5/12/2022) right GH CSI    Subjective:      Nilesh Rolon Jr. is a 47 y.o. male who returns for follow up treatment of the right shoulder problem.    The patient has persistent right shoulder pain and difficulty lifting the arm.  He had prior shots in the shoulder and had pain relief for about 3 months.     He would like to have a procedure to help his shoulder problem.     He his a chronic liver cirrhosis patient and has recently had multiple abdominal paracentses.      He works as a .       The patient has not had advanced imaging of the shoulder.     Outside reports reviewed: historical medical records.    Past Medical History:   Diagnosis Date    DONG (acute kidney injury) 12/1/2021    Anxiety     Arthritis     Cirrhosis     Hypertension     Liver disease     Osteoarthritis     Other meniscus derangements, other medial meniscus, left knee 1/7/2019       Patient Active Problem List   Diagnosis    Ascites    Thrombocytopenia    History of alcohol abuse    Gastrointestinal hemorrhage    Hx of colonic polyps    History of GI bleed    Alcoholic cirrhosis of liver with ascites    Primary osteoarthritis of left knee    Primary osteoarthritis of right shoulder    Pain of left upper extremity    Debility    Stiffness in joint    Class 1 obesity due to excess calories with serious comorbidity and body mass index (BMI) of 33.0 to 33.9 in adult    Elevated INR    Hemorrhoids    Candidiasis of esophagus with fungal esophagitis     Anemia       Past Surgical History:   Procedure Laterality Date    ARTHROSCOPY OF KNEE Left 1/7/2019    Procedure: ARTHROSCOPY, KNEE;  Surgeon: Derick Kirby MD;  Location: [facility];  Service: Orthopedics;  Laterality: Left;     COLONOSCOPY N/A 7/27/2020    Procedure: COLONOSCOPY;  Surgeon: Sotero Zapata MD;  Location: [facility];  Service: Endoscopy;  Laterality: N/A;    COLONOSCOPY N/A 1/20/2022    Procedure: COLONOSCOPY Suprep Vaccinated;  Surgeon: Jacob Andrea MD;  Location: [facility];  Service: Endoscopy;  Laterality: N/A;    ESOPHAGOGASTRODUODENOSCOPY N/A 4/8/2019    Procedure: EGD (ESOPHAGOGASTRODUODENOSCOPY);  Surgeon: Bonita Kendall MD;  Location: [facility];  Service: Endoscopy;  Laterality: N/A;    ESOPHAGOGASTRODUODENOSCOPY N/A 7/27/2020    Procedure: EGD (ESOPHAGOGASTRODUODENOSCOPY);  Surgeon: Sotero Zapata MD;  Location: [facility];  Service: Endoscopy;  Laterality: N/A;    ESOPHAGOGASTRODUODENOSCOPY N/A 12/2/2021    Procedure: EGD (ESOPHAGOGASTRODUODENOSCOPY);  Surgeon: Montez Guerra MD;  Location: [facility] ([address] [place] [address]);  Service: Endoscopy;  Laterality: N/A;    ESOPHAGOGASTRODUODENOSCOPY N/A 1/20/2022    Procedure: EGD (ESOPHAGOGASTRODUODENOSCOPY);  Surgeon: Jacob Andrea MD;  Location: [facility];  Service: Endoscopy;  Laterality: N/A;  please order CBC with plts and INR day of case.    KNEE SURGERY          Current Outpatient Medications   Medication Instructions    cholecalciferol (vitamin D3) 50,000 Units, Oral, Every 7 days    doxycycline (VIBRA-TABS) 100 mg, Oral, Daily    FeroSuL 325 mg, Oral, 2 times daily    folic acid (FOLVITE) 1 MG tablet TAKE 1 TABLET(1 MG) BY MOUTH EVERY DAY    ketoconazole (NIZORAL) 2 % cream 1 application, Topical (Top), Daily    lactulose (CHRONULAC) 10 gram/15 mL solution Take 30 mLs (20 g total) by mouth 2 (two) times daily. Goal of 3-4 bowel movements daily    multivitamin Tab 1 tablet, Oral, Daily    pantoprazole (PROTONIX) 40 mg, Oral, Daily    thiamine 100 mg, Oral, Daily        Review of patient's allergies indicates:  No Known Allergies    Social History     Socioeconomic History    Marital status: Single   Tobacco Use    Smoking status: Never  Smoker    Smokeless tobacco: Never Used    Tobacco comment: smokes Marijuana only   Substance and Sexual Activity    Alcohol use: Not Currently     Comment: last drink 11/28, drinks 3-4 a day    Drug use: No    Sexual activity: Yes     Partners: Female       Family History   Problem Relation Age of Onset    Birth defects Neg Hx          Review of Systems   Constitutional: Negative for chills and fever.   HENT: Negative for hearing loss.    Eyes: Negative for blurred vision.   Cardiovascular: Negative for chest pain.   Respiratory: Negative for shortness of breath.    Gastrointestinal: Negative for abdominal pain.   Neurological: Negative for light-headedness.        Objective:      General    Nursing note and vitals reviewed.  Constitutional: He is oriented to person, place, and time. He appears well-developed and well-nourished. No distress.   HENT:   Head: Normocephalic and atraumatic.   Eyes: Pupils are equal, round, and reactive to light.   Cardiovascular: Normal rate and regular rhythm.    Pulmonary/Chest: Effort normal and breath sounds normal. No respiratory distress.   Neurological: He is alert and oriented to person, place, and time.   Psychiatric: He has a normal mood and affect. His behavior is normal.         Right Shoulder Exam     Inspection/Observation   Swelling: absent  Bruising: absent  Atrophy: absent    Tenderness   The patient is tender to palpation of the biceps tendon.    Crepitus   The patient has crepitus of the deltoid.    Range of Motion   Active abduction: 110   Passive abduction: 170   Forward Flexion: 110   External Rotation 0 degrees: 20   Internal rotation 0 degrees: L4     Tests & Signs   Drop arm: negative  Vo test: negative  Impingement: positive  Belly Press: negative  Active Compression Test (Adair's Sign): positive  Speed's Test: negative    Other   Sensation: normal    Comments:  Ginny test- positive     Left Shoulder Exam     Inspection/Observation   Swelling:  absent  Bruising: absent  Atrophy: absent    Tenderness   The patient is experiencing no tenderness.     Range of Motion   Active abduction:  170 normal   Forward Flexion:  170 normal   External Rotation 0 degrees:  50 normal   Internal rotation 0 degrees:  T12 normal     Tests & Signs   Drop arm: negative  Vo test: negative  Impingement: negative  Belly Press: negative  Active Compression test (Chester's Sign): negative  Speed's Test: negative    Other   Sensation: normal     Comments:  Ginny test- negative      Muscle Strength   Right Upper Extremity   Shoulder Abduction: 4/5   Shoulder Internal Rotation: 5/5   Shoulder External Rotation: 4/5   Biceps: 5/5   Left Upper Extremity  Shoulder Abduction: 5/5   Shoulder Internal Rotation: 5/5   Shoulder External Rotation: 5/5   Biceps: 5/5     Reflexes     Left Side  Biceps:  2+  Triceps:  2+  Brachioradialis:  2+  Left Damian's Sign:  Absent    Right Side   Biceps:  2+  Triceps:  2+  Brachioradialis:  2+  Right Damian's Sign:  absent    Vascular Exam     Right Pulses      Radial:                    2+      Left Pulses      Radial:                    2+          Imaging:  None today    Large Joint Aspiration/Injection: R glenohumeral    Date/Time: 7/7/2022 8:45 AM  Performed by: Ernesto Diego IV, MD  Authorized by: Ernesto Diego IV, MD     Consent Done?:  Yes (Verbal)  Indications:  Pain  Site marked: the procedure site was marked    Timeout: prior to procedure the correct patient, procedure, and site was verified    Prep: patient was prepped and draped in usual sterile fashion      Local anesthesia used?: Yes    Local anesthetic:  Lidocaine 1% without epinephrine    Details:  Needle Size:  22 G  Ultrasonic Guidance for needle placement?: No    Approach:  Lateral  Location:  Shoulder  Site:  R glenohumeral  Medications:  40 mg triamcinolone acetonide 40 mg/mL  Patient tolerance:  Patient tolerated the procedure well with no immediate complications           Assessment:       Nilesh Rolon Jr. is a 47 y.o. male seen in the office today. There were no encounter diagnoses.  Non-operative treatment is recommended at this time. The patient continues to be very high risk candidate for surgery due to his multiple medical issues.  The patient wants a shoulder surgery to help but the most indicated operation is a total shoulder arthroplasty.  He is very young to undergo that procedure.  Therefore another option is to have a CAM shoulder arthroscopic procedure, but this would likely provide limited pain relief and will unlikely improve his shoulder motion.  Today the patient requested an injection of the shoulder and that was provided.  The patient will think about his options and call us in 6 weeks if he wants to proceed with surgery; in that case I will order an MRI of the right shoulder to further assess the shoulder condition.  I welcomed the patient to get another opinion regarding his shoulder condition as well. The natural history and expected course discussed with patient. Various treatment options were discussed, including their risks and benefits. All of the patient's questions were answered.     Plan:      1. Follow up as needed if symptoms worsen.  2. Corticosteroid injection given today (right glenohumeral).   3. Will call in 6 weeks to order MRI          Ernesto Diego IV, MD   of Clinical Orthopedics  Department of Orthopedic Surgery  [facility]  Office: [phone]  Website: [email or web address]      Orders Placed This Encounter    Large Joint Aspiration/Injection

## 2022-07-27 ENCOUNTER — PATIENT MESSAGE (OUTPATIENT)
Dept: ORTHOPEDICS | Facility: CLINIC | Age: 47
End: 2022-07-27
Payer: MEDICAID

## 2022-07-27 ENCOUNTER — TELEPHONE (OUTPATIENT)
Dept: ORTHOPEDICS | Facility: CLINIC | Age: 47
End: 2022-07-27
Payer: MEDICAID

## 2022-07-27 DIAGNOSIS — M19.011 PRIMARY OSTEOARTHRITIS OF RIGHT SHOULDER: Primary | ICD-10-CM

## 2022-07-27 DIAGNOSIS — M25.511 RIGHT SHOULDER PAIN, UNSPECIFIED CHRONICITY: ICD-10-CM

## 2022-07-27 NOTE — TELEPHONE ENCOUNTER
Appts scheduled.   [facility] BP[facility] 80/50's, MAPs 63-70. Sinus bradycaria HR 40-50's. Pt obtunded, awakens to shaking, will answer some questions with simple answers. VALENZUELA x4 to command. Pt snoring between cares, oxymask applied. Dr. Webster at bedside. Placed on bipap, reviewed VBG. Started norepinephrine for MAP >65. CTOH completed. Pt's spouse Kindra updated by writer and Dr. Ashton via phone.

## 2022-07-27 NOTE — PROGRESS NOTES
The patient recently received clearance from his hepatologist to proceed with surgery.      We will therefore proceed with planning for surgery, as previously discussed.     We will need to order an MRI to assess condition of his shoulder rotator cuff then can proceed with scheduling.      Patient needs to present for another visit to discuss shoulder operation.     The primary encounter diagnosis was Primary osteoarthritis of right shoulder. A diagnosis of Right shoulder pain, unspecified chronicity was also pertinent to this visit.  Orders Placed This Encounter    MRI Shoulder Without Contrast Right     Ernesto Diego IV

## 2022-07-27 NOTE — TELEPHONE ENCOUNTER
----- Message from Ernesto Diego IV, MD sent at 7/27/2022  3:13 PM CDT -----  Ordered.     Linden schedule his mri and an office visit to discuss results and surgery.     ----- Message -----  From: Audrey Aragon MA  Sent: 7/27/2022   2:23 PM CDT  To: Ernesto Diego IV, MD    MRI orders request  ----- Message -----  From: Lolly Clemons  Sent: 7/27/2022   2:15 PM CDT  To: Johnathon Orozco Staff    Type:  Patient Returning Call    Who Called: pt     Does the patient know what this is regarding?:Mri Orders  Would the patient rather a call back or a response via [facility]? No preference   Best Call Back Number:[phone]  Additional Information: pt would like to schedule Mri but needs orders in the system

## 2022-08-15 ENCOUNTER — LAB VISIT (OUTPATIENT)
Dept: LAB | Facility: HOSPITAL | Age: 47
End: 2022-08-15
Attending: INTERNAL MEDICINE
Payer: MEDICAID

## 2022-08-15 DIAGNOSIS — K70.31 ALCOHOLIC CIRRHOSIS OF LIVER WITH ASCITES: ICD-10-CM

## 2022-08-15 LAB
AFP SERPL-MCNC: 4.4 NG/ML (ref 0–8.4)
ALBUMIN SERPL BCP-MCNC: 3.5 G/DL (ref 3.5–5.2)
ALP SERPL-CCNC: 73 U/L (ref 55–135)
ALT SERPL W/O P-5'-P-CCNC: 20 U/L (ref 10–44)
ANION GAP SERPL CALC-SCNC: 8 MMOL/L (ref 8–16)
AST SERPL-CCNC: 32 U/L (ref 10–40)
BASOPHILS # BLD AUTO: 0.05 K/UL (ref 0–0.2)
BASOPHILS NFR BLD: 1.1 % (ref 0–1.9)
BILIRUB SERPL-MCNC: 1.2 MG/DL (ref 0.1–1)
BUN SERPL-MCNC: 17 MG/DL (ref 6–20)
CALCIUM SERPL-MCNC: 9.3 MG/DL (ref 8.7–10.5)
CHLORIDE SERPL-SCNC: 107 MMOL/L (ref 95–110)
CO2 SERPL-SCNC: 24 MMOL/L (ref 23–29)
CREAT SERPL-MCNC: 1 MG/DL (ref 0.5–1.4)
DIFFERENTIAL METHOD: ABNORMAL
EOSINOPHIL # BLD AUTO: 0.2 K/UL (ref 0–0.5)
EOSINOPHIL NFR BLD: 4 % (ref 0–8)
ERYTHROCYTE [DISTWIDTH] IN BLOOD BY AUTOMATED COUNT: 13.9 % (ref 11.5–14.5)
EST. GFR  (NO RACE VARIABLE): >60 ML/MIN/1.73 M^2
GLUCOSE SERPL-MCNC: 70 MG/DL (ref 70–110)
HCT VFR BLD AUTO: 37.5 % (ref 40–54)
HGB BLD-MCNC: 13 G/DL (ref 14–18)
IMM GRANULOCYTES # BLD AUTO: 0.01 K/UL (ref 0–0.04)
IMM GRANULOCYTES NFR BLD AUTO: 0.2 % (ref 0–0.5)
INR PPP: 1.2 (ref 0.8–1.2)
LYMPHOCYTES # BLD AUTO: 1.2 K/UL (ref 1–4.8)
LYMPHOCYTES NFR BLD: 26.3 % (ref 18–48)
MCH RBC QN AUTO: 33.2 PG (ref 27–31)
MCHC RBC AUTO-ENTMCNC: 34.7 G/DL (ref 32–36)
MCV RBC AUTO: 96 FL (ref 82–98)
MONOCYTES # BLD AUTO: 0.5 K/UL (ref 0.3–1)
MONOCYTES NFR BLD: 11.9 % (ref 4–15)
NEUTROPHILS # BLD AUTO: 2.6 K/UL (ref 1.8–7.7)
NEUTROPHILS NFR BLD: 56.5 % (ref 38–73)
NRBC BLD-RTO: 0 /100 WBC
PLATELET # BLD AUTO: 70 K/UL (ref 150–450)
PMV BLD AUTO: 10.6 FL (ref 9.2–12.9)
POTASSIUM SERPL-SCNC: 4.1 MMOL/L (ref 3.5–5.1)
PROT SERPL-MCNC: 7 G/DL (ref 6–8.4)
PROTHROMBIN TIME: 12.3 SEC (ref 9–12.5)
RBC # BLD AUTO: 3.92 M/UL (ref 4.6–6.2)
SODIUM SERPL-SCNC: 139 MMOL/L (ref 136–145)
WBC # BLD AUTO: 4.53 K/UL (ref 3.9–12.7)

## 2022-08-15 PROCEDURE — 85025 COMPLETE CBC W/AUTO DIFF WBC: CPT | Performed by: INTERNAL MEDICINE

## 2022-08-15 PROCEDURE — 82105 ALPHA-FETOPROTEIN SERUM: CPT | Performed by: INTERNAL MEDICINE

## 2022-08-15 PROCEDURE — 80053 COMPREHEN METABOLIC PANEL: CPT | Performed by: INTERNAL MEDICINE

## 2022-08-15 PROCEDURE — 36415 COLL VENOUS BLD VENIPUNCTURE: CPT | Performed by: INTERNAL MEDICINE

## 2022-08-15 PROCEDURE — 85610 PROTHROMBIN TIME: CPT | Performed by: INTERNAL MEDICINE

## 2022-09-01 ENCOUNTER — HOSPITAL ENCOUNTER (OUTPATIENT)
Dept: RADIOLOGY | Facility: HOSPITAL | Age: 47
Discharge: HOME | End: 2022-09-01
Attending: ORTHOPAEDIC SURGERY
Payer: MEDICAID

## 2022-09-01 DIAGNOSIS — M25.511 RIGHT SHOULDER PAIN, UNSPECIFIED CHRONICITY: ICD-10-CM

## 2022-09-01 PROCEDURE — 73221 MRI JOINT UPR EXTREM W/O DYE: CPT | Mod: 26,RT,, | Performed by: RADIOLOGY

## 2022-09-01 PROCEDURE — 73221 MRI SHOULDER WITHOUT CONTRAST RIGHT: ICD-10-PCS | Mod: 26,RT,, | Performed by: RADIOLOGY

## 2022-09-01 PROCEDURE — 73221 MRI JOINT UPR EXTREM W/O DYE: CPT | Mod: TC,RT

## 2022-09-07 ENCOUNTER — OFFICE VISIT (OUTPATIENT)
Dept: ORTHOPEDICS | Facility: CLINIC | Age: 47
End: 2022-09-07
Payer: MEDICAID

## 2022-09-07 ENCOUNTER — TELEPHONE (OUTPATIENT)
Dept: ORTHOPEDICS | Facility: CLINIC | Age: 47
End: 2022-09-07
Payer: MEDICAID

## 2022-09-07 VITALS
DIASTOLIC BLOOD PRESSURE: 79 MMHG | HEART RATE: 64 BPM | HEIGHT: 75 IN | SYSTOLIC BLOOD PRESSURE: 125 MMHG | WEIGHT: 238 LBS | BODY MASS INDEX: 29.59 KG/M2

## 2022-09-07 DIAGNOSIS — Z01.818 PRE-OP TESTING: ICD-10-CM

## 2022-09-07 DIAGNOSIS — Z01.818 PREOP EXAMINATION: ICD-10-CM

## 2022-09-07 DIAGNOSIS — M75.101 TEAR OF RIGHT SUPRASPINATUS TENDON: Primary | ICD-10-CM

## 2022-09-07 DIAGNOSIS — M19.011 PRIMARY OSTEOARTHRITIS OF RIGHT SHOULDER: ICD-10-CM

## 2022-09-07 DIAGNOSIS — M75.21 BICEPS TENDINITIS OF RIGHT UPPER EXTREMITY: ICD-10-CM

## 2022-09-07 PROCEDURE — 3074F SYST BP LT 130 MM HG: CPT | Mod: CPTII,,, | Performed by: ORTHOPAEDIC SURGERY

## 2022-09-07 PROCEDURE — 99214 OFFICE O/P EST MOD 30 MIN: CPT | Mod: S$PBB,,, | Performed by: ORTHOPAEDIC SURGERY

## 2022-09-07 PROCEDURE — 99215 OFFICE O/P EST HI 40 MIN: CPT | Mod: PBBFAC,PN | Performed by: ORTHOPAEDIC SURGERY

## 2022-09-07 PROCEDURE — 3078F PR MOST RECENT DIASTOLIC BLOOD PRESSURE < 80 MM HG: ICD-10-PCS | Mod: CPTII,,, | Performed by: ORTHOPAEDIC SURGERY

## 2022-09-07 PROCEDURE — 99999 PR PBB SHADOW E&M-EST. PATIENT-LVL V: ICD-10-PCS | Mod: PBBFAC,,, | Performed by: ORTHOPAEDIC SURGERY

## 2022-09-07 PROCEDURE — 3078F DIAST BP <80 MM HG: CPT | Mod: CPTII,,, | Performed by: ORTHOPAEDIC SURGERY

## 2022-09-07 PROCEDURE — 1159F MED LIST DOCD IN RCRD: CPT | Mod: CPTII,,, | Performed by: ORTHOPAEDIC SURGERY

## 2022-09-07 PROCEDURE — 3008F BODY MASS INDEX DOCD: CPT | Mod: CPTII,,, | Performed by: ORTHOPAEDIC SURGERY

## 2022-09-07 PROCEDURE — 1159F PR MEDICATION LIST DOCUMENTED IN MEDICAL RECORD: ICD-10-PCS | Mod: CPTII,,, | Performed by: ORTHOPAEDIC SURGERY

## 2022-09-07 PROCEDURE — 99999 PR PBB SHADOW E&M-EST. PATIENT-LVL V: CPT | Mod: PBBFAC,,, | Performed by: ORTHOPAEDIC SURGERY

## 2022-09-07 PROCEDURE — 3074F PR MOST RECENT SYSTOLIC BLOOD PRESSURE < 130 MM HG: ICD-10-PCS | Mod: CPTII,,, | Performed by: ORTHOPAEDIC SURGERY

## 2022-09-07 PROCEDURE — 99214 PR OFFICE/OUTPT VISIT, EST, LEVL IV, 30-39 MIN: ICD-10-PCS | Mod: S$PBB,,, | Performed by: ORTHOPAEDIC SURGERY

## 2022-09-07 PROCEDURE — 3008F PR BODY MASS INDEX (BMI) DOCUMENTED: ICD-10-PCS | Mod: CPTII,,, | Performed by: ORTHOPAEDIC SURGERY

## 2022-09-07 RX ORDER — PIMECROLIMUS 10 MG/G
CREAM TOPICAL 2 TIMES DAILY
COMMUNITY
Start: 2022-07-11

## 2022-09-07 RX ORDER — CLOTRIMAZOLE AND BETAMETHASONE DIPROPIONATE 10; .64 MG/G; MG/G
CREAM TOPICAL
COMMUNITY
Start: 2022-07-11

## 2022-09-07 NOTE — PROGRESS NOTES
[facility], Orthopedics and Sports Medicine  [facility]    Established Patient Shoulder Office Visit  09/07/2022     Diagnosis:  Right glenohumeral osteoarthritis   Right high grade partial supraspinatus tendon tear  Right Biceps tendonitis  Right subacromial impingement syndrome    Procedure:   (2/24/2021) right GH CSI  (5/12/2022) right GH CSI  (7/7/2022) right GH CSI  Subjective:      Nilesh Carrjose FrySimba is a 47 y.o. male who returns for follow up treatment of the right shoulder problem.  He has had several injections into the right shoulder and has minimal relief of pain and related symptoms.  We previously discussed proceeding with surgical treatment if he was able to be cleared by hepatology.  He was cleared to proceed.      Today the patient continues with activity related pain.  He has pain with lifting his arm overhead.  Pain located lateral and anterior shoulder.  He also has stiffness but is able to fully lift his arm over head.  His main complaint however is pain overhead.  He denies any neurologic complaints in the right arm.     Outside reports reviewed: historical medical records and office notes.    Past Medical History:   Diagnosis Date    DONG (acute kidney injury) 12/1/2021    Anxiety     Arthritis     Cirrhosis     Hypertension     Liver disease     Osteoarthritis     Other meniscus derangements, other medial meniscus, left knee 1/7/2019       Patient Active Problem List   Diagnosis    Ascites    Thrombocytopenia    History of alcohol abuse    Gastrointestinal hemorrhage    Hx of colonic polyps    History of GI bleed    Alcoholic cirrhosis of liver with ascites    Primary osteoarthritis of left knee    Primary osteoarthritis of right shoulder    Pain of left upper extremity    Debility    Stiffness in joint    Class 1 obesity due to excess calories with serious comorbidity and body mass index (BMI) of 33.0 to 33.9 in adult    Elevated INR    Hemorrhoids    Candidiasis  of esophagus with fungal esophagitis     Anemia    Tear of right supraspinatus tendon    Biceps tendinitis of right upper extremity       Past Surgical History:   Procedure Laterality Date    ARTHROSCOPY OF KNEE Left 1/7/2019    Procedure: ARTHROSCOPY, KNEE;  Surgeon: Derick Kirby MD;  Location: [facility];  Service: Orthopedics;  Laterality: Left;    COLONOSCOPY N/A 7/27/2020    Procedure: COLONOSCOPY;  Surgeon: Sotero Zapata MD;  Location: [facility];  Service: Endoscopy;  Laterality: N/A;    COLONOSCOPY N/A 1/20/2022    Procedure: COLONOSCOPY Suprep Vaccinated;  Surgeon: Jacob Andrea MD;  Location: [facility];  Service: Endoscopy;  Laterality: N/A;    ESOPHAGOGASTRODUODENOSCOPY N/A 4/8/2019    Procedure: EGD (ESOPHAGOGASTRODUODENOSCOPY);  Surgeon: Bonita Kendall MD;  Location: [facility];  Service: Endoscopy;  Laterality: N/A;    ESOPHAGOGASTRODUODENOSCOPY N/A 7/27/2020    Procedure: EGD (ESOPHAGOGASTRODUODENOSCOPY);  Surgeon: Sotero Zapata MD;  Location: [facility];  Service: Endoscopy;  Laterality: N/A;    ESOPHAGOGASTRODUODENOSCOPY N/A 12/2/2021    Procedure: EGD (ESOPHAGOGASTRODUODENOSCOPY);  Surgeon: Montez Guerra MD;  Location: [address]);  Service: Endoscopy;  Laterality: N/A;    ESOPHAGOGASTRODUODENOSCOPY N/A 1/20/2022    Procedure: EGD (ESOPHAGOGASTRODUODENOSCOPY);  Surgeon: Jacob Andrea MD;  Location: [facility];  Service: Endoscopy;  Laterality: N/A;  please order CBC with plts and INR day of case.    KNEE SURGERY          Current Outpatient Medications   Medication Instructions    cholecalciferol (vitamin D3) 50,000 Units, Oral, Every 7 days    clotrimazole-betamethasone 1-0.05% (LOTRISONE) cream SMARTSIG:Sparingly Topical Daily    doxycycline (VIBRA-TABS) 100 mg, Oral, Daily    ELIDEL 1 % cream Topical (Top), 2 times daily    FeroSuL 325 mg, Oral, 2 times daily    folic acid (FOLVITE) 1 MG tablet TAKE 1 TABLET(1 MG) BY MOUTH EVERY DAY    ketoconazole (NIZORAL) 2 %  cream 1 application, Topical (Top), Daily    lactulose (CHRONULAC) 10 gram/15 mL solution Take 30 mLs (20 g total) by mouth 2 (two) times daily. Goal of 3-4 bowel movements daily    multivitamin Tab 1 tablet, Oral, Daily    pantoprazole (PROTONIX) 40 mg, Oral, Daily    thiamine 100 mg, Oral, Daily        Review of patient's allergies indicates:  No Known Allergies    Social History     Socioeconomic History    Marital status: Single   Tobacco Use    Smoking status: Never    Smokeless tobacco: Never    Tobacco comments:     smokes Marijuana only   Substance and Sexual Activity    Alcohol use: Not Currently     Comment: last drink 11/28, drinks 3-4 a day    Drug use: No    Sexual activity: Yes     Partners: Female       Family History   Problem Relation Age of Onset    Birth defects Neg Hx          Review of Systems   Constitutional: Negative for chills and fever.   HENT:  Negative for hearing loss.    Eyes:  Negative for blurred vision.   Cardiovascular:  Negative for chest pain.   Respiratory:  Negative for shortness of breath.    Gastrointestinal:  Negative for abdominal pain.   Neurological:  Negative for light-headedness.      Objective:      General    Nursing note and vitals reviewed.  Constitutional: He is oriented to person, place, and time. He appears well-developed and well-nourished. No distress.   HENT:   Head: Normocephalic and atraumatic.   Eyes: Pupils are equal, round, and reactive to light.   Cardiovascular:  Normal rate and regular rhythm.            Pulmonary/Chest: Effort normal and breath sounds normal. No respiratory distress.   Neurological: He is alert and oriented to person, place, and time.   Psychiatric: He has a normal mood and affect. His behavior is normal.         Right Shoulder Exam     Inspection/Observation   Swelling: absent  Bruising: absent  Atrophy: absent    Tenderness   The patient is tender to palpation of the biceps tendon.    Crepitus   The patient has crepitus of the  deltoid.    Range of Motion   Active abduction:  150   Passive abduction:  170   Forward Flexion:  160   External Rotation 0 degrees:  20   Internal rotation 0 degrees:  L4     Tests & Signs   Drop arm: negative  Vo test: positive  Impingement: positive  Belly Press: negative  Active Compression Test (Rowan's Sign): positive  Speed's Test: negative    Other   Sensation: normal    Comments:  Ginny test- positive     Left Shoulder Exam     Inspection/Observation   Swelling: absent  Bruising: absent  Atrophy: absent    Tenderness   The patient is experiencing no tenderness.     Range of Motion   Active abduction:  170 normal   Forward Flexion:  170 normal   External Rotation 0 degrees:  50 normal   Internal rotation 0 degrees:  T12 normal     Tests & Signs   Drop arm: negative  Vo test: negative  Impingement: negative  Belly Press: negative  Active Compression test (Rowan's Sign): negative  Speed's Test: negative    Other   Sensation: normal     Comments:  Ginny test- negative      Muscle Strength   Right Upper Extremity   Shoulder Abduction: 4/5   Shoulder Internal Rotation: 5/5   Shoulder External Rotation: 4/5   Biceps: 5/5   Left Upper Extremity  Shoulder Abduction: 5/5   Shoulder Internal Rotation: 5/5   Shoulder External Rotation: 5/5   Biceps: 5/5     Reflexes     Left Side  Biceps:  2+  Triceps:  2+  Brachioradialis:  2+  Left Damian's Sign:  Absent    Right Side   Biceps:  2+  Triceps:  2+  Brachioradialis:  2+  Right Damian's Sign:  absent    Vascular Exam     Right Pulses      Radial:                    2+      Left Pulses      Radial:                    2+      Imaging:  MRI of the right shoulder taken taken  9/1/2022  was personally reviewed from the [facility].  Multiple T1 and T2 sequences including axial, coronal, and sagittal views were reviewed.  No acute fractures or dislocations are noted in these images.  There is a high grade partial tear of the supraspinatus tendon noted.   There is fluid around the biceps tendon and degenerative changes around biceps anchor; loose body in biceps groove.  There are subchondral cysts noted medial to the rotator cuff insertion.  There are degenerative changes at glenohumeral cartilage with prominent humeral head osteophyte and inferior glenoid osteophyte.  Degenerative changes of glenoid labrum noted. Arthrosis of AC joint noted.         Procedures      Assessment:       Nilesh Rolon Jr. is a 47 y.o. male seen in the office today. The primary encounter diagnosis was Tear of right supraspinatus tendon. Diagnoses of Biceps tendinitis of right upper extremity, Primary osteoarthritis of right shoulder, and Preop examination were also pertinent to this visit.  Operative treatment with shoulder surgery  is recommended at this time. We discussed treatment options in the form of surgical management given he has failed nonsurgical treatment.  Surgical treatment would involve a rotator cuff tendon repair with biceps tenodesis and likely acromioplasty or subacromial decompression.  I will evaluate the AC joint preoperatively to decide if he needs AC resection.  I will perform debridement of humeral head osteophyte during the operation.  We also discussed the possibility of shoulder arthroplasty but given his age and desire to remain highly active, shoulder arthroplasty is contraindicated for this young patient at this time. The natural history and expected course discussed with patient. Various treatment options were discussed, including their risks and benefits. All of the patient's questions were answered.     Plan:      Surgical treatment right shoulder arthroscopic rotator cuff repair, biceps tenodesis, SAD/extensive debridement, comprehensive arthroscopic management.  Surgical consent for surgery obtained during office visit.  Expected date of surgery: 10/4/2022.  Patient will require preoperative medical evaluation prior to surgery.  Post operative  physical therapy ordered.         Ernesto Diego IV, MD   of Clinical Orthopedics  Department of Orthopedic Surgery  [facility]  Office: [phone]  Website: [email or web address]      Orders Placed This Encounter    X-Ray Chest 1 View Pre-OP    Ambulatory referral/consult to Physical/Occupational Therapy    EKG 12-lead    Case Request Operating Room: ARTHROSCOPY, SHOULDER; rotator cuff repair, subacromial decompression, possible mini-open biceps tenodesis with CAM procedure

## 2022-09-23 ENCOUNTER — TELEPHONE (OUTPATIENT)
Dept: ORTHOPEDICS | Facility: CLINIC | Age: 47
End: 2022-09-23
Payer: MEDICAID

## 2022-09-23 DIAGNOSIS — G89.18 POST-OP PAIN: Primary | ICD-10-CM

## 2022-09-26 ENCOUNTER — HOSPITAL ENCOUNTER (OUTPATIENT)
Dept: RADIOLOGY | Facility: HOSPITAL | Age: 47
Discharge: HOME | End: 2022-09-26
Attending: ORTHOPAEDIC SURGERY
Payer: MEDICAID

## 2022-09-26 ENCOUNTER — OFFICE VISIT (OUTPATIENT)
Dept: FAMILY MEDICINE | Facility: HOSPITAL | Age: 47
End: 2022-09-26
Payer: MEDICAID

## 2022-09-26 ENCOUNTER — CLINICAL SUPPORT (OUTPATIENT)
Dept: LAB | Facility: HOSPITAL | Age: 47
End: 2022-09-26
Attending: ORTHOPAEDIC SURGERY
Payer: MEDICAID

## 2022-09-26 VITALS
HEART RATE: 60 BPM | WEIGHT: 241.38 LBS | HEIGHT: 75 IN | SYSTOLIC BLOOD PRESSURE: 95 MMHG | DIASTOLIC BLOOD PRESSURE: 65 MMHG | BODY MASS INDEX: 30.01 KG/M2

## 2022-09-26 DIAGNOSIS — Z01.818 PREOP EXAMINATION: ICD-10-CM

## 2022-09-26 DIAGNOSIS — Z01.818 PRE-OP EVALUATION: Primary | ICD-10-CM

## 2022-09-26 PROCEDURE — 71045 XR CHEST 1 VIEW PRE-OP: ICD-10-PCS | Mod: 26,,, | Performed by: RADIOLOGY

## 2022-09-26 PROCEDURE — 71045 X-RAY EXAM CHEST 1 VIEW: CPT | Mod: 26,,, | Performed by: RADIOLOGY

## 2022-09-26 PROCEDURE — 99213 OFFICE O/P EST LOW 20 MIN: CPT | Mod: 25

## 2022-09-26 PROCEDURE — 71045 X-RAY EXAM CHEST 1 VIEW: CPT | Mod: TC,FY

## 2022-09-26 NOTE — PROGRESS NOTES
Progress Note  [facility]      Subjective:     Nilesh Rolon Jr. is a 47 y.o. year old male with PMHx of HTN, OA, and liver cirrhosis  who presented to clinic today for a pre-op evaluation. Patient is scheduled to undergo Rt shoulder arthroscopy  with Dr. Diego on 10/4/22.     For patient's HTN, he is not taking medications for his HTN. Patient reports good pressures at home. Patient notes pressures in the 120s-130s systolic and 70s-80s dialstolic.    For patient history cirrhosis is not any chronic medications and just takes nutrional supplements/vitamins. Patient notes 1 year without alcohol, tobacco, or drugs. Patient was cleared by his hepatologist for this procedure.      Review of Systems   Constitutional:  Negative for chills, fever, malaise/fatigue and weight loss.   HENT:  Negative for ear discharge and ear pain.    Eyes:  Negative for blurred vision, photophobia and redness.   Respiratory:  Negative for cough, hemoptysis, sputum production and shortness of breath.    Cardiovascular:  Negative for chest pain, palpitations and leg swelling.   Gastrointestinal:  Negative for abdominal pain, heartburn, nausea and vomiting.   Genitourinary:  Negative for dysuria, flank pain and urgency.   Musculoskeletal:  Negative for back pain and myalgias.   Neurological:  Negative for dizziness, sensory change, speech change, focal weakness and headaches.     Objective:     Vitals:    09/26/22 1003   BP: 95/65   Pulse: 60       Wt Readings from Last 1 Encounters:   11/17/22 115.5 kg (254 lb 10.1 oz)       Physical Exam  Constitutional:       Appearance: He is well-developed.   Eyes:      General: No scleral icterus.  Cardiovascular:      Rate and Rhythm: Normal rate and regular rhythm.      Heart sounds: Normal heart sounds.   Pulmonary:      Effort: Pulmonary effort is normal. No respiratory distress.      Breath sounds: Normal breath sounds. No wheezing.   Abdominal:      General: Bowel sounds are  normal. There is no distension.      Palpations: Abdomen is soft. There is no shifting dullness, fluid wave or mass.      Tenderness: There is no abdominal tenderness. There is no rebound.   Musculoskeletal:         General: Normal range of motion.   Lymphadenopathy:      Cervical: No cervical adenopathy.   Skin:     General: Skin is warm and dry.   Neurological:      Mental Status: He is alert and oriented to person, place, and time.       Assessment/Plan:     Pre-operative evaluation with risk assessment              -           Scheduled for Rt arthroscopy on 10/4/22 by Dr. Diego  -           DASI score: 50.7 w/ Functional Capacity in METS: 8.9 (>4) with a revised cardiac risk index of 3.9%.    - he  is not on antiplatelets/anticoagulation.   - he does not have a history of blood dyscrasias or previous reaction to anesthesia   - he  is a NEVER SMOKER.  - he does have a prior h/o HTN. BP: 95/65.   - he does not have a prior h/o HLD/CAD w/ either MI or CVA. See below for latest ASCVD if all necessary values are available. Continue medical management.  - he does not have a prior h/o DM.  - he does not have a prior h/o thyroid disease. See below for last TSH.   - he does not have a prior h/o COPD/Asthma/MIKE/OHS. does not use CPAP. does not have changes from baseline function.   - he does not have a prior h/o of CHF  - BMI: Body mass index is 30.17 kg/m².               -           The patient is medically optimized with a low to moderate risk to proceed with (per ACC/AHA kamran-operative guidelines) a moderate risk surgery.   - Please call our office for any additional questions or concerns.    The ASCVD Risk score (Jacquelyn AGUIRRE, et al., 2019) failed to calculate for the following reasons:    Cannot find a previous HDL lab    Cannot find a previous total cholesterol lab  Lab Results   Component Value Date    HGBA1C 4.0 12/07/2021      Lab Results   Component Value Date    TSH 2.679 07/23/2020              Case discussed  with staff: Dr. Willam Gregg MD PGY-2  [facility]       This note was partially created using Prescient Voice Recognition software. Typographical and content errors may occur with this process. While efforts are made to detect and correct such errors, in some cases errors will persist. For this reason, wording in this document should be considered in the proper context and not strictly verbatim.

## 2022-09-29 ENCOUNTER — ANESTHESIA EVENT (OUTPATIENT)
Dept: SURGERY | Facility: HOSPITAL | Age: 47
End: 2022-09-29
Payer: MEDICAID

## 2022-09-29 ENCOUNTER — HOSPITAL ENCOUNTER (OUTPATIENT)
Dept: PREADMISSION TESTING | Facility: HOSPITAL | Age: 47
Discharge: HOME | End: 2022-09-29
Attending: ORTHOPAEDIC SURGERY
Payer: MEDICAID

## 2022-09-29 ENCOUNTER — HOSPITAL ENCOUNTER (OUTPATIENT)
Dept: RADIOLOGY | Facility: HOSPITAL | Age: 47
Discharge: HOME | End: 2022-09-29
Attending: PHYSICIAN ASSISTANT
Payer: MEDICAID

## 2022-09-29 VITALS
SYSTOLIC BLOOD PRESSURE: 122 MMHG | HEIGHT: 75 IN | BODY MASS INDEX: 30.29 KG/M2 | WEIGHT: 243.63 LBS | RESPIRATION RATE: 16 BRPM | HEART RATE: 60 BPM | DIASTOLIC BLOOD PRESSURE: 74 MMHG

## 2022-09-29 DIAGNOSIS — G89.18 POST-OP PAIN: ICD-10-CM

## 2022-09-29 PROCEDURE — 73030 XR SHOULDER COMPLETE 2 OR MORE VIEWS RIGHT: ICD-10-PCS | Mod: 26,RT,, | Performed by: RADIOLOGY

## 2022-09-29 PROCEDURE — 73030 X-RAY EXAM OF SHOULDER: CPT | Mod: 26,RT,, | Performed by: RADIOLOGY

## 2022-09-29 PROCEDURE — 73030 X-RAY EXAM OF SHOULDER: CPT | Mod: TC,FY,RT

## 2022-09-29 RX ORDER — SODIUM CHLORIDE, SODIUM LACTATE, POTASSIUM CHLORIDE, CALCIUM CHLORIDE 600; 310; 30; 20 MG/100ML; MG/100ML; MG/100ML; MG/100ML
INJECTION, SOLUTION INTRAVENOUS CONTINUOUS
Status: CANCELLED | OUTPATIENT
Start: 2022-09-29

## 2022-09-29 RX ORDER — LIDOCAINE HYDROCHLORIDE 10 MG/ML
1 INJECTION, SOLUTION EPIDURAL; INFILTRATION; INTRACAUDAL; PERINEURAL ONCE
Status: CANCELLED | OUTPATIENT
Start: 2022-09-29 | End: 2022-09-29

## 2022-09-29 NOTE — DISCHARGE INSTRUCTIONS
Your surgery is scheduled for 10/4/22.    Please report to [facility]obby on the 1st Floor at 0530 a.m.    THIS TIME IS SUBJECT TO CHANGE.  YOU WILL RECEIVE A PHONE CALL THE DAY BEFORE SURGERY BY 3:30 PM TO CONFIRM YOUR TIME OF ARRIVAL.  IF YOU HAVE NOT RECEIVED A PHONE CALL BY 3:30 PM THE DAY BEFORE YOUR SURGERY PLEASE CALL [phone]     INSTRUCTIONS IMPORTANT!!!  ¨ Do not eat or drink after 12 midnight-including water, candy, gum, & mints. OK to brush teeth.      ____  Proceed to [facility] on *** for additional testing.        ____  Do not wear makeup, including mascara.  ____  No powder, lotions or creams to surgical area.  ____  Please remove all jewelry, including piercings and leave at home.  ____  No money or valuables needed. Please leave at home.  ____  Please bring any documents given by your doctor.  ____  If going home the same day, arrange for a ride home. You will not be able to             drive if Anesthesia was used.  ____  Children under 18 years require a parent / guardian present the entire time             they are in surgery / recovery.  ____  Wear loose fitting clothing. Allow for dressings, bandages.  ____  Stop Aspirin, Ibuprofen, Motrin, Aleve, Goody's/BC powders, Excedrine and Naproxen (NSAIDS) at least 3-5 days before surgery, unless otherwise instructed by your doctor, or the nurse.   You MAY use Tylenol/acetaminophen until day of surgery.  ____  Wash the surgical area with Hibiclens the night before surgery, and again the             morning of surgery.  Be sure to rinse hibiclens off completely (if instructed by   nurse).  ____  If you take diabetic medication, do not take am of surgery unless instructed by Doctor.  ____  Call MD for temperature above 101 degrees or any other signs of infection such as Urinary (bladder) infection, Upper respiratory infection, skin boils, etc.  ____ Stop taking any Fish Oil supplement or any Vitamins that contain Vitamin E at  least 5 days prior to surgery.  ____ Do Not wear your contact lenses the day of your procedure.  You may wear your glasses.      ____Do not shave surgical site for 3 days prior to surgery.  ____ Practice Good hand washing before, during, and after procedure.      I have read or had read and explained to me, and understand the above information.  Additional comments or instructions:  For additional questions call [phone]      ANESTHESIA SIDE EFFECTS  -For the first 24 hours after surgery:  Do not drive, use heavy equipment, make important decisions, or drink alcohol  -It is normal to feel sleepy for several hours.  Rest until you are more awake.  -Have someone stay with you, if needed.  They can watch for problems and help keep you safe.  -Some possible post anesthesia side effects include: nausea and vomiting, sore throat and hoarseness, sleepiness, and dizziness.        Pre-Op Bathing Instructions    Before surgery, you can play an important role in your own health.    Because skin is not sterile, we need to be sure that your skin is as free of germs as possible. By following the instructions below, you can reduce the number of germs on your skin before surgery.    IMPORTANT: You will need to shower with a special soap called Hibiclens*, available at any pharmacy.  If you are allergic to Chlorhexidine (the antiseptic in Hibiclens), use an antibacterial soap such as Dial Soap for your preoperative shower.  You will shower with Hibiclens both the night before your surgery and the morning of your surgery.  Do not use Hibiclens on the head, face or genitals to avoid injury to those areas.    STEP #1: THE NIGHT BEFORE YOUR SURGERY     Do not shave the area of your body where your surgery will be performed.  Shower and wash your hair and body as usual with your normal soap and shampoo.  Rinse your hair and body thoroughly after you shower to remove all soap residue.  With your hand, apply one packet of Hibiclens soap to  the surgical site.   Wash the site gently for five (5) minutes. Do not scrub your skin too hard.   Do not wash with your regular soap after Hibiclens is used.  Rinse your body thoroughly.  Pat yourself dry with a clean, soft towel.  Do not use lotion, cream, or powder.  Wear clean clothes.    STEP #2: THE MORNING OF YOUR SURGERY     Repeat Step #1.    * Not to be used by people allergic to Chlorhexidine.

## 2022-09-29 NOTE — PRE-PROCEDURE INSTRUCTIONS
Kira Tolbert [phone]  Allergies, medical, surgical, family and psychosocial histories reviewed with patient. Periop plan of care reviewed. Preop instructions given, including medications to take and to hold. Hibiclens soap and instructions on use given. Time allotted for questions to be addressed.  Patient verbalized understanding.

## 2022-09-29 NOTE — ANESTHESIA PREPROCEDURE EVALUATION
"                                                                                                             09/29/2022  Nilesh Rolon Jr. is a 47 y.o., male scheduled for ARTHROSCOPY, SHOULDER; rotator cuff repair, subacromial decompression, possible mini-open biceps tenodesis with CAM procedure (Right: Shoulder) 10/4/22.    Per family medicine Dr. Gregg, "The patient is medically optimized with a low to moderate risk to proceed with (per ACC/AHA kamran-operative guidelines) a moderate risk surgery".    Past Medical History:   Diagnosis Date    DONG (acute kidney injury) 12/1/2021    Anxiety     Arthritis     Cirrhosis     Hypertension     Liver disease     Osteoarthritis     Other meniscus derangements, other medial meniscus, left knee 1/7/2019     Past Surgical History:   Procedure Laterality Date    ARTHROSCOPY OF KNEE Left 1/7/2019    Procedure: ARTHROSCOPY, KNEE;  Surgeon: Derick Kirby MD;  Location: [facility];  Service: Orthopedics;  Laterality: Left;    COLONOSCOPY N/A 7/27/2020    Procedure: COLONOSCOPY;  Surgeon: Sotero Zapata MD;  Location: [facility];  Service: Endoscopy;  Laterality: N/A;    COLONOSCOPY N/A 1/20/2022    Procedure: COLONOSCOPY Suprep Vaccinated;  Surgeon: Jacob Andrea MD;  Location: [facility];  Service: Endoscopy;  Laterality: N/A;    ESOPHAGOGASTRODUODENOSCOPY N/A 4/8/2019    Procedure: EGD (ESOPHAGOGASTRODUODENOSCOPY);  Surgeon: Bonita Kendall MD;  Location: [facility];  Service: Endoscopy;  Laterality: N/A;    ESOPHAGOGASTRODUODENOSCOPY N/A 7/27/2020    Procedure: EGD (ESOPHAGOGASTRODUODENOSCOPY);  Surgeon: Sotero Zapata MD;  Location: [facility];  Service: Endoscopy;  Laterality: N/A;    ESOPHAGOGASTRODUODENOSCOPY N/A 12/2/2021    Procedure: EGD (ESOPHAGOGASTRODUODENOSCOPY);  Surgeon: Montez Guerra MD;  Location: [facility] ([address] [place] [address]);  Service: Endoscopy;  Laterality: N/A;    ESOPHAGOGASTRODUODENOSCOPY N/A 1/20/2022    Procedure: EGD " (ESOPHAGOGASTRODUODENOSCOPY);  Surgeon: Jacob Andrea MD;  Location: [facility];  Service: Endoscopy;  Laterality: N/A;  please order CBC with plts and INR day of case.    KNEE SURGERY         Pre-op Assessment    I have reviewed the Patient Summary Reports.     I have reviewed the Nursing Notes.    I have reviewed the Medications.     Review of Systems  Anesthesia Hx:  No problems with previous Anesthesia   Denies Personal Hx of Anesthesia complications.   Social:  Non-Smoker, No Alcohol Use    Hematology/Oncology:         -- Anemia:   Cardiovascular:   Exercise tolerance: good Hypertension, well controlled  Denies Angina.    Pulmonary:  Pulmonary Normal  Denies Shortness of breath.    Renal/:   Chronic Renal Disease    Hepatic/GI:   Denies GERD. Liver Disease,    Musculoskeletal:   Arthritis     Neurological:  Neurology Normal    Endocrine:  Obesity / BMI > 30  Psych:  Psychiatric Normal           Physical Exam  General: Well nourished and Cooperative    Airway:  Mallampati: II   Mouth Opening: Normal  TM Distance: Normal  Tongue: Normal  Neck ROM: Normal ROM    Dental:  Intact    Chest/Lungs:  Clear to auscultation, Normal Respiratory Rate    Heart:  Rate: Normal  Rhythm: Regular Rhythm  Sounds: Normal      Lab Results   Component Value Date    WBC 4.53 08/15/2022    HGB 13.0 (L) 08/15/2022    HCT 37.5 (L) 08/15/2022    PLT 70 (L) 08/15/2022    CHOL 242 (H) 04/06/2016    TRIG 278 (H) 04/06/2016    HDL 40 04/06/2016    ALT 19 09/26/2022    AST 26 09/26/2022     09/26/2022    K 3.9 09/26/2022     09/26/2022    CREATININE 1.1 09/26/2022    BUN 16 09/26/2022    CO2 22 (L) 09/26/2022    TSH 2.679 07/23/2020    PSA 0.54 04/06/2016    INR 1.2 09/26/2022    HGBA1C 4.0 12/07/2021     Results for orders placed or performed in visit on 09/26/22   EKG 12-lead    Collection Time: 09/26/22  9:19 AM    Narrative    Test Reason : Z01.818,    Vent. Rate : 057 BPM     Atrial Rate : 057 BPM     P-R Int :  170 ms          QRS Dur : 092 ms      QT Int : 422 ms       P-R-T Axes : 048 027 036 degrees     QTc Int : 410 ms    Sinus bradycardia with occasional Premature ventricular complexes  Normal ECG  When compared with ECG of 08-JAN-2022 10:12,  Premature ventricular complexes are now Present  Vent. rate has decreased BY  36 BPM  Questionable change in QRS duration  Criteria for Septal infarct are no longer Present  Confirmed by Hasmukh Hargrove MD ([id number]) on 9/26/2022 9:41:44 PM    Referred By: NAY HODGES IV           Confirmed By:Hasmukh Hargrove MD     CXR 9/26/22  The trachea is unremarkable.  The cardiomediastinal silhouette is within normal limits.  The hilar structures are unremarkable.  There is no evidence of free air beneath the hemidiaphragms.  There are no pleural effusions.  There is no evidence of a pneumothorax. There is no evidence of pneumomediastinum.  No airspace opacity is present.  There are degenerative changes in the osseous structures.     Impression:     No acute process.      Anesthesia Plan  Type of Anesthesia, risks & benefits discussed:    Anesthesia Type: Regional, Gen ETT  Intra-op Monitoring Plan: Standard ASA Monitors  Post Op Pain Control Plan: multimodal analgesia  Induction:  IV  ASA Score: 2  Day of Surgery Review of History & Physical: H&P Update referred to the surgeon/provider.I have interviewed and examined the patient. I have reviewed the patient's H&P dated: There are no significant changes. H&P completed by Anesthesiologist.  Anesthesia Plan Notes: Anesthesia consent signed 10/4/22      Ready For Surgery From Anesthesia Perspective.     .

## 2022-09-30 ENCOUNTER — LAB VISIT (OUTPATIENT)
Dept: FAMILY MEDICINE | Facility: CLINIC | Age: 47
End: 2022-09-30
Payer: MEDICAID

## 2022-09-30 DIAGNOSIS — Z01.818 PRE-OP TESTING: ICD-10-CM

## 2022-09-30 LAB — SARS-COV-2 RNA RESP QL NAA+PROBE: NOT DETECTED

## 2022-09-30 PROCEDURE — U0003 INFECTIOUS AGENT DETECTION BY NUCLEIC ACID (DNA OR RNA); SEVERE ACUTE RESPIRATORY SYNDROME CORONAVIRUS 2 (SARS-COV-2) (CORONAVIRUS DISEASE [COVID-19]), AMPLIFIED PROBE TECHNIQUE, MAKING USE OF HIGH THROUGHPUT TECHNOLOGIES AS DESCRIBED BY CMS-2020-01-R: HCPCS | Performed by: ORTHOPAEDIC SURGERY

## 2022-09-30 PROCEDURE — U0005 INFEC AGEN DETEC AMPLI PROBE: HCPCS | Performed by: ORTHOPAEDIC SURGERY

## 2022-10-04 ENCOUNTER — ANESTHESIA (OUTPATIENT)
Dept: SURGERY | Facility: HOSPITAL | Age: 47
End: 2022-10-04
Payer: MEDICAID

## 2022-10-04 ENCOUNTER — HOSPITAL ENCOUNTER (OUTPATIENT)
Facility: HOSPITAL | Age: 47
Discharge: HOME | End: 2022-10-04
Attending: ORTHOPAEDIC SURGERY | Admitting: ORTHOPAEDIC SURGERY
Payer: MEDICAID

## 2022-10-04 VITALS
HEART RATE: 72 BPM | RESPIRATION RATE: 18 BRPM | DIASTOLIC BLOOD PRESSURE: 73 MMHG | SYSTOLIC BLOOD PRESSURE: 138 MMHG | TEMPERATURE: 98 F | WEIGHT: 235 LBS | HEIGHT: 75 IN | BODY MASS INDEX: 29.22 KG/M2 | OXYGEN SATURATION: 100 %

## 2022-10-04 DIAGNOSIS — M75.21 BICEPS TENDINITIS OF RIGHT UPPER EXTREMITY: ICD-10-CM

## 2022-10-04 DIAGNOSIS — M75.101 RIGHT ROTATOR CUFF TEAR: ICD-10-CM

## 2022-10-04 DIAGNOSIS — M19.011 PRIMARY OSTEOARTHRITIS OF RIGHT SHOULDER: ICD-10-CM

## 2022-10-04 DIAGNOSIS — M75.101 TEAR OF RIGHT SUPRASPINATUS TENDON: Primary | ICD-10-CM

## 2022-10-04 PROCEDURE — 37000008 HC ANESTHESIA 1ST 15 MINUTES: Performed by: ORTHOPAEDIC SURGERY

## 2022-10-04 PROCEDURE — 71000016 HC POSTOP RECOV ADDL HR: Performed by: ORTHOPAEDIC SURGERY

## 2022-10-04 PROCEDURE — 23430 PR REPAIR BICEPS LONG TENDON: ICD-10-PCS | Mod: 51,RT,, | Performed by: ORTHOPAEDIC SURGERY

## 2022-10-04 PROCEDURE — C1889 IMPLANT/INSERT DEVICE, NOC: HCPCS | Performed by: ORTHOPAEDIC SURGERY

## 2022-10-04 PROCEDURE — 01630 ANES OPN/ARTHR PX SHO JT NOS: CPT | Performed by: ORTHOPAEDIC SURGERY

## 2022-10-04 PROCEDURE — 25000003 PHARM REV CODE 250: Performed by: ORTHOPAEDIC SURGERY

## 2022-10-04 PROCEDURE — 29823 PR SHLDR ARTHROSCOP,EXTEN DEBRIDE: ICD-10-PCS | Mod: 51,RT,, | Performed by: ORTHOPAEDIC SURGERY

## 2022-10-04 PROCEDURE — 63600175 PHARM REV CODE 636 W HCPCS: Performed by: NURSE PRACTITIONER

## 2022-10-04 PROCEDURE — 29827 PR SHLDR ARTHROSCOP,SURG,W/ROTAT CUFF REPR: ICD-10-PCS | Mod: RT,,, | Performed by: ORTHOPAEDIC SURGERY

## 2022-10-04 PROCEDURE — 76942 ECHO GUIDE FOR BIOPSY: CPT | Mod: 59 | Performed by: STUDENT IN AN ORGANIZED HEALTH CARE EDUCATION/TRAINING PROGRAM

## 2022-10-04 PROCEDURE — 29827 SHO ARTHRS SRG RT8TR CUF RPR: CPT | Mod: RT,,, | Performed by: ORTHOPAEDIC SURGERY

## 2022-10-04 PROCEDURE — 25000003 PHARM REV CODE 250: Performed by: STUDENT IN AN ORGANIZED HEALTH CARE EDUCATION/TRAINING PROGRAM

## 2022-10-04 PROCEDURE — 63600175 PHARM REV CODE 636 W HCPCS: Performed by: STUDENT IN AN ORGANIZED HEALTH CARE EDUCATION/TRAINING PROGRAM

## 2022-10-04 PROCEDURE — C1713 ANCHOR/SCREW BN/BN,TIS/BN: HCPCS | Performed by: ORTHOPAEDIC SURGERY

## 2022-10-04 PROCEDURE — 23430 REPAIR BICEPS TENDON: CPT | Mod: 51,RT,, | Performed by: ORTHOPAEDIC SURGERY

## 2022-10-04 PROCEDURE — 36000710: Performed by: ORTHOPAEDIC SURGERY

## 2022-10-04 PROCEDURE — 71000033 HC RECOVERY, INTIAL HOUR: Performed by: ORTHOPAEDIC SURGERY

## 2022-10-04 PROCEDURE — 37000009 HC ANESTHESIA EA ADD 15 MINS: Performed by: ORTHOPAEDIC SURGERY

## 2022-10-04 PROCEDURE — 71000015 HC POSTOP RECOV 1ST HR: Performed by: ORTHOPAEDIC SURGERY

## 2022-10-04 PROCEDURE — 63600175 PHARM REV CODE 636 W HCPCS: Performed by: ORTHOPAEDIC SURGERY

## 2022-10-04 PROCEDURE — 36000711: Performed by: ORTHOPAEDIC SURGERY

## 2022-10-04 PROCEDURE — 27201423 OPTIME MED/SURG SUP & DEVICES STERILE SUPPLY: Performed by: ORTHOPAEDIC SURGERY

## 2022-10-04 PROCEDURE — C9290 INJ, BUPIVACAINE LIPOSOME: HCPCS | Performed by: STUDENT IN AN ORGANIZED HEALTH CARE EDUCATION/TRAINING PROGRAM

## 2022-10-04 PROCEDURE — 29823 SHO ARTHRS SRG XTNSV DBRDMT: CPT | Mod: 51,RT,, | Performed by: ORTHOPAEDIC SURGERY

## 2022-10-04 DEVICE — ANCHOR SWIVELOCK C TIGERTAPE: Type: IMPLANTABLE DEVICE | Site: SHOULDER | Status: FUNCTIONAL

## 2022-10-04 DEVICE — ANCHOR SUT BIO COMP 4.75X24: Type: IMPLANTABLE DEVICE | Site: SHOULDER | Status: FUNCTIONAL

## 2022-10-04 DEVICE — ANCHOR SUT 6.25X19.1MM: Type: IMPLANTABLE DEVICE | Site: SHOULDER | Status: FUNCTIONAL

## 2022-10-04 DEVICE — ANCHOR SWIVELOCK C BLU FIBERTP: Type: IMPLANTABLE DEVICE | Site: SHOULDER | Status: FUNCTIONAL

## 2022-10-04 DEVICE — SYS IMPL PROXIMAL TENODESIS: Type: IMPLANTABLE DEVICE | Site: SHOULDER | Status: FUNCTIONAL

## 2022-10-04 RX ORDER — BUPIVACAINE HYDROCHLORIDE 2.5 MG/ML
INJECTION, SOLUTION EPIDURAL; INFILTRATION; INTRACAUDAL
Status: DISCONTINUED | OUTPATIENT
Start: 2022-10-04 | End: 2022-10-04

## 2022-10-04 RX ORDER — HYDROMORPHONE HYDROCHLORIDE 2 MG/ML
0.2 INJECTION, SOLUTION INTRAMUSCULAR; INTRAVENOUS; SUBCUTANEOUS EVERY 5 MIN PRN
Status: DISCONTINUED | OUTPATIENT
Start: 2022-10-04 | End: 2022-10-04 | Stop reason: HOSPADM

## 2022-10-04 RX ORDER — PREGABALIN 75 MG/1
300 CAPSULE ORAL ONCE
Status: COMPLETED | OUTPATIENT
Start: 2022-10-04 | End: 2022-10-04

## 2022-10-04 RX ORDER — NEOSTIGMINE METHYLSULFATE 1 MG/ML
INJECTION, SOLUTION INTRAVENOUS
Status: DISCONTINUED | OUTPATIENT
Start: 2022-10-04 | End: 2022-10-04

## 2022-10-04 RX ORDER — EPINEPHRINE 1 MG/ML
INJECTION, SOLUTION INTRACARDIAC; INTRAMUSCULAR; INTRAVENOUS; SUBCUTANEOUS
Status: DISCONTINUED | OUTPATIENT
Start: 2022-10-04 | End: 2022-10-04 | Stop reason: HOSPADM

## 2022-10-04 RX ORDER — CEFAZOLIN SODIUM 2 G/50ML
2 SOLUTION INTRAVENOUS ONCE
Status: DISCONTINUED | OUTPATIENT
Start: 2022-10-04 | End: 2022-10-04 | Stop reason: HOSPADM

## 2022-10-04 RX ORDER — PROPOFOL 10 MG/ML
VIAL (ML) INTRAVENOUS
Status: DISCONTINUED | OUTPATIENT
Start: 2022-10-04 | End: 2022-10-04

## 2022-10-04 RX ORDER — SUCCINYLCHOLINE CHLORIDE 20 MG/ML
INJECTION INTRAMUSCULAR; INTRAVENOUS
Status: DISCONTINUED | OUTPATIENT
Start: 2022-10-04 | End: 2022-10-04

## 2022-10-04 RX ORDER — SODIUM CHLORIDE, SODIUM LACTATE, POTASSIUM CHLORIDE, CALCIUM CHLORIDE 600; 310; 30; 20 MG/100ML; MG/100ML; MG/100ML; MG/100ML
INJECTION, SOLUTION INTRAVENOUS CONTINUOUS
Status: DISCONTINUED | OUTPATIENT
Start: 2022-10-04 | End: 2022-10-04 | Stop reason: HOSPADM

## 2022-10-04 RX ORDER — LIDOCAINE HYDROCHLORIDE 20 MG/ML
INJECTION, SOLUTION EPIDURAL; INFILTRATION; INTRACAUDAL; PERINEURAL
Status: DISCONTINUED | OUTPATIENT
Start: 2022-10-04 | End: 2022-10-04

## 2022-10-04 RX ORDER — FENTANYL CITRATE 50 UG/ML
INJECTION, SOLUTION INTRAMUSCULAR; INTRAVENOUS
Status: DISCONTINUED | OUTPATIENT
Start: 2022-10-04 | End: 2022-10-04

## 2022-10-04 RX ORDER — MIDAZOLAM HYDROCHLORIDE 1 MG/ML
INJECTION, SOLUTION INTRAMUSCULAR; INTRAVENOUS
Status: DISCONTINUED | OUTPATIENT
Start: 2022-10-04 | End: 2022-10-04

## 2022-10-04 RX ORDER — ROCURONIUM BROMIDE 10 MG/ML
INJECTION, SOLUTION INTRAVENOUS
Status: DISCONTINUED | OUTPATIENT
Start: 2022-10-04 | End: 2022-10-04

## 2022-10-04 RX ORDER — LIDOCAINE HYDROCHLORIDE AND EPINEPHRINE 10; 10 MG/ML; UG/ML
INJECTION, SOLUTION INFILTRATION; PERINEURAL
Status: DISCONTINUED | OUTPATIENT
Start: 2022-10-04 | End: 2022-10-04 | Stop reason: HOSPADM

## 2022-10-04 RX ORDER — ACETAMINOPHEN 500 MG
1000 TABLET ORAL ONCE
Status: COMPLETED | OUTPATIENT
Start: 2022-10-04 | End: 2022-10-04

## 2022-10-04 RX ORDER — CELECOXIB 100 MG/1
200 CAPSULE ORAL ONCE
Status: COMPLETED | OUTPATIENT
Start: 2022-10-04 | End: 2022-10-04

## 2022-10-04 RX ORDER — DEXAMETHASONE SODIUM PHOSPHATE 4 MG/ML
INJECTION, SOLUTION INTRA-ARTICULAR; INTRALESIONAL; INTRAMUSCULAR; INTRAVENOUS; SOFT TISSUE
Status: DISCONTINUED | OUTPATIENT
Start: 2022-10-04 | End: 2022-10-04

## 2022-10-04 RX ORDER — EPHEDRINE SULFATE 50 MG/ML
INJECTION, SOLUTION INTRAVENOUS
Status: DISCONTINUED | OUTPATIENT
Start: 2022-10-04 | End: 2022-10-04

## 2022-10-04 RX ORDER — LIDOCAINE HYDROCHLORIDE 10 MG/ML
1 INJECTION, SOLUTION EPIDURAL; INFILTRATION; INTRACAUDAL; PERINEURAL ONCE
Status: DISCONTINUED | OUTPATIENT
Start: 2022-10-04 | End: 2022-10-04 | Stop reason: HOSPADM

## 2022-10-04 RX ORDER — SODIUM CHLORIDE 0.9 % (FLUSH) 0.9 %
10 SYRINGE (ML) INJECTION
Status: DISCONTINUED | OUTPATIENT
Start: 2022-10-04 | End: 2022-10-04 | Stop reason: HOSPADM

## 2022-10-04 RX ORDER — ONDANSETRON 2 MG/ML
4 INJECTION INTRAMUSCULAR; INTRAVENOUS DAILY PRN
Status: DISCONTINUED | OUTPATIENT
Start: 2022-10-04 | End: 2022-10-04 | Stop reason: HOSPADM

## 2022-10-04 RX ORDER — CEFAZOLIN SODIUM 1 G/3ML
INJECTION, POWDER, FOR SOLUTION INTRAMUSCULAR; INTRAVENOUS
Status: DISCONTINUED | OUTPATIENT
Start: 2022-10-04 | End: 2022-10-04

## 2022-10-04 RX ORDER — OXYCODONE HYDROCHLORIDE 5 MG/1
5 TABLET ORAL EVERY 8 HOURS PRN
Qty: 28 TABLET | Refills: 0 | Status: SHIPPED | OUTPATIENT
Start: 2022-10-04 | End: 2022-10-20

## 2022-10-04 RX ORDER — GABAPENTIN 300 MG/1
300 CAPSULE ORAL NIGHTLY
Qty: 14 CAPSULE | Refills: 0 | Status: SHIPPED | OUTPATIENT
Start: 2022-10-04 | End: 2022-10-17 | Stop reason: SDUPTHER

## 2022-10-04 RX ORDER — ONDANSETRON 2 MG/ML
INJECTION INTRAMUSCULAR; INTRAVENOUS
Status: DISCONTINUED | OUTPATIENT
Start: 2022-10-04 | End: 2022-10-04

## 2022-10-04 RX ADMIN — FENTANYL CITRATE 100 MCG: 50 INJECTION, SOLUTION INTRAMUSCULAR; INTRAVENOUS at 07:10

## 2022-10-04 RX ADMIN — PHENYLEPHRINE HYDROCHLORIDE 10 MCG/MIN: 10 INJECTION INTRAVENOUS at 09:10

## 2022-10-04 RX ADMIN — GLYCOPYRROLATE 1 MG: 0.2 INJECTION, SOLUTION INTRAMUSCULAR; INTRAVITREAL at 11:10

## 2022-10-04 RX ADMIN — MIDAZOLAM 5 MG: 1 INJECTION INTRAMUSCULAR; INTRAVENOUS at 06:10

## 2022-10-04 RX ADMIN — DEXAMETHASONE SODIUM PHOSPHATE 8 MG: 4 INJECTION, SOLUTION INTRA-ARTICULAR; INTRALESIONAL; INTRAMUSCULAR; INTRAVENOUS; SOFT TISSUE at 07:10

## 2022-10-04 RX ADMIN — ACETAMINOPHEN 1000 MG: 500 TABLET ORAL at 06:10

## 2022-10-04 RX ADMIN — CEFAZOLIN 2 G: 330 INJECTION, POWDER, FOR SOLUTION INTRAMUSCULAR; INTRAVENOUS at 07:10

## 2022-10-04 RX ADMIN — EPHEDRINE SULFATE 5 MG: 50 INJECTION, SOLUTION INTRAMUSCULAR; INTRAVENOUS; SUBCUTANEOUS at 07:10

## 2022-10-04 RX ADMIN — SODIUM CHLORIDE, SODIUM LACTATE, POTASSIUM CHLORIDE, AND CALCIUM CHLORIDE: .6; .31; .03; .02 INJECTION, SOLUTION INTRAVENOUS at 07:10

## 2022-10-04 RX ADMIN — CELECOXIB 200 MG: 100 CAPSULE ORAL at 06:10

## 2022-10-04 RX ADMIN — PROPOFOL 200 MG: 10 INJECTION, EMULSION INTRAVENOUS at 07:10

## 2022-10-04 RX ADMIN — LIDOCAINE HYDROCHLORIDE 100 MG: 20 INJECTION, SOLUTION EPIDURAL; INFILTRATION; INTRACAUDAL; PERINEURAL at 07:10

## 2022-10-04 RX ADMIN — ROCURONIUM BROMIDE 10 MG: 10 INJECTION, SOLUTION INTRAVENOUS at 09:10

## 2022-10-04 RX ADMIN — BUPIVACAINE HYDROCHLORIDE 10 ML: 2.5 INJECTION, SOLUTION EPIDURAL; INFILTRATION; INTRACAUDAL; PERINEURAL at 06:10

## 2022-10-04 RX ADMIN — EPHEDRINE SULFATE 5 MG: 50 INJECTION, SOLUTION INTRAMUSCULAR; INTRAVENOUS; SUBCUTANEOUS at 10:10

## 2022-10-04 RX ADMIN — BUPIVACAINE 10 ML: 13.3 INJECTION, SUSPENSION, LIPOSOMAL INFILTRATION at 06:10

## 2022-10-04 RX ADMIN — EPHEDRINE SULFATE 5 MG: 50 INJECTION, SOLUTION INTRAMUSCULAR; INTRAVENOUS; SUBCUTANEOUS at 11:10

## 2022-10-04 RX ADMIN — NEOSTIGMINE METHYLSULFATE 5 MG: 1 INJECTION INTRAVENOUS at 11:10

## 2022-10-04 RX ADMIN — ONDANSETRON 4 MG: 2 INJECTION, SOLUTION INTRAMUSCULAR; INTRAVENOUS at 11:10

## 2022-10-04 RX ADMIN — SUCCINYLCHOLINE CHLORIDE 120 MG: 20 INJECTION, SOLUTION INTRAMUSCULAR; INTRAVENOUS at 07:10

## 2022-10-04 RX ADMIN — EPHEDRINE SULFATE 5 MG: 50 INJECTION, SOLUTION INTRAMUSCULAR; INTRAVENOUS; SUBCUTANEOUS at 08:10

## 2022-10-04 RX ADMIN — PREGABALIN 300 MG: 75 CAPSULE ORAL at 06:10

## 2022-10-04 RX ADMIN — SODIUM CHLORIDE, SODIUM LACTATE, POTASSIUM CHLORIDE, AND CALCIUM CHLORIDE: .6; .31; .03; .02 INJECTION, SOLUTION INTRAVENOUS at 08:10

## 2022-10-04 NOTE — ANESTHESIA PROCEDURE NOTES
Intubation    Date/Time: 10/4/2022 7:41 AM  Performed by: Fiona Gutiérrez MD  Authorized by: Elton Steen MD     Intubation:     Induction:  Inhalational - mask    Intubated:  Postinduction    Mask Ventilation:  Easy with oral airway    Attempts:  2    Attempted By:  Staff anesthesiologist and resident anesthesiologist    Method of Intubation:  Direct    Blade:  Pricila 4    Laryngeal View Grade: Grade IIA - cords partially seen      Attempted By (2nd Attempt):  Staff anesthesiologist    Method of Intubation (2nd Attempt):  Direct    Blade (2nd Attempt):  Pricila 4    Laryngeal View Grade (2nd Attempt): Grade I - full view of cords      Difficult Airway Encountered?: No      Complications:  None    Airway Device:  Oral endotracheal tube    Airway Device Size:  7.5    Style/Cuff Inflation:  Cuffed    Tube secured:  22    Secured at:  The lips    Placement Verified By:  Capnometry and Colorimetric ETCO2 device

## 2022-10-04 NOTE — TRANSFER OF CARE
"Anesthesia Transfer of Care Note    Patient: Nilesh Rolon Jr.    Procedure(s) Performed: Procedure(s) (LRB):  ARTHROSCOPY, SHOULDER; rotator cuff repair, mini-open biceps tenodesis with CAM procedure, (Right)  EXTENSIVE DEBRIDEMENT (Right)  HUMORAL HEAD OSTEOPLASTY    Patient location: PACU    Anesthesia Type: regional and general    Transport from OR: Transported from OR on 6-10 L/min O2 by face mask with adequate spontaneous ventilation    Post pain: adequate analgesia    Post assessment: no apparent anesthetic complications    Post vital signs: stable    Level of consciousness: awake and alert    Nausea/Vomiting: no nausea/vomiting    Complications: none    Transfer of care protocol was followedComments: Report given to BIB Isidro      Last vitals:   Visit Vitals  /70   Pulse (!) 59   Temp 36.7 °C (98.1 °F) (Skin)   Resp 17   Ht 6' 3" (1.905 m)   Wt 106.6 kg (235 lb)   SpO2 95%   BMI 29.37 kg/m²     "

## 2022-10-04 NOTE — ANESTHESIA PROCEDURE NOTES
Peripheral Block    Patient location during procedure: pre-op   Block not for primary anesthetic.  Reason for block: at surgeon's request and post-op pain management   Post-op Pain Location: R Shoulder   Start time: 10/4/2022 6:50 AM  Timeout: 10/4/2022 6:50 AM   End time: 10/4/2022 6:55 AM    Staffing  Authorizing Provider: Elton Steen MD  Performing Provider: Rene Alejandro MD    Preanesthetic Checklist  Completed: patient identified, IV checked, site marked, risks and benefits discussed, surgical consent, monitors and equipment checked, pre-op evaluation and timeout performed  Peripheral Block  Patient position: sitting  Prep: ChloraPrep  Patient monitoring: heart rate, cardiac monitor, continuous pulse ox, continuous capnometry and frequent blood pressure checks  Block type: interscalene  Laterality: right  Injection technique: single shot  Needle  Needle type: Stimuplex   Needle gauge: 22 G  Needle length: 2 in  Needle localization: anatomical landmarks and ultrasound guidance   -ultrasound image captured on disc.  Assessment  Injection assessment: negative aspiration, negative parasthesia and local visualized surrounding nerve  Paresthesia pain: none  Heart rate change: no  Slow fractionated injection: yes  Pain Tolerance: comfortable throughout block and no complaints      Additional Notes  VSS.  DOS[facility] RN monitoring vitals throughout procedure.  Patient tolerated procedure well.

## 2022-10-04 NOTE — H&P
Pt seen today, no changes in documented h and p below, plan to proceed with surgery as previously discussed.  Rene Barcenas      [facility], Orthopedics and Sports Medicine  [facility]     Established Patient Shoulder Office Visit  09/07/2022      Diagnosis:  Right glenohumeral osteoarthritis   Right high grade partial supraspinatus tendon tear  Right Biceps tendonitis  Right subacromial impingement syndrome     Procedure:   (2/24/2021) right GH CSI  (5/12/2022) right GH CSI  (7/7/2022) right GH CSI  Subjective:   Nilesh Rolon Jr. is a 47 y.o. male who returns for follow up treatment of the right shoulder problem.  He has had several injections into the right shoulder and has minimal relief of pain and related symptoms.  We previously discussed proceeding with surgical treatment if he was able to be cleared by hepatology.  He was cleared to proceed.      Today the patient continues with activity related pain.  He has pain with lifting his arm overhead.  Pain located lateral and anterior shoulder.  He also has stiffness but is able to fully lift his arm over head.  His main complaint however is pain overhead.  He denies any neurologic complaints in the right arm.      Outside reports reviewed: historical medical records and office notes.          Past Medical History:   Diagnosis Date    DONG (acute kidney injury) 12/1/2021    Anxiety      Arthritis      Cirrhosis      Hypertension      Liver disease      Osteoarthritis      Other meniscus derangements, other medial meniscus, left knee 1/7/2019             Patient Active Problem List   Diagnosis    Ascites    Thrombocytopenia    History of alcohol abuse    Gastrointestinal hemorrhage    Hx of colonic polyps    History of GI bleed    Alcoholic cirrhosis of liver with ascites    Primary osteoarthritis of left knee    Primary osteoarthritis of right shoulder    Pain of left upper extremity    Debility    Stiffness in joint     Class 1 obesity due to excess calories with serious comorbidity and body mass index (BMI) of 33.0 to 33.9 in adult    Elevated INR    Hemorrhoids    Candidiasis of esophagus with fungal esophagitis     Anemia    Tear of right supraspinatus tendon    Biceps tendinitis of right upper extremity               Past Surgical History:   Procedure Laterality Date    ARTHROSCOPY OF KNEE Left 1/7/2019     Procedure: ARTHROSCOPY, KNEE;  Surgeon: Derick Kirby MD;  Location: [facility] OR;  Service: Orthopedics;  Laterality: Left;    COLONOSCOPY N/A 7/27/2020     Procedure: COLONOSCOPY;  Surgeon: Sotero Zapata MD;  Location: [facility];  Service: Endoscopy;  Laterality: N/A;    COLONOSCOPY N/A 1/20/2022     Procedure: COLONOSCOPY Suprep Vaccinated;  Surgeon: Jacob Andrea MD;  Location: [facility];  Service: Endoscopy;  Laterality: N/A;    ESOPHAGOGASTRODUODENOSCOPY N/A 4/8/2019     Procedure: EGD (ESOPHAGOGASTRODUODENOSCOPY);  Surgeon: Bonita Kendall MD;  Location: [facility];  Service: Endoscopy;  Laterality: N/A;    ESOPHAGOGASTRODUODENOSCOPY N/A 7/27/2020     Procedure: EGD (ESOPHAGOGASTRODUODENOSCOPY);  Surgeon: Sotero Zapata MD;  Location: [facility];  Service: Endoscopy;  Laterality: N/A;    ESOPHAGOGASTRODUODENOSCOPY N/A 12/2/2021     Procedure: EGD (ESOPHAGOGASTRODUODENOSCOPY);  Surgeon: Montez Guerra MD;  Location: [facility] ([address] [place] [address]);  Service: Endoscopy;  Laterality: N/A;    ESOPHAGOGASTRODUODENOSCOPY N/A 1/20/2022     Procedure: EGD (ESOPHAGOGASTRODUODENOSCOPY);  Surgeon: Jacob Andrea MD;  Location: [facility];  Service: Endoscopy;  Laterality: N/A;  please order CBC with plts and INR day of case.    KNEE SURGERY                  Current Outpatient Medications   Medication Instructions    cholecalciferol (vitamin D3) 50,000 Units, Oral, Every 7 days    clotrimazole-betamethasone 1-0.05% (LOTRISONE) cream SMARTSIG:Sparingly Topical Daily    doxycycline (VIBRA-TABS) 100 mg, Oral, Daily    ELIDEL 1 %  cream Topical (Top), 2 times daily    FeroSuL 325 mg, Oral, 2 times daily    folic acid (FOLVITE) 1 MG tablet TAKE 1 TABLET(1 MG) BY MOUTH EVERY DAY    ketoconazole (NIZORAL) 2 % cream 1 application, Topical (Top), Daily    lactulose (CHRONULAC) 10 gram/15 mL solution Take 30 mLs (20 g total) by mouth 2 (two) times daily. Goal of 3-4 bowel movements daily    multivitamin Tab 1 tablet, Oral, Daily    pantoprazole (PROTONIX) 40 mg, Oral, Daily    thiamine 100 mg, Oral, Daily         Review of patient's allergies indicates:  No Known Allergies     Social History               Socioeconomic History    Marital status: Single   Tobacco Use    Smoking status: Never    Smokeless tobacco: Never    Tobacco comments:       smokes Marijuana only   Substance and Sexual Activity    Alcohol use: Not Currently       Comment: last drink 11/28, drinks 3-4 a day    Drug use: No    Sexual activity: Yes       Partners: Female                  Family History   Problem Relation Age of Onset    Birth defects Neg Hx            Review of Systems   Constitutional: Negative for chills and fever.   HENT:  Negative for hearing loss.    Eyes:  Negative for blurred vision.   Cardiovascular:  Negative for chest pain.   Respiratory:  Negative for shortness of breath.    Gastrointestinal:  Negative for abdominal pain.   Neurological:  Negative for light-headedness.   Objective:   General     Nursing note and vitals reviewed.  Constitutional: He is oriented to person, place, and time. He appears well-developed and well-nourished. No distress.   HENT:   Head: Normocephalic and atraumatic.   Eyes: Pupils are equal, round, and reactive to light.   Cardiovascular:  Normal rate and regular rhythm.            Pulmonary/Chest: Effort normal and breath sounds normal. No respiratory distress.   Neurological: He is alert and oriented to person, place, and time.   Psychiatric: He has a normal mood and affect. His behavior is normal.            Right Shoulder  Exam      Inspection/Observation   Swelling: absent  Bruising: absent  Atrophy: absent     Tenderness   The patient is tender to palpation of the biceps tendon.     Crepitus   The patient has crepitus of the deltoid.     Range of Motion   Active abduction:  150   Passive abduction:  170   Forward Flexion:  160   External Rotation 0 degrees:  20   Internal rotation 0 degrees:  L4      Tests & Signs   Drop arm: negative  Vo test: positive  Impingement: positive  Belly Press: negative  Active Compression Test ([place]'s Sign): positive  Speed's Test: negative     Other   Sensation: normal     Comments:  Ginny test- positive      Left Shoulder Exam      Inspection/Observation   Swelling: absent  Bruising: absent  Atrophy: absent     Tenderness   The patient is experiencing no tenderness.      Range of Motion   Active abduction:  170 normal   Forward Flexion:  170 normal   External Rotation 0 degrees:  50 normal   Internal rotation 0 degrees:  T12 normal      Tests & Signs   Drop arm: negative  Vo test: negative  Impingement: negative  Belly Press: negative  Active Compression test ([place]'s Sign): negative  Speed's Test: negative     Other   Sensation: normal      Comments:  Ginny test- negative        Muscle Strength   Right Upper Extremity   Shoulder Abduction: 4/5   Shoulder Internal Rotation: 5/5   Shoulder External Rotation: 4/5   Biceps: 5/5   Left Upper Extremity  Shoulder Abduction: 5/5   Shoulder Internal Rotation: 5/5   Shoulder External Rotation: 5/5   Biceps: 5/5      Reflexes      Left Side  Biceps:  2+  Triceps:  2+  Brachioradialis:  2+  Left Damian's Sign:  Absent     Right Side   Biceps:  2+  Triceps:  2+  Brachioradialis:  2+  Right Damian's Sign:  absent     Vascular Exam      Right Pulses        Radial:                    2+        Left Pulses        Radial:                    2+        Imaging:  MRI of the right shoulder taken taken  9/1/2022  was personally reviewed from the [facility].  Multiple T1 and T2 sequences including axial, coronal, and sagittal views were reviewed.  No acute fractures or dislocations are noted in these images.  There is a high grade partial tear of the supraspinatus tendon noted.  There is fluid around the biceps tendon and degenerative changes around biceps anchor; loose body in biceps groove.  There are subchondral cysts noted medial to the rotator cuff insertion.  There are degenerative changes at glenohumeral cartilage with prominent humeral head osteophyte and inferior glenoid osteophyte.  Degenerative changes of glenoid labrum noted. Arthrosis of AC joint noted.           Procedures      Assessment:       Nilesh Rolon Jr. is a 47 y.o. male seen in the office today. The primary encounter diagnosis was Tear of right supraspinatus tendon. Diagnoses of Biceps tendinitis of right upper extremity, Primary osteoarthritis of right shoulder, and Preop examination were also pertinent to this visit.  Operative treatment with shoulder surgery  is recommended at this time. We discussed treatment options in the form of surgical management given he has failed nonsurgical treatment.  Surgical treatment would involve a rotator cuff tendon repair with biceps tenodesis and likely acromioplasty or subacromial decompression.  I will evaluate the AC joint preoperatively to decide if he needs AC resection.  I will perform debridement of humeral head osteophyte during the operation.  We also discussed the possibility of shoulder arthroplasty but given his age and desire to remain highly active, shoulder arthroplasty is contraindicated for this young patient at this time. The natural history and expected course discussed with patient. Various treatment options were discussed, including their risks and benefits. All of the patient's questions were answered.  Plan:   Surgical treatment right shoulder arthroscopic rotator cuff repair, biceps tenodesis, SAD/extensive debridement,  comprehensive arthroscopic management.  Surgical consent for surgery obtained during office visit.  Expected date of surgery: 10/4/2022.  Patient will require preoperative medical evaluation prior to surgery.  Post operative physical therapy ordered.        Ernesto Diego IV, MD   of Clinical Orthopedics  Department of Orthopedic Surgery  [facility]  Office: [phone]  Website: [email or web address]            Orders Placed This Encounter    X-Ray Chest 1 View Pre-OP    Ambulatory referral/consult to Physical/Occupational Therapy    EKG 12-lead    Case Request Operating Room: ARTHROSCOPY, SHOULDER; rotator cuff repair, subacromial decompression, possible mini-open biceps tenodesis with CAM procedure

## 2022-10-04 NOTE — DISCHARGE INSTRUCTIONS
ANESTHESIA  -For the first 24 hours after surgery:  Do not drive, use heavy equipment, make important decisions, or drink alcohol  -It is normal to feel sleepy for several hours.  Rest until you are more awake.  -Have someone stay with you, if needed.  They can watch for problems and help keep you safe.  -Some possible post anesthesia side effects include: nausea and vomiting, sore throat and hoarseness, sleepiness, and dizziness.    PAIN  -If you have pain after surgery, pain medicine will help you feel better.  Take it as directed, before pain becomes severe.  Most pain relievers taken by mouth need at least 20-30 minutes to start working.  -Do not drive or drink alcohol while taking pain medicine.  -Pain medication can upset your stomach.  Taking them with a little food may help.  -Other ways to help control pain: elevation, ice, and relaxation  -Call your surgeon if still having unmanageable pain an hour after taking pain medicine.  -Pain medicine can cause constipation.  Taking an over-the counter stool softener while on prescription pain medicine and drinking plenty of fluids can prevent this side effect.  -Call your surgeon if you have severe side effects like: breathing problems, trouble waking up, dizziness, confusion, or severe constipation.    NAUSEA  -Some people have nausea after surgery.  This is often because of anesthesia, pain, pain medicine, or the stress of surgery.  -Do not push yourself to eat.  Start off with clear liquids and soup.  Slowly move to solid foods.  Don't eat fatty, rich, spicy foods at first.  Eat smaller amounts.  -If you develop persistent nausea and vomiting please notify your surgeon immediately.    BLEEDING  -Different types of surgery require different types of care and dressing changes.  It is important to follow all instructions and advice from your surgeon.  Change dressing as directed.  Call your surgeon for any concerns regarding postop bleeding.    SIGNS OF  INFECTION  -Signs of infection include: fever, swelling, drainage, and redness  -Notify your surgeon if you have a fever of 100.4 F (38.0 C) or higher.  -Notify your surgeon if you notice redness, swelling, increased pain, pus, or a foul smell at the incision site.    Notify Dr. Diego for any questions or concerns.

## 2022-10-04 NOTE — BRIEF OP NOTE
[facility] - Surgery (Hospital)  Brief Operative Note    Surgery Date: 10/4/2022     Surgeon(s) and Role:     * Ernesto Diego IV, MD - Primary    Assisting Surgeon: None    Pre-op Diagnosis:  Tear of right supraspinatus tendon [M75.101]  Biceps tendinitis of right upper extremity [M75.21]  Primary osteoarthritis of right shoulder [M19.011]    Post-op Diagnosis:  Post-Op Diagnosis Codes:     * Tear of right supraspinatus tendon [M75.101]     * Biceps tendinitis of right upper extremity [M75.21]     * Primary osteoarthritis of right shoulder [M19.011]    Procedure(s) (LRB):  ARTHROSCOPY, SHOULDER; rotator cuff repair, mini-open biceps tenodesis with CAM procedure, (Right)  EXTENSIVE DEBRIDEMENT (Right)  HUMORAL HEAD OSTEOPLASTY    Anesthesia: General/Regional    Operative Findings: see op note    Estimated Blood Loss: * No values recorded between 10/4/2022  8:20 AM and 10/4/2022 11:51 AM *         Specimens:   Specimen (24h ago, onward)      None              Discharge Note    OUTCOME: Patient tolerated treatment/procedure well without complication and is now ready for discharge.    DISPOSITION: Home or Self Care    FINAL DIAGNOSIS:  <principal problem not specified>    FOLLOWUP: In clinic    DISCHARGE INSTRUCTIONS:  No discharge procedures on file.

## 2022-10-05 NOTE — ANESTHESIA POSTPROCEDURE EVALUATION
Anesthesia Post Evaluation    Patient: Nilesh Carrjose Mota    Procedure(s) Performed: Procedure(s) (LRB):  ARTHROSCOPY, SHOULDER; rotator cuff repair, mini-open biceps tenodesis with CAM procedure, (Right)  EXTENSIVE DEBRIDEMENT (Right)  HUMORAL HEAD OSTEOPLASTY    Final Anesthesia Type: general      Patient participation: Yes- Able to Participate  Level of consciousness: awake and alert  Post-procedure vital signs: reviewed and stable  Pain management: adequate  Airway patency: patent    PONV status at discharge: No PONV  Anesthetic complications: no      Cardiovascular status: blood pressure returned to baseline and hemodynamically stable  Respiratory status: unassisted  Hydration status: euvolemic  Follow-up not needed.          Vitals Value Taken Time   /73 10/04/22 1345   Temp 36.7 °C (98 °F) 10/04/22 1345   Pulse 72 10/04/22 1345   Resp 18 10/04/22 1345   SpO2 100 % 10/04/22 1345         Event Time   Out of Recovery 12:50:53         Pain/Maribel Score: Pain Rating Prior to Med Admin: 0 (10/4/2022  6:35 AM)  Maribel Score: 10 (10/4/2022  1:49 PM)

## 2022-10-06 NOTE — OP NOTE
Operative Report    IRVING GIRARD JR.  : 1975  MRN: [id number]    Date of Procedure: 10/4/2022     Pre-op Diagnosis:    Right shoulder pain  High Grade Partial Rotator Cuff Tear  Biceps Tendonitis  Subacromial Impingement  Glenohumeral Osteoarthritis     Post-op Diagnosis:    Same as preoperative    Procedure:   The following procedures were performed in the Right Shoulder:  Arthroscopic Rotator Cuff Repair (76839)  Open Biceps Tenodesis (58560)  Arthroscopic Shoulder Debridement - Extensive (74751)  Manipulation under anesthesia (47674)  Comprehensive arthroscopic management Right shoulder       Surgeon: Ernesto Diego IV, MD     Assisting Surgeon:    Rene Barcenas MD     Anesthesiologist:  See Record    Anesthesia:    General Endotracheal Intubation  Regional Block - Interscalene    Estimated Blood Loss: 5cc         Specimens: none    Findings:   Synovium: Erythematous   Loose bodies:  Present   Humeral head: Grade IV chondromalacia   Glenoid:  Grade IV chondromalacia   Superior labrum: Torn biceps anchor with fraying   Anterior labrum: Fraying   Posterior labrum: Fraying   Inferior pouch:  osteophyte and loose bodies noted     Biceps tendon: Partial tear    Superior Rotator cuff:  high grade partial tear     Subscapularis Rotator cuff: Normal   Other: None    Indications for surgery:   Irving Girard Jr. is a 47 y.o. male who presented with pain and weakness in the shoulder, which was not responsive to extensive non-operative treatments.  He was also noted to have limited range of motion and crepitation with shoulder motion.  The history, examination, and imaging are consistent with a torn rotator cuff and glenohumeral osteoarthritis.  The risks and benefits of surgery were discussed including, but not limited to, infection, bleeding, nerve and vessel injury, re-tear and ongoing pain.  Additionally, we discussed proceeding with this surgery to treat the osteoarthritis and rotator cuff issue   using arthroscopic means instead of proceeding to arthroplasty due to the patient's young age.  After this discussion,  the patient elected to proceed     Description of procedure:   Prior to the surgical procedure, the patient was identified in the preoperative holding area.  We had a thorough discussion about the risks and benefits of surgery including the expected post operative protocol.  The patient signed an informed consent and the operative extremity was marked.  A regional anesthesia block (interscalene) was performed by the Anesthesiology team prior to surgery.  Preoperative antibiotics were given prior to incision for infection prophylaxis. The patient was taken to the operating room and positioned on the table in the beach chair position.  All bony prominences were well padded.  An exam was performed, which showed 150 degrees of passive  forward flexion and 20 degrees of external rotation with elbow at side.  The patient was subsequently prepped and draped in the usual orthopaedic sterile fashion.  The arm was fixed in a sterile mechanical arm cordova.  A surgical timeout was performed confirming the surgical site and procedure prior to proceeding.  Local anesthetic was injected into the proposed portals.      A standard posterosuperior portal was made, and the arthroscope was introduced.  Using an outside-in technique an anterior interval cannula was established, and a probe was placed in the shoulder.  A thorough diagnostic arthroscopy was performed, and the above findings were seen.      (53599) The shaver was used to perform an extensive debridement of three or more areas. The debridement was performed on frayed anterior labral tissue, frayed posterior labral tissue, torn biceps anchor, degenerative cartilage on the humeral head, degenerative cartilage on the glenoid, and the biceps tendon was detached with arthroscopic scissors (seen to retract into the groove).  The probe was used to demonstrate  that the remaining tissue was stable.  The motorized shaver and hakeem were used to resect the inferior humeral head osteophyte.  Care was taken to avoid injury to the axillary nerve.  Intraoperative fluoroscopy was used to confirm adequate resection.  An arthroscopic biter was used to resect the inferior capsule taking care to avoid injury to the axillary nerve.  The ablator was then used to resect the tissue of the rotator interval and to transect the anterior and posterior capsule of the shoulder.  The motorized shaver was used to debride the articular surface of the rotator cuff tear which was visualized while inside the joint.    The scope was transitioned to the subacromial space.  The shaver was used to perform a subacromial decompression with an extensive bursectomy, which was required due to excessive thickened, inflamed bursal tissue and preoperative symptoms of subacromial bursitis.     (72190)  The rotator cuff tear was identified and torn edges of the tendon were debrided.  The bone quality at the greater tuberosity was very poor.  A hakeem was used to create a healing footprint.  Arthrex BioComposite suture anchors were placed adjacent to the articular surface.  Using a suture shuttle, the sutures were passed through the rotator cuff tissue.  Two pairs of sutures were crossed over the lateral edge of the tendon and fixed to 2 lateral anchors to create a suture bridge construct.  The lateral row required larger than usual anchors due to very poor bone quality.  The arm was taken through range of motion, and the rotator cuff tissue was found to move with the humerus without gap formation.      (36884) Next, a gentle manipulation under anesthesia was performed.      (14616) Attention was then turned to the anterior axillary fold where a 2-cm incision was made over the lower border of the pectoralis.  Blunt dissection was carried out and reflected laterally to expose the biceps tendon.  This was pulled free  from the wound, and the diseased portion was resected.  A FiberWire whipstitch was placed at the end of the tendon near the musculotendinous junction.  This was then reduced to the bone and fixed with an intracortical button.  The sutures were then tied over this to create an anchor.  The wound was then copiously irrigated.       The arthroscopic fluid and instruments were removed.  The arthroscopic portals were closed with 3-0 Nylon and xeroform gauze and 3.0 absorbable monofilament suture was used to close the biceps tenodesis wound over the anterior shoulder.  A dry, sterile dressing followed by an UltraSling was then applied.  The sponge, needle, and instrument counts were correct at the end of the case.  The patient tolerated the procedure well, was extubated, and was taken to recovery in stable condition.     Post-Operative Management:  The patient will be NWB on the operative extremity and is to wear the shoulder immobilizer at all times.  After discharge, the patient will follow up in my office ([phone]) in 14 days after surgery.  The patient will be treated with DVT prophylaxis in the post operative period.     Complications: No     Condition: Good     Disposition: PACU - hemodynamically stable.     Attestation: I was present and scrubbed for the entire procedure.    Implants:   Implant Name Type Inv. Item Serial No.  Lot No. LRB No. Used Action   ANCHOR SWIVELOCK C NISHANT FIBERTP - [id number]  ANCHOR SWIVELOCK C NISHANT FIBERTP  [employer] [id number] Right 1 Implanted   ANCHOR SWIVELOCK C TIGERTAPE - [id number]  ANCHOR SWIVELOCK C TIGERTAPE  [employer] [id number] Right 1 Implanted   ANCHOR SUT BIO COMP 4.75X24 - [id number]  ANCHOR SUT BIO COMP 4.75X24  [employer] [id number] Right 2 Implanted   ANCHOR SUT 6.25X19.1MM - [id number]  ANCHOR SUT 6.25X19.1MM  [employer] [id number] Right 1 Implanted   ANCHOR SUT 6.25X19.1MM - [id number]  ANCHOR SUT 6.25X19.1MM  [employer] [id number] Right 1 Implanted   SYS IMPL PROXIMAL  TENODESIS - [id number]  SYS IMPL PROXIMAL TENODESIS  [employer] [id number] Right 1 Implanted   SYS IMPL PROXIMAL TENODESIS - [id number]  SYS IMPL PROXIMAL TENODESIS  [employer] [id number] Right 1 Implanted

## 2022-10-11 ENCOUNTER — CLINICAL SUPPORT (OUTPATIENT)
Dept: REHABILITATION | Facility: HOSPITAL | Age: 47
End: 2022-10-11
Attending: ORTHOPAEDIC SURGERY
Payer: MEDICAID

## 2022-10-11 DIAGNOSIS — M19.011 PRIMARY OSTEOARTHRITIS OF RIGHT SHOULDER: ICD-10-CM

## 2022-10-11 DIAGNOSIS — M75.21 BICEPS TENDINITIS OF RIGHT UPPER EXTREMITY: ICD-10-CM

## 2022-10-11 DIAGNOSIS — M25.611 DECREASED RIGHT SHOULDER RANGE OF MOTION: ICD-10-CM

## 2022-10-11 DIAGNOSIS — M75.101 TEAR OF RIGHT SUPRASPINATUS TENDON: ICD-10-CM

## 2022-10-11 DIAGNOSIS — M62.81 MUSCLE WEAKNESS OF RIGHT UPPER EXTREMITY: ICD-10-CM

## 2022-10-11 PROCEDURE — 97110 THERAPEUTIC EXERCISES: CPT | Mod: PN | Performed by: PHYSICAL THERAPIST

## 2022-10-11 PROCEDURE — 97162 PT EVAL MOD COMPLEX 30 MIN: CPT | Mod: PN | Performed by: PHYSICAL THERAPIST

## 2022-10-11 NOTE — PLAN OF CARE
[facility]  Physical Therapy Initial Evaluation    Date: 10/11/2022   Name: Nilesh Rolon Jr.  Clinic Number: [id number]    Therapy Diagnosis:   Encounter Diagnoses   Name Primary?    Tear of right supraspinatus tendon     Biceps tendinitis of right upper extremity     Primary osteoarthritis of right shoulder     Decreased right shoulder range of motion     Muscle weakness of right upper extremity      Physician: Ernesto Diego IV, MD    Physician Orders: PT Eval and Treat    Medical Diagnosis from Referral:   M75.101 (ICD-10-CM) - Tear of right supraspinatus tendon   M75.21 (ICD-10-CM) - Biceps tendinitis of right upper extremity   M19.011 (ICD-10-CM) - Primary osteoarthritis of right shoulder     Evaluation Date: 10/11/2022  Authorization Period Expiration: 9/7/24  Plan of Care Expiration: 12/15/22  Progress Note Due: 12/15/22  Visit # / Visits authorized: 1/ 20   FOTO: 1/ 5   PTA Visit: 0/5    Precautions: Standard; SEE ATTACHED POST OP PROTOCOL (limit extension)    Surgery: 10/4/22 - by Dr. Diego  Arthroscopic Rotator Cuff Repair (48509)   Medium tear  Open Biceps Tenodesis (33760)  Arthroscopic Shoulder Debridement - Extensive (01316)   Resection inferior humeral head osteophyte and debridement of long head biceps tendon, ant & post labrum, glenoid, humeral head  Manipulation under anesthesia (18875)  Comprehensive arthroscopic management Right shoulder      Time In: 8:30 am  Time Out: 9:25 am  Total Appointment Time (timed & untimed codes): 55 minutes (MCE, TE)      SUBJECTIVE   Date of onset: chronic    History of current condition - Nilesh reports: he dislocated his right shoulder playing soccer when he was younger. He dislocated it multiple times more and would relocate himself. 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 Pain kept getting worse, but he dealt with it. He had tried multiple injections in his shoulder without relief. His biggest problem/pain prior  to surgery was with reaching overhead. He elected to have surgery on 10/4/22 by Dr. Diego.    Falls: none    Imaging, MRI studies: No acute fractures or dislocations are noted in these images.  There is a high grade partial tear of the supraspinatus tendon noted.  There is fluid around the biceps tendon and degenerative changes around biceps anchor; loose body in biceps groove.  There are subchondral cysts noted medial to the rotator cuff insertion.  There are degenerative changes at glenohumeral cartilage with prominent humeral head osteophyte and inferior glenoid osteophyte.  Degenerative changes of glenoid labrum noted. Arthrosis of AC joint noted.       Prior Therapy: none   Social History: lives with their family  Occupation: disability (used to paint)  Hand dominance: Right  Prior Level of Function: restricted and pain with reaching overhead; able to perform ADLs  Current Level of Function: unable to use right arm (in sling). Difficulty with all ADLs and IADLs. Unable to work    Pain:  Current 9/10, worst 9/10, best 7/10   Location: right shoulder    Description: Burning  Aggravating Factors: constant  Easing Factors: ice    Patients goals: regain full use of right arm, learn proper ways to work out with light weight     Medical History:   Past Medical History:   Diagnosis Date    DONG (acute kidney injury) 12/1/2021    Anxiety     Arthritis     Cirrhosis     Hypertension     Liver disease     Osteoarthritis     Other meniscus derangements, other medial meniscus, left knee 1/7/2019       Surgical History:   ChristopherANTONELLA Rolon Jr.  has a past surgical history that includes Arthroscopy of knee (Left, 1/7/2019); Esophagogastroduodenoscopy (N/A, 4/8/2019); Esophagogastroduodenoscopy (N/A, 7/27/2020); Colonoscopy (N/A, 7/27/2020); Knee surgery; Esophagogastroduodenoscopy (N/A, 12/2/2021); Esophagogastroduodenoscopy (N/A, 1/20/2022); Colonoscopy (N/A, 1/20/2022); Shoulder arthroscopy (Right, 10/4/2022);  "Debridement (Right, 10/4/2022); and Repair of bone (10/4/2022).    Medications:   Nilesh has a current medication list which includes the following prescription(s): cholecalciferol (vitamin d3), clotrimazole-betamethasone 1-0.05%, doxycycline, elidel, ferrous sulfate, folic acid, gabapentin, ketoconazole, lactulose, multivitamin, oxycodone, pantoprazole, and thiamine.    Allergies:   Review of patient's allergies indicates:  No Known Allergies       OBJECTIVE       Palpation: no tenderness  Observation: ecchymosis mid/distal bicep, resting in right abduction sling    Range of Motion:    Left Right   Shoulder Flexion P: 170    A: 170 P: 90     A: NT   Shoulder abduction P: 175    A: 175 P: 45     A: NT   Shoulder ER    A: 27 cm P: 20    A: NT   Shoulder IR     A: 35 cm P: 50    A: NT   Elbow WNL WNL   Wrist WNL WNL   Cervical Flexion [40 deg] WNL    Cervical Extension [50 deg]  WNL    Cervical Rotation [50 deg] WNL WNL   Cervical Side Flexion [22 deg] WNL WNL      Upper Extremity Strength    *=pain   Left (kg) Right (kg) Notes   Shoulder flexion 5/5 NT    Shoulder abduction 5/5 NT    Shoulder external rotation  5/5 NT    Shoulder internal rotation  5/5 NT    Elbow flexion 5/5 NT    Elbow extension 5/5 NT       : right 75 pounds, left 86 lbs    Special Tests:  No special tests needed due to post op    CMS Impairment/Limitation/Restriction for FOTO Shoulder Survey    Therapist reviewed FOTO scores for Nilesh BERMAN Gonzales Mota on 10/11/2022.   FOTO documents entered into EPIC - see Media section.    Current Limitation Score: 96%  Predicted Limitation Score: 45%           TREATMENT     Total Treatment time (time-based codes) separate from Evaluation: 13 minutes  (TE)    Nilesh received therapeutic exercises to develop strength, endurance, ROM, and flexibility for 8 minutes including:    Scapular retractions: 20x  Shoulder rolls: 20x  Shoulder shrugs  Right UT stretch: 3x20" holds  Wrist flexion/extension AROM: " 20x    Next:   Table slide flexion (to 90 degree)  Wand external rotation   Right elbow AAROM  LLLD bicep stretch supine on 1/2 foam roll    Nilesh received the following manual therapy techniques: Joint mobilizations, Myofacial release, and Soft tissue Mobilization were applied to the: right for 5 minutes, including:    Right shoulder PROM within restrictions with grade I joint mobs    PATIENT EDUCATION AND HOME EXERCISES     Education provided:   - anatomy  - importance of compliance with protocol and sling    Written Home Exercises Provided: yes. Exercises were reviewed and Nilesh was able to demonstrate them prior to the end of the session.  Nilesh demonstrated good  understanding of the education provided. See EMR under Patient Instructions for exercises provided during therapy sessions.    ASSESSMENT   Nilesh is a 47 y.o. male referred to outpatient Physical Therapy with a medical diagnosis of right supraspinatus tear, biceps tendonitis and right shoulder osteoarthritis. Pt presents with 1 wk status post right supraspinatus repair, SAD, biceps tenodesis and extensive debridement. He is currently in phase 1 of the rehab protocol. He presents wearing his abduction sling per protocol, but positioning was slightly adjusted. He presents with expected limitations in right shoulder range of motion and strength. His deficits limit his ability to work as a , lift, reach and perform ADLs and IADLs. Due to his deficits, he is appropriate for skilled PT to help him reach his goals.    Pt prognosis is Good.   Pt will benefit from skilled outpatient Physical Therapy to address the deficits stated above and in the chart below, provide pt/family education, and to maximize pt's level of independence.     Plan of care discussed with patient: Yes  Pt's spiritual, cultural and educational needs considered and patient is agreeable to the plan of care and goals as stated below:     Anticipated Barriers for therapy:  co-morbidities    Medical Necessity is demonstrated by the following  History  Co-morbidities and personal factors that may impact the plan of care Co-morbidities:   hx of alcohol abuse, cirrhosis of liver, high BMI, anemia    Personal Factors:   no deficits     high   Examination  Body Structures and Functions, activity limitations and participation restrictions that may impact the plan of care Body Regions:   upper extremities    Body Systems:    gross symmetry  ROM  strength  gross coordinated movement  motor control  edema  scar formation    Participation Restrictions:   Reaching, working, lifting, sleeping    Activity limitations:   Learning and applying knowledge  no deficits    General Tasks and Commands  no deficits    Communication  no deficits    Mobility  lifting and carrying objects  fine hand use (grasping/picking up)  driving (bike, car, motorcycle)    Self care  washing oneself (bathing, drying, washing hands)  caring for body parts (brushing teeth, shaving, grooming)  toileting  dressing  eating  drinking  looking after one's health    Domestic Life  shopping  cooking  doing house work (cleaning house, washing dishes, laundry)  assisting others    Interactions/Relationships  family relationships    Life Areas  employment    Community and Social Life  community life  recreation and leisure         high     Clinical Presentation evolving clinical presentation with changing clinical characteristics moderate   Decision Making/ Complexity Score: moderate     Goals:  Short Term Goals: 4 weeks   1.  Patient will report < 5/10 shoulder pain at worst for improved ADL performance.  2.  Patient will demonstrate right shoulder flexion PROM > 140 deg to improve overhead reach.  3.  Patient will demonstrate an understanding and compliance with home exercise program.    Long Term Goals: 16 weeks   1. Patient will report ability to perform dressing, bathing and hygiene without limitation, pain or compensation.  2.  Patient will demonstrate right shoulder flexion AROM > 145 degree to improve overhead reach.  3. Patient will demonstrate all right upper extremity strength via MicroFET > 85% of left for improved ADL and IADL performance.  4. Patient will improve FOTO to < 46% to improve ADL performance.    PLAN   Plan of care Certification: 10/11/2022 to 12/15/22.    Outpatient Physical Therapy 2 times weekly for 8 weeks to include the following interventions: Cervical/Lumbar Traction, Electrical Stimulation TENS, IFC, NMES, Manual Therapy, Moist Heat/ Ice, Neuromuscular Re-ed, Patient Education, Self Care, Therapeutic Activities, Therapeutic Exercise, and dry needling.         Pranav You, PT      I CERTIFY THE NEED FOR THESE SERVICES FURNISHED UNDER THIS PLAN OF TREATMENT AND WHILE UNDER MY CARE   Physician's comments:     Physician's Signature: ___________________________________________________

## 2022-10-13 ENCOUNTER — PATIENT MESSAGE (OUTPATIENT)
Dept: ORTHOPEDICS | Facility: CLINIC | Age: 47
End: 2022-10-13
Payer: MEDICAID

## 2022-10-13 DIAGNOSIS — M75.101 TEAR OF RIGHT SUPRASPINATUS TENDON: Primary | ICD-10-CM

## 2022-10-13 DIAGNOSIS — M19.011 PRIMARY OSTEOARTHRITIS OF RIGHT SHOULDER: ICD-10-CM

## 2022-10-13 DIAGNOSIS — M75.21 BICEPS TENDINITIS OF RIGHT UPPER EXTREMITY: ICD-10-CM

## 2022-10-17 ENCOUNTER — TELEPHONE (OUTPATIENT)
Dept: ORTHOPEDICS | Facility: CLINIC | Age: 47
End: 2022-10-17
Payer: MEDICAID

## 2022-10-17 ENCOUNTER — CLINICAL SUPPORT (OUTPATIENT)
Dept: REHABILITATION | Facility: HOSPITAL | Age: 47
End: 2022-10-17
Payer: MEDICAID

## 2022-10-17 DIAGNOSIS — M62.81 MUSCLE WEAKNESS OF RIGHT UPPER EXTREMITY: ICD-10-CM

## 2022-10-17 DIAGNOSIS — M25.611 DECREASED RIGHT SHOULDER RANGE OF MOTION: Primary | ICD-10-CM

## 2022-10-17 DIAGNOSIS — G89.18 POST-OP PAIN: Primary | ICD-10-CM

## 2022-10-17 PROCEDURE — 97110 THERAPEUTIC EXERCISES: CPT | Mod: PN,CQ

## 2022-10-17 RX ORDER — GABAPENTIN 300 MG/1
300 CAPSULE ORAL NIGHTLY
Qty: 14 CAPSULE | Refills: 0 | Status: SHIPPED | OUTPATIENT
Start: 2022-10-17 | End: 2023-09-26

## 2022-10-17 RX ORDER — NAPROXEN 500 MG/1
500 TABLET ORAL 2 TIMES DAILY
Qty: 28 TABLET | Refills: 0 | Status: SHIPPED | OUTPATIENT
Start: 2022-10-17 | End: 2022-10-31

## 2022-10-17 NOTE — PATIENT INSTRUCTIONS
ROM: External / Internal Rotation - Wand    Lying on back and holding wand with right hand palm up, push out from body with other hand, palm down. Keep both elbows bent and right elbow close to body. When stretch is felt, hold 2 seconds.   Repeat 10 times each side per set. Do 1 sets per session. Do 2 sessions per day.    ROM: Flexion        Keeping left arm on table, slide body away until stretch is felt. Hold 2 seconds.  Repeat 10 times per set. Do 1 sets per session. Do 2 sessions per day.     [email or web address]     Copyright © I. All rights reserved.

## 2022-10-17 NOTE — PROGRESS NOTES
[facility]   Physical Therapy Treatment Note     Name: Nilesh Rolon Jr.  Clinic Number: [id number]    Therapy Diagnosis:   Encounter Diagnoses   Name Primary?    Decreased right shoulder range of motion Yes    Muscle weakness of right upper extremity      Physician: Ernesto Diego IV, MD    Visit Date: 10/17/2022    Physician Orders: PT Eval and Treat    Medical Diagnosis from Referral:   M75.101 (ICD-10-CM) - Tear of right supraspinatus tendon   M75.21 (ICD-10-CM) - Biceps tendinitis of right upper extremity   M19.011 (ICD-10-CM) - Primary osteoarthritis of right shoulder      Evaluation Date: 10/11/2022  Authorization Period Expiration: 9/7/24  Plan of Care Expiration: 12/15/22  Progress Note Due: 12/15/22  Visit # / Visits authorized: 1/ 16 (+1)   FOTO: 2/ 5   PTA Visit #: 1/5     Time In: 9:00 am  Time Out: 9:55 am  Total Billable Time: 55 minutes (TE-4)     Precautions: Standard; SEE ATTACHED POST OP PROTOCOL (limit extension)     Surgery: 10/4/22 - by Dr. Diego  Arthroscopic Rotator Cuff Repair ([id number])              Medium tear  Open Biceps Tenodesis (57627)  Arthroscopic Shoulder Debridement - Extensive (26776)              Resection inferior humeral head osteophyte and debridement of long head biceps tendon, ant & post labrum, glenoid, humeral head  Manipulation under anesthesia (38059)  Comprehensive arthroscopic management Right shoulder      SUBJECTIVE     Pt reports: having a lot of pain.  He was compliant with home exercise program.  Response to previous treatment: 1st after.  Functional change: not at this time.    Pain: 8/10  Location: right shoulder      OBJECTIVE     Objective Measures updated at progress report unless specified.     Treatment     Nilesh received the treatments listed below:      therapeutic exercises to develop strength, endurance, ROM, and flexibility for 45 minutes including:     Scapular retractions: 20x  Shoulder rolls: 20x  Shoulder shrugs:  "15x  Right UT stretch: 3x20" holds  Wrist flexion/extension AROM: 20x    Table slide flexion (to 90 degree): 20x  Wand external rotation: 15x   Right elbow AAROM: 20x  LLLD bicep stretch supine on 1/2 foam roll: 1 minute     Nilesh received the following manual therapy techniques: Joint mobilizations, Myofacial release, and Soft tissue Mobilization were applied to the: right for 10 minutes, including:     Right shoulder PROM within restrictions with grade I joint mobs    Patient Education and Home Exercises     Home Exercises Provided and Patient Education Provided     Education provided:   - keep exercises in a pain free range.    Written Home Exercises Provided: yes. Exercises were reviewed and Nilesh was able to demonstrate them prior to the end of the session.  Nilesh demonstrated good  understanding of the education provided. See EMR under Patient Instructions for exercises provided during therapy sessions    ASSESSMENT     Nilesh is a 47 y.o. male referred to outpatient Physical Therapy with a medical diagnosis of right supraspinatus tear, biceps tendonitis and right shoulder osteoarthritis. Pt presents with 1 wk status post right supraspinatus repair, SAD, biceps tenodesis and extensive debridement. Patient required occasional cues to relax with PROM.  Patient required occasional cues to keep exercises in a pain free range.    Nilesh Is progressing well towards his goals.   Pt prognosis is Good.     Pt will continue to benefit from skilled outpatient physical therapy to address the deficits listed in the problem list box on initial evaluation, provide pt/family education and to maximize pt's level of independence in the home and community environment.     Pt's spiritual, cultural and educational needs considered and pt agreeable to plan of care and goals.     Anticipated barriers to physical therapy: co-morbidities.    Goals:  Short Term Goals: 4 weeks   1.  Patient will report < 5/10 shoulder pain at worst " for improved ADL performance.  2.  Patient will demonstrate right shoulder flexion PROM > 140 deg to improve overhead reach.  3.  Patient will demonstrate an understanding and compliance with home exercise program.     Long Term Goals: 16 weeks   1. Patient will report ability to perform dressing, bathing and hygiene without limitation, pain or compensation.  2. Patient will demonstrate right shoulder flexion AROM > 145 degree to improve overhead reach.  3. Patient will demonstrate all right upper extremity strength via MicroFET > 85% of left for improved ADL and IADL performance.  4. Patient will improve FOTO to < 46% to improve ADL performance.    PLAN     Continue per protocol.     Jonathan Rojas, PTA

## 2022-10-19 ENCOUNTER — CLINICAL SUPPORT (OUTPATIENT)
Dept: REHABILITATION | Facility: HOSPITAL | Age: 47
End: 2022-10-19
Payer: MEDICAID

## 2022-10-19 DIAGNOSIS — M62.81 MUSCLE WEAKNESS OF RIGHT UPPER EXTREMITY: ICD-10-CM

## 2022-10-19 DIAGNOSIS — M25.611 DECREASED RIGHT SHOULDER RANGE OF MOTION: Primary | ICD-10-CM

## 2022-10-19 PROCEDURE — 97110 THERAPEUTIC EXERCISES: CPT | Mod: PN,CQ

## 2022-10-19 NOTE — PROGRESS NOTES
[facility]   Physical Therapy Treatment Note     Name: Nilesh Rolon Jr.  Clinic Number: [id number]    Therapy Diagnosis:   Encounter Diagnoses   Name Primary?    Decreased right shoulder range of motion Yes    Muscle weakness of right upper extremity        Physician: Ernesto Diego IV, MD    Visit Date: 10/19/2022    Physician Orders: PT Eval and Treat    Medical Diagnosis from Referral:   M75.101 (ICD-10-CM) - Tear of right supraspinatus tendon   M75.21 (ICD-10-CM) - Biceps tendinitis of right upper extremity   M19.011 (ICD-10-CM) - Primary osteoarthritis of right shoulder      Evaluation Date: 10/11/2022  Authorization Period Expiration: 9/7/24  Plan of Care Expiration: 12/15/22  Progress Note Due: 12/15/22  Visit # / Visits authorized: 2/ 16 (+1)   FOTO: 3/ 5   PTA Visit #: 2/5     Time In: 8:00 am  Time Out: 8:53 am  Total Billable Time: 53 minutes (TE-4)     Precautions: Standard; SEE ATTACHED POST OP PROTOCOL (limit extension)     Surgery: 10/4/22 - by Dr. Diego  Arthroscopic Rotator Cuff Repair ([id number])              Medium tear  Open Biceps Tenodesis (24130)  Arthroscopic Shoulder Debridement - Extensive (87922)              Resection inferior humeral head osteophyte and debridement of long head biceps tendon, ant & post labrum, glenoid, humeral head  Manipulation under anesthesia (42388)  Comprehensive arthroscopic management Right shoulder      SUBJECTIVE     Pt reports: doing home exercise program.  States he is not taking any pain medicine.  He was compliant with home exercise program.  Response to previous treatment: soreness  Functional change: not at this time.    Pain: 7/10  Location: right shoulder      OBJECTIVE     Objective Measures updated at progress report unless specified.     Treatment     Nilesh received the treatments listed below:      therapeutic exercises to develop strength, endurance, ROM, and flexibility for 33 minutes including:     Scapular  "retractions: 20x  Shoulder rolls: 20x  Shoulder shrugs: 20x  Right UT stretch: 3x20" holds  Wrist flexion/extension AROM: 20x    Table slide flexion (to 90 degree): 20x  Wand ([place]) external rotation: 2x10  Right elbow AAROM: 20x  LLLD bicep stretch supine on 1/2 foam roll: 2 minutes     Nilesh received the following manual therapy techniques: Joint mobilizations, Myofacial release, and Soft tissue Mobilization were applied to the: right for 20 minutes, including:     Right shoulder PROM within restrictions with grade I joint mobs  Gentle STM  posterior shoulder and upper arm    Patient Education and Home Exercises     Home Exercises Provided and Patient Education Provided     Education provided:   - keep exercises in a pain free range.    Written Home Exercises Provided: yes. Exercises were reviewed and Nilesh was able to demonstrate them prior to the end of the session.  Nilesh demonstrated good  understanding of the education provided. See EMR under Patient Instructions for exercises provided during therapy sessions    ASSESSMENT     Nilesh is a 47 y.o. male referred to outpatient Physical Therapy with a medical diagnosis of right supraspinatus tear, biceps tendonitis and right shoulder osteoarthritis. Pt presents with 2 wk status post right supraspinatus repair, SAD, biceps tenodesis and extensive debridement. Patient required occasional cues to relax with PROM.  Patient required occasional cues to perform exercises with correct technique and in a pain free range. States "better" after treatment.  Rates pain "6/10"    Nilesh Is progressing well towards his goals.   Pt prognosis is Good.     Pt will continue to benefit from skilled outpatient physical therapy to address the deficits listed in the problem list box on initial evaluation, provide pt/family education and to maximize pt's level of independence in the home and community environment.     Pt's spiritual, cultural and educational needs considered and pt " agreeable to plan of care and goals.     Anticipated barriers to physical therapy: co-morbidities.    Goals:  Short Term Goals: 4 weeks   1.  Patient will report < 5/10 shoulder pain at worst for improved ADL performance.  2.  Patient will demonstrate right shoulder flexion PROM > 140 deg to improve overhead reach.  3.  Patient will demonstrate an understanding and compliance with home exercise program.     Long Term Goals: 16 weeks   1. Patient will report ability to perform dressing, bathing and hygiene without limitation, pain or compensation.  2. Patient will demonstrate right shoulder flexion AROM > 145 degree to improve overhead reach.  3. Patient will demonstrate all right upper extremity strength via MicroFET > 85% of left for improved ADL and IADL performance.  4. Patient will improve FOTO to < 46% to improve ADL performance.    PLAN     Continue per protocol.     Sabrina Garibay, PTA

## 2022-10-20 ENCOUNTER — HOSPITAL ENCOUNTER (OUTPATIENT)
Dept: RADIOLOGY | Facility: HOSPITAL | Age: 47
Discharge: HOME | End: 2022-10-20
Attending: PHYSICIAN ASSISTANT
Payer: MEDICAID

## 2022-10-20 ENCOUNTER — OFFICE VISIT (OUTPATIENT)
Dept: ORTHOPEDICS | Facility: CLINIC | Age: 47
End: 2022-10-20
Payer: MEDICAID

## 2022-10-20 VITALS
BODY MASS INDEX: 30.76 KG/M2 | HEART RATE: 61 BPM | SYSTOLIC BLOOD PRESSURE: 117 MMHG | WEIGHT: 247.38 LBS | DIASTOLIC BLOOD PRESSURE: 77 MMHG | HEIGHT: 75 IN

## 2022-10-20 DIAGNOSIS — M75.41 SUBACROMIAL IMPINGEMENT OF RIGHT SHOULDER: ICD-10-CM

## 2022-10-20 DIAGNOSIS — G89.18 POST-OP PAIN: ICD-10-CM

## 2022-10-20 DIAGNOSIS — M75.21 BICEPS TENDINITIS OF RIGHT UPPER EXTREMITY: ICD-10-CM

## 2022-10-20 DIAGNOSIS — M19.011 PRIMARY OSTEOARTHRITIS OF RIGHT SHOULDER: ICD-10-CM

## 2022-10-20 DIAGNOSIS — M25.511 RIGHT SHOULDER PAIN, UNSPECIFIED CHRONICITY: ICD-10-CM

## 2022-10-20 DIAGNOSIS — M75.101 TEAR OF RIGHT SUPRASPINATUS TENDON: Primary | ICD-10-CM

## 2022-10-20 PROCEDURE — 99024 POSTOP FOLLOW-UP VISIT: CPT | Mod: ,,, | Performed by: PHYSICIAN ASSISTANT

## 2022-10-20 PROCEDURE — 3078F PR MOST RECENT DIASTOLIC BLOOD PRESSURE < 80 MM HG: ICD-10-PCS | Mod: CPTII,,, | Performed by: PHYSICIAN ASSISTANT

## 2022-10-20 PROCEDURE — 3078F DIAST BP <80 MM HG: CPT | Mod: CPTII,,, | Performed by: PHYSICIAN ASSISTANT

## 2022-10-20 PROCEDURE — 3074F PR MOST RECENT SYSTOLIC BLOOD PRESSURE < 130 MM HG: ICD-10-PCS | Mod: CPTII,,, | Performed by: PHYSICIAN ASSISTANT

## 2022-10-20 PROCEDURE — 73030 X-RAY EXAM OF SHOULDER: CPT | Mod: 26,RT,, | Performed by: RADIOLOGY

## 2022-10-20 PROCEDURE — 1159F PR MEDICATION LIST DOCUMENTED IN MEDICAL RECORD: ICD-10-PCS | Mod: CPTII,,, | Performed by: PHYSICIAN ASSISTANT

## 2022-10-20 PROCEDURE — 73030 XR SHOULDER COMPLETE 2 OR MORE VIEWS RIGHT: ICD-10-PCS | Mod: 26,RT,, | Performed by: RADIOLOGY

## 2022-10-20 PROCEDURE — 3074F SYST BP LT 130 MM HG: CPT | Mod: CPTII,,, | Performed by: PHYSICIAN ASSISTANT

## 2022-10-20 PROCEDURE — 99999 PR PBB SHADOW E&M-EST. PATIENT-LVL III: CPT | Mod: PBBFAC,,, | Performed by: PHYSICIAN ASSISTANT

## 2022-10-20 PROCEDURE — 73030 X-RAY EXAM OF SHOULDER: CPT | Mod: TC,PN,RT

## 2022-10-20 PROCEDURE — 99024 PR POST-OP FOLLOW-UP VISIT: ICD-10-PCS | Mod: ,,, | Performed by: PHYSICIAN ASSISTANT

## 2022-10-20 PROCEDURE — 99213 OFFICE O/P EST LOW 20 MIN: CPT | Mod: PBBFAC,PN | Performed by: PHYSICIAN ASSISTANT

## 2022-10-20 PROCEDURE — 1159F MED LIST DOCD IN RCRD: CPT | Mod: CPTII,,, | Performed by: PHYSICIAN ASSISTANT

## 2022-10-20 PROCEDURE — 99999 PR PBB SHADOW E&M-EST. PATIENT-LVL III: ICD-10-PCS | Mod: PBBFAC,,, | Performed by: PHYSICIAN ASSISTANT

## 2022-10-20 RX ORDER — IBUPROFEN 800 MG/1
800 TABLET ORAL EVERY 6 HOURS PRN
COMMUNITY
Start: 2022-09-12

## 2022-10-20 NOTE — PROGRESS NOTES
[facility]  [facility]    Shoulder Post-op Visit  10/20/2022     Diagnosis:  Right shoulder pain  High Grade Partial Rotator Cuff Tear  Biceps Tendonitis  Subacromial Impingement  Glenohumeral Osteoarthritis    Procedure:  10/4/22  Arthroscopic Rotator Cuff Repair   Open Biceps Tenodesis   Arthroscopic Shoulder Debridement - Extensive   Manipulation under anesthesia   Comprehensive arthroscopic management Right shoulder       Subjective:      Nilesh Rolon Jr. is a 47 y.o. male who is now 2 weeks status post right shoulder surgery. Pain is controlled without any medications. The patient denies none, fever, wound drainage, increasing redness, pus, increasing pain, increasing swelling. Post op problems reported: none.    Patient doing well. In physical therapy. Currently not taking any medication for pain.      Objective:      Ortho/SPM Exam  General: alert, appears stated age, and cooperative   Sutures: Sutures in place and will be removed today.  Incision: healing well, no significant drainage, no dehiscence, no significant erythema  Tenderness: none  Forward Flexion: 20 degrees  External Rotation: 20 degrees  Elbow/Wrist/Hand: Full ROM  Neuro: Intact to light touch Ax/R/U/M  Vascular: CR<2s. Palpable radial pulse      Imaging:  X-ray Shoulder 2 or More Views Right  Narrative: EXAMINATION:  XR SHOULDER COMPLETE 2 OR MORE VIEWS RIGHT    CLINICAL HISTORY:  Other acute postprocedural pain    TECHNIQUE:  Two or three views of the right shoulder were performed.    COMPARISON:  09/29/2022    FINDINGS:  Glenohumeral joint space is narrowed with degenerative change in some loose bodies.  Surgical clips are present.  Impression: See above    Electronically signed by: Pranav Shah MD  Date:    10/20/2022  Time:    09:29    I have reviewed the above radiograph and agree with the findings stated by the radiologist.         Assessment:       The patient is  status post left shoulder surgery. The primary encounter diagnosis was Tear of right supraspinatus tendon. Diagnoses of Biceps tendinitis of right upper extremity, Primary osteoarthritis of right shoulder, Subacromial impingement of right shoulder, and Right shoulder pain, unspecified chronicity were also pertinent to this visit.  Doing well postoperatively. Continuation of post-op rehab course is recommended at this time. All of the patient's questions were answered.    Continue NWB operative extremity with brace. Continue PT and home exercises. Unable to take Tylenol due to cirrhosis. Recommend ibuprofen or aleve.      Plan:      Continue physical therapy.  Nonweightbearing on the operative extremity until 6 weeks after surgery.  Follow up at 6 weeks after surgery.  Ibuprofen or aleve for pain.        Yany Kaur PA-C  Department of Orthopedic Surgery  [facility]  Office: [phone]  Website: [email or web address]        No orders of the defined types were placed in this encounter.

## 2022-10-25 ENCOUNTER — CLINICAL SUPPORT (OUTPATIENT)
Dept: REHABILITATION | Facility: HOSPITAL | Age: 47
End: 2022-10-25
Payer: MEDICAID

## 2022-10-25 DIAGNOSIS — M62.81 MUSCLE WEAKNESS OF RIGHT UPPER EXTREMITY: ICD-10-CM

## 2022-10-25 DIAGNOSIS — M25.611 DECREASED RIGHT SHOULDER RANGE OF MOTION: Primary | ICD-10-CM

## 2022-10-25 PROCEDURE — 97110 THERAPEUTIC EXERCISES: CPT | Mod: PN

## 2022-10-25 NOTE — PROGRESS NOTES
[facility]   Physical Therapy Treatment Note     Name: Nilesh Rolon Jr.  Clinic Number: [id number]    Therapy Diagnosis:   Encounter Diagnoses   Name Primary?    Decreased right shoulder range of motion Yes    Muscle weakness of right upper extremity        Physician: Ernesto Diego IV, MD    Visit Date: 10/25/2022    Physician Orders: PT Eval and Treat    Medical Diagnosis from Referral:   M75.101 (ICD-10-CM) - Tear of right supraspinatus tendon   M75.21 (ICD-10-CM) - Biceps tendinitis of right upper extremity   M19.011 (ICD-10-CM) - Primary osteoarthritis of right shoulder      Evaluation Date: 10/11/2022  Authorization Period Expiration: 9/7/24  Plan of Care Expiration: 12/15/22  Progress Note Due: 12/15/22  Visit # / Visits authorized: 3/16 (+1)   FOTO: 4/5   PTA Visit #: 0/5     Time In: 9:05 am  Time Out: 10:00 am  Total Billable Time: 55 minutes (TE-4)     Precautions: Standard; SEE POST OP PROTOCOL (limit extension)     Surgery: 10/4/22 - by Dr. Diego  Arthroscopic Rotator Cuff Repair ([id number])              Medium tear  Open Biceps Tenodesis (94855)  Arthroscopic Shoulder Debridement - Extensive (28975)              Resection inferior humeral head osteophyte and debridement of long head biceps tendon, ant & post labrum, glenoid, humeral head  Manipulation under anesthesia (05874)  Comprehensive arthroscopic management Right shoulder      SUBJECTIVE     Pt reports: doing home exercise program.  States he is not taking any pain medicine.  He was compliant with home exercise program.  Response to previous treatment: soreness  Functional change: not at this time.    Pain: 7/10  Location: right shoulder      OBJECTIVE     Objective Measures updated at progress report unless specified.     Arthroscopic cuff repair and biceps tenodesis Phase II: 4-6 weeks  Brace donned as needed  AAROM: flexion to 140, abduction to 60-80, external rotation to 45 at side  Gentle AAROM exercises  without resistance, Pulleys, Grade I-II mobilizations  No resisted biceps strengthening for 8 weeks    Treatment     Nilesh received the treatments listed below:      therapeutic exercises to develop strength, endurance, ROM, and flexibility for 25 minutes including:     Scapular retractions: 20x  Shoulder rolls: 20x  Shoulder shrugs: 20x  Wrist flexion/extension AROM: 20x  Elbow extension stretch: 2x1 minute    Not done today: resume next  Table slide flexion (to 90 degree): 20x  Wand ([place]) external rotation: 2x10    Nilesh received the following manual therapy techniques: Joint mobilizations, Myofacial release, and Soft tissue Mobilization were applied to the: right for 35 minutes, including:    Right shoulder PROM within restrictions with grade I joint mobs  Gentle STM  posterior shoulder and upper arm  Right elbow extension stretching  Right shoulder GHJ posterior mobs, grade I-II    Patient Education and Home Exercises     Home Exercises Provided and Patient Education Provided   Education provided:   - keep exercises in a pain free range.    Written Home Exercises Provided: yes. Exercises were reviewed and Nilesh was able to demonstrate them prior to the end of the session.  Nilesh demonstrated good  understanding of the education provided. See EMR under Patient Instructions for exercises provided during therapy sessions    ASSESSMENT     Nilesh is a 47 y.o. male referred to outpatient Physical Therapy with a medical diagnosis of right supraspinatus tear, biceps tendonitis and right shoulder osteoarthritis. Pt presents with 4 wk status post right supraspinatus repair, SAD, biceps tenodesis and extensive debridement on 10/4/22. Pt did well and achieved about 95 degrees of passive flexion, 40 degrees external rotation, and 40 degrees of abduction with very minimal discomfort. Extensive bruising still present that is improving, and firm edema felt at posterior/medial aspect of left upper arm. Resume on gentle  SOL for another visit and working on right shoulder and elbow PROM per protocol.     Nilesh Is progressing well towards his goals.   Pt prognosis is Good.     Pt will continue to benefit from skilled outpatient physical therapy to address the deficits listed in the problem list box on initial evaluation, provide pt/family education and to maximize pt's level of independence in the home and community environment.     Pt's spiritual, cultural and educational needs considered and pt agreeable to plan of care and goals.     Anticipated barriers to physical therapy: co-morbidities.    Goals:  Short Term Goals: 4 weeks   1.  Patient will report < 5/10 shoulder pain at worst for improved ADL performance.  2.  Patient will demonstrate right shoulder flexion PROM > 140 deg to improve overhead reach.  3.  Patient will demonstrate an understanding and compliance with home exercise program.     Long Term Goals: 16 weeks   1. Patient will report ability to perform dressing, bathing and hygiene without limitation, pain or compensation.  2. Patient will demonstrate right shoulder flexion AROM > 145 degree to improve overhead reach.  3. Patient will demonstrate all right upper extremity strength via MicroFET > 85% of left for improved ADL and IADL performance.  4. Patient will improve FOTO to < 46% to improve ADL performance.    PLAN     Continue per protocol.     Bang Albrecht, PT

## 2022-10-26 ENCOUNTER — DOCUMENTATION ONLY (OUTPATIENT)
Dept: REHABILITATION | Facility: HOSPITAL | Age: 47
End: 2022-10-26
Payer: MEDICAID

## 2022-10-26 NOTE — PROGRESS NOTES
PT met face to face with Jonathan Rojas PTA to discuss pt's treatment plan and progress towards established goals. Pt will be seen by a physical therapist minimally every 6th visit or every 30 days.    Pranav You, PT

## 2022-10-27 ENCOUNTER — CLINICAL SUPPORT (OUTPATIENT)
Dept: REHABILITATION | Facility: HOSPITAL | Age: 47
End: 2022-10-27
Attending: OBSTETRICS & GYNECOLOGY
Payer: MEDICAID

## 2022-10-27 DIAGNOSIS — M25.611 DECREASED RIGHT SHOULDER RANGE OF MOTION: Primary | ICD-10-CM

## 2022-10-27 DIAGNOSIS — M62.81 MUSCLE WEAKNESS OF RIGHT UPPER EXTREMITY: ICD-10-CM

## 2022-10-27 PROCEDURE — 97110 THERAPEUTIC EXERCISES: CPT | Mod: PN

## 2022-10-27 NOTE — PROGRESS NOTES
[facility]   Physical Therapy Treatment Note     Name: Nilesh Rolon Jr.  Clinic Number: [id number]    Therapy Diagnosis:   Encounter Diagnoses   Name Primary?    Decreased right shoulder range of motion Yes    Muscle weakness of right upper extremity      Physician: Ernesto Diego IV, MD    Visit Date: 10/27/2022    Physician Orders: PT Eval and Treat    Medical Diagnosis from Referral:   M75.101 (ICD-10-CM) - Tear of right supraspinatus tendon   M75.21 (ICD-10-CM) - Biceps tendinitis of right upper extremity   M19.011 (ICD-10-CM) - Primary osteoarthritis of right shoulder      Evaluation Date: 10/11/2022  Authorization Period Expiration: 9/7/24  Plan of Care Expiration: 12/15/22  Progress Note Due: 12/15/22  Visit # / Visits authorized: 4/16 (+1)   FOTO: 5/5 - Done today 10/27/22   PTA Visit #: 0/5     Time In: 2:01 pm  Time Out: 3:00 pm  Total Billable Time: 59 minutes (TE-4) - Medicaid     Precautions: Standard; SEE POST OP PROTOCOL (limit extension)     Surgery: 10/4/22 - by Dr. Diego  Arthroscopic Rotator Cuff Repair (10313) - Medium tear  Open Biceps Tenodesis (67091)  Arthroscopic Shoulder Debridement - Extensive (36896) - Resection inferior humeral head osteophyte and debridement of long head biceps tendon, ant & post labrum, glenoid, humeral head  Manipulation under anesthesia (27805)  Comprehensive arthroscopic management Right shoulder      SUBJECTIVE     Pt reports: home exercises are going well and still not taking pain meds.  He was compliant with home exercise program.  Response to previous treatment: soreness  Functional change: not at this time.    Pain: 5-6/10  Location: right shoulder      OBJECTIVE     Objective Measures updated at progress report unless specified.     DOS: 10/4/22  Arthroscopic cuff repair and biceps tenodesis Phase II: 4-6 weeks  Brace donned as needed  AAROM: flexion to 140, abduction to 60-80, external rotation to 45 at side  Gentle AAROM  exercises without resistance, Pulleys, Grade I-II mobilizations  No resisted biceps strengthening for 8 weeks    10/27/22: right shoulder PROM  Flexion: 132 degrees  Abduction: 80 degrees  External rotation at side: 40 degrees    Treatment     Nilesh received the treatments listed below:      therapeutic exercises to develop strength, endurance, ROM, and flexibility for 30 minutes including:     Shoulder shrug + retractions : 20x  Wrist flexion/extension AROM: 20x  Elbow extension stretch: 2x1 minute    Table slide flexion (to 90 degree): 20x - resume next  Wand dowel external rotation: 2x10, 3'' holds  Seated shoulder flexion with ranger    Nilesh received the following manual therapy techniques: Joint mobilizations, Myofacial release, and Soft tissue Mobilization were applied to the: right for 29 minutes, including:    Right shoulder PROM within restrictions with grade I joint mobs  Gentle STM  posterior shoulder and upper arm (medially-posteriorly)   Right elbow extension stretching  Right shoulder GHJ posterior mobs, grade I-II    Patient Education and Home Exercises     Home Exercises Provided and Patient Education Provided   Education provided:   - keep exercises in a pain free range.    Written Home Exercises Provided: yes. Exercises were reviewed and Nilesh was able to demonstrate them prior to the end of the session.  Nilesh demonstrated good  understanding of the education provided. See EMR under Patient Instructions for exercises provided during therapy sessions    ASSESSMENT     Nilesh is a 47 y.o. male referred to outpatient Physical Therapy with a medical diagnosis of right supraspinatus tear, biceps tendonitis and right shoulder osteoarthritis. Pt presents with 4 wk status post right supraspinatus repair, SAD, biceps tenodesis and extensive debridement on 10/4/22. Improved right shoulder measurements today and near precautions parameters at this time until 6 weeks post-op. Pt pain is well controlled  and only present at end range stretching. Can gently initiate AAROM per patient tolerance.     Nilesh Is progressing well towards his goals.   Pt prognosis is Good.     Pt will continue to benefit from skilled outpatient physical therapy to address the deficits listed in the problem list box on initial evaluation, provide pt/family education and to maximize pt's level of independence in the home and community environment.     Pt's spiritual, cultural and educational needs considered and pt agreeable to plan of care and goals.     Anticipated barriers to physical therapy: co-morbidities.    Goals:  Short Term Goals: 4 weeks   1.  Patient will report < 5/10 shoulder pain at worst for improved ADL performance.  2.  Patient will demonstrate right shoulder flexion PROM > 140 deg to improve overhead reach.  3.  Patient will demonstrate an understanding and compliance with home exercise program.     Long Term Goals: 16 weeks   1. Patient will report ability to perform dressing, bathing and hygiene without limitation, pain or compensation.  2. Patient will demonstrate right shoulder flexion AROM > 145 degree to improve overhead reach.  3. Patient will demonstrate all right upper extremity strength via MicroFET > 85% of left for improved ADL and IADL performance.  4. Patient will improve FOTO to < 46% to improve ADL performance.    PLAN     Continue per protocol.     Bang Albrecht, PT            oriented to person, place, time/situation

## 2022-11-04 ENCOUNTER — CLINICAL SUPPORT (OUTPATIENT)
Dept: REHABILITATION | Facility: HOSPITAL | Age: 47
End: 2022-11-04
Payer: MEDICAID

## 2022-11-04 DIAGNOSIS — M25.611 DECREASED RIGHT SHOULDER RANGE OF MOTION: Primary | ICD-10-CM

## 2022-11-04 DIAGNOSIS — M62.81 MUSCLE WEAKNESS OF RIGHT UPPER EXTREMITY: ICD-10-CM

## 2022-11-04 PROCEDURE — 97110 THERAPEUTIC EXERCISES: CPT | Mod: PN,CQ

## 2022-11-04 NOTE — PROGRESS NOTES
[facility]   Physical Therapy Treatment Note     Name: Nilesh Rolon Jr.  Clinic Number: [id number]    Therapy Diagnosis:   Encounter Diagnoses   Name Primary?    Decreased right shoulder range of motion Yes    Muscle weakness of right upper extremity      Physician: Ernesto Diego IV, MD    Visit Date: 11/4/2022    Physician Orders: PT Eval and Treat    Medical Diagnosis from Referral:   M75.101 (ICD-10-CM) - Tear of right supraspinatus tendon   M75.21 (ICD-10-CM) - Biceps tendinitis of right upper extremity   M19.011 (ICD-10-CM) - Primary osteoarthritis of right shoulder      Evaluation Date: 10/11/2022  Authorization Period Expiration: 9/7/24  Plan of Care Expiration: 12/15/22  Progress Note Due: 12/15/22  Visit # / Visits authorized: 5/16 (+1)   FOTO: 1/5 - completed 10/27/22   PTA Visit #: 1/5     Time In: 9:55 am  Time Out: 10:35 am  Total Billable Time: 40 minutes (TE-3) - Medicaid     Precautions: Standard; SEE POST OP PROTOCOL (limit extension)     Surgery: 10/4/22 - by Dr. Diego  Arthroscopic Rotator Cuff Repair (82555) - Medium tear  Open Biceps Tenodesis (34686)  Arthroscopic Shoulder Debridement - Extensive (30384) - Resection inferior humeral head osteophyte and debridement of long head biceps tendon, ant & post labrum, glenoid, humeral head  Manipulation under anesthesia (09280)  Comprehensive arthroscopic management Right shoulder      SUBJECTIVE     Pt reports: he occasionally experiences a sharp pain in his shoulder but overall doing well.  He was compliant with home exercise program.  Response to previous treatment: soreness  Functional change: not at this time.    Pain: 5/10  Location: right shoulder      OBJECTIVE     Objective Measures updated at progress report unless specified.     DOS: 10/4/22  Arthroscopic cuff repair and biceps tenodesis Phase II: 4-6 weeks  Brace donned as needed  AAROM: flexion to 140, abduction to 60-80, external rotation to 45 at  side  Gentle AAROM exercises without resistance, Pulleys, Grade I-II mobilizations  No resisted biceps strengthening for 8 weeks    10/27/22: right shoulder PROM  Flexion: 132 degrees  Abduction: 80 degrees  External rotation at side: 40 degrees    Treatment     Nilesh received the treatments listed below:      therapeutic exercises to develop strength, endurance, ROM, and flexibility for 20 minutes including:     Shoulder shrug + retractions : 20x  Wrist flexion/extension AROM: 20x  Elbow extension stretch: 2x1 minute    Table slide flexion (to 90 degree): 20x   Wand dowel external rotation: 2x10, 3'' holds  Seated shoulder flexion with ranger: x20    Nilesh received the following manual therapy techniques: Joint mobilizations, Myofacial release, and Soft tissue Mobilization were applied to the: right for 20 minutes, including:    Right shoulder PROM within restrictions with grade I joint mobs  Gentle STM  posterior shoulder and upper arm (medially-posteriorly)   Right elbow extension stretching  Right shoulder GHJ posterior mobs, grade I-II    Patient Education and Home Exercises     Home Exercises Provided and Patient Education Provided   Education provided:   - keep exercises in a pain free range.    Written Home Exercises Provided: yes. Exercises were reviewed and Nilesh was able to demonstrate them prior to the end of the session.  Nilesh demonstrated good  understanding of the education provided. See EMR under Patient Instructions for exercises provided during therapy sessions    ASSESSMENT     Nilesh is a 47 y.o. male referred to outpatient Physical Therapy with a medical diagnosis of right supraspinatus tear, biceps tendonitis and right shoulder osteoarthritis. Pt presents with 4 wk status post right supraspinatus repair, SAD, biceps tenodesis and extensive debridement on 10/4/22.  Pt pain is well controlled and only present at end range stretching. Can gently initiate AAROM per patient tolerance. Patient  inquired about walk around his house without his sling and I told him to wait until his doctor visit on 11/17/22.  Patient completed his therapy with no increase in symptoms prior to leaving the clinic.     Nilesh Is progressing well towards his goals.   Pt prognosis is Good.     Pt will continue to benefit from skilled outpatient physical therapy to address the deficits listed in the problem list box on initial evaluation, provide pt/family education and to maximize pt's level of independence in the home and community environment.     Pt's spiritual, cultural and educational needs considered and pt agreeable to plan of care and goals.     Anticipated barriers to physical therapy: co-morbidities.    Goals:  Short Term Goals: 4 weeks   1.  Patient will report < 5/10 shoulder pain at worst for improved ADL performance.  2.  Patient will demonstrate right shoulder flexion PROM > 140 deg to improve overhead reach.  3.  Patient will demonstrate an understanding and compliance with home exercise program.     Long Term Goals: 16 weeks   1. Patient will report ability to perform dressing, bathing and hygiene without limitation, pain or compensation.  2. Patient will demonstrate right shoulder flexion AROM > 145 degree to improve overhead reach.  3. Patient will demonstrate all right upper extremity strength via MicroFET > 85% of left for improved ADL and IADL performance.  4. Patient will improve FOTO to < 46% to improve ADL performance.    PLAN     Continue per protocol.     Jonathan Rojas, PTA

## 2022-11-07 ENCOUNTER — CLINICAL SUPPORT (OUTPATIENT)
Dept: REHABILITATION | Facility: HOSPITAL | Age: 47
End: 2022-11-07
Payer: MEDICAID

## 2022-11-07 DIAGNOSIS — M25.611 DECREASED RIGHT SHOULDER RANGE OF MOTION: Primary | ICD-10-CM

## 2022-11-07 DIAGNOSIS — M62.81 MUSCLE WEAKNESS OF RIGHT UPPER EXTREMITY: ICD-10-CM

## 2022-11-07 PROCEDURE — 97110 THERAPEUTIC EXERCISES: CPT | Mod: PN,CQ

## 2022-11-07 NOTE — PROGRESS NOTES
[facility]   Physical Therapy Treatment Note     Name: Nilesh Rolon Jr.  Clinic Number: [id number]    Therapy Diagnosis:   Encounter Diagnoses   Name Primary?    Decreased right shoulder range of motion Yes    Muscle weakness of right upper extremity      Physician: Ernesto Diego IV, MD    Visit Date: 11/7/2022    Physician Orders: PT Eval and Treat    Medical Diagnosis from Referral:   M75.101 (ICD-10-CM) - Tear of right supraspinatus tendon   M75.21 (ICD-10-CM) - Biceps tendinitis of right upper extremity   M19.011 (ICD-10-CM) - Primary osteoarthritis of right shoulder      Evaluation Date: 10/11/2022  Authorization Period Expiration: 9/7/24  Plan of Care Expiration: 12/15/22  Progress Note Due: 12/15/22  Visit # / Visits authorized: 6/16 (+1)   FOTO: 2/5 - completed 10/27/22   PTA Visit #: 2/5     Time In: 7:52 am  Time Out: 8:30 am  Total Billable Time: 38 minutes (TE-3) - Medicaid     Precautions: Standard; SEE POST OP PROTOCOL (limit extension)     Surgery: 10/4/22 - by Dr. Diego  Arthroscopic Rotator Cuff Repair (67043) - Medium tear  Open Biceps Tenodesis (84264)  Arthroscopic Shoulder Debridement - Extensive (07667) - Resection inferior humeral head osteophyte and debridement of long head biceps tendon, ant & post labrum, glenoid, humeral head  Manipulation under anesthesia (96068)  Comprehensive arthroscopic management Right shoulder      SUBJECTIVE     Pt reports: he has good days and bad days with his pain, today it is 5/10, not a bad day.  He was compliant with home exercise program.  Response to previous treatment: soreness  Functional change: not at this time.    Pain: 5/10  Location: right shoulder      OBJECTIVE     Objective Measures updated at progress report unless specified.     DOS: 10/4/22  Arthroscopic cuff repair and biceps tenodesis Phase II: 4-6 weeks  Brace donned as needed  AAROM: flexion to 140, abduction to 60-80, external rotation to 45 at  side  Gentle AAROM exercises without resistance, Pulleys, Grade I-II mobilizations  No resisted biceps strengthening for 8 weeks    10/27/22: right shoulder PROM  Flexion: 132 degrees  Abduction: 80 degrees  External rotation at side: 40 degrees    Treatment     Nilesh received the treatments listed below:      therapeutic exercises to develop strength, endurance, ROM, and flexibility for 20 minutes including:     Shoulder shrug + retractions : 20x  Wrist flexion/extension AROM: 20x  Elbow extension stretch: 2x1 minute    Table slide flexion (to 90 degree): 20x   Wand dowel external rotation: 2x10, 3'' holds  Seated shoulder flexion with ranger: x20    Nilesh received the following manual therapy techniques: Joint mobilizations, Myofacial release, and Soft tissue Mobilization were applied to the: right shoulder for 18 minutes, including:    Right shoulder PROM within restrictions with grade I joint mobs  Gentle STM  posterior shoulder and upper arm (medially-posteriorly)   Right elbow extension stretching  Right shoulder GHJ posterior mobs, grade I-II    Patient Education and Home Exercises     Home Exercises Provided and Patient Education Provided   Education provided:   - keep exercises in a pain free range.    Written Home Exercises Provided: yes. Exercises were reviewed and Nilesh was able to demonstrate them prior to the end of the session.  Nilesh demonstrated good  understanding of the education provided. See EMR under Patient Instructions for exercises provided during therapy sessions    ASSESSMENT     Nilesh is a 47 y.o. male referred to outpatient Physical Therapy with a medical diagnosis of right supraspinatus tear, biceps tendonitis and right shoulder osteoarthritis. Pt presents with 4 wk status post right supraspinatus repair, SAD, biceps tenodesis and extensive debridement on 10/4/22.  Pt pain is well controlled and only present at end range stretching. Can gently initiate AAROM per patient tolerance.  Patient requires occasional short rest breaks, less than a minute, due to fatigue. Patient completed his therapy with no increase in symptoms prior to leaving the clinic.     Nilesh Is progressing well towards his goals.   Pt prognosis is Good.     Pt will continue to benefit from skilled outpatient physical therapy to address the deficits listed in the problem list box on initial evaluation, provide pt/family education and to maximize pt's level of independence in the home and community environment.     Pt's spiritual, cultural and educational needs considered and pt agreeable to plan of care and goals.     Anticipated barriers to physical therapy: co-morbidities.    Goals:  Short Term Goals: 4 weeks   1.  Patient will report < 5/10 shoulder pain at worst for improved ADL performance.  2.  Patient will demonstrate right shoulder flexion PROM > 140 deg to improve overhead reach.  3.  Patient will demonstrate an understanding and compliance with home exercise program.     Long Term Goals: 16 weeks   1. Patient will report ability to perform dressing, bathing and hygiene without limitation, pain or compensation.  2. Patient will demonstrate right shoulder flexion AROM > 145 degree to improve overhead reach.  3. Patient will demonstrate all right upper extremity strength via MicroFET > 85% of left for improved ADL and IADL performance.  4. Patient will improve FOTO to < 46% to improve ADL performance.    PLAN     Continue per protocol.     Jonathan Rojas, PTA

## 2022-11-10 ENCOUNTER — CLINICAL SUPPORT (OUTPATIENT)
Dept: REHABILITATION | Facility: HOSPITAL | Age: 47
End: 2022-11-10
Payer: MEDICAID

## 2022-11-10 DIAGNOSIS — M62.81 MUSCLE WEAKNESS OF RIGHT UPPER EXTREMITY: ICD-10-CM

## 2022-11-10 DIAGNOSIS — M25.611 DECREASED RIGHT SHOULDER RANGE OF MOTION: Primary | ICD-10-CM

## 2022-11-10 PROCEDURE — 97110 THERAPEUTIC EXERCISES: CPT | Mod: PN

## 2022-11-10 NOTE — PROGRESS NOTES
[facility]   Physical Therapy Treatment Note     Name: Nilesh Rolon Jr.  Clinic Number: [id number]    Therapy Diagnosis:   Encounter Diagnoses   Name Primary?    Decreased right shoulder range of motion Yes    Muscle weakness of right upper extremity      Physician: Ernesto Diego IV, MD    Visit Date: 11/10/2022    Physician Orders: PT Eval and Treat    Medical Diagnosis from Referral:   M75.101 (ICD-10-CM) - Tear of right supraspinatus tendon   M75.21 (ICD-10-CM) - Biceps tendinitis of right upper extremity   M19.011 (ICD-10-CM) - Primary osteoarthritis of right shoulder      Evaluation Date: 10/11/2022  Authorization Period Expiration: 9/7/24  Plan of Care Expiration: 12/15/22  Progress Note Due: 12/15/22  Visit # / Visits authorized: 7/16 (+1)   FOTO: 7/10   PTA Visit #: 0/5     Time In: 2:05 PM  Time Out: 3:00 PM  Total Billable Time: 55 minutes (TE-4) - Medicaid     Precautions: Standard; SEE POST OP PROTOCOL (limit extension)     Surgery: 10/4/22 - by Dr. Diego  Arthroscopic Rotator Cuff Repair (08718) - Medium tear  Open Biceps Tenodesis (24025)  Arthroscopic Shoulder Debridement - Extensive (17527) - Resection inferior humeral head osteophyte and debridement of long head biceps tendon, ant & post labrum, glenoid, humeral head  Manipulation under anesthesia (33175)  Comprehensive arthroscopic management Right shoulder      SUBJECTIVE     Pt reports:  some pain at night but doing well overall. Can move his arm more and uses it to eat.  He was compliant with home exercise program.  Response to previous treatment: soreness  Functional change: not at this time.    Pain: 5/10  Location: right shoulder      OBJECTIVE     Objective Measures updated at progress report unless specified.     DOS: 10/4/22  Arthroscopic cuff repair and biceps tenodesis Phase II: 4-6 weeks  Brace donned as needed  AAROM: flexion to 140, abduction to 60-80, external rotation to 45 at side  Gentle AAROM  exercises without resistance, Pulleys, Grade I-II mobilizations  No resisted biceps strengthening for 8 weeks    10/27/22: right shoulder PROM  Flexion: 132 degrees  Abduction: 80 degrees  External rotation at side: 40 degrees    Treatment     Nilesh received the treatments listed below:      therapeutic exercises to develop strength, endurance, ROM, and flexibility for 40 minutes including:    Shoulder shrug + retractions : 20x  Wrist flexion/extension AROM: 20x, 1#  Elbow extension stretch: 2x1 minute  Wand dowel external rotation: 2x10, 3'' holds    Table slide flexion (to 90 degree): 20x   Seated shoulder flexion with ranger: x20    Nilesh received the following manual therapy techniques: Joint mobilizations, Myofacial release, and Soft tissue Mobilization were applied to the: right shoulder for 15 minutes, including:    Right shoulder PROM within restrictions with grade I joint mobs  Gentle STM  posterior shoulder and upper arm (medially-posteriorly)   Right elbow extension stretching  Right shoulder GHJ posterior mobs, grade I-II    Patient Education and Home Exercises     Home Exercises Provided and Patient Education Provided   Education provided:   - keep exercises in a pain free range.    Written Home Exercises Provided: yes. Exercises were reviewed and Nilesh was able to demonstrate them prior to the end of the session.  Nilesh demonstrated good  understanding of the education provided. See EMR under Patient Instructions for exercises provided during therapy sessions    ASSESSMENT     Nilesh is a 47 y.o. male referred to outpatient Physical Therapy with a medical diagnosis of right supraspinatus tear, biceps tendonitis and right shoulder osteoarthritis. Pt presents status post right supraspinatus repair, SAD, biceps tenodesis and extensive debridement on 10/4/22.  Improving right shoulder PROM and minimal discomfort with waist level activities. Good tolerance with exercises today and starting gentle AAROM.      Nilesh Is progressing well towards his goals.   Pt prognosis is Good.     Pt will continue to benefit from skilled outpatient physical therapy to address the deficits listed in the problem list box on initial evaluation, provide pt/family education and to maximize pt's level of independence in the home and community environment.     Pt's spiritual, cultural and educational needs considered and pt agreeable to plan of care and goals.     Anticipated barriers to physical therapy: co-morbidities.    Goals:  Short Term Goals: 4 weeks   1.  Patient will report < 5/10 shoulder pain at worst for improved ADL performance. - progressing  2.  Patient will demonstrate right shoulder flexion PROM > 140 deg to improve overhead reach. - progressing  3.  Patient will demonstrate an understanding and compliance with home exercise program. - progressing     Long Term Goals: 16 weeks   1. Patient will report ability to perform dressing, bathing and hygiene without limitation, pain or compensation. - progressing  2. Patient will demonstrate right shoulder flexion AROM > 145 degree to improve overhead reach. - progressing  3. Patient will demonstrate all right upper extremity strength via MicroFET > 85% of left for improved ADL and IADL performance. - progressing  4. Patient will improve FOTO to < 46% to improve ADL performance. - progressing    PLAN     Continue per protocol.     Bang Albrecht, PT

## 2022-11-15 ENCOUNTER — CLINICAL SUPPORT (OUTPATIENT)
Dept: REHABILITATION | Facility: HOSPITAL | Age: 47
End: 2022-11-15
Payer: MEDICAID

## 2022-11-15 DIAGNOSIS — M25.611 DECREASED RIGHT SHOULDER RANGE OF MOTION: Primary | ICD-10-CM

## 2022-11-15 DIAGNOSIS — M62.81 MUSCLE WEAKNESS OF RIGHT UPPER EXTREMITY: ICD-10-CM

## 2022-11-15 PROCEDURE — 97110 THERAPEUTIC EXERCISES: CPT | Mod: PN,CQ

## 2022-11-15 NOTE — PROGRESS NOTES
[facility]   Physical Therapy Treatment Note     Name: Nilesh Rolon Jr.  Clinic Number: [id number]    Therapy Diagnosis:   Encounter Diagnoses   Name Primary?    Decreased right shoulder range of motion Yes    Muscle weakness of right upper extremity      Physician: Ernesto Diego IV, MD    Visit Date: 11/15/2022    Physician Orders: PT Eval and Treat    Medical Diagnosis from Referral:   M75.101 (ICD-10-CM) - Tear of right supraspinatus tendon   M75.21 (ICD-10-CM) - Biceps tendinitis of right upper extremity   M19.011 (ICD-10-CM) - Primary osteoarthritis of right shoulder      Evaluation Date: 10/11/2022  Authorization Period Expiration: 9/7/24  Plan of Care Expiration: 12/15/22  Progress Note Due: 12/15/22  Visit # / Visits authorized: 8/16 (+1)   FOTO: 9/10   PTA Visit #: 1/5     Time In: 8:00 AM  Time Out: 8:38 AM  Total Billable Time: 38 minutes (TE-3) - Medicaid     Precautions: Standard; SEE POST OP PROTOCOL (limit extension)     Surgery: 10/4/22 - by Dr. Diego  Arthroscopic Rotator Cuff Repair (29179) - Medium tear  Open Biceps Tenodesis (27193)  Arthroscopic Shoulder Debridement - Extensive (49318) - Resection inferior humeral head osteophyte and debridement of long head biceps tendon, ant & post labrum, glenoid, humeral head  Manipulation under anesthesia (62315)  Comprehensive arthroscopic management Right shoulder      SUBJECTIVE     Pt reports: his shoulder is hurting a little more this morning but feels that it is because of the weather.  He was compliant with home exercise program.  Response to previous treatment: soreness  Functional change: not at this time.    Pain: 6/10  Location: right shoulder      OBJECTIVE     Objective Measures updated at progress report unless specified.     DOS: 10/4/22  Arthroscopic cuff repair and biceps tenodesis Phase II: 4-6 weeks  Brace donned as needed  AAROM: flexion to 140, abduction to 60-80, external rotation to 45 at  side  Gentle AAROM exercises without resistance, Pulleys, Grade I-II mobilizations  No resisted biceps strengthening for 8 weeks    10/27/22: right shoulder PROM  Flexion: 132 degrees  Abduction: 80 degrees  External rotation at side: 40 degrees    Treatment     Nilesh received the treatments listed below:      therapeutic exercises to develop strength, endurance, ROM, and flexibility for 28 minutes including:    Shoulder shrug + retractions : 20x  Wrist flexion/extension AROM: 20x, 1#  Elbow extension stretch: 2x1 minute  Wand dowel external rotation: 2x10, 3'' holds    Table slide flexion (to 90 degree): 20x   Seated shoulder flexion with ranger: x20    Nilesh received the following manual therapy techniques: Joint mobilizations, Myofacial release, and Soft tissue Mobilization were applied to the: right shoulder for 10 minutes, including:    Right shoulder PROM within restrictions with grade I joint mobs  Gentle STM  posterior shoulder and upper arm (medially-posteriorly)   Right elbow extension stretching  Right shoulder GHJ posterior mobs, grade I-II    Patient Education and Home Exercises     Home Exercises Provided and Patient Education Provided   Education provided:   - keep exercises in a pain free range.    Written Home Exercises Provided: yes. Exercises were reviewed and Nilesh was able to demonstrate them prior to the end of the session.  Nilesh demonstrated good  understanding of the education provided. See EMR under Patient Instructions for exercises provided during therapy sessions    ASSESSMENT     Nilesh is a 47 y.o. male referred to outpatient Physical Therapy with a medical diagnosis of right supraspinatus tear, biceps tendonitis and right shoulder osteoarthritis. Pt presents status post right supraspinatus repair, SAD, biceps tenodesis and extensive debridement on 10/4/22.  Improving right shoulder PROM and minimal discomfort with waist level activities. Patient completed his therapy with no  increase in symptoms prior to leaving the clinic.     Nilesh Is progressing well towards his goals.   Pt prognosis is Good.     Pt will continue to benefit from skilled outpatient physical therapy to address the deficits listed in the problem list box on initial evaluation, provide pt/family education and to maximize pt's level of independence in the home and community environment.     Pt's spiritual, cultural and educational needs considered and pt agreeable to plan of care and goals.     Anticipated barriers to physical therapy: co-morbidities.    Goals:  Short Term Goals: 4 weeks   1.  Patient will report < 5/10 shoulder pain at worst for improved ADL performance. - progressing  2.  Patient will demonstrate right shoulder flexion PROM > 140 deg to improve overhead reach. - progressing  3.  Patient will demonstrate an understanding and compliance with home exercise program. - progressing     Long Term Goals: 16 weeks   1. Patient will report ability to perform dressing, bathing and hygiene without limitation, pain or compensation. - progressing  2. Patient will demonstrate right shoulder flexion AROM > 145 degree to improve overhead reach. - progressing  3. Patient will demonstrate all right upper extremity strength via MicroFET > 85% of left for improved ADL and IADL performance. - progressing  4. Patient will improve FOTO to < 46% to improve ADL performance. - progressing    PLAN     Continue per protocol.     Jonathan Rojas, PTA

## 2022-11-17 ENCOUNTER — OFFICE VISIT (OUTPATIENT)
Dept: ORTHOPEDICS | Facility: CLINIC | Age: 47
End: 2022-11-17
Payer: MEDICAID

## 2022-11-17 VITALS
HEART RATE: 73 BPM | DIASTOLIC BLOOD PRESSURE: 88 MMHG | WEIGHT: 254.63 LBS | BODY MASS INDEX: 31.66 KG/M2 | SYSTOLIC BLOOD PRESSURE: 136 MMHG | HEIGHT: 75 IN

## 2022-11-17 DIAGNOSIS — M75.41 SUBACROMIAL IMPINGEMENT OF RIGHT SHOULDER: ICD-10-CM

## 2022-11-17 DIAGNOSIS — M19.011 PRIMARY OSTEOARTHRITIS OF RIGHT SHOULDER: ICD-10-CM

## 2022-11-17 DIAGNOSIS — M75.21 BICEPS TENDINITIS OF RIGHT UPPER EXTREMITY: ICD-10-CM

## 2022-11-17 DIAGNOSIS — M75.101 TEAR OF RIGHT SUPRASPINATUS TENDON: Primary | ICD-10-CM

## 2022-11-17 PROCEDURE — 3079F PR MOST RECENT DIASTOLIC BLOOD PRESSURE 80-89 MM HG: ICD-10-PCS | Mod: CPTII,,, | Performed by: ORTHOPAEDIC SURGERY

## 2022-11-17 PROCEDURE — 99999 PR PBB SHADOW E&M-EST. PATIENT-LVL III: CPT | Mod: PBBFAC,,, | Performed by: ORTHOPAEDIC SURGERY

## 2022-11-17 PROCEDURE — 99024 PR POST-OP FOLLOW-UP VISIT: ICD-10-PCS | Mod: ,,, | Performed by: ORTHOPAEDIC SURGERY

## 2022-11-17 PROCEDURE — 3008F PR BODY MASS INDEX (BMI) DOCUMENTED: ICD-10-PCS | Mod: CPTII,,, | Performed by: ORTHOPAEDIC SURGERY

## 2022-11-17 PROCEDURE — 99024 POSTOP FOLLOW-UP VISIT: CPT | Mod: ,,, | Performed by: ORTHOPAEDIC SURGERY

## 2022-11-17 PROCEDURE — 1159F PR MEDICATION LIST DOCUMENTED IN MEDICAL RECORD: ICD-10-PCS | Mod: CPTII,,, | Performed by: ORTHOPAEDIC SURGERY

## 2022-11-17 PROCEDURE — 99999 PR PBB SHADOW E&M-EST. PATIENT-LVL III: ICD-10-PCS | Mod: PBBFAC,,, | Performed by: ORTHOPAEDIC SURGERY

## 2022-11-17 PROCEDURE — 3079F DIAST BP 80-89 MM HG: CPT | Mod: CPTII,,, | Performed by: ORTHOPAEDIC SURGERY

## 2022-11-17 PROCEDURE — 1159F MED LIST DOCD IN RCRD: CPT | Mod: CPTII,,, | Performed by: ORTHOPAEDIC SURGERY

## 2022-11-17 PROCEDURE — 3075F SYST BP GE 130 - 139MM HG: CPT | Mod: CPTII,,, | Performed by: ORTHOPAEDIC SURGERY

## 2022-11-17 PROCEDURE — 3008F BODY MASS INDEX DOCD: CPT | Mod: CPTII,,, | Performed by: ORTHOPAEDIC SURGERY

## 2022-11-17 PROCEDURE — 99213 OFFICE O/P EST LOW 20 MIN: CPT | Mod: PBBFAC,PN | Performed by: ORTHOPAEDIC SURGERY

## 2022-11-17 PROCEDURE — 3075F PR MOST RECENT SYSTOLIC BLOOD PRESS GE 130-139MM HG: ICD-10-PCS | Mod: CPTII,,, | Performed by: ORTHOPAEDIC SURGERY

## 2022-11-17 RX ORDER — CYCLOBENZAPRINE HCL 10 MG
10 TABLET ORAL NIGHTLY
COMMUNITY
Start: 2022-11-07

## 2022-11-17 NOTE — PROGRESS NOTES
[facility], Orthopedics and Sports Medicine  [facility]    Shoulder Post-op Visit  11/17/2022     Diagnosis:  Right shoulder pain  High Grade Partial Rotator Cuff Tear  Biceps Tendonitis  Subacromial Impingement  Glenohumeral Osteoarthritis     Procedure:  10/4/22  Arthroscopic Rotator Cuff Repair   Open Biceps Tenodesis   Arthroscopic Shoulder Debridement - Extensive   Manipulation under anesthesia   Comprehensive arthroscopic management Right shoulder       Subjective:      Nilesh Rolon Jr. is a 47 y.o. male who is now 6 weeks status post right shoulder surgery. The patient is not having any pain. The patient denies fever, wound drainage, increasing redness, pus, increasing pain, increasing swelling. Post op problems reported: none.    Overall doing well.  Pain post op subsided.  No neurologic complaints.      Objective:      Ortho/SPM Exam  General: alert, appears stated age, and cooperative   Sutures: Sutures out.  Incision: healing well, no significant drainage, no dehiscence, no significant erythema  Tenderness: none  Forward Flexion: pasive 90 degrees  External Rotation: passive 10 degrees  Elbow/Wrist/Hand: Full ROM  Neuro: Intact to light touch Ax/R/U/M  Vascular: CR<2s. Palpable radial pulse      Imaging:  Radiographs of the right shoulder taken  10/20/2022  were personally reviewed from the [facility] EMR.  Multiple views of the shoulder are available today for review, including an AP, scapular Y, axillary view.  The hardware placed in recent surgery is in appropriate position with no evidence of hardware failure or loosening.  There is no acute fracture or dislocation.         Assessment:       The patient is status post right shoulder surgery. The primary encounter diagnosis was Tear of right supraspinatus tendon. Diagnoses of Biceps tendinitis of right upper extremity, Primary osteoarthritis of right shoulder, and Subacromial impingement of right shoulder were also  pertinent to this visit.  Doing well postoperatively. Continuation of post-op rehab course is recommended at this time. All of the patient's questions were answered.    D/c sling. Continue therapy.      Plan:      Continue physical therapy.  Discontinue use of shoulder immobilizer/sling.  Follow up at 3 months after surgery.       Ernesto Diego IV, MD   of Clinical Orthopedics  Department of Orthopedic Surgery  [facility]  Office: [phone]  Website: [email or web address]        No orders of the defined types were placed in this encounter.

## 2022-11-18 ENCOUNTER — CLINICAL SUPPORT (OUTPATIENT)
Dept: REHABILITATION | Facility: HOSPITAL | Age: 47
End: 2022-11-18
Payer: MEDICAID

## 2022-11-18 DIAGNOSIS — M62.81 MUSCLE WEAKNESS OF RIGHT UPPER EXTREMITY: ICD-10-CM

## 2022-11-18 DIAGNOSIS — M25.611 DECREASED RIGHT SHOULDER RANGE OF MOTION: Primary | ICD-10-CM

## 2022-11-18 PROCEDURE — 97110 THERAPEUTIC EXERCISES: CPT | Mod: PN | Performed by: PHYSICAL THERAPIST

## 2022-11-18 NOTE — PROGRESS NOTES
"[facility]   Physical Therapy Treatment Note     Name: Nilesh Rolon Jr.  Clinic Number: [id number]    Therapy Diagnosis:   Encounter Diagnoses   Name Primary?    Decreased right shoulder range of motion Yes    Muscle weakness of right upper extremity        Physician: Ernesto Diego IV, MD    Visit Date: 11/18/2022    Physician Orders: PT Eval and Treat    Medical Diagnosis from Referral:   M75.101 (ICD-10-CM) - Tear of right supraspinatus tendon   M75.21 (ICD-10-CM) - Biceps tendinitis of right upper extremity   M19.011 (ICD-10-CM) - Primary osteoarthritis of right shoulder      Evaluation Date: 10/11/2022  Authorization Period Expiration: 9/7/24  Plan of Care Expiration: 12/15/22  Progress Note Due: 12/15/22  Visit # / Visits authorized: 9/16 (+1)   FOTO: 10/10   PTA Visit #: 1/5     Time In: 9:30 AM  Time Out: 10:16 AM  Total Billable Time: 45 minutes (TE-3) - Medicaid     Precautions: Standard; SEE POST OP PROTOCOL (limit extension); sling dc'd     Surgery: 10/4/22 - by Dr. Diego  Arthroscopic Rotator Cuff Repair (43516) - Medium tear  Open Biceps Tenodesis (98812)  Arthroscopic Shoulder Debridement - Extensive (15724) - Resection inferior humeral head osteophyte and debridement of long head biceps tendon, ant & post labrum, glenoid, humeral head  Manipulation under anesthesia (91667)  Comprehensive arthroscopic management Right shoulder      SUBJECTIVE     Pt reports: he saw Dr. Diego who dc'd his sling. "I can tell it's getting better"  He was compliant with home exercise program.  Response to previous treatment: soreness  Functional change: not at this time.    Pain: 5/10  Location: right shoulder      OBJECTIVE     Objective Measures updated at progress report unless specified.     DOS: 10/4/22  Arthroscopic cuff repair and biceps tenodesis Phase II: 4-6 weeks  Brace donned as needed  AAROM: flexion to 140, abduction to 60-80, external rotation to 45 at side  Gentle " "AAROM exercises without resistance, Pulleys, Grade I-II mobilizations  No resisted biceps strengthening for 8 weeks    10/27/22: right shoulder PROM  Flexion: 132 degrees  Abduction: 80 degrees  External rotation at side: 40 degrees    Treatment     Nilesh received the treatments listed below:      therapeutic exercises to develop strength, endurance, ROM, and flexibility for 35 minutes including:    Pulleys: 3 minutes     Submax (<50%) isometrics: all 10x5" holds  Flexion  Extension (oval pad behind elbow)  External rotation  Internal rotation  Abduction    Side lying shoulder flexion with ranger: 2x10  External rotation ranger: 2x10 3" holds    Landmines at shelf: 10x5" holds    Not today:  Seated shoulder flexion with ranger: x20  Shoulder shrug + retractions : 20x  Wrist flexion/extension AROM: 20x, 1#    Nilesh received the following manual therapy techniques: Joint mobilizations, Myofacial release, and Soft tissue Mobilization were applied to the: right shoulder for 10 minutes, including:    Right shoulder PROM within restrictions with grade I joint mobs  Right shoulder GHJ posterior mobs, grade I-II  Side lying scapular rocking followed by scapular upward rotation mobs with shoulder flexion PROM    Not today:  Gentle STM  posterior shoulder and upper arm (medially-posteriorly)   Right elbow extension stretching    Patient Education and Home Exercises     Home Exercises Provided and Patient Education Provided   Education provided:   - keep exercises in a pain free range.    Written Home Exercises Provided: yes. Exercises were reviewed and Nilesh was able to demonstrate them prior to the end of the session.  Nilesh demonstrated good  understanding of the education provided. See EMR under Patient Instructions for exercises provided during therapy sessions    ASSESSMENT     Nilesh is a 47 y.o. male referred to outpatient Physical Therapy with a medical diagnosis of right supraspinatus tear, biceps tendonitis and " right shoulder osteoarthritis. Pt presents status post right supraspinatus repair, SAD, biceps tenodesis and extensive debridement on 10/4/22.  He arrived today without his sling, which has been dc'd. Shoulder external rotation PROM 40 degree and limitations in flexion remain as well. He reported feeling good with scapular mobs.     Nilesh Is progressing well towards his goals.   Pt prognosis is Good.     Pt will continue to benefit from skilled outpatient physical therapy to address the deficits listed in the problem list box on initial evaluation, provide pt/family education and to maximize pt's level of independence in the home and community environment.     Pt's spiritual, cultural and educational needs considered and pt agreeable to plan of care and goals.     Anticipated barriers to physical therapy: co-morbidities.    Goals:  Short Term Goals: 4 weeks   1.  Patient will report < 5/10 shoulder pain at worst for improved ADL performance. - progressing  2.  Patient will demonstrate right shoulder flexion PROM > 140 deg to improve overhead reach. - progressing  3.  Patient will demonstrate an understanding and compliance with home exercise program. - progressing     Long Term Goals: 16 weeks   1. Patient will report ability to perform dressing, bathing and hygiene without limitation, pain or compensation. - progressing  2. Patient will demonstrate right shoulder flexion AROM > 145 degree to improve overhead reach. - progressing  3. Patient will demonstrate all right upper extremity strength via MicroFET > 85% of left for improved ADL and IADL performance. - progressing  4. Patient will improve FOTO to < 46% to improve ADL performance. - progressing    PLAN     Continue per protocol.     Pranav You, PT

## 2022-11-22 ENCOUNTER — CLINICAL SUPPORT (OUTPATIENT)
Dept: REHABILITATION | Facility: HOSPITAL | Age: 47
End: 2022-11-22
Payer: MEDICAID

## 2022-11-22 DIAGNOSIS — M25.611 DECREASED RIGHT SHOULDER RANGE OF MOTION: Primary | ICD-10-CM

## 2022-11-22 DIAGNOSIS — M62.81 MUSCLE WEAKNESS OF RIGHT UPPER EXTREMITY: ICD-10-CM

## 2022-11-22 PROCEDURE — 97110 THERAPEUTIC EXERCISES: CPT | Mod: PN | Performed by: PHYSICAL THERAPIST

## 2022-11-22 NOTE — PROGRESS NOTES
[facility]   Physical Therapy Treatment Note     Name: Nilesh Rolon Jr.  Clinic Number: [id number]    Therapy Diagnosis:   Encounter Diagnoses   Name Primary?    Decreased right shoulder range of motion Yes    Muscle weakness of right upper extremity        Physician: Ernesto Diego IV, MD    Visit Date: 11/22/2022    Physician Orders: PT Eval and Treat    Medical Diagnosis from Referral:   M75.101 (ICD-10-CM) - Tear of right supraspinatus tendon   M75.21 (ICD-10-CM) - Biceps tendinitis of right upper extremity   M19.011 (ICD-10-CM) - Primary osteoarthritis of right shoulder      Evaluation Date: 10/11/2022  Authorization Period Expiration: 9/7/24  Plan of Care Expiration: 12/15/22  Progress Note Due: 12/15/22  Visit # / Visits authorized: 10/16 (+1)   FOTO: PERFORMED 11/22/22  PTA Visit #: 0/5     Time In: 9:30 AM  Time Out: 10:23 AM  Total Billable Time: 53 minutes (TE-4) - Medicaid     Precautions: Standard; SEE POST OP PROTOCOL (limit extension); sling dc'd     Surgery: 10/4/22 - by Dr. Diego  Arthroscopic Rotator Cuff Repair (10710) - Medium tear  Open Biceps Tenodesis (30025)  Arthroscopic Shoulder Debridement - Extensive (25638) - Resection inferior humeral head osteophyte and debridement of long head biceps tendon, ant & post labrum, glenoid, humeral head  Manipulation under anesthesia (53761)  Comprehensive arthroscopic management Right shoulder      SUBJECTIVE     Pt reports: he was sore after last visit, but is ok now  He was compliant with home exercise program.  Response to previous treatment: soreness  Functional change: not at this time.    Pain: 5/10  Location: right shoulder      OBJECTIVE     Objective Measures updated at progress report unless specified.     DOS: 10/4/22  Arthroscopic cuff repair and biceps tenodesis Phase II: 4-6 weeks  Brace donned as needed  AAROM: flexion to 140, abduction to 60-80, external rotation to 45 at side  Gentle AAROM exercises  "without resistance, Pulleys, Grade I-II mobilizations  No resisted biceps strengthening for 8 weeks    11/22/22: right shoulder PROM  Flexion: 134 degrees  Abduction: 120 degrees  External rotation at side: 50 degrees    Treatment     Nilesh received the treatments listed below:      therapeutic exercises to develop strength, endurance, ROM, and flexibility for 40 minutes including measurements and FOTO:    Pulleys: 3 minutes     Submax (<50%) isometrics: all 10x5" holds  Flexion  Extension (oval pad behind elbow)  External rotation  Internal rotation  Abduction    Side lying shoulder flexion with ranger: 2x10  External rotation ranger: 2x10 3" holds    Landmines at shelf with ranger: 15x5" holds  Scapular retractions: 20x    Not today:  Seated shoulder flexion with ranger: x20  Shoulder shrug + retractions : 20x  Wrist flexion/extension AROM: 20x, 1#    Nilesh received the following manual therapy techniques: Joint mobilizations, Myofacial release, and Soft tissue Mobilization were applied to the: right shoulder for 13 minutes, including:    Right shoulder PROM within restrictions with grade I joint mobs  Right shoulder GHJ posterior mobs, grade I-II  Side lying scapular rocking followed by scapular upward rotation mobs with shoulder flexion PROM    Not today:  Gentle STM  posterior shoulder and upper arm (medially-posteriorly)   Right elbow extension stretching    Patient Education and Home Exercises     Home Exercises Provided and Patient Education Provided   Education provided:   - keep exercises in a pain free range.    Written Home Exercises Provided: yes. Exercises were reviewed and Nilesh was able to demonstrate them prior to the end of the session.  Nilesh demonstrated good  understanding of the education provided. See EMR under Patient Instructions for exercises provided during therapy sessions    ASSESSMENT     Nilesh is a 47 y.o. male referred to outpatient Physical Therapy with a medical diagnosis of " right supraspinatus tear, biceps tendonitis and right shoulder osteoarthritis. Pt presents status post right supraspinatus repair, SAD, biceps tenodesis and extensive debridement on 10/4/22.  Main limitations remains shoulder flexion with limited inferior GHJ glide. External rotation PROM continues to improve. Pain remains well controlled at this time.    Nilesh Is progressing well towards his goals.   Pt prognosis is Good.     Pt will continue to benefit from skilled outpatient physical therapy to address the deficits listed in the problem list box on initial evaluation, provide pt/family education and to maximize pt's level of independence in the home and community environment.     Pt's spiritual, cultural and educational needs considered and pt agreeable to plan of care and goals.     Anticipated barriers to physical therapy: co-morbidities.    Goals:  Short Term Goals: 4 weeks   1.  Patient will report < 5/10 shoulder pain at worst for improved ADL performance. - progressing  2.  Patient will demonstrate right shoulder flexion PROM > 140 deg to improve overhead reach. - progressing  3.  Patient will demonstrate an understanding and compliance with home exercise program. - progressing     Long Term Goals: 16 weeks   1. Patient will report ability to perform dressing, bathing and hygiene without limitation, pain or compensation. - progressing  2. Patient will demonstrate right shoulder flexion AROM > 145 degree to improve overhead reach. - progressing  3. Patient will demonstrate all right upper extremity strength via MicroFET > 85% of left for improved ADL and IADL performance. - progressing  4. Patient will improve FOTO to < 46% to improve ADL performance. - progressing    PLAN     Continue per protocol.     Pranav You, PT

## 2022-11-25 ENCOUNTER — CLINICAL SUPPORT (OUTPATIENT)
Dept: REHABILITATION | Facility: HOSPITAL | Age: 47
End: 2022-11-25
Payer: MEDICAID

## 2022-11-25 DIAGNOSIS — M25.611 DECREASED RIGHT SHOULDER RANGE OF MOTION: Primary | ICD-10-CM

## 2022-11-25 DIAGNOSIS — M62.81 MUSCLE WEAKNESS OF RIGHT UPPER EXTREMITY: ICD-10-CM

## 2022-11-25 PROCEDURE — 97110 THERAPEUTIC EXERCISES: CPT | Mod: PN | Performed by: PHYSICAL THERAPIST

## 2022-11-25 NOTE — PROGRESS NOTES
[facility]   Physical Therapy Treatment Note     Name: Nilesh Rolon Jr.  Clinic Number: [id number]    Therapy Diagnosis:   Encounter Diagnoses   Name Primary?    Decreased right shoulder range of motion Yes    Muscle weakness of right upper extremity        Physician: Ernesto Diego IV, MD    Visit Date: 11/25/2022    Physician Orders: PT Eval and Treat    Medical Diagnosis from Referral:   M75.101 (ICD-10-CM) - Tear of right supraspinatus tendon   M75.21 (ICD-10-CM) - Biceps tendinitis of right upper extremity   M19.011 (ICD-10-CM) - Primary osteoarthritis of right shoulder      Evaluation Date: 10/11/2022  Authorization Period Expiration: 9/7/24  Plan of Care Expiration: 12/15/22  Progress Note Due: 12/15/22  Visit # / Visits authorized: 11/16 (+1)   FOTO: PERFORMED 11/22/22  PTA Visit #: 0/5     Time In: 9:30 AM  Time Out: 10:25 AM  Total Billable Time: 55 minutes (TE-4) - Medicaid     Precautions: Standard; SEE POST OP PROTOCOL (limit extension); sling dc'd     Surgery: 10/4/22 - by Dr. Diego  Arthroscopic Rotator Cuff Repair (22866) - Medium tear  Open Biceps Tenodesis (16574)  Arthroscopic Shoulder Debridement - Extensive (34700) - Resection inferior humeral head osteophyte and debridement of long head biceps tendon, ant & post labrum, glenoid, humeral head  Manipulation under anesthesia (98004)  Comprehensive arthroscopic management Right shoulder      SUBJECTIVE     Pt reports: less soreness after last visit  He was compliant with home exercise program.  Response to previous treatment: soreness  Functional change: not at this time.    Pain: 4-5/10  Location: right shoulder      OBJECTIVE     Objective Measures updated at progress report unless specified.     DOS: 10/4/22  Arthroscopic cuff repair and biceps tenodesis Phase II: 4-6 weeks  Brace donned as needed  AAROM: flexion to 140, abduction to 60-80, external rotation to 45 at side  Gentle AAROM exercises without  "resistance, Pulleys, Grade I-II mobilizations  No resisted biceps strengthening for 8 weeks    11/25/22: right shoulder PROM  Flexion: 145 degrees  External rotation at side: 55 degrees    Treatment     Nilesh received the treatments listed below:      therapeutic exercises to develop strength, endurance, ROM, and flexibility for 40 minutes including measurements and FOTO:    Pulleys: 3 minutes     Submax (<50%) isometrics: all 10x5" holds  Flexion  Extension (oval pad behind elbow)  External rotation  Internal rotation  Abduction    Prone right Row: 2x10  Prone right extension: 2x10  Side lying shoulder flexion with ranger: 2x10  External rotation ranger: 2x10 5" holds    Landmines at shelf with ranger: 15x5" holds  Scapular retractions: 20x    Not today:  Next: consider supine shoulder flexion AROM, side lying external rotation, supine external rotation/internal rotation AROM  Seated shoulder flexion with ranger: x20  Shoulder shrug + retractions : 20x  Wrist flexion/extension AROM: 20x, 1#    Nilesh received the following manual therapy techniques: Joint mobilizations, Myofacial release, and Soft tissue Mobilization were applied to the: right shoulder for 12 minutes, including:    Right shoulder PROM within restrictions with grade I joint mobs  Right shoulder GHJ posterior mobs, grade I-II  Side lying scapular rocking followed by scapular upward rotation mobs with shoulder flexion PROM    Not today:  Gentle STM  posterior shoulder and upper arm (medially-posteriorly)   Right elbow extension stretching    Patient Education and Home Exercises     Home Exercises Provided and Patient Education Provided   Education provided:   - keep exercises in a pain free range.    Written Home Exercises Provided: yes. Exercises were reviewed and Nilesh was able to demonstrate them prior to the end of the session.  Nilesh demonstrated good  understanding of the education provided. See EMR under Patient Instructions for exercises " provided during therapy sessions    ASSESSMENT     Nilesh is a 47 y.o. male referred to outpatient Physical Therapy with a medical diagnosis of right supraspinatus tear, biceps tendonitis and right shoulder osteoarthritis. Pt presents status post right supraspinatus repair, SAD, biceps tenodesis and extensive debridement on 10/4/22.  Flexion and external rotation PROM improved again from last visit, but mild clicking occasionally occurred.     Nilesh Is progressing well towards his goals.   Pt prognosis is Good.     Pt will continue to benefit from skilled outpatient physical therapy to address the deficits listed in the problem list box on initial evaluation, provide pt/family education and to maximize pt's level of independence in the home and community environment.     Pt's spiritual, cultural and educational needs considered and pt agreeable to plan of care and goals.     Anticipated barriers to physical therapy: co-morbidities.    Goals:  Short Term Goals: 4 weeks   1.  Patient will report < 5/10 shoulder pain at worst for improved ADL performance. - progressing  2.  Patient will demonstrate right shoulder flexion PROM > 140 deg to improve overhead reach. - MET  3.  Patient will demonstrate an understanding and compliance with home exercise program. - MET     Long Term Goals: 16 weeks   1. Patient will report ability to perform dressing, bathing and hygiene without limitation, pain or compensation. - progressing  2. Patient will demonstrate right shoulder flexion AROM > 145 degree to improve overhead reach. - progressing  3. Patient will demonstrate all right upper extremity strength via MicroFET > 85% of left for improved ADL and IADL performance. - progressing  4. Patient will improve FOTO to < 46% to improve ADL performance. - progressing    PLAN     Continue per protocol.     Pranav You, PT

## 2022-11-29 ENCOUNTER — CLINICAL SUPPORT (OUTPATIENT)
Dept: REHABILITATION | Facility: HOSPITAL | Age: 47
End: 2022-11-29
Payer: MEDICAID

## 2022-11-29 DIAGNOSIS — M62.81 MUSCLE WEAKNESS OF RIGHT UPPER EXTREMITY: ICD-10-CM

## 2022-11-29 DIAGNOSIS — M25.611 DECREASED RIGHT SHOULDER RANGE OF MOTION: Primary | ICD-10-CM

## 2022-11-29 PROCEDURE — 97110 THERAPEUTIC EXERCISES: CPT | Mod: PN | Performed by: PHYSICAL THERAPIST

## 2022-11-29 NOTE — PROGRESS NOTES
[facility]   Physical Therapy Treatment Note     Name: Nilesh Rolon Jr.  Clinic Number: [id number]    Therapy Diagnosis:   Encounter Diagnoses   Name Primary?    Decreased right shoulder range of motion Yes    Muscle weakness of right upper extremity          Physician: Ernesto Diego IV, MD    Visit Date: 11/29/2022    Physician Orders: PT Eval and Treat    Medical Diagnosis from Referral:   M75.101 (ICD-10-CM) - Tear of right supraspinatus tendon   M75.21 (ICD-10-CM) - Biceps tendinitis of right upper extremity   M19.011 (ICD-10-CM) - Primary osteoarthritis of right shoulder      Evaluation Date: 10/11/2022  Authorization Period Expiration: 9/7/24  Plan of Care Expiration: 12/15/22  Progress Note Due: 12/15/22  Visit # / Visits authorized: 12/16 (+1)   FOTO: PERFORMED 11/22/22  PTA Visit #: 0/5     Time In: 11:11 AM  Time Out: 12:05 AM  Total Billable Time: 55 minutes (TE-4) - Medicaid     Precautions: Standard; SEE POST OP PROTOCOL (limit extension); sling dc'd     Surgery: 10/4/22 - by Dr. Diego  Arthroscopic Rotator Cuff Repair (67270) - Medium tear  Open Biceps Tenodesis (28477)  Arthroscopic Shoulder Debridement - Extensive (74401) - Resection inferior humeral head osteophyte and debridement of long head biceps tendon, ant & post labrum, glenoid, humeral head  Manipulation under anesthesia (71953)  Comprehensive arthroscopic management Right shoulder      SUBJECTIVE     Pt reports: no increased soreness or pain after last visit. His shoulder feels pretty good, though  He was compliant with home exercise program.  Response to previous treatment: soreness  Functional change: not at this time.    Pain: 3/10  Location: right shoulder      OBJECTIVE     Objective Measures updated at progress report unless specified.     DOS: 10/4/22  Arthroscopic cuff repair and biceps tenodesis Phase II: 4-6 weeks  Brace donned as needed  AAROM: flexion to 140, abduction to 60-80, external  "rotation to 45 at side  Gentle AAROM exercises without resistance, Pulleys, Grade I-II mobilizations  No resisted biceps strengthening for 8 weeks    11/25/22: right shoulder PROM  Flexion: 145 degrees  External rotation at side: 55 degrees    Treatment     Nilesh received the treatments listed below:      therapeutic exercises to develop strength, endurance, ROM, and flexibility for 43 minutes including measurements:    Pulleys: 3 minutes     Submax (<50%) isometrics: all 10x5" holds  Flexion  Extension (oval pad behind elbow)  External rotation  Internal rotation  Abduction    Prone right Row: 2x10  Prone right extension: 2x10  Side lying shoulder flexion with ranger: 2x10  side lying external rotation: 2x8 right   External rotation ranger: 2x10 5" holds  supine shoulder flexion AROM: 2x8  supine external rotation/internal rotation AROM: 2x10    Landmines at table with ranger: 15x5" holds  Scapular retractions: 20x    Not today:  Seated shoulder flexion with ranger: x20  Shoulder shrug + retractions : 20x  Wrist flexion/extension AROM: 20x, 1#    Nilesh received the following manual therapy techniques: Joint mobilizations, Myofacial release, and Soft tissue Mobilization were applied to the: right shoulder for 12 minutes, including:    Right shoulder PROM within restrictions with grade I joint mobs  Right shoulder GHJ posterior mobs, grade I-II  Side lying scapular rocking followed by scapular upward rotation mobs with shoulder flexion PROM    Not today:  Gentle STM  posterior shoulder and upper arm (medially-posteriorly)   Right elbow extension stretching    Patient Education and Home Exercises     Home Exercises Provided and Patient Education Provided   Education provided:   - keep exercises in a pain free range.    Written Home Exercises Provided: yes. Exercises were reviewed and Nilesh was able to demonstrate them prior to the end of the session.  Nilesh demonstrated good  understanding of the education " provided. See EMR under Patient Instructions for exercises provided during therapy sessions    ASSESSMENT     Nilesh is a 47 y.o. male referred to outpatient Physical Therapy with a medical diagnosis of right supraspinatus tear, biceps tendonitis and right shoulder osteoarthritis. Pt presents status post right supraspinatus repair, SAD, biceps tenodesis and extensive debridement on 10/4/22.  Limited right shoulder external rotation AROM with added exercises, but no complaint of increased pain during session. Progressed AROM per protocol.    Nilesh Is progressing well towards his goals.   Pt prognosis is Good.     Pt will continue to benefit from skilled outpatient physical therapy to address the deficits listed in the problem list box on initial evaluation, provide pt/family education and to maximize pt's level of independence in the home and community environment.     Pt's spiritual, cultural and educational needs considered and pt agreeable to plan of care and goals.     Anticipated barriers to physical therapy: co-morbidities.    Goals:  Short Term Goals: 4 weeks   1.  Patient will report < 5/10 shoulder pain at worst for improved ADL performance. - progressing  2.  Patient will demonstrate right shoulder flexion PROM > 140 deg to improve overhead reach. - MET  3.  Patient will demonstrate an understanding and compliance with home exercise program. - MET     Long Term Goals: 16 weeks   1. Patient will report ability to perform dressing, bathing and hygiene without limitation, pain or compensation. - progressing  2. Patient will demonstrate right shoulder flexion AROM > 145 degree to improve overhead reach. - progressing  3. Patient will demonstrate all right upper extremity strength via MicroFET > 85% of left for improved ADL and IADL performance. - progressing  4. Patient will improve FOTO to < 46% to improve ADL performance. - progressing    PLAN     Continue per protocol.     Pranav You, PT

## 2022-12-01 ENCOUNTER — CLINICAL SUPPORT (OUTPATIENT)
Dept: REHABILITATION | Facility: HOSPITAL | Age: 47
End: 2022-12-01
Payer: MEDICAID

## 2022-12-01 DIAGNOSIS — M62.81 MUSCLE WEAKNESS OF RIGHT UPPER EXTREMITY: ICD-10-CM

## 2022-12-01 DIAGNOSIS — M25.611 DECREASED RIGHT SHOULDER RANGE OF MOTION: Primary | ICD-10-CM

## 2022-12-01 PROCEDURE — 97110 THERAPEUTIC EXERCISES: CPT | Mod: PN | Performed by: PHYSICAL THERAPIST

## 2022-12-01 NOTE — PROGRESS NOTES
[facility]   Physical Therapy Treatment Note     Name: Nilesh Rolon Jr.  Clinic Number: [id number]    Therapy Diagnosis:   Encounter Diagnoses   Name Primary?    Decreased right shoulder range of motion Yes    Muscle weakness of right upper extremity          Physician: Ernesto Diego IV, MD    Visit Date: 12/1/2022    Physician Orders: PT Eval and Treat    Medical Diagnosis from Referral:   M75.101 (ICD-10-CM) - Tear of right supraspinatus tendon   M75.21 (ICD-10-CM) - Biceps tendinitis of right upper extremity   M19.011 (ICD-10-CM) - Primary osteoarthritis of right shoulder      Evaluation Date: 10/11/2022  Authorization Period Expiration: 9/7/24  Plan of Care Expiration: 12/15/22  Progress Note Due: 12/15/22  Visit # / Visits authorized: 13/16 (+1)   FOTO: PERFORMED 11/22/22  PTA Visit #: 0/5     Time In: 11:04 AM  Time Out: 12:00 AM  Total Billable Time: 55 minutes (TE-4) - Medicaid     Precautions: Standard; SEE POST OP PROTOCOL (limit extension); sling dc'd     Surgery: 10/4/22 - by Dr. Diego  Arthroscopic Rotator Cuff Repair (33099) - Medium tear  Open Biceps Tenodesis (01606)  Arthroscopic Shoulder Debridement - Extensive (07759) - Resection inferior humeral head osteophyte and debridement of long head biceps tendon, ant & post labrum, glenoid, humeral head  Manipulation under anesthesia (44586)  Comprehensive arthroscopic management Right shoulder      SUBJECTIVE     Pt reports: worst pain is at night, but it comes and goes    He was compliant with home exercise program.  Response to previous treatment: soreness  Functional change: not at this time.    Pain: 3/10  Location: right shoulder      OBJECTIVE     Objective Measures updated at progress report unless specified.     DOS: 10/4/22  Arthroscopic cuff repair and biceps tenodesis Phase II: 4-6 weeks  Brace donned as needed  AAROM: flexion to 140, abduction to 60-80, external rotation to 45 at side  Gentle AAROM  "exercises without resistance, Pulleys, Grade I-II mobilizations  No resisted biceps strengthening for 8 weeks    11/25/22: right shoulder PROM  Flexion: 145 degrees  External rotation at side: 55 degrees    Treatment     Nilesh received the treatments listed below:      therapeutic exercises to develop strength, endurance, ROM, and flexibility for 43 minutes including measurements:    Pulleys: 3 minutes     Submax (<50%) isometrics: all 10x5" holds  Flexion  Extension (oval pad behind elbow)  External rotation  Internal rotation  Abduction    Prone right Row: 2x10  Prone right extension: 2x10  Side lying shoulder flexion with ranger: 2x10  side lying external rotation: 2x8 right   External rotation ranger: 2x10 5" holds  supine shoulder flexion AROM: 2x8  supine external rotation/internal rotation AROM: 2x10    Landmines at table with ranger: 15x5" holds  Scapular retractions: 20x    Not today:  Seated shoulder flexion with ranger: x20  Shoulder shrug + retractions : 20x  Wrist flexion/extension AROM: 20x, 1#    Nilesh received the following manual therapy techniques: Joint mobilizations, Myofacial release, and Soft tissue Mobilization were applied to the: right shoulder for 12 minutes, including:    Right shoulder PROM within restrictions with grade I joint mobs  Right shoulder GHJ posterior mobs, grade I-II  Side lying scapular rocking followed by scapular upward rotation mobs with shoulder flexion PROM    Not today:  Gentle STM  posterior shoulder and upper arm (medially-posteriorly)   Right elbow extension stretching    Patient Education and Home Exercises     Home Exercises Provided and Patient Education Provided   Education provided:   - keep exercises in a pain free range.    Written Home Exercises Provided: yes. Exercises were reviewed and Nilesh was able to demonstrate them prior to the end of the session.  Nilesh demonstrated good  understanding of the education provided. See EMR under Patient Instructions " for exercises provided during therapy sessions    ASSESSMENT     Nilesh is a 47 y.o. male referred to outpatient Physical Therapy with a medical diagnosis of right supraspinatus tear, biceps tendonitis and right shoulder osteoarthritis. Pt presents status post right supraspinatus repair, SAD, biceps tenodesis and extensive debridement on 10/4/22.  Initiated shoulder flexion AROM supine today with range of motion same as AAROM with no increase in symptoms. Pain remains well controlled with range of motion being main deficit at this time.     Nilesh Is progressing well towards his goals.   Pt prognosis is Good.     Pt will continue to benefit from skilled outpatient physical therapy to address the deficits listed in the problem list box on initial evaluation, provide pt/family education and to maximize pt's level of independence in the home and community environment.     Pt's spiritual, cultural and educational needs considered and pt agreeable to plan of care and goals.     Anticipated barriers to physical therapy: co-morbidities.    Goals:  Short Term Goals: 4 weeks   1.  Patient will report < 5/10 shoulder pain at worst for improved ADL performance. - progressing  2.  Patient will demonstrate right shoulder flexion PROM > 140 deg to improve overhead reach. - MET  3.  Patient will demonstrate an understanding and compliance with home exercise program. - MET     Long Term Goals: 16 weeks   1. Patient will report ability to perform dressing, bathing and hygiene without limitation, pain or compensation. - progressing  2. Patient will demonstrate right shoulder flexion AROM > 145 degree to improve overhead reach. - progressing  3. Patient will demonstrate all right upper extremity strength via MicroFET > 85% of left for improved ADL and IADL performance. - progressing  4. Patient will improve FOTO to < 46% to improve ADL performance. - progressing    PLAN     Continue per protocol.     Pranav You, PT

## 2022-12-05 ENCOUNTER — HOSPITAL ENCOUNTER (OUTPATIENT)
Dept: RADIOLOGY | Facility: HOSPITAL | Age: 47
Discharge: HOME | End: 2022-12-05
Attending: INTERNAL MEDICINE
Payer: MEDICAID

## 2022-12-05 DIAGNOSIS — K70.31 ALCOHOLIC CIRRHOSIS OF LIVER WITH ASCITES: ICD-10-CM

## 2022-12-05 PROCEDURE — 76700 US EXAM ABDOM COMPLETE: CPT | Mod: 26,,, | Performed by: RADIOLOGY

## 2022-12-05 PROCEDURE — 76700 US EXAM ABDOM COMPLETE: CPT | Mod: TC

## 2022-12-05 PROCEDURE — 76700 US ABDOMEN COMPLETE: ICD-10-PCS | Mod: 26,,, | Performed by: RADIOLOGY

## 2022-12-07 ENCOUNTER — CLINICAL SUPPORT (OUTPATIENT)
Dept: REHABILITATION | Facility: HOSPITAL | Age: 47
End: 2022-12-07
Payer: MEDICAID

## 2022-12-07 DIAGNOSIS — M62.81 MUSCLE WEAKNESS OF RIGHT UPPER EXTREMITY: ICD-10-CM

## 2022-12-07 DIAGNOSIS — M25.611 DECREASED RIGHT SHOULDER RANGE OF MOTION: Primary | ICD-10-CM

## 2022-12-07 PROCEDURE — 97110 THERAPEUTIC EXERCISES: CPT | Mod: PN,CQ

## 2022-12-07 NOTE — PROGRESS NOTES
[facility]   Physical Therapy Treatment Note     Name: Nilesh Rolon Jr.  Clinic Number: [id number]    Therapy Diagnosis:   Encounter Diagnoses   Name Primary?    Decreased right shoulder range of motion Yes    Muscle weakness of right upper extremity      Physician: Ernesto Diego IV, MD    Visit Date: 12/7/2022    Physician Orders: PT Eval and Treat    Medical Diagnosis from Referral:   M75.101 (ICD-10-CM) - Tear of right supraspinatus tendon   M75.21 (ICD-10-CM) - Biceps tendinitis of right upper extremity   M19.011 (ICD-10-CM) - Primary osteoarthritis of right shoulder      Evaluation Date: 10/11/2022  Authorization Period Expiration: 9/7/24  Plan of Care Expiration: 12/15/22  Progress Note Due: 12/15/22  Visit # / Visits authorized: 14/16 (+1)   FOTO: PERFORMED 11/22/22  PTA Visit #: 1/5     Time In: 9:00 AM  Time Out: 9:53 AM  Total Billable Time: 53 minutes (TE-4) - Medicaid     Precautions: Standard; SEE POST OP PROTOCOL (limit extension); sling dc'd     Surgery: 10/4/22 - by Dr. Diego  Arthroscopic Rotator Cuff Repair (71174) - Medium tear  Open Biceps Tenodesis (21641)  Arthroscopic Shoulder Debridement - Extensive (09189) - Resection inferior humeral head osteophyte and debridement of long head biceps tendon, ant & post labrum, glenoid, humeral head  Manipulation under anesthesia (93734)  Comprehensive arthroscopic management Right shoulder      SUBJECTIVE     Pt reports: worst pain is at night, but it comes and goes    He was compliant with home exercise program.  Response to previous treatment: soreness  Functional change: not at this time.    Pain: 5/10  Location: right shoulder      OBJECTIVE     Objective Measures updated at progress report unless specified.     DOS: 10/4/22  Arthroscopic cuff repair and biceps tenodesis Phase II: 4-6 weeks  Brace donned as needed  AAROM: flexion to 140, abduction to 60-80, external rotation to 45 at side  Gentle AAROM exercises  "without resistance, Pulleys, Grade I-II mobilizations  No resisted biceps strengthening for 8 weeks    11/25/22: right shoulder PROM  Flexion: 145 degrees  External rotation at side: 55 degrees    Treatment     Nilesh received the treatments listed below:      therapeutic exercises to develop strength, endurance, ROM, and flexibility for 40 minutes including measurements:    Pulleys: 3 minutes     Submax (<50%) isometrics: all 10x5" holds  Flexion  Extension (oval pad behind elbow)  External rotation  Internal rotation  Abduction    Prone right Row: 2x10  Prone right extension: 2x10  Side lying shoulder flexion with ranger: 2x10  Side lying external rotation: 2x10 right   External rotation ranger: 2x10 5" holds  Supine shoulder flexion AROM: 2x10  Supine external rotation/internal rotation AROM: 2x10    Landmines at table with ranger: 20x5" holds  Scapular retractions: 20x    Not today:  Seated shoulder flexion with ranger: x20  Shoulder shrug + retractions : 20x  Wrist flexion/extension AROM: 20x, 1#    Nilesh received the following manual therapy techniques: Joint mobilizations, Myofacial release, and Soft tissue Mobilization were applied to the: right shoulder for 13 minutes, including:    Right shoulder PROM within restrictions with grade I joint mobs  Right shoulder GHJ posterior mobs, grade I-II  Side lying scapular rocking followed by scapular upward rotation mobs with shoulder flexion PROM    Not today:  Gentle STM  posterior shoulder and upper arm (medially-posteriorly)   Right elbow extension stretching    Patient Education and Home Exercises     Home Exercises Provided and Patient Education Provided   Education provided:   - keep exercises in a pain free range.    Written Home Exercises Provided: yes. Exercises were reviewed and Nilesh was able to demonstrate them prior to the end of the session.  Nilesh demonstrated good  understanding of the education provided. See EMR under Patient Instructions for " exercises provided during therapy sessions    ASSESSMENT     Nilesh is a 47 y.o. male referred to outpatient Physical Therapy with a medical diagnosis of right supraspinatus tear, biceps tendonitis and right shoulder osteoarthritis. Pt presents status post right supraspinatus repair, SAD, biceps tenodesis and extensive debridement on 10/4/22. Pain remains well controlled with range of motion being main deficit at this time. Patient had no difficulty with today's progressions, noted in bold.    Nilesh Is progressing well towards his goals.   Pt prognosis is Good.     Pt will continue to benefit from skilled outpatient physical therapy to address the deficits listed in the problem list box on initial evaluation, provide pt/family education and to maximize pt's level of independence in the home and community environment.     Pt's spiritual, cultural and educational needs considered and pt agreeable to plan of care and goals.     Anticipated barriers to physical therapy: co-morbidities.    Goals:  Short Term Goals: 4 weeks   1.  Patient will report < 5/10 shoulder pain at worst for improved ADL performance. - progressing  2.  Patient will demonstrate right shoulder flexion PROM > 140 deg to improve overhead reach. - MET  3.  Patient will demonstrate an understanding and compliance with home exercise program. - MET     Long Term Goals: 16 weeks   1. Patient will report ability to perform dressing, bathing and hygiene without limitation, pain or compensation. - progressing  2. Patient will demonstrate right shoulder flexion AROM > 145 degree to improve overhead reach. - progressing  3. Patient will demonstrate all right upper extremity strength via MicroFET > 85% of left for improved ADL and IADL performance. - progressing  4. Patient will improve FOTO to < 46% to improve ADL performance. - progressing    PLAN     Continue per protocol.     Jonathan Rojas, PTA                Clofazimine Counseling:  I discussed with the patient the risks of clofazimine including but not limited to skin and eye pigmentation, liver damage, nausea/vomiting, gastrointestinal bleeding and allergy.

## 2022-12-09 ENCOUNTER — CLINICAL SUPPORT (OUTPATIENT)
Dept: REHABILITATION | Facility: HOSPITAL | Age: 47
End: 2022-12-09
Payer: MEDICAID

## 2022-12-09 DIAGNOSIS — M25.611 DECREASED RIGHT SHOULDER RANGE OF MOTION: Primary | ICD-10-CM

## 2022-12-09 DIAGNOSIS — M62.81 MUSCLE WEAKNESS OF RIGHT UPPER EXTREMITY: ICD-10-CM

## 2022-12-09 PROCEDURE — 97110 THERAPEUTIC EXERCISES: CPT | Mod: PN,CQ

## 2022-12-09 NOTE — PROGRESS NOTES
[facility]   Physical Therapy Treatment Note     Name: Nilesh Rolon Jr.  Clinic Number: [id number]    Therapy Diagnosis:   Encounter Diagnoses   Name Primary?    Decreased right shoulder range of motion Yes    Muscle weakness of right upper extremity      Physician: Ernesto Diego IV, MD    Visit Date: 12/9/2022    Physician Orders: PT Eval and Treat    Medical Diagnosis from Referral:   M75.101 (ICD-10-CM) - Tear of right supraspinatus tendon   M75.21 (ICD-10-CM) - Biceps tendinitis of right upper extremity   M19.011 (ICD-10-CM) - Primary osteoarthritis of right shoulder      Evaluation Date: 10/11/2022  Authorization Period Expiration: 9/7/24  Plan of Care Expiration: 12/15/22  Progress Note Due: 12/15/22  Visit # / Visits authorized: 15/16 (+1)   FOTO: PERFORMED 11/22/22  PTA Visit #: 2/5     Time In: 9:00 AM  Time Out: 9:38 AM  Total Billable Time: 38 minutes (TE-3) - Medicaid     Precautions: Standard; SEE POST OP PROTOCOL (limit extension); sling dc'd     Surgery: 10/4/22 - by Dr. Diego  Arthroscopic Rotator Cuff Repair (85464) - Medium tear  Open Biceps Tenodesis (97164)  Arthroscopic Shoulder Debridement - Extensive (22985) - Resection inferior humeral head osteophyte and debridement of long head biceps tendon, ant & post labrum, glenoid, humeral head  Manipulation under anesthesia (94407)  Comprehensive arthroscopic management Right shoulder      SUBJECTIVE     Pt reports: feels that his shoulder is improving.    He was compliant with home exercise program.  Response to previous treatment: soreness  Functional change: not at this time.    Pain: 5/10  Location: right shoulder      OBJECTIVE     Objective Measures updated at progress report unless specified.     DOS: 10/4/22  Arthroscopic cuff repair and biceps tenodesis Phase II: 4-6 weeks  Brace donned as needed  AAROM: flexion to 140, abduction to 60-80, external rotation to 45 at side  Gentle AAROM exercises without  "resistance, Pulleys, Grade I-II mobilizations  No resisted biceps strengthening for 8 weeks    11/25/22: right shoulder PROM  Flexion: 145 degrees  External rotation at side: 55 degrees    Treatment     Nilesh received the treatments listed below:      therapeutic exercises to develop strength, endurance, ROM, and flexibility for 30 minutes including measurements:    Pulleys: 3 minutes     Submax (<50%) isometrics: all 10x5" holds  Flexion  Extension (oval pad behind elbow)  External rotation  Internal rotation  Abduction    Prone right Row: 2x10  Prone right extension: 2x10  Side lying shoulder flexion with ranger: 3x10  Side lying external rotation: 3x10 right   External rotation ranger: 2x10 5" holds  Supine shoulder flexion AROM: 2x10  Supine external rotation/internal rotation AROM: 2x10    Landmines at table with ranger: 20x5" holds  Scapular retractions: 20x    Not today:  Seated shoulder flexion with ranger: x20  Shoulder shrug + retractions : 20x  Wrist flexion/extension AROM: 20x, 1#    Nilesh received the following manual therapy techniques: Joint mobilizations, Myofacial release, and Soft tissue Mobilization were applied to the: right shoulder for 8 minutes, including:    Right shoulder PROM within restrictions with grade I joint mobs  Right shoulder GHJ posterior mobs, grade I-II  Side lying scapular rocking followed by scapular upward rotation mobs with shoulder flexion PROM    Not today:  Gentle STM  posterior shoulder and upper arm (medially-posteriorly)   Right elbow extension stretching    Patient Education and Home Exercises     Home Exercises Provided and Patient Education Provided   Education provided:   - keep exercises in a pain free range.    Written Home Exercises Provided: yes. Exercises were reviewed and Nilesh was able to demonstrate them prior to the end of the session.  Nilesh demonstrated good  understanding of the education provided. See EMR under Patient Instructions for exercises " provided during therapy sessions    ASSESSMENT     Nilesh is a 47 y.o. male referred to outpatient Physical Therapy with a medical diagnosis of right supraspinatus tear, biceps tendonitis and right shoulder osteoarthritis. Pt presents status post right supraspinatus repair, SAD, biceps tenodesis and extensive debridement on 10/4/22. Pain remains well controlled with range of motion being main deficit at this time. Patient requires occasional cues to keep exercises in a pain free range.    Nilesh Is progressing well towards his goals.   Pt prognosis is Good.     Pt will continue to benefit from skilled outpatient physical therapy to address the deficits listed in the problem list box on initial evaluation, provide pt/family education and to maximize pt's level of independence in the home and community environment.     Pt's spiritual, cultural and educational needs considered and pt agreeable to plan of care and goals.     Anticipated barriers to physical therapy: co-morbidities.    Goals:  Short Term Goals: 4 weeks   1.  Patient will report < 5/10 shoulder pain at worst for improved ADL performance. - progressing  2.  Patient will demonstrate right shoulder flexion PROM > 140 deg to improve overhead reach. - MET  3.  Patient will demonstrate an understanding and compliance with home exercise program. - MET     Long Term Goals: 16 weeks   1. Patient will report ability to perform dressing, bathing and hygiene without limitation, pain or compensation. - progressing  2. Patient will demonstrate right shoulder flexion AROM > 145 degree to improve overhead reach. - progressing  3. Patient will demonstrate all right upper extremity strength via MicroFET > 85% of left for improved ADL and IADL performance. - progressing  4. Patient will improve FOTO to < 46% to improve ADL performance. - progressing    PLAN     Continue per protocol.     Jonathan Rojas, PTA

## 2022-12-13 ENCOUNTER — CLINICAL SUPPORT (OUTPATIENT)
Dept: REHABILITATION | Facility: HOSPITAL | Age: 47
End: 2022-12-13
Payer: MEDICAID

## 2022-12-13 DIAGNOSIS — M62.81 MUSCLE WEAKNESS OF RIGHT UPPER EXTREMITY: ICD-10-CM

## 2022-12-13 DIAGNOSIS — M25.611 DECREASED RIGHT SHOULDER RANGE OF MOTION: Primary | ICD-10-CM

## 2022-12-13 PROCEDURE — 97110 THERAPEUTIC EXERCISES: CPT | Mod: PN,CQ

## 2022-12-13 NOTE — PROGRESS NOTES
[facility]   Physical Therapy Treatment Note     Name: Nilesh Rolon Jr.  Clinic Number: [id number]    Therapy Diagnosis:   Encounter Diagnoses   Name Primary?    Decreased right shoulder range of motion Yes    Muscle weakness of right upper extremity      Physician: Ernesto Diego IV, MD    Visit Date: 12/13/2022    Physician Orders: PT Eval and Treat    Medical Diagnosis from Referral:   M75.101 (ICD-10-CM) - Tear of right supraspinatus tendon   M75.21 (ICD-10-CM) - Biceps tendinitis of right upper extremity   M19.011 (ICD-10-CM) - Primary osteoarthritis of right shoulder      Evaluation Date: 10/11/2022  Authorization Period Expiration: 9/7/24  Plan of Care Expiration: 12/15/22  Progress Note Due: 12/15/22  Visit # / Visits authorized: 16/16 (+1)   FOTO: PERFORMED 11/22/22  PTA Visit #: 3/5     Time In: 8:55 AM  Time Out: 9:35 AM  Total Billable Time: 40 minutes (TE-3) - Medicaid     Precautions: Standard; SEE POST OP PROTOCOL (limit extension); sling dc'd     Surgery: 10/4/22 - by Dr. Diego  Arthroscopic Rotator Cuff Repair (90485) - Medium tear  Open Biceps Tenodesis (93293)  Arthroscopic Shoulder Debridement - Extensive (67836) - Resection inferior humeral head osteophyte and debridement of long head biceps tendon, ant & post labrum, glenoid, humeral head  Manipulation under anesthesia (22059)  Comprehensive arthroscopic management Right shoulder      SUBJECTIVE     Pt reports: his pain is not too bad.    He was compliant with home exercise program.  Response to previous treatment: soreness  Functional change: not at this time.    Pain: 4-5/10  Location: right shoulder      OBJECTIVE     Objective Measures updated at progress report unless specified.     DOS: 10/4/22  Arthroscopic cuff repair and biceps tenodesis Phase II: 4-6 weeks  Brace donned as needed  AAROM: flexion to 140, abduction to 60-80, external rotation to 45 at side  Gentle AAROM exercises without resistance,  "Pulleys, Grade I-II mobilizations  No resisted biceps strengthening for 8 weeks    11/25/22: right shoulder PROM  Flexion: 145 degrees  External rotation at side: 55 degrees    Treatment     Nilesh received the treatments listed below:      therapeutic exercises to develop strength, endurance, ROM, and flexibility for 32 minutes including measurements:    Pulleys: 3 minutes     Submax (<50%) isometrics: all 10x5" holds  Flexion  Extension (oval pad behind elbow)  External rotation  Internal rotation  Abduction    Prone right Row: 3x10  Prone right extension: 3x10  Side lying shoulder flexion with ranger: 3x10  Side lying external rotation: 3x10 right   External rotation ranger: 2x10 5" holds  Supine shoulder flexion AROM: 3x10  Supine external rotation/internal rotation AROM: 2x10    Landmines at table with ranger: 20x5" holds  Scapular retractions: 20x    Not today:  Seated shoulder flexion with ranger: x20  Shoulder shrug + retractions : 20x  Wrist flexion/extension AROM: 20x, 1#    Nilesh received the following manual therapy techniques: Joint mobilizations, Myofacial release, and Soft tissue Mobilization were applied to the: right shoulder for 8 minutes, including:    Right shoulder PROM within restrictions with grade I joint mobs  Right shoulder GHJ posterior mobs, grade I-II  Side lying scapular rocking followed by scapular upward rotation mobs with shoulder flexion PROM    Not today:  Gentle STM  posterior shoulder and upper arm (medially-posteriorly)   Right elbow extension stretching    Patient Education and Home Exercises     Home Exercises Provided and Patient Education Provided   Education provided:   - keep exercises in a pain free range.    Written Home Exercises Provided: yes. Exercises were reviewed and Nilesh was able to demonstrate them prior to the end of the session.  Nilesh demonstrated good  understanding of the education provided. See EMR under Patient Instructions for exercises provided " during therapy sessions    ASSESSMENT     Nilesh is a 47 y.o. male referred to outpatient Physical Therapy with a medical diagnosis of right supraspinatus tear, biceps tendonitis and right shoulder osteoarthritis. Pt presents status post right supraspinatus repair, SAD, biceps tenodesis and extensive debridement on 10/4/22. Pain remains well controlled with range of motion being main deficit at this time. Patient requires occasional cues to keep exercises in a pain free range.  Patient had no difficulty with today's progressions, noted in bold.    Nilesh Is progressing well towards his goals.   Pt prognosis is Good.     Pt will continue to benefit from skilled outpatient physical therapy to address the deficits listed in the problem list box on initial evaluation, provide pt/family education and to maximize pt's level of independence in the home and community environment.     Pt's spiritual, cultural and educational needs considered and pt agreeable to plan of care and goals.     Anticipated barriers to physical therapy: co-morbidities.    Goals:  Short Term Goals: 4 weeks   1.  Patient will report < 5/10 shoulder pain at worst for improved ADL performance. - progressing  2.  Patient will demonstrate right shoulder flexion PROM > 140 deg to improve overhead reach. - MET  3.  Patient will demonstrate an understanding and compliance with home exercise program. - MET     Long Term Goals: 16 weeks   1. Patient will report ability to perform dressing, bathing and hygiene without limitation, pain or compensation. - progressing  2. Patient will demonstrate right shoulder flexion AROM > 145 degree to improve overhead reach. - progressing  3. Patient will demonstrate all right upper extremity strength via MicroFET > 85% of left for improved ADL and IADL performance. - progressing  4. Patient will improve FOTO to < 46% to improve ADL performance. - progressing    PLAN     Continue per protocol.     Jonathan Rojas, PTA

## 2022-12-14 ENCOUNTER — DOCUMENTATION ONLY (OUTPATIENT)
Dept: REHABILITATION | Facility: HOSPITAL | Age: 47
End: 2022-12-14
Payer: MEDICAID

## 2022-12-15 ENCOUNTER — CLINICAL SUPPORT (OUTPATIENT)
Dept: REHABILITATION | Facility: HOSPITAL | Age: 47
End: 2022-12-15
Payer: MEDICAID

## 2022-12-15 DIAGNOSIS — M25.611 DECREASED RIGHT SHOULDER RANGE OF MOTION: Primary | ICD-10-CM

## 2022-12-15 DIAGNOSIS — M62.81 MUSCLE WEAKNESS OF RIGHT UPPER EXTREMITY: ICD-10-CM

## 2022-12-15 PROCEDURE — 97110 THERAPEUTIC EXERCISES: CPT | Mod: PN | Performed by: PHYSICAL THERAPIST

## 2022-12-15 NOTE — PROGRESS NOTES
[facility]   Physical Therapy Treatment Note     Name: Nilesh Rolon Jr.  Clinic Number: [id number]    Therapy Diagnosis:   Encounter Diagnoses   Name Primary?    Decreased right shoulder range of motion Yes    Muscle weakness of right upper extremity        Physician: Ernesto Diego IV, MD    Visit Date: 12/15/2022    Physician Orders: PT Eval and Treat    Medical Diagnosis from Referral:   M75.101 (ICD-10-CM) - Tear of right supraspinatus tendon   M75.21 (ICD-10-CM) - Biceps tendinitis of right upper extremity   M19.011 (ICD-10-CM) - Primary osteoarthritis of right shoulder      Evaluation Date: 10/11/2022  Authorization Period Expiration: 9/7/24  Plan of Care Expiration: 12/15/22; 2/10/23  Progress Note Due: 12/15/22  Visit # / Visits authorized: 16/16 (+1); 1/TBD   FOTO: PERFORMED 11/22/22  PTA Visit #: 0/5     Time In: 8:41 AM  Time Out: 9:42 AM  Total Billable Time: 60 minutes (TE-4) - Medicaid     Precautions: Standard; SEE POST OP PROTOCOL (limit extension); sling dc'd     Surgery: 10/4/22 - by Dr. Diego  Arthroscopic Rotator Cuff Repair (44143) - Medium tear  Open Biceps Tenodesis (65924)  Arthroscopic Shoulder Debridement - Extensive (63977) - Resection inferior humeral head osteophyte and debridement of long head biceps tendon, ant & post labrum, glenoid, humeral head  Manipulation under anesthesia (22326)  Comprehensive arthroscopic management Right shoulder      SUBJECTIVE     Pt reports: his pain is not too bad.    He was compliant with home exercise program.  Response to previous treatment: soreness  Functional change: not at this time.    Pain: 4-5/10  Location: right shoulder      OBJECTIVE     Objective Measures updated at progress report unless specified.     DOS: 10/4/22  Arthroscopic cuff repair and biceps tenodesis Phase II: 4-6 weeks  Brace donned as needed  AAROM: flexion to 140, abduction to 60-80, external rotation to 45 at side  Gentle AAROM exercises  "without resistance, Pulleys, Grade I-II mobilizations  No resisted biceps strengthening for 8 weeks    See UPOC on 12/15/22    Treatment     Nilesh received the treatments listed below:      therapeutic exercises to develop strength, endurance, ROM, and flexibility for 50 minutes including re-assessment:    Pulleys: 3 minutes - not today (OOT)    Prone right Row: 3x10 2 pounds (DC next)  Prone right extension: 3x10 2 pounds (DC next)  Prone T: 2x10  Side lying shoulder flexion with ranger: 3x10  Side lying external rotation: 3x10 right   RS: 3 rounds 20" holds  Supine shoulder flexion ranger: 2x10 5" holds  Supine shoulder flexion AROM: 3x10  Supine external rotation/internal rotation AROM: 2x10  External rotation ranger: 2x10 5" holds    Landmines at table with ranger: 20x5" holds - not today    Theraband walkouts  Flexion: green theraband 10x  Abduction: green theraband 10x  External rotation: red theraband 10x  Internal rotation: green theraband 10x    Next:  Theraband Rows  Theraband SAPD      Not today:  Seated shoulder flexion with ranger: x20  Shoulder shrug + retractions : 20x  Wrist flexion/extension AROM: 20x, 1#    Nilesh received the following manual therapy techniques: Joint mobilizations, Myofacial release, and Soft tissue Mobilization were applied to the: right shoulder for 10 minutes, including:    Right shoulder PROM within restrictions with grade I joint mobs  Right shoulder GHJ posterior mobs, grade I-II  Side lying scapular rocking followed by scapular upward rotation mobs with shoulder flexion PROM    Not today:  Gentle STM  posterior shoulder and upper arm (medially-posteriorly)   Right elbow extension stretching    Patient Education and Home Exercises     Home Exercises Provided and Patient Education Provided   Education provided:   - keep exercises in a pain free range.    Written Home Exercises Provided: yes. Exercises were reviewed and Nilesh was able to demonstrate them prior to the end of " the session.  Nilesh demonstrated good  understanding of the education provided. See EMR under Patient Instructions for exercises provided during therapy sessions    ASSESSMENT     Nilesh is a 47 y.o. male referred to outpatient Physical Therapy with a medical diagnosis of right supraspinatus tear, biceps tendonitis and right shoulder osteoarthritis. Pt presents status post right supraspinatus repair, SAD, biceps tenodesis and extensive debridement on 10/4/22. See UPOC    Nilesh Is progressing well towards his goals.   Pt prognosis is Good.     Pt will continue to benefit from skilled outpatient physical therapy to address the deficits listed in the problem list box on initial evaluation, provide pt/family education and to maximize pt's level of independence in the home and community environment.     Pt's spiritual, cultural and educational needs considered and pt agreeable to plan of care and goals.     Anticipated barriers to physical therapy: co-morbidities.    Goals:  Short Term Goals: 4 weeks   1.  Patient will report < 5/10 shoulder pain at worst for improved ADL performance. - MET  2.  Patient will demonstrate right shoulder flexion PROM > 140 deg to improve overhead reach. - MET  3.  Patient will demonstrate an understanding and compliance with home exercise program. - MET     Long Term Goals: 16 weeks   1. Patient will report ability to perform dressing, bathing and hygiene without limitation, pain or compensation. - progressing  2. Patient will demonstrate right shoulder flexion AROM > 145 degree to improve overhead reach. - progressing  3. Patient will demonstrate all right upper extremity strength via MicroFET > 85% of left for improved ADL and IADL performance. - progressing  4. Patient will improve FOTO to < 46% to improve ADL performance. - progressing    PLAN     Continue per protocol (Phase III)    See UPOC    Pranav You, PT

## 2022-12-15 NOTE — PLAN OF CARE
Outpatient Therapy Updated Plan of Care     Visit Date: 12/15/2022  Name: Nilesh Rolon Jr.  Clinic Number: [id number]    Therapy Diagnosis:   Encounter Diagnoses   Name Primary?    Decreased right shoulder range of motion Yes    Muscle weakness of right upper extremity      Physician: Ernesto Diego IV, MD    Physician Orders: PT Eval and Treat    Medical Diagnosis from Referral:   M75.101 (ICD-10-CM) - Tear of right supraspinatus tendon   M75.21 (ICD-10-CM) - Biceps tendinitis of right upper extremity   M19.011 (ICD-10-CM) - Primary osteoarthritis of right shoulder      Evaluation Date: 10/11/2022    Total Visits Received: 18    Current Certification Period:  10/11/22 to 12/15/22  Precautions:  standard; post-op right rotator cuff per attached protocol  Visits from Evaluation Date:  17      Subjective     Update: his pain is well controlled and he feels like he's progressing. He's sleeping well, but can't sleep on his right. He has difficulty with putting on sweatshirts, reaching overhead (washing his hair mostly with his left) and is avoiding carrying anything of weight.    Objective     Update:   12/15/22  Range of Motion:     Left Right   Shoulder Flexion P: 170    A: 170 P: 140     A: 105*   Shoulder abduction P: 175    A: 175 P: 135     A: 103*   Shoulder ER P: 95   A: 27 cm P: 52    A: 45   Shoulder IR P: 85    A: 35 cm P: 70    A: 70       Assessment     Update: Nilesh is a 47 y.o. male referred to outpatient Physical Therapy with a medical diagnosis of right supraspinatus tear, biceps tendonitis and right shoulder osteoarthritis. Pt presents status post right supraspinatus repair, SAD, biceps tenodesis and extensive debridement on 10/4/22. Main limitation is range of motion (both AROM and PROM), specifically with overhead motions. Crepitus felt around 50-55 degree external rotation. Pain is controlled < 5/10. Beginning phase III of rehab protocol. Functional limitations remain reaching overhead,  carrying objects, washing, dressing and ADLs. He is appropriate for continued skilled PT to help him reach his goals.    Previous Short Term Goals Status: 3/3    Short Term Goals: 4 weeks   1.  Patient will report < 5/10 shoulder pain at worst for improved ADL performance. - MET  2.  Patient will demonstrate right shoulder flexion PROM > 140 deg to improve overhead reach. - MET  3.  Patient will demonstrate an understanding and compliance with home exercise program. - MET  New Short Term Goals Status:   none  Long Term Goal Status:   continue per initial plan of care.  Reasons for Recertification of Therapy:   UPOC - per protocol    Plan     Updated Certification Period: 12/15/2022 to 2/10/23  Recommended Treatment Plan: 2 times per week for 8 weeks: Electrical Stimulation TENS, IFC, NMES, Manual Therapy, Moist Heat/ Ice, Neuromuscular Re-ed, Patient Education, Self Care, Therapeutic Activities, Therapeutic Exercise, and dry needling  Other Recommendations: per protocol    Pranav You, PT  12/15/2022      I CERTIFY THE NEED FOR THESE SERVICES FURNISHED UNDER THIS PLAN OF TREATMENT AND WHILE UNDER MY CARE    Physician's comments:        Physician's Signature: ___________________________________________________

## 2022-12-20 ENCOUNTER — CLINICAL SUPPORT (OUTPATIENT)
Dept: REHABILITATION | Facility: HOSPITAL | Age: 47
End: 2022-12-20
Payer: MEDICAID

## 2022-12-20 DIAGNOSIS — M62.81 MUSCLE WEAKNESS OF RIGHT UPPER EXTREMITY: ICD-10-CM

## 2022-12-20 DIAGNOSIS — M25.611 DECREASED RIGHT SHOULDER RANGE OF MOTION: Primary | ICD-10-CM

## 2022-12-20 PROCEDURE — 97110 THERAPEUTIC EXERCISES: CPT | Mod: PN,CQ

## 2022-12-20 NOTE — PROGRESS NOTES
[facility]   Physical Therapy Treatment Note     Name: Nilesh Rolon Jr.  Clinic Number: [id number]    Therapy Diagnosis:   Encounter Diagnoses   Name Primary?    Decreased right shoulder range of motion Yes    Muscle weakness of right upper extremity        Physician: Ernesto Diego IV, MD    Visit Date: 12/20/2022    Physician Orders: PT Eval and Treat    Medical Diagnosis from Referral:   M75.101 (ICD-10-CM) - Tear of right supraspinatus tendon   M75.21 (ICD-10-CM) - Biceps tendinitis of right upper extremity   M19.011 (ICD-10-CM) - Primary osteoarthritis of right shoulder      Evaluation Date: 10/11/2022  Authorization Period Expiration: 9/7/24  Plan of Care Expiration: 12/15/22; 2/10/23  Progress Note Due: 12/15/22  Visit # / Visits authorized: 16/16 (+1); 2/TBD   FOTO: PERFORMED 11/22/22  PTA Visit #: 1/5     Time In: 9:00 AM  Time Out: 9:54 AM  Total Billable Time: 54 minutes (TE-4) - Medicaid     Precautions: Standard; SEE POST OP PROTOCOL (limit extension); sling dc'd     Surgery: 10/4/22 - by Dr. Diego  Arthroscopic Rotator Cuff Repair (61136) - Medium tear  Open Biceps Tenodesis (76153)  Arthroscopic Shoulder Debridement - Extensive (68741) - Resection inferior humeral head osteophyte and debridement of long head biceps tendon, ant & post labrum, glenoid, humeral head  Manipulation under anesthesia (87204)  Comprehensive arthroscopic management Right shoulder      SUBJECTIVE     Pt reports: his pain is about the same.    He was compliant with home exercise program.  Response to previous treatment: soreness  Functional change: not at this time.    Pain: 4-5/10  Location: right shoulder      OBJECTIVE     Objective Measures updated at progress report unless specified.     DOS: 10/4/22  Arthroscopic cuff repair and biceps tenodesis Phase II: 4-6 weeks  Brace donned as needed  AAROM: flexion to 140, abduction to 60-80, external rotation to 45 at side  Gentle AAROM exercises  "without resistance, Pulleys, Grade I-II mobilizations  No resisted biceps strengthening for 8 weeks    See UPOC on 12/15/22    Treatment     Nilesh received the treatments listed below:      therapeutic exercises to develop strength, endurance, ROM, and flexibility for 46 minutes including re-assessment:    Pulleys: 3 minutes - not today (OOT)    Prone T: 2x10  Side lying shoulder flexion with ranger: 3x10  Side lying external rotation: 3x10 right   RS: 3 rounds 20" holds  Supine shoulder flexion ranger: 2x10 5" holds  Supine shoulder flexion AROM: 3x10  Supine external rotation/internal rotation AROM: 2x10  External rotation ranger: 2x10 5" holds    Landmines at table with ranger: 20x5" holds - not today    Theraband walkouts  Flexion: green theraband 10x  Abduction: green theraband 10x  External rotation: red theraband 10x  Internal rotation: green theraband 10x  Theraband Rows: 2x10 green theraband   Theraband SAPD: 2x10 green theraband       Not today:  Seated shoulder flexion with ranger: x20  Shoulder shrug + retractions : 20x  Wrist flexion/extension AROM: 20x, 1#    Nilesh received the following manual therapy techniques: Joint mobilizations, Myofacial release, and Soft tissue Mobilization were applied to the: right shoulder for 8 minutes, including:    Right shoulder PROM within restrictions with grade I joint mobs  Right shoulder GHJ posterior mobs, grade I-II  Side lying scapular rocking followed by scapular upward rotation mobs with shoulder flexion PROM    Not today:  Gentle STM  posterior shoulder and upper arm (medially-posteriorly)   Right elbow extension stretching    Patient Education and Home Exercises     Home Exercises Provided and Patient Education Provided   Education provided:   - keep exercises in a pain free range.    Written Home Exercises Provided: yes. Exercises were reviewed and Nilesh was able to demonstrate them prior to the end of the session.  Nilesh demonstrated good  understanding " of the education provided. See EMR under Patient Instructions for exercises provided during therapy sessions    ASSESSMENT     Nilesh is a 47 y.o. male referred to outpatient Physical Therapy with a medical diagnosis of right supraspinatus tear, biceps tendonitis and right shoulder osteoarthritis. Pt presents status post right supraspinatus repair, SAD, biceps tenodesis and extensive debridement on 10/4/22. Patient had no difficulty with new exercises added, noted in bold, with no increase in symptoms prior to leaving the clinic.     Nilesh Is progressing well towards his goals.   Pt prognosis is Good.     Pt will continue to benefit from skilled outpatient physical therapy to address the deficits listed in the problem list box on initial evaluation, provide pt/family education and to maximize pt's level of independence in the home and community environment.     Pt's spiritual, cultural and educational needs considered and pt agreeable to plan of care and goals.     Anticipated barriers to physical therapy: co-morbidities.    Goals:  Short Term Goals: 4 weeks   1.  Patient will report < 5/10 shoulder pain at worst for improved ADL performance. - MET  2.  Patient will demonstrate right shoulder flexion PROM > 140 deg to improve overhead reach. - MET  3.  Patient will demonstrate an understanding and compliance with home exercise program. - MET     Long Term Goals: 16 weeks   1. Patient will report ability to perform dressing, bathing and hygiene without limitation, pain or compensation. - progressing  2. Patient will demonstrate right shoulder flexion AROM > 145 degree to improve overhead reach. - progressing  3. Patient will demonstrate all right upper extremity strength via MicroFET > 85% of left for improved ADL and IADL performance. - progressing  4. Patient will improve FOTO to < 46% to improve ADL performance. - progressing    PLAN     Continue per protocol (Phase III)    See UPOC    Jonathan Rojas, PTA

## 2022-12-22 ENCOUNTER — TELEPHONE (OUTPATIENT)
Dept: GASTROENTEROLOGY | Facility: CLINIC | Age: 47
End: 2022-12-22
Payer: MEDICAID

## 2022-12-23 ENCOUNTER — CLINICAL SUPPORT (OUTPATIENT)
Dept: REHABILITATION | Facility: HOSPITAL | Age: 47
End: 2022-12-23
Attending: ORTHOPAEDIC SURGERY
Payer: MEDICAID

## 2022-12-23 DIAGNOSIS — M25.611 DECREASED RIGHT SHOULDER RANGE OF MOTION: Primary | ICD-10-CM

## 2022-12-23 DIAGNOSIS — M62.81 MUSCLE WEAKNESS OF RIGHT UPPER EXTREMITY: ICD-10-CM

## 2022-12-23 PROCEDURE — 97110 THERAPEUTIC EXERCISES: CPT | Mod: PN,CQ

## 2022-12-23 NOTE — PROGRESS NOTES
[facility]   Physical Therapy Treatment Note     Name: Nilesh Rolon Jr.  Clinic Number: [id number]    Therapy Diagnosis:   Encounter Diagnoses   Name Primary?    Decreased right shoulder range of motion Yes    Muscle weakness of right upper extremity        Physician: Ernesto Diego IV, MD    Visit Date: 12/23/2022    Physician Orders: PT Eval and Treat    Medical Diagnosis from Referral:   M75.101 (ICD-10-CM) - Tear of right supraspinatus tendon   M75.21 (ICD-10-CM) - Biceps tendinitis of right upper extremity   M19.011 (ICD-10-CM) - Primary osteoarthritis of right shoulder      Evaluation Date: 10/11/2022  Authorization Period Expiration: 9/7/24  Plan of Care Expiration: 12/15/22; 2/10/23  Progress Note Due: 12/15/22  Visit # / Visits authorized: 16/16 (+1); 2/TBD   FOTO: PERFORMED 11/22/22  PTA Visit #: 1/5     Time In: 8:00 AM  Time Out: 8:48 AM  Total Billable Time: 48 minutes (TE-3) - Medicaid     Precautions: Standard; SEE POST OP PROTOCOL (limit extension); sling dc'd     Surgery: 10/4/22 - by Dr. Diego  Arthroscopic Rotator Cuff Repair (53804) - Medium tear  Open Biceps Tenodesis (01174)  Arthroscopic Shoulder Debridement - Extensive (66507) - Resection inferior humeral head osteophyte and debridement of long head biceps tendon, ant & post labrum, glenoid, humeral head  Manipulation under anesthesia (80864)  Comprehensive arthroscopic management Right shoulder      SUBJECTIVE     Pt reports: his pain is about the same.    He was compliant with home exercise program.  Response to previous treatment: soreness  Functional change: not at this time.    Pain: 4/10  Location: right shoulder      OBJECTIVE     Objective Measures updated at progress report unless specified.     DOS: 10/4/22  Arthroscopic cuff repair and biceps tenodesis Phase II: 4-6 weeks  Brace donned as needed  AAROM: flexion to 140, abduction to 60-80, external rotation to 45 at side  Gentle AAROM exercises  "without resistance, Pulleys, Grade I-II mobilizations  No resisted biceps strengthening for 8 weeks    See [facility]OC on 12/15/22    Treatment     Nilesh received the treatments listed below:      therapeutic exercises to develop strength, endurance, ROM, and flexibility for 40 minutes including re-assessment:    Pulleys: 3 minutes - not today (OOT)    Prone T: 2x10  Side lying shoulder flexion with ranger: 3x10  Side lying external rotation: 3x10 right   RS: 3 rounds 20" holds  Supine shoulder flexion ranger: 2x10 5" holds  Supine shoulder flexion AROM: 3x10  Supine external rotation/internal rotation AROM: 2x10  External rotation ranger: 2x10 5" holds    Landmines at table with ranger: 20x5" holds - not today    Theraband walkouts  Flexion: green theraband 10x  Abduction: green theraband 10x  External rotation: red theraband 10x  Internal rotation: green theraband 10x  Theraband Rows: 2x10 green theraband   Theraband SAPD: 2x10 green theraband       Not today:  Seated shoulder flexion with ranger: x20  Shoulder shrug + retractions : 20x  Wrist flexion/extension AROM: 20x, 1#    Nilesh received the following manual therapy techniques: Joint mobilizations, Myofacial release, and Soft tissue Mobilization were applied to the: right shoulder for 8 minutes, including:    Right shoulder PROM within restrictions with grade I joint mobs  Right shoulder GHJ posterior mobs, grade I-II  Side lying scapular rocking followed by scapular upward rotation mobs with shoulder flexion PROM    Not today:  Gentle STM  posterior shoulder and upper arm (medially-posteriorly)   Right elbow extension stretching    Patient Education and Home Exercises     Home Exercises Provided and Patient Education Provided   Education provided:   - keep exercises in a pain free range.    Written Home Exercises Provided: yes. Exercises were reviewed and Nilesh was able to demonstrate them prior to the end of the session.  Nilesh demonstrated good  understanding " of the education provided. See EMR under Patient Instructions for exercises provided during therapy sessions    ASSESSMENT     Nilesh is a 47 y.o. male referred to outpatient Physical Therapy with a medical diagnosis of right supraspinatus tear, biceps tendonitis and right shoulder osteoarthritis. Pt presents status post right supraspinatus repair, SAD, biceps tenodesis and extensive debridement on 10/4/22. Patient requires occasional cues for proper execution of some exercises.  Patient's PROM improves following scapula mobs.     Nilesh Is progressing well towards his goals.   Pt prognosis is Good.     Pt will continue to benefit from skilled outpatient physical therapy to address the deficits listed in the problem list box on initial evaluation, provide pt/family education and to maximize pt's level of independence in the home and community environment.     Pt's spiritual, cultural and educational needs considered and pt agreeable to plan of care and goals.     Anticipated barriers to physical therapy: co-morbidities.    Goals:  Short Term Goals: 4 weeks   1.  Patient will report < 5/10 shoulder pain at worst for improved ADL performance. - MET  2.  Patient will demonstrate right shoulder flexion PROM > 140 deg to improve overhead reach. - MET  3.  Patient will demonstrate an understanding and compliance with home exercise program. - MET     Long Term Goals: 16 weeks   1. Patient will report ability to perform dressing, bathing and hygiene without limitation, pain or compensation. - progressing  2. Patient will demonstrate right shoulder flexion AROM > 145 degree to improve overhead reach. - progressing  3. Patient will demonstrate all right upper extremity strength via MicroFET > 85% of left for improved ADL and IADL performance. - progressing  4. Patient will improve FOTO to < 46% to improve ADL performance. - progressing    PLAN     Continue per protocol (Phase III)    See UPOC    Jonathan Rojas, PTA

## 2022-12-27 NOTE — PROGRESS NOTES
[facility]   Physical Therapy Treatment Note     Name: Nilesh Rolon Jr.  Clinic Number: [id number]    Therapy Diagnosis:   Encounter Diagnoses   Name Primary?    Decreased right shoulder range of motion Yes    Muscle weakness of right upper extremity          Physician: Ernesto Diego IV, MD    Visit Date: 12/28/2022    Physician Orders: PT Eval and Treat    Medical Diagnosis from Referral:   M75.101 (ICD-10-CM) - Tear of right supraspinatus tendon   M75.21 (ICD-10-CM) - Biceps tendinitis of right upper extremity   M19.011 (ICD-10-CM) - Primary osteoarthritis of right shoulder      Evaluation Date: 10/11/2022  Authorization Period Expiration: 9/7/24  Plan of Care Expiration: 12/15/22; 2/10/23  Progress Note Due: 12/15/22  Visit # / Visits authorized: 18/32 (+1)  FOTO: PERFORMED 11/22/22  PTA Visit #: 3/5     Time In: 11:03 AM  Time Out: 11:57 AM  Total Billable Time: 54 minutes (TE-3, MT-1) - Medicaid     Precautions: Standard; SEE POST OP PROTOCOL (limit extension); sling dc'd     Surgery: 10/4/22 - by Dr. Diego  Arthroscopic Rotator Cuff Repair (24427) - Medium tear  Open Biceps Tenodesis (99643)  Arthroscopic Shoulder Debridement - Extensive (20828) - Resection inferior humeral head osteophyte and debridement of long head biceps tendon, ant & post labrum, glenoid, humeral head  Manipulation under anesthesia (60173)  Comprehensive arthroscopic management Right shoulder      SUBJECTIVE     Pt reports: no changes in pain.    He was compliant with home exercise program.  Response to previous treatment: soreness  Functional change: not at this time.    Pain: 4/10  Location: right shoulder      OBJECTIVE     Objective Measures updated at progress report unless specified.     DOS: 10/4/22  Arthroscopic cuff repair and biceps tenodesis Phase II: 4-6 weeks  Brace donned as needed  AAROM: flexion to 140, abduction to 60-80, external rotation to 45 at side  Gentle AAROM exercises without  "resistance, Pulleys, Grade I-II mobilizations  No resisted biceps strengthening for 8 weeks    See [facility]OC on 12/15/22    Treatment     Nilesh received the treatments listed below:      therapeutic exercises to develop strength, endurance, ROM, and flexibility for 46 minutes including re-assessment:    Pulleys: 3 minutes , flexion only    Prone T: 2x10  Side lying shoulder flexion with no ranger today: 1x10 , cues to stop @ end range before elbow flexion compensation  Side lying external rotation: 3x10 right   RS: 3 rounds 20" holds - see below  Supine shoulder flexion ranger: 2x10 5" holds  External rotation ranger: 2x10 5" holds  Rows w/ GTB - 2x10 , cues to reduce excessive shoulder extension + proper scap squeeze & posture  RS: ABCs 1x through  UBE fwd/back 3' ea LV1    Landmines at table with ranger: 20x5" holds - not today    Theraband walkouts  Flexion: green theraband 10x  Abduction: green theraband 10x  External rotation: red theraband 10x  Internal rotation: green theraband 10x  Theraband Rows: 2x10 green theraband - not walkouts today, see above  Theraband SAPD: 2x10 green theraband - not today       Not today:  Seated shoulder flexion with ranger: x20  Shoulder shrug + retractions : 20x  Wrist flexion/extension AROM: 20x, 1#  Supine shoulder flexion AROM: 3x10  Supine external rotation/internal rotation AROM: 2x10    Nilesh received the following manual therapy techniques: Joint mobilizations, Myofacial release, and Soft tissue Mobilization were applied to the: right shoulder for 8 minutes, including:    Right shoulder PROM within restrictions with grade I joint mobs  Right shoulder GHJ posterior mobs, grade I-II      Not today:  Gentle STM  posterior shoulder and upper arm (medially-posteriorly)   Right elbow extension stretching  Side lying scapular rocking followed by scapular upward rotation mobs with shoulder flexion PROM -      Patient Education and Home Exercises     Home Exercises Provided and " Patient Education Provided   Education provided:   - keep exercises in a pain free range.    Written Home Exercises Provided: yes. Exercises were reviewed and Nilesh was able to demonstrate them prior to the end of the session.  Nilesh demonstrated good  understanding of the education provided. See EMR under Patient Instructions for exercises provided during therapy sessions    ASSESSMENT     Pt w/ good rowan of instructed exercises today as progressions were made in tx. Pt required min-mod verbal cues to complete exercises w/ proper technique. Pt w/ good response to all MT today. Pt was advised that he may be sore 2/2 progressions post tx.     Nilesh Is progressing well towards his goals.   Pt prognosis is Good.     Pt will continue to benefit from skilled outpatient physical therapy to address the deficits listed in the problem list box on initial evaluation, provide pt/family education and to maximize pt's level of independence in the home and community environment.     Pt's spiritual, cultural and educational needs considered and pt agreeable to plan of care and goals.     Anticipated barriers to physical therapy: co-morbidities.    Goals:  Short Term Goals: 4 weeks   1.  Patient will report < 5/10 shoulder pain at worst for improved ADL performance. - MET  2.  Patient will demonstrate right shoulder flexion PROM > 140 deg to improve overhead reach. - MET  3.  Patient will demonstrate an understanding and compliance with home exercise program. - MET     Long Term Goals: 16 weeks   1. Patient will report ability to perform dressing, bathing and hygiene without limitation, pain or compensation. - progressing  2. Patient will demonstrate right shoulder flexion AROM > 145 degree to improve overhead reach. - progressing  3. Patient will demonstrate all right upper extremity strength via MicroFET > 85% of left for improved ADL and IADL performance. - progressing  4. Patient will improve FOTO to < 46% to improve ADL  performance. - progressing    PLAN     Continue per protocol (Phase III)    See MATTI Hong, PTA

## 2022-12-28 ENCOUNTER — CLINICAL SUPPORT (OUTPATIENT)
Dept: REHABILITATION | Facility: HOSPITAL | Age: 47
End: 2022-12-28
Payer: MEDICAID

## 2022-12-28 DIAGNOSIS — M62.81 MUSCLE WEAKNESS OF RIGHT UPPER EXTREMITY: ICD-10-CM

## 2022-12-28 DIAGNOSIS — M25.611 DECREASED RIGHT SHOULDER RANGE OF MOTION: Primary | ICD-10-CM

## 2022-12-28 PROCEDURE — 97110 THERAPEUTIC EXERCISES: CPT | Mod: PN,CQ

## 2022-12-28 PROCEDURE — 97140 MANUAL THERAPY 1/> REGIONS: CPT | Mod: PN,CQ

## 2022-12-30 ENCOUNTER — CLINICAL SUPPORT (OUTPATIENT)
Dept: REHABILITATION | Facility: HOSPITAL | Age: 47
End: 2022-12-30
Payer: MEDICAID

## 2022-12-30 DIAGNOSIS — M25.611 DECREASED RIGHT SHOULDER RANGE OF MOTION: Primary | ICD-10-CM

## 2022-12-30 DIAGNOSIS — M62.81 MUSCLE WEAKNESS OF RIGHT UPPER EXTREMITY: ICD-10-CM

## 2022-12-30 PROCEDURE — 97110 THERAPEUTIC EXERCISES: CPT | Mod: PN,CQ

## 2022-12-30 NOTE — PROGRESS NOTES
[facility]   Physical Therapy Treatment Note     Name: Nilesh Rolon Jr.  Clinic Number: [id number]    Therapy Diagnosis:   Encounter Diagnoses   Name Primary?    Decreased right shoulder range of motion Yes    Muscle weakness of right upper extremity          Physician: Ernesto Diego IV, MD    Visit Date: 12/30/2022    Physician Orders: PT Eval and Treat    Medical Diagnosis from Referral:   M75.101 (ICD-10-CM) - Tear of right supraspinatus tendon   M75.21 (ICD-10-CM) - Biceps tendinitis of right upper extremity   M19.011 (ICD-10-CM) - Primary osteoarthritis of right shoulder      Evaluation Date: 10/11/2022  Authorization Period Expiration: 9/7/24  Plan of Care Expiration: 12/15/22; 2/10/23  Progress Note Due: 12/15/22  Visit # / Visits authorized: 21/32 (+1)  FOTO: PERFORMED 11/22/22  PTA Visit #: 4/5     Time In: 4:00 PM  Time Out: 4:53 PM  Total Billable Time: 53 minutes (TE-4) - Medicaid     Precautions: Standard; SEE POST OP PROTOCOL (limit extension); sling dc'd     Surgery: 10/4/22 - by Dr. Diego  Arthroscopic Rotator Cuff Repair (03491) - Medium tear  Open Biceps Tenodesis (15688)  Arthroscopic Shoulder Debridement - Extensive (01883) - Resection inferior humeral head osteophyte and debridement of long head biceps tendon, ant & post labrum, glenoid, humeral head  Manipulation under anesthesia (09814)  Comprehensive arthroscopic management Right shoulder      SUBJECTIVE     Pt reports: his pain is about the same.    He was compliant with home exercise program.  Response to previous treatment: soreness  Functional change: not at this time.    Pain: 4/10  Location: right shoulder      OBJECTIVE     Objective Measures updated at progress report unless specified.     DOS: 10/4/22  Arthroscopic cuff repair and biceps tenodesis Phase II: 4-6 weeks  Brace donned as needed  AAROM: flexion to 140, abduction to 60-80, external rotation to 45 at side  Gentle AAROM exercises without  "resistance, Pulleys, Grade I-II mobilizations  No resisted biceps strengthening for 8 weeks    See [facility] on 12/15/22    Treatment     Nilesh received the treatments listed below:      therapeutic exercises to develop strength, endurance, ROM, and flexibility for 45 minutes including re-assessment:    Pulleys: 3 minutes , flexion only    Prone T: 2x10  Side lying shoulder flexion with no ranger today: 1x10 , cues to stop @ end range before elbow flexion compensation  Side lying external rotation: 3x10 right   RS: 3 rounds 20" holds - see below  Supine shoulder flexion ranger: 2x10 5" holds  External rotation ranger: 2x10 5" holds  Rows w/ GTB - 2x10 , cues to reduce excessive shoulder extension + proper scap squeeze & posture  RS: ABCs 1x through  UBE fwd/back 3' ea LV1    Landmines at table with ranger: 20x5" holds     Theraband walkouts  Flexion: green theraband 10x  Abduction: green theraband 10x  External rotation: red theraband 10x  Internal rotation: green theraband 10x  Theraband SAPD: 2x10 green theraband        Not today:  Seated shoulder flexion with ranger: x20  Shoulder shrug + retractions : 20x  Wrist flexion/extension AROM: 20x, 1#  Supine shoulder flexion AROM: 3x10  Supine external rotation/internal rotation AROM: 2x10    Nilesh received the following manual therapy techniques: Joint mobilizations, Myofacial release, and Soft tissue Mobilization were applied to the: right shoulder for 8 minutes, including:    Right shoulder PROM within restrictions with grade I joint mobs  Right shoulder GHJ posterior mobs, grade I-II      Not today:  Gentle STM  posterior shoulder and upper arm (medially-posteriorly)   Right elbow extension stretching  Side lying scapular rocking followed by scapular upward rotation mobs with shoulder flexion PROM -      Patient Education and Home Exercises     Home Exercises Provided and Patient Education Provided   Education provided:   - keep exercises in a pain free " range.    Written Home Exercises Provided: yes. Exercises were reviewed and Nilesh was able to demonstrate them prior to the end of the session.  Nilesh demonstrated good  understanding of the education provided. See EMR under Patient Instructions for exercises provided during therapy sessions    ASSESSMENT     Pt w/ good rowan of instructed exercises today as progressions were made in tx. Pt required min-mod verbal cues to complete exercises w/ proper technique. Patient completed his therapy with no increase in symptoms prior to leaving the clinic.     Nilesh Is progressing well towards his goals.   Pt prognosis is Good.     Pt will continue to benefit from skilled outpatient physical therapy to address the deficits listed in the problem list box on initial evaluation, provide pt/family education and to maximize pt's level of independence in the home and community environment.     Pt's spiritual, cultural and educational needs considered and pt agreeable to plan of care and goals.     Anticipated barriers to physical therapy: co-morbidities.    Goals:  Short Term Goals: 4 weeks   1.  Patient will report < 5/10 shoulder pain at worst for improved ADL performance. - MET  2.  Patient will demonstrate right shoulder flexion PROM > 140 deg to improve overhead reach. - MET  3.  Patient will demonstrate an understanding and compliance with home exercise program. - MET     Long Term Goals: 16 weeks   1. Patient will report ability to perform dressing, bathing and hygiene without limitation, pain or compensation. - progressing  2. Patient will demonstrate right shoulder flexion AROM > 145 degree to improve overhead reach. - progressing  3. Patient will demonstrate all right upper extremity strength via MicroFET > 85% of left for improved ADL and IADL performance. - progressing  4. Patient will improve FOTO to < 46% to improve ADL performance. - progressing    PLAN     Continue per protocol (Phase III)    ]Jonathan Rojas, PTA

## 2023-01-03 ENCOUNTER — TELEPHONE (OUTPATIENT)
Dept: HEPATOLOGY | Facility: CLINIC | Age: 48
End: 2023-01-03
Payer: MEDICAID

## 2023-01-03 ENCOUNTER — OFFICE VISIT (OUTPATIENT)
Dept: HEPATOLOGY | Facility: CLINIC | Age: 48
End: 2023-01-03
Attending: INTERNAL MEDICINE
Payer: MEDICAID

## 2023-01-03 VITALS
HEIGHT: 75 IN | HEART RATE: 66 BPM | WEIGHT: 254.44 LBS | RESPIRATION RATE: 18 BRPM | OXYGEN SATURATION: 99 % | SYSTOLIC BLOOD PRESSURE: 130 MMHG | DIASTOLIC BLOOD PRESSURE: 86 MMHG | TEMPERATURE: 97 F | BODY MASS INDEX: 31.64 KG/M2

## 2023-01-03 DIAGNOSIS — K70.30 ALCOHOLIC CIRRHOSIS OF LIVER WITHOUT ASCITES: Primary | ICD-10-CM

## 2023-01-03 PROCEDURE — 1159F MED LIST DOCD IN RCRD: CPT | Mod: CPTII,,, | Performed by: INTERNAL MEDICINE

## 2023-01-03 PROCEDURE — 3008F PR BODY MASS INDEX (BMI) DOCUMENTED: ICD-10-PCS | Mod: CPTII,,, | Performed by: INTERNAL MEDICINE

## 2023-01-03 PROCEDURE — 99214 OFFICE O/P EST MOD 30 MIN: CPT | Mod: PBBFAC | Performed by: INTERNAL MEDICINE

## 2023-01-03 PROCEDURE — 3075F SYST BP GE 130 - 139MM HG: CPT | Mod: CPTII,,, | Performed by: INTERNAL MEDICINE

## 2023-01-03 PROCEDURE — 99213 OFFICE O/P EST LOW 20 MIN: CPT | Mod: S$PBB,,, | Performed by: INTERNAL MEDICINE

## 2023-01-03 PROCEDURE — 1159F PR MEDICATION LIST DOCUMENTED IN MEDICAL RECORD: ICD-10-PCS | Mod: CPTII,,, | Performed by: INTERNAL MEDICINE

## 2023-01-03 PROCEDURE — 3079F DIAST BP 80-89 MM HG: CPT | Mod: CPTII,,, | Performed by: INTERNAL MEDICINE

## 2023-01-03 PROCEDURE — 3008F BODY MASS INDEX DOCD: CPT | Mod: CPTII,,, | Performed by: INTERNAL MEDICINE

## 2023-01-03 PROCEDURE — 99999 PR PBB SHADOW E&M-EST. PATIENT-LVL IV: ICD-10-PCS | Mod: PBBFAC,,, | Performed by: INTERNAL MEDICINE

## 2023-01-03 PROCEDURE — 3079F PR MOST RECENT DIASTOLIC BLOOD PRESSURE 80-89 MM HG: ICD-10-PCS | Mod: CPTII,,, | Performed by: INTERNAL MEDICINE

## 2023-01-03 PROCEDURE — 99999 PR PBB SHADOW E&M-EST. PATIENT-LVL IV: CPT | Mod: PBBFAC,,, | Performed by: INTERNAL MEDICINE

## 2023-01-03 PROCEDURE — 3075F PR MOST RECENT SYSTOLIC BLOOD PRESS GE 130-139MM HG: ICD-10-PCS | Mod: CPTII,,, | Performed by: INTERNAL MEDICINE

## 2023-01-03 PROCEDURE — 99213 PR OFFICE/OUTPT VISIT, EST, LEVL III, 20-29 MIN: ICD-10-PCS | Mod: S$PBB,,, | Performed by: INTERNAL MEDICINE

## 2023-01-03 NOTE — PROGRESS NOTES
HEPATOLOGY FOLLOW UP    Current Corresponding Physician: Dr. Pb Rolon Jr. is here for follow up of Cirrhosis      HPI  Since Nilesh Rolon Jr.'s last visit has reported not requiring further paracentesis in at least 6 months if not longer due to absence of ascites. He further reports only taking his lactulose 1x per day and can go several days before needing it again. He continues to go to AA meetings and maintain sobriety with the supportive aid of his wife. She states that they do their best to always cook at home and maintain a low sodium diet and grilled options. Their principle concern remains regarding maintaining optimal liver health.     Outpatient Encounter Medications as of 1/3/2023   Medication Sig Dispense Refill    cholecalciferol, vitamin D3, 1,250 mcg (50,000 unit) capsule Take 50,000 Units by mouth every 7 days.      clotrimazole-betamethasone 1-0.05% (LOTRISONE) cream SMARTSIG:Sparingly Topical Daily      cyclobenzaprine (FLEXERIL) 10 MG tablet Take 10 mg by mouth every evening.      doxycycline (VIBRA-TABS) 100 MG tablet Take 100 mg by mouth once daily.      ELIDEL 1 % cream Apply topically 2 (two) times daily.      ferrous sulfate (FEOSOL) 325 mg (65 mg iron) Tab tablet Take 1 tablet (325 mg total) by mouth 2 (two) times daily. 60 tablet 2    folic acid (FOLVITE) 1 MG tablet Take 1 tablet (1 mg total) by mouth once daily. 30 tablet 5    ibuprofen (ADVIL,MOTRIN) 800 MG tablet Take 800 mg by mouth every 6 (six) hours as needed.      ketoconazole (NIZORAL) 2 % cream Apply 1 application topically once daily.      lactulose (CHRONULAC) 10 gram/15 mL solution Take 30 mLs (20 g total) by mouth 2 (two) times daily. Goal of 3-4 bowel movements daily 1892 mL 6    multivitamin Tab Take 1 tablet by mouth once daily. 30 tablet 2    pantoprazole (PROTONIX) 40 MG tablet Take 1 tablet (40 mg total) by mouth once daily. 30 tablet 11    thiamine 100 MG tablet Take 1 tablet (100 mg  total) by mouth once daily. 30 tablet 11    gabapentin (NEURONTIN) 300 MG capsule Take 1 capsule (300 mg total) by mouth every evening. for 14 doses 14 capsule 0     No facility-administered encounter medications on file as of 1/3/2023.     Review of patient's allergies indicates:  No Known Allergies  Past Medical History:   Diagnosis Date    DONG (acute kidney injury) 12/1/2021    Anxiety     Arthritis     Cirrhosis     Hypertension     Liver disease     Osteoarthritis     Other meniscus derangements, other medial meniscus, left knee 1/7/2019       Review of Systems   Constitutional:  Negative for chills, diaphoresis, fatigue and fever.   Respiratory:  Negative for cough, shortness of breath and wheezing.    Cardiovascular:  Negative for chest pain, palpitations and leg swelling.   Gastrointestinal:  Positive for constipation. Negative for abdominal pain, diarrhea, nausea and vomiting.   Genitourinary:  Positive for frequency. Negative for dysuria.   Musculoskeletal:  Positive for arthralgias (Recent shoulder surgery). Negative for back pain, gait problem and joint swelling.   Neurological:  Negative for tremors, speech difficulty, light-headedness and headaches.   Psychiatric/Behavioral:  Negative for agitation, confusion and decreased concentration. The patient is nervous/anxious (Regarding overall liver health).    Vitals:    01/03/23 0844   BP: 130/86   Pulse: 66   Resp: 18   Temp: 96.7 °F (35.9 °C)       Physical Exam  Constitutional:       General: He is not in acute distress.     Appearance: Normal appearance. He is obese. He is not ill-appearing, toxic-appearing or diaphoretic.   HENT:      Head: Normocephalic and atraumatic.   Cardiovascular:      Rate and Rhythm: Normal rate and regular rhythm.      Heart sounds: No murmur heard.  Pulmonary:      Effort: Pulmonary effort is normal. No respiratory distress.      Breath sounds: Normal breath sounds. No wheezing or rales.   Abdominal:      General: Abdomen is  flat. Bowel sounds are normal. There is no distension.      Palpations: Abdomen is soft.      Tenderness: There is no abdominal tenderness. There is no guarding.   Musculoskeletal:         General: No swelling.      Right lower leg: No edema.      Left lower leg: No edema.   Skin:     Coloration: Skin is not jaundiced.      Findings: No bruising or lesion.   Neurological:      General: No focal deficit present.      Mental Status: He is alert and oriented to person, place, and time.   Psychiatric:         Behavior: Behavior normal.         Thought Content: Thought content normal.         Judgment: Judgment normal.     MELD-Na score: 11 at 12/5/2022 10:15 AM  MELD score: 11 at 12/5/2022 10:15 AM  Calculated from:  Serum Creatinine: 1.1 mg/dL at 12/5/2022 10:15 AM  Serum Sodium: 139 mmol/L (Using max of 137 mmol/L) at 12/5/2022 10:15 AM  Total Bilirubin: 1.6 mg/dL at 12/5/2022 10:15 AM  INR(ratio): 1.2 at 12/5/2022 10:15 AM  Age: 47 years    Lab Results   Component Value Date    GLU 90 12/05/2022    GLU 90 12/05/2022    BUN 18 12/05/2022    BUN 18 12/05/2022    CREATININE 1.1 12/05/2022    CREATININE 1.1 12/05/2022    CALCIUM 9.4 12/05/2022    CALCIUM 9.4 12/05/2022     12/05/2022     12/05/2022    K 4.3 12/05/2022    K 4.3 12/05/2022     12/05/2022     12/05/2022    PROT 7.6 12/05/2022    PROT 7.6 12/05/2022    CO2 24 12/05/2022    CO2 24 12/05/2022    ANIONGAP 10 12/05/2022    ANIONGAP 10 12/05/2022    WBC 4.51 12/05/2022    WBC 4.51 12/05/2022    RBC 4.47 (L) 12/05/2022    RBC 4.47 (L) 12/05/2022    HGB 14.5 12/05/2022    HGB 14.5 12/05/2022    HCT 41.0 12/05/2022    HCT 41.0 12/05/2022    HCT 31 (L) 11/30/2021    MCV 92 12/05/2022    MCV 92 12/05/2022    MCH 32.4 (H) 12/05/2022    MCH 32.4 (H) 12/05/2022    MCHC 35.4 12/05/2022    MCHC 35.4 12/05/2022     Lab Results   Component Value Date    RDW 13.2 12/05/2022    RDW 13.2 12/05/2022    PLT 67 (L) 12/05/2022    PLT 67 (L) 12/05/2022    MPV  10.1 12/05/2022    MPV 10.1 12/05/2022    GRAN 2.4 12/05/2022    GRAN 52.1 12/05/2022    LYMPH 1.6 12/05/2022    LYMPH 34.8 12/05/2022    MONO 0.4 12/05/2022    MONO 8.2 12/05/2022    EOSINOPHIL 3.8 12/05/2022    BASOPHIL 0.9 12/05/2022    EOS 0.2 12/05/2022    EOS 1 01/26/2022    BASO 0.04 12/05/2022    APTT 29.9 04/07/2019    GROUPTRH A POS 07/23/2020    BNP 52 01/08/2022    CHOL 242 (H) 04/06/2016    TRIG 278 (H) 04/06/2016    HDL 40 04/06/2016    CHOLHDL 16.5 (L) 04/06/2016    TOTALCHOLEST 6.1 (H) 04/06/2016    ALBUMIN 3.9 12/05/2022    ALBUMIN 3.9 12/05/2022    BILIDIR 3.7 (H) 07/27/2020    AST 21 12/05/2022    AST 21 12/05/2022    ALT 21 12/05/2022    ALT 21 12/05/2022    ALKPHOS 79 12/05/2022    ALKPHOS 79 12/05/2022    MG 1.8 02/01/2022    LABPROT 12.4 12/05/2022    LABPROT 12.4 12/05/2022    LABPROT 12.4 12/05/2022    INR 1.2 12/05/2022    INR 1.2 12/05/2022    INR 1.2 12/05/2022    PSA 0.54 04/06/2016       Assessment and Plan:  Patient Active Problem List   Diagnosis    Ascites    Thrombocytopenia    History of alcohol abuse    Gastrointestinal hemorrhage    Hx of colonic polyps    History of GI bleed    Alcoholic cirrhosis of liver without ascites    Primary osteoarthritis of left knee    Primary osteoarthritis of right shoulder    Pain of left upper extremity    Debility    Stiffness in joint    Class 1 obesity due to excess calories with serious comorbidity and body mass index (BMI) of 33.0 to 33.9 in adult    Elevated INR    Hemorrhoids    Candidiasis of esophagus with fungal esophagitis     Anemia    Tear of right supraspinatus tendon    Biceps tendinitis of right upper extremity    Decreased right shoulder range of motion    Muscle weakness of right upper extremity     Nilesh Rolon Jr. is a 47 y.o. male withCirrhosis    After review of most recent labs, imaging, physical exam, and medical interviewing, patient has remained medically stable regarding his alcoholic cirrhosis. His support system  and himself maintain a health diet, as well as consciously continuing to consider implications of foods and medicines on his overall health. When discussed about physical exercise he endorses going on walks. Encouraged further vigorous exercise which patient states having an elliptical at home. Will continue to follow-up in  6 months.

## 2023-01-05 ENCOUNTER — OFFICE VISIT (OUTPATIENT)
Dept: ORTHOPEDICS | Facility: CLINIC | Age: 48
End: 2023-01-05
Payer: MEDICAID

## 2023-01-05 VITALS
DIASTOLIC BLOOD PRESSURE: 80 MMHG | HEART RATE: 60 BPM | SYSTOLIC BLOOD PRESSURE: 121 MMHG | BODY MASS INDEX: 32.1 KG/M2 | HEIGHT: 75 IN | WEIGHT: 258.19 LBS

## 2023-01-05 DIAGNOSIS — M19.011 PRIMARY OSTEOARTHRITIS OF RIGHT SHOULDER: ICD-10-CM

## 2023-01-05 DIAGNOSIS — M75.101 TEAR OF RIGHT SUPRASPINATUS TENDON: Primary | ICD-10-CM

## 2023-01-05 DIAGNOSIS — M75.21 BICEPS TENDINITIS OF RIGHT UPPER EXTREMITY: ICD-10-CM

## 2023-01-05 DIAGNOSIS — M75.41 SUBACROMIAL IMPINGEMENT OF RIGHT SHOULDER: ICD-10-CM

## 2023-01-05 PROCEDURE — 1159F PR MEDICATION LIST DOCUMENTED IN MEDICAL RECORD: ICD-10-PCS | Mod: CPTII,,, | Performed by: PHYSICIAN ASSISTANT

## 2023-01-05 PROCEDURE — 3008F PR BODY MASS INDEX (BMI) DOCUMENTED: ICD-10-PCS | Mod: CPTII,,, | Performed by: PHYSICIAN ASSISTANT

## 2023-01-05 PROCEDURE — 99214 OFFICE O/P EST MOD 30 MIN: CPT | Mod: PBBFAC,PN | Performed by: PHYSICIAN ASSISTANT

## 2023-01-05 PROCEDURE — 99024 POSTOP FOLLOW-UP VISIT: CPT | Mod: ,,, | Performed by: PHYSICIAN ASSISTANT

## 2023-01-05 PROCEDURE — 99999 PR PBB SHADOW E&M-EST. PATIENT-LVL IV: CPT | Mod: PBBFAC,,, | Performed by: PHYSICIAN ASSISTANT

## 2023-01-05 PROCEDURE — 3074F SYST BP LT 130 MM HG: CPT | Mod: CPTII,,, | Performed by: PHYSICIAN ASSISTANT

## 2023-01-05 PROCEDURE — 3008F BODY MASS INDEX DOCD: CPT | Mod: CPTII,,, | Performed by: PHYSICIAN ASSISTANT

## 2023-01-05 PROCEDURE — 3079F PR MOST RECENT DIASTOLIC BLOOD PRESSURE 80-89 MM HG: ICD-10-PCS | Mod: CPTII,,, | Performed by: PHYSICIAN ASSISTANT

## 2023-01-05 PROCEDURE — 1160F PR REVIEW ALL MEDS BY PRESCRIBER/CLIN PHARMACIST DOCUMENTED: ICD-10-PCS | Mod: CPTII,,, | Performed by: PHYSICIAN ASSISTANT

## 2023-01-05 PROCEDURE — 99999 PR PBB SHADOW E&M-EST. PATIENT-LVL IV: ICD-10-PCS | Mod: PBBFAC,,, | Performed by: PHYSICIAN ASSISTANT

## 2023-01-05 PROCEDURE — 1159F MED LIST DOCD IN RCRD: CPT | Mod: CPTII,,, | Performed by: PHYSICIAN ASSISTANT

## 2023-01-05 PROCEDURE — 1160F RVW MEDS BY RX/DR IN RCRD: CPT | Mod: CPTII,,, | Performed by: PHYSICIAN ASSISTANT

## 2023-01-05 PROCEDURE — 3079F DIAST BP 80-89 MM HG: CPT | Mod: CPTII,,, | Performed by: PHYSICIAN ASSISTANT

## 2023-01-05 PROCEDURE — 3074F PR MOST RECENT SYSTOLIC BLOOD PRESSURE < 130 MM HG: ICD-10-PCS | Mod: CPTII,,, | Performed by: PHYSICIAN ASSISTANT

## 2023-01-05 PROCEDURE — 99024 PR POST-OP FOLLOW-UP VISIT: ICD-10-PCS | Mod: ,,, | Performed by: PHYSICIAN ASSISTANT

## 2023-01-05 NOTE — PROGRESS NOTES
[facility], Orthopedics and Sports Medicine  [facility]    Shoulder Post-op Visit #3  01/05/2023     Diagnosis:  Right shoulder pain  High Grade Partial Rotator Cuff Tear  Biceps Tendonitis  Subacromial Impingement  Glenohumeral Osteoarthritis     Procedure:  10/4/22  Arthroscopic Rotator Cuff Repair   Open Biceps Tenodesis   Arthroscopic Shoulder Debridement - Extensive   Manipulation under anesthesia   Comprehensive arthroscopic management Right shoulder       Subjective:      Nilesh Rolon Jr. is a 47 y.o. male who is now 3 months status post right shoulder surgery. The patient is not having any pain. The patient denies none, fever, wound drainage, increasing redness, pus, increasing pain, increasing swelling. Post op problems reported: none.    Currently in PT. Still progressing. Notes mild soreness after.     Objective:      Ortho/SPM Exam  General: alert, appears stated age, and cooperative   Sutures: Sutures out.  Incision: healing well, no significant drainage, no dehiscence, no significant erythema  Tenderness: none  Forward Flexion: 150 degrees 3/5  Abduction 100 3/5  External Rotation: 60 degrees 3/5  Elbow/Wrist/Hand: Full ROM  Neuro: Intact to light touch Ax/R/U/M  Vascular: CR<2s. Palpable radial pulse      Imaging:  Non taken today.       Assessment:       The patient is status post right shoulder surgery. The primary encounter diagnosis was Tear of right supraspinatus tendon. Diagnoses of Biceps tendinitis of right upper extremity, Primary osteoarthritis of right shoulder, and Subacromial impingement of right shoulder were also pertinent to this visit.  Doing well postoperatively. Continuation of post-op rehab course is recommended at this time. All of the patient's questions were answered.    Still progressing in physical therapy. Will put in order for more visits. WBAT. He will follow up in 3 months for 6 mon PO visit with Dr. Diego.      Plan:      Tylenol 650mg  TID PRN for pain.  Continue physical therapy.  Weightbearing as tolerated on operative extremity.  Follow up in 3 mon with Dr. Johnathon Kaur PA-C  Department of Orthopedic Surgery  [facility]  Office: [phone]  Website: [email or web address]        Orders Placed This Encounter   Procedures    Ambulatory referral/consult to Physical/Occupational Therapy

## 2023-01-06 ENCOUNTER — CLINICAL SUPPORT (OUTPATIENT)
Dept: REHABILITATION | Facility: HOSPITAL | Age: 48
End: 2023-01-06
Payer: MEDICAID

## 2023-01-06 DIAGNOSIS — M25.611 DECREASED RIGHT SHOULDER RANGE OF MOTION: Primary | ICD-10-CM

## 2023-01-06 DIAGNOSIS — M62.81 MUSCLE WEAKNESS OF RIGHT UPPER EXTREMITY: ICD-10-CM

## 2023-01-06 PROCEDURE — 97110 THERAPEUTIC EXERCISES: CPT | Mod: PN,CQ

## 2023-01-06 NOTE — PROGRESS NOTES
[facility]   Physical Therapy Treatment Note     Name: Nilesh Rolon Jr.  Clinic Number: [id number]    Therapy Diagnosis:   Encounter Diagnoses   Name Primary?    Decreased right shoulder range of motion Yes    Muscle weakness of right upper extremity        Physician: Ernesto Diego IV, MD    Visit Date: 1/6/2023    Physician Orders: PT Eval and Treat    Medical Diagnosis from Referral:   M75.101 (ICD-10-CM) - Tear of right supraspinatus tendon   M75.21 (ICD-10-CM) - Biceps tendinitis of right upper extremity   M19.011 (ICD-10-CM) - Primary osteoarthritis of right shoulder      Evaluation Date: 10/11/2022  Authorization Period Expiration: 9/7/24  Plan of Care Expiration: 12/15/22; 2/10/23  Progress Note Due: 12/15/22  Visit # / Visits authorized: 21/32 (+1), 1/12  FOTO: PERFORMED 11/22/22  PTA Visit #: 5/5     Time In: 8:00 AM  Time Out: 8:55 PM  Total Billable Time: 55 minutes (TE-4) - Medicaid     Precautions: Standard; SEE POST OP PROTOCOL (limit extension); sling dc'd     Surgery: 10/4/22 - by Dr. Diego  Arthroscopic Rotator Cuff Repair (99500) - Medium tear  Open Biceps Tenodesis (76801)  Arthroscopic Shoulder Debridement - Extensive (62904) - Resection inferior humeral head osteophyte and debridement of long head biceps tendon, ant & post labrum, glenoid, humeral head  Manipulation under anesthesia (53013)  Comprehensive arthroscopic management Right shoulder      SUBJECTIVE     Pt reports: his pain is about the same although a little stiff today, possibly the colder weather.    He was compliant with home exercise program.  Response to previous treatment: soreness  Functional change: not at this time.    Pain: 4/10  Location: right shoulder      OBJECTIVE     Objective Measures updated at progress report unless specified.     DOS: 10/4/22  Arthroscopic cuff repair and biceps tenodesis Phase II: 4-6 weeks  Brace donned as needed  AAROM: flexion to 140, abduction to 60-80,  "external rotation to 45 at side  Gentle AAROM exercises without resistance, Pulleys, Grade I-II mobilizations  No resisted biceps strengthening for 8 weeks    See UPOC on 12/15/22    Treatment     Nilesh received the treatments listed below:      therapeutic exercises to develop strength, endurance, ROM, and flexibility for 47 minutes including re-assessment:    Pulleys: 3 minutes , flexion only    Prone T: 2x10  Side lying shoulder flexion with no ranger today: 1x10 , cues to stop @ end range before elbow flexion compensation  Side lying external rotation: 3x10 right   RS: 3 rounds 20" holds - see below  Supine shoulder flexion ranger: 2x10 5" holds  External rotation ranger: 2x10 5" holds  Rows w/ GTB - 3x10 , cues to reduce excessive shoulder extension + proper scap squeeze & posture  RS: ABCs 1x through  UBE fwd/back 3' ea LV2     Landmines at table with ranger: 20x5" holds     Theraband walkouts  Flexion: green theraband 2x10   Abduction: green theraband 2x10   External rotation: red theraband 10x  Internal rotation: green theraband 10x  Theraband SAPD: 2x10 green theraband                                                Not today:  Seated shoulder flexion with ranger: x20  Shoulder shrug + retractions : 20x  Wrist flexion/extension AROM: 20x, 1#  Supine shoulder flexion AROM: 3x10  Supine external rotation/internal rotation AROM: 2x10    Nilesh received the following manual therapy techniques: Joint mobilizations, Myofacial release, and Soft tissue Mobilization were applied to the: right shoulder for 8 minutes, including:    Right shoulder PROM within restrictions with grade I joint mobs  Right shoulder GHJ posterior mobs, grade I-II      Not today:  Gentle STM  posterior shoulder and upper arm (medially-posteriorly)   Right elbow extension stretching  Side lying scapular rocking followed by scapular upward rotation mobs with shoulder flexion PROM -      Patient Education and Home Exercises     Home Exercises " Provided and Patient Education Provided   Education provided:   - keep exercises in a pain free range.    Written Home Exercises Provided: yes. Exercises were reviewed and Nilesh was able to demonstrate them prior to the end of the session.  Nilesh demonstrated good  understanding of the education provided. See EMR under Patient Instructions for exercises provided during therapy sessions    ASSESSMENT     Nilesh is a 47 y.o. male referred to outpatient Physical Therapy with a medical diagnosis of right supraspinatus tear, biceps tendonitis and right shoulder osteoarthritis.  Pt required min-mod verbal cues to complete exercises w/ proper technique. Patient had no difficulty with today's progressions, noted in bold, with no increase in symptoms prior to leaving the clinic.      Nilesh Is progressing well towards his goals.   Pt prognosis is Good.     Pt will continue to benefit from skilled outpatient physical therapy to address the deficits listed in the problem list box on initial evaluation, provide pt/family education and to maximize pt's level of independence in the home and community environment.     Pt's spiritual, cultural and educational needs considered and pt agreeable to plan of care and goals.     Anticipated barriers to physical therapy: co-morbidities.    Goals:  Short Term Goals: 4 weeks   1.  Patient will report < 5/10 shoulder pain at worst for improved ADL performance. - MET  2.  Patient will demonstrate right shoulder flexion PROM > 140 deg to improve overhead reach. - MET  3.  Patient will demonstrate an understanding and compliance with home exercise program. - MET     Long Term Goals: 16 weeks   1. Patient will report ability to perform dressing, bathing and hygiene without limitation, pain or compensation. - progressing  2. Patient will demonstrate right shoulder flexion AROM > 145 degree to improve overhead reach. - progressing  3. Patient will demonstrate all right upper extremity strength  via MicroFET > 85% of left for improved ADL and IADL performance. - progressing  4. Patient will improve FOTO to < 46% to improve ADL performance. - progressing    PLAN     Continue per protocol (Phase III)    ]Jonathan Rojas, PTA

## 2023-01-10 ENCOUNTER — CLINICAL SUPPORT (OUTPATIENT)
Dept: REHABILITATION | Facility: HOSPITAL | Age: 48
End: 2023-01-10
Payer: MEDICAID

## 2023-01-10 DIAGNOSIS — M25.611 DECREASED RIGHT SHOULDER RANGE OF MOTION: Primary | ICD-10-CM

## 2023-01-10 DIAGNOSIS — M19.011 PRIMARY OSTEOARTHRITIS OF RIGHT SHOULDER: ICD-10-CM

## 2023-01-10 DIAGNOSIS — M62.81 MUSCLE WEAKNESS OF RIGHT UPPER EXTREMITY: ICD-10-CM

## 2023-01-10 DIAGNOSIS — M75.101 TEAR OF RIGHT SUPRASPINATUS TENDON: ICD-10-CM

## 2023-01-10 DIAGNOSIS — M75.21 BICEPS TENDINITIS OF RIGHT UPPER EXTREMITY: ICD-10-CM

## 2023-01-10 PROCEDURE — 97110 THERAPEUTIC EXERCISES: CPT | Mod: PN | Performed by: PHYSICAL THERAPIST

## 2023-01-10 NOTE — PROGRESS NOTES
[facility]   Physical Therapy Treatment Note     Name: Nilesh Rolon Jr.  Clinic Number: [id number]    Therapy Diagnosis:   Encounter Diagnoses   Name Primary?    Tear of right supraspinatus tendon     Biceps tendinitis of right upper extremity     Primary osteoarthritis of right shoulder        Physician: Ernesto Diego IV, MD    Visit Date: 1/10/2023    Physician Orders: PT Eval and Treat    Medical Diagnosis from Referral:   M75.101 (ICD-10-CM) - Tear of right supraspinatus tendon   M75.21 (ICD-10-CM) - Biceps tendinitis of right upper extremity   M19.011 (ICD-10-CM) - Primary osteoarthritis of right shoulder      Evaluation Date: 10/11/2022  Authorization Period Expiration: 9/7/24  Plan of Care Expiration: 12/15/22; 2/10/23  Progress Note Due: 2/10/23  Visit # / Visits authorized: 2/12 (+22 from 2022)  FOTO: PERFORMED 11/22/22  PTA Visit #: 0/5     Time In: 8:00 AM  Time Out: 9:00 AM  Total Billable Time: 55 minutes (TE-4) - Medicaid     Precautions: Standard; SEE POST OP PROTOCOL (limit extension); sling dc'd     Surgery: 10/4/22 - by Dr. Diego  Arthroscopic Rotator Cuff Repair (50518) - Medium tear  Open Biceps Tenodesis (09856)  Arthroscopic Shoulder Debridement - Extensive (16039) - Resection inferior humeral head osteophyte and debridement of long head biceps tendon, ant & post labrum, glenoid, humeral head  Manipulation under anesthesia (85859)  Comprehensive arthroscopic management Right shoulder      SUBJECTIVE     Pt reports: he's doing ok. Biggest limitation is strength    He was compliant with home exercise program.  Response to previous treatment: soreness  Functional change: not at this time.    Pain: 3/10  Location: right shoulder      OBJECTIVE     Objective Measures updated at progress report unless specified.     DOS: 10/4/22  Arthroscopic cuff repair and biceps tenodesis Phase II: 4-6 weeks  Brace donned as needed  AAROM: flexion to 140, abduction to 60-80,  "external rotation to 45 at side  Gentle AAROM exercises without resistance, Pulleys, Grade I-II mobilizations  No resisted biceps strengthening for 8 weeks    See UPOC on 12/15/22    Treatment     Nilesh received the treatments listed below:      therapeutic exercises to develop strength, endurance, ROM, and flexibility for 47 minutes including re-assessment:    UBE fwd/back 3' ea LV2   Supine wand flexion: 15x5" holds  Supine shoulder flexion AROM: 2x10  Supine wand external rotation: 20x5"  Side lying external rotation: 3x10 right     Prone T: 2x10    Wall walks: 15x    Rows w/ GTB - 3x10 , cues to reduce excessive shoulder extension + proper scap squeeze & posture  Flexion walkout: blue theraband 2x10   External rotation: yellow theraband 2x10  Internal rotation: red theraband 2x10  Theraband SAPD: 2x10 green theraband       Standing ball roll RS: ABCs 1x through                                           Next:  Seated 90/90 external rotation (Elbow resting on table)  Landmines at table with ranger: 20x5" holds     Not today:  Side lying shoulder flexion with no ranger today: 1x10 , cues to stop @ end range before elbow flexion compensation    Theraband Abduction walkouts: green theraband 2x10   Pulleys: 3 minutes , flexion only    Nilesh received the following manual therapy techniques: Joint mobilizations, Myofacial release, and Soft tissue Mobilization were applied to the: right shoulder for 0 minutes, including:    Right shoulder PROM within restrictions with grade I joint mobs  Right shoulder GHJ posterior mobs, grade I-II      Not today:  Gentle STM  posterior shoulder and upper arm (medially-posteriorly)   Right elbow extension stretching  Side lying scapular rocking followed by scapular upward rotation mobs with shoulder flexion PROM -      Patient Education and Home Exercises     Home Exercises Provided and Patient Education Provided   Education provided:   - keep exercises in a pain free " range.    Written Home Exercises Provided: yes. Exercises were reviewed and Nilesh was able to demonstrate them prior to the end of the session.  Nilesh demonstrated good  understanding of the education provided. See EMR under Patient Instructions for exercises provided during therapy sessions    ASSESSMENT     Nilesh is a 47 y.o. male referred to outpatient Physical Therapy with a medical diagnosis of right supraspinatus tear, biceps tendonitis and right shoulder osteoarthritis.  Limited standing shoulder flexion AROM still to about 100 degree. Progressed light RTC strengthening with theraband external rotation and internal rotation today.     Nilesh Is progressing well towards his goals.   Pt prognosis is Good.     Pt will continue to benefit from skilled outpatient physical therapy to address the deficits listed in the problem list box on initial evaluation, provide pt/family education and to maximize pt's level of independence in the home and community environment.     Pt's spiritual, cultural and educational needs considered and pt agreeable to plan of care and goals.     Anticipated barriers to physical therapy: co-morbidities.    Goals:  Short Term Goals: 4 weeks   1.  Patient will report < 5/10 shoulder pain at worst for improved ADL performance. - MET  2.  Patient will demonstrate right shoulder flexion PROM > 140 deg to improve overhead reach. - MET  3.  Patient will demonstrate an understanding and compliance with home exercise program. - MET     Long Term Goals: 16 weeks   1. Patient will report ability to perform dressing, bathing and hygiene without limitation, pain or compensation. - progressing  2. Patient will demonstrate right shoulder flexion AROM > 145 degree to improve overhead reach. - progressing  3. Patient will demonstrate all right upper extremity strength via MicroFET > 85% of left for improved ADL and IADL performance. - progressing  4. Patient will improve FOTO to < 46% to improve ADL  performance. - progressing    PLAN     Continue per protocol (Phase III)    ]Pranav You, PT

## 2023-01-13 ENCOUNTER — CLINICAL SUPPORT (OUTPATIENT)
Dept: REHABILITATION | Facility: HOSPITAL | Age: 48
End: 2023-01-13
Attending: ORTHOPAEDIC SURGERY
Payer: MEDICAID

## 2023-01-13 DIAGNOSIS — M25.611 DECREASED RIGHT SHOULDER RANGE OF MOTION: Primary | ICD-10-CM

## 2023-01-13 DIAGNOSIS — M62.81 MUSCLE WEAKNESS OF RIGHT UPPER EXTREMITY: ICD-10-CM

## 2023-01-13 PROCEDURE — 97110 THERAPEUTIC EXERCISES: CPT | Mod: PN,CQ

## 2023-01-13 NOTE — PROGRESS NOTES
[facility]   Physical Therapy Treatment Note     Name: Nilesh Rolon Jr.  Clinic Number: [id number]    Therapy Diagnosis:   Encounter Diagnoses   Name Primary?    Decreased right shoulder range of motion Yes    Muscle weakness of right upper extremity        Physician: Ernesto Diego IV, MD    Visit Date: 1/13/2023    Physician Orders: PT Eval and Treat    Medical Diagnosis from Referral:   M75.101 (ICD-10-CM) - Tear of right supraspinatus tendon   M75.21 (ICD-10-CM) - Biceps tendinitis of right upper extremity   M19.011 (ICD-10-CM) - Primary osteoarthritis of right shoulder      Evaluation Date: 10/11/2022  Authorization Period Expiration: 9/7/24  Plan of Care Expiration: 12/15/22; 2/10/23  Progress Note Due: 2/10/23  Visit # / Visits authorized: 3/12 (+22 from 2022)  FOTO: PERFORMED 11/22/22  PTA Visit #: 1/5     Time In: 8:00 AM  Time Out: 8:55 AM  Total Billable Time: 55 minutes (TE-4) - Medicaid     Precautions: Standard; SEE POST OP PROTOCOL (limit extension); sling dc'd     Surgery: 10/4/22 - by Dr. Diego  Arthroscopic Rotator Cuff Repair (09374) - Medium tear  Open Biceps Tenodesis (61358)  Arthroscopic Shoulder Debridement - Extensive (89348) - Resection inferior humeral head osteophyte and debridement of long head biceps tendon, ant & post labrum, glenoid, humeral head  Manipulation under anesthesia (69616)  Comprehensive arthroscopic management Right shoulder      SUBJECTIVE     Pt reports: his pain is about the same, maybe a little more because of the cold weather.    He was compliant with home exercise program.  Response to previous treatment: soreness  Functional change: not at this time.    Pain: 4/10  Location: right shoulder      OBJECTIVE     Objective Measures updated at progress report unless specified.     DOS: 10/4/22  Arthroscopic cuff repair and biceps tenodesis Phase II: 4-6 weeks  Brace donned as needed  AAROM: flexion to 140, abduction to 60-80, external  "rotation to 45 at side  Gentle AAROM exercises without resistance, Pulleys, Grade I-II mobilizations  No resisted biceps strengthening for 8 weeks    See [facility] on 12/15/22    Treatment     Nilesh received the treatments listed below:      therapeutic exercises to develop strength, endurance, ROM, and flexibility for 50 minutes including re-assessment:    UBE fwd/back 3' ea LV2    Supine wand flexion: 15x5" holds   Supine shoulder flexion AROM: 2x10   Supine wand external rotation: 20x5"   Side lying external rotation: 3x10 right     Prone T: 2x10    Wall walks: 20x    Rows w/ GTB - 3x10 , cues to reduce excessive shoulder extension + proper scap squeeze & posture  Flexion walkout: blue theraband 3x10   External rotation: yellow theraband 2x10  Internal rotation: red theraband 2x10  Theraband SAPD: 3x10 green theraband       Standing ball roll RS: ABCs 1x through                                         Seated 90/90 external rotation (Elbow resting on table): 2x10  Landmines at table with ranger: 20x5" holds     Not today:  Side lying shoulder flexion with no ranger today: 1x10 , cues to stop @ end range before elbow flexion compensation    Theraband Abduction walkouts: green theraband 2x10   Pulleys: 3 minutes , flexion only    Nilesh received the following manual therapy techniques: Joint mobilizations, Myofacial release, and Soft tissue Mobilization were applied to the: right shoulder for 5 minutes, including:    Right shoulder PROM within restrictions with grade I joint mobs  Right shoulder GHJ posterior mobs, grade I-II      Not today:  Gentle STM  posterior shoulder and upper arm (medially-posteriorly)   Right elbow extension stretching  Side lying scapular rocking followed by scapular upward rotation mobs with shoulder flexion PROM -      Patient Education and Home Exercises     Home Exercises Provided and Patient Education Provided   Education provided:   - keep exercises in a pain free range.    Written Home " Exercises Provided: yes. Exercises were reviewed and Nilesh was able to demonstrate them prior to the end of the session.  Nilesh demonstrated good  understanding of the education provided. See EMR under Patient Instructions for exercises provided during therapy sessions    ASSESSMENT     Nilesh is a 47 y.o. male referred to outpatient Physical Therapy with a medical diagnosis of right supraspinatus tear, biceps tendonitis and right shoulder osteoarthritis.  Limited standing shoulder flexion AROM still to about 100 degree. Progressed light RTC strengthening with increased reps with resistance exercises and added one new exercise as noted above in bold.      Nilesh Is progressing well towards his goals.   Pt prognosis is Good.     Pt will continue to benefit from skilled outpatient physical therapy to address the deficits listed in the problem list box on initial evaluation, provide pt/family education and to maximize pt's level of independence in the home and community environment.     Pt's spiritual, cultural and educational needs considered and pt agreeable to plan of care and goals.     Anticipated barriers to physical therapy: co-morbidities.    Goals:  Short Term Goals: 4 weeks   1.  Patient will report < 5/10 shoulder pain at worst for improved ADL performance. - MET  2.  Patient will demonstrate right shoulder flexion PROM > 140 deg to improve overhead reach. - MET  3.  Patient will demonstrate an understanding and compliance with home exercise program. - MET     Long Term Goals: 16 weeks   1. Patient will report ability to perform dressing, bathing and hygiene without limitation, pain or compensation. - progressing  2. Patient will demonstrate right shoulder flexion AROM > 145 degree to improve overhead reach. - progressing  3. Patient will demonstrate all right upper extremity strength via MicroFET > 85% of left for improved ADL and IADL performance. - progressing  4. Patient will improve FOTO to < 46% to  improve ADL performance. - progressing    PLAN     Continue per protocol (Phase III)    ]Jonathan Rojas, PTA

## 2023-01-18 ENCOUNTER — CLINICAL SUPPORT (OUTPATIENT)
Dept: REHABILITATION | Facility: HOSPITAL | Age: 48
End: 2023-01-18
Payer: MEDICAID

## 2023-01-18 DIAGNOSIS — M25.611 DECREASED RIGHT SHOULDER RANGE OF MOTION: Primary | ICD-10-CM

## 2023-01-18 DIAGNOSIS — M62.81 MUSCLE WEAKNESS OF RIGHT UPPER EXTREMITY: ICD-10-CM

## 2023-01-18 PROCEDURE — 97110 THERAPEUTIC EXERCISES: CPT | Mod: PN,CQ

## 2023-01-18 NOTE — PROGRESS NOTES
[facility]   Physical Therapy Treatment Note     Name: Nilesh Rolon Jr.  Clinic Number: [id number]    Therapy Diagnosis:   Encounter Diagnoses   Name Primary?    Decreased right shoulder range of motion Yes    Muscle weakness of right upper extremity        Physician: Ernesto Diego IV, MD    Visit Date: 1/18/2023    Physician Orders: PT Eval and Treat    Medical Diagnosis from Referral:   M75.101 (ICD-10-CM) - Tear of right supraspinatus tendon   M75.21 (ICD-10-CM) - Biceps tendinitis of right upper extremity   M19.011 (ICD-10-CM) - Primary osteoarthritis of right shoulder      Evaluation Date: 10/11/2022  Authorization Period Expiration: 9/7/24  Plan of Care Expiration: 12/15/22; 2/10/23  Progress Note Due: 2/10/23  Visit # / Visits authorized: 4/12 (+22 from 2022)  FOTO: PERFORMED 11/22/22  PTA Visit #: 2/5     Time In: 8:00 AM  Time Out: 8:55 AM  Total Billable Time: 55 minutes (TE-4) - Medicaid     Precautions: Standard; SEE POST OP PROTOCOL (limit extension); sling dc'd     Surgery: 10/4/22 - by Dr. Diego  Arthroscopic Rotator Cuff Repair (79019) - Medium tear  Open Biceps Tenodesis (08256)  Arthroscopic Shoulder Debridement - Extensive (06887) - Resection inferior humeral head osteophyte and debridement of long head biceps tendon, ant & post labrum, glenoid, humeral head  Manipulation under anesthesia (68915)  Comprehensive arthroscopic management Right shoulder      SUBJECTIVE     Pt reports: his pain is about the same, maybe a little more because of the cold weather.    He was compliant with home exercise program.  Response to previous treatment: soreness  Functional change: not at this time.    Pain: 4/10  Location: right shoulder      OBJECTIVE     Objective Measures updated at progress report unless specified.     DOS: 10/4/22  Arthroscopic cuff repair and biceps tenodesis Phase II: 4-6 weeks  Brace donned as needed  AAROM: flexion to 140, abduction to 60-80, external  "rotation to 45 at side  Gentle AAROM exercises without resistance, Pulleys, Grade I-II mobilizations  No resisted biceps strengthening for 8 weeks    See [facility] on 12/15/22    Treatment     Nilesh received the treatments listed below:      therapeutic exercises to develop strength, endurance, ROM, and flexibility for 50 minutes including re-assessment:    UBE fwd/back 3' ea LV2    Supine wand flexion: 20x5" holds   Supine shoulder flexion AROM: 2x10   Supine wand external rotation: 20x5"   Side lying external rotation: 3x10 right     Prone T: 2x10    Wall walks: 20x (x10 on finger ladder today)    Rows w/ GTB - 3x10 , cues to reduce excessive shoulder extension + proper scap squeeze & posture  Flexion walkout: blue theraband 3x10   External rotation: yellow theraband 3x10  Internal rotation: red theraband 3x10  Theraband SAPD: 3x10 green theraband       Standing ball roll RS: ABCs 1x through                                         Seated 90/90 external rotation (Elbow resting on table): 2x10  Landmines at table with ranger: 20x5" holds     Not today:  Side lying shoulder flexion with no ranger today: 1x10 , cues to stop @ end range before elbow flexion compensation    Theraband Abduction walkouts: green theraband 2x10   Pulleys: 3 minutes , flexion only    Nilesh received the following manual therapy techniques: Joint mobilizations, Myofacial release, and Soft tissue Mobilization were applied to the: right shoulder for 5 minutes, including:    Right shoulder PROM within restrictions with grade I joint mobs  Right shoulder GHJ posterior mobs, grade I-II      Not today:  Gentle STM  posterior shoulder and upper arm (medially-posteriorly)   Right elbow extension stretching  Side lying scapular rocking followed by scapular upward rotation mobs with shoulder flexion PROM -      Patient Education and Home Exercises     Home Exercises Provided and Patient Education Provided   Education provided:   - keep exercises in a pain " free range.    Written Home Exercises Provided: yes. Exercises were reviewed and Nilesh was able to demonstrate them prior to the end of the session.  Nilesh demonstrated good  understanding of the education provided. See EMR under Patient Instructions for exercises provided during therapy sessions    ASSESSMENT     Nilesh is a 47 y.o. male referred to outpatient Physical Therapy with a medical diagnosis of right supraspinatus tear, biceps tendonitis and right shoulder osteoarthritis.  Limited standing shoulder flexion AROM still to about 100 degree. Patient was able to perform wall walks on the finger ladder with minimal pain at end range as opposed to attempting to walk up the wall.  Patient had no difficulty with today's progressions, noted in bold.       Nilesh Is progressing well towards his goals.   Pt prognosis is Good.     Pt will continue to benefit from skilled outpatient physical therapy to address the deficits listed in the problem list box on initial evaluation, provide pt/family education and to maximize pt's level of independence in the home and community environment.     Pt's spiritual, cultural and educational needs considered and pt agreeable to plan of care and goals.     Anticipated barriers to physical therapy: co-morbidities.    Goals:  Short Term Goals: 4 weeks   1.  Patient will report < 5/10 shoulder pain at worst for improved ADL performance. - MET  2.  Patient will demonstrate right shoulder flexion PROM > 140 deg to improve overhead reach. - MET  3.  Patient will demonstrate an understanding and compliance with home exercise program. - MET     Long Term Goals: 16 weeks   1. Patient will report ability to perform dressing, bathing and hygiene without limitation, pain or compensation. - progressing  2. Patient will demonstrate right shoulder flexion AROM > 145 degree to improve overhead reach. - progressing  3. Patient will demonstrate all right upper extremity strength via MicroFET > 85%  of left for improved ADL and IADL performance. - progressing  4. Patient will improve FOTO to < 46% to improve ADL performance. - progressing    PLAN     Continue per protocol (Phase III)    ]Jonathan Rojas, PTA

## 2023-01-19 NOTE — ED NOTES
PT GIVEN DC INSTRUCTIONS. PT INSTRUCTED TO FOLLOW UP WITH IR on Monday for paracentesis. PT VERB UNDERSTANDING. IV DCD. SITE WRAPPED WITH COBAN. PT INSTRUCTED TO REMOVE WITHIN 10-15 MIN. PT VERB UNDERSTANDING. PT HAS NO FURTHER QUESTIONS OR CONCERNS. PT ASSISTED TO CHECKOUT   VSS except BPs 130s/90s. Fundus firm, midline U/1 with scant flow Pain well controlled with tylenol/ibuprofen.  Up independently in room.  Working on breastfeeding  and  cares. Progressing per care plan. Continue to monitor and notify MD as needed.

## 2023-01-20 ENCOUNTER — CLINICAL SUPPORT (OUTPATIENT)
Dept: REHABILITATION | Facility: HOSPITAL | Age: 48
End: 2023-01-20
Attending: ORTHOPAEDIC SURGERY
Payer: MEDICAID

## 2023-01-20 DIAGNOSIS — M25.611 DECREASED RIGHT SHOULDER RANGE OF MOTION: Primary | ICD-10-CM

## 2023-01-20 DIAGNOSIS — M62.81 MUSCLE WEAKNESS OF RIGHT UPPER EXTREMITY: ICD-10-CM

## 2023-01-20 PROCEDURE — 97110 THERAPEUTIC EXERCISES: CPT | Mod: PN,CQ

## 2023-01-20 NOTE — PROGRESS NOTES
"[facility]   Physical Therapy Treatment Note     Name: Nilesh Rolon Jr.  Clinic Number: [id number]    Therapy Diagnosis:   Encounter Diagnoses   Name Primary?    Decreased right shoulder range of motion Yes    Muscle weakness of right upper extremity        Physician: Ernesto Diego IV, MD    Visit Date: 1/20/2023    Physician Orders: PT Eval and Treat    Medical Diagnosis from Referral:   M75.101 (ICD-10-CM) - Tear of right supraspinatus tendon   M75.21 (ICD-10-CM) - Biceps tendinitis of right upper extremity   M19.011 (ICD-10-CM) - Primary osteoarthritis of right shoulder      Evaluation Date: 10/11/2022  Authorization Period Expiration: 9/7/24  Plan of Care Expiration: 12/15/22; 2/10/23  Progress Note Due: 2/10/23  Visit # / Visits authorized: 5/12 (+22 from 2022)  FOTO: PERFORMED 11/22/22  PTA Visit #: 3/5     Time In: 8:00 AM  Time Out: 8:55 AM  Total Billable Time: 55 minutes (TE-4) - Medicaid     Precautions: Standard; SEE POST OP PROTOCOL (limit extension); sling dc'd     Surgery: 10/4/22 - by Dr. Diego  Arthroscopic Rotator Cuff Repair (13904) - Medium tear  Open Biceps Tenodesis (35681)  Arthroscopic Shoulder Debridement - Extensive (84876) - Resection inferior humeral head osteophyte and debridement of long head biceps tendon, ant & post labrum, glenoid, humeral head  Manipulation under anesthesia (55192)  Comprehensive arthroscopic management Right shoulder      SUBJECTIVE     Pt reports: "pain is not so bad today but my shoulder feels very stiff and tight, maybe because of the cold weather."    He was compliant with home exercise program.  Response to previous treatment: soreness  Functional change: not at this time.    Pain: 3/10  Location: right shoulder      OBJECTIVE     Objective Measures updated at progress report unless specified.     DOS: 10/4/22  Arthroscopic cuff repair and biceps tenodesis Phase II: 4-6 weeks  Brace donned as needed  AAROM: flexion to 140, " "abduction to 60-80, external rotation to 45 at side  Gentle AAROM exercises without resistance, Pulleys, Grade I-II mobilizations  No resisted biceps strengthening for 8 weeks    See [facility] on 12/15/22    Treatment     Nilesh received the treatments listed below:      therapeutic exercises to develop strength, endurance, ROM, and flexibility for 47 minutes including re-assessment:    UBE fwd/back 3' ea LV2    Supine wand flexion: 20x5" holds   Supine shoulder flexion AROM: 2x10   Supine wand external rotation: 20x5"   Side lying external rotation: 3x10 right     Prone T: 2x10    Wall walks: 20x on finger ladder   Rows w/ Blue theraband: 3x10 , cues to reduce excessive shoulder extension + proper scap squeeze & posture  Flexion walkout: blue theraband 3x10   External rotation: yellow theraband 3x10  Internal rotation: red theraband 3x10  Theraband SAPD: 3x10 green theraband       Standing ball roll RS: ABCs 1x through                                         Seated 90/90 external rotation (Elbow resting on table): 2x10  Landmines at table with ranger: 20x5" holds     Not today:  Side lying shoulder flexion with no ranger today: 1x10 , cues to stop @ end range before elbow flexion compensation    Theraband Abduction walkouts: green theraband 2x10   Pulleys: 3 minutes , flexion only    Nilesh received the following manual therapy techniques: Joint mobilizations, Myofacial release, and Soft tissue Mobilization were applied to the: right shoulder for 8 minutes, including:    Right shoulder PROM within restrictions with grade I joint mobs  Right shoulder GHJ posterior mobs, grade I-II      Not today:  Gentle STM  posterior shoulder and upper arm (medially-posteriorly)   Right elbow extension stretching  Side lying scapular rocking followed by scapular upward rotation mobs with shoulder flexion PROM -      Patient Education and Home Exercises     Home Exercises Provided and Patient Education Provided   Education provided:   - " keep exercises in a pain free range.    Written Home Exercises Provided: yes. Exercises were reviewed and Nilesh was able to demonstrate them prior to the end of the session.  Nilesh demonstrated good  understanding of the education provided. See EMR under Patient Instructions for exercises provided during therapy sessions    ASSESSMENT     Nilesh is a 47 y.o. male referred to outpatient Physical Therapy with a medical diagnosis of right supraspinatus tear, biceps tendonitis and right shoulder osteoarthritis.  Limited standing shoulder flexion AROM still to about 100 degrees.  Patient completed his therapy with no increase in symptoms prior to leaving the clinic.        Nilesh Is progressing well towards his goals.   Pt prognosis is Good.     Pt will continue to benefit from skilled outpatient physical therapy to address the deficits listed in the problem list box on initial evaluation, provide pt/family education and to maximize pt's level of independence in the home and community environment.     Pt's spiritual, cultural and educational needs considered and pt agreeable to plan of care and goals.     Anticipated barriers to physical therapy: co-morbidities.    Goals:  Short Term Goals: 4 weeks   1.  Patient will report < 5/10 shoulder pain at worst for improved ADL performance. - MET  2.  Patient will demonstrate right shoulder flexion PROM > 140 deg to improve overhead reach. - MET  3.  Patient will demonstrate an understanding and compliance with home exercise program. - MET     Long Term Goals: 16 weeks   1. Patient will report ability to perform dressing, bathing and hygiene without limitation, pain or compensation. - progressing  2. Patient will demonstrate right shoulder flexion AROM > 145 degree to improve overhead reach. - progressing  3. Patient will demonstrate all right upper extremity strength via MicroFET > 85% of left for improved ADL and IADL performance. - progressing  4. Patient will improve FOTO  to < 46% to improve ADL performance. - progressing    PLAN     Continue per protocol (Phase III)    ]Jonathan Rojas, PTA

## 2023-01-24 ENCOUNTER — CLINICAL SUPPORT (OUTPATIENT)
Dept: REHABILITATION | Facility: HOSPITAL | Age: 48
End: 2023-01-24
Payer: MEDICAID

## 2023-01-24 DIAGNOSIS — M62.81 MUSCLE WEAKNESS OF RIGHT UPPER EXTREMITY: ICD-10-CM

## 2023-01-24 DIAGNOSIS — M25.611 DECREASED RIGHT SHOULDER RANGE OF MOTION: Primary | ICD-10-CM

## 2023-01-24 PROCEDURE — 97110 THERAPEUTIC EXERCISES: CPT | Mod: PN,CQ

## 2023-01-24 NOTE — PROGRESS NOTES
[facility]   Physical Therapy Treatment Note     Name: Nilesh Rolon Jr.  Clinic Number: [id number]    Therapy Diagnosis:   Encounter Diagnoses   Name Primary?    Decreased right shoulder range of motion Yes    Muscle weakness of right upper extremity        Physician: Ernesto Diego IV, MD    Visit Date: 1/24/2023    Physician Orders: PT Eval and Treat    Medical Diagnosis from Referral:   M75.101 (ICD-10-CM) - Tear of right supraspinatus tendon   M75.21 (ICD-10-CM) - Biceps tendinitis of right upper extremity   M19.011 (ICD-10-CM) - Primary osteoarthritis of right shoulder      Evaluation Date: 10/11/2022  Authorization Period Expiration: 9/7/24  Plan of Care Expiration: 12/15/22; 2/10/23  Progress Note Due: 2/10/23  Visit # / Visits authorized: 6/12 (+22 from 2022)  FOTO: PERFORMED 11/22/22  PTA Visit #: 4/5     Time In: 8:02 AM  Time Out: 8:55 AM  Total Billable Time: 53 minutes (TE-4) - Medicaid     Precautions: Standard; SEE POST OP PROTOCOL (limit extension); sling dc'd     Surgery: 10/4/22 - by Dr. Diego  Arthroscopic Rotator Cuff Repair (14772) - Medium tear  Open Biceps Tenodesis (15773)  Arthroscopic Shoulder Debridement - Extensive (16492) - Resection inferior humeral head osteophyte and debridement of long head biceps tendon, ant & post labrum, glenoid, humeral head  Manipulation under anesthesia (19506)  Comprehensive arthroscopic management Right shoulder      SUBJECTIVE     Pt reports: Patient reports 3/10 pain in shoulder.     He was compliant with home exercise program.  Response to previous treatment: soreness  Functional change: not at this time.    Pain: 3/10  Location: right shoulder      OBJECTIVE     Objective Measures updated at progress report unless specified.     DOS: 10/4/22  Arthroscopic cuff repair and biceps tenodesis Phase II: 4-6 weeks  Brace donned as needed  AAROM: flexion to 140, abduction to 60-80, external rotation to 45 at side  Gentle AAROM  "exercises without resistance, Pulleys, Grade I-II mobilizations  No resisted biceps strengthening for 8 weeks    See UPOC on 12/15/22    Treatment     Nilesh received the treatments listed below:      therapeutic exercises to develop strength, endurance, ROM, and flexibility for 50 minutes   UBE fwd/back 3' ea LV2    Supine wand flexion: 20x5" holds   Supine wand external rotation: 20x5"   Supine Serratus Punch 20x2"   Side lying external rotation: 3x10 right     Prone T: 2x10      Rows w/ Blue theraband: 3x10 , cues to reduce excessive shoulder extension + proper scap squeeze & posture  Flexion walkout: blue theraband 3x10   External rotation: yellow theraband 3x10  Internal rotation: red theraband 3x10  Theraband SAPD: 3x10 green theraband     Standing SA press at wall 2x10 for "light weight bearing" per protocol.     Wall walks: 20x on finger ladder   Standing ball roll RS: ABCs 1x through                                         Landmines at table with ranger: 20x5" holds     Not today:  Side lying shoulder flexion with no ranger today: 1x10 , cues to stop @ end range before elbow flexion compensation  Seated 90/90 external rotation (Elbow resting on table): 2x10  Theraband Abduction walkouts: green theraband 2x10   Pulleys: 3 minutes , flexion only    Nilesh received the following manual therapy techniques: Joint mobilizations, Myofacial release, and Soft tissue Mobilization were applied to the: right shoulder for 8 minutes, including:    Right shoulder PROM within restrictions with grade I joint mobs  Right shoulder GHJ posterior mobs, grade I-II      Not today:  Gentle STM  posterior shoulder and upper arm (medially-posteriorly)   Right elbow extension stretching  Side lying scapular rocking followed by scapular upward rotation mobs with shoulder flexion PROM -      Patient Education and Home Exercises     Home Exercises Provided and Patient Education Provided   Education provided:   - keep exercises in a pain " free range.    Written Home Exercises Provided: yes. Exercises were reviewed and Nilesh was able to demonstrate them prior to the end of the session.  Nilesh demonstrated good  understanding of the education provided. See EMR under Patient Instructions for exercises provided during therapy sessions    ASSESSMENT     Nilesh is a 47 y.o. male referred to outpatient Physical Therapy with a medical diagnosis of right supraspinatus tear, biceps tendonitis and right shoulder osteoarthritis. Initiated Serratus therex for periscapular strength and early stage weight bearing. Patient continues to benefit from VC and TC for appropriate performance of activities today to prevent upper trap compensation  and for SA activation when appropriate during therex today.  Limited standing shoulder flexion AROM still to about 100 degrees.  Patient completed his therapy with no increase in symptoms prior to leaving the clinic.        Nilesh Is progressing well towards his goals.   Pt prognosis is Good.     Pt will continue to benefit from skilled outpatient physical therapy to address the deficits listed in the problem list box on initial evaluation, provide pt/family education and to maximize pt's level of independence in the home and community environment.     Pt's spiritual, cultural and educational needs considered and pt agreeable to plan of care and goals.     Anticipated barriers to physical therapy: co-morbidities.    Goals:  Short Term Goals: 4 weeks   1.  Patient will report < 5/10 shoulder pain at worst for improved ADL performance. - MET  2.  Patient will demonstrate right shoulder flexion PROM > 140 deg to improve overhead reach. - MET  3.  Patient will demonstrate an understanding and compliance with home exercise program. - MET     Long Term Goals: 16 weeks   1. Patient will report ability to perform dressing, bathing and hygiene without limitation, pain or compensation. - progressing  2. Patient will demonstrate right  shoulder flexion AROM > 145 degree to improve overhead reach. - progressing  3. Patient will demonstrate all right upper extremity strength via MicroFET > 85% of left for improved ADL and IADL performance. - progressing  4. Patient will improve FOTO to < 46% to improve ADL performance. - progressing    PLAN     Continue per protocol (Phase III)    ]Gorge Sun, PTA

## 2023-01-26 ENCOUNTER — CLINICAL SUPPORT (OUTPATIENT)
Dept: REHABILITATION | Facility: HOSPITAL | Age: 48
End: 2023-01-26
Attending: ORTHOPAEDIC SURGERY
Payer: MEDICAID

## 2023-01-26 DIAGNOSIS — M62.81 MUSCLE WEAKNESS OF RIGHT UPPER EXTREMITY: ICD-10-CM

## 2023-01-26 DIAGNOSIS — M25.611 DECREASED RIGHT SHOULDER RANGE OF MOTION: Primary | ICD-10-CM

## 2023-01-26 PROCEDURE — 97110 THERAPEUTIC EXERCISES: CPT | Mod: PN,CQ

## 2023-01-26 NOTE — PROGRESS NOTES
[facility]   Physical Therapy Treatment Note     Name: Nilesh Rolon Jr.  Clinic Number: [id number]    Therapy Diagnosis:   Encounter Diagnoses   Name Primary?    Decreased right shoulder range of motion Yes    Muscle weakness of right upper extremity          Physician: Ernesto Diego IV, MD    Visit Date: 1/26/2023    Physician Orders: PT Eval and Treat    Medical Diagnosis from Referral:   M75.101 (ICD-10-CM) - Tear of right supraspinatus tendon   M75.21 (ICD-10-CM) - Biceps tendinitis of right upper extremity   M19.011 (ICD-10-CM) - Primary osteoarthritis of right shoulder      Evaluation Date: 10/11/2022  Authorization Period Expiration: 9/7/24  Plan of Care Expiration: 12/15/22; 2/10/23  Progress Note Due: 2/10/23  Visit # / Visits authorized: 7/12 (+22 from 2022)  FOTO: PERFORMED 11/22/22  PTA Visit #: 5/5     Time In: 9:02 AM  Time Out: 8:56 AM  Total Billable Time: 53 minutes (TE-4) - Medicaid     Precautions: Standard; SEE POST OP PROTOCOL (limit extension); sling dc'd     Surgery: 10/4/22 - by Dr. Diego  Arthroscopic Rotator Cuff Repair (21922) - Medium tear  Open Biceps Tenodesis (76061)  Arthroscopic Shoulder Debridement - Extensive (23559) - Resection inferior humeral head osteophyte and debridement of long head biceps tendon, ant & post labrum, glenoid, humeral head  Manipulation under anesthesia (03895)  Comprehensive arthroscopic management Right shoulder      SUBJECTIVE     Pt reports: Patient reports 3/10 pain in shoulder.     He was compliant with home exercise program.  Response to previous treatment: soreness  Functional change: not at this time.    Pain: 3/10  Location: right shoulder      OBJECTIVE     Objective Measures updated at progress report unless specified.     DOS: 10/4/22  Arthroscopic cuff repair and biceps tenodesis Phase II: 4-6 weeks  Brace donned as needed  AAROM: flexion to 140, abduction to 60-80, external rotation to 45 at side  Gentle  "AAROM exercises without resistance, Pulleys, Grade I-II mobilizations  No resisted biceps strengthening for 8 weeks    See UPOC on 12/15/22    Treatment     Nilesh received the treatments listed below:      therapeutic exercises to develop strength, endurance, ROM, and flexibility for 53 minutes   UBE fwd/back 3' ea LV2    Standing  wand flexion: 20x5" holds   Standing Wand Scaption 20x3"   Standing wand external rotation: 20x5"   Supine Serratus Punch 20x2"   Side lying external rotation: 3x10 right     Prone T: 2x10  Prone Y: 2x10     Rows w/ Blue theraband: 3x10 , cues to reduce excessive shoulder extension + proper scap squeeze & posture  Flexion walkout: blue theraband 3x10   External rotation: yellow theraband 3x10  Internal rotation: red theraband 3x10  Theraband SAPD: 3x10 green theraband     Standing SA press at wall 2x10 for "light weight bearing" per protocol.     Wall walks: 20x on finger ladder   Standing ball roll RS: ABCs 1x through                                         Landmines at table with ranger: 20x5" holds     Not today:  Side lying shoulder flexion with no ranger today: 1x10 , cues to stop @ end range before elbow flexion compensation  Seated 90/90 external rotation (Elbow resting on table): 2x10  Theraband Abduction walkouts: green theraband 2x10   Pulleys: 3 minutes , flexion only    Nilesh received the following manual therapy techniques: Joint mobilizations, Myofacial release, and Soft tissue Mobilization were applied to the: right shoulder for 8 minutes, including:    Right shoulder PROM within restrictions with grade I joint mobs  Right shoulder GHJ posterior mobs, grade I-II      Not today:  Gentle STM  posterior shoulder and upper arm (medially-posteriorly)   Right elbow extension stretching  Side lying scapular rocking followed by scapular upward rotation mobs with shoulder flexion PROM -      Patient Education and Home Exercises     Home Exercises Provided and Patient Education " Provided   Education provided:   - keep exercises in a pain free range.    Written Home Exercises Provided: yes. Exercises were reviewed and Nilesh was able to demonstrate them prior to the end of the session.  Nilesh demonstrated good  understanding of the education provided. See EMR under Patient Instructions for exercises provided during therapy sessions    ASSESSMENT     Nilesh is a 47 y.o. male referred to outpatient Physical Therapy with a medical diagnosis of right supraspinatus tear, biceps tendonitis and right shoulder osteoarthritis. Initiated Serratus therex for periscapular strength and early stage weight bearing. Patient continues to benefit from VC and TC for appropriate performance of activities today to prevent upper trap compensation  and for SA activation when appropriate during therex today.  Limited standing shoulder flexion AROM still to about 100 degrees.  Patient completed his therapy with no increase in symptoms prior to leaving the clinic.        Nilesh Is progressing well towards his goals.   Pt prognosis is Good.     Pt will continue to benefit from skilled outpatient physical therapy to address the deficits listed in the problem list box on initial evaluation, provide pt/family education and to maximize pt's level of independence in the home and community environment.     Pt's spiritual, cultural and educational needs considered and pt agreeable to plan of care and goals.     Anticipated barriers to physical therapy: co-morbidities.    Goals:  Short Term Goals: 4 weeks   1.  Patient will report < 5/10 shoulder pain at worst for improved ADL performance. - MET  2.  Patient will demonstrate right shoulder flexion PROM > 140 deg to improve overhead reach. - MET  3.  Patient will demonstrate an understanding and compliance with home exercise program. - MET     Long Term Goals: 16 weeks   1. Patient will report ability to perform dressing, bathing and hygiene without limitation, pain or  compensation. - progressing  2. Patient will demonstrate right shoulder flexion AROM > 145 degree to improve overhead reach. - progressing  3. Patient will demonstrate all right upper extremity strength via MicroFET > 85% of left for improved ADL and IADL performance. - progressing  4. Patient will improve FOTO to < 46% to improve ADL performance. - progressing    PLAN     Continue per protocol (Phase III)    ]Gorge Sun, PTA

## 2023-01-30 ENCOUNTER — CLINICAL SUPPORT (OUTPATIENT)
Dept: REHABILITATION | Facility: HOSPITAL | Age: 48
End: 2023-01-30
Payer: MEDICAID

## 2023-01-30 DIAGNOSIS — M25.611 DECREASED RIGHT SHOULDER RANGE OF MOTION: Primary | ICD-10-CM

## 2023-01-30 DIAGNOSIS — M62.81 MUSCLE WEAKNESS OF RIGHT UPPER EXTREMITY: ICD-10-CM

## 2023-01-30 PROCEDURE — 97110 THERAPEUTIC EXERCISES: CPT | Mod: PN | Performed by: PHYSICAL THERAPIST

## 2023-01-30 NOTE — PROGRESS NOTES
[facility]   Physical Therapy Treatment Note     Name: Nilesh Rolon Jr.  Clinic Number: [id number]    Therapy Diagnosis:   Encounter Diagnoses   Name Primary?    Decreased right shoulder range of motion Yes    Muscle weakness of right upper extremity        Physician: Ernesto Diego IV, MD    Visit Date: 1/30/2023    Physician Orders: PT Eval and Treat    Medical Diagnosis from Referral:   M75.101 (ICD-10-CM) - Tear of right supraspinatus tendon   M75.21 (ICD-10-CM) - Biceps tendinitis of right upper extremity   M19.011 (ICD-10-CM) - Primary osteoarthritis of right shoulder      Evaluation Date: 10/11/2022  Authorization Period Expiration: 9/7/24  Plan of Care Expiration: 12/15/22; 2/10/23  Progress Note Due: 2/10/23  Visit # / Visits authorized: 8/12 (+22 from 2022)  FOTO: PERFORMED 11/22/22  PTA Visit #: 0/5     Time In: 8:53 AM  Time Out: 9:50 AM  Total Billable Time: 56 minutes (TE-4) - Medicaid     Precautions: Standard; SEE POST OP PROTOCOL (limit extension); sling dc'd     Surgery: 10/4/22 - by Dr. Diego  Arthroscopic Rotator Cuff Repair (00813) - Medium tear  Open Biceps Tenodesis (17509)  Arthroscopic Shoulder Debridement - Extensive (38845) - Resection inferior humeral head osteophyte and debridement of long head biceps tendon, ant & post labrum, glenoid, humeral head  Manipulation under anesthesia (46652)  Comprehensive arthroscopic management Right shoulder      SUBJECTIVE     Pt reports: He's been trying to do his stretches at home    He was compliant with home exercise program.  Response to previous treatment: soreness  Functional change: not at this time.    Pain: 2/10  Location: right shoulder      OBJECTIVE     Objective Measures updated at progress report unless specified.     DOS: 10/4/22    Treatment     Nilesh received the treatments listed below:      therapeutic exercises to develop strength, endurance, ROM, and flexibility for 53 minutes     UBE fwd/back  "3' ea LV3   Supine Serratus Punch 20x2" with 4 lb DB   Side lying external rotation: 3x10 right 1 pounds     Prone T: 2x10  Prone Y: 2x10     Isometric hold + contralateral Rows w/ Blue theraband: 2x10 each   Flexion: red theraband 3x10   External rotation: yellow theraband 3x10  Internal rotation: red theraband 3x10  Theraband SAPD: 3x10 green theraband     Standing SA press at wall 2x10 for "light weight bearing" per protocol.     Wall walks: 20x on finger ladder     Not today:  Standing ball roll RS: ABCs 1x through                                         Landmines at table with ranger: 20x5" holds   Seated 90/90 external rotation (Elbow resting on table): 2x10  Theraband Abduction walkouts: green theraband 2x10       Nilesh received the following manual therapy techniques: Joint mobilizations, Myofacial release, and Soft tissue Mobilization were applied to the: right shoulder for 7 minutes, including:    Right shoulder PROM within restrictions with grade I joint mobs  Right shoulder GHJ posterior mobs, grade I-II      Not today:  Gentle STM  posterior shoulder and upper arm (medially-posteriorly)   Right elbow extension stretching  Side lying scapular rocking followed by scapular upward rotation mobs with shoulder flexion PROM -      Patient Education and Home Exercises     Home Exercises Provided and Patient Education Provided   Education provided:   - keep exercises in a pain free range.    Written Home Exercises Provided: yes. Exercises were reviewed and Nilesh was able to demonstrate them prior to the end of the session.  Nilesh demonstrated good  understanding of the education provided. See EMR under Patient Instructions for exercises provided during therapy sessions    ASSESSMENT     Nilesh is a 47 y.o. male referred to outpatient Physical Therapy with a medical diagnosis of right supraspinatus tear, biceps tendonitis and right shoulder osteoarthritis. Shoulder range of motion continues to improve after " manuals and exercise. Progressing rotator cuff and periscapular strength. Only report of workout fatigue upon completion.        Nilesh Is progressing well towards his goals.   Pt prognosis is Good.     Pt will continue to benefit from skilled outpatient physical therapy to address the deficits listed in the problem list box on initial evaluation, provide pt/family education and to maximize pt's level of independence in the home and community environment.     Pt's spiritual, cultural and educational needs considered and pt agreeable to plan of care and goals.     Anticipated barriers to physical therapy: co-morbidities.    Goals:  Short Term Goals: 4 weeks   1.  Patient will report < 5/10 shoulder pain at worst for improved ADL performance. - MET  2.  Patient will demonstrate right shoulder flexion PROM > 140 deg to improve overhead reach. - MET  3.  Patient will demonstrate an understanding and compliance with home exercise program. - MET     Long Term Goals: 16 weeks   1. Patient will report ability to perform dressing, bathing and hygiene without limitation, pain or compensation. - progressing  2. Patient will demonstrate right shoulder flexion AROM > 145 degree to improve overhead reach. - progressing  3. Patient will demonstrate all right upper extremity strength via MicroFET > 85% of left for improved ADL and IADL performance. - progressing  4. Patient will improve FOTO to < 46% to improve ADL performance. - progressing    PLAN     Continue per protocol (Phase III)    ]Pranav You, PT

## 2023-02-01 ENCOUNTER — CLINICAL SUPPORT (OUTPATIENT)
Dept: REHABILITATION | Facility: HOSPITAL | Age: 48
End: 2023-02-01
Payer: MEDICAID

## 2023-02-01 DIAGNOSIS — M62.81 MUSCLE WEAKNESS OF RIGHT UPPER EXTREMITY: ICD-10-CM

## 2023-02-01 DIAGNOSIS — M25.611 DECREASED RIGHT SHOULDER RANGE OF MOTION: Primary | ICD-10-CM

## 2023-02-01 PROCEDURE — 97140 MANUAL THERAPY 1/> REGIONS: CPT | Mod: PN,CQ

## 2023-02-01 PROCEDURE — 97110 THERAPEUTIC EXERCISES: CPT | Mod: PN,CQ

## 2023-02-01 NOTE — PROGRESS NOTES
[facility]   Physical Therapy Treatment Note     Name: Nilesh Rolon Jr.  Clinic Number: [id number]    Therapy Diagnosis:   Encounter Diagnoses   Name Primary?    Decreased right shoulder range of motion Yes    Muscle weakness of right upper extremity        Physician: Ernesto Diego IV, MD    Visit Date: 2/1/2023    Physician Orders: PT Eval and Treat    Medical Diagnosis from Referral:   M75.101 (ICD-10-CM) - Tear of right supraspinatus tendon   M75.21 (ICD-10-CM) - Biceps tendinitis of right upper extremity   M19.011 (ICD-10-CM) - Primary osteoarthritis of right shoulder      Evaluation Date: 10/11/2022  Authorization Period Expiration: 9/7/24  Plan of Care Expiration: 12/15/22; 2/10/23  Progress Note Due: 2/10/23  Visit # / Visits authorized: 9/12 (+22 from 2022)  FOTO:   PTA Visit #: 1/5     Time In: 9:00 AM  Time Out: 9:58 AM  Total Billable Time: 56 minutes (TE-4) - Medicaid     Precautions: Standard; SEE POST OP PROTOCOL (limit extension); sling dc'd     Surgery: 10/4/22 - by Dr. Diego  Arthroscopic Rotator Cuff Repair (43234) - Medium tear  Open Biceps Tenodesis (34818)  Arthroscopic Shoulder Debridement - Extensive (18798) - Resection inferior humeral head osteophyte and debridement of long head biceps tendon, ant & post labrum, glenoid, humeral head  Manipulation under anesthesia (74997)  Comprehensive arthroscopic management Right shoulder      SUBJECTIVE     Pt reports: Patient reports liking the stretching from previous visit stating he thinkgs is has helped to loosen his shoulder.     He was compliant with home exercise program.  Response to previous treatment: soreness  Functional change: not at this time.    Pain: 2/10  Location: right shoulder      OBJECTIVE     Objective Measures updated at progress report unless specified.     DOS: 10/4/22    Treatment     Nilesh received the treatments listed below:      therapeutic exercises to develop strength, endurance,  "ROM, and flexibility for 45 minutes     UBE fwd/back 3' ea LV3   Sleeper Stretch 2x30"  Hands on Table Shoulder flexion stretch (CKC) 2x30"    Supine Serratus Punch 20x2" with 4 lb DB   Side lying external rotation: 3x10 right 1 pounds     Prone T: 2x10  Prone Y: 2x10     Isometric hold + contralateral Rows w/ Blue theraband: 2x10 each   Flexion: red theraband 3x10   External rotation: yellow theraband 3x10  Internal rotation: red theraband 3x10  Theraband SAPD: 3x10 green theraband     Standing SA press at wall 2x10 for "light weight bearing" per protocol.     Wall walks: 20x on finger ladder     Not today:  Standing ball roll RS: ABCs 1x through                                         Landmines at table with ranger: 20x5" holds   Seated 90/90 external rotation (Elbow resting on table): 2x10  Theraband Abduction walkouts: green theraband 2x10       Nilesh received the following manual therapy techniques: Joint mobilizations, Myofacial release, and Soft tissue Mobilization were applied to the: right shoulder for 10 minutes, including:    Right shoulder PROM D1, D2 patterns.         Not today:  Gentle STM  posterior shoulder and upper arm (medially-posteriorly)   Right elbow extension stretching  Side lying scapular rocking followed by scapular upward rotation mobs with shoulder flexion PROM -      Patient Education and Home Exercises     Home Exercises Provided and Patient Education Provided   Education provided:   - keep exercises in a pain free range.    Written Home Exercises Provided: yes. Exercises were reviewed and Nilesh was able to demonstrate them prior to the end of the session.  Nilesh demonstrated good  understanding of the education provided. See EMR under Patient Instructions for exercises provided during therapy sessions    ASSESSMENT     Nilesh is a 47 y.o. male referred to outpatient Physical Therapy with a medical diagnosis of right supraspinatus tear, biceps tendonitis and right shoulder " osteoarthritis. Patient reports benefit from passive stretching this visit stating it is easier to go through AROM and PRE after stretching. Initiated bolded stretches for to improve shoulder ROM per protocol.      Nilesh Is progressing well towards his goals.   Pt prognosis is Good.     Pt will continue to benefit from skilled outpatient physical therapy to address the deficits listed in the problem list box on initial evaluation, provide pt/family education and to maximize pt's level of independence in the home and community environment.     Pt's spiritual, cultural and educational needs considered and pt agreeable to plan of care and goals.     Anticipated barriers to physical therapy: co-morbidities.    Goals:  Short Term Goals: 4 weeks   1.  Patient will report < 5/10 shoulder pain at worst for improved ADL performance. - MET  2.  Patient will demonstrate right shoulder flexion PROM > 140 deg to improve overhead reach. - MET  3.  Patient will demonstrate an understanding and compliance with home exercise program. - MET     Long Term Goals: 16 weeks   1. Patient will report ability to perform dressing, bathing and hygiene without limitation, pain or compensation. - progressing  2. Patient will demonstrate right shoulder flexion AROM > 145 degree to improve overhead reach. - progressing  3. Patient will demonstrate all right upper extremity strength via MicroFET > 85% of left for improved ADL and IADL performance. - progressing  4. Patient will improve FOTO to < 46% to improve ADL performance. - progressing    PLAN     Continue per protocol (Phase III)    ]Gorge Sun, PTA

## 2023-02-06 ENCOUNTER — CLINICAL SUPPORT (OUTPATIENT)
Dept: REHABILITATION | Facility: HOSPITAL | Age: 48
End: 2023-02-06
Payer: MEDICAID

## 2023-02-06 DIAGNOSIS — M62.81 MUSCLE WEAKNESS OF RIGHT UPPER EXTREMITY: ICD-10-CM

## 2023-02-06 DIAGNOSIS — M25.611 DECREASED RIGHT SHOULDER RANGE OF MOTION: Primary | ICD-10-CM

## 2023-02-06 PROCEDURE — 97110 THERAPEUTIC EXERCISES: CPT | Mod: PN | Performed by: PHYSICAL THERAPIST

## 2023-02-06 PROCEDURE — 97110 THERAPEUTIC EXERCISES: CPT | Mod: PN,CQ

## 2023-02-06 NOTE — PROGRESS NOTES
[facility]   Physical Therapy Treatment Note     Name: Nilesh Rolon Jr.  Clinic Number: [id number]    Therapy Diagnosis:   No diagnosis found.      Physician: Ernesto Diego IV, MD    Visit Date: 2/6/2023    Physician Orders: PT Eval and Treat    Medical Diagnosis from Referral:   M75.101 (ICD-10-CM) - Tear of right supraspinatus tendon   M75.21 (ICD-10-CM) - Biceps tendinitis of right upper extremity   M19.011 (ICD-10-CM) - Primary osteoarthritis of right shoulder      Evaluation Date: 10/11/2022  Authorization Period Expiration: 9/7/24  Plan of Care Expiration: 12/15/22; 2/10/23  Progress Note Due: 2/10/23  Visit # / Visits authorized: 10/12 (+22 from 2022)  FOTO: Performed 11/22/23  PTA Visit #: 0/5     Time In: 8:55 AM  Time Out: 9:05 AM  Total Billable Time: 10 minutes (TE-1) - Medicaid     Precautions: Standard; SEE POST OP PROTOCOL (limit extension); sling dc'd     Surgery: 10/4/22 - by Dr. Diego  Arthroscopic Rotator Cuff Repair (58381) - Medium tear  Open Biceps Tenodesis (07669)  Arthroscopic Shoulder Debridement - Extensive (76726) - Resection inferior humeral head osteophyte and debridement of long head biceps tendon, ant & post labrum, glenoid, humeral head  Manipulation under anesthesia (72313)  Comprehensive arthroscopic management Right shoulder      SUBJECTIVE     Pt reports: he's continuously stretching his shoulder. He understands that it's only about 3 months out and will take time.    He was compliant with home exercise program.  Response to previous treatment: soreness  Functional change: not at this time.    Pain: 1-2/10  Location: right shoulder      OBJECTIVE     Objective Measures updated at progress report unless specified.     DOS: 10/4/22    Treatment     Nielsh received the treatments listed below:      therapeutic exercises to develop strength, endurance, ROM, and flexibility for 10 minutes     UBE fwd/back 3' ea LV3   Hands on Table Shoulder  "flexion stretch (CKC) 2x30"      See note from Gorge Sun PTA for remaining visit:      Patient Education and Home Exercises     Home Exercises Provided and Patient Education Provided   Education provided:   - keep exercises in a pain free range.    Written Home Exercises Provided: yes. Exercises were reviewed and Nilesh was able to demonstrate them prior to the end of the session.  Nilesh demonstrated good  understanding of the education provided. See EMR under Patient Instructions for exercises provided during therapy sessions    ASSESSMENT     Nilesh is a 47 y.o. male referred to outpatient Physical Therapy with a medical diagnosis of right supraspinatus tear, biceps tendonitis and right shoulder osteoarthritis. Continuing with expected rotator cuff weakness and limitations in flexion AROM. See note from Gorge Sun PTA, for further assessment from today.    Nilesh Is progressing well towards his goals.   Pt prognosis is Good.     Pt will continue to benefit from skilled outpatient physical therapy to address the deficits listed in the problem list box on initial evaluation, provide pt/family education and to maximize pt's level of independence in the home and community environment.     Pt's spiritual, cultural and educational needs considered and pt agreeable to plan of care and goals.     Anticipated barriers to physical therapy: co-morbidities.    Goals:  Short Term Goals: 4 weeks   1.  Patient will report < 5/10 shoulder pain at worst for improved ADL performance. - MET  2.  Patient will demonstrate right shoulder flexion PROM > 140 deg to improve overhead reach. - MET  3.  Patient will demonstrate an understanding and compliance with home exercise program. - MET     Long Term Goals: 16 weeks   1. Patient will report ability to perform dressing, bathing and hygiene without limitation, pain or compensation. - progressing  2. Patient will demonstrate right shoulder flexion AROM > 145 degree to improve overhead " reach. - progressing  3. Patient will demonstrate all right upper extremity strength via MicroFET > 85% of left for improved ADL and IADL performance. - progressing  4. Patient will improve FOTO to < 46% to improve ADL performance. - progressing    PLAN     Continue per protocol (Phase III)    ]Pranav You, PT

## 2023-02-06 NOTE — PROGRESS NOTES
[facility]   Physical Therapy Treatment Note     Name: Nilesh Rolon Jr.  Clinic Number: [id number]    Therapy Diagnosis:   Encounter Diagnoses   Name Primary?    Decreased right shoulder range of motion Yes    Muscle weakness of right upper extremity        Physician: Ernesto Diego IV, MD    Visit Date: 2/6/2023    Physician Orders: PT Eval and Treat    Medical Diagnosis from Referral:   M75.101 (ICD-10-CM) - Tear of right supraspinatus tendon   M75.21 (ICD-10-CM) - Biceps tendinitis of right upper extremity   M19.011 (ICD-10-CM) - Primary osteoarthritis of right shoulder      Evaluation Date: 10/11/2022  Authorization Period Expiration: 9/7/24  Plan of Care Expiration: 12/15/22; 2/10/23  Progress Note Due: 2/10/23  Visit # / Visits authorized: 10/12 (+22 from 2022)  FOTO:   PTA Visit #: 2/5     Time In: 9:05 AM  Time Out: 9:53 AM  Total Billable Time: 48 minutes (TE-3) - Medicaid     Precautions: Standard; SEE POST OP PROTOCOL (limit extension); sling dc'd     Surgery: 10/4/22 - by Dr. Diego  Arthroscopic Rotator Cuff Repair (40060) - Medium tear  Open Biceps Tenodesis (77943)  Arthroscopic Shoulder Debridement - Extensive (54466) - Resection inferior humeral head osteophyte and debridement of long head biceps tendon, ant & post labrum, glenoid, humeral head  Manipulation under anesthesia (48370)  Comprehensive arthroscopic management Right shoulder      SUBJECTIVE     Pt reports: Patient reports liking the stretching from previous visit stating he thinkgs is has helped to loosen his shoulder.     He was compliant with home exercise program.  Response to previous treatment: soreness  Functional change: not at this time.    Pain: 2/10  Location: right shoulder      OBJECTIVE     Objective Measures updated at progress report unless specified.     DOS: 10/4/22    Treatment     Nilesh received the treatments listed below:      therapeutic exercises to develop strength, endurance,  "ROM, and flexibility for 45 minutes     UBE fwd/back 3' ea LV3 - NP  Hands on Table Shoulder flexion stretch (CKC) 2x30"-NP  Sleeper Stretch 3x30"      Supine Serratus Punch 20x2" with 5 lb DB   Side lying external rotation: 3x10 right 2# pounds     Prone T: 2x10  Prone Y: 2x10     Isometric hold + contralateral Rows w/ Blue theraband: 3x10 each   Flexion: red theraband 3x10   External rotation: Red theraband 3x10  Internal rotation: red theraband 3x10  Theraband SAPD: 3x10 Blue theraband     Landmines at table with ranger: 20x5" holds     Wall Push ups: 3x10   Standing SA press at wall 2x10 for "light weight bearing" per protocol.   Wall walks: 20x on finger ladder     Not today:  Standing ball roll RS: ABCs 1x through                                           Seated 90/90 external rotation (Elbow resting on table): 2x10  Theraband Abduction walkouts: green theraband 2x10       Nilesh received the following manual therapy techniques: Joint mobilizations, Myofacial release, and Soft tissue Mobilization were applied to the: right shoulder for 10 minutes, including:    Right shoulder PROM D1, D2 patterns.         Not today:  Gentle STM  posterior shoulder and upper arm (medially-posteriorly)   Right elbow extension stretching  Side lying scapular rocking followed by scapular upward rotation mobs with shoulder flexion PROM -      Patient Education and Home Exercises     Home Exercises Provided and Patient Education Provided   Education provided:   - keep exercises in a pain free range.    Written Home Exercises Provided: yes. Exercises were reviewed and Nilesh was able to demonstrate them prior to the end of the session.  iNlesh demonstrated good  understanding of the education provided. See EMR under Patient Instructions for exercises provided during therapy sessions    ASSESSMENT     Nilesh is a 47 y.o. male referred to outpatient Physical Therapy with a medical diagnosis of right supraspinatus tear, biceps " tendonitis and right shoulder osteoarthritis. Patient continues to report benefit from passive stretching this visit stating it is easier to go through AROM and PRE after stretching. Progressed strengthening reflected in bolded items in tx section. Patient questions if he can do push ups with hands on table, PTA advised wall push ups to start, patient performed and reports they felt really good.     Nilesh Is progressing well towards his goals.   Pt prognosis is Good.     Pt will continue to benefit from skilled outpatient physical therapy to address the deficits listed in the problem list box on initial evaluation, provide pt/family education and to maximize pt's level of independence in the home and community environment.     Pt's spiritual, cultural and educational needs considered and pt agreeable to plan of care and goals.     Anticipated barriers to physical therapy: co-morbidities.    Goals:  Short Term Goals: 4 weeks   1.  Patient will report < 5/10 shoulder pain at worst for improved ADL performance. - MET  2.  Patient will demonstrate right shoulder flexion PROM > 140 deg to improve overhead reach. - MET  3.  Patient will demonstrate an understanding and compliance with home exercise program. - MET     Long Term Goals: 16 weeks   1. Patient will report ability to perform dressing, bathing and hygiene without limitation, pain or compensation. - progressing  2. Patient will demonstrate right shoulder flexion AROM > 145 degree to improve overhead reach. - progressing  3. Patient will demonstrate all right upper extremity strength via MicroFET > 85% of left for improved ADL and IADL performance. - progressing  4. Patient will improve FOTO to < 46% to improve ADL performance. - progressing    PLAN     Continue per protocol (Phase III)    ]Gorge Sun PTA

## 2023-02-08 ENCOUNTER — CLINICAL SUPPORT (OUTPATIENT)
Dept: REHABILITATION | Facility: HOSPITAL | Age: 48
End: 2023-02-08
Payer: MEDICAID

## 2023-02-08 DIAGNOSIS — M25.611 DECREASED RIGHT SHOULDER RANGE OF MOTION: Primary | ICD-10-CM

## 2023-02-08 DIAGNOSIS — M62.81 MUSCLE WEAKNESS OF RIGHT UPPER EXTREMITY: ICD-10-CM

## 2023-02-08 PROCEDURE — 97110 THERAPEUTIC EXERCISES: CPT | Mod: PN,CQ

## 2023-02-08 NOTE — PROGRESS NOTES
[facility]   Physical Therapy Treatment Note     Name: Nilesh Rolon Jr.  Clinic Number: [id number]    Therapy Diagnosis:   Encounter Diagnoses   Name Primary?    Decreased right shoulder range of motion Yes    Muscle weakness of right upper extremity          Physician: Ernesto Diego IV, MD    Visit Date: 2/8/2023    Physician Orders: PT Eval and Treat    Medical Diagnosis from Referral:   M75.101 (ICD-10-CM) - Tear of right supraspinatus tendon   M75.21 (ICD-10-CM) - Biceps tendinitis of right upper extremity   M19.011 (ICD-10-CM) - Primary osteoarthritis of right shoulder      Evaluation Date: 10/11/2022  Authorization Period Expiration: 9/7/24  Plan of Care Expiration: 12/15/22; 2/10/23  Progress Note Due: 2/10/23  Visit # / Visits authorized: 11/12 (+22 from 2022)  FOTO:   PTA Visit #: 3/5     Time In: 9:02 AM  Time Out: 10:00 AM  Total Billable Time: 55 minutes (TE-4) - Medicaid     Precautions: Standard; SEE POST OP PROTOCOL (limit extension); sling dc'd     Surgery: 10/4/22 - by Dr. Diego  Arthroscopic Rotator Cuff Repair (53099) - Medium tear  Open Biceps Tenodesis (74982)  Arthroscopic Shoulder Debridement - Extensive (59854) - Resection inferior humeral head osteophyte and debridement of long head biceps tendon, ant & post labrum, glenoid, humeral head  Manipulation under anesthesia (05288)  Comprehensive arthroscopic management Right shoulder      SUBJECTIVE     Pt reports: No new complaints. Patient reports feeling a little sore in shoulder     He was compliant with home exercise program.  Response to previous treatment: soreness  Functional change: not at this time.    Pain: 2/10  Location: right shoulder      OBJECTIVE     Objective Measures updated at progress report unless specified.     DOS: 10/4/22    Treatment     Nilesh received the treatments listed below:      therapeutic exercises to develop strength, endurance, ROM, and flexibility for 45 minutes     UBE  "fwd/back 3' ea LV3   Hands on Table Shoulder flexion stretch (CKC) 10x10"  Sleeper Stretch 3x30"      Supine Serratus Punch 20x2" with 5 lb DB   Side lying external rotation: 3x10 right 2# pounds     Prone T: 3x10  Prone Y: 3x10     Isometric hold + contralateral Rows w/ Blue theraband: 3x10 each   Flexion: red theraband 3x10   External rotation: Red theraband 3x10  Internal rotation: red theraband 3x10  Theraband SAPD: 3x10 Blue theraband     Landmines at table with ranger: 20x5" holds     Wall Push ups: 3x10   Standing SA press at wall 3x10 for "light weight bearing" per protocol.   Wall walks: 20x on finger ladder     Not today:  Standing ball roll RS: ABCs 1x through                                           Seated 90/90 external rotation (Elbow resting on table): 2x10  Theraband Abduction walkouts: green theraband 2x10       Nilesh received the following manual therapy techniques: Joint mobilizations, Myofacial release, and Soft tissue Mobilization were applied to the: right shoulder for 10 minutes, including:    Right shoulder PROM D1, D2 patterns.         Not today:  Gentle STM  posterior shoulder and upper arm (medially-posteriorly)   Right elbow extension stretching  Side lying scapular rocking followed by scapular upward rotation mobs with shoulder flexion PROM -      Patient Education and Home Exercises     Home Exercises Provided and Patient Education Provided   Education provided:   - keep exercises in a pain free range.    Written Home Exercises Provided: yes. Exercises were reviewed and Nilesh was able to demonstrate them prior to the end of the session.  Nilesh demonstrated good  understanding of the education provided. See EMR under Patient Instructions for exercises provided during therapy sessions    ASSESSMENT     Nilesh is a 47 y.o. male referred to outpatient Physical Therapy with a medical diagnosis of right supraspinatus tear, biceps tendonitis and right shoulder osteoarthritis. Patient " continues to report benefit from passive stretching this visit stating it is easier to go through AROM and PRE after stretching. Progressed strengthening reflected in bolded items in tx section. Patient tolerates tx well with out provocation of pain. R [place] hiking persists with shoulder flexion.     Nilesh Is progressing well towards his goals.   Pt prognosis is Good.     Pt will continue to benefit from skilled outpatient physical therapy to address the deficits listed in the problem list box on initial evaluation, provide pt/family education and to maximize pt's level of independence in the home and community environment.     Pt's spiritual, cultural and educational needs considered and pt agreeable to plan of care and goals.     Anticipated barriers to physical therapy: co-morbidities.    Goals:  Short Term Goals: 4 weeks   1.  Patient will report < 5/10 shoulder pain at worst for improved ADL performance. - MET  2.  Patient will demonstrate right shoulder flexion PROM > 140 deg to improve overhead reach. - MET  3.  Patient will demonstrate an understanding and compliance with home exercise program. - MET     Long Term Goals: 16 weeks   1. Patient will report ability to perform dressing, bathing and hygiene without limitation, pain or compensation. - progressing  2. Patient will demonstrate right shoulder flexion AROM > 145 degree to improve overhead reach. - progressing  3. Patient will demonstrate all right upper extremity strength via MicroFET > 85% of left for improved ADL and IADL performance. - progressing  4. Patient will improve FOTO to < 46% to improve ADL performance. - progressing    PLAN     Continue per protocol (Phase III)    ]Gorge Sun, PTA

## 2023-02-13 ENCOUNTER — CLINICAL SUPPORT (OUTPATIENT)
Dept: REHABILITATION | Facility: HOSPITAL | Age: 48
End: 2023-02-13
Payer: MEDICAID

## 2023-02-13 DIAGNOSIS — M62.81 MUSCLE WEAKNESS OF RIGHT UPPER EXTREMITY: ICD-10-CM

## 2023-02-13 DIAGNOSIS — M25.611 DECREASED RIGHT SHOULDER RANGE OF MOTION: Primary | ICD-10-CM

## 2023-02-13 PROCEDURE — 97110 THERAPEUTIC EXERCISES: CPT | Mod: PN | Performed by: PHYSICAL THERAPIST

## 2023-02-13 NOTE — PROGRESS NOTES
[facility]   Physical Therapy Treatment Note     Name: Nilesh Rolon Jr.  Clinic Number: [id number]    Therapy Diagnosis:   Encounter Diagnoses   Name Primary?    Decreased right shoulder range of motion Yes    Muscle weakness of right upper extremity        Physician: Ernesto Diego IV, MD    Visit Date: 2/13/2023    Physician Orders: PT Eval and Treat    Medical Diagnosis from Referral:   M75.101 (ICD-10-CM) - Tear of right supraspinatus tendon   M75.21 (ICD-10-CM) - Biceps tendinitis of right upper extremity   M19.011 (ICD-10-CM) - Primary osteoarthritis of right shoulder      Evaluation Date: 10/11/2022  Authorization Period Expiration: 9/7/24  Plan of Care Expiration: 12/15/22; 2/10/23  Progress Note Due: 2/10/23  Visit # / Visits authorized: 12/12 (+22 from 2022)  FOTO:   PTA Visit #: 0/5     Time In: 2:00 AM  Time Out: 2:55 AM  Total Billable Time: 55 minutes (TE-4) - Medicaid     Precautions: Standard; SEE POST OP PROTOCOL (limit extension); sling dc'd     Surgery: 10/4/22 - by Dr. Diego  Arthroscopic Rotator Cuff Repair (48596) - Medium tear  Open Biceps Tenodesis (56107)  Arthroscopic Shoulder Debridement - Extensive (82099) - Resection inferior humeral head osteophyte and debridement of long head biceps tendon, ant & post labrum, glenoid, humeral head  Manipulation under anesthesia (16006)  Comprehensive arthroscopic management Right shoulder      SUBJECTIVE     Pt reports: See UPOC    He was compliant with home exercise program.  Response to previous treatment: soreness  Functional change: not at this time.    Pain: 2/10  Location: right shoulder      OBJECTIVE     Objective Measures updated at progress report unless specified. - updated 2/13/23    DOS: 10/4/22    Treatment     Nilesh received the treatments listed below:      therapeutic exercises to develop strength, endurance, ROM, and flexibility for 55 minutes     UBE fwd/back 3' ea LV3   Sleeper Stretch  "3x30"    Supine shoulder flexion: 2x10 right   Side lying external rotation: 3x10 right    Prone T: 3x10      Flexion: red theraband 3x10   External rotation: Red theraband 3x10  Internal rotation: red theraband 3x10  Theraband SAPD: 3x10 Blue theraband       Wall walks: 20x on wall    Not today:  Landmines at table with ranger: 20x5" holds     Wall Push ups: 3x10   Standing SA press at wall 3x10 for "light weight bearing" per protocol.   Isometric hold + contralateral Rows w/ Blue theraband: 3x10 each   Hands on Table Shoulder flexion stretch (CKC) 10x10"  Supine Serratus Punch 20x2" with 5 lb DB   Standing ball roll RS: ABCs 1x through                                           Seated 90/90 external rotation (Elbow resting on table): 2x10  Theraband Abduction walkouts: green theraband 2x10       Nilesh received the following manual therapy techniques: Joint mobilizations, Myofacial release, and Soft tissue Mobilization were applied to the: right shoulder for 00 minutes, including:    Right shoulder PROM D1, D2 patterns.       Not today:  Gentle STM  posterior shoulder and upper arm (medially-posteriorly)   Right elbow extension stretching  Side lying scapular rocking followed by scapular upward rotation mobs with shoulder flexion PROM -      Patient Education and Home Exercises     Home Exercises Provided and Patient Education Provided   Education provided:   - keep exercises in a pain free range.  - updated home exercise program on 2/13/23    Written Home Exercises Provided: yes. Exercises were reviewed and Nilesh was able to demonstrate them prior to the end of the session.  Nilesh demonstrated good  understanding of the education provided. See EMR under Patient Instructions for exercises provided during therapy sessions    ASSESSMENT     Nilesh is a 47 y.o. male referred to outpatient Physical Therapy with a medical diagnosis of right supraspinatus tear, biceps tendonitis and right shoulder osteoarthritis. See " MATTI Galloway Is progressing well towards his goals.   Pt prognosis is Good.     Pt will continue to benefit from skilled outpatient physical therapy to address the deficits listed in the problem list box on initial evaluation, provide pt/family education and to maximize pt's level of independence in the home and community environment.     Pt's spiritual, cultural and educational needs considered and pt agreeable to plan of care and goals.     Anticipated barriers to physical therapy: co-morbidities.    Goals:  Short Term Goals: 4 weeks   1.  Patient will report < 5/10 shoulder pain at worst for improved ADL performance. - MET  2.  Patient will demonstrate right shoulder flexion PROM > 140 deg to improve overhead reach. - MET  3.  Patient will demonstrate an understanding and compliance with home exercise program. - MET     Long Term Goals: 16 weeks   1. Patient will report ability to perform dressing, bathing and hygiene without limitation, pain or compensation. - progressing  2. Patient will demonstrate right shoulder flexion AROM > 145 degree to improve overhead reach. - progressing  3. Patient will demonstrate all right upper extremity strength via MicroFET > 85% of left for improved ADL and IADL performance. - progressing  4. Patient will improve FOTO to < 46% to improve ADL performance. - progressing    PLAN     Continue per protocol (Phase III)    ]Pranav You, PT

## 2023-02-13 NOTE — PLAN OF CARE
Outpatient Therapy Updated Plan of Care     Visit Date: 2/13/2023  Name: Nilesh Rolon Jr.  Clinic Number: [id number]    Therapy Diagnosis:   Encounter Diagnoses   Name Primary?    Decreased right shoulder range of motion Yes    Muscle weakness of right upper extremity      Physician: Ernesto Diego IV, MD    Physician Orders: PT Eval and Treat    Medical Diagnosis from Referral:   M75.101 (ICD-10-CM) - Tear of right supraspinatus tendon   M75.21 (ICD-10-CM) - Biceps tendinitis of right upper extremity   M19.011 (ICD-10-CM) - Primary osteoarthritis of right shoulder      Evaluation Date: 10/11/2022    Total Visits Received: 34    Current Certification Period:  12/15/22 to 2/10/23  Precautions:  standard; post-op right rotator cuff per attached protocol  Visits from Evaluation Date:  33      Subjective     Update: his main issues are with reaching overhead. He feels like he's made mild improvements over the past 2 months. Pain is well controlled, but weakness and decreased range of motion are his main concerns.    Objective     Update:   2/13/23  Range of Motion:   () = new measurements    Left Right   Shoulder Flexion P: 170    A: 170 P: 140     A: 105* (120)   Shoulder abduction P: 175    A: 175 P: 135     A: 103* (112)    Shoulder ER P: 95   A: 27 cm P: 52 (62)   A: 45 deg (13 cm)   Shoulder IR P: 85    A: 35 cm P: 70    A: 70 degree (54 cm)       All tests taken in supine position  *=pain   Left (kg) Right (kg) Notes   Shoulder flexion 29.4 11.4 39%   Shoulder abduction 31.8 10.7 34%   Shoulder external rotation  15.3 7.3 48%   Shoulder internal rotation  25.4 11.7 46%   Elbow flexion 25 20 80%   Elbow extension 16.9 16.5 100%        Assessment     Update: Nilesh is a 47 y.o. male referred to outpatient Physical Therapy with a medical diagnosis of right supraspinatus tear, biceps tendonitis and right shoulder osteoarthritis. Pt presents status post right supraspinatus repair, SAD, biceps tenodesis and  extensive debridement on 10/4/22. Main limitation is range of motion (both AROM and PROM), specifically with overhead motions. AROM has mildly improved with high levels of difficulty for final 10% of motion. Right upper extremity strength generally 35-40% of left despite being dominant arm. Pain is controlled < 4/10. Currently in phase III of rehab protocol. Functional limitations remain reaching overhead, carrying objects, washing, dressing and ADLs. He is appropriate for continued skilled PT to help him reach his goals.    Previous Short Term Goals Status: 3/3    Short Term Goals: 4 weeks   1.  Patient will report < 5/10 shoulder pain at worst for improved ADL performance. - MET  2.  Patient will demonstrate right shoulder flexion PROM > 140 deg to improve overhead reach. - MET  3.  Patient will demonstrate an understanding and compliance with home exercise program. - MET  New Short Term Goals Status:   none  Long Term Goal Status:   continue per initial plan of care.  Reasons for Recertification of Therapy:   UPOC - per protocol    Plan     Updated Certification Period: 2/13/2023 to 4/14/23  Recommended Treatment Plan: 2 times per week for 8 weeks: Electrical Stimulation TENS, IFC, NMES, Manual Therapy, Moist Heat/ Ice, Neuromuscular Re-ed, Patient Education, Self Care, Therapeutic Activities, Therapeutic Exercise, and dry needling  Other Recommendations: per protocol    Pranav You, PT  2/13/2023      I CERTIFY THE NEED FOR THESE SERVICES FURNISHED UNDER THIS PLAN OF TREATMENT AND WHILE UNDER MY CARE    Physician's comments:        Physician's Signature: ___________________________________________________

## 2023-02-17 ENCOUNTER — CLINICAL SUPPORT (OUTPATIENT)
Dept: REHABILITATION | Facility: HOSPITAL | Age: 48
End: 2023-02-17
Payer: MEDICAID

## 2023-02-17 DIAGNOSIS — M62.81 MUSCLE WEAKNESS OF RIGHT UPPER EXTREMITY: ICD-10-CM

## 2023-02-17 DIAGNOSIS — M25.611 DECREASED RIGHT SHOULDER RANGE OF MOTION: Primary | ICD-10-CM

## 2023-02-17 PROCEDURE — 97110 THERAPEUTIC EXERCISES: CPT | Mod: PN

## 2023-02-17 PROCEDURE — 97140 MANUAL THERAPY 1/> REGIONS: CPT | Mod: PN

## 2023-02-17 NOTE — PROGRESS NOTES
[facility]   Physical Therapy Treatment Note     Name: Nilesh Rolon Jr.  Clinic Number: [id number]    Therapy Diagnosis:   Encounter Diagnoses   Name Primary?    Decreased right shoulder range of motion Yes    Muscle weakness of right upper extremity      Physician: Ernesto Diego IV, MD    Visit Date: 2/17/2023    Physician Orders: PT Eval and Treat    Medical Diagnosis from Referral:   M75.101 (ICD-10-CM) - Tear of right supraspinatus tendon   M75.21 (ICD-10-CM) - Biceps tendinitis of right upper extremity   M19.011 (ICD-10-CM) - Primary osteoarthritis of right shoulder      Evaluation Date: 10/11/2022  Authorization Period Expiration: 9/7/24  Plan of Care Expiration: 12/15/22; 2/10/23  Progress Note Due: 2/10/23  Visit # / Visits authorized: 13/12 (+22 from 2022)  FOTO: 3/5  PTA Visit #: 0/5     Time In: 2:00 AM  Time Out: 2:55 AM  Total Billable Time: 55 minutes (TE-4) - Medicaid     Precautions: Standard; SEE POST OP PROTOCOL (limit extension); sling dc'd     Surgery: 10/4/22 - by Dr. Diego  Arthroscopic Rotator Cuff Repair (06612) - Medium tear  Open Biceps Tenodesis (97025)  Arthroscopic Shoulder Debridement - Extensive (65367) - Resection inferior humeral head osteophyte and debridement of long head biceps tendon, ant & post labrum, glenoid, humeral head  Manipulation under anesthesia (60188)  Comprehensive arthroscopic management Right shoulder      SUBJECTIVE     Pt reports: some soreness in right shoulder and trouble reaching high above his head.    He was compliant with home exercise program.  Response to previous treatment: soreness  Functional change: not at this time.    Pain: 2/10  Location: right shoulder      OBJECTIVE     Objective Measures updated at progress report unless specified. - updated 2/13/23    DOS: 10/4/22    Treatment     Nilesh received the treatments listed below:      therapeutic exercises to develop strength, endurance, ROM, and flexibility for  "45 minutes     UBE fwd/back 3' ea LV3   Sleeper Stretch 3x30" - not today  Supine teres minor shoulder external rotation at 90/90 position: 2x12 2#, right  Prone T's (no external rotation): 3x10 right   Supine shoulder flexion: 3x10 with 2# dowel     Flexion: red theraband 3x10   External rotation: Red theraband 3x8, right  Internal rotation: Green theraband 2x12, right  Theraband SAPD: 3x10 Blue theraband, right    Wall ball rolls: 2x10 on wall with large blue ball    Not today:  Landmines at table with ranger: 20x5" holds   Wall Push ups: 3x10   Standing SA press at wall 3x10 for "light weight bearing" per protocol.   Isometric hold + contralateral Rows w/ Blue theraband: 3x10 each   Hands on Table Shoulder flexion stretch (CKC) 10x10"  Supine Serratus Punch 20x2" with 5 lb DB   Standing ball roll RS: ABCs 1x through                                           Seated 90/90 external rotation (Elbow resting on table): 2x10  Theraband Abduction walkouts: green theraband 2x10       Nilesh received the following manual therapy techniques: Joint mobilizations, Myofacial release, and Soft tissue Mobilization were applied to the: right shoulder for 10 minutes, including:    Right shoulder PROM D1, D2 patterns  Right shoulder PROM stretching       Not today:  Gentle STM  posterior shoulder and upper arm (medially-posteriorly)   Right elbow extension stretching  Side lying scapular rocking followed by scapular upward rotation mobs with shoulder flexion PROM -      Patient Education and Home Exercises     Home Exercises Provided and Patient Education Provided   Education provided:   - keep exercises in a pain free range.  - updated home exercise program on 2/13/23    Written Home Exercises Provided: yes. Exercises were reviewed and Nilesh was able to demonstrate them prior to the end of the session.  Nilesh demonstrated good  understanding of the education provided. See EMR under Patient Instructions for exercises provided " during therapy sessions    ASSESSMENT     Nilesh is a 47 y.o. male referred to outpatient Physical Therapy with a medical diagnosis of right supraspinatus tear, biceps tendonitis and right shoulder osteoarthritis. Focused on end range flexion stretching and AROm exercises. Pt progressing well and low shoulder irritability overall.     Nilesh Is progressing well towards his goals.   Pt prognosis is Good.     Pt will continue to benefit from skilled outpatient physical therapy to address the deficits listed in the problem list box on initial evaluation, provide pt/family education and to maximize pt's level of independence in the home and community environment.     Pt's spiritual, cultural and educational needs considered and pt agreeable to plan of care and goals.     Anticipated barriers to physical therapy: co-morbidities.    Goals:  Short Term Goals: 4 weeks   1.  Patient will report < 5/10 shoulder pain at worst for improved ADL performance. - MET  2.  Patient will demonstrate right shoulder flexion PROM > 140 deg to improve overhead reach. - MET  3.  Patient will demonstrate an understanding and compliance with home exercise program. - MET     Long Term Goals: 16 weeks   1. Patient will report ability to perform dressing, bathing and hygiene without limitation, pain or compensation. - progressing  2. Patient will demonstrate right shoulder flexion AROM > 145 degree to improve overhead reach. - progressing  3. Patient will demonstrate all right upper extremity strength via MicroFET > 85% of left for improved ADL and IADL performance. - progressing  4. Patient will improve FOTO to < 46% to improve ADL performance. - progressing    PLAN     Continue per protocol (Phase III)    ]Bang Albrecht, PT

## 2023-02-22 ENCOUNTER — CLINICAL SUPPORT (OUTPATIENT)
Dept: REHABILITATION | Facility: HOSPITAL | Age: 48
End: 2023-02-22
Payer: MEDICAID

## 2023-02-22 DIAGNOSIS — M25.611 DECREASED RIGHT SHOULDER RANGE OF MOTION: Primary | ICD-10-CM

## 2023-02-22 DIAGNOSIS — M62.81 MUSCLE WEAKNESS OF RIGHT UPPER EXTREMITY: ICD-10-CM

## 2023-02-22 PROCEDURE — 97110 THERAPEUTIC EXERCISES: CPT | Mod: PN | Performed by: PHYSICAL THERAPIST

## 2023-02-22 NOTE — PROGRESS NOTES
[facility]   Physical Therapy Treatment Note     Name: Nilesh Rolon Jr.  Clinic Number: [id number]    Therapy Diagnosis:   Encounter Diagnoses   Name Primary?    Decreased right shoulder range of motion Yes    Muscle weakness of right upper extremity      Physician: Ernesto Diego IV, MD    Visit Date: 2/22/2023    Physician Orders: PT Eval and Treat    Medical Diagnosis from Referral:   M75.101 (ICD-10-CM) - Tear of right supraspinatus tendon   M75.21 (ICD-10-CM) - Biceps tendinitis of right upper extremity   M19.011 (ICD-10-CM) - Primary osteoarthritis of right shoulder      Evaluation Date: 10/11/2022  Authorization Period Expiration: 9/7/24  Plan of Care Expiration: 4/14/23  Progress Note Due: 4/14/23  Visit # / Visits authorized: 1/12 (+35)  FOTO: 4/5  PTA Visit #: 0/5     Time In: 9:00 AM  Time Out: 10:00 AM  Total Billable Time: 58 minutes (TE-4) - Medicaid     Precautions: Standard; SEE POST OP PROTOCOL (limit extension)     Surgery: 10/4/22 - by Dr. Diego  Arthroscopic Rotator Cuff Repair (71153) - Medium tear  Open Biceps Tenodesis (10119)  Arthroscopic Shoulder Debridement - Extensive (03620) - Resection inferior humeral head osteophyte and debridement of long head biceps tendon, ant & post labrum, glenoid, humeral head  Manipulation under anesthesia (12819)  Comprehensive arthroscopic management Right shoulder      SUBJECTIVE     Pt reports: some soreness in right shoulder and trouble reaching high above his head.    He was compliant with home exercise program.  Response to previous treatment: soreness  Functional change: not at this time.    Pain: 2/10  Location: right shoulder      OBJECTIVE     Objective Measures updated at progress report unless specified. - updated 2/13/23    DOS: 10/4/22    Treatment     Nilesh received the treatments listed below:      therapeutic exercises to develop strength, endurance, ROM, and flexibility for 42 minutes     Supine kalie  "minor shoulder external rotation at 90/90 position: 2x12 2#, right  Side lying external rotation: 2x10 with 3" hold 1 pounds   Prone T's (no external rotation): 3x10 right   Supine shoulder flexion: 2x10 AROM (after manual lat release)    Flexion: red theraband 3x10   External rotation: Red theraband 3x8, right  Internal rotation: Green theraband 3x10, right  Theraband SAPD: 3x10 Blue theraband, right    Lat Accupressure with ball: 1 minutes   Scaption with tennis ball lat accupressure: 10x  Wall ball rolls: 20x with 5" holds on wall with large blue ball  Dowel wand overhead press: 2x10    Not today:  UBE fwd/back 3' ea LV3   Sleeper Stretch 3x30" - not today  Wall Push ups: 3x10   Standing SA press at wall 3x10 for "light weight bearing" per protocol.   Isometric hold + contralateral Rows w/ Blue theraband: 3x10 each   Hands on Table Shoulder flexion stretch (CKC) 10x10"  Supine Serratus Punch 20x2" with 5 lb DB   Standing ball roll RS: ABCs 1x through                                           Seated 90/90 external rotation (Elbow resting on table): 2x10      Nilesh received the following manual therapy techniques: Joint mobilizations, Myofacial release, and Soft tissue Mobilization were applied to the: right shoulder for 16 minutes, including:    Right shoulder PROM stretching with Grade II-IV GHJ mobs (A<>P, inferior)  Right lat, lower trap release with shoulder flexion PROM      Not today:  Right shoulder PROM D1, D2 patterns  Gentle STM  posterior shoulder and upper arm (medially-posteriorly)   Right elbow extension stretching  Side lying scapular rocking followed by scapular upward rotation mobs with shoulder flexion PROM -      Patient Education and Home Exercises     Home Exercises Provided and Patient Education Provided   Education provided:   - keep exercises in a pain free range.  - updated home exercise program on 2/13/23    Written Home Exercises Provided: yes. Exercises were reviewed and Nilesh was " able to demonstrate them prior to the end of the session.  Nilesh demonstrated good  understanding of the education provided. See EMR under Patient Instructions for exercises provided during therapy sessions    ASSESSMENT     Nilesh is a 47 y.o. male referred to outpatient Physical Therapy with a medical diagnosis of right supraspinatus tear, biceps tendonitis and right shoulder osteoarthritis. Unable to perform prone Y due to range of motion restrictions and weakness. Supine shoulder flexion ease and range of motion improved after manuals today, particularly release of lat.     Nilesh Is progressing well towards his goals.   Pt prognosis is Good.     Pt will continue to benefit from skilled outpatient physical therapy to address the deficits listed in the problem list box on initial evaluation, provide pt/family education and to maximize pt's level of independence in the home and community environment.     Pt's spiritual, cultural and educational needs considered and pt agreeable to plan of care and goals.     Anticipated barriers to physical therapy: co-morbidities.    Goals:  Short Term Goals: 4 weeks   1.  Patient will report < 5/10 shoulder pain at worst for improved ADL performance. - MET  2.  Patient will demonstrate right shoulder flexion PROM > 140 deg to improve overhead reach. - MET  3.  Patient will demonstrate an understanding and compliance with home exercise program. - MET     Long Term Goals: 16 weeks   1. Patient will report ability to perform dressing, bathing and hygiene without limitation, pain or compensation. - progressing  2. Patient will demonstrate right shoulder flexion AROM > 145 degree to improve overhead reach. - progressing  3. Patient will demonstrate all right upper extremity strength via MicroFET > 85% of left for improved ADL and IADL performance. - progressing  4. Patient will improve FOTO to < 46% to improve ADL performance. - progressing    PLAN     Continue per protocol (Phase  III)    ]Pranav You, PT

## 2023-02-27 ENCOUNTER — CLINICAL SUPPORT (OUTPATIENT)
Dept: REHABILITATION | Facility: HOSPITAL | Age: 48
End: 2023-02-27
Payer: MEDICAID

## 2023-02-27 DIAGNOSIS — M25.611 DECREASED RIGHT SHOULDER RANGE OF MOTION: Primary | ICD-10-CM

## 2023-02-27 DIAGNOSIS — M62.81 MUSCLE WEAKNESS OF RIGHT UPPER EXTREMITY: ICD-10-CM

## 2023-02-27 PROCEDURE — 97110 THERAPEUTIC EXERCISES: CPT | Mod: PN,CQ

## 2023-02-27 NOTE — PROGRESS NOTES
[facility]   Physical Therapy Treatment Note     Name: Nilesh Rolon Jr.  Clinic Number: [id number]    Therapy Diagnosis:   Encounter Diagnoses   Name Primary?    Decreased right shoulder range of motion Yes    Muscle weakness of right upper extremity      Physician: Ernesto Diego IV, MD    Visit Date: 2/27/2023    Physician Orders: PT Eval and Treat    Medical Diagnosis from Referral:   M75.101 (ICD-10-CM) - Tear of right supraspinatus tendon   M75.21 (ICD-10-CM) - Biceps tendinitis of right upper extremity   M19.011 (ICD-10-CM) - Primary osteoarthritis of right shoulder      Evaluation Date: 10/11/2022  Authorization Period Expiration: 9/7/24  Plan of Care Expiration: 4/14/23  Progress Note Due: 4/14/23  Visit # / Visits authorized: 1/12 (+35)  FOTO: 5/5  PTA Visit #: 1/5     Time In: 9:00 AM  Time Out: 10:00 AM  Total Billable Time: 58 minutes (TE-4) - Medicaid     Precautions: Standard; SEE POST OP PROTOCOL (limit extension)     Surgery: 10/4/22 - by Dr. Diego  Arthroscopic Rotator Cuff Repair (05675) - Medium tear  Open Biceps Tenodesis (46968)  Arthroscopic Shoulder Debridement - Extensive (56239) - Resection inferior humeral head osteophyte and debridement of long head biceps tendon, ant & post labrum, glenoid, humeral head  Manipulation under anesthesia (57547)  Comprehensive arthroscopic management Right shoulder      SUBJECTIVE     Pt reports: No new complaints. Patient reports mobility issues persists but pain levels are generally low or none.     He was compliant with home exercise program.  Response to previous treatment: soreness  Functional change: not at this time.    Pain: 2/10  Location: right shoulder      OBJECTIVE     Objective Measures updated at progress report unless specified. - updated 2/13/23    DOS: 10/4/22    Treatment     Nilesh received the treatments listed below:      therapeutic exercises to develop strength, endurance, ROM, and flexibility for  ID Progress Note    S:  Patient is seen with family present, states that today 11/13 she is feeling well. Denies fever, chills, dyspnea, chest pain, abdominal pain, diarrhea. Does report that she continues to have some left lower extremity discomfort at the site of the wound vac.   Discussed with family the need for compliance to medications as recommended as well as to allow nursing to work with the patient. Family is agreeable with treatment, and will discuss with patient necessity of treatment.     O:    Vitals:    Vital Last Value 24 Hour Range   Temperature 97.9 °F (36.6 °C) (11/13/20 0427) Temp  Min: 97.5 °F (36.4 °C)  Max: 98.1 °F (36.7 °C)   Pulse (!) 118(metoprolol given at this time ) (11/13/20 0906) Pulse  Min: 108  Max: 123   Respiratory 16 (11/13/20 0427) Resp  Min: 16  Max: 20   Non-Invasive  Blood Pressure (!) 145/73 (11/13/20 0906) BP  Min: 135/83  Max: 146/77   Pulse Oximetry 98 % (11/13/20 0427) SpO2  Min: 97 %  Max: 98 %   Arterial   Blood Pressure 117/47 (11/07/20 1000) No data recorded      I/O last 3 completed shifts:  In: 2200.2 [I.V.:2200.2]  Out: 675 [Urine:550; Drains:125]  No intake/output data recorded.    Physical Exam:  Physical Exam  Vitals signs reviewed.   HENT:      Head: Normocephalic and atraumatic.      Right Ear: External ear normal.      Left Ear: External ear normal.   Eyes:      Conjunctiva/sclera: Conjunctivae normal.   Neck:      Musculoskeletal: Normal range of motion.   Cardiovascular:      Rate and Rhythm: Normal rate and regular rhythm.      Heart sounds: Normal heart sounds. No murmur.   Pulmonary:      Effort: Pulmonary effort is normal. No respiratory distress.      Breath sounds: Normal breath sounds. No wheezing.   Abdominal:      General: Bowel sounds are normal. There is no distension.      Palpations: Abdomen is soft.      Tenderness: There is no abdominal tenderness.   Musculoskeletal:      Right lower leg: No edema.      Comments: Wound Vac in place to left  "47 minutes     UBE fwd/back 3' ea LV3     Supine teres minor shoulder external rotation at 90/90 position: 2x12 2#, right  Side lying external rotation: 2x10 with 3" hold 1 pounds   Prone T's (no external rotation): 3x10 right   Supine shoulder flexion: 2x10 AROM (after manual lat release)    Hands on Table Shoulder flexion stretch , Scaption, Abduction (CKC) 10x10"    Flexion: red theraband 3x10   External rotation: Red theraband 3x8, right  Internal rotation: Green theraband 3x10, right  Theraband SAPD: 3x10 Blue theraband, right    Standing SA press at wall 3x10     Wall ball rolls: 20x with 5" holds on wall with large blue ball  Dowel wand overhead press: 2x10    Not today:  Sleeper Stretch 3x30" - not today  Wall Push ups: 3x10   Standing SA press at wall 3x10 for "light weight bearing" per protocol.   Isometric hold + contralateral Rows w/ Blue theraband: 3x10 each     Supine Serratus Punch 20x2" with 5 lb DB   Standing ball roll RS: ABCs 1x through                                           Seated 90/90 external rotation (Elbow resting on table): 2x10  Lat Accupressure with ball: 1 minutes   Scaption with tennis ball lat accupressure: 10x      Nilesh received the following manual therapy techniques: Joint mobilizations, Myofacial release, and Soft tissue Mobilization were applied to the: right shoulder for 08 minutes, including:    Right shoulder PROM stretching Diagonal Pattern with Grade II-IV GHJ mobs (A<>P, inferior)    Previous:   Right lat, lower trap release with shoulder flexion PROM      Not today:  Right shoulder PROM D1, D2 patterns  Gentle STM  posterior shoulder and upper arm (medially-posteriorly)   Right elbow extension stretching  Side lying scapular rocking followed by scapular upward rotation mobs with shoulder flexion PROM -      Patient Education and Home Exercises     Home Exercises Provided and Patient Education Provided   Education provided:   - keep exercises in a pain free range.  - " thigh/stump.    Skin:     General: Skin is warm and dry.   Neurological:      Mental Status: She is alert and oriented to person, place, and time.          Diagnostic data:  Recent Results (from the past 48 hour(s))   Basic Metabolic Panel    Collection Time: 11/11/20  4:24 PM   Result Value Ref Range    Sodium 142 135 - 145 mmol/L    Potassium 4.3 3.4 - 5.1 mmol/L    Chloride 105 98 - 107 mmol/L    Carbon Dioxide 28 21 - 32 mmol/L    Anion Gap 13 10 - 20 mmol/L    Glucose 108 (H) 65 - 99 mg/dL    BUN 16 6 - 20 mg/dL    Creatinine 0.60 0.51 - 0.95 mg/dL    GFR Estimate,  >90     GFR Estimate, Non African American 85     BUN/Creatinine Ratio 27 (H) 7 - 25    CALCIUM 9.3 8.4 - 10.2 mg/dL   CBC with Automated Differential    Collection Time: 11/11/20  4:24 PM   Result Value Ref Range    WBC 14.2 (H) 4.2 - 11.0 K/mcL    RBC 3.35 (L) 4.00 - 5.20 mil/mcL    HGB 9.6 (L) 12.0 - 15.5 g/dL    HCT 32.2 (L) 36.0 - 46.5 %    MCV 96.1 78.0 - 100.0 fl    MCH 28.7 26.0 - 34.0 pg    MCHC 29.8 (L) 32.0 - 36.5 g/dL    RDW-CV 17.5 (H) 11.0 - 15.0 %     (H) 140 - 450 K/mcL    NRBC 0 0 /100 WBC    DIFF TYPE AUTOMATED DIFFERENTIAL     Neutrophil 65 %    LYMPH 19 %    MONO 7 %    EOSIN 9 %    BASO 0 %    Percent Immature Granuloctyes 0 %    Absolute Neutrophil 9.1 (H) 1.8 - 7.7 K/mcL    Absolute Lymph 2.7 1.0 - 4.0 K/mcL    Absolute Mono 1.0 (H) 0.3 - 0.9 K/mcL    Absolute Eos 1.3 (H) 0.1 - 0.5 K/mcL    Absolute Baso 0.0 0.0 - 0.3 K/mcL    Absolute Immature Granulocytes 0.1 0 - 0.2 K/mcl   Basic Metabolic Panel    Collection Time: 11/12/20  9:22 AM   Result Value Ref Range    Sodium 141 135 - 145 mmol/L    Potassium 4.0 3.4 - 5.1 mmol/L    Chloride 107 98 - 107 mmol/L    Carbon Dioxide 27 21 - 32 mmol/L    Anion Gap 11 10 - 20 mmol/L    Glucose 111 (H) 65 - 99 mg/dL    BUN 11 6 - 20 mg/dL    Creatinine 0.52 0.51 - 0.95 mg/dL    GFR Estimate,  >90     GFR Estimate, Non  90      BUN/Creatinine Ratio 21 7 - 25    CALCIUM 9.3 8.4 - 10.2 mg/dL   CBC with Automated Differential    Collection Time: 11/12/20  9:22 AM   Result Value Ref Range    WBC 12.1 (H) 4.2 - 11.0 K/mcL    RBC 3.40 (L) 4.00 - 5.20 mil/mcL    HGB 9.7 (L) 12.0 - 15.5 g/dL    HCT 32.9 (L) 36.0 - 46.5 %    MCV 96.8 78.0 - 100.0 fl    MCH 28.5 26.0 - 34.0 pg    MCHC 29.5 (L) 32.0 - 36.5 g/dL    RDW-CV 17.7 (H) 11.0 - 15.0 %     (H) 140 - 450 K/mcL    NRBC 0 0 /100 WBC    DIFF TYPE MANUAL DIFFERENTIAL     SEG 72 %    LYMPH 18 %    MONO 1 %    EOS 9 %    BASO 0 %    Absolute Neut 8.7 (H) 1.8 - 7.7 K/mcL    Absolute Lymph 2.2 1.0 - 4.0 K/mcL    Absolute Mono 0.1 (L) 0.3 - 0.9 K/mcL    Absolute Eos 1.1 (H) 0.1 - 0.5 K/mcL    Absolute Baso 0.0 0.0 - 0.3 K/mcL    Hypochromia FEW    Basic Metabolic Panel    Collection Time: 11/13/20  8:17 AM   Result Value Ref Range    Sodium 141 135 - 145 mmol/L    Potassium 4.0 3.4 - 5.1 mmol/L    Chloride 106 98 - 107 mmol/L    Carbon Dioxide 26 21 - 32 mmol/L    Anion Gap 13 10 - 20 mmol/L    Glucose 106 (H) 65 - 99 mg/dL    BUN 8 6 - 20 mg/dL    Creatinine 0.50 (L) 0.51 - 0.95 mg/dL    GFR Estimate,  >90     GFR Estimate, Non African American >90     BUN/Creatinine Ratio 16 7 - 25    CALCIUM 9.2 8.4 - 10.2 mg/dL   CBC with Automated Differential    Collection Time: 11/13/20  8:17 AM   Result Value Ref Range    WBC 12.9 (H) 4.2 - 11.0 K/mcL    RBC 3.32 (L) 4.00 - 5.20 mil/mcL    HGB 9.5 (L) 12.0 - 15.5 g/dL    HCT 31.8 (L) 36.0 - 46.5 %    MCV 95.8 78.0 - 100.0 fl    MCH 28.6 26.0 - 34.0 pg    MCHC 29.9 (L) 32.0 - 36.5 g/dL    RDW-CV 17.7 (H) 11.0 - 15.0 %     (H) 140 - 450 K/mcL    NRBC 0 0 /100 WBC    DIFF TYPE AUTO DIFF verified by manual smear review.     Neutrophil 68 %    LYMPH 16 %    MONO 7 %    EOSIN 8 %    BASO 0 %    Percent Immature Granuloctyes 1 %    Absolute Neutrophil 8.8 (H) 1.8 - 7.7 K/mcL    Absolute Lymph 2.0 1.0 - 4.0 K/mcL    Absolute Mono 0.9 0.3 -  updated home exercise program on 2/13/23    Written Home Exercises Provided: yes. Exercises were reviewed and Nilesh was able to demonstrate them prior to the end of the session.  Nilesh demonstrated good  understanding of the education provided. See EMR under Patient Instructions for exercises provided during therapy sessions    ASSESSMENT     Nilesh is a 47 y.o. male referred to outpatient Physical Therapy with a medical diagnosis of right supraspinatus tear, biceps tendonitis and right shoulder osteoarthritis. Resumed CKC shoulder flexion stretch and initiated scaption and abduction CKC stretch to improve shoulder ROM. Patient tolerates tx well with out provocation of pain.     Nilesh Is progressing well towards his goals.   Pt prognosis is Good.     Pt will continue to benefit from skilled outpatient physical therapy to address the deficits listed in the problem list box on initial evaluation, provide pt/family education and to maximize pt's level of independence in the home and community environment.     Pt's spiritual, cultural and educational needs considered and pt agreeable to plan of care and goals.     Anticipated barriers to physical therapy: co-morbidities.    Goals:  Short Term Goals: 4 weeks   1.  Patient will report < 5/10 shoulder pain at worst for improved ADL performance. - MET  2.  Patient will demonstrate right shoulder flexion PROM > 140 deg to improve overhead reach. - MET  3.  Patient will demonstrate an understanding and compliance with home exercise program. - MET     Long Term Goals: 16 weeks   1. Patient will report ability to perform dressing, bathing and hygiene without limitation, pain or compensation. - progressing  2. Patient will demonstrate right shoulder flexion AROM > 145 degree to improve overhead reach. - progressing  3. Patient will demonstrate all right upper extremity strength via MicroFET > 85% of left for improved ADL and IADL performance. - progressing  4. Patient will  0.9 K/mcL    Absolute Eos 1.0 (H) 0.1 - 0.5 K/mcL    Absolute Baso 0.0 0.0 - 0.3 K/mcL    Absolute Immature Granulocytes 0.1 0 - 0.2 K/mcl    Hypochromia FEW    ]  ?  Studies:  No results found.    Cardiac studies:   Encounter Date: 10/23/20   Electrocardiogram 12-Lead   Result Value    Ventricular Rate EKG/Min (BPM) 97    Atrial Rate (BPM) 97    OK-Interval (MSEC) 140    QRS-Interval (MSEC) 88    QT-Interval (MSEC) 404    QTc 513    P Axis (Degrees) 50    R Axis (Degrees) -15    T Axis (Degrees) -51    REPORT TEXT      Normal sinus rhythm  T wave abnormality, consider inferolateral ischemia  Prolonged QT  Abnormal ECG  When compared with ECG of  02-NOV-2020 00:28,  No significant change was found  Confirmed by BRANDI DONAHUE, [facility] ([id number]) on 11/2/2020 2:51:04 PM         ASSESSMENT/PLAN:    #Resolved sepsis with MSSA bacteremia with   #Presumed mitral valve endocarditis  #Left thigh hematoma s/p drainage 10/30  -1st negative blood culture - 10/25  -LIANET (10/25) - demonstrated mitral valve annulus is mildly calcified, leaflets are mildly thickened and calcified.   -Patient underwent excisional and non-excisional debridement of left thigh wound on (11/2), previously patient had hematoma drained on (10/30) with positive MSSA culture.   -Underwent re-debridement of left thigh wound and debridement of LLE anterior compartment (11/4)  -Underwent Left AKA (11/6) due to occluded lower extremity stents  -Wound VAC in place to LLE.    -WBC - 12.9 (11/13)  -Patient still at times is refusing treatment and care, family is currently with patient discussing this issue.   Plan:  -Continue Cefazolin 2 grams IV q8h through 11/26/2020   -Continue use of Wound VAC to left thigh/stump, dressings per surgery  -Encouraged importance of compliance to nursing care and medical recommendations.   -Weekly CBC, CMP, ESR, CRP  -Will continue to follow     Discussed with Dr. Mohamud     Written by:   Harley Lujan OMS4    Roxana Ortega MD   Internal  improve FOTO to < 46% to improve ADL performance. - progressing    PLAN     Continue per protocol (Phase III)    ]Gorge Sun, PTA                                      Medicine Resident PGY-1  Contact through [employer]

## 2023-02-28 ENCOUNTER — CLINICAL SUPPORT (OUTPATIENT)
Dept: REHABILITATION | Facility: HOSPITAL | Age: 48
End: 2023-02-28
Payer: MEDICAID

## 2023-02-28 DIAGNOSIS — M25.611 DECREASED RIGHT SHOULDER RANGE OF MOTION: Primary | ICD-10-CM

## 2023-02-28 DIAGNOSIS — M62.81 MUSCLE WEAKNESS OF RIGHT UPPER EXTREMITY: ICD-10-CM

## 2023-02-28 PROCEDURE — 97110 THERAPEUTIC EXERCISES: CPT | Mod: PN,CQ

## 2023-02-28 NOTE — PROGRESS NOTES
[facility]   Physical Therapy Treatment Note     Name: Nilesh Rolon Jr.  Clinic Number: [id number]    Therapy Diagnosis:   Encounter Diagnoses   Name Primary?    Decreased right shoulder range of motion Yes    Muscle weakness of right upper extremity      Physician: Ernesto Diego IV, MD    Visit Date: 2/28/2023    Physician Orders: PT Eval and Treat    Medical Diagnosis from Referral:   M75.101 (ICD-10-CM) - Tear of right supraspinatus tendon   M75.21 (ICD-10-CM) - Biceps tendinitis of right upper extremity   M19.011 (ICD-10-CM) - Primary osteoarthritis of right shoulder      Evaluation Date: 10/11/2022  Authorization Period Expiration: 9/7/24  Plan of Care Expiration: 4/14/23  Progress Note Due: 4/14/23  Visit # / Visits authorized: 1/12 (+35)  FOTO: 6/5  PTA Visit #: 2/5     Time In: 10:00 AM  Time Out: 11:00 AM  Total Billable Time: 58 minutes (TE-4) - Medicaid     Precautions: Standard; SEE POST OP PROTOCOL (limit extension)     Surgery: 10/4/22 - by Dr. Diego  Arthroscopic Rotator Cuff Repair (82277) - Medium tear  Open Biceps Tenodesis (52124)  Arthroscopic Shoulder Debridement - Extensive (96658) - Resection inferior humeral head osteophyte and debridement of long head biceps tendon, ant & post labrum, glenoid, humeral head  Manipulation under anesthesia (52007)  Comprehensive arthroscopic management Right shoulder      SUBJECTIVE     Pt reports: No new complaints. Patient reports mobility issues persists but pain levels are generally low or none.     He was compliant with home exercise program.  Response to previous treatment: soreness  Functional change: not at this time.    Pain: 2/10  Location: right shoulder      OBJECTIVE     Objective Measures updated at progress report unless specified. - updated 2/13/23    DOS: 10/4/22    Treatment     Nilesh received the treatments listed below:      therapeutic exercises to develop strength, endurance, ROM, and flexibility for  "47 minutes     UBE fwd/back 3' ea LV3     Supine teres minor shoulder external rotation at 90/90 position: 2x12 2#, right  Side lying external rotation: 2x10 with 3" hold 1 pounds   Prone T's (no external rotation): 3x10 right   Supine shoulder flexion: 2x10 AROM (after manual lat release)    Hands on Table Shoulder flexion stretch , Scaption, Abduction (CKC) 10x10"    Flexion: red theraband 3x10   External rotation: Red theraband 3x8, right  Internal rotation: Green theraband 3x10, right  Theraband SAPD: 3x10 Blue theraband, right    Standing SA press at wall 3x10     Wall ball rolls: 20x with 5" holds on wall with large blue ball  Dowel wand overhead press: 2x10    Not today:  Sleeper Stretch 3x30" - not today  Wall Push ups: 3x10   Standing SA press at wall 3x10 for "light weight bearing" per protocol.   Isometric hold + contralateral Rows w/ Blue theraband: 3x10 each     Supine Serratus Punch 20x2" with 5 lb DB   Standing ball roll RS: ABCs 1x through                                           Seated 90/90 external rotation (Elbow resting on table): 2x10  Lat Accupressure with ball: 1 minutes   Scaption with tennis ball lat accupressure: 10x      Nilesh received the following manual therapy techniques: Joint mobilizations, Myofacial release, and Soft tissue Mobilization were applied to the: right shoulder for 08 minutes, including:    Right shoulder PROM stretching Diagonal Pattern  Active release R latissimus Dorsi.     Previous:   Right lat, lower trap release with shoulder flexion PROM      Not today:  Right shoulder PROM D1, D2 patterns  Gentle STM  posterior shoulder and upper arm (medially-posteriorly)   Right elbow extension stretching  Side lying scapular rocking followed by scapular upward rotation mobs with shoulder flexion PROM -      Patient Education and Home Exercises     Home Exercises Provided and Patient Education Provided   Education provided:   - keep exercises in a pain free range.  - updated " home exercise program on 2/13/23    Written Home Exercises Provided: yes. Exercises were reviewed and Nilesh was able to demonstrate them prior to the end of the session.  Nilesh demonstrated good  understanding of the education provided. See EMR under Patient Instructions for exercises provided during therapy sessions    ASSESSMENT     Nilesh is a 47 y.o. male referred to outpatient Physical Therapy with a medical diagnosis of right supraspinatus tear, biceps tendonitis and right shoulder osteoarthritis. Resumed lat release to promote ER and flexion ROM of shoulder.     Nilesh Is progressing well towards his goals.   Pt prognosis is Good.     Pt will continue to benefit from skilled outpatient physical therapy to address the deficits listed in the problem list box on initial evaluation, provide pt/family education and to maximize pt's level of independence in the home and community environment.     Pt's spiritual, cultural and educational needs considered and pt agreeable to plan of care and goals.     Anticipated barriers to physical therapy: co-morbidities.    Goals:  Short Term Goals: 4 weeks   1.  Patient will report < 5/10 shoulder pain at worst for improved ADL performance. - MET  2.  Patient will demonstrate right shoulder flexion PROM > 140 deg to improve overhead reach. - MET  3.  Patient will demonstrate an understanding and compliance with home exercise program. - MET     Long Term Goals: 16 weeks   1. Patient will report ability to perform dressing, bathing and hygiene without limitation, pain or compensation. - progressing  2. Patient will demonstrate right shoulder flexion AROM > 145 degree to improve overhead reach. - progressing  3. Patient will demonstrate all right upper extremity strength via MicroFET > 85% of left for improved ADL and IADL performance. - progressing  4. Patient will improve FOTO to < 46% to improve ADL performance. - progressing    PLAN     Continue per protocol (Phase  III)    ]Gorge Dino, PTA

## 2023-03-03 ENCOUNTER — CLINICAL SUPPORT (OUTPATIENT)
Dept: REHABILITATION | Facility: HOSPITAL | Age: 48
End: 2023-03-03
Payer: MEDICAID

## 2023-03-03 DIAGNOSIS — M62.81 MUSCLE WEAKNESS OF RIGHT UPPER EXTREMITY: ICD-10-CM

## 2023-03-03 DIAGNOSIS — M25.611 DECREASED RIGHT SHOULDER RANGE OF MOTION: Primary | ICD-10-CM

## 2023-03-03 PROCEDURE — 97110 THERAPEUTIC EXERCISES: CPT | Mod: PN | Performed by: PHYSICAL THERAPIST

## 2023-03-03 NOTE — PROGRESS NOTES
[facility]   Physical Therapy Treatment Note     Name: Nilesh Rolon Jr.  Clinic Number: [id number]    Therapy Diagnosis:   Encounter Diagnoses   Name Primary?    Decreased right shoulder range of motion Yes    Muscle weakness of right upper extremity        Physician: Ernesto Diego IV, MD    Visit Date: 3/3/2023    Physician Orders: PT Eval and Treat    Medical Diagnosis from Referral:   M75.101 (ICD-10-CM) - Tear of right supraspinatus tendon   M75.21 (ICD-10-CM) - Biceps tendinitis of right upper extremity   M19.011 (ICD-10-CM) - Primary osteoarthritis of right shoulder      Evaluation Date: 10/11/2022  Authorization Period Expiration: 9/7/24  Plan of Care Expiration: 4/14/23  Progress Note Due: 4/14/23  Visit # / Visits authorized: 14/20 (+35)  FOTO: 7/10  PTA Visit #: 0/5     Time In: 8:00 AM  Time Out: 9:00 AM  Total Billable Time: 58 minutes (TE-4) - Medicaid     Precautions: Standard; SEE POST OP PROTOCOL (limit extension)     Surgery: 10/4/22 - by Dr. Diego  Arthroscopic Rotator Cuff Repair (79621) - Medium tear  Open Biceps Tenodesis (89950)  Arthroscopic Shoulder Debridement - Extensive (34340) - Resection inferior humeral head osteophyte and debridement of long head biceps tendon, ant & post labrum, glenoid, humeral head  Manipulation under anesthesia (30235)  Comprehensive arthroscopic management Right shoulder      SUBJECTIVE     Pt reports: his stiffness is on the top of his shoulder    He was compliant with home exercise program.  Response to previous treatment: soreness  Functional change: not at this time.    Pain: 1/10  Location: right shoulder      OBJECTIVE     Objective Measures updated at progress report unless specified. - updated 2/13/23    DOS: 10/4/22    3/3/23:  Right Shoulder external rotation PROM upon arrival: 65 degree  Right shoulder flexion AROM after manuals: 130 degree   Clunks felt with flexion PROM without pain associated  + Sam's  "deformity on right     Treatment     Nilesh received the treatments listed below:      **All billed per medicaid guidelines**    therapeutic exercises to develop strength, endurance, ROM, and flexibility for 58 minutes     UBE fwd/back 3' ea LV3     Supine teres minor shoulder external rotation at 90/90 position: 2x12 2#, right  Supine D2 PNF AROM: 2x10  Side lying external rotation: 3x10 with 3" hold 1 pounds   Prone T's (no external rotation): 3x10 right   Prone Y: 2x10  Supine shoulder flexion: 3x10 AROM       Flexion: red theraband 3x10   External rotation: Red theraband 3x10, right  Internal rotation: Green theraband 3x10, right  Theraband SAPD: 3x10 Blue theraband, Bilateral       Not today:  Dowel wand overhead press: 2x10  Standing SA press at wall 3x10   Hands on Table Shoulder flexion stretch , Scaption, Abduction (CKC) 10x10"  Wall ball rolls: 20x with 5" holds on wall with large blue ball  Sleeper Stretch 3x30" - not today  Wall Push ups: 3x10   Standing SA press at wall 3x10 for "light weight bearing" per protocol.   Isometric hold + contralateral Rows w/ Blue theraband: 3x10 each     Supine Serratus Punch 20x2" with 5 lb DB   Standing ball roll RS: ABCs 1x through                                           Seated 90/90 external rotation (Elbow resting on table): 2x10  Lat Accupressure with ball: 1 minutes   Scaption with tennis ball lat accupressure: 10x      Nilesh received the following manual therapy techniques: Joint mobilizations, Myofacial release, and Soft tissue Mobilization were applied to the: right shoulder for 12 minutes, including:    Right shoulder PROM stretching Diagonal Pattern  Active release R latissimus Dorsi.     Previous:   Right lat, lower trap release with shoulder flexion PROM      Not today:  Right shoulder PROM D1, D2 patterns  Gentle STM  posterior shoulder and upper arm (medially-posteriorly)   Right elbow extension stretching  Side lying scapular rocking followed by " scapular upward rotation mobs with shoulder flexion PROM -      Patient Education and Home Exercises     Home Exercises Provided and Patient Education Provided   Education provided:   - keep exercises in a pain free range.  - updated home exercise program on 2/13/23    Written Home Exercises Provided: yes. Exercises were reviewed and Nilesh was able to demonstrate them prior to the end of the session.  Nilesh demonstrated good  understanding of the education provided. See EMR under Patient Instructions for exercises provided during therapy sessions    ASSESSMENT     Nilesh is a 47 y.o. male referred to outpatient Physical Therapy with a medical diagnosis of right supraspinatus tear, biceps tendonitis and right shoulder osteoarthritis. He continues with main complaint of poor range of motion (both active and passive). Clunks felt with flexion PROM, but no increase in pain associated. Flexion AROM and PROM did improve after manuals. + shrug sign with active flexion. 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    Nilesh Is progressing well towards his goals.   Pt prognosis is Good.     Pt will continue to benefit from skilled outpatient physical therapy to address the deficits listed in the problem list box on initial evaluation, provide pt/family education and to maximize pt's level of independence in the home and community environment.     Pt's spiritual, cultural and educational needs considered and pt agreeable to plan of care and goals.     Anticipated barriers to physical therapy: co-morbidities.    Goals:  Short Term Goals: 4 weeks   1.  Patient will report < 5/10 shoulder pain at worst for improved ADL performance. - MET  2.  Patient will demonstrate right shoulder flexion PROM > 140 deg to improve overhead reach. - MET  3.  Patient will demonstrate an understanding and compliance with home exercise program. - MET     Long Term Goals: 16 weeks   1. Patient will report  ability to perform dressing, bathing and hygiene without limitation, pain or compensation. - progressing  2. Patient will demonstrate right shoulder flexion AROM > 145 degree to improve overhead reach. - progressing  3. Patient will demonstrate all right upper extremity strength via MicroFET > 85% of left for improved ADL and IADL performance. - progressing  4. Patient will improve FOTO to < 46% to improve ADL performance. - progressing    PLAN     Continue per protocol (Phase III)    ]Pranav You, PT

## 2023-03-07 ENCOUNTER — CLINICAL SUPPORT (OUTPATIENT)
Dept: REHABILITATION | Facility: HOSPITAL | Age: 48
End: 2023-03-07
Payer: MEDICAID

## 2023-03-07 DIAGNOSIS — M62.81 MUSCLE WEAKNESS OF RIGHT UPPER EXTREMITY: ICD-10-CM

## 2023-03-07 DIAGNOSIS — M25.611 DECREASED RIGHT SHOULDER RANGE OF MOTION: Primary | ICD-10-CM

## 2023-03-07 PROCEDURE — 97110 THERAPEUTIC EXERCISES: CPT | Mod: PN

## 2023-03-08 NOTE — PROGRESS NOTES
[facility]   Physical Therapy Treatment Note     Name: Nilesh Rolon Jr.  Clinic Number: [id number]    Therapy Diagnosis:   Encounter Diagnoses   Name Primary?    Decreased right shoulder range of motion Yes    Muscle weakness of right upper extremity        Physician: Ernesto Diego IV, MD    Visit Date: 3/7/2023    Physician Orders: PT Eval and Treat    Medical Diagnosis from Referral:   M75.101 (ICD-10-CM) - Tear of right supraspinatus tendon   M75.21 (ICD-10-CM) - Biceps tendinitis of right upper extremity   M19.011 (ICD-10-CM) - Primary osteoarthritis of right shoulder      Evaluation Date: 10/11/2022  Authorization Period Expiration: 9/7/24  Plan of Care Expiration: 4/14/23  Progress Note Due: 4/14/23  Visit # / Visits authorized: 15/20 (+36) FOTO: 8/10 PTA Visit #: 0/5     Time In: 8:00 AM  Time Out: 9:00 AM  Total Billable Time: 58 minutes (TE-4) - Medicaid  Precautions: Standard; SEE POST OP PROTOCOL (limit extension)     Surgery: 10/4/22 - by Dr. Diego  Arthroscopic Rotator Cuff Repair (92234) - Medium tear  Open Biceps Tenodesis (10525)  Arthroscopic Shoulder Debridement - Extensive (97711) - Resection inferior humeral head osteophyte and debridement of long head biceps tendon, ant & post labrum, glenoid, humeral head  Manipulation under anesthesia (19092)  Comprehensive arthroscopic management Right shoulder      SUBJECTIVE     Pt reports: no change in symptoms.  Minimal pain.  Soreness in superior shoulder with certain movements.     He was compliant with home exercise program.  Response to previous treatment: soreness  Functional change: not at this time.    Pain: 1/10  Location: right shoulder      OBJECTIVE     Objective Measures updated at progress report unless specified. - updated 2/13/23    DOS: 10/4/22    3/3/23:  Right Shoulder external rotation PROM upon arrival: 65 degree  Right shoulder flexion AROM after manuals: 130 degree   Clunks felt with flexion PROM  "without pain associated  + Sam's deformity on right     Treatment     Nilesh received the treatments listed below:    **All billed per medicaid guidelines**    Therapeutic exercises to develop strength, endurance, ROM, and flexibility for 40 minutes   UBE fwd/back 3' ea LV3   Supine teres minor shoulder external rotation at 90/90 position: 2x12 2#, right  Supine D2 PNF AROM: 2x10  Side lying external rotation: 3x10 with 3" hold 1 pounds     Prone T's (no external rotation): 3x10 right  (not today)  Prone Y: 2x10 (not today)  Supine shoulder flexion: 3x10 AROM     Flexion: red theraband 3x10   External rotation: Red theraband 3x10, right  Internal rotation: Green theraband 3x10, right  Theraband SAPD: 3x10 Blue theraband, Bilateral     Nilesh received the following manual therapy techniques: Joint mobilizations, Myofacial release, and Soft tissue Mobilization were applied to the: right shoulder for 15 minutes, including:  Right shoulder PROM stretching Diagonal Pattern, internal rotation.   Active release R latissimus Dorsi.       Patient Education and Home Exercises     Home Exercises Provided and Patient Education Provided   Education provided:   - keep exercises in a pain free range.  - updated home exercise program on 2/13/23    Written Home Exercises Provided: yes. Exercises were reviewed and Nilesh was able to demonstrate them prior to the end of the session.  Nilesh demonstrated good  understanding of the education provided. See EMR under Patient Instructions for exercises provided during therapy sessions    ASSESSMENT     Nilesh is a 47 y.o. male referred to outpatient Physical Therapy with a medical diagnosis of right supraspinatus tear, biceps tendonitis and right shoulder osteoarthritis. Surgery date: 10/04/2022. Post-op 22 weeks. Superior pain with end-range flexion both passively and actively.  Passive tight into flexion and external rotation at side.  Increased anterior/posterior GHJ play; history of " anterior subluxation.  Clunk/crepitus noted but painless. Scapulothoracic movement > GHJ movement into flexion; disproportionate ratio.  Focus on session on RC stabilization within appropriate EMG level (<50%) and maximizing range of motion.     Nilesh Is progressing well towards his goals.   Pt prognosis is Good.     Pt will continue to benefit from skilled outpatient physical therapy to address the deficits listed in the problem list box on initial evaluation, provide pt/family education and to maximize pt's level of independence in the home and community environment.   Pt's spiritual, cultural and educational needs considered and pt agreeable to plan of care and goals.     Anticipated barriers to physical therapy: co-morbidities.    Goals:  Short Term Goals: 4 weeks   1.  Patient will report < 5/10 shoulder pain at worst for improved ADL performance. - MET  2.  Patient will demonstrate right shoulder flexion PROM > 140 deg to improve overhead reach. - MET  3.  Patient will demonstrate an understanding and compliance with home exercise program. - MET     Long Term Goals: 16 weeks   1. Patient will report ability to perform dressing, bathing and hygiene without limitation, pain or compensation. - progressing  2. Patient will demonstrate right shoulder flexion AROM > 145 degree to improve overhead reach. - progressing  3. Patient will demonstrate all right upper extremity strength via MicroFET > 85% of left for improved ADL and IADL performance. - progressing  4. Patient will improve FOTO to < 46% to improve ADL performance. - progressing    PLAN     Continue per protocol (Phase III)    Sung Low, PT, DPT, OCS

## 2023-03-17 ENCOUNTER — CLINICAL SUPPORT (OUTPATIENT)
Dept: REHABILITATION | Facility: HOSPITAL | Age: 48
End: 2023-03-17
Attending: ORTHOPAEDIC SURGERY
Payer: MEDICAID

## 2023-03-17 DIAGNOSIS — M62.81 MUSCLE WEAKNESS OF RIGHT UPPER EXTREMITY: ICD-10-CM

## 2023-03-17 DIAGNOSIS — M25.611 DECREASED RIGHT SHOULDER RANGE OF MOTION: Primary | ICD-10-CM

## 2023-03-17 PROCEDURE — 97110 THERAPEUTIC EXERCISES: CPT | Mod: PN,CQ

## 2023-03-17 NOTE — PROGRESS NOTES
[facility]   Physical Therapy Treatment Note     Name: Nilesh Rolon Jr.  Clinic Number: [id number]    Therapy Diagnosis:   Encounter Diagnoses   Name Primary?    Decreased right shoulder range of motion Yes    Muscle weakness of right upper extremity        Physician: Ernesto Diego IV, MD    Visit Date: 3/17/2023    Physician Orders: PT Eval and Treat    Medical Diagnosis from Referral:   M75.101 (ICD-10-CM) - Tear of right supraspinatus tendon   M75.21 (ICD-10-CM) - Biceps tendinitis of right upper extremity   M19.011 (ICD-10-CM) - Primary osteoarthritis of right shoulder      Evaluation Date: 10/11/2022  Authorization Period Expiration: 9/7/24  Plan of Care Expiration: 4/14/23  Progress Note Due: 4/14/23  Visit # / Visits authorized: 15/20 (+36) FOTO: 9/10   PTA Visit #: 1/5     Time In: 1004 AM  Time Out: 10:57AM  Total Billable Time: 53 minutes (TE-4) - Medicaid  Precautions: Standard; SEE POST OP PROTOCOL (limit extension)     Surgery: 10/4/22 - by Dr. Diego  Arthroscopic Rotator Cuff Repair (78006) - Medium tear  Open Biceps Tenodesis (45608)  Arthroscopic Shoulder Debridement - Extensive (68069) - Resection inferior humeral head osteophyte and debridement of long head biceps tendon, ant & post labrum, glenoid, humeral head  Manipulation under anesthesia (44654)  Comprehensive arthroscopic management Right shoulder      SUBJECTIVE     Pt reports: no change in symptoms.  Minimum pain with continued ROM restrictions.     He was compliant with home exercise program.  Response to previous treatment: soreness  Functional change: not at this time.    Pain: 1/10  Location: right shoulder      OBJECTIVE     Objective Measures updated at progress report unless specified. 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     Treatment     Nilesh received the treatments listed below:    **All billed per medicaid guidelines**    Therapeutic exercises to develop strength, endurance, ROM, and flexibility for 40 minutes   UBE fwd/back 3' ea LV3   Supine teres minor shoulder external rotation at 90/90 position: 2x12 2#, right  Supine D2 PNF AROM: 2x10  Side lying external rotation: 3x10 with 3" hold 1 pounds     Prone T's (Neutral rotation): 3x10 right  (not today)  Prone Y: 2x10 (neutral rotation)  Supine shoulder flexion: 3x10 AROM     Flexion: red theraband 3x10   External rotation: Red theraband 3x10, right  Internal rotation: Green theraband 3x10, right  Theraband SAPD: 3x10 Blue theraband, Bilateral     Hands on mat table CKC shoulder flexion 20x5"     Nilesh received the following manual therapy techniques: Joint mobilizations, Myofacial release, and Soft tissue Mobilization were applied to the: right shoulder for 00 minutes, including:  Right shoulder PROM stretching Diagonal Pattern, internal rotation.   Active release R latissimus Dorsi.       Patient Education and Home Exercises     Home Exercises Provided and Patient Education Provided   Education provided:   - keep exercises in a pain free range.  - updated home exercise program on 2/13/23    Written Home Exercises Provided: yes. Exercises were reviewed and Nilesh was able to demonstrate them prior to the end of the session.  Nilesh demonstrated good  understanding of the education provided. See EMR under Patient Instructions for exercises provided during therapy sessions    ASSESSMENT     Nilesh is a 47 y.o. male referred to outpatient Physical Therapy with a medical diagnosis of right supraspinatus tear, biceps tendonitis and right shoulder osteoarthritis. Surgery date: 10/04/2022. Patient tolerates tx well, resumed CKC flexion and scaption stretch to improve ROM of shoulder for improved functional use in [place] activities. Patient req " VC for 90 90 activity due to compensatory shoulder ABD/ADD motion.     Nilesh Is progressing well towards his goals.   Pt prognosis is Good.     Pt will continue to benefit from skilled outpatient physical therapy to address the deficits listed in the problem list box on initial evaluation, provide pt/family education and to maximize pt's level of independence in the home and community environment.   Pt's spiritual, cultural and educational needs considered and pt agreeable to plan of care and goals.     Anticipated barriers to physical therapy: co-morbidities.    Goals:  Short Term Goals: 4 weeks   1.  Patient will report < 5/10 shoulder pain at worst for improved ADL performance. - MET  2.  Patient will demonstrate right shoulder flexion PROM > 140 deg to improve overhead reach. - MET  3.  Patient will demonstrate an understanding and compliance with home exercise program. - MET     Long Term Goals: 16 weeks   1. Patient will report ability to perform dressing, bathing and hygiene without limitation, pain or compensation. - progressing  2. Patient will demonstrate right shoulder flexion AROM > 145 degree to improve overhead reach. - progressing  3. Patient will demonstrate all right upper extremity strength via MicroFET > 85% of left for improved ADL and IADL performance. - progressing  4. Patient will improve FOTO to < 46% to improve ADL performance. - progressing    PLAN     Continue per protocol (Phase III)    Gorge Sun, PTA, DPT, OCS

## 2023-03-21 ENCOUNTER — CLINICAL SUPPORT (OUTPATIENT)
Dept: REHABILITATION | Facility: HOSPITAL | Age: 48
End: 2023-03-21
Payer: MEDICAID

## 2023-03-21 ENCOUNTER — DOCUMENTATION ONLY (OUTPATIENT)
Dept: REHABILITATION | Facility: HOSPITAL | Age: 48
End: 2023-03-21

## 2023-03-21 DIAGNOSIS — M25.611 DECREASED RIGHT SHOULDER RANGE OF MOTION: Primary | ICD-10-CM

## 2023-03-21 DIAGNOSIS — M62.81 MUSCLE WEAKNESS OF RIGHT UPPER EXTREMITY: ICD-10-CM

## 2023-03-21 PROCEDURE — 97110 THERAPEUTIC EXERCISES: CPT | Mod: PN | Performed by: PHYSICAL THERAPIST

## 2023-03-21 NOTE — PROGRESS NOTES
[facility]   Physical Therapy Treatment Note     Name: Nilesh Rolon Jr.  Clinic Number: [id number]    Therapy Diagnosis:   Encounter Diagnoses   Name Primary?    Decreased right shoulder range of motion Yes    Muscle weakness of right upper extremity        Physician: Ernesto Diego IV, MD    Visit Date: 3/21/2023    Physician Orders: PT Eval and Treat    Medical Diagnosis from Referral:   M75.101 (ICD-10-CM) - Tear of right supraspinatus tendon   M75.21 (ICD-10-CM) - Biceps tendinitis of right upper extremity   M19.011 (ICD-10-CM) - Primary osteoarthritis of right shoulder      Evaluation Date: 10/11/2022  Authorization Period Expiration: 9/7/24  Plan of Care Expiration: 4/14/23  Progress Note Due: 4/14/23  Visit # / Visits authorized: 18/20  FOTO: 9/10   PTA Visit #: 0/5     Time In: 8:00 AM  Time Out: 8:55 AM  Total Billable Time: 54 minutes (TE-4) - Medicaid  Precautions: Standard; SEE POST OP PROTOCOL (limit extension)     Surgery: 10/4/22 - by Dr. Diego  Arthroscopic Rotator Cuff Repair (23507) - Medium tear  Open Biceps Tenodesis (39698)  Arthroscopic Shoulder Debridement - Extensive (60034) - Resection inferior humeral head osteophyte and debridement of long head biceps tendon, ant & post labrum, glenoid, humeral head  Manipulation under anesthesia (64322)  Comprehensive arthroscopic management Right shoulder      SUBJECTIVE     Pt reports: he felt a good workout after today's visit. He does feel like he's making improvements    He was compliant with home exercise program.  Response to previous treatment: soreness  Functional change: not at this time.    Pain: 1/10  Location: right shoulder      OBJECTIVE     Objective Measures updated at progress report unless specified. - updated 2/13/23    DOS: 10/4/22    3/3/23:  Right Shoulder external rotation PROM upon arrival: 65 degree  Right shoulder flexion AROM after manuals: 130 degree   Clunks felt with flexion PROM  "without pain associated  + Sam's deformity on right     Treatment     Nilesh received the treatments listed below:    **All billed per medicaid guidelines**    Therapeutic exercises to develop strength, endurance, ROM, and flexibility for 54 minutes     UBE fwd/back 3' ea LV3 - not today  Supine teres minor shoulder external rotation at 90/90 position: 2x12 2#, right  Supine D2 PNF AROM: 2x10  Side lying external rotation: 3x10 with 3" hold 1 pounds     Prone T's (Neutral rotation): 3x10 right   Prone Y: 2x10 (neutral rotation)  Supine shoulder flexion: 3x10 AROM     Flexion: red theraband 3x10   External rotation: Red theraband 3x10, right  Internal rotation: Green theraband 3x10, right  Theraband SAPD: 3x10 Blue theraband, Bilateral     Wall walks with lift off: 10x right   Wall clocks: yellow theraband 10x each  Wand overhead press: 2 lb dowel 2x10  Serratus ball circles: weightless yellow ball, 20x cw and ccw  Cable column rows: 13 pounds double arm 1x10; 17 pounds 1x10     Nilesh received the following manual therapy techniques: Joint mobilizations, Myofacial release, and Soft tissue Mobilization were applied to the: right shoulder for 00 minutes, including:  Right shoulder PROM stretching Diagonal Pattern, internal rotation.   Active release R latissimus Dorsi.       Patient Education and Home Exercises     Home Exercises Provided and Patient Education Provided   Education provided:   - keep exercises in a pain free range.  - updated home exercise program on 2/13/23    Written Home Exercises Provided: yes. Exercises were reviewed and Nilesh was able to demonstrate them prior to the end of the session.  Nilesh demonstrated good  understanding of the education provided. See EMR under Patient Instructions for exercises provided during therapy sessions    ASSESSMENT     Nilesh is a 47 y.o. male referred to outpatient Physical Therapy with a medical diagnosis of right supraspinatus tear, biceps tendonitis and " "right shoulder osteoarthritis. Surgery date: 10/04/2022. Main deficit is shoulder flexion range of motion and ability to reach overhead. Progressed strengthening today with reports of a "good workout."    Nilesh Is progressing well towards his goals.   Pt prognosis is Good.     Pt will continue to benefit from skilled outpatient physical therapy to address the deficits listed in the problem list box on initial evaluation, provide pt/family education and to maximize pt's level of independence in the home and community environment.   Pt's spiritual, cultural and educational needs considered and pt agreeable to plan of care and goals.     Anticipated barriers to physical therapy: co-morbidities.    Goals:  Short Term Goals: 4 weeks   1.  Patient will report < 5/10 shoulder pain at worst for improved ADL performance. - MET  2.  Patient will demonstrate right shoulder flexion PROM > 140 deg to improve overhead reach. - MET  3.  Patient will demonstrate an understanding and compliance with home exercise program. - MET     Long Term Goals: 16 weeks   1. Patient will report ability to perform dressing, bathing and hygiene without limitation, pain or compensation. - progressing  2. Patient will demonstrate right shoulder flexion AROM > 145 degree to improve overhead reach. - progressing  3. Patient will demonstrate all right upper extremity strength via MicroFET > 85% of left for improved ADL and IADL performance. - progressing  4. Patient will improve FOTO to < 46% to improve ADL performance. - progressing    PLAN     Continue per protocol (Phase III)    Pranav You, PT, DPT, OCS                                            "

## 2023-03-21 NOTE — PROGRESS NOTES
PT met face to face with Gorge Sun PTA to discuss pt's treatment plan and progress towards established goals. Pt will be seen by a physical therapist minimally every 6th visit or every 30 days.    We spoke about him prior to me treating him on 3/17   Gorge Sun PTA

## 2023-03-22 ENCOUNTER — TELEPHONE (OUTPATIENT)
Dept: OTOLARYNGOLOGY | Facility: CLINIC | Age: 48
End: 2023-03-22
Payer: MEDICAID

## 2023-03-22 NOTE — TELEPHONE ENCOUNTER
----- Message from Trupti Mendoza sent at 3/22/2023  2:12 PM CDT -----  Contact: Jazmine (Spouse)  [phone]  Type: Appointment Request    Name of Caller: Jazmine (Spouse)   When is the first available appointment? Do not have access  Reason for Visit:  Hearing loss going on over 48 hours  Best Call Back Number: [phone]  Additional Information: New Medicaid patient, never had a hearing test before

## 2023-03-23 ENCOUNTER — CLINICAL SUPPORT (OUTPATIENT)
Dept: REHABILITATION | Facility: HOSPITAL | Age: 48
End: 2023-03-23
Payer: MEDICAID

## 2023-03-23 DIAGNOSIS — M25.611 DECREASED RIGHT SHOULDER RANGE OF MOTION: Primary | ICD-10-CM

## 2023-03-23 DIAGNOSIS — M62.81 MUSCLE WEAKNESS OF RIGHT UPPER EXTREMITY: ICD-10-CM

## 2023-03-23 PROCEDURE — 97110 THERAPEUTIC EXERCISES: CPT | Mod: PN

## 2023-03-23 NOTE — PROGRESS NOTES
[facility]   Physical Therapy Treatment Note     Name: Nilesh Rolon Jr.  Clinic Number: [id number]    Therapy Diagnosis:   Encounter Diagnoses   Name Primary?    Decreased right shoulder range of motion Yes    Muscle weakness of right upper extremity      Physician: Ernesto Diego IV, MD    Visit Date: 3/23/2023    Physician Orders: PT Eval and Treat    Medical Diagnosis from Referral:   M75.101 (ICD-10-CM) - Tear of right supraspinatus tendon   M75.21 (ICD-10-CM) - Biceps tendinitis of right upper extremity   M19.011 (ICD-10-CM) - Primary osteoarthritis of right shoulder      Evaluation Date: 10/11/2022  Authorization Period Expiration: 9/7/24  Plan of Care Expiration: 4/14/23  Progress Note Due: 4/14/23  Visit # / Visits authorized: 19/20  FOTO: 9/10 - Next  PTA Visit #: 0/5     Time In: 11:05 AM  Time Out: 12:00 PM  Total Billable Time: 55 minutes (TE-4) - Medicaid  Precautions: Standard; SEE POST OP PROTOCOL (limit extension)     Surgery: 10/4/22 - by Dr. Diego  Arthroscopic Rotator Cuff Repair (95862) - Medium tear  Open Biceps Tenodesis (96111)  Arthroscopic Shoulder Debridement - Extensive (93600) - Resection inferior humeral head osteophyte and debridement of long head biceps tendon, ant & post labrum, glenoid, humeral head  Manipulation under anesthesia (11288)  Comprehensive arthroscopic management Right shoulder      SUBJECTIVE     Pt reports: doing ok today and some soreness in right shoulder but doing well.     He was compliant with home exercise program.  Response to previous treatment: soreness  Functional change: not at this time.    Pain: 1/10  Location: right shoulder      OBJECTIVE     Objective Measures updated at progress report unless specified. 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     Treatment     Nilesh received the treatments listed below:    **All billed per medicaid guidelines**    Therapeutic exercises to develop strength, endurance, ROM, and flexibility for 45 minutes     UBE fwd/back 3' ea LV3 - not today  Supine teres minor shoulder external rotation at 90/90 position: 2x12 2#, right  Supine D2 PNF AROM: 2x10  Side lying external rotation: 2x20 with 3" hold 1 pounds     Prone T's (Neutral rotation): 3x10 right   Prone Y: 2x10 (neutral rotation)  Supine shoulder flexion: 3x10 AROM     Flexion: red theraband 3x10   External rotation walkouts at cables: 2x10 (2 steps), 3#  External rotation: Red theraband 3x10, right  Internal rotation: Green theraband 2x15, right  Theraband SAPD: 3x10 Blue theraband, Bilateral     Wall walks with lift off: 10x right   Wall clocks: yellow theraband 10x each  Wand overhead press: 2 lb dowel 2x10  Serratus ball circles: weightless yellow ball, 40x cw and ccw  Cable column rows: 17 pounds 3x10   Seated AAROM shoulder flexion: 15x, 3#    Nilesh received the following manual therapy techniques: Joint mobilizations, Myofacial release, and Soft tissue Mobilization were applied to the: right shoulder for 10 minutes, including:  Right shoulder PROM stretching Diagonal Pattern, internal rotation.   Active release R latissimus Dorsi.     Patient Education and Home Exercises     Home Exercises Provided and Patient Education Provided   Education provided:   - keep exercises in a pain free range.  - updated home exercise program on 2/13/23    Written Home Exercises Provided: yes. Exercises were reviewed and Nilesh was able to demonstrate them prior to the end of the session.  Nilesh demonstrated good  understanding of the education provided. See EMR under Patient Instructions for exercises provided during therapy sessions    ASSESSMENT     Nilesh is a 47 y.o. male referred to outpatient Physical Therapy with a medical " diagnosis of right supraspinatus tear, biceps tendonitis and right shoulder osteoarthritis. Surgery date: 10/04/2022. Able to passively achieve about 150 degrees of flexion compared to active. Lacking periscapular and external rotation shoulder strength. Mild discomfort at jaun-lateral shoulder area and along bicep. Good tolerance for all activities and low shoulder irritability overall. Phase III rotator cuff strengthening.     Nilesh Is progressing well towards his goals.   Pt prognosis is Good.     Pt will continue to benefit from skilled outpatient physical therapy to address the deficits listed in the problem list box on initial evaluation, provide pt/family education and to maximize pt's level of independence in the home and community environment.   Pt's spiritual, cultural and educational needs considered and pt agreeable to plan of care and goals.     Anticipated barriers to physical therapy: co-morbidities.    Goals:  Short Term Goals: 4 weeks   1.  Patient will report < 5/10 shoulder pain at worst for improved ADL performance. - MET  2.  Patient will demonstrate right shoulder flexion PROM > 140 deg to improve overhead reach. - MET  3.  Patient will demonstrate an understanding and compliance with home exercise program. - MET     Long Term Goals: 16 weeks   1. Patient will report ability to perform dressing, bathing and hygiene without limitation, pain or compensation. - progressing  2. Patient will demonstrate right shoulder flexion AROM > 145 degree to improve overhead reach. - progressing  3. Patient will demonstrate all right upper extremity strength via MicroFET > 85% of left for improved ADL and IADL performance. - progressing  4. Patient will improve FOTO to < 46% to improve ADL performance. - progressing    PLAN     Continue per protocol (Phase III)    Bang Albrecht, PT, DPT, OCS

## 2023-03-27 ENCOUNTER — CLINICAL SUPPORT (OUTPATIENT)
Dept: REHABILITATION | Facility: HOSPITAL | Age: 48
End: 2023-03-27
Payer: MEDICAID

## 2023-03-27 DIAGNOSIS — M62.81 MUSCLE WEAKNESS OF RIGHT UPPER EXTREMITY: ICD-10-CM

## 2023-03-27 DIAGNOSIS — M25.611 DECREASED RIGHT SHOULDER RANGE OF MOTION: Primary | ICD-10-CM

## 2023-03-27 PROCEDURE — 97110 THERAPEUTIC EXERCISES: CPT | Mod: PN

## 2023-03-27 NOTE — PROGRESS NOTES
[facility]   Physical Therapy Treatment Note     Name: Nilesh Rolon Jr.  Clinic Number: [id number]    Therapy Diagnosis:   Encounter Diagnoses   Name Primary?    Decreased right shoulder range of motion Yes    Muscle weakness of right upper extremity      Physician: Ernesto Diego IV, MD    Visit Date: 3/27/2023    Physician Orders: PT Eval and Treat    Medical Diagnosis from Referral:   M75.101 (ICD-10-CM) - Tear of right supraspinatus tendon   M75.21 (ICD-10-CM) - Biceps tendinitis of right upper extremity   M19.011 (ICD-10-CM) - Primary osteoarthritis of right shoulder      Evaluation Date: 10/11/2022  Authorization Period Expiration: 9/7/24  Plan of Care Expiration: 4/14/23  Progress Note Due: 4/14/23  Visit # / Visits authorized: 20/20  FOTO: 10/10 - Next  PTA Visit #: 0/5     Time In: 10:01 AM  Time Out: 11:00 AM  Total Billable Time: 59 minutes (TE-4) - Medicaid  Precautions: Standard; SEE POST OP PROTOCOL (limit extension)    Surgery: 10/4/22 - by Dr. Diego  Arthroscopic Rotator Cuff Repair (00053) - Medium tear  Open Biceps Tenodesis (72717)  Arthroscopic Shoulder Debridement - Extensive (25694) - Resection inferior humeral head osteophyte and debridement of long head biceps tendon, ant & post labrum, glenoid, humeral head  Manipulation under anesthesia (30720)  Comprehensive arthroscopic management Right shoulder      SUBJECTIVE     Pt reports: no disturbed sleep, no sharp pain, and just has soreness at his right biceps and back of upper arm     He was compliant with home exercise program.  Response to previous treatment: soreness  Functional change: not at this time.    Pain: 1/10  Location: right shoulder      OBJECTIVE     Objective Measures updated at progress report unless specified. 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     Treatment     Nilesh received the treatments listed below:    **All billed per medicaid guidelines**    Therapeutic exercises to develop strength, endurance, ROM, and flexibility for 45 minutes     UBE fwd/back 4' ea LV3  Seated teres minor shoulder external rotation at 90/90 position: 3x10 1#, right  Supine D2 PNF AROM: 2x10  Prone T's (Neutral rotation): 3x10 right     Flexion: red theraband 3x10   External rotation: yellow theraband 3x10, right  Internal rotation: Green theraband 2x15, right  Theraband SAPD: 3x10 Blue theraband, Bilateral     Wall walks with lift off: 10x right   Wall clocks: yellow theraband 10x each - not today  Wand overhead press: 2 lb dowel 2x10  Serratus ball circles: weightless yellow ball, 40x cw and ccw  Cable column rows: 20 pounds 2x20   RUE single arm forward walk outs at cables: 2x10, 13# (elbow bent)  External rotation walkouts at cables: 2x12 (2 steps), 3#  Seated AAROM shoulder flexion: 2x12, 3#    Nilesh received the following manual therapy techniques: Joint mobilizations, Myofacial release, and Soft tissue Mobilization were applied to the: right shoulder for 10 minutes, including:  Right shoulder PROM stretching Diagonal Pattern, internal rotation  Active release R latissimus Dorsi.     Patient Education and Home Exercises     Home Exercises Provided and Patient Education Provided   Education provided:   - keep exercises in a pain free range.  - updated home exercise program on 2/13/23    Written Home Exercises Provided: yes. Exercises were reviewed and Nilesh was able to demonstrate them prior to the end of the session.  Nilesh demonstrated good  understanding of the education provided. See EMR under Patient Instructions for exercises provided during therapy sessions    ASSESSMENT     Nilesh is a 47 y.o. male referred to outpatient Physical Therapy with a medical diagnosis of right supraspinatus tear,  biceps tendonitis and right shoulder osteoarthritis. Surgery date: 10/04/2022. Pt educated to continue right shoulder exercises focusing on mobility+strengthening exercises (wall slides, etc.). Pt did well today and minimal crepitus present. Weakness present with infraspinatus and teres minor activities. Combined glenohumeral tightness with decreased scapular upward rotation.     Nilesh Is progressing well towards his goals.   Pt prognosis is Good.     Pt will continue to benefit from skilled outpatient physical therapy to address the deficits listed in the problem list box on initial evaluation, provide pt/family education and to maximize pt's level of independence in the home and community environment.   Pt's spiritual, cultural and educational needs considered and pt agreeable to plan of care and goals.     Anticipated barriers to physical therapy: co-morbidities.    Goals:  Short Term Goals: 4 weeks   1.  Patient will report < 5/10 shoulder pain at worst for improved ADL performance. - MET  2.  Patient will demonstrate right shoulder flexion PROM > 140 deg to improve overhead reach. - MET  3.  Patient will demonstrate an understanding and compliance with home exercise program. - MET     Long Term Goals: 16 weeks   1. Patient will report ability to perform dressing, bathing and hygiene without limitation, pain or compensation. - progressing  2. Patient will demonstrate right shoulder flexion AROM > 145 degree to improve overhead reach. - progressing  3. Patient will demonstrate all right upper extremity strength via MicroFET > 85% of left for improved ADL and IADL performance. - progressing  4. Patient will improve FOTO to < 46% to improve ADL performance. - progressing    PLAN     Continue per protocol (Phase III)    Bang Albrecht, PT, DPT, OCS

## 2023-03-31 ENCOUNTER — CLINICAL SUPPORT (OUTPATIENT)
Dept: REHABILITATION | Facility: HOSPITAL | Age: 48
End: 2023-03-31
Payer: MEDICAID

## 2023-03-31 DIAGNOSIS — M25.611 DECREASED RIGHT SHOULDER RANGE OF MOTION: Primary | ICD-10-CM

## 2023-03-31 DIAGNOSIS — M62.81 MUSCLE WEAKNESS OF RIGHT UPPER EXTREMITY: ICD-10-CM

## 2023-03-31 PROCEDURE — 97110 THERAPEUTIC EXERCISES: CPT | Mod: PN

## 2023-03-31 NOTE — PROGRESS NOTES
[facility]   Physical Therapy Treatment Note     Name: Nilesh Rolon Jr.  Clinic Number: [id number]    Therapy Diagnosis:   Encounter Diagnoses   Name Primary?    Decreased right shoulder range of motion Yes    Muscle weakness of right upper extremity      Physician: Ernesto Diego IV, MD    Visit Date: 3/31/2023    Physician Orders: PT Eval and Treat    Medical Diagnosis from Referral:   M75.101 (ICD-10-CM) - Tear of right supraspinatus tendon   M75.21 (ICD-10-CM) - Biceps tendinitis of right upper extremity   M19.011 (ICD-10-CM) - Primary osteoarthritis of right shoulder      Evaluation Date: 10/11/2022  Authorization Period Expiration: 9/7/24  Plan of Care Expiration: 4/14/23  Progress Note Due: 4/14/23  Visit # / Visits authorized: 20/20, Currently 1/12  FOTO: Taken today 3.31.2023  PTA Visit #: 0/5     Time In: 9:00am   Time Out: 10:05am  Total Billable Time: 65 minutes (TE-4) - Medicaid  Precautions: Standard; SEE POST OP PROTOCOL (limit extension)    Surgery: 10/4/22 - by Dr. Diego  Arthroscopic Rotator Cuff Repair (52974) - Medium tear  Open Biceps Tenodesis (96632)  Arthroscopic Shoulder Debridement - Extensive (84978) - Resection inferior humeral head osteophyte and debridement of long head biceps tendon, ant & post labrum, glenoid, humeral head  Manipulation under anesthesia (96718)  Comprehensive arthroscopic management Right shoulder      SUBJECTIVE     Pt reports: chief c/o limited ROM, especially overhead. Feels also very limited due to weakness. Otherwise, no new c/o including no disturbed sleep, no sharp pain, and just has soreness at his right biceps and back of upper arm     He was compliant with home exercise program.  Response to previous treatment: soreness  Functional change: not at this time.    Pain: 1/10  Location: right shoulder      OBJECTIVE     Objective Measures updated at progress report unless specified. 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     Treatment     Nilesh received the treatments listed below:    **All billed per medicaid guidelines**    Therapeutic exercises to develop strength, endurance, ROM, and flexibility for 65 minutes     UBE fwd/back 5' ea LV4.0  Wall slides 2x10     SL shoulder ER 4# 3x12  Prone T's (Neutral rotation): 3x10 right   Supine D2 PNF AROM: 2x10  Serratus wall slide with foam roll 3x12  Seated physioball roll out 10" hold x5    Cable column rows: 20 pounds 2x20   B pull cable column pull downs 3x12 10#  RUE single arm forward walk outs at cables: 2x10, 17# (elbow bent)  Farmer carry 20# KB x3 laps of gym    Seated teres minor shoulder external rotation at 90/90 position: 3x10 1#, right    Not Today:  Flexion: red theraband 3x10   External rotation: yellow theraband 3x10, right  Internal rotation: Green theraband 2x15, right  Theraband SAPD: 3x10 Blue theraband, Bilateral     Wall walks with lift off: 10x right   Wall clocks: yellow theraband 10x each - not today  Wand overhead press: 2 lb dowel 2x10  Serratus ball circles: weightless yellow ball, 40x cw and ccw    External rotation walkouts at cables: 2x12 (2 steps), 3#  Seated AAROM shoulder flexion: 2x12, 3#    Nilesh received the following manual therapy techniques: Joint mobilizations, Myofacial release, and Soft tissue Mobilization were applied to the: right shoulder for 00 minutes, including:  Right shoulder PROM stretching Diagonal Pattern, internal rotation  Active release R latissimus Dorsi.     Patient Education and Home Exercises     Home Exercises Provided and Patient Education Provided   Education provided:   - keep exercises in a pain free range.  - updated home exercise program on 2/13/23    Written Home Exercises Provided: yes. Exercises " were reviewed and Nilesh was able to demonstrate them prior to the end of the session.  Nilesh demonstrated good  understanding of the education provided. See EMR under Patient Instructions for exercises provided during therapy sessions    ASSESSMENT     Nilesh is a 47 y.o. male referred to outpatient Physical Therapy with a medical diagnosis of right supraspinatus tear, biceps tendonitis and right shoulder osteoarthritis. Surgery date: 10/04/2022. Pt educated to continue right shoulder exercises focusing on mobility+strengthening exercises. Pt did well today and minimal crepitus present. Pt tolerated strengthening progression well today with marked fatigue end of session as expected. Weakness present with infraspinatus and teres minor activities. Combined glenohumeral tightness with decreased scapular upward rotation.     Nilesh Is progressing well towards his goals.   Pt prognosis is Good.     Pt will continue to benefit from skilled outpatient physical therapy to address the deficits listed in the problem list box on initial evaluation, provide pt/family education and to maximize pt's level of independence in the home and community environment.   Pt's spiritual, cultural and educational needs considered and pt agreeable to plan of care and goals.     Anticipated barriers to physical therapy: co-morbidities.    Goals:  Short Term Goals: 4 weeks   1.  Patient will report < 5/10 shoulder pain at worst for improved ADL performance. - MET  2.  Patient will demonstrate right shoulder flexion PROM > 140 deg to improve overhead reach. - MET  3.  Patient will demonstrate an understanding and compliance with home exercise program. - MET     Long Term Goals: 16 weeks   1. Patient will report ability to perform dressing, bathing and hygiene without limitation, pain or compensation. - progressing  2. Patient will demonstrate right shoulder flexion AROM > 145 degree to improve overhead reach. - progressing  3. Patient will  demonstrate all right upper extremity strength via MicroFET > 85% of left for improved ADL and IADL performance. - progressing  4. Patient will improve FOTO to < 46% to improve ADL performance. - progressing    PLAN     Continue per protocol (Phase III)    Brian Will, PT, DPT, OCS

## 2023-04-03 ENCOUNTER — CLINICAL SUPPORT (OUTPATIENT)
Dept: REHABILITATION | Facility: HOSPITAL | Age: 48
End: 2023-04-03
Payer: MEDICAID

## 2023-04-03 DIAGNOSIS — M25.611 DECREASED RIGHT SHOULDER RANGE OF MOTION: Primary | ICD-10-CM

## 2023-04-03 DIAGNOSIS — M62.81 MUSCLE WEAKNESS OF RIGHT UPPER EXTREMITY: ICD-10-CM

## 2023-04-03 PROCEDURE — 97110 THERAPEUTIC EXERCISES: CPT | Mod: PN

## 2023-04-03 NOTE — PROGRESS NOTES
[facility]   Physical Therapy Treatment Note     Name: Nilesh Rolon Jr.  Clinic Number: [id number]    Therapy Diagnosis:   Encounter Diagnoses   Name Primary?    Decreased right shoulder range of motion Yes    Muscle weakness of right upper extremity      Physician: Ernesto Diego IV, MD    Visit Date: 4/3/2023    Physician Orders: PT Eval and Treat    Medical Diagnosis from Referral:   M75.101 (ICD-10-CM) - Tear of right supraspinatus tendon   M75.21 (ICD-10-CM) - Biceps tendinitis of right upper extremity   M19.011 (ICD-10-CM) - Primary osteoarthritis of right shoulder      Evaluation Date: 10/11/2022  Authorization Period Expiration: 9/7/24  Plan of Care Expiration: 4/14/23  Progress Note Due: 4/14/23  Visit # / Visits authorized: 1/1, 21/32, 22/16  FOTO: 6/10  PTA Visit #: 0/5     Time In: 11:05 am   Time Out: 11:50 am  Total Billable Time: 45 minutes (TE-3) - Medicaid  Precautions: Standard; SEE POST OP PROTOCOL (limit extension)    Surgery: 10/4/22 - by Dr. Diego  Arthroscopic Rotator Cuff Repair (03960) - Medium tear  Open Biceps Tenodesis (39936)  Arthroscopic Shoulder Debridement - Extensive (16994) - Resection inferior humeral head osteophyte and debridement of long head biceps tendon, ant & post labrum, glenoid, humeral head  Manipulation under anesthesia (72161)  Comprehensive arthroscopic management Right shoulder      SUBJECTIVE     Pt reports: no new changes and good workout last visit. Some low grade soreness and no sharp pain.    He was compliant with home exercise program.  Response to previous treatment: soreness  Functional change: not at this time.    Pain: 1/10  Location: right shoulder      OBJECTIVE     Objective Measures updated at progress report unless specified. 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     Treatment     Nilesh received the treatments listed below:    **All billed per medicaid guidelines**    Therapeutic exercises to develop strength, endurance, ROM, and flexibility for 45 minutes     UBE fwd/back 5' ea LV4.0  Wall slides 2x10     SL shoulder ER 4# 3x12  HOB elevated javelin series: 2x20 with 2# dowel  Prone T's (Neutral rotation): 3x10 right   Supine D2 PNF AROM: 10x (right deltoid pain - Held)  Serratus wall slide with foam roll 3x12  Seated physioball roll out 10" hold x5    Cable column rows: 20 pounds 2x20 - resume next  B pull cable column pull downs 3x12 10# - resume next  RUE single arm forward walk outs at cables: 2x10, 17# (elbow bent) - resume next  Akbar carry 20# KB x3 laps of gym - resume next    Seated teres minor shoulder external rotation at 90/90 position: 3x10 1#, right    Nilesh received the following manual therapy techniques: Joint mobilizations, Myofacial release, and Soft tissue Mobilization were applied to the: right shoulder for 00 minutes, including:  Right shoulder PROM stretching Diagonal Pattern, internal rotation  Active release R latissimus Dorsi.     Patient Education and Home Exercises     Home Exercises Provided and Patient Education Provided   Education provided:   - keep exercises in a pain free range.  - updated home exercise program on 2/13/23    Written Home Exercises Provided: yes. Exercises were reviewed and Nilesh was able to demonstrate them prior to the end of the session.  Nilesh demonstrated good  understanding of the education provided. See EMR under Patient Instructions for exercises provided during therapy sessions    ASSESSMENT     Nilesh is a 47 y.o. male referred to outpatient Physical Therapy with a medical diagnosis of right supraspinatus tear, biceps tendonitis and right shoulder osteoarthritis. Surgery date: 10/04/2022. Right shoulder PROM>AROM, weakness noted with external " rotation, slight drop arm sign present, no sharp pain throughout session, and periscapular weakness with GHJ exercises (minimal assist with scapular positioning with external rotation in sidelying).    Nilesh Is progressing well towards his goals.   Pt prognosis is Good.     Pt will continue to benefit from skilled outpatient physical therapy to address the deficits listed in the problem list box on initial evaluation, provide pt/family education and to maximize pt's level of independence in the home and community environment.   Pt's spiritual, cultural and educational needs considered and pt agreeable to plan of care and goals.     Anticipated barriers to physical therapy: co-morbidities.    Goals:  Short Term Goals: 4 weeks   1.  Patient will report < 5/10 shoulder pain at worst for improved ADL performance. - MET  2.  Patient will demonstrate right shoulder flexion PROM > 140 deg to improve overhead reach. - MET  3.  Patient will demonstrate an understanding and compliance with home exercise program. - MET     Long Term Goals: 16 weeks   1. Patient will report ability to perform dressing, bathing and hygiene without limitation, pain or compensation. - progressing  2. Patient will demonstrate right shoulder flexion AROM > 145 degree to improve overhead reach. - progressing  3. Patient will demonstrate all right upper extremity strength via MicroFET > 85% of left for improved ADL and IADL performance. - progressing  4. Patient will improve FOTO to < 46% to improve ADL performance. - progressing    PLAN     Continue per protocol (Phase III)    Bang Albrecht, PT, DPT, OCS

## 2023-04-05 ENCOUNTER — CLINICAL SUPPORT (OUTPATIENT)
Dept: REHABILITATION | Facility: HOSPITAL | Age: 48
End: 2023-04-05
Payer: MEDICAID

## 2023-04-05 DIAGNOSIS — M25.611 DECREASED RIGHT SHOULDER RANGE OF MOTION: Primary | ICD-10-CM

## 2023-04-05 DIAGNOSIS — M62.81 MUSCLE WEAKNESS OF RIGHT UPPER EXTREMITY: ICD-10-CM

## 2023-04-05 PROCEDURE — 97110 THERAPEUTIC EXERCISES: CPT | Mod: PN | Performed by: PHYSICAL THERAPIST

## 2023-04-05 NOTE — PROGRESS NOTES
[facility]   Physical Therapy Treatment Note     Name: Nilesh Rolon Jr.  Clinic Number: [id number]    Therapy Diagnosis:   Encounter Diagnoses   Name Primary?    Decreased right shoulder range of motion Yes    Muscle weakness of right upper extremity        Physician: Ernesto Diego IV, MD    Visit Date: 4/5/2023    Physician Orders: PT Eval and Treat    Medical Diagnosis from Referral:   M75.101 (ICD-10-CM) - Tear of right supraspinatus tendon   M75.21 (ICD-10-CM) - Biceps tendinitis of right upper extremity   M19.011 (ICD-10-CM) - Primary osteoarthritis of right shoulder      Evaluation Date: 10/11/2022  Authorization Period Expiration: 9/7/24  Plan of Care Expiration: 5/18/23  Progress Note Due: 5/18/23  Visit # / Visits authorized: 1/1, 21/32, 23/16  FOTO: 6/10  PTA Visit #: 0/5     Time In: 11:00 am   Time Out: 11:54 am  Total Billable Time: 54 minutes (TE-4) - Medicaid  Precautions: Standard; SEE POST OP PROTOCOL (limit extension)    Surgery: 10/4/22 - by Dr. Diego  Arthroscopic Rotator Cuff Repair (50583) - Medium tear  Open Biceps Tenodesis (67656)  Arthroscopic Shoulder Debridement - Extensive (25350) - Resection inferior humeral head osteophyte and debridement of long head biceps tendon, ant & post labrum, glenoid, humeral head  Manipulation under anesthesia (78627)  Comprehensive arthroscopic management Right shoulder      SUBJECTIVE     Pt reports: see UPOC    He was compliant with home exercise program.  Response to previous treatment: soreness  Functional change: not at this time.    Pain: 1/10  Location: right shoulder      OBJECTIVE     Objective Measures updated at progress report unless specified. - updated 4/5/23    DOS: 10/4/22      Treatment     Nilesh received the treatments listed below:    **All billed per medicaid guidelines**    Therapeutic exercises to develop strength, endurance, ROM, and flexibility for 54 minutes including testing:    Lat acupressure  "with scaption: 15x right  Lat acupressure with external rotation: 15x right   Seated CC lat pulldown to flexion stretch: 20x 10 pounds   Cable column rows: 20 pounds 1x15; 23 pounds 1x15; 27 pounds 1x15   RUE single arm forward walk outs at cables: 3x10, 17# (elbow bent)   Standing chicken wings: 10x5" holds    Not today:  B pull cable column pull downs 3x12 10# - resume next    Akbar carry 20# KB x3 laps of gym - resume next    Seated teres minor shoulder external rotation at 90/90 position: 3x10 1#, right    UBE fwd/back 5' ea LV4.0  Wall slides 2x10     SL shoulder ER 4# 3x12  HOB elevated javelin series: 2x20 with 2# dowel  Prone T's (Neutral rotation): 3x10 right   Supine D2 PNF AROM: 10x (right deltoid pain - Held)  Serratus wall slide with foam roll 3x12  Seated physioball roll out 10" hold x5    Nilesh received the following manual therapy techniques: Joint mobilizations, Myofacial release, and Soft tissue Mobilization were applied to the: right shoulder for 00 minutes, including:  Right shoulder PROM stretching Diagonal Pattern, internal rotation  Active release R latissimus Dorsi.     Patient Education and Home Exercises     Home Exercises Provided and Patient Education Provided   Education provided:   - keep exercises in a pain free range.  - updated home exercise program on 2/13/23    Written Home Exercises Provided: yes. Exercises were reviewed and Nilesh was able to demonstrate them prior to the end of the session.  Nilesh demonstrated good  understanding of the education provided. See EMR under Patient Instructions for exercises provided during therapy sessions    ASSESSMENT     Nilesh is a 47 y.o. male referred to outpatient Physical Therapy with a medical diagnosis of right supraspinatus tear, biceps tendonitis and right shoulder osteoarthritis. Surgery date: 10/04/2022. See UPOC.    Nilesh Is progressing well towards his goals.   Pt prognosis is Good.     Pt will continue to benefit from skilled " outpatient physical therapy to address the deficits listed in the problem list box on initial evaluation, provide pt/family education and to maximize pt's level of independence in the home and community environment.   Pt's spiritual, cultural and educational needs considered and pt agreeable to plan of care and goals.     Anticipated barriers to physical therapy: co-morbidities.    Goals:  Short Term Goals: 4 weeks   1.  Patient will report < 5/10 shoulder pain at worst for improved ADL performance. - MET  2.  Patient will demonstrate right shoulder flexion PROM > 140 deg to improve overhead reach. - MET  3.  Patient will demonstrate an understanding and compliance with home exercise program. - MET     Long Term Goals: 16 weeks   1. Patient will report ability to perform dressing, bathing and hygiene without limitation, pain or compensation. - progressing  2. Patient will demonstrate right shoulder flexion AROM > 145 degree to improve overhead reach. - progressing  3. Patient will demonstrate all right upper extremity strength via MicroFET > 85% of left for improved ADL and IADL performance. - progressing  4. Patient will improve FOTO to < 46% to improve ADL performance. - progressing    PLAN     Continue per protocol (Phase III)    Pranav You, PT, DPT, OCS

## 2023-04-05 NOTE — PLAN OF CARE
Outpatient Therapy Updated Plan of Care     Visit Date: 4/5/2023  Name: Nilesh Rolon Jr.  Clinic Number: [id number]    Therapy Diagnosis:   Encounter Diagnoses   Name Primary?    Decreased right shoulder range of motion Yes    Muscle weakness of right upper extremity      Physician: Ernesto Diego IV, MD    Physician Orders: PT Eval and Treat    Medical Diagnosis from Referral:   M75.101 (ICD-10-CM) - Tear of right supraspinatus tendon   M75.21 (ICD-10-CM) - Biceps tendinitis of right upper extremity   M19.011 (ICD-10-CM) - Primary osteoarthritis of right shoulder      Evaluation Date: 10/11/2022    Total Visits Received: 47    Current Certification Period:  12/15/22 to 2/10/23  Precautions:  standard; post-op right rotator cuff per attached protocol  Visits from Evaluation Date:  46      Subjective     Update: he still has trouble reaching above head and doing any hair care or shaving. He's noticed that he's using his arm better, but would like to continue for 1 more round of PT to improve a little more.     Objective     Update:     Range of Motion:   () = new measurements    Left Right 4/5/23   Shoulder Flexion P: 170    A: 170 P: 140     A: 105* (120) P: 150  A:135   Shoulder abduction P: 175    A: 175 P: 135     A: 103* (112)  P: 143  A:130   Shoulder ER P: 95   A: 27 cm P: 52 (62)   A: 45 deg (13 cm) P:65 A:18 cm   Shoulder IR P: 85    A: 35 cm P: 70    A: 70 degree (54 cm) P:74    A:48 cm       All tests taken in supine position  *=pain   Left (kg) Right (kg) 4/5/23 Notes   Shoulder flexion 29.4 11.4 17.4 59%   Shoulder abduction 31.8 10.7 15.1 47%   Shoulder external rotation  15.3 7.3 9.5 62%   Shoulder internal rotation  25.4 11.7 14.2 56%   Elbow flexion 25 20 22.8 91%        Assessment     Update: Nilesh is a 47 y.o. male referred to outpatient Physical Therapy with a medical diagnosis of right supraspinatus tear, biceps tendonitis and right shoulder osteoarthritis. Pt presents status post right  supraspinatus repair, SAD, biceps tenodesis and extensive debridement on 10/4/22. Main limitation is range of motion (both AROM and PROM), specifically with overhead motions and external rotation. All range of motion has improved since last POC. Strength also improving, but all shoulder microFET testing < 65% of left. He is right hand dominant. Progress overall limited by osteoarthritis, but crepitus has decreased with PROM, although still present at end ranges. Main limitations are with anything he has to do overhead. He is appropriate for one more round of PT to help him reach his goals prior to discharge.     Previous Short Term Goals Status: 3/3    Short Term Goals: 4 weeks   1.  Patient will report < 5/10 shoulder pain at worst for improved ADL performance. - MET  2.  Patient will demonstrate right shoulder flexion PROM > 140 deg to improve overhead reach. - MET  3.  Patient will demonstrate an understanding and compliance with home exercise program. - MET  New Short Term Goals Status:   none  Long Term Goal Status:   continue per initial plan of care.  Reasons for Recertification of Therapy:   UPOC - per protocol    Plan     Updated Certification Period: 4/5/2023 to 5/18/23  Recommended Treatment Plan: 2 times per week for 6 weeks: Electrical Stimulation TENS, IFC, NMES, Manual Therapy, Moist Heat/ Ice, Neuromuscular Re-ed, Patient Education, Self Care, Therapeutic Activities, Therapeutic Exercise, and dry needling  Other Recommendations: per protocol    Pranav You, PT  4/5/2023      I CERTIFY THE NEED FOR THESE SERVICES FURNISHED UNDER THIS PLAN OF TREATMENT AND WHILE UNDER MY CARE    Physician's comments:        Physician's Signature: ___________________________________________________

## 2023-04-11 ENCOUNTER — CLINICAL SUPPORT (OUTPATIENT)
Dept: REHABILITATION | Facility: HOSPITAL | Age: 48
End: 2023-04-11
Payer: MEDICAID

## 2023-04-11 DIAGNOSIS — M25.611 DECREASED RIGHT SHOULDER RANGE OF MOTION: Primary | ICD-10-CM

## 2023-04-11 DIAGNOSIS — M62.81 MUSCLE WEAKNESS OF RIGHT UPPER EXTREMITY: ICD-10-CM

## 2023-04-11 PROCEDURE — 97110 THERAPEUTIC EXERCISES: CPT | Mod: PN

## 2023-04-11 NOTE — PROGRESS NOTES
[facility]   Physical Therapy Treatment Note     Name: Nilesh Rolon Jr.  Clinic Number: [id number]    Therapy Diagnosis:   Encounter Diagnoses   Name Primary?    Decreased right shoulder range of motion Yes    Muscle weakness of right upper extremity        Physician: Ernesto Diego IV, MD    Visit Date: 4/11/2023    Physician Orders: PT Eval and Treat    Medical Diagnosis from Referral:   M75.101 (ICD-10-CM) - Tear of right supraspinatus tendon   M75.21 (ICD-10-CM) - Biceps tendinitis of right upper extremity   M19.011 (ICD-10-CM) - Primary osteoarthritis of right shoulder      Evaluation Date: 10/11/2022  Authorization Period Expiration: 9/7/24  Plan of Care Expiration: 5/18/23  Progress Note Due: 5/18/23  Visit # / Visits authorized: 1/1, 21/32, 24/16  FOTO: 7/10  PTA Visit #: 0/5     Time In: 8:05 am   Time Out: 9:00 am  Total Billable Time: 55 minutes (TE-4) - Medicaid  Precautions: Standard; SEE POST OP PROTOCOL (limit extension)    Surgery: 10/4/22 - by Dr. Diego  Arthroscopic Rotator Cuff Repair (42841) - Medium tear  Open Biceps Tenodesis (33334)  Arthroscopic Shoulder Debridement - Extensive (73367) - Resection inferior humeral head osteophyte and debridement of long head biceps tendon, ant & post labrum, glenoid, humeral head  Manipulation under anesthesia (86408)  Comprehensive arthroscopic management Right shoulder      SUBJECTIVE     Pt reports: feels like he can move his right shoulder better recently.    He was compliant with home exercise program.  Response to previous treatment: soreness  Functional change: not at this time.    Pain: 1/10  Location: right shoulder      OBJECTIVE     Objective Measures updated at progress report unless specified. - updated 4/5/23    DOS: 10/4/22    Treatment     Nilesh received the treatments listed below:    **All billed per medicaid guidelines**    Therapeutic exercises to develop strength, endurance, ROM, and flexibility for  "55 minutes including testing:    Lat acupressure with scaption: 15x right  Lat acupressure with external rotation: 15x right   Seated CC lat pulldown to flexion stretch: 20x 10 pounds   Cable column rows: 20 pounds 1x15; 23 pounds 1x15; 27 pounds 1x15   RUE single arm forward walk outs at cables: 3x10, 17# (elbow bent)   Standing chicken wings: 10x5" holds  B pull cable column pull downs 3x12 10#    Farmer carry 25# KB x3 laps of gym    Seated teres minor shoulder external rotation at 90/90 position: 3x10 2#, right    UBE fwd/back 5' ea LV4.0  Wall slides 2x10   Serratus wall slide with foam roll 3x12    Not today  SL shoulder ER 4# 3x12 - not today  HOB elevated javelin series: 2x20 with 2# dowel  Prone T's (Neutral rotation): 3x10 right   Supine D2 PNF AROM: 10x (right deltoid pain - Held)  Seated physioball roll out 10" hold x5    Nilesh received the following manual therapy techniques: Joint mobilizations, Myofacial release, and Soft tissue Mobilization were applied to the: right shoulder for 00 minutes, including:  Right shoulder PROM stretching Diagonal Pattern, internal rotation  Active release R latissimus Dorsi.     Patient Education and Home Exercises     Home Exercises Provided and Patient Education Provided   Education provided:   - keep exercises in a pain free range.  - updated home exercise program on 2/13/23    Written Home Exercises Provided: yes. Exercises were reviewed and Nilesh was able to demonstrate them prior to the end of the session.  Nilesh demonstrated good  understanding of the education provided. See EMR under Patient Instructions for exercises provided during therapy sessions    ASSESSMENT     Nilesh is a 47 y.o. male referred to outpatient Physical Therapy with a medical diagnosis of right supraspinatus tear, biceps tendonitis and right shoulder osteoarthritis. Surgery date: 10/04/2022. Improved right shoulder active flexion to 140 degrees today and improve quality of motion with " n/a standing flexion recently. Pt progressing well, and continue heavy cuff strengthening.     Nilesh Is progressing well towards his goals.   Pt prognosis is Good.     Pt will continue to benefit from skilled outpatient physical therapy to address the deficits listed in the problem list box on initial evaluation, provide pt/family education and to maximize pt's level of independence in the home and community environment.   Pt's spiritual, cultural and educational needs considered and pt agreeable to plan of care and goals.     Anticipated barriers to physical therapy: co-morbidities.    Goals:  Short Term Goals: 4 weeks   1.  Patient will report < 5/10 shoulder pain at worst for improved ADL performance. - MET  2.  Patient will demonstrate right shoulder flexion PROM > 140 deg to improve overhead reach. - MET  3.  Patient will demonstrate an understanding and compliance with home exercise program. - MET     Long Term Goals: 16 weeks   1. Patient will report ability to perform dressing, bathing and hygiene without limitation, pain or compensation. - progressing  2. Patient will demonstrate right shoulder flexion AROM > 145 degree to improve overhead reach. - progressing  3. Patient will demonstrate all right upper extremity strength via MicroFET > 85% of left for improved ADL and IADL performance. - progressing  4. Patient will improve FOTO to < 46% to improve ADL performance. - progressing    PLAN     Continue per protocol (Phase III)    Bang Albrecht, PT, DPT

## 2023-04-11 NOTE — PROGRESS NOTES
[facility]   Physical Therapy Treatment Note     Name: Nilesh Rolon Jr.  Clinic Number: [id number]    Therapy Diagnosis:   Encounter Diagnoses   Name Primary?    Decreased right shoulder range of motion Yes    Muscle weakness of right upper extremity        Physician: Ernesto Diego IV, MD    Visit Date: 4/11/2023    Physician Orders: PT Eval and Treat    Medical Diagnosis from Referral:   M75.101 (ICD-10-CM) - Tear of right supraspinatus tendon   M75.21 (ICD-10-CM) - Biceps tendinitis of right upper extremity   M19.011 (ICD-10-CM) - Primary osteoarthritis of right shoulder      Evaluation Date: 10/11/2022  Authorization Period Expiration: 9/7/24  Plan of Care Expiration: 5/18/23  Progress Note Due: 5/18/23  Visit # / Visits authorized: 1/1, 21/32, 24/25  FOTO: 6/10  PTA Visit #: 0/5     Time In: 8:00 am   Time Out: 9:00 am  Total Billable Time: 54 minutes (TE-4) - Medicaid  Precautions: Standard; SEE POST OP PROTOCOL (limit extension)    Surgery: 10/4/22 - by Dr. Diego  Arthroscopic Rotator Cuff Repair (35513) - Medium tear  Open Biceps Tenodesis (42057)  Arthroscopic Shoulder Debridement - Extensive (69046) - Resection inferior humeral head osteophyte and debridement of long head biceps tendon, ant & post labrum, glenoid, humeral head  Manipulation under anesthesia (25232)  Comprehensive arthroscopic management Right shoulder      SUBJECTIVE     Pt reports: see UPOC    He was compliant with home exercise program.  Response to previous treatment: soreness  Functional change: not at this time.    Pain: 1/10  Location: right shoulder      OBJECTIVE     Objective Measures updated at progress report unless specified. - updated 4/5/23    DOS: 10/4/22  4/11/23  Right shoulder flexion AROM upon arrival: 148 degree     Treatment     Nilesh received the treatments listed below:    **All billed per medicaid guidelines**    Therapeutic exercises to develop strength, endurance, ROM, and  "flexibility for 54 minutes including testing:    Upper body ergometer: fwd/back 6' ea LV4.0  Lat acupressure with scaption: 15x right  Lat acupressure with external rotation: 15x right   Seated CC lat pulldown to flexion stretch: 20x 10 pounds   Cable column rows: 20 pounds 1x15; 23 pounds 1x15; 27 pounds 1x15   RUE single arm forward walk outs at cables: 3x10, 17# (elbow bent)   Standing chicken wings: 10x5" holds    Not today:  B pull cable column pull downs 3x12 10# - resume next    Akbar carry 20# KB x3 laps of gym - resume next    Seated teres minor shoulder external rotation at 90/90 position: 3x10 1#, right      Wall slides 2x10     SL shoulder ER 4# 3x12  HOB elevated javelin series: 2x20 with 2# dowel  Prone T's (Neutral rotation): 3x10 right   Supine D2 PNF AROM: 10x (right deltoid pain - Held)  Serratus wall slide with foam roll 3x12  Seated physioball roll out 10" hold x5    Nilesh received the following manual therapy techniques: Joint mobilizations, Myofacial release, and Soft tissue Mobilization were applied to the: right shoulder for 00 minutes, including:  Right shoulder PROM stretching Diagonal Pattern, internal rotation  Active release R latissimus Dorsi.     Patient Education and Home Exercises     Home Exercises Provided and Patient Education Provided   Education provided:   - keep exercises in a pain free range.  - updated home exercise program on 2/13/23    Written Home Exercises Provided: yes. Exercises were reviewed and Nilesh was able to demonstrate them prior to the end of the session.  Nilesh demonstrated good  understanding of the education provided. See EMR under Patient Instructions for exercises provided during therapy sessions    ASSESSMENT     Nilesh is a 47 y.o. male referred to outpatient Physical Therapy with a medical diagnosis of right supraspinatus tear, biceps tendonitis and right shoulder osteoarthritis. Surgery date: 10/04/2022. See UPOC.    Nilesh Is progressing well " towards his goals.   Pt prognosis is Good.     Pt will continue to benefit from skilled outpatient physical therapy to address the deficits listed in the problem list box on initial evaluation, provide pt/family education and to maximize pt's level of independence in the home and community environment.   Pt's spiritual, cultural and educational needs considered and pt agreeable to plan of care and goals.     Anticipated barriers to physical therapy: co-morbidities.    Goals:  Short Term Goals: 4 weeks   1.  Patient will report < 5/10 shoulder pain at worst for improved ADL performance. - MET  2.  Patient will demonstrate right shoulder flexion PROM > 140 deg to improve overhead reach. - MET  3.  Patient will demonstrate an understanding and compliance with home exercise program. - MET     Long Term Goals: 16 weeks   1. Patient will report ability to perform dressing, bathing and hygiene without limitation, pain or compensation. - progressing  2. Patient will demonstrate right shoulder flexion AROM > 145 degree to improve overhead reach. - progressing  3. Patient will demonstrate all right upper extremity strength via MicroFET > 85% of left for improved ADL and IADL performance. - progressing  4. Patient will improve FOTO to < 46% to improve ADL performance. - progressing    PLAN     Continue per protocol (Phase III)    Pranav You, PT, DPT, OCS

## 2023-04-13 ENCOUNTER — CLINICAL SUPPORT (OUTPATIENT)
Dept: REHABILITATION | Facility: HOSPITAL | Age: 48
End: 2023-04-13
Payer: MEDICAID

## 2023-04-13 DIAGNOSIS — M25.611 DECREASED RIGHT SHOULDER RANGE OF MOTION: Primary | ICD-10-CM

## 2023-04-13 DIAGNOSIS — M62.81 MUSCLE WEAKNESS OF RIGHT UPPER EXTREMITY: ICD-10-CM

## 2023-04-13 PROCEDURE — 97110 THERAPEUTIC EXERCISES: CPT | Mod: PN | Performed by: PHYSICAL THERAPIST

## 2023-04-13 NOTE — PROGRESS NOTES
[facility]   Physical Therapy Treatment Note     Name: Nilesh Rolon Jr.  Clinic Number: [id number]    Therapy Diagnosis:   Encounter Diagnoses   Name Primary?    Decreased right shoulder range of motion Yes    Muscle weakness of right upper extremity        Physician: Ernesto Diego IV, MD    Visit Date: 4/13/2023    Physician Orders: PT Eval and Treat    Medical Diagnosis from Referral:   M75.101 (ICD-10-CM) - Tear of right supraspinatus tendon   M75.21 (ICD-10-CM) - Biceps tendinitis of right upper extremity   M19.011 (ICD-10-CM) - Primary osteoarthritis of right shoulder      Evaluation Date: 10/11/2022  Authorization Period Expiration: 9/7/24  Plan of Care Expiration: 5/18/23  Progress Note Due: 5/18/23  Visit # / Visits authorized: 25/25 (+24)  FOTO: 7/10  PTA Visit #: 0/5     Time In: 8:00 am   Time Out: 9:00 am  Total Billable Time: 56 minutes (TE-4) - Medicaid  Precautions: Standard; SEE POST OP PROTOCOL (limit extension)    Surgery: 10/4/22 - by Dr. Diego  Arthroscopic Rotator Cuff Repair (16401) - Medium tear  Open Biceps Tenodesis (52429)  Arthroscopic Shoulder Debridement - Extensive (41162) - Resection inferior humeral head osteophyte and debridement of long head biceps tendon, ant & post labrum, glenoid, humeral head  Manipulation under anesthesia (97223)  Comprehensive arthroscopic management Right shoulder      SUBJECTIVE     Pt reports: he likes the new exercises. He has difficulty holding his arm up above head.    He was compliant with home exercise program.  Response to previous treatment: soreness  Functional change: not at this time.    Pain: 1/10  Location: right shoulder      OBJECTIVE     Objective Measures updated at progress report unless specified. - updated 4/5/23    DOS: 10/4/22    Treatment     Nilesh received the treatments listed below:    **All billed per medicaid guidelines**    Therapeutic exercises to develop strength, endurance, ROM, and  "flexibility for 56 minutes including testing:    Lat acupressure with scaption: 15x right  Lat acupressure with external rotation: 15x right   Seated CC lat pulldown to flexion stretch: 20x 10 pounds   Cable column rows: 20 pounds 1x15; 23 pounds 1x15; 27 pounds 1x15   RUE single arm forward walk outs at cables: 3x10, 17# (elbow bent)   Standing chicken wings: 10x5" holds  Theraband external rotation: red theraband 3x10    Farmer carry 25# KB x3 laps of gym  Eccentric lowering from top of shelves: (up with left, slow down with right) 2.5 pounds 2x10    Body blade:   External rotation/internal rotation by side: 30"  @ shoulder height external rotation/internal rotation: 20"  @ shoulder height flexion/extend: 20"  Overhead: 2x15"      UBE fwd/back 5' ea LV4.0    Not today  Wall slides 2x10   Serratus wall slide with foam roll 3x12  Seated teres minor shoulder external rotation at 90/90 position: 3x10 2#, right  B pull cable column pull downs 3x12 10#  SL shoulder ER 4# 3x12 - not today  HOB elevated javelin series: 2x20 with 2# dowel  Prone T's (Neutral rotation): 3x10 right   Supine D2 PNF AROM: 10x (right deltoid pain - Held)  Seated physioball roll out 10" hold x5    Nilesh received the following manual therapy techniques: Joint mobilizations, Myofacial release, and Soft tissue Mobilization were applied to the: right shoulder for 00 minutes, including:  Right shoulder PROM stretching Diagonal Pattern, internal rotation  Active release R latissimus Dorsi.     Patient Education and Home Exercises     Home Exercises Provided and Patient Education Provided   Education provided:   - keep exercises in a pain free range.  - updated home exercise program on 2/13/23    Written Home Exercises Provided: yes. Exercises were reviewed and Nilesh was able to demonstrate them prior to the end of the session.  Nilesh demonstrated good  understanding of the education provided. See EMR under Patient Instructions for exercises " provided during therapy sessions    ASSESSMENT     Nilesh is a 47 y.o. male referred to outpatient Physical Therapy with a medical diagnosis of right supraspinatus tear, biceps tendonitis and right shoulder osteoarthritis. Surgery date: 10/04/2022. Progressed to overhead endurance training and eccentric training today with expected fatigue as these have become his biggest limitations.     Nilesh Is progressing well towards his goals.   Pt prognosis is Good.     Pt will continue to benefit from skilled outpatient physical therapy to address the deficits listed in the problem list box on initial evaluation, provide pt/family education and to maximize pt's level of independence in the home and community environment.   Pt's spiritual, cultural and educational needs considered and pt agreeable to plan of care and goals.     Anticipated barriers to physical therapy: co-morbidities.    Goals:  Short Term Goals: 4 weeks   1.  Patient will report < 5/10 shoulder pain at worst for improved ADL performance. - MET  2.  Patient will demonstrate right shoulder flexion PROM > 140 deg to improve overhead reach. - MET  3.  Patient will demonstrate an understanding and compliance with home exercise program. - MET     Long Term Goals: 16 weeks   1. Patient will report ability to perform dressing, bathing and hygiene without limitation, pain or compensation. - progressing  2. Patient will demonstrate right shoulder flexion AROM > 145 degree to improve overhead reach. - progressing  3. Patient will demonstrate all right upper extremity strength via MicroFET > 85% of left for improved ADL and IADL performance. - progressing  4. Patient will improve FOTO to < 46% to improve ADL performance. - progressing    PLAN     Continue per protocol (Phase III)    Pranav You, PT, DPT

## 2023-04-20 ENCOUNTER — TELEPHONE (OUTPATIENT)
Dept: REHABILITATION | Facility: HOSPITAL | Age: 48
End: 2023-04-20

## 2023-04-20 NOTE — TELEPHONE ENCOUNTER
Called regarding 2 straight no showed visits. Patient states his phone hadn't been working and he was going to call today. He has been out of town. He confirmed his next appointment.

## 2023-04-26 ENCOUNTER — CLINICAL SUPPORT (OUTPATIENT)
Dept: REHABILITATION | Facility: HOSPITAL | Age: 48
End: 2023-04-26
Payer: MEDICAID

## 2023-04-26 DIAGNOSIS — M62.81 MUSCLE WEAKNESS OF RIGHT UPPER EXTREMITY: ICD-10-CM

## 2023-04-26 DIAGNOSIS — M25.611 DECREASED RIGHT SHOULDER RANGE OF MOTION: Primary | ICD-10-CM

## 2023-04-26 PROCEDURE — 97110 THERAPEUTIC EXERCISES: CPT | Mod: PN,CQ

## 2023-04-26 NOTE — PROGRESS NOTES
[facility]   Physical Therapy Treatment Note     Name: Nilesh Rolon Jr.  Clinic Number: [id number]    Therapy Diagnosis:   Encounter Diagnoses   Name Primary?    Decreased right shoulder range of motion Yes    Muscle weakness of right upper extremity          Physician: Ernesto Diego IV, MD    Visit Date: 4/26/2023    Physician Orders: PT Eval and Treat    Medical Diagnosis from Referral:   M75.101 (ICD-10-CM) - Tear of right supraspinatus tendon   M75.21 (ICD-10-CM) - Biceps tendinitis of right upper extremity   M19.011 (ICD-10-CM) - Primary osteoarthritis of right shoulder      Evaluation Date: 10/11/2022  Authorization Period Expiration: 9/7/24  Plan of Care Expiration: 5/18/23  Progress Note Due: 5/18/23  Visit # / Visits authorized: 25/25 (+24)  FOTO: 7/10  PTA Visit #: 0/5     Time In: 9:06 am   Time Out: 9::50 am  Total Billable Time: 43 minutes (TE-3) - Medicaid  Precautions: Standard; SEE POST OP PROTOCOL (limit extension)    Surgery: 10/4/22 - by Dr. Diego  Arthroscopic Rotator Cuff Repair (62620) - Medium tear  Open Biceps Tenodesis (86588)  Arthroscopic Shoulder Debridement - Extensive (41228) - Resection inferior humeral head osteophyte and debridement of long head biceps tendon, ant & post labrum, glenoid, humeral head  Manipulation under anesthesia (76818)  Comprehensive arthroscopic management Right shoulder      SUBJECTIVE     Pt reports: No new complaints.     He was compliant with home exercise program.  Response to previous treatment: soreness  Functional change: not at this time.    Pain: 1/10  Location: right shoulder      OBJECTIVE     Objective Measures updated at progress report unless specified. - updated 4/5/23    DOS: 10/4/22    Treatment     Nilesh received the treatments listed below:    **All billed per medicaid guidelines**    Therapeutic exercises to develop strength, endurance, ROM, and flexibility for 56 minutes including testing:    Lat  "acupressure with scaption: 15x right  Lat acupressure with external rotation: 15x right   Seated CC lat pulldown to flexion stretch: 20x 13 pounds   Cable column rows: 20 pounds 1x15; 23 pounds 1x15; 27 pounds 1x15   RUE single arm forward walk outs at cables: 3x10, 17# (elbow bent)   Standing chicken wings: 10x10" holds  Theraband external rotation: red theraband 3x10    Farmer carry 25# KB x3 laps of gym  Eccentric lowering from top of shelves: (up with left, slow down with right) 2.5 pounds 2x10    Body blade:   External rotation/internal rotation by side: 30"  Resume next visit::  @ shoulder height external rotation/internal rotation: 20"  @ shoulder height flexion/extend: 20"  Overhead: 2x15"    Resume next visit.   UBE fwd/back 5' ea LV4.0    Not today  Wall slides 2x10   Serratus wall slide with foam roll 3x12  Seated teres minor shoulder external rotation at 90/90 position: 3x10 2#, right  B pull cable column pull downs 3x12 10#  SL shoulder ER 4# 3x12 - not today  HOB elevated javelin series: 2x20 with 2# dowel  Prone T's (Neutral rotation): 3x10 right   Supine D2 PNF AROM: 10x (right deltoid pain - Held)  Seated physioball roll out 10" hold x5    Nilesh received the following manual therapy techniques: Joint mobilizations, Myofacial release, and Soft tissue Mobilization were applied to the: right shoulder for 00 minutes, including:  Right shoulder PROM stretching Diagonal Pattern, internal rotation  Active release R latissimus Dorsi.     Patient Education and Home Exercises     Home Exercises Provided and Patient Education Provided   Education provided:   - keep exercises in a pain free range.  - updated home exercise program on 2/13/23    Written Home Exercises Provided: yes. Exercises were reviewed and Nilehs was able to demonstrate them prior to the end of the session.  Nilesh demonstrated good  understanding of the education provided. See EMR under Patient Instructions for exercises provided during " "therapy sessions    ASSESSMENT     Patient required frequent VC to avoid performing strengthening therex in high velocity "jerking" manner in favor of strengthening through controled motion. Patient reports needing to go to dentist appointment and needs to leave early.     Nilesh is a 47 y.o. male referred to outpatient Physical Therapy with a medical diagnosis of right supraspinatus tear, biceps tendonitis and right shoulder osteoarthritis. Surgery date: 10/04/2022. Progressed to overhead endurance training and eccentric training today with expected fatigue as these have become his biggest limitations.     Nilesh Is progressing well towards his goals.   Pt prognosis is Good.     Pt will continue to benefit from skilled outpatient physical therapy to address the deficits listed in the problem list box on initial evaluation, provide pt/family education and to maximize pt's level of independence in the home and community environment.   Pt's spiritual, cultural and educational needs considered and pt agreeable to plan of care and goals.     Anticipated barriers to physical therapy: co-morbidities.    Goals:  Short Term Goals: 4 weeks   1.  Patient will report < 5/10 shoulder pain at worst for improved ADL performance. - MET  2.  Patient will demonstrate right shoulder flexion PROM > 140 deg to improve overhead reach. - MET  3.  Patient will demonstrate an understanding and compliance with home exercise program. - MET     Long Term Goals: 16 weeks   1. Patient will report ability to perform dressing, bathing and hygiene without limitation, pain or compensation. - progressing  2. Patient will demonstrate right shoulder flexion AROM > 145 degree to improve overhead reach. - progressing  3. Patient will demonstrate all right upper extremity strength via MicroFET > 85% of left for improved ADL and IADL performance. - progressing  4. Patient will improve FOTO to < 46% to improve ADL performance. - progressing    PLAN "     Continue per protocol (Phase III)    Gorge Sun, PTA, DPT

## 2023-05-02 ENCOUNTER — CLINICAL SUPPORT (OUTPATIENT)
Dept: REHABILITATION | Facility: HOSPITAL | Age: 48
End: 2023-05-02
Payer: MEDICAID

## 2023-05-02 DIAGNOSIS — M62.81 MUSCLE WEAKNESS OF RIGHT UPPER EXTREMITY: ICD-10-CM

## 2023-05-02 DIAGNOSIS — M25.611 DECREASED RIGHT SHOULDER RANGE OF MOTION: Primary | ICD-10-CM

## 2023-05-02 PROCEDURE — 97110 THERAPEUTIC EXERCISES: CPT | Mod: PN | Performed by: PHYSICAL THERAPIST

## 2023-05-02 NOTE — PROGRESS NOTES
[facility]   Physical Therapy Treatment Note     Name: Nilesh Rolon Jr.  Clinic Number: [id number]    Therapy Diagnosis:   Encounter Diagnoses   Name Primary?    Decreased right shoulder range of motion Yes    Muscle weakness of right upper extremity          Physician: Ernesto Diego IV, MD    Visit Date: 5/2/2023    Physician Orders: PT Eval and Treat    Medical Diagnosis from Referral:   M75.101 (ICD-10-CM) - Tear of right supraspinatus tendon   M75.21 (ICD-10-CM) - Biceps tendinitis of right upper extremity   M19.011 (ICD-10-CM) - Primary osteoarthritis of right shoulder      Evaluation Date: 10/11/2022  Authorization Period Expiration: 9/7/24  Plan of Care Expiration: 5/18/23  Progress Note Due: 5/18/23  Visit # / Visits authorized: 27/37 (+24)  FOTO: 8/10  PTA Visit #: 0/5     Time In: 8:00 am   Time Out: 8:50 am  Total Billable Time: 50 minutes (TE-3) - Medicaid  Precautions: Standard; SEE POST OP PROTOCOL    Surgery: 10/4/22 - by Dr. Diego  Arthroscopic Rotator Cuff Repair (68899) - Medium tear  Open Biceps Tenodesis (00307)  Arthroscopic Shoulder Debridement - Extensive (65281) - Resection inferior humeral head osteophyte and debridement of long head biceps tendon, ant & post labrum, glenoid, humeral head  Manipulation under anesthesia (00996)  Comprehensive arthroscopic management Right shoulder      SUBJECTIVE     Pt reports: he tries stretching his arm (into flexion) randomly throughout the day. He occasionally has cramping in his bicep. He'd like to start light bench pressing, bicep curls and tricep extensions at home.     He was compliant with home exercise program.  Response to previous treatment: soreness  Functional change: not at this time.    Pain: 1/10  Location: right shoulder      OBJECTIVE     Objective Measures updated at progress report unless specified. - updated 4/5/23    DOS: 10/4/22    5/2/23  Right shoulder flexion AROM: 150 degree     Treatment  "    Nilesh received the treatments listed below:    **All billed per medicaid guidelines**    Therapeutic exercises to develop strength, endurance, ROM, and flexibility for 56 minutes including testing:    UBE fwd/back 3' ea LV5.0  Lat acupressure with scaption: 20x right  Lat acupressure with external rotation: 20x right   Seated CC lat pulldown to flexion stretch: 20x 13 pounds   Cable column rows: 27 pounds 2x15   RUE single arm forward walk outs at cables: 3x10, 17# (elbow bent)   Supine chest press: 10 lb Dbs 3x10  Standing (back against wall) posterior capsule stretch: 5x10" holds  Standing PNF D2 flexion: 2x5 (back against wall)  Standing chicken wings: 10x10" holds  Theraband external rotation: red theraband 3x10    Farmer carry 25# KB x3 laps of gym  Eccentric lowering from top of shelves: (up with left, slow down with right) 3 pounds 2x10    Body blade:   External rotation/internal rotation by side: 30"  @ shoulder height external rotation/internal rotation: 20"  @ shoulder height flexion/extend: 20"  Overhead: 2x15"      Not today  Wall slides 2x10   Serratus wall slide with foam roll 3x12  Seated teres minor shoulder external rotation at 90/90 position: 3x10 2#, right  B pull cable column pull downs 3x12 10#  SL shoulder ER 4# 3x12 - not today  HOB elevated javelin series: 2x20 with 2# dowel  Prone T's (Neutral rotation): 3x10 right   Supine D2 PNF AROM: 10x (right deltoid pain - Held)  Seated physioball roll out 10" hold x5    Nilesh received the following manual therapy techniques: Joint mobilizations, Myofacial release, and Soft tissue Mobilization were applied to the: right shoulder for 00 minutes, including:  Right shoulder PROM stretching Diagonal Pattern, internal rotation  Active release R latissimus Dorsi.     Patient Education and Home Exercises     Home Exercises Provided and Patient Education Provided   Education provided:   - keep exercises in a pain free range.  - updated home exercise " program on 2/13/23    Written Home Exercises Provided: yes. Exercises were reviewed and Nilesh was able to demonstrate them prior to the end of the session.  Nilesh demonstrated good  understanding of the education provided. See EMR under Patient Instructions for exercises provided during therapy sessions    ASSESSMENT       Nilesh is a 47 y.o. male referred to outpatient Physical Therapy with a medical diagnosis of right supraspinatus tear, biceps tendonitis and right shoulder osteoarthritis. Surgery date: 10/04/2022. Added chest press, bicep curls and tricep extension pull down to home exercise program after demonstrating good form. Shoulder flexion has improved to 150 degree, but significant difficulty and limitation with addition of D2 PNF flexion.     Nilesh Is progressing well towards his goals.   Pt prognosis is Good.     Pt will continue to benefit from skilled outpatient physical therapy to address the deficits listed in the problem list box on initial evaluation, provide pt/family education and to maximize pt's level of independence in the home and community environment.   Pt's spiritual, cultural and educational needs considered and pt agreeable to plan of care and goals.     Anticipated barriers to physical therapy: co-morbidities.    Goals:  Short Term Goals: 4 weeks   1.  Patient will report < 5/10 shoulder pain at worst for improved ADL performance. - MET  2.  Patient will demonstrate right shoulder flexion PROM > 140 deg to improve overhead reach. - MET  3.  Patient will demonstrate an understanding and compliance with home exercise program. - MET     Long Term Goals: 16 weeks   1. Patient will report ability to perform dressing, bathing and hygiene without limitation, pain or compensation. - progressing  2. Patient will demonstrate right shoulder flexion AROM > 145 degree to improve overhead reach. - progressing  3. Patient will demonstrate all right upper extremity strength via MicroFET > 85% of  left for improved ADL and IADL performance. - progressing  4. Patient will improve FOTO to < 46% to improve ADL performance. - progressing    PLAN     Continue per protocol (Phase III)    Pranav You, PT, DPT

## 2023-05-09 ENCOUNTER — CLINICAL SUPPORT (OUTPATIENT)
Dept: REHABILITATION | Facility: HOSPITAL | Age: 48
End: 2023-05-09
Payer: MEDICAID

## 2023-05-09 DIAGNOSIS — K76.82 HEPATIC ENCEPHALOPATHY: ICD-10-CM

## 2023-05-09 DIAGNOSIS — M62.81 MUSCLE WEAKNESS OF RIGHT UPPER EXTREMITY: ICD-10-CM

## 2023-05-09 DIAGNOSIS — M25.611 DECREASED RIGHT SHOULDER RANGE OF MOTION: Primary | ICD-10-CM

## 2023-05-09 PROCEDURE — 97110 THERAPEUTIC EXERCISES: CPT | Mod: PN | Performed by: PHYSICAL THERAPIST

## 2023-05-09 RX ORDER — LANOLIN ALCOHOL/MO/W.PET/CERES
100 CREAM (GRAM) TOPICAL DAILY
Qty: 30 TABLET | Refills: 11 | Status: CANCELLED | OUTPATIENT
Start: 2023-05-09 | End: 2024-05-08

## 2023-05-09 RX ORDER — LACTULOSE 10 G/15ML
30 SOLUTION ORAL; RECTAL 2 TIMES DAILY
Qty: 1892 ML | Refills: 6 | Status: SHIPPED | OUTPATIENT
Start: 2023-05-09

## 2023-05-09 NOTE — PROGRESS NOTES
"  [facility]   Physical Therapy Treatment Note     Name: Nilesh Rolon Jr.  Clinic Number: [id number]    Therapy Diagnosis:   Encounter Diagnoses   Name Primary?    Decreased right shoulder range of motion Yes    Muscle weakness of right upper extremity        Physician: Ernesto Diego IV, MD    Visit Date: 5/9/2023    Physician Orders: PT Eval and Treat    Medical Diagnosis from Referral:   M75.101 (ICD-10-CM) - Tear of right supraspinatus tendon   M75.21 (ICD-10-CM) - Biceps tendinitis of right upper extremity   M19.011 (ICD-10-CM) - Primary osteoarthritis of right shoulder      Evaluation Date: 10/11/2022  Authorization Period Expiration: 9/7/24  Plan of Care Expiration: 5/18/23  Progress Note Due: 5/18/23  Visit # / Visits authorized: 28/37 (+24)  FOTO: 9/10  PTA Visit #: 0/5     Time In: 8:00 am   Time Out: 8:56 am  Total Billable Time: 56 minutes (TE-3) - Medicaid  Precautions: Standard; SEE POST OP PROTOCOL    Surgery: 10/4/22 - by Dr. Diego  Arthroscopic Rotator Cuff Repair (45272) - Medium tear  Open Biceps Tenodesis (57046)  Arthroscopic Shoulder Debridement - Extensive (91472) - Resection inferior humeral head osteophyte and debridement of long head biceps tendon, ant & post labrum, glenoid, humeral head  Manipulation under anesthesia (25365)  Comprehensive arthroscopic management Right shoulder      SUBJECTIVE     Pt reports: "I've been doing my stretches at home." He leaves for 2 weeks starting on the 22nd.     He was compliant with home exercise program.  Response to previous treatment: soreness  Functional change: not at this time.    Pain: 1/10  Location: right shoulder      OBJECTIVE     Objective Measures updated at progress report unless specified. - updated 4/5/23    DOS: 10/4/22    5/2/23  Right shoulder flexion AROM: 150 degree     Treatment     Nilesh received the treatments listed below:    **All billed per medicaid guidelines**    Therapeutic exercises to " "develop strength, endurance, ROM, and flexibility for 56 minutes including testing:    UBE fwd/back 3' ea LV5.0  Lat acupressure with scaption: 20x right  Lat acupressure with external rotation: 20x right   Seated CC lat pulldown to flexion stretch: 20x 13 pounds   Cable column rows: 27 pounds 2x15   RUE single arm forward walk outs at cables: 3x10, 17# (elbow bent)   Supine chest press: 10 lb Dbs 3x10  Standing (back against wall) posterior capsule stretch: 5x10" holds  Standing PNF D2 flexion: 2x5 (back against wall)  Standing chicken wings: 10x10" holds  Theraband external rotation: red theraband 3x10    Farmer carry 25# KB x3 laps of gym  Eccentric lowering from top of shelves: (up with left, slow down with right) 3 pounds 2x10  Overhead physioball taps on wall: 3x30"    Body blade:   External rotation/internal rotation by side: 30"  @ shoulder height external rotation/internal rotation: 20"  @ shoulder height flexion/extend: 20"  Overhead: 2x15"      Not today  Wall slides 2x10   Serratus wall slide with foam roll 3x12  Seated teres minor shoulder external rotation at 90/90 position: 3x10 2#, right  HOB elevated javelin series: 2x20 with 2# dowel  Prone T's (Neutral rotation): 3x10 right     Nilesh received the following manual therapy techniques: Joint mobilizations, Myofacial release, and Soft tissue Mobilization were applied to the: right shoulder for 00 minutes, including:  Right shoulder PROM stretching Diagonal Pattern, internal rotation  Active release R latissimus Dorsi.     Patient Education and Home Exercises     Home Exercises Provided and Patient Education Provided   Education provided:   - keep exercises in a pain free range.  - updated home exercise program on 2/13/23    Written Home Exercises Provided: yes. Exercises were reviewed and Nilesh was able to demonstrate them prior to the end of the session.  Nilesh demonstrated good  understanding of the education provided. See EMR under Patient " Instructions for exercises provided during therapy sessions    ASSESSMENT       Nilesh is a 47 y.o. male referred to outpatient Physical Therapy with a medical diagnosis of right supraspinatus tear, biceps tendonitis and right shoulder osteoarthritis. Surgery date: 10/04/2022. Improvement in flexion with D2 flexion PNF standing. Pain continues to be well controlled. AROM and strength are gradually improving. Overall rehab potential limited by shoulder osteoarthritis.    Nilesh Is progressing well towards his goals.   Pt prognosis is Good.     Pt will continue to benefit from skilled outpatient physical therapy to address the deficits listed in the problem list box on initial evaluation, provide pt/family education and to maximize pt's level of independence in the home and community environment.   Pt's spiritual, cultural and educational needs considered and pt agreeable to plan of care and goals.     Anticipated barriers to physical therapy: co-morbidities.    Goals:  Short Term Goals: 4 weeks   1.  Patient will report < 5/10 shoulder pain at worst for improved ADL performance. - MET  2.  Patient will demonstrate right shoulder flexion PROM > 140 deg to improve overhead reach. - MET  3.  Patient will demonstrate an understanding and compliance with home exercise program. - MET     Long Term Goals: 16 weeks   1. Patient will report ability to perform dressing, bathing and hygiene without limitation, pain or compensation. - progressing  2. Patient will demonstrate right shoulder flexion AROM > 145 degree to improve overhead reach. - MET  3. Patient will demonstrate all right upper extremity strength via MicroFET > 85% of left for improved ADL and IADL performance. - progressing  4. Patient will improve FOTO to < 46% to improve ADL performance. - progressing    PLAN     Continue per protocol (Phase IV)    Pranav You, PT, DPT

## 2023-05-10 RX ORDER — LANOLIN ALCOHOL/MO/W.PET/CERES
100 CREAM (GRAM) TOPICAL DAILY
Qty: 30 TABLET | Refills: 11 | Status: CANCELLED | OUTPATIENT
Start: 2023-05-09 | End: 2024-05-08

## 2023-05-11 ENCOUNTER — TELEPHONE (OUTPATIENT)
Dept: REHABILITATION | Facility: HOSPITAL | Age: 48
End: 2023-05-11
Payer: MEDICAID

## 2023-05-15 RX ORDER — FERROUS SULFATE 325(65) MG
325 TABLET ORAL 2 TIMES DAILY
Qty: 60 TABLET | Refills: 2 | Status: SHIPPED | OUTPATIENT
Start: 2023-05-15 | End: 2023-11-20 | Stop reason: SDUPTHER

## 2023-05-18 ENCOUNTER — DOCUMENTATION ONLY (OUTPATIENT)
Dept: REHABILITATION | Facility: HOSPITAL | Age: 48
End: 2023-05-18
Payer: MEDICAID

## 2023-05-18 PROBLEM — M62.81 MUSCLE WEAKNESS OF RIGHT UPPER EXTREMITY: Status: RESOLVED | Noted: 2022-10-11 | Resolved: 2023-05-18

## 2023-05-18 PROBLEM — M25.611 DECREASED RIGHT SHOULDER RANGE OF MOTION: Status: RESOLVED | Noted: 2022-10-11 | Resolved: 2023-05-18

## 2023-05-18 NOTE — PROGRESS NOTES
PATIENT DISCHARGE:    Had discussed upcoming discharge during his last visit. He has no showed his last 3 appointments and will be discharged at this time.

## 2023-06-01 ENCOUNTER — EXTERNAL HOME HEALTH (OUTPATIENT)
Dept: HOME HEALTH SERVICES | Facility: HOSPITAL | Age: 48
End: 2023-06-01
Payer: MEDICAID

## 2023-06-22 ENCOUNTER — PATIENT MESSAGE (OUTPATIENT)
Dept: ORTHOPEDICS | Facility: CLINIC | Age: 48
End: 2023-06-22
Payer: MEDICAID

## 2023-06-22 DIAGNOSIS — M19.011 PRIMARY OSTEOARTHRITIS OF RIGHT SHOULDER: ICD-10-CM

## 2023-06-22 DIAGNOSIS — M75.21 BICEPS TENDINITIS OF RIGHT UPPER EXTREMITY: ICD-10-CM

## 2023-06-22 DIAGNOSIS — M75.101 TEAR OF RIGHT SUPRASPINATUS TENDON: Primary | ICD-10-CM

## 2023-07-21 ENCOUNTER — CLINICAL SUPPORT (OUTPATIENT)
Dept: REHABILITATION | Facility: HOSPITAL | Age: 48
End: 2023-07-21
Attending: ORTHOPAEDIC SURGERY
Payer: MEDICAID

## 2023-07-21 DIAGNOSIS — M75.101 TEAR OF RIGHT SUPRASPINATUS TENDON: ICD-10-CM

## 2023-07-21 DIAGNOSIS — M19.011 PRIMARY OSTEOARTHRITIS OF RIGHT SHOULDER: ICD-10-CM

## 2023-07-21 DIAGNOSIS — R29.898 WEAKNESS OF SHOULDER: ICD-10-CM

## 2023-07-21 DIAGNOSIS — M25.611 DECREASED RANGE OF MOTION OF RIGHT SHOULDER: ICD-10-CM

## 2023-07-21 DIAGNOSIS — M75.21 BICEPS TENDINITIS OF RIGHT UPPER EXTREMITY: ICD-10-CM

## 2023-07-21 PROCEDURE — 97110 THERAPEUTIC EXERCISES: CPT | Mod: PN

## 2023-07-21 PROCEDURE — 97162 PT EVAL MOD COMPLEX 30 MIN: CPT | Mod: PN

## 2023-07-21 NOTE — PLAN OF CARE
[facility]   Physical Therapy Initial Evaluation      [facility]   Physical Therapy Initial Evaluation     Date: 7/21/2023   Name: Nilesh Rolon Jr.  Clinic Number: [id number]    Therapy Diagnosis:   Encounter Diagnoses   Name Primary?    Tear of right supraspinatus tendon     Biceps tendinitis of right upper extremity     Primary osteoarthritis of right shoulder     Decreased range of motion of right shoulder     Weakness of shoulder      Physician: Ernesto Diego IV, MD      Physician Orders: PT Eval and Treat   Medical Diagnosis from Referral:   M75.101 (ICD-10-CM) - Tear of right supraspinatus tendon   M75.21 (ICD-10-CM) - Biceps tendinitis of right upper extremity   M19.011 (ICD-10-CM) - Primary osteoarthritis of right shoulder     Evaluation Date: 7/21/2023  Authorization Period Expiration: 8/21/2023  Plan of Care Expiration: 8/18/2023  Progress Note Due: 8/4/2023  Visit # / Visits authorized: 1/1   FOTO: 1/5    Precautions: Standard     Time In: 12:12 pm   Time Out: 1:00 pm  Total Billable Time: 48 minutes    Subjective     Date of surgery: October 2022     History of current condition - Nilesh reports pain in the middle of his biceps and lateral aspect of his shoulder. His pain is on and off. Trouble reaching overhead, cross body, and reaching behind his back. He has been trying to keep up the exercises from last plan of care. He contacted MD for more therapy to get more strength and some stretching. Right shoulder waking him up at night.    Falls: none     Imaging: See EMR    Prior Therapy: yes, right shoulder. Ended in May 2023.  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   Prior Level of Function: independent   Current Level of Function: independent with ADLs, running, upper extremity strengthening (bicep curls, push ups, band  exercises)    Pain:  Current 2-3/10, worst 7/10, best 1/10   Location: right shoulder    Description: Aching, Sharp, and Shooting  Aggravating Factors: Reaching overhead, reaching behind back, and cross-body  Easing Factors: nothing    Patients goals: strengthen his arm to return to swimming and shoot hoops with his daughter     Medical History:   Past Medical History:   Diagnosis Date    DONG (acute kidney injury) 12/1/2021    Anxiety     Arthritis     Cirrhosis     Hypertension     Liver disease     Osteoarthritis     Other meniscus derangements, other medial meniscus, left knee 1/7/2019       Surgical History:   Nilesh Ottrosita Mota  has a past surgical history that includes Arthroscopy of knee (Left, 1/7/2019); Esophagogastroduodenoscopy (N/A, 4/8/2019); Esophagogastroduodenoscopy (N/A, 7/27/2020); Colonoscopy (N/A, 7/27/2020); Knee surgery; Esophagogastroduodenoscopy (N/A, 12/2/2021); Esophagogastroduodenoscopy (N/A, 1/20/2022); Colonoscopy (N/A, 1/20/2022); Shoulder arthroscopy (Right, 10/4/2022); Debridement (Right, 10/4/2022); and Repair of bone (10/4/2022).    Medications:   Nilesh has a current medication list which includes the following prescription(s): cholecalciferol (vitamin d3), clotrimazole-betamethasone 1-0.05%, cyclobenzaprine, doxycycline, elidel, ferrous sulfate, folic acid, gabapentin, ibuprofen, ketoconazole, lactulose, multivitamin, and pantoprazole.    Allergies:   Review of patient's allergies indicates:  No Known Allergies     Objective      Posture: visible popeyes sign, rounded shoulder and protracted scapula     Palpation: (+) tenderness subacromial space, supraspinatus insertion, posterolateral aspect of shoulder     Range of Motion: seated    Left Right   Shoulder Flexion P: 175    A: 170 P: 136     A: 122   Shoulder Abduction P: 180    A: 175 P: 120     A: 110   Shoulder ER P: 75    A: 70 P: 58    A: 52   Shoulder IR P: 80    A: 75 P: 65    A: 60   Cervical Flexion [45 deg] WNL     Cervical Extension [55 deg]  WNL    Cervical Rotation [70 deg] WNL WNL   Cervical Side Flexion [40 deg] WNL WNL      Gross movement:    Right  Left   IR (behind back) L5 T7   ER (top of head) back of skull back of skull     Upper Extremity Strength  *=pain   right  left  Notes   Shoulder flexion 4-/5 5/5    Shoulder abduction 4-/5 5/5    Shoulder external rotation  4-/5 4/5    Shoulder internal rotation  4/5 5/5    Elbow flexion 4+/5 5/5    Elbow extension 4+/5 5/5       UE Right Left   Lower Trap 3/5 3+/5   Middle Trap 4-/5 4+/5   Rhomboids 4-/5 4+/5       Special Tests:   Left Right   Internal Rotation Lag Sign NT NT   External Rotation Lag Sign NT NT   Sam's sign NT +   Speed's test (SLAP cluster) NT + for pain   O'Briens Test (SLAP cluster) NT + for pain        Joint Mobility:    Glenohumeral mobility: posterior capsule tightness     Intake Outcome Measure for FOTO shoulder Survey    Therapist reviewed FOTO scores for Nilesh BERMAN Gonzales Mota on 7/21/2023.   FOTO documents entered into EPIC - see Media section.    Intake Score: 47%         Treatment     Total Treatment time (time-based codes) separate from Evaluation: 08 minutes     Nilesh received the treatments listed below:        manual therapy techniques: Soft tissue Mobilization were applied to the: right biceps region for 8 minutes, including:  STM to biceps and upper arm     Patient Education and Home Exercises     Education provided:   - Findings; prognosis and plan of care  - Home exercise program  - Modality options  - Therapist contact information      Written Home Exercises Provided: yes. Exercises were reviewed and Nilesh was able to demonstrate them prior to the end of the session.  Nilesh demonstrated good  understanding of the education provided. See EMR under Patient Instructions for exercises provided during therapy sessions.    Assessment     Nilesh is a 48 y.o. male referred to outpatient Physical Therapy with a medical diagnosis of  M75.101 (ICD-10-CM) - Tear of right supraspinatus tendon, M75.21 (ICD-10-CM) - Biceps tendinitis of right upper extremity, and M19.011 (ICD-10-CM) - Primary osteoarthritis of right shoulder. Patient returns to clinic following 8 months of therapy following right rotator cuff repair with biceps tenodesis. Presents with complaints of pain located at biceps and posterolateral aspect of shoulder. Dave's sign present, which occurred month prior. Decreased active range of motion since last updated plan of care with crepitus present in right shoulder with all active range of motion and manual muscle testing. Believes significant osteoarthritis is a factor in lack of range of motion. Will benefit from right shoulder mobility and strengthening of  serratus + middle/lower trapezius couple force to aid on overhead movement patterns and functional activities.    Patient prognosis is Good.   Patient will benefit from skilled outpatient Physical Therapy to address the deficits stated above and in the chart below, provide patient /family education, and to maximize patientt's level of independence.     Plan of care discussed with patient: Yes  Patient's spiritual, cultural and educational needs considered and patient is agreeable to the plan of care and goals as stated below:     Anticipated Barriers for therapy: dave's sign, hx of RTC repair, osteoarthritis    Medical Necessity is demonstrated by the following  History  Co-morbidities and personal factors that may impact the plan of care [] LOW: no personal factors / co-morbidities  [] MODERATE: 1-2 personal factors / co-morbidities  [x] HIGH: 3+ personal factors / co-morbidities    Moderate / High Support Documentation:   Co-morbidities affecting plan of care: anxiety, arthritis, HTN, liver disease; hx of RTC repair     Personal Factors:   lifestyle     Examination  Body Structures and Functions, activity limitations and participation restrictions that may impact the plan of  care [] LOW: addressing 1-2 elements  [x] MODERATE: 3+ elements  [] HIGH: 4+ elements (please support below)    Moderate / High Support Documentation: Based on PMHX, co morbidities , data from assessments and functional level of assistance required with task and clinical presentation directly impacting function.         Clinical Presentation [] LOW: stable  [x] MODERATE: Evolving  [] HIGH: Unstable     Decision Making/ Complexity Score: moderate       Goals:  Short Term Goals (2 Weeks):   1. Patient will be compliant with home exercise program to assist therapy in restoring pain free motion of the shoulder.   2. Patient will improve impaired shoulder manual muscle tests 1/2 grade bilaterally to improve strength for functional tasks.  3. Patient will improve right shoulder flexion >/= 10 degrees to improve functional mobility of upper extremities.  4. Patient will improve right shoulder abduction >/= 10 degrees to improve functional mobility of upper extremities.      Long Term Goals (4 Weeks):   1. Patient will improve FOTO score to </= 27% to demonstrate improvements in carrying, moving, and handling objects  2. Patient will improve impaired shoulder manual muscle tests 1 grade bilaterally to improve strength for household duties.  3. Patient will improve right shoulder flexion to >/= 140 degrees to improve functional mobility of upper extremities.   4. Patient will improve right shoulder abduction to >/= 140 degrees to improve functional mobility of upper extremities.  5. Patient will improver lower trap manual muscle tests to 4/5 to improve overhead reach tasks.  6. Patient will be able to shoot basketball hoop with daughter.     Plan     Plan of care Certification: 7/21/2023 to 8/18/2023.    Outpatient Physical Therapy 2 times weekly for 4 weeks to include the following interventions: Manual Therapy, Moist Heat/ Ice, Neuromuscular Re-ed, Patient Education, Self Care, Therapeutic Activities, and Therapeutic  Exercise.     Maame Cheung, PT        Physician's Signature: _________________________________________ Date: ________________

## 2023-07-24 ENCOUNTER — CLINICAL SUPPORT (OUTPATIENT)
Dept: REHABILITATION | Facility: HOSPITAL | Age: 48
End: 2023-07-24
Payer: MEDICAID

## 2023-07-24 DIAGNOSIS — M25.611 DECREASED RANGE OF MOTION OF RIGHT SHOULDER: Primary | ICD-10-CM

## 2023-07-24 DIAGNOSIS — R29.898 WEAKNESS OF SHOULDER: ICD-10-CM

## 2023-07-24 PROCEDURE — 97110 THERAPEUTIC EXERCISES: CPT | Mod: PN

## 2023-07-24 NOTE — PROGRESS NOTES
"[facility]   Physical Therapy Treatment Note      Name: Nilesh Rolon Jr.  Clinic Number: [id number]    Therapy Diagnosis:   Encounter Diagnoses   Name Primary?    Decreased range of motion of right shoulder Yes    Weakness of shoulder      Physician: Ernesto Diego IV, MD    Visit Date: 7/24/2023       Physician Orders: PT Eval and Treat   Medical Diagnosis from Referral:   M75.101 (ICD-10-CM) - Tear of right supraspinatus tendon   M75.21 (ICD-10-CM) - Biceps tendinitis of right upper extremity   M19.011 (ICD-10-CM) - Primary osteoarthritis of right shoulder      Evaluation Date: 7/21/2023  Authorization Period Expiration: 8/21/2023  Plan of Care Expiration: 8/18/2023  Progress Note Due: 8/4/2023  Visit # / Visits authorized: 1/1   FOTO: 1/5     Precautions: Standard      Time In: 8:05 am   Time Out: 9:00 am  Total Billable Time: 55 minutes (4 TE)      PTA Visit #: 0/5       Subjective     Pt reports: some improved pain in biceps from soft tissue mobilizations at initial evaluation. Has used theraband for bicep curls which has helped with the pain.     He was not provided home exercise program.  Response to previous treatment: ongoing   Functional change: ongoing     Pain: 3/10  Location: right shoulder      Objective      Objective Measures updated at progress report unless specified.     Treatment     Nilesh received the treatments listed below:      therapeutic exercises to develop strength, ROM, and flexibility for 55 minutes including:    UBE: level 4 - 3'/3'  Swiss ball overhead roll ups: 20x  Wall slides Flexion/abduction: 15x5"  Forward bows: 15x   90-90 ER: 3# -3x10  Free motion rows: 20# - 3x10   Lat stretch at free motion: max weight - 5x20"   Side-lying ER: 4# - 3x10  Serratus punch: 4# - 3x10    Patient Education and Home Exercises       Education provided:   - compliance with HEP    Written Home Exercises Provided: yes. Exercises were reviewed and Nilesh was able to " demonstrate them prior to the end of the session.  Nilesh demonstrated good  understanding of the education provided. See EMR under Patient Instructions for exercises provided during therapy sessions    Assessment     Nilesh is a 48 y.o. male referred to outpatient Physical Therapy with a medical diagnosis of M75.101 (ICD-10-CM) - Tear of right supraspinatus tendon, M75.21 (ICD-10-CM) - Biceps tendinitis of right upper extremity, and M19.011 (ICD-10-CM) - Primary osteoarthritis of right shoulder. Focused on right shoulder AAROM overhead, serratus + middle/lower trapezius couple force, and infraspinatus strengthening. Will perform AP mobilization next visit for pain relief.    Nilesh Is progressing well towards his goals.   Pt prognosis is Good.     Pt will continue to benefit from skilled outpatient physical therapy to address the deficits listed in the problem list box on initial evaluation, provide pt/family education and to maximize pt's level of independence in the home and community environment.     Pt's spiritual, cultural and educational needs considered and pt agreeable to plan of care and goals.     Anticipated barriers to physical therapy: dave's sign, hx of RTC repair, osteoarthritis       Goals:   Short Term Goals (2 Weeks):   1. Patient will be compliant with home exercise program to assist therapy in restoring pain free motion of the shoulder.   2. Patient will improve impaired shoulder manual muscle tests 1/2 grade bilaterally to improve strength for functional tasks.  3. Patient will improve right shoulder flexion >/= 10 degrees to improve functional mobility of upper extremities.  4. Patient will improve right shoulder abduction >/= 10 degrees to improve functional mobility of upper extremities.      Long Term Goals (4 Weeks):   1. Patient will improve FOTO score to </= 27% to demonstrate improvements in carrying, moving, and handling objects  2. Patient will improve impaired shoulder manual  muscle tests 1 grade bilaterally to improve strength for household duties.  3. Patient will improve right shoulder flexion to >/= 140 degrees to improve functional mobility of upper extremities.   4. Patient will improve right shoulder abduction to >/= 140 degrees to improve functional mobility of upper extremities.  5. Patient will improver lower trap manual muscle tests to 4/5 to improve overhead reach tasks.  6. Patient will be able to shoot basketball hoop with daughter.     Plan     Right shoulder overhead motion >120 degrees  serratus + middle/lower trapezius couple force     Maame Cheung, PT

## 2023-07-25 ENCOUNTER — CLINICAL SUPPORT (OUTPATIENT)
Dept: AUDIOLOGY | Facility: CLINIC | Age: 48
End: 2023-07-25
Payer: MEDICAID

## 2023-07-25 ENCOUNTER — OFFICE VISIT (OUTPATIENT)
Dept: OTOLARYNGOLOGY | Facility: CLINIC | Age: 48
End: 2023-07-25
Payer: MEDICAID

## 2023-07-25 DIAGNOSIS — H66.92 ACUTE INFECTION OF LEFT EAR: Primary | ICD-10-CM

## 2023-07-25 DIAGNOSIS — H90.3 BILATERAL HIGH FREQUENCY SENSORINEURAL HEARING LOSS: Primary | ICD-10-CM

## 2023-07-25 DIAGNOSIS — H90.3 BILATERAL HIGH FREQUENCY SENSORINEURAL HEARING LOSS: ICD-10-CM

## 2023-07-25 DIAGNOSIS — H93.13 TINNITUS, BILATERAL: ICD-10-CM

## 2023-07-25 PROCEDURE — 99203 PR OFFICE/OUTPT VISIT, NEW, LEVL III, 30-44 MIN: ICD-10-PCS | Mod: S$PBB,,, | Performed by: OTOLARYNGOLOGY

## 2023-07-25 PROCEDURE — 99999 PR PBB SHADOW E&M-EST. PATIENT-LVL III: ICD-10-PCS | Mod: PBBFAC,,, | Performed by: OTOLARYNGOLOGY

## 2023-07-25 PROCEDURE — 99999 PR PBB SHADOW E&M-EST. PATIENT-LVL I: CPT | Mod: PBBFAC,,,

## 2023-07-25 PROCEDURE — 99203 OFFICE O/P NEW LOW 30 MIN: CPT | Mod: S$PBB,,, | Performed by: OTOLARYNGOLOGY

## 2023-07-25 PROCEDURE — 99999 PR PBB SHADOW E&M-EST. PATIENT-LVL I: ICD-10-PCS | Mod: PBBFAC,,,

## 2023-07-25 PROCEDURE — 99211 OFF/OP EST MAY X REQ PHY/QHP: CPT | Mod: PBBFAC

## 2023-07-25 PROCEDURE — 92567 TYMPANOMETRY: CPT | Mod: PBBFAC

## 2023-07-25 PROCEDURE — 99999 PR PBB SHADOW E&M-EST. PATIENT-LVL III: CPT | Mod: PBBFAC,,, | Performed by: OTOLARYNGOLOGY

## 2023-07-25 PROCEDURE — 92557 COMPREHENSIVE HEARING TEST: CPT | Mod: PBBFAC

## 2023-07-25 PROCEDURE — 99213 OFFICE O/P EST LOW 20 MIN: CPT | Mod: PBBFAC,27 | Performed by: OTOLARYNGOLOGY

## 2023-07-25 NOTE — PROGRESS NOTES
Nilesh Rolon Jr., a 48 y.o. male, was seen today in the clinic for an audiologic evaluation.  The patient's main complaint was hearing loss.  Mr. Rolon reported having trouble understanding speech; specifically when background noise is present. This worsens when he has episodes of aural fullness and tinnitus in both ears. He denied otalgia.     Tympanometry revealed Type A in the right ear and Type Ad in the left ear. Audiogram results revealed a mild sloping to moderately-severe high frequency sensorineural hearing loss in the right ear and a severe high frequency sensorineural hearing loss in the left ear.  Speech reception thresholds were noted at 20 dB in the right ear and 20 dB in the left ear.  Speech discrimination scores were 100% in the right ear and 96% in the left ear.    Recommendations:  Otologic evaluation  Annual audiogram  Hearing protection when in noise

## 2023-07-26 ENCOUNTER — PATIENT MESSAGE (OUTPATIENT)
Dept: ORTHOPEDICS | Facility: CLINIC | Age: 48
End: 2023-07-26

## 2023-07-26 ENCOUNTER — OFFICE VISIT (OUTPATIENT)
Dept: ORTHOPEDICS | Facility: CLINIC | Age: 48
End: 2023-07-26
Payer: MEDICAID

## 2023-07-26 VITALS
BODY MASS INDEX: 29.83 KG/M2 | WEIGHT: 239.88 LBS | DIASTOLIC BLOOD PRESSURE: 80 MMHG | HEART RATE: 60 BPM | HEIGHT: 75 IN | SYSTOLIC BLOOD PRESSURE: 119 MMHG

## 2023-07-26 DIAGNOSIS — Z98.890 S/P RIGHT ROTATOR CUFF REPAIR: Primary | ICD-10-CM

## 2023-07-26 PROCEDURE — 3008F PR BODY MASS INDEX (BMI) DOCUMENTED: ICD-10-PCS | Mod: CPTII,,, | Performed by: ORTHOPAEDIC SURGERY

## 2023-07-26 PROCEDURE — 99213 OFFICE O/P EST LOW 20 MIN: CPT | Mod: S$PBB,,, | Performed by: ORTHOPAEDIC SURGERY

## 2023-07-26 PROCEDURE — 3074F SYST BP LT 130 MM HG: CPT | Mod: CPTII,,, | Performed by: ORTHOPAEDIC SURGERY

## 2023-07-26 PROCEDURE — 3079F DIAST BP 80-89 MM HG: CPT | Mod: CPTII,,, | Performed by: ORTHOPAEDIC SURGERY

## 2023-07-26 PROCEDURE — 3008F BODY MASS INDEX DOCD: CPT | Mod: CPTII,,, | Performed by: ORTHOPAEDIC SURGERY

## 2023-07-26 PROCEDURE — 1159F PR MEDICATION LIST DOCUMENTED IN MEDICAL RECORD: ICD-10-PCS | Mod: CPTII,,, | Performed by: ORTHOPAEDIC SURGERY

## 2023-07-26 PROCEDURE — 99999 PR PBB SHADOW E&M-EST. PATIENT-LVL III: CPT | Mod: PBBFAC,,, | Performed by: ORTHOPAEDIC SURGERY

## 2023-07-26 PROCEDURE — 3074F PR MOST RECENT SYSTOLIC BLOOD PRESSURE < 130 MM HG: ICD-10-PCS | Mod: CPTII,,, | Performed by: ORTHOPAEDIC SURGERY

## 2023-07-26 PROCEDURE — 99999 PR PBB SHADOW E&M-EST. PATIENT-LVL III: ICD-10-PCS | Mod: PBBFAC,,, | Performed by: ORTHOPAEDIC SURGERY

## 2023-07-26 PROCEDURE — 99213 OFFICE O/P EST LOW 20 MIN: CPT | Mod: PBBFAC,PN | Performed by: ORTHOPAEDIC SURGERY

## 2023-07-26 PROCEDURE — 1159F MED LIST DOCD IN RCRD: CPT | Mod: CPTII,,, | Performed by: ORTHOPAEDIC SURGERY

## 2023-07-26 PROCEDURE — 3079F PR MOST RECENT DIASTOLIC BLOOD PRESSURE 80-89 MM HG: ICD-10-PCS | Mod: CPTII,,, | Performed by: ORTHOPAEDIC SURGERY

## 2023-07-26 PROCEDURE — 99213 PR OFFICE/OUTPT VISIT, EST, LEVL III, 20-29 MIN: ICD-10-PCS | Mod: S$PBB,,, | Performed by: ORTHOPAEDIC SURGERY

## 2023-07-26 RX ORDER — CEFDINIR 300 MG/1
300 CAPSULE ORAL EVERY 12 HOURS
COMMUNITY
Start: 2023-06-11

## 2023-07-26 RX ORDER — TRAMADOL HYDROCHLORIDE 50 MG/1
50 TABLET ORAL EVERY 6 HOURS PRN
COMMUNITY
Start: 2023-02-06

## 2023-07-26 RX ORDER — CIPROFLOXACIN AND DEXAMETHASONE 3; 1 MG/ML; MG/ML
SUSPENSION/ DROPS AURICULAR (OTIC)
COMMUNITY
Start: 2023-06-12

## 2023-07-26 NOTE — PROGRESS NOTES
[facility], Orthopedics and Sports Medicine  [facility]    Established Patient Shoulder Office Visit  07/26/2023     Diagnosis:  Right shoulder pain  High Grade Partial Rotator Cuff Tear  Biceps Tendonitis  Subacromial Impingement  Glenohumeral Osteoarthritis     Procedure:  10/4/22  Arthroscopic Rotator Cuff Repair   Open Biceps Tenodesis   Arthroscopic Shoulder Debridement - Extensive   Manipulation under anesthesia   Comprehensive arthroscopic management Right shoulder          Subjective:      Nilesh Rolon Jr. is a 48 y.o. male who returns for follow up treatment of the right shoulder problem.  He is now about 9 months s/p his above shoulder surgery.  He is doing well overall and pain much better than before surgery. He does report felt a pop early in post op course, anterior shoulder, during therapy.  He now has a dave deformity of the right biceps but is not an issue for the patient.      Outside reports reviewed: historical medical records and office notes.    Past Medical History:   Diagnosis Date    DONG (acute kidney injury) 12/1/2021    Anxiety     Arthritis     Cirrhosis     Hypertension     Liver disease     Osteoarthritis     Other meniscus derangements, other medial meniscus, left knee 1/7/2019       Patient Active Problem List   Diagnosis    Ascites    Thrombocytopenia    History of alcohol abuse    Gastrointestinal hemorrhage    Hx of colonic polyps    History of GI bleed    Alcoholic cirrhosis of liver without ascites    Primary osteoarthritis of left knee    Primary osteoarthritis of right shoulder    Pain of left upper extremity    Debility    Stiffness in joint    Class 1 obesity due to excess calories with serious comorbidity and body mass index (BMI) of 33.0 to 33.9 in adult    Elevated INR    Hemorrhoids    Candidiasis of esophagus with fungal esophagitis     Anemia    Tear of right supraspinatus tendon    Biceps tendinitis of right upper extremity    Decreased  range of motion of right shoulder    Weakness of shoulder       Past Surgical History:   Procedure Laterality Date    ARTHROSCOPY OF KNEE Left 1/7/2019    Procedure: ARTHROSCOPY, KNEE;  Surgeon: Derick Kirby MD;  Location: [facility];  Service: Orthopedics;  Laterality: Left;    COLONOSCOPY N/A 7/27/2020    Procedure: COLONOSCOPY;  Surgeon: Sotero Zapata MD;  Location: [facility];  Service: Endoscopy;  Laterality: N/A;    COLONOSCOPY N/A 1/20/2022    Procedure: COLONOSCOPY Suprep Vaccinated;  Surgeon: Jacob Andrea MD;  Location: [facility];  Service: Endoscopy;  Laterality: N/A;    DEBRIDEMENT Right 10/4/2022    Procedure: EXTENSIVE DEBRIDEMENT;  Surgeon: Ernesto Diego IV, MD;  Location: [facility];  Service: Orthopedics;  Laterality: Right;    ESOPHAGOGASTRODUODENOSCOPY N/A 4/8/2019    Procedure: EGD (ESOPHAGOGASTRODUODENOSCOPY);  Surgeon: Bonita Kendall MD;  Location: [facility];  Service: Endoscopy;  Laterality: N/A;    ESOPHAGOGASTRODUODENOSCOPY N/A 7/27/2020    Procedure: EGD (ESOPHAGOGASTRODUODENOSCOPY);  Surgeon: Sotero Zapata MD;  Location: [facility];  Service: Endoscopy;  Laterality: N/A;    ESOPHAGOGASTRODUODENOSCOPY N/A 12/2/2021    Procedure: EGD (ESOPHAGOGASTRODUODENOSCOPY);  Surgeon: Montez Guerra MD;  Location: [address]);  Service: Endoscopy;  Laterality: N/A;    ESOPHAGOGASTRODUODENOSCOPY N/A 1/20/2022    Procedure: EGD (ESOPHAGOGASTRODUODENOSCOPY);  Surgeon: Jacob Andrea MD;  Location: [facility];  Service: Endoscopy;  Laterality: N/A;  please order CBC with plts and INR day of case.    KNEE SURGERY      REPAIR OF BONE  10/4/2022    Procedure: HUMORAL HEAD OSTEOPLASTY;  Surgeon: Ernesto Diego IV, MD;  Location: [facility];  Service: Orthopedics;;    SHOULDER ARTHROSCOPY Right 10/4/2022    Procedure: ARTHROSCOPY, SHOULDER; rotator cuff repair, mini-open biceps tenodesis with CAM procedure,;  Surgeon: Ernesto Diego IV, MD;  Location: [facility];  Service: Orthopedics;   Laterality: Right;  Beachchair position, spider arm cordova, arthrex suture anchors, arthrex biceps tenodesis button, Dr. Diego special Kandice retractor set  Have available depuy/synthes [place] straight suture passer          Current Outpatient Medications   Medication Instructions    cefdinir (OMNICEF) 300 mg, Oral, Every 12 hours    cholecalciferol (vitamin D3) 50,000 Units, Oral, Every 7 days    CIPRODEX 0.3-0.1 % DrpS Both Ears    clotrimazole-betamethasone 1-0.05% (LOTRISONE) cream SMARTSIG:Sparingly Topical Daily    cyclobenzaprine (FLEXERIL) 10 mg, Oral, Nightly    doxycycline (VIBRA-TABS) 100 mg, Oral, Daily    ELIDEL 1 % cream Topical (Top), 2 times daily    FeroSuL 325 mg, Oral, 2 times daily    folic acid (FOLVITE) 1 mg, Oral, Daily    gabapentin (NEURONTIN) 300 mg, Oral, Nightly    ibuprofen (ADVIL,MOTRIN) 800 mg, Oral, Every 6 hours PRN    ketoconazole (NIZORAL) 2 % cream 1 application , Topical (Top), Daily    lactulose (CHRONULAC) 10 gram/15 mL solution Take 30 mLs (20 g total) by mouth 2 (two) times daily. Goal of 3-4 bowel movements daily    multivitamin Tab 1 tablet, Oral, Daily    pantoprazole (PROTONIX) 40 mg, Oral, Daily    traMADoL (ULTRAM) 50 mg, Oral, Every 6 hours PRN        Review of patient's allergies indicates:  No Known Allergies    Social History     Socioeconomic History    Marital status: Single   Tobacco Use    Smoking status: Never    Smokeless tobacco: Never    Tobacco comments:     smokes Marijuana only   Substance and Sexual Activity    Alcohol use: Not Currently     Comment: last drink 11/28, drinks 3-4 a day    Drug use: No    Sexual activity: Yes     Partners: Female       Family History   Problem Relation Age of Onset    Birth defects Neg Hx          Review of Systems   Constitutional: Negative for chills and fever.   HENT:  Negative for hearing loss.    Eyes:  Negative for blurred vision.   Cardiovascular:  Negative for chest pain.   Respiratory:  Negative for  shortness of breath.    Gastrointestinal:  Negative for abdominal pain.   Neurological:  Negative for light-headedness.      Objective:      General    Nursing note and vitals reviewed.  Constitutional: He is oriented to person, place, and time. He appears well-developed and well-nourished. No distress.   HENT:   Head: Normocephalic and atraumatic.   Eyes: Pupils are equal, round, and reactive to light.   Cardiovascular:  Normal rate and regular rhythm.            Pulmonary/Chest: Effort normal and breath sounds normal. No respiratory distress.   Neurological: He is alert and oriented to person, place, and time.   Psychiatric: He has a normal mood and affect. His behavior is normal.         Right Shoulder Exam     Inspection/Observation   Swelling: absent  Bruising: absent  Atrophy: absent    Tenderness   The patient is experiencing no tenderness.    Range of Motion   Active abduction:  110   Forward Flexion:  120   External Rotation 0 degrees:  40   Internal rotation 0 degrees:  L3     Tests & Signs   Drop arm: negative  Belly Press: negative    Other   Sensation: normal    Left Shoulder Exam     Inspection/Observation   Swelling: absent  Bruising: absent  Atrophy: absent    Tenderness   The patient is experiencing no tenderness.     Range of Motion   Active abduction:  170 normal   Forward Flexion:  170 normal   External Rotation 0 degrees:  50 normal   Internal rotation 0 degrees:  T8 normal     Tests & Signs   Drop arm: negative  Vo test: negative  Impingement: negative  Belly Press: negative  Active Compression test ([place]'s Sign): negative  Speed's Test: negative    Other   Sensation: normal     Comments:  Ginny test- negative      Muscle Strength   Right Upper Extremity   Shoulder Abduction: 5/5   Shoulder Internal Rotation: 5/5   Shoulder External Rotation: 5/5   Biceps: 5/5   Left Upper Extremity  Shoulder Abduction: 5/5   Shoulder Internal Rotation: 5/5   Shoulder External Rotation: 5/5   Biceps: 5/5      Reflexes     Left Side  Biceps:  2+  Triceps:  2+  Brachioradialis:  2+  Left Damian's Sign:  Absent    Right Side   Biceps:  2+  Triceps:  2+  Brachioradialis:  2+  Right Damian's Sign:  absent    Vascular Exam     Right Pulses      Radial:                    2+      Left Pulses      Radial:                    2+      Imaging:  None     Procedures      Assessment:       Nilesh Rolon Jr. is a 48 y.o. male seen in the office today. The encounter diagnosis was S/P right rotator cuff repair.  Non-operative treatment is recommended at this time. Continue home exercise program. The natural history and expected course discussed with patient. Various treatment options were discussed, including their risks and benefits. All of the patient's questions were answered.     Plan:      Tylenol 650mg TID, PRN pain.  Continue home exercise program  No restrictions, activity as tolerated  Follow up as needed only          Ernesto Diego IV, MD   of Clinical Orthopedics  Department of Orthopedic Surgery  [facility]  Office: [phone]  Website: [email or web address]

## 2023-07-27 ENCOUNTER — CLINICAL SUPPORT (OUTPATIENT)
Dept: REHABILITATION | Facility: HOSPITAL | Age: 48
End: 2023-07-27
Payer: MEDICAID

## 2023-07-27 DIAGNOSIS — M25.611 DECREASED RANGE OF MOTION OF RIGHT SHOULDER: Primary | ICD-10-CM

## 2023-07-27 DIAGNOSIS — R29.898 WEAKNESS OF SHOULDER: ICD-10-CM

## 2023-07-27 PROCEDURE — 97110 THERAPEUTIC EXERCISES: CPT | Mod: PN

## 2023-07-27 NOTE — PROGRESS NOTES
"[facility]   Physical Therapy Treatment Note      Name: Nilesh Rolon Jr.  Clinic Number: [id number]    Therapy Diagnosis:   Encounter Diagnoses   Name Primary?    Decreased range of motion of right shoulder Yes    Weakness of shoulder      Physician: Ernesto Diego IV, MD    Visit Date: 7/27/2023       Physician Orders: PT Eval and Treat   Medical Diagnosis from Referral:   M75.101 (ICD-10-CM) - Tear of right supraspinatus tendon   M75.21 (ICD-10-CM) - Biceps tendinitis of right upper extremity   M19.011 (ICD-10-CM) - Primary osteoarthritis of right shoulder      Evaluation Date: 7/21/2023  Authorization Period Expiration: 8/21/2023  Plan of Care Expiration: 8/18/2023  Progress Note Due: 8/4/2023  Visit # / Visits authorized: 1/20 (+ 2)  FOTO: 1/5     Precautions: Standard      Time In: 7:05 am   Time Out: 8:00 am  Total Billable Time: 55 minutes (4 TE)      PTA Visit #: 0/5       Subjective     Pt reports: improved pain levels, just some soreness. Had follow up with Dr. Diego who said to continue conservative treatment.    He was not provided home exercise program.  Response to previous treatment: ongoing   Functional change: ongoing     Pain: 3/10  Location: right shoulder      Objective      Objective Measures updated at progress report unless specified.     Treatment     Nilesh received the treatments listed below:      therapeutic exercises to develop strength, ROM, and flexibility for 45 minutes including:    UBE: level 4 - 3'/3'  Lat stretch at free motion: max weight - 5x20"   Wall slides Flexion/abduction: 15x5" right  Push ups at ballet bar: 3x10  Shoulder taps: 25x  Pulleys: 2 minute (flexion)  Wall circles: 25x CW/CCW  90-90 ER: 3# -3x10  Serratus punch: 5# - 3x10  Side-lying ER: 4# - 3x10  Prone rows: 5# - 3x10 right   Prone T's: 3x10 bilateral    manual therapy techniques: Joint mobilizations and Soft tissue Mobilization were applied to the: right shoulder for 10 " minutes, including:  AP glenohumeral mobilizations- grade II-III  Scapulothoracic mobilization with end-range shoulder flexion  Passive ranging into flexion/abduction/external rotation     Patient Education and Home Exercises       Education provided:   - compliance with HEP    Written Home Exercises Provided: yes. Exercises were reviewed and Nilesh was able to demonstrate them prior to the end of the session.  Nilesh demonstrated good  understanding of the education provided. See EMR under Patient Instructions for exercises provided during therapy sessions    Assessment     Nilesh is a 48 y.o. male referred to outpatient Physical Therapy with a medical diagnosis of M75.101 (ICD-10-CM) - Tear of right supraspinatus tendon, M75.21 (ICD-10-CM) - Biceps tendinitis of right upper extremity, and M19.011 (ICD-10-CM) - Primary osteoarthritis of right shoulder. High focus on shoulder stabilization and introduction to weight-bearing single arm activities. Added AP mobilization for pain relief and passive stretching for tissue extensibility. Quick fatigue with prone periscapular strengthening. Overall, good tolerance to session and suspect cuff soreness.      Nilesh Is progressing well towards his goals.   Pt prognosis is Good.     Pt will continue to benefit from skilled outpatient physical therapy to address the deficits listed in the problem list box on initial evaluation, provide pt/family education and to maximize pt's level of independence in the home and community environment.     Pt's spiritual, cultural and educational needs considered and pt agreeable to plan of care and goals.     Anticipated barriers to physical therapy: dave's sign, hx of RTC repair, osteoarthritis       Goals:   Short Term Goals (2 Weeks):   1. Patient will be compliant with home exercise program to assist therapy in restoring pain free motion of the shoulder.   2. Patient will improve impaired shoulder manual muscle tests 1/2 grade  bilaterally to improve strength for functional tasks.  3. Patient will improve right shoulder flexion >/= 10 degrees to improve functional mobility of upper extremities.  4. Patient will improve right shoulder abduction >/= 10 degrees to improve functional mobility of upper extremities.      Long Term Goals (4 Weeks):   1. Patient will improve FOTO score to </= 27% to demonstrate improvements in carrying, moving, and handling objects  2. Patient will improve impaired shoulder manual muscle tests 1 grade bilaterally to improve strength for household duties.  3. Patient will improve right shoulder flexion to >/= 140 degrees to improve functional mobility of upper extremities.   4. Patient will improve right shoulder abduction to >/= 140 degrees to improve functional mobility of upper extremities.  5. Patient will improver lower trap manual muscle tests to 4/5 to improve overhead reach tasks.  6. Patient will be able to shoot basketball hoop with daughter.     Plan     Right shoulder overhead motion >120 degrees  serratus + middle/lower trapezius couple force     Maame Cheung, PT

## 2023-07-28 ENCOUNTER — HOSPITAL ENCOUNTER (OUTPATIENT)
Dept: RADIOLOGY | Facility: HOSPITAL | Age: 48
Discharge: HOME | End: 2023-07-28
Attending: INTERNAL MEDICINE
Payer: MEDICAID

## 2023-07-28 DIAGNOSIS — K70.31 ALCOHOLIC CIRRHOSIS OF LIVER WITH ASCITES: ICD-10-CM

## 2023-07-28 PROCEDURE — 76700 US EXAM ABDOM COMPLETE: CPT | Mod: TC

## 2023-07-28 PROCEDURE — 76700 US EXAM ABDOM COMPLETE: CPT | Mod: 26,,, | Performed by: RADIOLOGY

## 2023-07-28 PROCEDURE — 76700 US ABDOMEN COMPLETE: ICD-10-PCS | Mod: 26,,, | Performed by: RADIOLOGY

## 2023-07-29 NOTE — PROGRESS NOTES
Subjective     Patient ID: Nilesh Rolon Jr. is a 48 y.o. male.    Chief Complaint: Hearing Loss (Per patient was prescribed antibiotics and stopped taking them once he felt better. Patient informed to always finish course of antibiotics to make sure that infection is clear. Patient verbalized understanding.)    Hearing Loss: No fever.       Nilesh Rolon Jr. is a 48 y.o. male here for evaluation after recent ear infection.  Treated by  with antibiotics.  Feels ear is better.  Denies hx of chronic ear issues.  Does report noise exposure.  Review of Systems   Constitutional:  Negative for chills and fever.   HENT:  Positive for hearing loss. Negative for sore throat and trouble swallowing.    Respiratory:  Negative for apnea and chest tightness.    Cardiovascular:  Negative for chest pain.          Objective     Physical Exam  Vitals and nursing note reviewed.   Constitutional:       Appearance: Normal appearance.   HENT:      Head: Normocephalic and atraumatic.      Right Ear: Tympanic membrane, ear canal and external ear normal. There is no impacted cerumen.      Left Ear: Tympanic membrane, ear canal and external ear normal. There is no impacted cerumen.   Neurological:      Mental Status: He is alert.          Data Reviewed:      Audiogram tracings independently reviewed and discussed with patient show's  bilateral high freq SNHL       Assessment and Plan     1. Acute infection of left ear    2. Bilateral high frequency sensorineural hearing loss        Infection resolved  Reviewed hearing loss  Recommend noise protection         No follow-ups on file.

## 2023-08-10 NOTE — PROGRESS NOTES
"[facility]   Physical Therapy Treatment Note      Name: Nilesh Rolon Jr.  Clinic Number: [id number]    Therapy Diagnosis:   Encounter Diagnoses   Name Primary?    Decreased range of motion of right shoulder Yes    Weakness of shoulder          Physician: Ernesto Diego IV, MD    Visit Date: 8/11/2023       Physician Orders: PT Eval and Treat   Medical Diagnosis from Referral:   M75.101 (ICD-10-CM) - Tear of right supraspinatus tendon   M75.21 (ICD-10-CM) - Biceps tendinitis of right upper extremity   M19.011 (ICD-10-CM) - Primary osteoarthritis of right shoulder      Evaluation Date: 7/21/2023  Authorization Period Expiration: 8/21/2023  Plan of Care Expiration: 8/18/2023  Progress Note Due: 8/4/2023  Visit # / Visits authorized: 2/20 (+ 2)  FOTO: 2/5     Precautions: Standard      Time In: 7:05 am   Time Out: 8:00 am  Total Billable Time: 55 minutes (4 TE)      PTA Visit #: 1/5       Subjective     Pt reports: improved pain levels, just some soreness. Had follow up with Dr. Diego who said to continue conservative treatment.    He was not provided home exercise program.  Response to previous treatment: ongoing   Functional change: ongoing     Pain: 3/10  Location: right shoulder      Objective      Objective Measures updated at progress report unless specified.     Treatment     Nilesh received the treatments listed below:      therapeutic exercises to develop strength, ROM, and flexibility for 35 minutes including:    UBE: level 4 - 3'/3'   Lat stretch at free motion: max weight - 5x20"   Wall slides Flexion/abduction: 15x5" right  Push ups at ballet bar: 3x10  Shoulder taps: 25x  Pulleys: 2 minute (flexion)  Wall circles: 25x CW/CCW  90-90 ER: 3# -3x10  Serratus punch: 5# - 3x10  Side-lying ER: 4# - 3x10  Prone rows: 5# - 3x10 right   Prone T's: 3x10 bilateral    manual therapy techniques: Joint mobilizations and Soft tissue Mobilization were applied to the: right shoulder for 20 " minutes, including:  AP glenohumeral mobilizations- grade II-III  Scapulothoracic mobilization with end-range shoulder flexion  Passive ranging into flexion/abduction/external rotation   Upper trap static pressure and follow to release  Subscapularis release    Patient Education and Home Exercises       Education provided:   - compliance with HEP    Written Home Exercises Provided: yes. Exercises were reviewed and Nilesh was able to demonstrate them prior to the end of the session.  Nilesh demonstrated good  understanding of the education provided. See EMR under Patient Instructions for exercises provided during therapy sessions    Assessment     Nilesh is a 48 y.o. male referred to outpatient Physical Therapy with a medical diagnosis of M75.101 (ICD-10-CM) - Tear of right supraspinatus tendon, M75.21 (ICD-10-CM) - Biceps tendinitis of right upper extremity, and M19.011 (ICD-10-CM) - Primary osteoarthritis of right shoulder. High focus on shoulder stabilization and introduction to weight-bearing single arm activities. Added AP mobilization for pain relief and passive stretching for tissue extensibility. Quick fatigue with prone periscapular strengthening. Overall, good tolerance to session and suspect cuff soreness.      Nilesh Is progressing well towards his goals.   Pt prognosis is Good.     Pt will continue to benefit from skilled outpatient physical therapy to address the deficits listed in the problem list box on initial evaluation, provide pt/family education and to maximize pt's level of independence in the home and community environment.     Pt's spiritual, cultural and educational needs considered and pt agreeable to plan of care and goals.     Anticipated barriers to physical therapy: dave's sign, hx of RTC repair, osteoarthritis       Goals:   Short Term Goals (2 Weeks):   1. Patient will be compliant with home exercise program to assist therapy in restoring pain free motion of the shoulder.   2.  Patient will improve impaired shoulder manual muscle tests 1/2 grade bilaterally to improve strength for functional tasks.  3. Patient will improve right shoulder flexion >/= 10 degrees to improve functional mobility of upper extremities.  4. Patient will improve right shoulder abduction >/= 10 degrees to improve functional mobility of upper extremities.      Long Term Goals (4 Weeks):   1. Patient will improve FOTO score to </= 27% to demonstrate improvements in carrying, moving, and handling objects  2. Patient will improve impaired shoulder manual muscle tests 1 grade bilaterally to improve strength for household duties.  3. Patient will improve right shoulder flexion to >/= 140 degrees to improve functional mobility of upper extremities.   4. Patient will improve right shoulder abduction to >/= 140 degrees to improve functional mobility of upper extremities.  5. Patient will improver lower trap manual muscle tests to 4/5 to improve overhead reach tasks.  6. Patient will be able to shoot basketball hoop with daughter.     Plan     Right shoulder overhead motion >120 degrees  serratus + middle/lower trapezius couple force     Jairo Bojorquez PTA

## 2023-08-11 ENCOUNTER — CLINICAL SUPPORT (OUTPATIENT)
Dept: REHABILITATION | Facility: HOSPITAL | Age: 48
End: 2023-08-11
Payer: MEDICAID

## 2023-08-11 DIAGNOSIS — R29.898 WEAKNESS OF SHOULDER: ICD-10-CM

## 2023-08-11 DIAGNOSIS — M25.611 DECREASED RANGE OF MOTION OF RIGHT SHOULDER: Primary | ICD-10-CM

## 2023-08-11 PROCEDURE — 97110 THERAPEUTIC EXERCISES: CPT | Mod: PN,CQ

## 2023-08-15 ENCOUNTER — CLINICAL SUPPORT (OUTPATIENT)
Dept: REHABILITATION | Facility: HOSPITAL | Age: 48
End: 2023-08-15
Payer: MEDICAID

## 2023-08-15 DIAGNOSIS — M25.611 DECREASED RANGE OF MOTION OF RIGHT SHOULDER: Primary | ICD-10-CM

## 2023-08-15 DIAGNOSIS — R29.898 WEAKNESS OF SHOULDER: ICD-10-CM

## 2023-08-15 PROCEDURE — 97110 THERAPEUTIC EXERCISES: CPT | Mod: PN

## 2023-08-15 NOTE — PROGRESS NOTES
[facility]   Physical Therapy Treatment Note / Discharge      Name: Nilesh Rolon Jr.  Clinic Number: [id number]    Therapy Diagnosis:   Encounter Diagnoses   Name Primary?    Decreased range of motion of right shoulder Yes    Weakness of shoulder      Physician: Ernesto Diego IV, MD    Visit Date: 8/15/2023       Physician Orders: PT Eval and Treat   Medical Diagnosis from Referral:   M75.101 (ICD-10-CM) - Tear of right supraspinatus tendon   M75.21 (ICD-10-CM) - Biceps tendinitis of right upper extremity   M19.011 (ICD-10-CM) - Primary osteoarthritis of right shoulder      Evaluation Date: 7/21/2023  Authorization Period Expiration: 8/21/2023  Plan of Care Expiration: 8/18/2023  Progress Note Due: 8/4/2023  Visit # / Visits authorized: 32/45 (+ 2)  FOTO: completed      Precautions: Standard      Time In: 7:10 am   Time Out: 7:45 am  Total Billable Time: 35 minutes (2 TE)      PTA Visit #: 1/5       Subjective     Pt reports: has noticed much better improvements since strengthening with therbands versus free weights.    He was not provided home exercise program.  Response to previous treatment: ongoing   Functional change: improved pain levels and mobility.     Pain: 1/10  Location: right shoulder      Objective      Objective Measures updated at progress report unless specified.     Range of Motion: seated     Left Right   Shoulder Flexion P: 175    A: 170 P: 136     A: 122 -- 135   Shoulder Abduction P: 180    A: 175 P: 120--140    A: 110 -- 135   Shoulder ER P: 75    A: 70 P: 58 -- 75   A: 52 -- 70   Shoulder IR P: 80    A: 75 P: 65 -- 75    A: 60 -- 66     Upper Extremity Strength  *=pain    right  left  Notes   Shoulder flexion 4+/5 5/5     Shoulder abduction 4+/5 5/5     Shoulder external rotation  4/5 4/5     Shoulder internal rotation  4+/5 5/5     Elbow flexion 5/5 5/5     Elbow extension 5/5 5/5      UE Right Left   Lower Trap 3+/5 4-/5   Middle Trap 4+/5 4+/5   Rhomboids  4+/5 4+/5        Treatment     Nilesh received the treatments listed below:      Therapeutic activities to improve functional performance for 35 minutes, including:  FOTO  Objective tests and measures  Outcome measures  Home exercise program overview   Questions and answers   Discharge     Patient Education and Home Exercises       Education provided:   - compliance with HEP    Written Home Exercises Provided: yes. Exercises were reviewed and Nilesh was able to demonstrate them prior to the end of the session.  Nilesh demonstrated good  understanding of the education provided. See EMR under Patient Instructions for exercises provided during therapy sessions    Assessment     Nilesh is a 48 y.o. male referred to outpatient Physical Therapy with a medical diagnosis of M75.101 (ICD-10-CM) - Tear of right supraspinatus tendon, M75.21 (ICD-10-CM) - Biceps tendinitis of right upper extremity, and M19.011 (ICD-10-CM) - Primary osteoarthritis of right shoulder. Presents to therapy ready for discharge as he feels he can complete therex at home independently. Patient performing shoulder strengthening with therabands versus free weights, which has decreased pain levels in biceps and shoulder. Provided updated home exercise program and contact information. Discharged at this time.    Nilesh Is progressing well towards his goals.   Pt prognosis is Good.     Pt will continue to benefit from skilled outpatient physical therapy to address the deficits listed in the problem list box on initial evaluation, provide pt/family education and to maximize pt's level of independence in the home and community environment.     Pt's spiritual, cultural and educational needs considered and pt agreeable to plan of care and goals.     Anticipated barriers to physical therapy: dave's sign, hx of RTC repair, osteoarthritis       Goals:   Short Term Goals (2 Weeks):   1. Patient will be compliant with home exercise program to assist therapy in  restoring pain free motion of the shoulder. MET 8/15/2023  2. Patient will improve impaired shoulder manual muscle tests 1/2 grade bilaterally to improve strength for functional tasks. MET 8/15/2023  3. Patient will improve right shoulder flexion >/= 10 degrees to improve functional mobility of upper extremities. MET 8/15/2023  4. Patient will improve right shoulder abduction >/= 10 degrees to improve functional mobility of upper extremities. MET 8/15/2023     Long Term Goals (4 Weeks):   1. Patient will improve FOTO score to </= 27% to demonstrate improvements in carrying, moving, and handling objects.  NOT MET 8/15/2023  2. Patient will improve impaired shoulder manual muscle tests 1 grade bilaterally to improve strength for household duties. MET 8/15/2023  3. Patient will improve right shoulder flexion to >/= 140 degrees to improve functional mobility of upper extremities. PARTIALLY MET (135 DEGREES) 8/15/2023  4. Patient will improve right shoulder abduction to >/= 140 degrees to improve functional mobility of upper extremities. PARTIALLY MET (135 DEGREES) 8/15/2023  5. Patient will improver lower trap manual muscle tests to 4/5 to improve overhead reach tasks. NOT MET 8/15/2023  6. Patient will be able to shoot basketball hoop with daughter. MET 8/15/2023    Plan     discharge    Maame Cheung, PT

## 2023-09-26 ENCOUNTER — OFFICE VISIT (OUTPATIENT)
Dept: HEPATOLOGY | Facility: CLINIC | Age: 48
End: 2023-09-26
Attending: INTERNAL MEDICINE
Payer: MEDICAID

## 2023-09-26 VITALS
DIASTOLIC BLOOD PRESSURE: 74 MMHG | RESPIRATION RATE: 16 BRPM | HEIGHT: 74 IN | HEART RATE: 68 BPM | TEMPERATURE: 97 F | BODY MASS INDEX: 29.79 KG/M2 | OXYGEN SATURATION: 97 % | SYSTOLIC BLOOD PRESSURE: 110 MMHG | WEIGHT: 232.13 LBS

## 2023-09-26 DIAGNOSIS — K70.30 ALCOHOLIC CIRRHOSIS OF LIVER WITHOUT ASCITES: Primary | ICD-10-CM

## 2023-09-26 PROCEDURE — 99999 PR PBB SHADOW E&M-EST. PATIENT-LVL IV: ICD-10-PCS | Mod: PBBFAC,,, | Performed by: INTERNAL MEDICINE

## 2023-09-26 PROCEDURE — 1160F RVW MEDS BY RX/DR IN RCRD: CPT | Mod: CPTII,,, | Performed by: INTERNAL MEDICINE

## 2023-09-26 PROCEDURE — 3074F PR MOST RECENT SYSTOLIC BLOOD PRESSURE < 130 MM HG: ICD-10-PCS | Mod: CPTII,,, | Performed by: INTERNAL MEDICINE

## 2023-09-26 PROCEDURE — 1160F PR REVIEW ALL MEDS BY PRESCRIBER/CLIN PHARMACIST DOCUMENTED: ICD-10-PCS | Mod: CPTII,,, | Performed by: INTERNAL MEDICINE

## 2023-09-26 PROCEDURE — 3074F SYST BP LT 130 MM HG: CPT | Mod: CPTII,,, | Performed by: INTERNAL MEDICINE

## 2023-09-26 PROCEDURE — 3078F PR MOST RECENT DIASTOLIC BLOOD PRESSURE < 80 MM HG: ICD-10-PCS | Mod: CPTII,,, | Performed by: INTERNAL MEDICINE

## 2023-09-26 PROCEDURE — 3008F PR BODY MASS INDEX (BMI) DOCUMENTED: ICD-10-PCS | Mod: CPTII,,, | Performed by: INTERNAL MEDICINE

## 2023-09-26 PROCEDURE — 99999 PR PBB SHADOW E&M-EST. PATIENT-LVL IV: CPT | Mod: PBBFAC,,, | Performed by: INTERNAL MEDICINE

## 2023-09-26 PROCEDURE — 1159F PR MEDICATION LIST DOCUMENTED IN MEDICAL RECORD: ICD-10-PCS | Mod: CPTII,,, | Performed by: INTERNAL MEDICINE

## 2023-09-26 PROCEDURE — 3078F DIAST BP <80 MM HG: CPT | Mod: CPTII,,, | Performed by: INTERNAL MEDICINE

## 2023-09-26 PROCEDURE — 99214 OFFICE O/P EST MOD 30 MIN: CPT | Mod: PBBFAC | Performed by: INTERNAL MEDICINE

## 2023-09-26 PROCEDURE — 99213 OFFICE O/P EST LOW 20 MIN: CPT | Mod: S$PBB,,, | Performed by: INTERNAL MEDICINE

## 2023-09-26 PROCEDURE — 3008F BODY MASS INDEX DOCD: CPT | Mod: CPTII,,, | Performed by: INTERNAL MEDICINE

## 2023-09-26 PROCEDURE — 99213 PR OFFICE/OUTPT VISIT, EST, LEVL III, 20-29 MIN: ICD-10-PCS | Mod: S$PBB,,, | Performed by: INTERNAL MEDICINE

## 2023-09-26 PROCEDURE — 1159F MED LIST DOCD IN RCRD: CPT | Mod: CPTII,,, | Performed by: INTERNAL MEDICINE

## 2023-09-26 NOTE — PROGRESS NOTES
HEPATOLOGY FOLLOW UP    Referring Physician:   Current Corresponding Physician:     Nilesh Rolon Jr. is here for follow up of Cirrhosis      HPI  This 48-year-old gentleman has a diagnosis of cirrhosis secondary to history of alcohol use disorder.  He has been abstinent from alcohol for the past 2 years and with this his liver disease has completely recompensated.  Specifically, he has no ascites lower extremity edema or hepatic encephalopathy.  His liver synthetic function is normal.  He has no symptoms of chronic liver disease.  His last ultrasound showed no ascites or focal mass lesions.  Outpatient Encounter Medications as of 9/26/2023   Medication Sig Dispense Refill    cefdinir (OMNICEF) 300 MG capsule Take 300 mg by mouth every 12 (twelve) hours.      cholecalciferol, vitamin D3, 1,250 mcg (50,000 unit) capsule Take 50,000 Units by mouth every 7 days.      CIPRODEX 0.3-0.1 % DrpS Place into both ears.      clotrimazole-betamethasone 1-0.05% (LOTRISONE) cream SMARTSIG:Sparingly Topical Daily      cyclobenzaprine (FLEXERIL) 10 MG tablet Take 10 mg by mouth every evening.      doxycycline (VIBRA-TABS) 100 MG tablet Take 100 mg by mouth once daily.      ELIDEL 1 % cream Apply topically 2 (two) times daily.      ferrous sulfate (FEROSUL) 325 mg (65 mg iron) Tab tablet Take 1 tablet (325 mg total) by mouth 2 (two) times daily. 60 tablet 2    folic acid (FOLVITE) 1 MG tablet Take 1 tablet (1 mg total) by mouth once daily. 30 tablet 5    gabapentin (NEURONTIN) 300 MG capsule Take 1 capsule (300 mg total) by mouth every evening. for 14 doses 14 capsule 0    ibuprofen (ADVIL,MOTRIN) 800 MG tablet Take 800 mg by mouth every 6 (six) hours as needed.      ketoconazole (NIZORAL) 2 % cream Apply 1 application topically once daily.      lactulose (CHRONULAC) 10 gram/15 mL solution Take 30 mLs (20 g total) by mouth 2 (two) times daily. Goal of 3-4 bowel movements daily 1892 mL 6    multivitamin Tab Take 1 tablet by  mouth once daily. 30 tablet 2    pantoprazole (PROTONIX) 40 MG tablet Take 1 tablet (40 mg total) by mouth once daily. 30 tablet 11    traMADoL (ULTRAM) 50 mg tablet Take 50 mg by mouth every 6 (six) hours as needed.       No facility-administered encounter medications on file as of 9/26/2023.     Review of patient's allergies indicates:  No Known Allergies  Past Medical History:   Diagnosis Date    DONG (acute kidney injury) 12/1/2021    Anxiety     Arthritis     Cirrhosis     Hypertension     Liver disease     Osteoarthritis     Other meniscus derangements, other medial meniscus, left knee 1/7/2019       Review of Systems   Constitutional:  Negative for activity change, appetite change, chills, fatigue and unexpected weight change.   HENT:  Negative for congestion, facial swelling and tinnitus.    Eyes:  Negative for visual disturbance.   Respiratory:  Negative for cough, shortness of breath and wheezing.    Cardiovascular:  Negative for chest pain, palpitations and leg swelling.   Gastrointestinal:  Negative for abdominal distention.   Genitourinary:  Negative for dysuria.   Musculoskeletal:  Negative for arthralgias, joint swelling and myalgias.   Neurological:  Negative for syncope and headaches.   Hematological:  Does not bruise/bleed easily.   Psychiatric/Behavioral:  Negative for confusion.      Vitals:    09/26/23 1358   BP: 110/74   Pulse: 68   Resp: 16   Temp: 97 °F (36.1 °C)       Physical Exam  Constitutional:       Appearance: He is well-developed.   Eyes:      General: No scleral icterus.  Cardiovascular:      Rate and Rhythm: Normal rate and regular rhythm.      Heart sounds: Normal heart sounds.   Pulmonary:      Effort: Pulmonary effort is normal. No respiratory distress.      Breath sounds: Normal breath sounds. No wheezing.   Abdominal:      General: Bowel sounds are normal. There is no distension.      Palpations: Abdomen is soft. There is no shifting dullness, fluid wave or mass.       Tenderness: There is no abdominal tenderness. There is no rebound.   Musculoskeletal:         General: Normal range of motion.   Lymphadenopathy:      Cervical: No cervical adenopathy.   Skin:     General: Skin is warm and dry.   Neurological:      Mental Status: He is alert and oriented to person, place, and time.         MELD 3.0: 9 at 7/28/2023  7:21 AM  MELD-Na: 9 at 7/28/2023  7:21 AM  Calculated from:  Serum Creatinine: 1.2 mg/dL at 7/28/2023  7:21 AM  Serum Sodium: 138 mmol/L (Using max of 137 mmol/L) at 7/28/2023  7:21 AM  Total Bilirubin: 0.7 mg/dL (Using min of 1 mg/dL) at 7/28/2023  7:21 AM  Serum Albumin: 4.4 g/dL (Using max of 3.5 g/dL) at 7/28/2023  7:21 AM  INR(ratio): 1.1 at 7/28/2023  7:21 AM  Age at listing (hypothetical): 48 years  Sex: Male at 7/28/2023  7:21 AM      Lab Results   Component Value Date    GLU 91 07/28/2023    BUN 18 07/28/2023    CREATININE 1.2 07/28/2023    CALCIUM 9.9 07/28/2023     07/28/2023    K 4.8 07/28/2023     07/28/2023    PROT 7.6 07/28/2023    CO2 25 07/28/2023    ANIONGAP 8 07/28/2023    WBC 4.51 12/05/2022    WBC 4.51 12/05/2022    RBC 4.47 (L) 12/05/2022    RBC 4.47 (L) 12/05/2022    HGB 14.5 12/05/2022    HGB 14.5 12/05/2022    HCT 41.0 12/05/2022    HCT 41.0 12/05/2022    HCT 31 (L) 11/30/2021    MCV 92 12/05/2022    MCV 92 12/05/2022    MCH 32.4 (H) 12/05/2022    MCH 32.4 (H) 12/05/2022    MCHC 35.4 12/05/2022    MCHC 35.4 12/05/2022     Lab Results   Component Value Date    RDW 13.2 12/05/2022    RDW 13.2 12/05/2022    PLT 67 (L) 12/05/2022    PLT 67 (L) 12/05/2022    MPV 10.1 12/05/2022    MPV 10.1 12/05/2022    GRAN 2.4 12/05/2022    GRAN 52.1 12/05/2022    LYMPH 1.6 12/05/2022    LYMPH 34.8 12/05/2022    MONO 0.4 12/05/2022    MONO 8.2 12/05/2022    EOSINOPHIL 3.8 12/05/2022    BASOPHIL 0.9 12/05/2022    EOS 0.2 12/05/2022    EOS 1 01/26/2022    BASO 0.04 12/05/2022    APTT 29.9 04/07/2019    GROUPTRH A POS 07/23/2020    BNP 52 01/08/2022    CHOL  242 (H) 04/06/2016    TRIG 278 (H) 04/06/2016    HDL 40 04/06/2016    CHOLHDL 16.5 (L) 04/06/2016    TOTALCHOLEST 6.1 (H) 04/06/2016    ALBUMIN 4.4 07/28/2023    BILIDIR 3.7 (H) 07/27/2020    AST 22 07/28/2023    ALT 19 07/28/2023    ALKPHOS 72 07/28/2023    MG 1.8 02/01/2022    LABPROT 11.8 07/28/2023    INR 1.1 07/28/2023    PSA 0.54 04/06/2016       Assessment and Plan:  Patient Active Problem List   Diagnosis    Ascites    Thrombocytopenia    History of alcohol abuse    Gastrointestinal hemorrhage    Hx of colonic polyps    History of GI bleed    Alcoholic cirrhosis of liver without ascites    Primary osteoarthritis of left knee    Primary osteoarthritis of right shoulder    Pain of left upper extremity    Debility    Stiffness in joint    Class 1 obesity due to excess calories with serious comorbidity and body mass index (BMI) of 33.0 to 33.9 in adult    Elevated INR    Hemorrhoids    Candidiasis of esophagus with fungal esophagitis     Anemia    Tear of right supraspinatus tendon    Biceps tendinitis of right upper extremity    Decreased range of motion of right shoulder    Weakness of shoulder     Nilesh Rolon Jr. is a 48 y.o. male withCirrhosis    Impression:  Cirrhosis secondary to a prior history of alcohol use disorder with a current meld score of 9.    Plan:  I encouraged continued abstinence.  He no longer requires diuretics.  He will need blood work and a surveillance ultrasound every 6 months.  He will return to clinic in 1 year's time.

## 2023-11-21 RX ORDER — FERROUS SULFATE 325(65) MG
325 TABLET ORAL 2 TIMES DAILY
Qty: 60 TABLET | Refills: 2 | Status: SHIPPED | OUTPATIENT
Start: 2023-11-21

## 2024-01-22 ENCOUNTER — PATIENT MESSAGE (OUTPATIENT)
Dept: HEPATOLOGY | Facility: CLINIC | Age: 49
End: 2024-01-22
Payer: MEDICAID

## 2024-01-22 ENCOUNTER — HOSPITAL ENCOUNTER (OUTPATIENT)
Dept: RADIOLOGY | Facility: HOSPITAL | Age: 49
Discharge: HOME | End: 2024-01-22
Attending: INTERNAL MEDICINE
Payer: MEDICAID

## 2024-01-22 DIAGNOSIS — K70.31 ALCOHOLIC CIRRHOSIS OF LIVER WITH ASCITES: ICD-10-CM

## 2024-01-22 PROCEDURE — 76705 ECHO EXAM OF ABDOMEN: CPT | Mod: 26,,, | Performed by: STUDENT IN AN ORGANIZED HEALTH CARE EDUCATION/TRAINING PROGRAM

## 2024-01-22 PROCEDURE — 76705 ECHO EXAM OF ABDOMEN: CPT | Mod: TC

## 2024-05-21 ENCOUNTER — PATIENT MESSAGE (OUTPATIENT)
Dept: ORTHOPEDICS | Facility: CLINIC | Age: 49
End: 2024-05-21
Payer: MEDICAID

## 2024-05-21 ENCOUNTER — PATIENT MESSAGE (OUTPATIENT)
Dept: HEPATOLOGY | Facility: CLINIC | Age: 49
End: 2024-05-21
Payer: MEDICAID

## 2024-05-21 DIAGNOSIS — K76.82 HEPATIC ENCEPHALOPATHY: ICD-10-CM

## 2024-05-21 RX ORDER — FOLIC ACID 1 MG/1
1 TABLET ORAL DAILY
Qty: 30 TABLET | Refills: 5 | Status: SHIPPED | OUTPATIENT
Start: 2024-05-21

## 2024-05-21 RX ORDER — LACTULOSE 10 G/15ML
30 SOLUTION ORAL; RECTAL 2 TIMES DAILY
Qty: 1892 ML | Refills: 6 | Status: SHIPPED | OUTPATIENT
Start: 2024-05-21

## 2024-05-21 RX ORDER — PANTOPRAZOLE SODIUM 40 MG/1
40 TABLET, DELAYED RELEASE ORAL DAILY
Qty: 30 TABLET | Refills: 11 | OUTPATIENT
Start: 2024-05-21 | End: 2025-05-21

## 2024-06-12 DIAGNOSIS — K70.30 ALCOHOLIC CIRRHOSIS OF LIVER WITHOUT ASCITES: Primary | ICD-10-CM

## 2024-06-13 ENCOUNTER — TELEPHONE (OUTPATIENT)
Dept: HEPATOLOGY | Facility: CLINIC | Age: 49
End: 2024-06-13
Payer: MEDICAID

## 2024-06-13 DIAGNOSIS — K70.30 ALCOHOLIC CIRRHOSIS OF LIVER WITHOUT ASCITES: Primary | ICD-10-CM

## 2024-06-13 NOTE — TELEPHONE ENCOUNTER
Called the pt and I scheduled his lab and u/s same day on 6/17/24.And I scheduled his follow up visit on 6/20/24

## 2024-06-14 ENCOUNTER — HOSPITAL ENCOUNTER (OUTPATIENT)
Dept: RADIOLOGY | Facility: HOSPITAL | Age: 49
Discharge: HOME | End: 2024-06-14
Attending: INTERNAL MEDICINE
Payer: MEDICAID

## 2024-06-14 DIAGNOSIS — K70.30 ALCOHOLIC CIRRHOSIS OF LIVER WITHOUT ASCITES: ICD-10-CM

## 2024-06-14 PROCEDURE — 76700 US EXAM ABDOM COMPLETE: CPT | Mod: TC

## 2024-06-14 PROCEDURE — 76700 US EXAM ABDOM COMPLETE: CPT | Mod: 26,,, | Performed by: RADIOLOGY

## 2024-06-20 ENCOUNTER — LAB VISIT (OUTPATIENT)
Dept: LAB | Facility: HOSPITAL | Age: 49
End: 2024-06-20
Attending: INTERNAL MEDICINE
Payer: MEDICAID

## 2024-06-20 ENCOUNTER — OFFICE VISIT (OUTPATIENT)
Dept: HEPATOLOGY | Facility: CLINIC | Age: 49
End: 2024-06-20
Attending: INTERNAL MEDICINE
Payer: MEDICAID

## 2024-06-20 VITALS
RESPIRATION RATE: 18 BRPM | HEIGHT: 74 IN | OXYGEN SATURATION: 98 % | WEIGHT: 220.44 LBS | DIASTOLIC BLOOD PRESSURE: 77 MMHG | SYSTOLIC BLOOD PRESSURE: 121 MMHG | TEMPERATURE: 96 F | BODY MASS INDEX: 28.29 KG/M2 | HEART RATE: 78 BPM

## 2024-06-20 DIAGNOSIS — K70.30 ALCOHOLIC CIRRHOSIS OF LIVER WITHOUT ASCITES: Primary | ICD-10-CM

## 2024-06-20 DIAGNOSIS — K70.30 ALCOHOLIC CIRRHOSIS OF LIVER WITHOUT ASCITES: ICD-10-CM

## 2024-06-20 LAB
AFP SERPL-MCNC: 3 NG/ML (ref 0–8.4)
ALBUMIN SERPL BCP-MCNC: 4.1 G/DL (ref 3.5–5.2)
ALP SERPL-CCNC: 54 U/L (ref 55–135)
ALT SERPL W/O P-5'-P-CCNC: 18 U/L (ref 10–44)
ANION GAP SERPL CALC-SCNC: 7 MMOL/L (ref 8–16)
AST SERPL-CCNC: 26 U/L (ref 10–40)
BASOPHILS # BLD AUTO: 0.01 K/UL (ref 0–0.2)
BASOPHILS NFR BLD: 0.3 % (ref 0–1.9)
BILIRUB SERPL-MCNC: 0.8 MG/DL (ref 0.1–1)
BUN SERPL-MCNC: 18 MG/DL (ref 6–20)
CALCIUM SERPL-MCNC: 9.5 MG/DL (ref 8.7–10.5)
CHLORIDE SERPL-SCNC: 106 MMOL/L (ref 95–110)
CHOLEST SERPL-MCNC: 231 MG/DL (ref 120–199)
CHOLEST/HDLC SERPL: 5.6 {RATIO} (ref 2–5)
CO2 SERPL-SCNC: 26 MMOL/L (ref 23–29)
CREAT SERPL-MCNC: 1.3 MG/DL (ref 0.5–1.4)
DIFFERENTIAL METHOD BLD: ABNORMAL
EOSINOPHIL # BLD AUTO: 0.1 K/UL (ref 0–0.5)
EOSINOPHIL NFR BLD: 1.5 % (ref 0–8)
ERYTHROCYTE [DISTWIDTH] IN BLOOD BY AUTOMATED COUNT: 14.1 % (ref 11.5–14.5)
EST. GFR  (NO RACE VARIABLE): >60 ML/MIN/1.73 M^2
GLUCOSE SERPL-MCNC: 92 MG/DL (ref 70–110)
HCT VFR BLD AUTO: 41.1 % (ref 40–54)
HDLC SERPL-MCNC: 41 MG/DL (ref 40–75)
HDLC SERPL: 17.7 % (ref 20–50)
HGB BLD-MCNC: 14.2 G/DL (ref 14–18)
IMM GRANULOCYTES # BLD AUTO: 0.01 K/UL (ref 0–0.04)
IMM GRANULOCYTES NFR BLD AUTO: 0.3 % (ref 0–0.5)
INR PPP: 1.1 (ref 0.8–1.2)
LDLC SERPL CALC-MCNC: 143.6 MG/DL (ref 63–159)
LYMPHOCYTES # BLD AUTO: 1.1 K/UL (ref 1–4.8)
LYMPHOCYTES NFR BLD: 33.5 % (ref 18–48)
MCH RBC QN AUTO: 31 PG (ref 27–31)
MCHC RBC AUTO-ENTMCNC: 34.5 G/DL (ref 32–36)
MCV RBC AUTO: 90 FL (ref 82–98)
MONOCYTES # BLD AUTO: 0.3 K/UL (ref 0.3–1)
MONOCYTES NFR BLD: 7.9 % (ref 4–15)
NEUTROPHILS # BLD AUTO: 1.9 K/UL (ref 1.8–7.7)
NEUTROPHILS NFR BLD: 56.5 % (ref 38–73)
NONHDLC SERPL-MCNC: 190 MG/DL
NRBC BLD-RTO: 0 /100 WBC
PLATELET # BLD AUTO: 66 K/UL (ref 150–450)
PMV BLD AUTO: 9.6 FL (ref 9.2–12.9)
POTASSIUM SERPL-SCNC: 4.3 MMOL/L (ref 3.5–5.1)
PROT SERPL-MCNC: 7.2 G/DL (ref 6–8.4)
PROTHROMBIN TIME: 11.7 SEC (ref 9–12.5)
RBC # BLD AUTO: 4.58 M/UL (ref 4.6–6.2)
SODIUM SERPL-SCNC: 139 MMOL/L (ref 136–145)
TRIGL SERPL-MCNC: 232 MG/DL (ref 30–150)
WBC # BLD AUTO: 3.31 K/UL (ref 3.9–12.7)

## 2024-06-20 PROCEDURE — 3074F SYST BP LT 130 MM HG: CPT | Mod: CPTII,,, | Performed by: INTERNAL MEDICINE

## 2024-06-20 PROCEDURE — 82105 ALPHA-FETOPROTEIN SERUM: CPT | Performed by: INTERNAL MEDICINE

## 2024-06-20 PROCEDURE — 3078F DIAST BP <80 MM HG: CPT | Mod: CPTII,,, | Performed by: INTERNAL MEDICINE

## 2024-06-20 PROCEDURE — 80053 COMPREHEN METABOLIC PANEL: CPT | Performed by: INTERNAL MEDICINE

## 2024-06-20 PROCEDURE — 36415 COLL VENOUS BLD VENIPUNCTURE: CPT | Performed by: INTERNAL MEDICINE

## 2024-06-20 PROCEDURE — 99214 OFFICE O/P EST MOD 30 MIN: CPT | Mod: PBBFAC | Performed by: INTERNAL MEDICINE

## 2024-06-20 PROCEDURE — 85610 PROTHROMBIN TIME: CPT | Performed by: INTERNAL MEDICINE

## 2024-06-20 PROCEDURE — 99999 PR PBB SHADOW E&M-EST. PATIENT-LVL IV: CPT | Mod: PBBFAC,,, | Performed by: INTERNAL MEDICINE

## 2024-06-20 PROCEDURE — 99213 OFFICE O/P EST LOW 20 MIN: CPT | Mod: S$PBB,,, | Performed by: INTERNAL MEDICINE

## 2024-06-20 PROCEDURE — 1159F MED LIST DOCD IN RCRD: CPT | Mod: CPTII,,, | Performed by: INTERNAL MEDICINE

## 2024-06-20 PROCEDURE — 1160F RVW MEDS BY RX/DR IN RCRD: CPT | Mod: CPTII,,, | Performed by: INTERNAL MEDICINE

## 2024-06-20 PROCEDURE — 3008F BODY MASS INDEX DOCD: CPT | Mod: CPTII,,, | Performed by: INTERNAL MEDICINE

## 2024-06-20 PROCEDURE — 80061 LIPID PANEL: CPT | Performed by: INTERNAL MEDICINE

## 2024-06-20 PROCEDURE — 85025 COMPLETE CBC W/AUTO DIFF WBC: CPT | Performed by: INTERNAL MEDICINE

## 2024-06-20 NOTE — PROGRESS NOTES
HEPATOLOGY FOLLOW UP    Referring Physician:   Current Corresponding Physician:     Nilesh Rolon Jr. is here for follow up of Cirrhosis      HPI  This 48-year-old gentleman has a diagnosis of cirrhosis secondary to history of alcohol use disorder.  He has been abstinent from alcohol for the past 3 years and with this his liver disease has completely recompensated.  An ultrasound last week showed no focal mass lesions or ascites.  He does have chronic thrombocytopenia due to chronic liver disease.  He is overdue for liver chemistry and liver synthetic function.  He has no symptoms of chronic liver disease.  Outpatient Encounter Medications as of 6/20/2024   Medication Sig Dispense Refill    cefdinir (OMNICEF) 300 MG capsule Take 300 mg by mouth every 12 (twelve) hours.      cholecalciferol, vitamin D3, 1,250 mcg (50,000 unit) capsule Take 50,000 Units by mouth every 7 days.      CIPRODEX 0.3-0.1 % DrpS Place into both ears.      clotrimazole-betamethasone 1-0.05% (LOTRISONE) cream SMARTSIG:Sparingly Topical Daily      cyclobenzaprine (FLEXERIL) 10 MG tablet Take 10 mg by mouth every evening.      doxycycline (VIBRA-TABS) 100 MG tablet Take 100 mg by mouth once daily.      ELIDEL 1 % cream Apply topically 2 (two) times daily.      ferrous sulfate (FEROSUL) 325 mg (65 mg iron) Tab tablet Take 1 tablet (325 mg total) by mouth 2 (two) times daily. 60 tablet 2    folic acid (FOLVITE) 1 MG tablet Take 1 tablet (1 mg total) by mouth once daily. 30 tablet 5    ibuprofen (ADVIL,MOTRIN) 800 MG tablet Take 800 mg by mouth every 6 (six) hours as needed.      ketoconazole (NIZORAL) 2 % cream Apply 1 application topically once daily.      lactulose (CHRONULAC) 10 gram/15 mL solution Take 30 mLs (20 g total) by mouth 2 (two) times daily. Goal of 3-4 bowel movements daily 1892 mL 6    multivitamin Tab Take 1 tablet by mouth once daily. 30 tablet 2    traMADoL (ULTRAM) 50 mg tablet Take 50 mg by mouth every 6 (six) hours as  needed.      gabapentin (NEURONTIN) 300 MG capsule Take 1 capsule (300 mg total) by mouth every evening. for 14 doses 14 capsule 0    pantoprazole (PROTONIX) 40 MG tablet Take 1 tablet (40 mg total) by mouth once daily. 30 tablet 11     No facility-administered encounter medications on file as of 6/20/2024.     Review of patient's allergies indicates:  No Known Allergies  Past Medical History:   Diagnosis Date    DONG (acute kidney injury) 12/1/2021    Anxiety     Arthritis     Cirrhosis     Hypertension     Liver disease     Osteoarthritis     Other meniscus derangements, other medial meniscus, left knee 1/7/2019       Review of Systems   Constitutional:  Negative for activity change, appetite change, chills, fatigue and unexpected weight change.   HENT:  Negative for congestion, facial swelling and tinnitus.    Eyes:  Negative for visual disturbance.   Respiratory:  Negative for cough, shortness of breath and wheezing.    Cardiovascular:  Negative for chest pain, palpitations and leg swelling.   Gastrointestinal:  Negative for abdominal distention.   Genitourinary:  Negative for dysuria.   Musculoskeletal:  Negative for arthralgias, joint swelling and myalgias.   Neurological:  Negative for syncope and headaches.   Hematological:  Does not bruise/bleed easily.   Psychiatric/Behavioral:  Negative for confusion.      Vitals:    06/20/24 0911   BP: 121/77   Pulse: 78   Resp: 18   Temp: 96 °F (35.6 °C)       Physical Exam  Constitutional:       Appearance: He is well-developed.   Eyes:      General: No scleral icterus.  Cardiovascular:      Rate and Rhythm: Normal rate and regular rhythm.      Heart sounds: Normal heart sounds.   Pulmonary:      Effort: Pulmonary effort is normal. No respiratory distress.      Breath sounds: Normal breath sounds. No wheezing.   Abdominal:      General: Bowel sounds are normal. There is no distension.      Palpations: Abdomen is soft. There is no shifting dullness, fluid wave or mass.       Tenderness: There is no abdominal tenderness. There is no rebound.   Musculoskeletal:         General: Normal range of motion.   Lymphadenopathy:      Cervical: No cervical adenopathy.   Skin:     General: Skin is warm and dry.   Neurological:      Mental Status: He is alert and oriented to person, place, and time.         MELD 3.0: 9 at 1/22/2024  7:45 AM  MELD-Na: 9 at 1/22/2024  7:45 AM  Calculated from:  Serum Creatinine: 1.2 mg/dL at 1/22/2024  7:45 AM  Serum Sodium: 143 mmol/L (Using max of 137 mmol/L) at 1/22/2024  7:45 AM  Total Bilirubin: 0.8 mg/dL (Using min of 1 mg/dL) at 1/22/2024  7:45 AM  Serum Albumin: 4.2 g/dL (Using max of 3.5 g/dL) at 1/22/2024  7:45 AM  INR(ratio): 1.1 at 1/22/2024  7:45 AM  Age at listing (hypothetical): 48 years  Sex: Male at 1/22/2024  7:45 AM      Lab Results   Component Value Date    GLU 96 01/22/2024    BUN 19 01/22/2024    CREATININE 1.2 01/22/2024    CALCIUM 9.7 01/22/2024     01/22/2024    K 4.3 01/22/2024     01/22/2024    PROT 7.5 01/22/2024    CO2 23 01/22/2024    ANIONGAP 13 01/22/2024    WBC 5.55 06/14/2024    RBC 4.71 06/14/2024    HGB 14.8 06/14/2024    HCT 41.9 06/14/2024    HCT 31 (L) 11/30/2021    MCV 89 06/14/2024    MCH 31.4 (H) 06/14/2024    MCHC 35.3 06/14/2024     Lab Results   Component Value Date    RDW 13.9 06/14/2024    PLT 73 (L) 06/14/2024    MPV 9.5 06/14/2024    GRAN 3.8 06/14/2024    GRAN 67.6 06/14/2024    LYMPH 1.3 06/14/2024    LYMPH 24.0 06/14/2024    MONO 0.4 06/14/2024    MONO 6.7 06/14/2024    EOSINOPHIL 1.1 06/14/2024    BASOPHIL 0.4 06/14/2024    EOS 0.1 06/14/2024    EOS 1 01/26/2022    BASO 0.02 06/14/2024    APTT 29.9 04/07/2019    GROUPTRH A POS 07/23/2020    BNP 52 01/08/2022    CHOL 242 (H) 04/06/2016    TRIG 278 (H) 04/06/2016    HDL 40 04/06/2016    CHOLHDL 16.5 (L) 04/06/2016    TOTALCHOLEST 6.1 (H) 04/06/2016    ALBUMIN 4.2 01/22/2024    BILIDIR 3.7 (H) 07/27/2020    AST 22 01/22/2024    ALT 21 01/22/2024     ALKPHOS 51 (L) 01/22/2024    MG 1.8 02/01/2022    LABPROT 12.1 01/22/2024    INR 1.1 01/22/2024    PSA 0.54 04/06/2016       Assessment and Plan:  Patient Active Problem List   Diagnosis    Ascites    Thrombocytopenia    History of alcohol abuse    Gastrointestinal hemorrhage    Hx of colonic polyps    History of GI bleed    Alcoholic cirrhosis of liver without ascites    Primary osteoarthritis of left knee    Primary osteoarthritis of right shoulder    Pain of left upper extremity    Debility    Stiffness in joint    Class 1 obesity due to excess calories with serious comorbidity and body mass index (BMI) of 33.0 to 33.9 in adult    Elevated INR    Hemorrhoids    Candidiasis of esophagus with fungal esophagitis     Anemia    Tear of right supraspinatus tendon    Biceps tendinitis of right upper extremity    Decreased range of motion of right shoulder    Weakness of shoulder     Nilesh Rolon Jr. is a 48 y.o. male withCirrhosis    Impression:  Cirrhosis secondary to a prior history of alcohol use disorder with a recent meld score of 9.    Plan:  I encouraged continued abstinence.  I will be getting blood work today.  He will continue with clinical, biochemical and ultrasound surveillance every 6 months.

## 2024-07-09 ENCOUNTER — HOSPITAL ENCOUNTER (EMERGENCY)
Facility: HOSPITAL | Age: 49
Discharge: HOME | End: 2024-07-09
Attending: EMERGENCY MEDICINE
Payer: MEDICAID

## 2024-07-09 VITALS
RESPIRATION RATE: 16 BRPM | DIASTOLIC BLOOD PRESSURE: 76 MMHG | HEIGHT: 75 IN | SYSTOLIC BLOOD PRESSURE: 119 MMHG | HEART RATE: 70 BPM | OXYGEN SATURATION: 97 % | WEIGHT: 220 LBS | BODY MASS INDEX: 27.35 KG/M2 | TEMPERATURE: 98 F

## 2024-07-09 DIAGNOSIS — R51.9 NONINTRACTABLE HEADACHE, UNSPECIFIED CHRONICITY PATTERN, UNSPECIFIED HEADACHE TYPE: Primary | ICD-10-CM

## 2024-07-09 PROCEDURE — 96375 TX/PRO/DX INJ NEW DRUG ADDON: CPT

## 2024-07-09 PROCEDURE — 63600175 PHARM REV CODE 636 W HCPCS: Performed by: NURSE PRACTITIONER

## 2024-07-09 PROCEDURE — 99284 EMERGENCY DEPT VISIT MOD MDM: CPT | Mod: 25

## 2024-07-09 PROCEDURE — 96374 THER/PROPH/DIAG INJ IV PUSH: CPT

## 2024-07-09 RX ORDER — PROCHLORPERAZINE EDISYLATE 5 MG/ML
10 INJECTION INTRAMUSCULAR; INTRAVENOUS
Status: COMPLETED | OUTPATIENT
Start: 2024-07-09 | End: 2024-07-09

## 2024-07-09 RX ORDER — KETOROLAC TROMETHAMINE 30 MG/ML
15 INJECTION, SOLUTION INTRAMUSCULAR; INTRAVENOUS
Status: COMPLETED | OUTPATIENT
Start: 2024-07-09 | End: 2024-07-09

## 2024-07-09 RX ORDER — DIPHENHYDRAMINE HYDROCHLORIDE 50 MG/ML
12.5 INJECTION INTRAMUSCULAR; INTRAVENOUS
Status: COMPLETED | OUTPATIENT
Start: 2024-07-09 | End: 2024-07-09

## 2024-07-09 RX ADMIN — DIPHENHYDRAMINE HYDROCHLORIDE 12.5 MG: 50 INJECTION INTRAMUSCULAR; INTRAVENOUS at 12:07

## 2024-07-09 RX ADMIN — PROCHLORPERAZINE EDISYLATE 10 MG: 5 INJECTION INTRAMUSCULAR; INTRAVENOUS at 12:07

## 2024-07-09 RX ADMIN — KETOROLAC TROMETHAMINE 15 MG: 30 INJECTION, SOLUTION INTRAMUSCULAR; INTRAVENOUS at 12:07

## 2024-07-09 NOTE — DISCHARGE INSTRUCTIONS

## 2024-07-09 NOTE — Clinical Note
"Nilesh Medina" Gonzales was seen and treated in our emergency department on 7/9/2024.  He may return to work on 07/11/2024.       If you have any questions or concerns, please don't hesitate to call.      Camilla Sierra NP"

## 2024-07-09 NOTE — ED NOTES
Patient presents to the ED with c/o generalized headache and throbbing x 1 week. Pt states the pain is worst to posterior right region. Pt states the pain is constant with minimal relief from Tylenol or Motrin. Pt denies any trauma/fall/injury. States he has been working out in the heat and trying to drink a lot of water. Pt denies nausea or vomiting. Denies weakness. Denies decreased urinary output. Patient alert and oriented. PERRLA noted. Updated on care plan. Medicated per MAR. Denies questions and/or needs. Care ongoing.

## 2024-07-09 NOTE — ED NOTES
Pt resting with eyes closed, respirations even and unlabored. Awoke to verbal stimuli. States pain remains 9/10. Pt requesting ct scan. ED provided made aware. Awaiting orders.

## 2024-07-09 NOTE — ED PROVIDER NOTES
Encounter Date: 7/9/2024       History     Chief Complaint   Patient presents with    Headache     Left sided headache x1 week. Denies vision changes, n/v, head trauma.      49-year-old male presents emergency room with reports of right-sided headache not left eye was was noted in the triage note.  Patient denies any known injury.  He denies any vision changes or blurred vision.  He denies any fever, rash or neck stiffness.  Patient denies taking any medications for symptom control at home.  Denies nausea or vomiting.  Denies any recent sick contacts.  Patient states he does have a history of an DONG, arthritis, cirrhosis, hypertension, osteoarthritis, left meniscus injury.    The history is provided by the patient. No  was used.     Review of patient's allergies indicates:  No Known Allergies  Past Medical History:   Diagnosis Date    DONG (acute kidney injury) 12/1/2021    Anxiety     Arthritis     Cirrhosis     Hypertension     Liver disease     Osteoarthritis     Other meniscus derangements, other medial meniscus, left knee 1/7/2019     Past Surgical History:   Procedure Laterality Date    ARTHROSCOPY OF KNEE Left 1/7/2019    Procedure: ARTHROSCOPY, KNEE;  Surgeon: Derick Kirby MD;  Location: [facility] OR;  Service: Orthopedics;  Laterality: Left;    COLONOSCOPY N/A 7/27/2020    Procedure: COLONOSCOPY;  Surgeon: Sotero Zapata MD;  Location: [facility] ENDO;  Service: Endoscopy;  Laterality: N/A;    COLONOSCOPY N/A 1/20/2022    Procedure: COLONOSCOPY Suprep Vaccinated;  Surgeon: Jacob Andrea MD;  Location: [facility] ENDO;  Service: Endoscopy;  Laterality: N/A;    DEBRIDEMENT Right 10/4/2022    Procedure: EXTENSIVE DEBRIDEMENT;  Surgeon: Ernesto Diego IV, MD;  Location: [facility] OR;  Service: Orthopedics;  Laterality: Right;    ESOPHAGOGASTRODUODENOSCOPY N/A 4/8/2019    Procedure: EGD (ESOPHAGOGASTRODUODENOSCOPY);  Surgeon: Bonita Kendall MD;  Location: [facility] ENDO;  Service: Endoscopy;  Laterality: N/A;     ESOPHAGOGASTRODUODENOSCOPY N/A 7/27/2020    Procedure: EGD (ESOPHAGOGASTRODUODENOSCOPY);  Surgeon: Sotero Zapata MD;  Location: [facility];  Service: Endoscopy;  Laterality: N/A;    ESOPHAGOGASTRODUODENOSCOPY N/A 12/2/2021    Procedure: EGD (ESOPHAGOGASTRODUODENOSCOPY);  Surgeon: Montez Guerra MD;  Location: [facility] ENDO (2ND FLR);  Service: Endoscopy;  Laterality: N/A;    ESOPHAGOGASTRODUODENOSCOPY N/A 1/20/2022    Procedure: EGD (ESOPHAGOGASTRODUODENOSCOPY);  Surgeon: Jacob Andrea MD;  Location: [facility];  Service: Endoscopy;  Laterality: N/A;  please order CBC with plts and INR day of case.    KNEE SURGERY      REPAIR OF BONE  10/4/2022    Procedure: HUMORAL HEAD OSTEOPLASTY;  Surgeon: Ernesto Diego IV, MD;  Location: [facility] OR;  Service: Orthopedics;;    SHOULDER ARTHROSCOPY Right 10/4/2022    Procedure: ARTHROSCOPY, SHOULDER; rotator cuff repair, mini-open biceps tenodesis with CAM procedure,;  Surgeon: Ernesto Diego IV, MD;  Location: [facility] OR;  Service: Orthopedics;  Laterality: Right;  Beachchair position, spider arm cordova, arthrex suture anchors, arthrex biceps tenodesis button, Dr. Diego special Kandice retractor set  Have available depuy/synthes [place] straight suture passer       Family History   Problem Relation Name Age of Onset    Birth defects Neg Hx       Social History     Tobacco Use    Smoking status: Never    Smokeless tobacco: Never    Tobacco comments:     smokes Marijuana only   Substance Use Topics    Alcohol use: Not Currently     Comment: last drink 11/28, drinks 3-4 a day    Drug use: No     Review of Systems   Neurological:  Positive for headaches.   All other systems reviewed and are negative.      Physical Exam     Initial Vitals [07/09/24 1103]   BP Pulse Resp Temp SpO2   (!) 153/91 73 20 98.3 °F (36.8 °C) 97 %      MAP       --         Physical Exam    Constitutional: He appears well-developed and well-nourished. He is not diaphoretic. No distress.   HENT:   Head:  Normocephalic and atraumatic.   Right Ear: Hearing and tympanic membrane normal.   Left Ear: Hearing and tympanic membrane normal.   Nose: Nose normal.   Mouth/Throat: Uvula is midline, oropharynx is clear and moist and mucous membranes are normal.   Eyes: Lids are normal. Pupils are equal, round, and reactive to light.   Cardiovascular:  Normal rate.           Pulmonary/Chest: Effort normal and breath sounds normal. No respiratory distress. He has no wheezes. He has no rhonchi.   Abdominal: Abdomen is soft. There is no abdominal tenderness.   Musculoskeletal:         General: Normal range of motion.      Cervical back: No edema, erythema or rigidity. No spinous process tenderness or muscular tenderness. Normal range of motion.     Neurological: He is oriented to person, place, and time. No cranial nerve deficit. GCS eye subscore is 4. GCS verbal subscore is 5. GCS motor subscore is 6.   No facial droop, no slurred speech, no drift noted, sensory intact.  No tremors.  Normal and steady gait.   Skin: Skin is warm and dry. No rash noted.   Psychiatric: He has a normal mood and affect. His behavior is normal. Judgment and thought content normal.         ED Course   Procedures  Labs Reviewed - No data to display       Imaging Results    None          Medications   prochlorperazine injection Soln 10 mg (10 mg Intravenous Given 7/9/24 1200)   diphenhydrAMINE injection 12.5 mg (12.5 mg Intravenous Given 7/9/24 1200)   ketorolac injection 15 mg (15 mg Intravenous Given 7/9/24 1257)     Medical Decision Making  Differential Diagnosis includes, but is not limited to:  Ischemic stroke, hemorrhagic stroke, subarachnoid hemorrhage/ruptured aneurysm, intracranial lesion/mass, meningitis/encephalitis, epidural hematoma, subdural hematoma, pseudotumor cerebri, venous sinus thrombosis, CO poisoning, hypertensive encephalopathy, MI/ACS, head trauma/contusion, concussion, sinus headache, dehydration, anxiety, medication  non-compliance, primary headache (tension/cluster/migraine).     Risk  Prescription drug management.               ED Course as of 07/09/24 1822 Tue Jul 09, 2024   1255 Pt was asleep upon reassessment however still having slight headache. Toradol ordered.  [DT]   1349 Pt reports improvement in his condition with no headache reported after Toradol given. Pt notified we will refer him to PCP for continued care if needed. Pt is not toxic in appearance and stable for dc.  [DT]      ED Course User Index  [DT] Camilla Sierra NP                           Clinical Impression:  Final diagnoses:  [R51.9] Nonintractable headache, unspecified chronicity pattern, unspecified headache type (Primary)          ED Disposition Condition    Discharge Stable          ED Prescriptions    None       Follow-up Information       Follow up With Specialties Details Why Contact Info Additional Information    [facility] Schedule an appointment as soon as possible for a visit in 2 days  [address], [address]  [place] [address] [phone] Please park in [address] or D and use [address]. Take Medical Office Bldg. elevators.             Camilla Sierra NP  07/09/24 1822

## 2024-07-09 NOTE — ED TRIAGE NOTES
Reports right posterior headache for past 2-3 days. States pain is constant, intermittently worse when bending forward. Denies N/V, congestion, or trauma. Denies excessive exposure to outdoor temps. Eating/drinking normally. Took meloxicam and Tylenol without relief. Presents awake, alert. No distress noted.

## 2024-10-03 NOTE — TELEPHONE ENCOUNTER
Attempted to contact patient due to NS for today's appt.  LM to check status, review attendance policy and confirm next appt on Tuesday 5/16 at 8 am. Requested call back to confirm if he will attend both or 1 of his final visits prior to expected discharge.    Pranav You, PT    Change to adderall 30mg bid from xr 30 qd

## 2024-12-16 ENCOUNTER — HOSPITAL ENCOUNTER (OUTPATIENT)
Dept: RADIOLOGY | Facility: HOSPITAL | Age: 49
Discharge: HOME | End: 2024-12-16
Attending: INTERNAL MEDICINE
Payer: MEDICAID

## 2024-12-16 DIAGNOSIS — K70.30 ALCOHOLIC CIRRHOSIS OF LIVER WITHOUT ASCITES: ICD-10-CM

## 2024-12-16 PROCEDURE — 76700 US EXAM ABDOM COMPLETE: CPT | Mod: 26,,, | Performed by: RADIOLOGY

## 2024-12-16 PROCEDURE — 76700 US EXAM ABDOM COMPLETE: CPT | Mod: TC

## 2024-12-23 ENCOUNTER — TELEPHONE (OUTPATIENT)
Dept: HEPATOLOGY | Facility: CLINIC | Age: 49
End: 2024-12-23

## 2024-12-23 ENCOUNTER — OFFICE VISIT (OUTPATIENT)
Dept: HEPATOLOGY | Facility: CLINIC | Age: 49
End: 2024-12-23
Attending: INTERNAL MEDICINE
Payer: MEDICAID

## 2024-12-23 VITALS
RESPIRATION RATE: 18 BRPM | SYSTOLIC BLOOD PRESSURE: 131 MMHG | HEART RATE: 89 BPM | DIASTOLIC BLOOD PRESSURE: 75 MMHG | WEIGHT: 212.06 LBS | HEIGHT: 75 IN | OXYGEN SATURATION: 98 % | TEMPERATURE: 98 F | BODY MASS INDEX: 26.37 KG/M2

## 2024-12-23 DIAGNOSIS — K70.30 ALCOHOLIC CIRRHOSIS OF LIVER WITHOUT ASCITES: Primary | ICD-10-CM

## 2024-12-23 PROCEDURE — 99213 OFFICE O/P EST LOW 20 MIN: CPT | Mod: S$PBB,,, | Performed by: INTERNAL MEDICINE

## 2024-12-23 PROCEDURE — 99999 PR PBB SHADOW E&M-EST. PATIENT-LVL V: CPT | Mod: PBBFAC,,, | Performed by: INTERNAL MEDICINE

## 2024-12-23 PROCEDURE — 1160F RVW MEDS BY RX/DR IN RCRD: CPT | Mod: CPTII,,, | Performed by: INTERNAL MEDICINE

## 2024-12-23 PROCEDURE — 3075F SYST BP GE 130 - 139MM HG: CPT | Mod: CPTII,,, | Performed by: INTERNAL MEDICINE

## 2024-12-23 PROCEDURE — 1159F MED LIST DOCD IN RCRD: CPT | Mod: CPTII,,, | Performed by: INTERNAL MEDICINE

## 2024-12-23 PROCEDURE — 3078F DIAST BP <80 MM HG: CPT | Mod: CPTII,,, | Performed by: INTERNAL MEDICINE

## 2024-12-23 PROCEDURE — 3008F BODY MASS INDEX DOCD: CPT | Mod: CPTII,,, | Performed by: INTERNAL MEDICINE

## 2024-12-23 PROCEDURE — 99215 OFFICE O/P EST HI 40 MIN: CPT | Mod: PBBFAC | Performed by: INTERNAL MEDICINE

## 2024-12-23 RX ORDER — TRAZODONE HYDROCHLORIDE 50 MG/1
50 TABLET ORAL NIGHTLY
COMMUNITY
Start: 2024-08-05

## 2024-12-23 RX ORDER — ERGOCALCIFEROL 1.25 MG/1
50000 CAPSULE ORAL
COMMUNITY
Start: 2024-11-16

## 2024-12-23 RX ORDER — MELOXICAM 7.5 MG/1
7.5 TABLET ORAL
COMMUNITY
Start: 2024-12-15

## 2024-12-23 NOTE — TELEPHONE ENCOUNTER
----- Message from Pb Mixon MD sent at 12/23/2024  8:18 AM CST -----  Needs a return appointment in 6 months with blood work and ultrasound

## 2024-12-23 NOTE — TELEPHONE ENCOUNTER
Scheduled pt for follow up in 6 months on 6/23/2025.Labs and u/s on 6/13/2025 in  [facility].Send appt by mail.

## 2024-12-23 NOTE — PROGRESS NOTES
HEPATOLOGY FOLLOW UP    Referring Physician:   Current Corresponding Physician:     Nilesh Rolon Jr. is here for follow up of Cirrhosis      HPI  This 49-year-old gentleman has a diagnosis of cirrhosis secondary to history of alcohol use disorder.  He has been abstinent from alcohol for the past 4 years and with this his liver disease has completely recompensated.  An ultrasound last week showed no focal mass lesions or ascites.  His liver enzymes and liver synthetic function are normal.  He has no symptoms of chronic liver disease.  Outpatient Encounter Medications as of 12/23/2024   Medication Sig Dispense Refill    cefdinir (OMNICEF) 300 MG capsule Take 300 mg by mouth every 12 (twelve) hours.      cholecalciferol, vitamin D3, 1,250 mcg (50,000 unit) capsule Take 50,000 Units by mouth every 7 days.      CIPRODEX 0.3-0.1 % DrpS Place into both ears.      clotrimazole-betamethasone 1-0.05% (LOTRISONE) cream SMARTSIG:Sparingly Topical Daily      cyclobenzaprine (FLEXERIL) 10 MG tablet Take 10 mg by mouth every evening.      doxycycline (VIBRA-TABS) 100 MG tablet Take 100 mg by mouth once daily.      ELIDEL 1 % cream Apply topically 2 (two) times daily.      ergocalciferol (ERGOCALCIFEROL) 50,000 unit Cap Take 50,000 Units by mouth every 7 days.      ferrous sulfate (FEROSUL) 325 mg (65 mg iron) Tab tablet Take 1 tablet (325 mg total) by mouth 2 (two) times daily. 60 tablet 2    folic acid (FOLVITE) 1 MG tablet Take 1 tablet (1 mg total) by mouth once daily. 30 tablet 5    ketoconazole (NIZORAL) 2 % cream Apply 1 application topically once daily.      lactulose (CHRONULAC) 10 gram/15 mL solution Take 30 mLs (20 g total) by mouth 2 (two) times daily. Goal of 3-4 bowel movements daily 1892 mL 6    meloxicam (MOBIC) 7.5 MG tablet Take 7.5 mg by mouth.      multivitamin Tab Take 1 tablet by mouth once daily. 30 tablet 2    traZODone (DESYREL) 50 MG tablet Take 50 mg by mouth every evening.      gabapentin  (NEURONTIN) 300 MG capsule Take 1 capsule (300 mg total) by mouth every evening. for 14 doses (Patient not taking: Reported on 12/23/2024) 14 capsule 0    ibuprofen (ADVIL,MOTRIN) 800 MG tablet Take 800 mg by mouth every 6 (six) hours as needed. (Patient not taking: Reported on 12/23/2024)      pantoprazole (PROTONIX) 40 MG tablet Take 1 tablet (40 mg total) by mouth once daily. (Patient not taking: Reported on 12/23/2024) 30 tablet 11    traMADoL (ULTRAM) 50 mg tablet Take 50 mg by mouth every 6 (six) hours as needed. (Patient not taking: Reported on 12/23/2024)       No facility-administered encounter medications on file as of 12/23/2024.     Review of patient's allergies indicates:  No Known Allergies  Past Medical History:   Diagnosis Date    DONG (acute kidney injury) 12/1/2021    Anxiety     Arthritis     Cirrhosis     Hypertension     Liver disease     Osteoarthritis     Other meniscus derangements, other medial meniscus, left knee 1/7/2019       Review of Systems   Constitutional:  Negative for activity change, appetite change, chills, fatigue and unexpected weight change.   HENT:  Negative for congestion, facial swelling and tinnitus.    Eyes:  Negative for visual disturbance.   Respiratory:  Negative for cough, shortness of breath and wheezing.    Cardiovascular:  Negative for chest pain, palpitations and leg swelling.   Gastrointestinal:  Negative for abdominal distention.   Genitourinary:  Negative for dysuria.   Musculoskeletal:  Negative for arthralgias, joint swelling and myalgias.   Neurological:  Negative for syncope and headaches.   Hematological:  Does not bruise/bleed easily.   Psychiatric/Behavioral:  Negative for confusion.      Vitals:    12/23/24 0802   BP: 131/75   Pulse: 89   Resp: 18   Temp: 98.4 °F (36.9 °C)       Physical Exam  Constitutional:       Appearance: He is well-developed.   Eyes:      General: No scleral icterus.  Cardiovascular:      Rate and Rhythm: Normal rate and regular  rhythm.      Heart sounds: Normal heart sounds.   Pulmonary:      Effort: Pulmonary effort is normal. No respiratory distress.      Breath sounds: Normal breath sounds. No wheezing.   Abdominal:      General: Bowel sounds are normal. There is no distension.      Palpations: Abdomen is soft. There is no shifting dullness, fluid wave or mass.      Tenderness: There is no abdominal tenderness. There is no rebound.   Musculoskeletal:         General: Normal range of motion.   Lymphadenopathy:      Cervical: No cervical adenopathy.   Skin:     General: Skin is warm and dry.   Neurological:      Mental Status: He is alert and oriented to person, place, and time.         MELD 3.0: 9 at 12/16/2024  6:48 AM  MELD-Na: 9 at 12/16/2024  6:48 AM  Calculated from:  Serum Creatinine: 1.2 mg/dL at 12/16/2024  6:48 AM  Serum Sodium: 138 mmol/L (Using max of 137 mmol/L) at 12/16/2024  6:48 AM  Total Bilirubin: 0.7 mg/dL (Using min of 1 mg/dL) at 12/16/2024  6:48 AM  Serum Albumin: 4.3 g/dL (Using max of 3.5 g/dL) at 12/16/2024  6:48 AM  INR(ratio): 1.1 at 12/16/2024  6:48 AM  Age at listing (hypothetical): 49 years  Sex: Male at 12/16/2024  6:48 AM      Lab Results   Component Value Date    GLU 92 12/16/2024    BUN 15 12/16/2024    CREATININE 1.2 12/16/2024    CALCIUM 9.7 12/16/2024     12/16/2024    K 4.2 12/16/2024     12/16/2024    PROT 7.2 12/16/2024    CO2 26 12/16/2024    ANIONGAP 9 12/16/2024    WBC 3.60 (L) 12/16/2024    RBC 4.53 (L) 12/16/2024    HGB 14.5 12/16/2024    HCT 41.0 12/16/2024    HCT 31 (L) 11/30/2021    MCV 91 12/16/2024    MCH 32.0 (H) 12/16/2024    MCHC 35.4 12/16/2024     Lab Results   Component Value Date    RDW 13.9 12/16/2024    PLT 69 (L) 12/16/2024    MPV 9.8 12/16/2024    GRAN 2.1 12/16/2024    GRAN 58.4 12/16/2024    LYMPH 1.2 12/16/2024    LYMPH 31.9 12/16/2024    MONO 0.3 12/16/2024    MONO 7.2 12/16/2024    EOSINOPHIL 1.9 12/16/2024    BASOPHIL 0.6 12/16/2024    EOS 0.1 12/16/2024    EOS  1 01/26/2022    BASO 0.02 12/16/2024    APTT 29.9 04/07/2019    GROUPTRH A POS 07/23/2020    BNP 52 01/08/2022    CHOL 231 (H) 06/20/2024    TRIG 232 (H) 06/20/2024    HDL 41 06/20/2024    CHOLHDL 17.7 (L) 06/20/2024    TOTALCHOLEST 5.6 (H) 06/20/2024    ALBUMIN 4.3 12/16/2024    BILIDIR 3.7 (H) 07/27/2020    AST 26 12/16/2024    ALT 22 12/16/2024    ALKPHOS 49 12/16/2024    MG 1.8 02/01/2022    LABPROT 11.7 12/16/2024    INR 1.1 12/16/2024    PSA 0.54 04/06/2016       Assessment and Plan:  Patient Active Problem List   Diagnosis    Ascites    Thrombocytopenia    History of alcohol abuse    Gastrointestinal hemorrhage    Hx of colonic polyps    History of GI bleed    Alcoholic cirrhosis of liver without ascites    Primary osteoarthritis of left knee    Primary osteoarthritis of right shoulder    Pain of left upper extremity    Debility    Stiffness in joint    Class 1 obesity due to excess calories with serious comorbidity and body mass index (BMI) of 33.0 to 33.9 in adult    Elevated INR    Hemorrhoids    Candidiasis of esophagus with fungal esophagitis     Anemia    Tear of right supraspinatus tendon    Biceps tendinitis of right upper extremity    Decreased range of motion of right shoulder    Weakness of shoulder     Nilesh Rolon Jr. is a 49 y.o. male withCirrhosis    Impression:  Cirrhosis secondary to a prior history of alcohol use disorder with a recent meld score of 9.    Plan:  I encouraged continued abstinence.    He will continue with clinical, biochemical and ultrasound surveillance every 6 months.

## 2024-12-26 RX ORDER — FOLIC ACID 1 MG/1
1 TABLET ORAL DAILY
Qty: 30 TABLET | Refills: 5 | Status: SHIPPED | OUTPATIENT
Start: 2024-12-26

## 2025-03-17 DIAGNOSIS — Z98.890 S/P RIGHT ROTATOR CUFF REPAIR: Primary | ICD-10-CM

## 2025-03-27 ENCOUNTER — HOSPITAL ENCOUNTER (OUTPATIENT)
Dept: RADIOLOGY | Facility: HOSPITAL | Age: 50
Discharge: HOME | End: 2025-03-27
Attending: ORTHOPAEDIC SURGERY
Payer: MEDICAID

## 2025-03-27 ENCOUNTER — OFFICE VISIT (OUTPATIENT)
Dept: ORTHOPEDICS | Facility: CLINIC | Age: 50
End: 2025-03-27
Payer: MEDICAID

## 2025-03-27 VITALS — BODY MASS INDEX: 26.51 KG/M2 | WEIGHT: 212.06 LBS

## 2025-03-27 DIAGNOSIS — Z98.890 S/P RIGHT ROTATOR CUFF REPAIR: ICD-10-CM

## 2025-03-27 DIAGNOSIS — M19.011 PRIMARY OSTEOARTHRITIS OF RIGHT SHOULDER: Primary | ICD-10-CM

## 2025-03-27 DIAGNOSIS — M25.511 CHRONIC RIGHT SHOULDER PAIN: ICD-10-CM

## 2025-03-27 DIAGNOSIS — G89.29 CHRONIC RIGHT SHOULDER PAIN: ICD-10-CM

## 2025-03-27 PROCEDURE — 3008F BODY MASS INDEX DOCD: CPT | Mod: CPTII,,, | Performed by: ORTHOPAEDIC SURGERY

## 2025-03-27 PROCEDURE — 73030 X-RAY EXAM OF SHOULDER: CPT | Mod: 26,RT,, | Performed by: RADIOLOGY

## 2025-03-27 PROCEDURE — 99999 PR PBB SHADOW E&M-EST. PATIENT-LVL II: CPT | Mod: PBBFAC,,, | Performed by: ORTHOPAEDIC SURGERY

## 2025-03-27 PROCEDURE — 73030 X-RAY EXAM OF SHOULDER: CPT | Mod: TC,FY,RT

## 2025-03-27 PROCEDURE — 99212 OFFICE O/P EST SF 10 MIN: CPT | Mod: PBBFAC,25,PN | Performed by: ORTHOPAEDIC SURGERY

## 2025-03-27 PROCEDURE — 99213 OFFICE O/P EST LOW 20 MIN: CPT | Mod: S$PBB,,, | Performed by: ORTHOPAEDIC SURGERY

## 2025-03-27 NOTE — PROGRESS NOTES
[facility], Orthopedics and Sports Medicine  [facility]    Established Patient Shoulder Office Visit  03/27/2025     Diagnosis:  Right shoulder pain  High Grade Partial Rotator Cuff Tear  Biceps Tendonitis  Subacromial Impingement  Glenohumeral Osteoarthritis     Procedure:  10/4/22  Arthroscopic Rotator Cuff Repair   Open Biceps Tenodesis   Arthroscopic Shoulder Debridement - Extensive   Manipulation under anesthesia   Comprehensive arthroscopic management Right shoulder       Subjective:      Nilesh Rolon Jr. is a 49 y.o. male who returns for follow up treatment of the right shoulder problem.  He is now about 2.5 years s/p the above surgery.  He has stiffness and pain in the right shoulder.  It hurts when trying to move the arm.  No new injuries.  Continues home exercise program regularly.     Regular swimming exercise.    Not working, sometimes .     Outside reports reviewed: historical medical records, office notes, and radiology reports.    Past Medical History:   Diagnosis Date    DONG (acute kidney injury) 12/1/2021    Anxiety     Arthritis     Cirrhosis     Hypertension     Liver disease     Osteoarthritis     Other meniscus derangements, other medial meniscus, left knee 1/7/2019       Problem List[1]    Past Surgical History:   Procedure Laterality Date    ARTHROSCOPY OF KNEE Left 1/7/2019    Procedure: ARTHROSCOPY, KNEE;  Surgeon: Derick Kirby MD;  Location: [facility] OR;  Service: Orthopedics;  Laterality: Left;    COLONOSCOPY N/A 7/27/2020    Procedure: COLONOSCOPY;  Surgeon: Sotero Zapata MD;  Location: [facility] ENDO;  Service: Endoscopy;  Laterality: N/A;    COLONOSCOPY N/A 1/20/2022    Procedure: COLONOSCOPY Suprep Vaccinated;  Surgeon: Jacob Andrea MD;  Location: [facility] ENDO;  Service: Endoscopy;  Laterality: N/A;    DEBRIDEMENT Right 10/4/2022    Procedure: EXTENSIVE DEBRIDEMENT;  Surgeon: Ernesto Diego IV, MD;  Location: [facility] OR;  Service: Orthopedics;   Laterality: Right;    ESOPHAGOGASTRODUODENOSCOPY N/A 4/8/2019    Procedure: EGD (ESOPHAGOGASTRODUODENOSCOPY);  Surgeon: Bonita Kendall MD;  Location: [facility] ENDO;  Service: Endoscopy;  Laterality: N/A;    ESOPHAGOGASTRODUODENOSCOPY N/A 7/27/2020    Procedure: EGD (ESOPHAGOGASTRODUODENOSCOPY);  Surgeon: Sotero Zapata MD;  Location: [facility] ENDO;  Service: Endoscopy;  Laterality: N/A;    ESOPHAGOGASTRODUODENOSCOPY N/A 12/2/2021    Procedure: EGD (ESOPHAGOGASTRODUODENOSCOPY);  Surgeon: Montez Guerra MD;  Location: [facility] ([facility] F[facility]);  Service: Endoscopy;  Laterality: N/A;    ESOPHAGOGASTRODUODENOSCOPY N/A 1/20/2022    Procedure: EGD (ESOPHAGOGASTRODUODENOSCOPY);  Surgeon: Jacob Andrea MD;  Location: [facility];  Service: Endoscopy;  Laterality: N/A;  please order CBC with plts and INR day of case.    KNEE SURGERY      REPAIR OF BONE  10/4/2022    Procedure: HUMORAL HEAD OSTEOPLASTY;  Surgeon: Ernesto Diego IV, MD;  Location: [facility] OR;  Service: Orthopedics;;    SHOULDER ARTHROSCOPY Right 10/4/2022    Procedure: ARTHROSCOPY, SHOULDER; rotator cuff repair, mini-open biceps tenodesis with CAM procedure,;  Surgeon: Ernesto Diego IV, MD;  Location: [facility] OR;  Service: Orthopedics;  Laterality: Right;  Beachchair position, spider arm cordova, arthrex suture anchors, arthrex biceps tenodesis button, Dr. Diego special Kandice retractor set  Have available depuy/synthes [place] straight suture passer          Current Outpatient Medications   Medication Instructions    cefdinir (OMNICEF) 300 mg, Every 12 hours    cholecalciferol (vitamin D3) 50,000 Units, Every 7 days    CIPRODEX 0.3-0.1 % DrpS Place into both ears.    clotrimazole-betamethasone 1-0.05% (LOTRISONE) cream SMARTSIG:Sparingly Topical Daily    cyclobenzaprine (FLEXERIL) 10 mg, Nightly    doxycycline (VIBRA-TABS) 100 mg, Daily    ELIDEL 1 % cream 2 times daily    ergocalciferol (ERGOCALCIFEROL) 50,000 Units, Every 7 days    FeroSuL 325 mg, Oral, 2  times daily    folic acid (FOLVITE) 1 mg, Oral, Daily    gabapentin (NEURONTIN) 300 mg, Oral, Nightly    ibuprofen (ADVIL,MOTRIN) 800 mg, Every 6 hours PRN    ketoconazole (NIZORAL) 2 % cream 1 application , Daily    lactulose (CHRONULAC) 10 gram/15 mL solution Take 30 mLs (20 g total) by mouth 2 (two) times daily. Goal of 3-4 bowel movements daily    meloxicam (MOBIC) 7.5 mg    multivitamin Tab 1 tablet, Oral, Daily    pantoprazole (PROTONIX) 40 mg, Oral, Daily    traMADoL (ULTRAM) 50 mg, Every 6 hours PRN    traZODone (DESYREL) 50 mg, Nightly        Review of patient's allergies indicates:  No Known Allergies    Social History[2]    Family History   Problem Relation Name Age of Onset    Birth defects Neg Hx           Review of Systems   Constitutional: Negative for chills and fever.   HENT:  Negative for hearing loss.    Eyes:  Negative for blurred vision.   Cardiovascular:  Negative for chest pain.   Respiratory:  Negative for shortness of breath.    Gastrointestinal:  Negative for abdominal pain.   Neurological:  Negative for light-headedness.        Objective:      General    Nursing note and vitals reviewed.  Constitutional: He is oriented to person, place, and time. He appears well-developed and well-nourished. No distress.   HENT:   Head: Normocephalic and atraumatic.   Eyes: Pupils are equal, round, and reactive to light.   Cardiovascular:  Normal rate and regular rhythm.            Pulmonary/Chest: Effort normal and breath sounds normal. No respiratory distress.   Neurological: He is alert and oriented to person, place, and time.   Psychiatric: He has a normal mood and affect. His behavior is normal.         Right Shoulder Exam     Inspection/Observation   Swelling: absent  Bruising: absent  Atrophy: absent    Tenderness   The patient is experiencing no tenderness.    Range of Motion   Active abduction:  150   Forward Flexion:  140   External Rotation 0 degrees:  40   Internal rotation 0 degrees:  Sacrum      Tests & Signs   Drop arm: negative  Vo test: positive  Impingement: positive  Rotator Cuff Painful Arc/Range: moderate  Belly Press: negative    Other   Sensation: normal    Comments:  Ginny test- positive     Left Shoulder Exam     Inspection/Observation   Swelling: absent  Bruising: absent  Atrophy: absent    Tenderness   The patient is experiencing no tenderness.     Range of Motion   Active abduction:  170 normal   Forward Flexion:  170 normal   External Rotation 0 degrees:  50 normal   Internal rotation 0 degrees:  T8 normal     Tests & Signs   Drop arm: negative  Vo test: negative  Impingement: negative  Belly Press: negative  Active Compression test ([place]'s Sign): negative  Speed's Test: negative    Other   Sensation: normal     Comments:  Ginny test- negative      Muscle Strength   Right Upper Extremity   Shoulder Abduction: 5/5   Shoulder Internal Rotation: 5/5   Shoulder External Rotation: 5/5   Biceps: 5/5   Left Upper Extremity  Shoulder Abduction: 5/5   Shoulder Internal Rotation: 5/5   Shoulder External Rotation: 5/5   Biceps: 5/5     Reflexes     Left Side  Biceps:  2+  Triceps:  2+  Brachioradialis:  2+  Left Damian's Sign:  Absent    Right Side   Biceps:  2+  Triceps:  2+  Brachioradialis:  2+  Right Damian's Sign:  absent    Vascular Exam     Right Pulses      Radial:                    2+      Left Pulses      Radial:                    2+        Imaging:  Radiographs of the right shoulder taken 03/27/2025 were personally reviewed from the [facility] EMR.  Multiple views of the shoulder are available today for review, including an AP, scapular Y, axillary view.  The glenohumeral joint demonstrates severe degenerative changes.  The acromioclavicular joint demonstrates severe degenerative changes.  The glenohumeral joint is concentrically reduced.  There is no acute fracture or dislocation.       Procedures      Assessment:       Nilesh Rolon Jr. is a 49 y.o. male seen in  the office today. The primary encounter diagnosis was Primary osteoarthritis of right shoulder. Diagnoses of S/P right rotator cuff repair and Chronic right shoulder pain were also pertinent to this visit.  Further work-up is recommended at this time. MRI recommended to assess rotator cuff tear.  Has advanced shoulder arthritis and discussed arthroplasty today.  Patient will think about it as we obtain MRI and can discuss again next visit. The natural history and expected course discussed with patient. Various treatment options were discussed, including their risks and benefits. All of the patient's questions were answered.     Plan:      Tylenol 650mg TID, PRN pain.  Follow up in 2 weeks.  MRI right shoulder.         Ernesto Diego IV, MD   of Clinical Orthopedics  Department of Orthopedic Surgery  [facility]  Office: [phone]  Website: [email or web address]      Orders Placed This Encounter    MRI Shoulder Without Contrast Right           [1]   Patient Active Problem List  Diagnosis    Ascites    Thrombocytopenia    History of alcohol abuse    Gastrointestinal hemorrhage    Hx of colonic polyps    History of GI bleed    Alcoholic cirrhosis of liver without ascites    Primary osteoarthritis of left knee    Primary osteoarthritis of right shoulder    Pain of left upper extremity    Debility    Stiffness in joint    Class 1 obesity due to excess calories with serious comorbidity and body mass index (BMI) of 33.0 to 33.9 in adult    Elevated INR    Hemorrhoids    Candidiasis of esophagus with fungal esophagitis     Anemia    Tear of right supraspinatus tendon    Biceps tendinitis of right upper extremity    Decreased range of motion of right shoulder    Weakness of shoulder   [2]   Social History  Socioeconomic History    Marital status: Single   Tobacco Use    Smoking status: Never    Smokeless tobacco: Never    Tobacco comments:     smokes Marijuana only   Substance  and Sexual Activity    Alcohol use: Not Currently     Comment: last drink 11/28, drinks 3-4 a day    Drug use: No    Sexual activity: Yes     Partners: Female     Social Drivers of Health     Financial Resource Strain: Medium Risk (12/22/2024)    Overall Financial Resource Strain (CARDIA)     Difficulty of Paying Living Expenses: Somewhat hard   Food Insecurity: Food Insecurity Present (12/22/2024)    Hunger Vital Sign     Worried About Running Out of Food in the Last Year: Sometimes true     Ran Out of Food in the Last Year: Sometimes true   Physical Activity: Insufficiently Active (12/22/2024)    Exercise Vital Sign     Days of Exercise per Week: 5 days     Minutes of Exercise per Session: 20 min   Stress: No Stress Concern Present (12/22/2024)    [employer] [facility] [employer] - Occupational Stress Questionnaire     Feeling of Stress : Only a little   Housing Stability: High Risk (12/22/2024)    Housing Stability Vital Sign     Unable to Pay for Housing in the Last Year: Yes

## 2025-04-04 ENCOUNTER — HOSPITAL ENCOUNTER (OUTPATIENT)
Dept: RADIOLOGY | Facility: HOSPITAL | Age: 50
Discharge: HOME | End: 2025-04-04
Attending: ORTHOPAEDIC SURGERY
Payer: MEDICAID

## 2025-04-04 DIAGNOSIS — G89.29 CHRONIC RIGHT SHOULDER PAIN: ICD-10-CM

## 2025-04-04 DIAGNOSIS — M25.511 CHRONIC RIGHT SHOULDER PAIN: ICD-10-CM

## 2025-04-04 PROCEDURE — 73221 MRI JOINT UPR EXTREM W/O DYE: CPT | Mod: TC,RT

## 2025-04-04 PROCEDURE — 73221 MRI JOINT UPR EXTREM W/O DYE: CPT | Mod: 26,RT,, | Performed by: RADIOLOGY

## 2025-04-10 ENCOUNTER — RESULTS FOLLOW-UP (OUTPATIENT)
Dept: ORTHOPEDICS | Facility: CLINIC | Age: 50
End: 2025-04-10

## 2025-06-25 DIAGNOSIS — K70.30 ALCOHOLIC CIRRHOSIS OF LIVER WITHOUT ASCITES: Primary | ICD-10-CM

## 2025-06-26 DIAGNOSIS — M25.552 BILATERAL HIP PAIN: Primary | ICD-10-CM

## 2025-06-26 DIAGNOSIS — M25.551 BILATERAL HIP PAIN: Primary | ICD-10-CM

## 2025-07-07 ENCOUNTER — HOSPITAL ENCOUNTER (OUTPATIENT)
Facility: HOSPITAL | Age: 50
Discharge: HOME | End: 2025-07-07
Attending: PHYSICIAN ASSISTANT
Payer: MEDICAID

## 2025-07-07 ENCOUNTER — OFFICE VISIT (OUTPATIENT)
Dept: ORTHOPEDICS | Facility: CLINIC | Age: 50
End: 2025-07-07
Payer: MEDICAID

## 2025-07-07 VITALS — HEIGHT: 75 IN | WEIGHT: 212.06 LBS | BODY MASS INDEX: 26.37 KG/M2

## 2025-07-07 DIAGNOSIS — M25.551 BILATERAL HIP PAIN: ICD-10-CM

## 2025-07-07 DIAGNOSIS — M25.552 BILATERAL HIP PAIN: ICD-10-CM

## 2025-07-07 DIAGNOSIS — M16.0 ARTHRITIS OF BOTH HIPS: Primary | ICD-10-CM

## 2025-07-07 PROCEDURE — 1159F MED LIST DOCD IN RCRD: CPT | Mod: CPTII,,, | Performed by: PHYSICIAN ASSISTANT

## 2025-07-07 PROCEDURE — 99214 OFFICE O/P EST MOD 30 MIN: CPT | Mod: S$PBB,,, | Performed by: PHYSICIAN ASSISTANT

## 2025-07-07 PROCEDURE — 73521 X-RAY EXAM HIPS BI 2 VIEWS: CPT | Mod: TC,PN

## 2025-07-07 PROCEDURE — 3008F BODY MASS INDEX DOCD: CPT | Mod: CPTII,,, | Performed by: PHYSICIAN ASSISTANT

## 2025-07-07 PROCEDURE — 73521 X-RAY EXAM HIPS BI 2 VIEWS: CPT | Mod: 26,,, | Performed by: RADIOLOGY

## 2025-07-07 PROCEDURE — 99999 PR PBB SHADOW E&M-EST. PATIENT-LVL III: CPT | Mod: PBBFAC,,, | Performed by: PHYSICIAN ASSISTANT

## 2025-07-07 PROCEDURE — 99213 OFFICE O/P EST LOW 20 MIN: CPT | Mod: PBBFAC,25,PN | Performed by: PHYSICIAN ASSISTANT

## 2025-07-07 RX ORDER — CYCLOBENZAPRINE HCL 5 MG
5 TABLET ORAL NIGHTLY
Qty: 30 TABLET | Refills: 0 | Status: SHIPPED | OUTPATIENT
Start: 2025-07-07 | End: 2025-08-06

## 2025-07-07 NOTE — PROGRESS NOTES
Subjective:      Chief Complaint: Pain of the Right Hip and Pain of the Left Hip    Patient ID: Nilesh Rolon Jr. is a 50 y.o. male.  49yo male phmx alcoholic cirrhosis of the liver presents with 2 month history of bilateral hip pain.  Right worse than left.  No trauma.  Localized to the groin.  Worse with ambulation, sleeping, crossing legs.  He notes crepitus but denies instability, radicular symptoms, paresthesias.  He is currently not taking any medication for pain.        Past Medical History:   Diagnosis Date    DONG (acute kidney injury) 12/1/2021    Anxiety     Arthritis     Cirrhosis     Hypertension     Liver disease     Osteoarthritis     Other meniscus derangements, other medial meniscus, left knee 1/7/2019        Past Surgical History:   Procedure Laterality Date    ARTHROSCOPY OF KNEE Left 1/7/2019    Procedure: ARTHROSCOPY, KNEE;  Surgeon: Derick Kirby MD;  Location: [facility];  Service: Orthopedics;  Laterality: Left;    COLONOSCOPY N/A 7/27/2020    Procedure: COLONOSCOPY;  Surgeon: Sotero Zapata MD;  Location: [facility];  Service: Endoscopy;  Laterality: N/A;    COLONOSCOPY N/A 1/20/2022    Procedure: COLONOSCOPY Suprep Vaccinated;  Surgeon: Jacob Andrea MD;  Location: [facility];  Service: Endoscopy;  Laterality: N/A;    DEBRIDEMENT Right 10/4/2022    Procedure: EXTENSIVE DEBRIDEMENT;  Surgeon: Ernesto Diego IV, MD;  Location: [facility];  Service: Orthopedics;  Laterality: Right;    ESOPHAGOGASTRODUODENOSCOPY N/A 4/8/2019    Procedure: EGD (ESOPHAGOGASTRODUODENOSCOPY);  Surgeon: Bonita Kendall MD;  Location: [facility];  Service: Endoscopy;  Laterality: N/A;    ESOPHAGOGASTRODUODENOSCOPY N/A 7/27/2020    Procedure: EGD (ESOPHAGOGASTRODUODENOSCOPY);  Surgeon: Sotero Zapata MD;  Location: [facility];  Service: Endoscopy;  Laterality: N/A;    ESOPHAGOGASTRODUODENOSCOPY N/A 12/2/2021    Procedure: EGD (ESOPHAGOGASTRODUODENOSCOPY);  Surgeon: Montez Guerra MD;  Location: [address]  FLR);  Service: Endoscopy;  Laterality: N/A;    ESOPHAGOGASTRODUODENOSCOPY N/A 1/20/2022    Procedure: EGD (ESOPHAGOGASTRODUODENOSCOPY);  Surgeon: Jacob Andrea MD;  Location: [facility];  Service: Endoscopy;  Laterality: N/A;  please order CBC with plts and INR day of case.    KNEE SURGERY      REPAIR OF BONE  10/4/2022    Procedure: HUMORAL HEAD OSTEOPLASTY;  Surgeon: Ernesto Diego IV, MD;  Location: [facility] OR;  Service: Orthopedics;;    SHOULDER ARTHROSCOPY Right 10/4/2022    Procedure: ARTHROSCOPY, SHOULDER; rotator cuff repair, mini-open biceps tenodesis with CAM procedure,;  Surgeon: Ernesto Diego IV, MD;  Location: [facility] OR;  Service: Orthopedics;  Laterality: Right;  Beachchair position, spider arm cordova, arthrex suture anchors, arthrex biceps tenodesis button, Dr. Diego special Kandice retractor set  Have available depuy/synthes [place] straight suture passer          Current Outpatient Medications   Medication Instructions    cefdinir (OMNICEF) 300 mg, Every 12 hours    cholecalciferol (vitamin D3) 50,000 Units, Every 7 days    CIPRODEX 0.3-0.1 % DrpS Place into both ears.    clotrimazole-betamethasone 1-0.05% (LOTRISONE) cream SMARTSIG:Sparingly Topical Daily    cyclobenzaprine (FLEXERIL) 10 mg, Nightly    cyclobenzaprine (FLEXERIL) 5 mg, Oral, Nightly    doxycycline (VIBRA-TABS) 100 mg, Daily    ELIDEL 1 % cream 2 times daily    ergocalciferol (ERGOCALCIFEROL) 50,000 Units, Every 7 days    FeroSuL 325 mg, Oral, 2 times daily    folic acid (FOLVITE) 1 mg, Oral, Daily    ibuprofen (ADVIL,MOTRIN) 800 mg, Every 6 hours PRN    ketoconazole (NIZORAL) 2 % cream 1 application , Daily    lactulose (CHRONULAC) 10 gram/15 mL solution Take 30 mLs (20 g total) by mouth 2 (two) times daily. Goal of 3-4 bowel movements daily    meloxicam (MOBIC) 7.5 mg    multivitamin Tab 1 tablet, Oral, Daily    pantoprazole (PROTONIX) 40 mg, Oral, Daily    traZODone (DESYREL) 50 mg, Nightly        Review of patient's  "allergies indicates:  No Known Allergies    Review of Systems   Constitutional: Negative for fever and malaise/fatigue.   Eyes:  Negative for blurred vision.   Cardiovascular:  Negative for chest pain.   Respiratory:  Negative for shortness of breath.    Skin:  Negative for poor wound healing.   Musculoskeletal:  Positive for arthritis, joint pain and myalgias.   Genitourinary:  Negative for bladder incontinence.   Neurological:  Negative for dizziness, numbness and paresthesias.   Psychiatric/Behavioral:  Negative for altered mental status.        The patient's relevant past medical, surgical, and social history was reviewed in Epic.       Objective:      VITAL SIGNS: Ht 6' 3" (1.905 m)   Wt 96.2 kg (212 lb 1.3 oz)   BMI 26.51 kg/m²     General    Nursing note and vitals reviewed.  Constitutional: He is oriented to person, place, and time. He appears well-developed and well-nourished.   Neurological: He is alert and oriented to person, place, and time.     General Musculoskeletal Exam   Gait: antalgic         Right Hip Exam     Range of Motion   Abduction:  20   Extension:  10   Flexion:  60   External rotation:  40   Internal rotation:  10     Tests   Stinchfield test: positive    Other   Sensation: normal  Left Hip Exam     Range of Motion   Abduction:  30   Extension:  20   Flexion:  100   External rotation:  50   Internal rotation: 15     Tests   Stinchfield test: positive    Other   Sensation: normal          Muscle Strength   Right Lower Extremity   Hip Abduction: 4/5   Hip Adduction: 4/5   Hip Flexion: 4/5   Ankle Dorsiflexion:  5/5   Left Lower Extremity   Hip Abduction: 5/5   Hip Adduction: 5/5   Hip Flexion: 5/5   Ankle Dorsiflexion:  5/5        Imaging  X-Ray Hips Bilateral 2 View Incl AP Pelvis  Narrative: EXAMINATION:  XR HIPS BILATERAL 2 VIEW INCL AP PELVIS    CLINICAL HISTORY:  Pain in right hip    TECHNIQUE:  AP view of the pelvis and frogleg lateral views of both hips were " performed.    COMPARISON:  None.    FINDINGS:  DJD with squaring of the femoral heads identified bilaterally.  The hip joint spaces are narrowed more on the right side.  Deformity of the right ilium above the acetabulum probably related to previous trauma or surgery.  No acute fracture or dislocation.  No bone destruction identified  Impression: See above    Electronically signed by: Sotero Shah MD  Date:    07/07/2025  Time:    09:50    I have reviewed the above imaging and agree with the findings stated by the radiologist.           Assessment:       Nilesh Rolon Jr. is a 50 y.o. male seen in the office today for   1. Arthritis of both hips    2. Bilateral hip pain     Bilateral hip arthritis.  No evidence of avascular necrosis but most likely beginning stages given the history of alcohol abuse.  I explained this condition to the patient.  Discuss options which include oral medication for pain, formal physical therapy, intra-articular hip injections, and lastly total hip replacement.  He would like to proceed with home exercise program and hip injections at this time.  I recommend over-the-counter medication for pain.  He requested Flexeril prescription to take at night.  I will see him back in about 6-8 weeks for repeat assessment.      Plan:       Nilesh was seen today for pain and pain.    Diagnoses and all orders for this visit:    Arthritis of both hips  -     Ambulatory referral/consult to Interventional RAD; Future  -     cyclobenzaprine (FLEXERIL) 5 MG tablet; Take 1 tablet (5 mg total) by mouth nightly.    Bilateral hip pain  -     Ambulatory referral/consult to Interventional RAD; Future  -     cyclobenzaprine (FLEXERIL) 5 MG tablet; Take 1 tablet (5 mg total) by mouth nightly.          Diagnoses and plan discussed with the patient, as well as the expected course and duration of his symptoms.  All questions and concerns were addressed prior to the end of the visit.   Instructed patient to call  office if they have any future questions/concerns or to schedule apt. Patient will return to see me if symptoms worsen or fail to improve    Note dictated with voice recognition software, please excuse any grammatical errors.        Yany Kaur PA-C      Department of Orthopedic Surgery  [facility]  Office: [phone]  07/07/2025

## 2025-07-11 ENCOUNTER — PATIENT MESSAGE (OUTPATIENT)
Dept: ORTHOPEDICS | Facility: CLINIC | Age: 50
End: 2025-07-11
Payer: MEDICAID

## 2025-07-14 ENCOUNTER — PATIENT MESSAGE (OUTPATIENT)
Dept: INTERVENTIONAL RADIOLOGY/VASCULAR | Facility: CLINIC | Age: 50
End: 2025-07-14
Payer: MEDICAID

## 2025-07-15 DIAGNOSIS — M16.0 ARTHRITIS OF BOTH HIPS: Primary | ICD-10-CM

## 2025-07-15 DIAGNOSIS — M25.551 BILATERAL HIP PAIN: ICD-10-CM

## 2025-07-15 DIAGNOSIS — M25.552 BILATERAL HIP PAIN: ICD-10-CM

## 2025-07-17 ENCOUNTER — PATIENT MESSAGE (OUTPATIENT)
Dept: INTERVENTIONAL RADIOLOGY/VASCULAR | Facility: HOSPITAL | Age: 50
End: 2025-07-17
Payer: MEDICAID

## 2025-07-21 ENCOUNTER — TELEPHONE (OUTPATIENT)
Dept: INTERVENTIONAL RADIOLOGY/VASCULAR | Facility: HOSPITAL | Age: 50
End: 2025-07-21
Payer: MEDICAID

## 2025-07-21 NOTE — NURSING
Procedure confirmation call complete.  2 patient identifier used (name and ).   Arrival time 0630.      Patient confirmed will have a ride home.

## 2025-07-23 ENCOUNTER — HOSPITAL ENCOUNTER (OUTPATIENT)
Dept: INTERVENTIONAL RADIOLOGY/VASCULAR | Facility: HOSPITAL | Age: 50
Discharge: HOME | End: 2025-07-23
Attending: FAMILY MEDICINE
Payer: MEDICAID

## 2025-07-23 VITALS
BODY MASS INDEX: 24.87 KG/M2 | WEIGHT: 200 LBS | OXYGEN SATURATION: 100 % | RESPIRATION RATE: 17 BRPM | TEMPERATURE: 98 F | SYSTOLIC BLOOD PRESSURE: 122 MMHG | DIASTOLIC BLOOD PRESSURE: 80 MMHG | HEIGHT: 75 IN | HEART RATE: 66 BPM

## 2025-07-23 DIAGNOSIS — M16.0 ARTHRITIS OF BOTH HIPS: ICD-10-CM

## 2025-07-23 DIAGNOSIS — M25.552 BILATERAL HIP PAIN: ICD-10-CM

## 2025-07-23 DIAGNOSIS — M19.90 ARTHRITIS: ICD-10-CM

## 2025-07-23 DIAGNOSIS — M25.551 BILATERAL HIP PAIN: ICD-10-CM

## 2025-07-23 PROCEDURE — 63600175 PHARM REV CODE 636 W HCPCS: Mod: JZ,TB | Performed by: RADIOLOGY

## 2025-07-23 PROCEDURE — 99900035 HC TECH TIME PER 15 MIN (STAT)

## 2025-07-23 PROCEDURE — 94760 N-INVAS EAR/PLS OXIMETRY 1: CPT

## 2025-07-23 PROCEDURE — 25500020 PHARM REV CODE 255: Performed by: RADIOLOGY

## 2025-07-23 RX ORDER — METHYLPREDNISOLONE ACETATE 40 MG/ML
INJECTION, SUSPENSION INTRA-ARTICULAR; INTRALESIONAL; INTRAMUSCULAR; SOFT TISSUE
Status: COMPLETED | OUTPATIENT
Start: 2025-07-23 | End: 2025-07-23

## 2025-07-23 RX ORDER — LIDOCAINE HYDROCHLORIDE 20 MG/ML
INJECTION, SOLUTION INFILTRATION; PERINEURAL
Status: COMPLETED | OUTPATIENT
Start: 2025-07-23 | End: 2025-07-23

## 2025-07-23 RX ORDER — LIDOCAINE HYDROCHLORIDE 10 MG/ML
1 INJECTION, SOLUTION EPIDURAL; INFILTRATION; INTRACAUDAL; PERINEURAL ONCE
Status: DISCONTINUED | OUTPATIENT
Start: 2025-07-23 | End: 2025-07-24 | Stop reason: HOSPADM

## 2025-07-23 RX ORDER — SODIUM CHLORIDE 9 MG/ML
INJECTION, SOLUTION INTRAVENOUS CONTINUOUS
Status: DISCONTINUED | OUTPATIENT
Start: 2025-07-23 | End: 2025-07-24 | Stop reason: HOSPADM

## 2025-07-23 RX ORDER — BUPIVACAINE HYDROCHLORIDE 2.5 MG/ML
INJECTION, SOLUTION EPIDURAL; INFILTRATION; INTRACAUDAL; PERINEURAL
Status: COMPLETED | OUTPATIENT
Start: 2025-07-23 | End: 2025-07-23

## 2025-07-23 RX ADMIN — IOHEXOL 10 ML: 180 INJECTION INTRAVENOUS at 08:07

## 2025-07-23 RX ADMIN — LIDOCAINE HYDROCHLORIDE 10 ML: 20 INJECTION, SOLUTION INFILTRATION; PERINEURAL at 08:07

## 2025-07-23 RX ADMIN — METHYLPREDNISOLONE ACETATE 80 MG: 40 INJECTION, SUSPENSION INTRA-ARTICULAR; INTRALESIONAL; INTRAMUSCULAR; SOFT TISSUE at 08:07

## 2025-07-23 RX ADMIN — BUPIVACAINE HYDROCHLORIDE 20 ML: 2.5 INJECTION, SOLUTION EPIDURAL; INFILTRATION; INTRACAUDAL; PERINEURAL at 08:07

## 2025-07-23 NOTE — PROCEDURES
Radiology Post-Procedure Note    Pre Op Diagnosis: Hip pain    Post Op Diagnosis: Same    Procedure: Bilateral hip joint steroid injection    Procedure performed by: Frank Villanueva MD    Written Informed Consent Obtained: Yes  Specimen Removed: NO  Estimated Blood Loss: Minimal    Findings:   Under fluoroscopic guidance, 26 gauge needle advanced sequentially to each hip joint and injection of 8 cc of 0.25% bupivacaine and 40 mg depomedrol per joint space performed. Needles removed. No complications. See dictation.    Patient tolerated procedure well.    Frank Villanueva M.D.  Interventional Radiology  Department of Radiology

## 2025-07-23 NOTE — PLAN OF CARE
Bandage(s) are intact to the procedural site. Scant to no drainage noted. No edema or skin discoloration outside of skin antiseptic noted in perimeter of site.  Pt. Instructed to keep area dry and free from heat for 24 hrs, but may apply ice packs for 20 minutes as needed.      Discharge instructions given and explained to patient with verbalization of understanding all instructions. Patients v/s stable, denies n/v and tolerating po, rates pain level tolerable, and ready for patient discharge home.

## 2025-07-23 NOTE — DISCHARGE SUMMARY
Radiology Discharge Summary      Hospital Course: No complications    Admit Date: 7/23/2025  Discharge Date: 07/23/2025     Instructions Given to Patient: Yes  Diet: Resume prior diet  Activity: activity as tolerated    Description of Condition on Discharge: Stable  Vital Signs (Most Recent): Temp: 97.8 °F (36.6 °C) (07/23/25 0749)  Pulse: (!) 56 (07/23/25 0827)  Resp: 16 (07/23/25 0827)  BP: 118/75 (07/23/25 0827)  SpO2: 99 % (07/23/25 0827)    Discharge Disposition: Home    Discharge Diagnosis: Hip pain s/p bilateral hip joint injections    Follow up: As scheduled    Frank Villanueva M.D.  Interventional Radiology  Department of Radiology

## 2025-07-23 NOTE — Clinical Note
Bilateral: Groin.   Scrubbed with ChloroPrep With Tint.   Painted with Chlorohexidine/Alcohol.  Hair: N/A.  Skin prep dry before draping.  Prepped by: Shea Rich, RT 7/23/2025 8:12 AM.

## 2025-07-23 NOTE — H&P
Radiology History & Physical      SUBJECTIVE:     Chief Complaint: Hip pain    History of Present Illness:  Nilesh Rolon Jr. is a 50 y.o. male who presents for bilateral hip joint steroid injection  Past Medical History:   Diagnosis Date    DONG (acute kidney injury) 12/1/2021    Anxiety     Arthritis     Cirrhosis     Hypertension     Liver disease     Osteoarthritis     Other meniscus derangements, other medial meniscus, left knee 1/7/2019     Past Surgical History:   Procedure Laterality Date    ARTHROSCOPY OF KNEE Left 1/7/2019    Procedure: ARTHROSCOPY, KNEE;  Surgeon: Derick Kirby MD;  Location: [facility] OR;  Service: Orthopedics;  Laterality: Left;    COLONOSCOPY N/A 7/27/2020    Procedure: COLONOSCOPY;  Surgeon: Sotero Zapata MD;  Location: [facility] ENDO;  Service: Endoscopy;  Laterality: N/A;    COLONOSCOPY N/A 1/20/2022    Procedure: COLONOSCOPY Suprep Vaccinated;  Surgeon: Jacob Andrea MD;  Location: [facility];  Service: Endoscopy;  Laterality: N/A;    DEBRIDEMENT Right 10/4/2022    Procedure: EXTENSIVE DEBRIDEMENT;  Surgeon: Ernesto Diego IV, MD;  Location: [facility];  Service: Orthopedics;  Laterality: Right;    ESOPHAGOGASTRODUODENOSCOPY N/A 4/8/2019    Procedure: EGD (ESOPHAGOGASTRODUODENOSCOPY);  Surgeon: Bonita Kendall MD;  Location: [facility];  Service: Endoscopy;  Laterality: N/A;    ESOPHAGOGASTRODUODENOSCOPY N/A 7/27/2020    Procedure: EGD (ESOPHAGOGASTRODUODENOSCOPY);  Surgeon: Sotreo Zapata MD;  Location: [facility];  Service: Endoscopy;  Laterality: N/A;    ESOPHAGOGASTRODUODENOSCOPY N/A 12/2/2021    Procedure: EGD (ESOPHAGOGASTRODUODENOSCOPY);  Surgeon: Montez Guerra MD;  [facility]  Service: Endoscopy;  Laterality: N/A;    ESOPHAGOGASTRODUODENOSCOPY N/A 1/20/2022    Procedure: EGD (ESOPHAGOGASTRODUODENOSCOPY);  Surgeon: Jacob Andrea MD;  Location: [facility];  Service: Endoscopy;  Laterality: N/A;  please order CBC with plts and INR day of case.     KNEE SURGERY      REPAIR OF BONE  10/4/2022    Procedure: HUMORAL HEAD OSTEOPLASTY;  Surgeon: Ernesto Diego IV, MD;  Location: [facility] OR;  Service: Orthopedics;;    SHOULDER ARTHROSCOPY Right 10/4/2022    Procedure: ARTHROSCOPY, SHOULDER; rotator cuff repair, mini-open biceps tenodesis with CAM procedure,;  Surgeon: Ernesto Diego IV, MD;  Location: [facility] OR;  Service: Orthopedics;  Laterality: Right;  Beachchair position, spider arm cordova, arthrex suture anchors, arthrex biceps tenodesis button, Dr. Diego special Kandice retractor set  Have available depuy/synthes Ideal Pass straight suture passer         Home Meds:   Prior to Admission medications    Medication Sig Start Date End Date Taking? Authorizing Provider   cefdinir (OMNICEF) 300 MG capsule Take 300 mg by mouth every 12 (twelve) hours. 6/11/23   Provider, Historical   cholecalciferol, vitamin D3, 1,250 mcg (50,000 unit) capsule Take 50,000 Units by mouth every 7 days. 4/26/22   Provider, Historical   CIPRODEX 0.3-0.1 % DrpS Place into both ears. 6/12/23   Provider, Historical   clotrimazole-betamethasone 1-0.05% (LOTRISONE) cream SMARTSIG:Sparingly Topical Daily 7/11/22   Provider, Historical   cyclobenzaprine (FLEXERIL) 10 MG tablet Take 10 mg by mouth every evening. 11/7/22   Provider, Historical   cyclobenzaprine (FLEXERIL) 5 MG tablet Take 1 tablet (5 mg total) by mouth nightly. 7/7/25 8/6/25  Yany Kaur PA-C   doxycycline (VIBRA-TABS) 100 MG tablet Take 100 mg by mouth once daily. 5/6/22   Provider, Historical   ELIDEL 1 % cream Apply topically 2 (two) times daily. 7/11/22   Provider, Historical   ergocalciferol (ERGOCALCIFEROL) 50,000 unit Cap Take 50,000 Units by mouth every 7 days. 11/16/24   Provider, Historical   ferrous sulfate (FEROSUL) 325 mg (65 mg iron) Tab tablet Take 1 tablet (325 mg total) by mouth 2 (two) times daily. 11/21/23   Pb Mixon MD   folic acid (FOLVITE) 1 MG tablet Take 1 tablet (1 mg total) by mouth once daily.  12/26/24   Pb Mixon MD   ibuprofen (ADVIL,MOTRIN) 800 MG tablet Take 800 mg by mouth every 6 (six) hours as needed. 9/12/22   Provider, Historical   ketoconazole (NIZORAL) 2 % cream Apply 1 application topically once daily. 5/6/22   Provider, Historical   lactulose (CHRONULAC) 10 gram/15 mL solution Take 30 mLs (20 g total) by mouth 2 (two) times daily. Goal of 3-4 bowel movements daily 5/21/24   Pb Mixon MD   meloxicam (MOBIC) 7.5 MG tablet Take 7.5 mg by mouth. 12/15/24   Provider, Historical   multivitamin Tab Take 1 tablet by mouth once daily. 7/1/25   Pb Mixon MD   pantoprazole (PROTONIX) 40 MG tablet Take 1 tablet (40 mg total) by mouth once daily.  Patient not taking: Reported on 12/23/2024 2/2/22 9/26/23  Savita Bianchi MD   traZODone (DESYREL) 50 MG tablet Take 50 mg by mouth every evening. 8/5/24   Provider, Historical     Anticoagulants/Antiplatelets: no anticoagulation    Allergies: Review of patient's allergies indicates:  No Known Allergies  Sedation History:  no adverse reactions    Review of Systems:   Hematological: no known coagulopathies  Respiratory: no shortness of breath  Cardiovascular: no chest pain  Gastrointestinal: no abdominal pain  Genito-Urinary: no dysuria  Musculoskeletal: negative  Neurological: no TIA or stroke symptoms         OBJECTIVE:     Vital Signs (Most Recent)       Physical Exam:  ASA: 2  Mallampati: 2    General: no acute distress  Mental Status: alert and oriented to person, place and time  HEENT: normocephalic, atraumatic  Chest: unlabored breathing  Heart: regular heart rate  Abdomen: nondistended  Extremity: moves all extremities    Laboratory  Lab Results   Component Value Date    INR 1.1 12/16/2024       Lab Results   Component Value Date    WBC 3.60 (L) 12/16/2024    HGB 14.5 12/16/2024    HCT 41.0 12/16/2024    MCV 91 12/16/2024    PLT 69 (L) 12/16/2024      Lab Results   Component Value Date    GLU 92 12/16/2024      12/16/2024    K 4.2 12/16/2024     12/16/2024    CO2 26 12/16/2024    BUN 15 12/16/2024    CREATININE 1.2 12/16/2024    CALCIUM 9.7 12/16/2024    MG 1.8 02/01/2022    ALT 22 12/16/2024    AST 26 12/16/2024    ALBUMIN 4.3 12/16/2024    BILITOT 0.7 12/16/2024    BILIDIR 3.7 (H) 07/27/2020       ASSESSMENT/PLAN:     Sedation Plan: Local only  Patient will undergo bilateral hip joint injections.    Frank Villanueva M.D.  Interventional Radiology  Department of Radiology

## 2025-07-23 NOTE — DISCHARGE INSTRUCTIONS
Joint injection/ Aspiration Instructions    Follow-up Care   Follow-up care is an important part of your treatment and safety. Be sure to make and go to all follow-up appointments after your tube has been placed. You should expect to have follow-up care from both the provider who ordered the joint injection/ aspiration.     After the procedure  - You can expect some increased discomfort or sorenessa the site site for the first 24-26 hours, after which the symptoms should go back to baseline or improve     Call your doctor if you are having problems or increased pain, swelling, redness or oozing at the site.     Diet and Medication   1. Unless specified on your personalized discharge instructions, continue previous medications and/or diet recommendations.   2. For questions about this please contact your primary care provider or the provider who ordered the procedure    Activity    Avoid any strenuous activity for the day, after that you can proceed with activities as tolerated.    Bandage  - You may remove the bandage 24 hours after the procedure    Showering   You may shower the day of the procedure, but do not submerge the sire for 24 hours      If you have any of the following symptoms, call the Interventional Radiology Clinic    Increased pain at the site   Increased or more short of breath    Continuous vomiting    Fever (greater than 100.6 degrees F) or chills    Foul smelling drainage or abnormal bleeding from the access site       Call 911 if you have any of the following signs and symptoms, or if you think you need emergency care.    Shortness of breath    Chest pain    Passing out, fainting (loss of consciousness)    Interventional Radiology Clinic   For complications   [phone]. Monday - Friday, 8:00 am - 4:00 pm    [phone] After hours and on holidays. Ask to speak with the interventional radiologist on call.     For Scheduling   [phone] Monday - Friday, 8:00 am - 4:00 pm

## 2025-08-11 ENCOUNTER — TELEPHONE (OUTPATIENT)
Dept: HEPATOLOGY | Facility: CLINIC | Age: 50
End: 2025-08-11
Payer: MEDICAID

## 2025-08-11 RX ORDER — FOLIC ACID 1 MG/1
1 TABLET ORAL DAILY
Qty: 30 TABLET | Refills: 5 | Status: SHIPPED | OUTPATIENT
Start: 2025-08-11

## 2025-08-20 ENCOUNTER — OFFICE VISIT (OUTPATIENT)
Dept: ORTHOPEDICS | Facility: CLINIC | Age: 50
End: 2025-08-20
Payer: MEDICAID

## 2025-08-20 VITALS — HEIGHT: 75 IN | WEIGHT: 191.38 LBS | BODY MASS INDEX: 23.8 KG/M2

## 2025-08-20 DIAGNOSIS — M16.0 ARTHRITIS OF BOTH HIPS: Primary | ICD-10-CM

## 2025-08-20 DIAGNOSIS — M25.551 RIGHT HIP PAIN: ICD-10-CM

## 2025-08-20 PROCEDURE — 3008F BODY MASS INDEX DOCD: CPT | Mod: CPTII,,, | Performed by: PHYSICIAN ASSISTANT

## 2025-08-20 PROCEDURE — 99213 OFFICE O/P EST LOW 20 MIN: CPT | Mod: PBBFAC,PN | Performed by: PHYSICIAN ASSISTANT

## 2025-08-20 PROCEDURE — 99999 PR PBB SHADOW E&M-EST. PATIENT-LVL III: CPT | Mod: PBBFAC,,, | Performed by: PHYSICIAN ASSISTANT

## 2025-08-20 PROCEDURE — 99213 OFFICE O/P EST LOW 20 MIN: CPT | Mod: S$PBB,,, | Performed by: PHYSICIAN ASSISTANT

## 2025-08-20 PROCEDURE — 1159F MED LIST DOCD IN RCRD: CPT | Mod: CPTII,,, | Performed by: PHYSICIAN ASSISTANT

## (undated) DEVICE — CANNULA CRYTSAL PT 5.75MMX7CM

## (undated) DEVICE — TOWEL OR DISP STRL BLUE 4/PK

## (undated) DEVICE — SYS CLSR DERMABOND PRINEO 22CM

## (undated) DEVICE — SYR 50CC LL

## (undated) DEVICE — SEE MEDLINE ITEM 146313

## (undated) DEVICE — SPONGE DERMACEA GAUZE 4X4

## (undated) DEVICE — TUBING 4-LEAD ARTHOSCOPY

## (undated) DEVICE — SEE MEDLINE ITEM 146231

## (undated) DEVICE — GOWN POLY REINF X-LONG 2XL

## (undated) DEVICE — BLADE SURG CARBON STEEL SZ11

## (undated) DEVICE — TAPE ADH MEDIPORE 4 X 10YDS

## (undated) DEVICE — KIT SHOULDER POSITIONER SPIDER

## (undated) DEVICE — MANIFOLD 4 PORT

## (undated) DEVICE — DRAPE INCISE IOBAN 2 23X17IN

## (undated) DEVICE — CLOSURE SKIN STERI STRIP 1/2X4

## (undated) DEVICE — COVER PROXIMA MAYO STAND

## (undated) DEVICE — SLING SHOT II LARGE

## (undated) DEVICE — SEE MEDLINE ITEM 157216

## (undated) DEVICE — DURAPREP SURG SCRUB 26ML

## (undated) DEVICE — ELECTRODE REM PLYHSV RETURN 9

## (undated) DEVICE — SUT VICRYL 2 0 CT 2

## (undated) DEVICE — ADHESIVE DERMABOND ADVANCED

## (undated) DEVICE — NDL ARTHSCP MF SCORPION

## (undated) DEVICE — DRESSING XEROFORM FOIL PK 1X8

## (undated) DEVICE — DRAPE THREE-QTR REINF 53X77IN

## (undated) DEVICE — DRESSING GAUZE XEROFORM 5X9

## (undated) DEVICE — SUT ETHILON 3-0 PS2 18 BLK

## (undated) DEVICE — PAD ABDOMINAL 5X9 STERILE

## (undated) DEVICE — SEE MEDLINE ITEM 152530

## (undated) DEVICE — SYR ONLY LUER LOCK 20CC

## (undated) DEVICE — DRAPE STERI-DRAPE 1000 17X11IN

## (undated) DEVICE — GAUZE SPONGE 4X4 12PLY

## (undated) DEVICE — CANNULA TWIST IN 7MM X 7CM

## (undated) DEVICE — SUPPORT ULNA NERVE PROTECTOR

## (undated) DEVICE — CANNULA ARTHROSCOPIC

## (undated) DEVICE — PACK SURGERY START

## (undated) DEVICE — NDL HYPO REG 25G X 1 1/2

## (undated) DEVICE — SUT ETHILON 3-0 BLK MONO FS

## (undated) DEVICE — DRAPE STERI U-SHAPED 47X51IN

## (undated) DEVICE — GLOVE BIOGEL PIMICRO INDIC 8.5

## (undated) DEVICE — ABLATOR EXTENDED LENGTH

## (undated) DEVICE — GLOVE 8 PROTEXIS PI BLUE

## (undated) DEVICE — PAD SHOULDER CARE POLAR

## (undated) DEVICE — SEE MEDLINE ITEM 156953

## (undated) DEVICE — CLOSURE STERI STRIP .5X4IN TAN

## (undated) DEVICE — DRAPE U SPLIT SHEET 54X76IN

## (undated) DEVICE — NDL 18GA X1 1/2 REG BEVEL

## (undated) DEVICE — DRAPE PLASTIC U 60X72

## (undated) DEVICE — PILLOW FACE ADLT FOAM W/VELCRO

## (undated) DEVICE — SPONGE GAUZE 16PLY 4X4

## (undated) DEVICE — TUBE SET INFLOW/OUTFLOW

## (undated) DEVICE — DRESSING AQUACEL SACRAL 9 X 9

## (undated) DEVICE — NDL SPINAL 18GX3.5 SPINOCAN

## (undated) DEVICE — ALCOHOL 70% ISOP W/GREEN 16OZ

## (undated) DEVICE — APPLICATOR CHLORAPREP ORN 26ML

## (undated) DEVICE — MAT SUCTION PUDDLEVAC ORANGE

## (undated) DEVICE — TUBING SUC UNIV W/CONN 12FT

## (undated) DEVICE — GLOVE 7.5 PROTEXIS PI ORTHO PF

## (undated) DEVICE — DRAPE TOP 53X102IN

## (undated) DEVICE — DRESSING MEDIPORE CLTH 3.5X6IN

## (undated) DEVICE — TAPE MEDIPORE 1 X 10YD

## (undated) DEVICE — GLOVE BIOGEL SKINSENSE PI 8.5

## (undated) DEVICE — SYR 30CC LUER LOCK

## (undated) DEVICE — PACK BASIC

## (undated) DEVICE — BLADE SURG #15 CARBON STEEL

## (undated) DEVICE — SEE MEDLINE ITEM 146292

## (undated) DEVICE — PAD ABD TNDRSRB 7.5X8 STRL

## (undated) DEVICE — COVER OVERHEAD SURG LT BLUE

## (undated) DEVICE — SOL IRR NACL .9% 3000ML

## (undated) DEVICE — PACK FLUID CONTROL SHOULDER

## (undated) DEVICE — SEE MEDLINE ITEM 152622

## (undated) DEVICE — SUT BLU BR 2 TAPERD NDL 1/2

## (undated) DEVICE — GOWN POLY REINF BRTH SLV LG

## (undated) DEVICE — BNDG COFLEX FOAM LF2 ST 6X5YD

## (undated) DEVICE — SUT MONOCRYL 3-0 PS-2 UND